# Patient Record
Sex: MALE | Race: WHITE | NOT HISPANIC OR LATINO | Employment: OTHER | URBAN - NONMETROPOLITAN AREA
[De-identification: names, ages, dates, MRNs, and addresses within clinical notes are randomized per-mention and may not be internally consistent; named-entity substitution may affect disease eponyms.]

---

## 2017-01-17 ENCOUNTER — HOSPITAL ENCOUNTER (INPATIENT)
Facility: HOSPITAL | Age: 47
LOS: 10 days | Discharge: HOME | End: 2017-01-27
Attending: FAMILY MEDICINE | Admitting: FAMILY MEDICINE

## 2017-01-17 ENCOUNTER — APPOINTMENT (OUTPATIENT)
Dept: ULTRASOUND IMAGING | Facility: HOSPITAL | Age: 47
End: 2017-01-17

## 2017-01-17 ENCOUNTER — APPOINTMENT (OUTPATIENT)
Dept: CARDIOLOGY | Facility: HOSPITAL | Age: 47
End: 2017-01-17
Attending: INTERNAL MEDICINE

## 2017-01-17 ENCOUNTER — APPOINTMENT (OUTPATIENT)
Dept: CT IMAGING | Facility: HOSPITAL | Age: 47
End: 2017-01-17

## 2017-01-17 DIAGNOSIS — N08 HENOCH-SCHONLEIN PURPURA NEPHRITIS (HCC): Primary | ICD-10-CM

## 2017-01-17 DIAGNOSIS — D69.0 HENOCH-SCHONLEIN PURPURA NEPHRITIS (HCC): Primary | ICD-10-CM

## 2017-01-17 DIAGNOSIS — N17.9 ACUTE KIDNEY INJURY SUPERIMPOSED ON CHRONIC KIDNEY DISEASE (HCC): ICD-10-CM

## 2017-01-17 DIAGNOSIS — N08 HSP (HENOCH-SCHONLEIN PURPURA) NEPHRITIS (HCC): ICD-10-CM

## 2017-01-17 DIAGNOSIS — D69.0 HSP (HENOCH-SCHONLEIN PURPURA) NEPHRITIS (HCC): ICD-10-CM

## 2017-01-17 DIAGNOSIS — N18.9 ACUTE KIDNEY INJURY SUPERIMPOSED ON CHRONIC KIDNEY DISEASE (HCC): ICD-10-CM

## 2017-01-17 LAB
ALBUMIN SERPL-MCNC: 3.7 G/DL (ref 3.5–5)
ALBUMIN/GLOB SERPL: 0.9 G/DL (ref 1.1–2.5)
ALP SERPL-CCNC: 92 U/L (ref 24–120)
ALT SERPL W P-5'-P-CCNC: 61 U/L (ref 0–54)
ANION GAP SERPL CALCULATED.3IONS-SCNC: 16 MMOL/L (ref 4–13)
ANION GAP SERPL CALCULATED.3IONS-SCNC: 16 MMOL/L (ref 4–13)
ANION GAP SERPL CALCULATED.3IONS-SCNC: 17 MMOL/L (ref 4–13)
AST SERPL-CCNC: 36 U/L (ref 7–45)
BACTERIA UR QL AUTO: ABNORMAL /HPF
BASOPHILS # BLD AUTO: 0.02 10*3/MM3 (ref 0–0.2)
BASOPHILS NFR BLD AUTO: 0.2 % (ref 0–2)
BILIRUB SERPL-MCNC: 0.7 MG/DL (ref 0.1–1)
BILIRUB UR QL STRIP: NEGATIVE
BUN BLD-MCNC: 109 MG/DL (ref 5–21)
BUN BLD-MCNC: 99 MG/DL (ref 5–21)
BUN BLD-MCNC: 99 MG/DL (ref 5–21)
BUN/CREAT SERPL: 17.8 (ref 7–25)
BUN/CREAT SERPL: 18 (ref 7–25)
BUN/CREAT SERPL: 19.2 (ref 7–25)
CALCIUM SPEC-SCNC: 8.4 MG/DL (ref 8.4–10.4)
CALCIUM SPEC-SCNC: 8.6 MG/DL (ref 8.4–10.4)
CALCIUM SPEC-SCNC: 8.8 MG/DL (ref 8.4–10.4)
CHLORIDE SERPL-SCNC: 96 MMOL/L (ref 98–110)
CHLORIDE SERPL-SCNC: 98 MMOL/L (ref 98–110)
CHLORIDE SERPL-SCNC: 99 MMOL/L (ref 98–110)
CLARITY UR: CLEAR
CO2 SERPL-SCNC: 23 MMOL/L (ref 24–31)
CO2 SERPL-SCNC: 23 MMOL/L (ref 24–31)
CO2 SERPL-SCNC: 24 MMOL/L (ref 24–31)
COLOR UR: ABNORMAL
CREAT BLD-MCNC: 5.51 MG/DL (ref 0.5–1.4)
CREAT BLD-MCNC: 5.57 MG/DL (ref 0.5–1.4)
CREAT BLD-MCNC: 5.69 MG/DL (ref 0.5–1.4)
DEPRECATED RDW RBC AUTO: 48.5 FL (ref 40–54)
DEPRECATED RDW RBC AUTO: 49.5 FL (ref 40–54)
EOSINOPHIL # BLD AUTO: 0 10*3/MM3 (ref 0–0.7)
EOSINOPHIL NFR BLD AUTO: 0 % (ref 0–4)
ERYTHROCYTE [DISTWIDTH] IN BLOOD BY AUTOMATED COUNT: 16.6 % (ref 12–15)
ERYTHROCYTE [DISTWIDTH] IN BLOOD BY AUTOMATED COUNT: 16.6 % (ref 12–15)
GFR SERPL CREATININE-BSD FRML MDRD: 11 ML/MIN/1.73
GLOBULIN UR ELPH-MCNC: 4 GM/DL
GLUCOSE BLD-MCNC: 110 MG/DL (ref 70–100)
GLUCOSE BLD-MCNC: 121 MG/DL (ref 70–100)
GLUCOSE BLD-MCNC: 80 MG/DL (ref 70–100)
GLUCOSE UR STRIP-MCNC: NEGATIVE MG/DL
HCT VFR BLD AUTO: 33 % (ref 40–52)
HCT VFR BLD AUTO: 33.4 % (ref 40–52)
HGB BLD-MCNC: 10.6 G/DL (ref 14–18)
HGB BLD-MCNC: 10.9 G/DL (ref 14–18)
HGB UR QL STRIP.AUTO: ABNORMAL
HYALINE CASTS UR QL AUTO: ABNORMAL /LPF
IMM GRANULOCYTES # BLD: 0.02 10*3/MM3 (ref 0–0.03)
IMM GRANULOCYTES NFR BLD: 0.2 % (ref 0–5)
KETONES UR QL STRIP: NEGATIVE
LEUKOCYTE ESTERASE UR QL STRIP.AUTO: ABNORMAL
LYMPHOCYTES # BLD AUTO: 2.59 10*3/MM3 (ref 0.72–4.86)
LYMPHOCYTES NFR BLD AUTO: 23.1 % (ref 15–45)
MCH RBC QN AUTO: 26 PG (ref 28–32)
MCH RBC QN AUTO: 26.1 PG (ref 28–32)
MCHC RBC AUTO-ENTMCNC: 32.1 G/DL (ref 33–36)
MCHC RBC AUTO-ENTMCNC: 32.6 G/DL (ref 33–36)
MCV RBC AUTO: 80.1 FL (ref 82–95)
MCV RBC AUTO: 80.9 FL (ref 82–95)
MONOCYTES # BLD AUTO: 0.84 10*3/MM3 (ref 0.19–1.3)
MONOCYTES NFR BLD AUTO: 7.5 % (ref 4–12)
NEUTROPHILS # BLD AUTO: 7.74 10*3/MM3 (ref 1.87–8.4)
NEUTROPHILS NFR BLD AUTO: 69 % (ref 39–78)
NITRITE UR QL STRIP: NEGATIVE
PH UR STRIP.AUTO: 6.5 [PH] (ref 5–8)
PLATELET # BLD AUTO: 128 10*3/MM3 (ref 130–400)
PLATELET # BLD AUTO: 133 10*3/MM3 (ref 130–400)
PMV BLD AUTO: 9.3 FL (ref 6–12)
PMV BLD AUTO: 9.4 FL (ref 6–12)
POTASSIUM BLD-SCNC: 4.7 MMOL/L (ref 3.5–5.3)
POTASSIUM BLD-SCNC: 5.1 MMOL/L (ref 3.5–5.3)
POTASSIUM BLD-SCNC: 5.3 MMOL/L (ref 3.5–5.3)
PROT SERPL-MCNC: 7.7 G/DL (ref 6.3–8.7)
PROT UR QL STRIP: ABNORMAL
RBC # BLD AUTO: 4.08 10*6/MM3 (ref 4.8–5.9)
RBC # BLD AUTO: 4.17 10*6/MM3 (ref 4.8–5.9)
RBC # UR: ABNORMAL /HPF
REF LAB TEST METHOD: ABNORMAL
SODIUM BLD-SCNC: 136 MMOL/L (ref 135–145)
SODIUM BLD-SCNC: 138 MMOL/L (ref 135–145)
SODIUM BLD-SCNC: 138 MMOL/L (ref 135–145)
SP GR UR STRIP: 1.01 (ref 1–1.03)
SQUAMOUS #/AREA URNS HPF: ABNORMAL /HPF
UROBILINOGEN UR QL STRIP: ABNORMAL
WBC NRBC COR # BLD: 11.21 10*3/MM3 (ref 4.8–10.8)
WBC NRBC COR # BLD: 12.75 10*3/MM3 (ref 4.8–10.8)
WBC UR QL AUTO: ABNORMAL /HPF

## 2017-01-17 PROCEDURE — 87086 URINE CULTURE/COLONY COUNT: CPT | Performed by: FAMILY MEDICINE

## 2017-01-17 PROCEDURE — 93306 TTE W/DOPPLER COMPLETE: CPT | Performed by: INTERNAL MEDICINE

## 2017-01-17 PROCEDURE — 85027 COMPLETE CBC AUTOMATED: CPT | Performed by: INTERNAL MEDICINE

## 2017-01-17 PROCEDURE — 76775 US EXAM ABDO BACK WALL LIM: CPT

## 2017-01-17 PROCEDURE — 25010000002 METHYLPREDNISOLONE PER 125 MG: Performed by: FAMILY MEDICINE

## 2017-01-17 PROCEDURE — 25010000002 PERFLUTREN (DEFINITY) 8.476 MG IN SODIUM CHLORIDE 10 ML INJECTION: Performed by: INTERNAL MEDICINE

## 2017-01-17 PROCEDURE — 80053 COMPREHEN METABOLIC PANEL: CPT | Performed by: FAMILY MEDICINE

## 2017-01-17 PROCEDURE — 81001 URINALYSIS AUTO W/SCOPE: CPT | Performed by: FAMILY MEDICINE

## 2017-01-17 PROCEDURE — 85025 COMPLETE CBC W/AUTO DIFF WBC: CPT | Performed by: FAMILY MEDICINE

## 2017-01-17 PROCEDURE — 93005 ELECTROCARDIOGRAM TRACING: CPT | Performed by: INTERNAL MEDICINE

## 2017-01-17 PROCEDURE — 99284 EMERGENCY DEPT VISIT MOD MDM: CPT

## 2017-01-17 PROCEDURE — 25010000002 MORPHINE SULFATE (PF) 2 MG/ML SOLUTION: Performed by: FAMILY MEDICINE

## 2017-01-17 PROCEDURE — 25010000002 METHYLPREDNISOLONE PER 125 MG: Performed by: INTERNAL MEDICINE

## 2017-01-17 PROCEDURE — 94799 UNLISTED PULMONARY SVC/PX: CPT

## 2017-01-17 PROCEDURE — 99253 IP/OBS CNSLTJ NEW/EST LOW 45: CPT | Performed by: UROLOGY

## 2017-01-17 PROCEDURE — 80048 BASIC METABOLIC PNL TOTAL CA: CPT | Performed by: NURSE PRACTITIONER

## 2017-01-17 PROCEDURE — 25010000002 CEFTRIAXONE: Performed by: INTERNAL MEDICINE

## 2017-01-17 PROCEDURE — 25010000002 ONDANSETRON PER 1 MG: Performed by: FAMILY MEDICINE

## 2017-01-17 PROCEDURE — C8929 TTE W OR WO FOL WCON,DOPPLER: HCPCS

## 2017-01-17 PROCEDURE — 93010 ELECTROCARDIOGRAM REPORT: CPT | Performed by: INTERNAL MEDICINE

## 2017-01-17 PROCEDURE — 74176 CT ABD & PELVIS W/O CONTRAST: CPT

## 2017-01-17 PROCEDURE — 80048 BASIC METABOLIC PNL TOTAL CA: CPT | Performed by: INTERNAL MEDICINE

## 2017-01-17 RX ORDER — ACETAMINOPHEN 325 MG/1
650 TABLET ORAL EVERY 6 HOURS PRN
Status: DISCONTINUED | OUTPATIENT
Start: 2017-01-17 | End: 2017-01-20 | Stop reason: SDUPTHER

## 2017-01-17 RX ORDER — ALBUTEROL SULFATE 90 UG/1
AEROSOL, METERED RESPIRATORY (INHALATION) EVERY 6 HOURS
Status: ON HOLD | COMMUNITY
End: 2020-08-25

## 2017-01-17 RX ORDER — ONDANSETRON 2 MG/ML
4 INJECTION INTRAMUSCULAR; INTRAVENOUS ONCE
Status: COMPLETED | OUTPATIENT
Start: 2017-01-17 | End: 2017-01-17

## 2017-01-17 RX ORDER — LISINOPRIL AND HYDROCHLOROTHIAZIDE 12.5; 1 MG/1; MG/1
1 TABLET ORAL DAILY
COMMUNITY
Start: 2016-11-03 | End: 2017-01-27 | Stop reason: HOSPADM

## 2017-01-17 RX ORDER — IPRATROPIUM BROMIDE AND ALBUTEROL SULFATE 2.5; .5 MG/3ML; MG/3ML
3 SOLUTION RESPIRATORY (INHALATION) EVERY 4 HOURS PRN
Status: DISCONTINUED | OUTPATIENT
Start: 2017-01-17 | End: 2017-01-27 | Stop reason: HOSPADM

## 2017-01-17 RX ORDER — SODIUM CHLORIDE 9 MG/ML
150 INJECTION, SOLUTION INTRAVENOUS CONTINUOUS
Status: DISCONTINUED | OUTPATIENT
Start: 2017-01-17 | End: 2017-01-22

## 2017-01-17 RX ORDER — METHYLPREDNISOLONE 4 MG/1
TABLET ORAL 2 TIMES DAILY
COMMUNITY
End: 2017-01-27 | Stop reason: HOSPADM

## 2017-01-17 RX ORDER — METHYLPREDNISOLONE SODIUM SUCCINATE 125 MG/2ML
80 INJECTION, POWDER, LYOPHILIZED, FOR SOLUTION INTRAMUSCULAR; INTRAVENOUS EVERY 8 HOURS
Status: DISCONTINUED | OUTPATIENT
Start: 2017-01-17 | End: 2017-01-21

## 2017-01-17 RX ORDER — METHYLPREDNISOLONE SODIUM SUCCINATE 125 MG/2ML
125 INJECTION, POWDER, LYOPHILIZED, FOR SOLUTION INTRAMUSCULAR; INTRAVENOUS ONCE
Status: COMPLETED | OUTPATIENT
Start: 2017-01-17 | End: 2017-01-17

## 2017-01-17 RX ORDER — METHYLPREDNISOLONE SODIUM SUCCINATE 125 MG/2ML
80 INJECTION, POWDER, LYOPHILIZED, FOR SOLUTION INTRAMUSCULAR; INTRAVENOUS EVERY 8 HOURS
Status: DISCONTINUED | OUTPATIENT
Start: 2017-01-17 | End: 2017-01-17

## 2017-01-17 RX ORDER — CIPROFLOXACIN 500 MG/1
TABLET, FILM COATED ORAL
COMMUNITY
Start: 2016-12-30 | End: 2017-01-27 | Stop reason: HOSPADM

## 2017-01-17 RX ORDER — SODIUM CHLORIDE 9 MG/ML
125 INJECTION, SOLUTION INTRAVENOUS CONTINUOUS
Status: DISCONTINUED | OUTPATIENT
Start: 2017-01-17 | End: 2017-01-17 | Stop reason: ALTCHOICE

## 2017-01-17 RX ORDER — ONDANSETRON 2 MG/ML
4 INJECTION INTRAMUSCULAR; INTRAVENOUS EVERY 6 HOURS PRN
Status: DISCONTINUED | OUTPATIENT
Start: 2017-01-17 | End: 2017-01-27 | Stop reason: HOSPADM

## 2017-01-17 RX ORDER — SODIUM CHLORIDE 0.9 % (FLUSH) 0.9 %
1-10 SYRINGE (ML) INJECTION AS NEEDED
Status: DISCONTINUED | OUTPATIENT
Start: 2017-01-17 | End: 2017-01-27 | Stop reason: HOSPADM

## 2017-01-17 RX ORDER — MORPHINE SULFATE 2 MG/ML
2 INJECTION, SOLUTION INTRAMUSCULAR; INTRAVENOUS ONCE
Status: COMPLETED | OUTPATIENT
Start: 2017-01-17 | End: 2017-01-17

## 2017-01-17 RX ORDER — NICOTINE 21 MG/24HR
1 PATCH, TRANSDERMAL 24 HOURS TRANSDERMAL NIGHTLY
Status: DISCONTINUED | OUTPATIENT
Start: 2017-01-17 | End: 2017-01-27 | Stop reason: HOSPADM

## 2017-01-17 RX ADMIN — SODIUM CHLORIDE 1000 ML: 9 INJECTION, SOLUTION INTRAVENOUS at 02:09

## 2017-01-17 RX ADMIN — ONDANSETRON 4 MG: 2 INJECTION INTRAMUSCULAR; INTRAVENOUS at 02:11

## 2017-01-17 RX ADMIN — CEFTRIAXONE 1 G: 1 INJECTION, POWDER, FOR SOLUTION INTRAMUSCULAR; INTRAVENOUS at 05:17

## 2017-01-17 RX ADMIN — METHYLPREDNISOLONE SODIUM SUCCINATE 80 MG: 125 INJECTION, POWDER, FOR SOLUTION INTRAMUSCULAR; INTRAVENOUS at 17:13

## 2017-01-17 RX ADMIN — SODIUM CHLORIDE 150 ML/HR: 9 INJECTION, SOLUTION INTRAVENOUS at 21:58

## 2017-01-17 RX ADMIN — METHYLPREDNISOLONE SODIUM SUCCINATE 125 MG: 125 INJECTION, POWDER, FOR SOLUTION INTRAMUSCULAR; INTRAVENOUS at 02:14

## 2017-01-17 RX ADMIN — METHYLPREDNISOLONE SODIUM SUCCINATE 80 MG: 125 INJECTION, POWDER, FOR SOLUTION INTRAMUSCULAR; INTRAVENOUS at 11:03

## 2017-01-17 RX ADMIN — SODIUM CHLORIDE 150 ML/HR: 9 INJECTION, SOLUTION INTRAVENOUS at 15:25

## 2017-01-17 RX ADMIN — SODIUM CHLORIDE 125 ML/HR: 9 INJECTION, SOLUTION INTRAVENOUS at 01:47

## 2017-01-17 RX ADMIN — MORPHINE SULFATE 2 MG: 2 INJECTION, SOLUTION INTRAMUSCULAR; INTRAVENOUS at 02:11

## 2017-01-17 RX ADMIN — SODIUM CHLORIDE 5 ML: 9 INJECTION INTRAMUSCULAR; INTRAVENOUS; SUBCUTANEOUS at 16:26

## 2017-01-17 NOTE — ED PROVIDER NOTES
Subjective   History of Present Illness  Patient presents for hematuria and right flank pain and increase renal failure suspected.  Patient reports that he was recently admitted to [facility] for Henoch Schoenlein purpura he was treated and released and instructed to drink 3 gallons a day of water.  Patient reports that he was doing really well and then he started to have nausea and vomiting had difficulty keeping his fluids down he did go back to [facility] they told him his creatinine was much higher was in fact twice as high as it was when he was discharged however they told him to go home he needed to find a nephrologist in order to get that taken care off and they discharged him.  Patient's wife it was a nurse for 30 years she looked at the values realized something was wrong and she had him brought up here.  Nausea and vomiting has made this worse nothing has made this better.  He does not have a history of this prior to this episode this month.  Patient reports that this flank pain has been increasingly worse today however.  Review of Systems   Constitutional: Negative for activity change, chills, fatigue and fever.   HENT: Negative for congestion and dental problem.    Eyes: Negative for pain, discharge and itching.   Respiratory: Negative for apnea and chest tightness.    Cardiovascular: Negative for chest pain and palpitations.   Gastrointestinal: Positive for nausea and vomiting. Negative for abdominal pain and diarrhea.   Endocrine: Negative for polydipsia and polyuria.   Genitourinary: Positive for flank pain, frequency and hematuria. Negative for difficulty urinating and dysuria.   Musculoskeletal: Negative for arthralgias, neck pain and neck stiffness.   Neurological: Negative for dizziness and headaches.   Hematological: Negative for adenopathy. Does not bruise/bleed easily.   Psychiatric/Behavioral: Negative for agitation and behavioral problems.       Past  Medical History   Diagnosis Date   • A-fib    • Chronic renal failure    • Elevated PSA    • Purpura        No Known Allergies    Past Surgical History   Procedure Laterality Date   • Appendectomy         History reviewed. No pertinent family history.    Social History     Social History   • Marital status:      Spouse name: N/A   • Number of children: N/A   • Years of education: N/A     Social History Main Topics   • Smoking status: Heavy Tobacco Smoker   • Smokeless tobacco: None   • Alcohol use Yes   • Drug use: None   • Sexual activity: Not Asked     Other Topics Concern   • None     Social History Narrative   • None       Lab Results (last 24 hours)     Procedure Component Value Units Date/Time    Urinalysis With / Culture If Indicated [[id number]]  (Abnormal) Collected:  01/17/17 0044    Specimen:  Urine from Urine, Clean Catch Updated:  01/17/17 0129     Color, UA Red (A)      Appearance, UA Clear      pH, UA 6.5      Specific Gravity, UA 1.014      Glucose, UA Negative      Ketones, UA Negative      Bilirubin, UA Negative      Blood, UA Large (3+) (A)      Protein,  mg/dL (2+) (A)      Leuk Esterase, UA Small (1+) (A)      Nitrite, UA Negative      Urobilinogen, UA 0.2 E.U./dL     Urinalysis, Microscopic Only [[id number]]  (Abnormal) Collected:  01/17/17 0044    Specimen:  Urine from Urine, Clean Catch Updated:  01/17/17 0129     RBC, UA Too Numerous to Count (A) /HPF      WBC, UA 6-12 (A) /HPF      Bacteria, UA None Seen /HPF      Squamous Epithelial Cells, UA 0-2 /HPF      Hyaline Casts, UA 0-2 /LPF      Methodology Manual Light Microscopy     Urine Culture [[id number]] Collected:  01/17/17 0044    Specimen:  Urine from Urine, Clean Catch Updated:  01/17/17 0057    CBC & Differential [[id number]] Collected:  01/17/17 0115    Specimen:  Blood Updated:  01/17/17 0130    Narrative:       The following orders were created for panel order CBC & Differential.  Procedure                                Abnormality         Status                     ---------                               -----------         ------                     CBC Auto Differential[[id number]]         Abnormal            Final result                 Please view results for these tests on the individual orders.    Comprehensive Metabolic Panel [[id number]]  (Abnormal) Collected:  01/17/17 0115    Specimen:  Blood Updated:  01/17/17 0142     Glucose 80 mg/dL       (H) mg/dL      Creatinine 5.69 (H) mg/dL      Sodium 138 mmol/L      Potassium 4.7 mmol/L      Chloride 98 mmol/L      CO2 24.0 mmol/L      Calcium 8.8 mg/dL      Total Protein 7.7 g/dL      Albumin 3.70 g/dL      ALT (SGPT) 61 (H) U/L      AST (SGOT) 36 U/L      Alkaline Phosphatase 92 U/L      Total Bilirubin 0.7 mg/dL      eGFR Non African Amer 11 (L) mL/min/1.73      Globulin 4.0 gm/dL      A/G Ratio 0.9 (L) g/dL      BUN/Creatinine Ratio 19.2      Anion Gap 16.0 (H) mmol/L     CBC Auto Differential [[id number]]  (Abnormal) Collected:  01/17/17 0115    Specimen:  Blood Updated:  01/17/17 0130     WBC 11.21 (H) 10*3/mm3      RBC 4.17 (L) 10*6/mm3      Hemoglobin 10.9 (L) g/dL      Hematocrit 33.4 (L) %      MCV 80.1 (L) fL      MCH 26.1 (L) pg      MCHC 32.6 (L) g/dL      RDW 16.6 (H) %      RDW-SD 48.5 fl      MPV 9.3 fL      Platelets 133 10*3/mm3      Neutrophil % 69.0 %      Lymphocyte % 23.1 %      Monocyte % 7.5 %      Eosinophil % 0.0 %      Basophil % 0.2 %      Immature Grans % 0.2 %      Neutrophils, Absolute 7.74 10*3/mm3      Lymphocytes, Absolute 2.59 10*3/mm3      Monocytes, Absolute 0.84 10*3/mm3      Eosinophils, Absolute 0.00 10*3/mm3      Basophils, Absolute 0.02 10*3/mm3      Immature Grans, Absolute 0.02 10*3/mm3           Objective   Physical Exam   Constitutional: He is oriented to person, place, and time. He appears well-developed and well-nourished.   HENT:   Head: Normocephalic and atraumatic.   Eyes: Conjunctivae and EOM are normal. Pupils are equal,  "round, and reactive to light.   Neck: Normal range of motion. Neck supple.   Cardiovascular: Normal rate and regular rhythm.    Pulmonary/Chest: Effort normal and breath sounds normal.   Abdominal: Soft. Bowel sounds are normal.   Musculoskeletal: He exhibits no tenderness or deformity.   Neurological: He is alert and oriented to person, place, and time.   Skin: Skin is warm and dry. Rash noted.   Purpura most pronounced on bilateral lower extremities.   Nursing note and vitals reviewed.      Procedures         CT Abdomen Pelvis Without Contrast    (Results Pending)       Visit Vitals   • /86   • Pulse 80   • Temp 98.6 °F (37 °C) (Temporal Artery )   • Resp 21   • Ht 73\" (185.4 cm)   • Wt 219 lb (99.3 kg)   • SpO2 99%   • BMI 28.89 kg/m2       ED Course    ED Course       Medications   sodium chloride 0.9 % infusion (125 mL/hr Intravenous New Bag 1/17/17 0147)   methylPREDNISolone sodium succinate (SOLU-Medrol) injection 80 mg (not administered)   acetaminophen (TYLENOL) tablet 650 mg (not administered)   ondansetron (ZOFRAN) injection 4 mg (not administered)   ipratropium-albuterol (DUO-NEB) nebulizer solution 3 mL (not administered)   sodium chloride 0.9 % infusion (not administered)   sodium chloride 0.9 % bolus 1,000 mL (0 mL Intravenous Stopped 1/17/17 0414)   methylPREDNISolone sodium succinate (SOLU-Medrol) injection 125 mg (125 mg Intravenous Given 1/17/17 0214)   ondansetron (ZOFRAN) injection 4 mg (4 mg Intravenous Given 1/17/17 0211)   Morphine sulfate (PF) injection 2 mg (2 mg Intravenous Given 1/17/17 0211)            MDM  Number of Diagnoses or Management Options  Henoch-Schonlein purpura nephritis: established and worsening  Diagnosis management comments: Spoke to nephrology they will consult in the a.m.  I did speak to jaime the mid-level for the nephrology group.  Spoke to Dr. Johan Dinh who will admit the patient for further workup.  Patient was given 1 L normal saline down here 125 mL of " saline per hour is now ordered and Dr. Dinh states that he will continue that.  Explained patient all the findings patient will be admitted to the service of the hospitalist for further evaluation and workup patient indicated understanding agreed with the plan.       Amount and/or Complexity of Data Reviewed  Clinical lab tests: ordered and reviewed  Tests in the radiology section of CPT®: ordered and reviewed  Tests in the medicine section of CPT®: ordered and reviewed  Independent visualization of images, tracings, or specimens: yes    Risk of Complications, Morbidity, and/or Mortality  Presenting problems: moderate  Diagnostic procedures: moderate  Management options: moderate    Patient Progress  Patient progress: improved      Final diagnoses:   Henoch-Schonlein purpura nephritis    add the diagnosis acute renal failure, flank pain, vomiting, nausea     Skye Lee,   01/17/17 0433

## 2017-01-17 NOTE — H&P
TIME: 4:28 a.m.     PRIMARY CARE PHYSICIAN: Moises Montes MD    HISTORY OF PRESENT ILLNESS: Mr. Andrea as a 47-year-old  male who presents to the emergency department at [facility] due to a chief complaint of nausea and vomiting associated with dark urine and bilateral flank pain. Imaging studies in the emergency department demonstrate right-sided hydronephrosis. Mr. Andrea has a history of nephrolithiasis. To complicate matters somewhat, he also has a history of HSP (Henoch-Schonlein purpura). In the emergency department, he was found to have significantly worsening renal function. Mr. Andrea will require admission to the hospital for further evaluation and treatment.     Note that Mr. Andrea awoke on the morning of 01/02/2017 and noticed an usual rash on his lower extremities. At that time he was also producing a dark urine. He presented at [facility] Emergency Department and was admitted with a diagnosis of HSP. He remained in the hospital for approximately 3 days and was discharged in stable and improved condition. Repeat followup laboratory studies demonstrated stable renal function. However, he has since then developed nausea and vomiting and his renal function has worsened considerably.     Presently, he is awake and alert. He has no complaints. He is in no distress at this time. He will require admission to the hospital for further evaluation and treatment. The nephrology service has already been notified.     REVIEW OF SYSTEMS: Otherwise unremarkable from a cardiovascular, pulmonary, gastrointestinal, genitourinary, neurologic, psychiatric, metabolic and constitutional standpoint, except as noted. He has had no unusual fatigue, malaise, lethargy or weakness. He has had no definite fevers, chills, or sweats. His appetite is good. His weight is stable. He has had no chest pain, chest palpitations, shortness of breath, lower extremity swelling, orthopnea, cough,  wheezing, or hemoptysis. He has had no abdominal pain, but he has had nausea and vomiting. He has had no diarrhea or constipation. He has had no dysphagia, odynophagia, hematemesis, hematochezia, or melena. He relates bilateral flank pain. He has had no pelvic pain. He has dark-colored urine. He has had no dysuria. He has had no skin rashes, arthralgias, or myalgias except for purpura on his lower extremities, which is not palpable at this time. He has had no headache, confusion, memory deficits or loss of consciousness. He has had no changes in vision or hearing. He has had no acute motor or sensory deficits.     PAST MEDICAL HISTORY:  1. Hypertension.   2. Paroxysmal atrial fibrillation.  3. Nephrolithiasis.   4. Chronic kidney disease.   5. Elevated PSA.   6. Recurring hematuria.   7. Hypomagnesemia.     PAST SURGICAL HISTORY:  1. Status post appendectomy.   2. Status post reconstruction of his lower lip due to injury.     ALLERGIES: No known drug allergies.     HOME MEDICATIONS:  1. Cipro 500 mg p.o. b.i.d.   2. Lisinopril/hydrochlorothiazide 10/12.5, take 1 p.o. daily.   3. Medrol Dosepak.   4. Lopressor 25 mg p.o. daily.   5. Albuterol inhaler 2-4 inhalations q.6 h. p.r.n. for shortness of breath.     SOCIAL HISTORY: Significant for being a resident of [place], [place]. He is . He has no children. He works on his farm. He has a high school education plus an additional studies. He smokes a pack of cigarettes per day and has done so for 30 years. He drinks alcohol on rare occasions. He has no history of illicit drug use. He has no particular Orthodoxy affiliation. He has no recent history of travel outside this region.     He designates his wife, Karine Dey, to serve as a surrogate for healthcare matters should such become necessary.     CODE STATUS: He is a FULL CODE.      FAMILY HISTORY: Significant for having a half-brother on his mother's side of the family. His brother is in good health;  however, he has a history of Kawasaki's disease as a child. His father is living but has a history of end-stage liver disease due to alcohol use, COPD, and diabetes mellitus type 2. His mother is alive and has a history of chronic kidney disease and hypothyroidism.      PHYSICAL EXAMINATION:  VITAL SIGNS: Temperature is 98.6, pulse 80, respirations are 21 and unlabored, blood pressure is 137/86, O2 saturation is 99% on nasal cannula, weight is 219 pounds.   GENERAL: This is a 47-year-old  male appearing his documented age. He is resting comfortably in bed. He is in no apparent distress. He is articulate in his speech. He is interactive and cooperative. He proves to be a good historian. His wife was present throughout the interview and exam and also assisted in providing information.   HEENT: Essentially unremarkable except as noted. I see no signs of acute trauma. Eyes, nose, and throat are grossly unremarkable. Sclerae are clear. There is no discharge from the nostrils. Mucous membranes are moist.   NECK: Supple. He has no cervical or clavicular adenopathy. He has no definite carotid bruits. There are no masses of the head or neck. Neck veins are not pathologically distended.   CARDIAC: Reveals S1 and S2 with a regular rhythm. He has a 3/6 systolic murmur heard best along the left anterior chest.   ABDOMEN: Reveals bowel sounds to be present. His abdomen is nontender, nondistended and soft. He relates bilateral flank pain. There is no guarding.   EXTREMITIES: No lower extremity edema, erythema or calf tenderness. He has a purpura rash which is obvious on his lower extremities, most notably below the knees.   NEUROLOGIC: Reveals the patient to be awake and alert. He seems oriented to person, place, time and situation. Cranial nerves II through XII appear grossly intact. He exhibits no definite focal, motor or sensory deficits. He seems able to move all extremities without difficulty and at will. His gait  was not tested.   PSYCHIATRIC: Reveals his mood to be stable. Affect seems appropriate. Thought processes are organized in that he is able to answer questions appropriately and provide a coherent history. Speech is fluent. There is no flight of ideas. There are no apparent short-term or long-term memory deficits.     DIAGNOSTIC DATA: Sodium 138, potassium 4.7, chloride 98, CO2 of 24, , creatinine 5.69, glucose 80, total calcium 8.8.     Liver function testing is essentially unremarkable; however, ALT is slightly elevated at 61.     CBC demonstrates a white blood cell count of 11.2, hemoglobin 10.9, hematocrit 33.4, and platelet count 133,000.     Urinalysis demonstrates a specific gravity of 1.014 with a pH of 6.5. His urine is positive for blood, protein and leukocyte esterase. He has too numerous to count red blood cells per high-power field and 6-12 white blood cells per high-power field.     CT study of the abdomen and pelvis demonstrates right-sided hydronephrosis, per report of the emergency department physician.     EKG is pending.     IMPRESSION:  1. Acute kidney injury.   2. Chronic kidney disease.   3. HSP.   4. Nephrolithiasis.   5. Right hydronephrosis.   6. Elevated PSA.   7. Hypertension.   8. Paroxysmal atrial fibrillation.   9. Urinary tract infection.     PLAN: At this time, Mr. Andrea will be admitted to [facility] for further evaluation and treatment. His admitting diagnoses are as noted. His condition at this time is judged to be stable. He will be placed on telemetry due to his acute kidney injury.     I have asked the nursing staff to obtain vital signs per protocol. He will be confined to bedrest with bathroom privileges with assistance. As noted, he has no known drug allergies. I have asked the nursing staff to monitor input and output. Daily weights will be obtained. He will be maintained on a renal diet but will be held n.p.o. pending evaluation by the nephrology  service. IV fluids will consist of normal saline at 150 mL/h. Oxygen will be used as needed to maintain his O2 saturation greater than 92%. He is a FULL CODE. Fall precautions are to be instituted. I will consult the nephrology and urology services.     ADMITTING MEDICATIONS:   1. Solu-Medrol 80 mg IV q.8 h.   2. Tylenol 650 mg p.o. q.6 h. p.r.n. for fever and/or discomfort.   3. DuoNeb 1 unit q.4 h. p.r.n. for shortness of breath.   4. Zofran 4 mg IV q.6 h. p.r.n. for nausea and vomiting.   5. Rocephin 1 g IV daily.     I will obtain followup laboratory studies in the morning.     I will obtain a renal ultrasound study.     EKG is pending.     I will continue to follow Mr. Andrea closely through the night, pending the return of the hospitalist team in the morning. Nursing staff may certainly call should they have any questions or concerns. Please refer to the medical record for additional information, orders and/or comments.       cc:  MD Johan PARMAR M.D. JRP/[id number]  D:  01/17/2017 05:44:51(Eastern Time)  T:  01/17/2017 07:19:27(Eastern Time)  Voice ID:  [id number]/Document ID:  [id number]

## 2017-01-17 NOTE — CONSULTS
Consults         Referring Provider: Fabrizio  Reason for Consultation: Hydronephrosis    Chief complaint Hematuria/Right Flank Pain    Subjective .     History of present illness:  Urolithiasis  Patient complains of right flank pain without radiation to the testicles. Onset of symptoms was abrupt starting 2 days ago with rapidly improving course since that time. Patient describes the pain as cramping, continuous and rated as severe. The patient has had nausea and vomiting. There has been no fever or chills. The patient is not complaining of dysuria, frequency, or urgency.  Previous management of stones includes none     Elevated PSA  Patient is here with an elevated PSA. The PSA was drawn4 week(s). He does not have a family history of prostate cancer. His AUA Symptom Score is  /35. Voiding symptoms include Hematuria. Denies Incomplete emptying, Intermittency, Weakened stream, Straining, Nocturia and Dysuria. Voiding symptoms began several days  . These have been sudden in onset. none  Previous PSA values are : 18.1 repeat after abx 17.9  No results found for: PSA         Review of Systems  The following systems were reviewed and negative;  eyes, ENT, respiratory, cardiovascular, gastrointestinal, breast, hematologic / lymphatic, musculoskeletal, neurological, behavioral/psych and endocrine     History  Past Medical History   Diagnosis Date   • A-fib    • Chronic renal failure    • Elevated PSA    • Purpura        Past Surgical History   Procedure Laterality Date   • Appendectomy         History reviewed. No pertinent family history.    Social History     Social History   • Marital status:      Spouse name: N/A   • Number of children: N/A   • Years of education: N/A     Occupational History   • Not on file.     Social History Main Topics   • Smoking status: Heavy Tobacco Smoker   • Smokeless tobacco: Not on file   • Alcohol use Yes   • Drug use: Not on file   • Sexual activity: Not on file     Other Topics  Concern   • Not on file     Social History Narrative   • No narrative on file       Current Facility-Administered Medications   Medication Dose Route Frequency Provider Last Rate Last Dose   • acetaminophen (TYLENOL) tablet 650 mg  650 mg Oral Q6H PRN Johan Dinh MD       • cefTRIAXone (ROCEPHIN) 1 g/100 mL 0.9% NS (MBP)  1 g Intravenous Q24H Johan Dinh MD   Stopped at 01/17/17 0552   • ipratropium-albuterol (DUO-NEB) nebulizer solution 3 mL  3 mL Nebulization Q4H PRN Johan Dinh MD       • methylPREDNISolone sodium succinate (SOLU-Medrol) injection 80 mg  80 mg Intravenous Q8H Johan Dinh MD   80 mg at 01/17/17 1103   • ondansetron (ZOFRAN) injection 4 mg  4 mg Intravenous Q6H PRN Johan Dinh MD       • sodium chloride 0.9 % flush 1-10 mL  1-10 mL Intravenous PRN Johan Dinh MD       • sodium chloride 0.9 % infusion  150 mL/hr Intravenous Continuous Johan Dinh  mL/hr at 01/17/17 0429 150 mL/hr at 01/17/17 0429        No Known Allergies    Objective     Vital Signs   Temp:  [97.5 °F (36.4 °C)-98.6 °F (37 °C)] 97.5 °F (36.4 °C)  Heart Rate:  [70-88] 70  Resp:  [12-21] 20  BP: (124-149)/() 132/79    Intake/Output Summary (Last 24 hours) at 01/17/17 1351  Last data filed at 01/17/17 0913   Gross per 24 hour   Intake      0 ml   Output   1150 ml   Net  -1150 ml       Physical Exam:     General Appearance:    Alert, cooperative, in no acute distress   Head:    Normocephalic, without obvious abnormality, atraumatic   Eyes:            Lids and lashes normal, conjunctivae and sclerae normal, no   icterus, no pallor, corneas clear, PERRLA   Ears:    Ears appear intact with no abnormalities noted   Throat:   No oral lesions, no thrush, oral mucosa moist   Neck:   No adenopathy, supple, trachea midline, no thyromegaly, no   carotid bruit, no JVD   Back:     No kyphosis present, no scoliosis present, no skin lesions,      erythema or scars, no tenderness to percussion or                   palpation,    range of motion normal   Lungs:     Clear to auscultation,respirations regular, even and                  unlabored    Heart:    Regular rhythm and normal rate, normal S1 and S2, no            murmur, no gallop, no rub, no click   Chest Wall:    No abnormalities observed   Abdomen:     Normal bowel sounds, no masses, no organomegaly, soft        non-tender, non-distended, no guarding, no rebound                Tenderness     Genitorurinary:    No anal or perineal lesions  No scrotal lesions, cysts, or rashes  Bilateral epididymides symmetrical without masses  Testes descended bilaterally, symmetric size, no tenderness, no masses  Penis without lesion, mass, scarring, or deformity    Normal Sphincter tone, no hemorrhoids, or masses  Seminal vesicles non palpable  Prostate 40gm, nodularity around apex   Extremities:   Moves all extremities well, no edema, no cyanosis, no             redness   Pulses:   Pulses palpable and equal bilaterally   Skin:   No bleeding, + purpura rash bilateral lower extremities   Lymph nodes:   No palpable adenopathy   Neurologic:   Cranial nerves 2 - 12 grossly intact, sensation intact, DTR       present and equal bilaterally       Results Review:   I reviewed the patient's new clinical results.      @[facility]@    @[facility]@    Imaging Results (last 24 hours)     Procedure Component Value Units Date/Time    CT Abdomen Pelvis Without Contrast [[id number]] Collected:  01/17/17 0714     Updated:  01/17/17 0735    Narrative:       CT ABDOMEN PELVIS WO CONTRAST- 1/17/2017 2:18 AM CST     HISTORY: right flank pain      COMPARISON: None      DLP: 838 mGy cm. Automated exposure control was utilized to diminish  patient radiation dose.     TECHNIQUE: Noncontrast enhanced images of the abdomen and pelvis  obtained without oral contrast.      FINDINGS:   There is mild bibasilar atelectasis. The base of the heart is  unremarkable..      LIVER: There are 3 hypodense lesions within the right lobe of  liver  including a 9 mm lesion within the anterior segment of the right lobe of  the liver as well as a 18 and 13 mm hypodense lesion within the  posterior segment of the right lobe of the liver. These are likely  representative of hepatic cysts. The liver is otherwise normal in  appearance. Ultrasound could be helpful for further characterization..      BILIARY SYSTEM: The gallbladder is unremarkable. No intrahepatic or  extrahepatic ductal dilatation.      PANCREAS: No focal pancreatic lesion.      SPLEEN: There is mild splenomegaly with a [id number] length of 15 cm..        KIDNEYS AND ADRENALS: No discrete renal mass is identified. There is a 9  mm nonobstructing calculus involving the interpolar aspect of the right  kidney. There is mild dilatation of the upper tracts of the right kidney  as well as some proximal right periureteral stranding. This would  suggest the patient may have recently passed a stone. There is no  evidence of a discrete ureteral stone at this time. The left renal  collecting system and ureter is unremarkable..     .     RETROPERITONEUM: No mass, lymphadenopathy or hemorrhage.      GI TRACT: There is mild constipation. There is no evidence of mechanical  obstruction but there are multiple fluid-filled small bowel loops. A few  scattered air-fluid levels are present.. The appendix has been  surgically removed..     OTHER: There is no mesenteric mass, lymphadenopathy or fluid collection.  The osseous structures and soft tissues demonstrate no worrisome  lesions. 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      PELVIS: No mass lesion, fluid collection or significant lymphadenopathy  is seen in the pelvis. The urinary bladder is normal in appearance. The  prostate gland is mildly enlarged.       Impression:       1. There is 9 mm nonobstructing calculus involving the interpolar aspect  of the right kidney. There is mild dilatation of the upper tracts of the  right  kidney and proximal right ureter with proximal periureteral  stranding. I see no evidence of a discrete ureteral stone but this may  represent sequela of a recently passed stone. Mild right-sided  obstructive uropathy secondary to another etiology would also be a  differential and if the patient's hematuria and flank pain are  persistent I would suggest urology consultation with retrograde  examination of the right renal collecting system and ureter. No evidence  of left-sided nephrolithiasis or ureteral calculus.  2. Suspected hepatic cysts within the right lobe of the liver. This  could be confirmed with ultrasound.  3. Nonspecific bowel gas pattern with some scattered fluid-filled bowel  loops. This is likely related to mild constipation with increased stool  noted throughout the colon..   4. Prostatic enlargement. A small fat-containing periumbilical hernia  is present.     Electronically Signed By-Dr. Jarvis Morgan MD On:1/17/2017 8:33 AM  EST  This report was finalized on 01/17/2017 07:33 by Dr. Jarvis Morgan MD.        CT independent review  The CT scan of the abdomen/pelvis done without contrast is available for me to review.  Treatment recommendations require an independent review.  First I scanned the liver, spleen, and bowel pattern.  The retroperitoneum including the major vessels and lymphatic packages are briefly reviewed.  This film as been reviewed by the radiologist to determine any non urologic abnormalities that are present.  The kidneys are closely inspected for size, symmetry, contour, parenchymal thickness, perinephric reaction, presence of calcifications, and intrarenal dilation of the collecting system.  The ureters are inspected for their course, caliber, and any calcifications.  The bladder is inspected for its thickness, size, and presence of any calcifications.  This scan shows:    The right kidney appears 9mm stone in upper pole infindibulum causing obstruction of that calyx.   There is no obstruction of the collecting system.  No dilation of the ureter, no obstructing stone in ureter    The left kidney appears normal on this non-contrasted CT scan.  The renal parenchymal is norml in thickness.  There are no solid masses or cysts.  There is no hydronephrosis.  There are no stones.      The bladder appears normal on this non-contrasted CT scan.  The bladder appears normal in thickness.  There no masses or stones seen on this exam.    Renal ultrasound independent review    The renal ultrasound is available for me to review.  Treatment recommendations require an independent review.  This film has been reviewed by the radiologist to determine any non urologic abnormalities that are presents.  However, I very closely inspected the kidneys for size, symmetry, contour, parenchymal thickness, perinephric reaction, presence of calcifications, and intrarenal dilation of the collecting system.       The right kidney appears 9mm stone in upper pole infindibulum causing obstruction of that calyx.  There is no obstruction of the collecting system.  No dilation of the ureter, no obstructing stone in ureter    The left kidney appears normal on this ultrasound.  The renal parenchymal is norml in thickness.  There are no solid masses or cysts.  There is no hydronephrosis.  There are no stones.      The bladder appears normal on thisultrsaound.  The bladder appears normal in thickness.  There no masses or stones seen on this exam.           Assessment/Plan     Active Problems:    Henoch-Schonlein purpura nephritis      9mm Right Nephrolithiasis  I personally reviewed his images.  It appears to me that he has a stone at the infundibulum of the right upper pole it is causing obstruction of this upper pole calyx.  I do not see dilation of the renal pelvis or the ureter on that side.  I would not recommend  any acute intervention.    As far as his elevated PSA is concerned.  The patient was previously seen by  Doctor Ruiz at [facility].  I reviewed records from Dr. Ruiz indicating a PSA of 18.1 and a repeat of 17.9 after fluoroquinolone antibiotic. His prostate was asymmetrical at the time of examination back in November. He would rather transferred his care to here.  His PSA levels which I have reviewed of18.1and17.9 and his examination I have recommended a prostate biopsy.  I will schedule this on an outpatient basis.    I discussed the patients findings and my recommendations with patient, family and nursing staff    Luis Carlos Awan MD  01/17/17  1:51 PM

## 2017-01-17 NOTE — IP AVS SNAPSHOT
AFTER VISIT SUMMARY             Gurjit Andrea           About your hospitalization     You were admitted on:  January 17, 2017 You last received care in the:  [address]       Procedures & Surgeries      Procedure(s) (LRB):  INSERTION PERMCATH (N/A)     1/17/2017 - 1/20/2017     Surgeon(s):  Ian Grimes, DO  -------------------      Medications    If you or your caregiver advised us that you are currently taking a medication and that medication is marked below as “Resume”, this simply indicates that we have reviewed those medications to make sure our new therapy recommendations do not interfere.  If you have concerns about medications other than those new ones which we are prescribing today, please consult the physician who prescribed them (or your primary physician).  Our review of your home medications is not meant to indicate that we are directing their use.             Your Medications      START taking these medications     diltiaZEM  MG 24 hr capsule   Take 1 capsule by mouth Daily.   Last time this was given:  1/27/2017  4:49 PM   Commonly known as:  CARDIZEM CD           predniSONE 20 MG tablet   Take 2 tablets by mouth 2 (Two) Times a Day With Meals.   Last time this was given:  1/27/2017  4:52 PM   Commonly known as:  DELTASONE           tamsulosin 0.4 MG capsule 24 hr capsule   Take 1 capsule by mouth Every Night.   Last time this was given:  1/26/2017  9:37 PM   Commonly known as:  FLOMAX             CONTINUE taking these medications     albuterol 108 (90 BASE) MCG/ACT inhaler   Every 6 (Six) Hours.   Commonly known as:  PROVENTIL HFA;VENTOLIN HFA   Notes to Patient:  Every 6 hours           metoprolol tartrate 25 MG tablet   25 mg 2 (Two) Times a Day.   Last time this was given:  1/26/2017  9:38 PM   Commonly known as:  LOPRESSOR             STOP taking these medications     CIPRO 500 MG tablet   Generic drug:  ciprofloxacin           lisinopril-hydrochlorothiazide 10-12.5  MG per tablet   Commonly known as:  PRINZIDE,ZESTORETIC           MethylPREDNISolone 4 MG tablet   Commonly known as:  MEDROL (LUIS ANGEL)                Where to Get Your Medications      These medications were sent to [facility] - [place], [place] - [address] - [phone]  - [phone] [address] [address]     Phone:  [phone]     diltiaZEM  MG 24 hr capsule    predniSONE 20 MG tablet    tamsulosin 0.4 MG capsule 24 hr capsule                  Your Medications      Your Medication List           Morning Noon Evening Bedtime As Needed    albuterol 108 (90 BASE) MCG/ACT inhaler   Every 6 (Six) Hours.   Commonly known as:  PROVENTIL HFA;VENTOLIN HFA   Notes to Patient:  Every 6 hours                                diltiaZEM  MG 24 hr capsule   Take 1 capsule by mouth Daily.   Commonly known as:  CARDIZEM CD                                   metoprolol tartrate 25 MG tablet   25 mg 2 (Two) Times a Day.   Commonly known as:  LOPRESSOR                                      predniSONE 20 MG tablet   Take 2 tablets by mouth 2 (Two) Times a Day With Meals.   Commonly known as:  DELTASONE                                      tamsulosin 0.4 MG capsule 24 hr capsule   Take 1 capsule by mouth Every Night.   Commonly known as:  FLOMAX                                            Instructions for After Discharge        Activity Instructions     Activity as Tolerated                 Diet Instructions     Diet: Cardiac, Renal; Thin Liquids, No Restrictions       Discharge Diet:   Cardiac  Renal      Fluid Consistency:  Thin Liquids, No Restrictions             Discharge References/Attachments     ACUTE KIDNEY INJURY (ENGLISH)    ATRIAL FIBRILLATION, EASY-TO-READ (ENGLISH)    DILTIAZEM TABLETS (ENGLISH)    PREDNISONE TABLETS (ENGLISH)    TAMSULOSIN CAPSULES (ENGLISH)       Follow-ups for After Discharge        Follow-up Information     Follow up with Alex Cortez MD Follow up in 4 week(s).     Specialty:  Cardiology    Why:  see in 1-2 months in [place] - per  request     Contact information:    [address]  [facility] [address]  [phone]          Follow up with Moises Montes MD Follow up in 1 week(s).    Specialty:  Internal Medicine    Contact information:    [address] [place] [address] [address] [place] [address]  [phone]          Follow up with Luis Carlos Awan MD Follow up in 2 week(s).    Specialty:  Urology    Why:  Needs prostate biopsy    Contact information:    [address]  [facility] [address]  [phone]        Referrals and Follow-ups to Schedule     Follow-Up    As directed    Keep scheduled appointments for dialysis, [facility]             MyChart Signup     Our records indicate that you have declined [employer] MyChart signup. If you would like to sign up for [employer], please email [email or web address] or call [phone] to obtain an activation code.         Summary of Your Hospitalization        Reason for Hospitalization     Your primary diagnosis was:  Kidney Disorder    Your diagnoses also included:  Tobacco Abuse, Atrial Fibrillation (Irregular Heartbeat), Elevated Prostate Specific Antigen (Psa), Nephrolithiasis, Microcytic Anemia, Kidney Disease Due To High Blood Pressure, Leaky Mitral Heart Valve      Care Providers     Provider Service Role Specialty    Pierre Pritchard DO -- Attending Provider Hospitalist    Mejia Hatfield MD -- Consulting Physician  Nephrology    Luis Carlos Awan MD -- Consulting Physician  Urology    Alex Cortez MD -- Consulting Physician  Cardiology      Your Allergies  Date Reviewed: 1/26/2017    No active allergies      Patient Belongings Returned     Document Return of Belongings Flowsheet     Were the patient bedside belongings sent home?   Yes   Belongings Retrieved from Security & Sent Home   N/A    Belongings Sent to Safe   --   Medications Retrieved from Pharmacy & Sent Home   N/A               More Information      Acute Kidney Injury  Acute kidney injury is any condition in which there is sudden (acute) damage to the kidneys. Acute kidney injury was previously known as acute kidney failure or acute renal failure. The kidneys are two organs that lie on either side of the spine between the middle of the back and the front of the abdomen. The kidneys:  · Remove wastes and extra water from the blood.    · Produce important hormones. These help keep bones strong, regulate blood pressure, and help create red blood cells.    · Balance the fluids and chemicals in the blood and tissues.  A small amount of kidney damage may not cause problems, but a large amount of damage may make it difficult or impossible for the kidneys to work the way they should. Acute kidney injury may develop into long-lasting (chronic) kidney disease. It may also develop into a life-threatening disease called end-stage kidney disease. Acute kidney injury can get worse very quickly, so it should be treated right away. Early treatment may prevent other kidney diseases from developing.  CAUSES   · A problem with blood flow to the kidneys. This may be caused by:      Blood loss.      Heart disease.      Severe burns.      Liver disease.  · Direct damage to the kidneys. This may be caused by:    Some medicines.      A kidney infection.      Poisoning or consuming toxic substances.      A surgical wound.      A blow to the kidney area.    · A problem with urine flow. This may be caused by:      Cancer.      Kidney stones.      An enlarged prostate.  SIGNS AND SYMPTOMS   · Swelling (edema) of the legs, ankles, or feet.    · Tiredness (lethargy).    · Nausea or vomiting.    · Confusion.    · Problems with urination, such as:      Painful or burning feeling during urination.      Decreased urine production.      Frequent accidents in children who are potty trained.      Bloody urine.    · Muscle twitches and cramps.    · Shortness of breath.     · Seizures.    · Chest pain or pressure.  Sometimes, no symptoms are present.   DIAGNOSIS  Acute kidney injury may be detected and diagnosed by tests, including blood, urine, imaging, or kidney biopsy tests.   TREATMENT  Treatment of acute kidney injury varies depending on the cause and severity of the kidney damage. In mild cases, no treatment may be needed. The kidneys may heal on their own. If acute kidney injury is more severe, your health care provider will treat the cause of the kidney damage, help the kidneys heal, and prevent complications from occurring. Severe cases may require a procedure to remove toxic wastes from the body (dialysis) or surgery to repair kidney damage. Surgery may involve:   · Repair of a torn kidney.    · Removal of an obstruction.  HOME CARE INSTRUCTIONS  · Follow your prescribed diet.  · Take medicines only as directed by your health care provider.   · Do not take any new medicines (prescription, over-the-counter, or nutritional supplements) unless approved by your health care provider. Many medicines can worsen your kidney damage or may need to have the dose adjusted.    · Keep all follow-up visits as directed by your health care provider. This is important.  · Observe your condition to make sure you are healing as expected.  SEEK IMMEDIATE MEDICAL CARE IF:  · You are feeling ill or have severe pain in the back or side.    · Your symptoms return or you have new symptoms.  · You have any symptoms of end-stage kidney disease. These include:      Persistent itchiness.      Loss of appetite.      Headaches.      Abnormally dark or light skin.    Numbness in the hands or feet.      Easy bruising.      Frequent hiccups.      Menstruation stops.    · You have a fever.  · You have increased urine production.  · You have pain or bleeding when urinating.  MAKE SURE YOU:   · Understand these instructions.  · Will watch your condition.  · Will get help right away if you are not doing well or  get worse.     This information is not intended to replace advice given to you by your health care provider. Make sure you discuss any questions you have with your health care provider.     Document Released: 07/02/2012 Document Revised: 01/08/2016 Document Reviewed: 08/16/2013  [employer] Interactive Patient Education ©2016 [employer]          Atrial Fibrillation  Atrial fibrillation is a type of heartbeat that is irregular or fast (rapid). If you have this condition, your heart keeps quivering in a weird (chaotic) way. This condition can make it so your heart cannot pump blood normally. Having this condition gives a person more risk for stroke, heart failure, and other heart problems. There are different types of atrial fibrillation. Talk with your doctor to learn about the type that you have.  HOME CARE  · Take over-the-counter and prescription medicines only as told by your doctor.  · If your doctor prescribed a blood-thinning medicine, take it exactly as told. Taking too much of it can cause bleeding. If you do not take enough of it, you will not have the protection that you need against stroke and other problems.  · Do not use any tobacco products. These include cigarettes, chewing tobacco, and e-cigarettes. If you need help quitting, ask your doctor.  · If you have apnea (obstructive sleep apnea), manage it as told by your doctor.  · Do not drink alcohol.  · Do not drink beverages that have caffeine. These include coffee, soda, and tea.  · Maintain a healthy weight. Do not use diet pills unless your doctor says they are safe for you. Diet pills may make heart problems worse.  · Follow diet instructions as told by your doctor.  · Exercise regularly as told by your doctor.  · Keep all follow-up visits as told by your doctor. This is important.  GET HELP IF:  · You notice a change in the speed, rhythm, or strength of your heartbeat.  · You are taking a blood-thinning medicine and you notice more  bruising.  · You get tired more easily when you move or exercise.  GET HELP RIGHT AWAY IF:  · You have pain in your chest or your belly (abdomen).  · You have sweating or weakness.  · You feel sick to your stomach (nauseous).  · You notice blood in your throw up (vomit), poop (stool), or pee (urine).  · You are short of breath.  · You suddenly have swollen feet and ankles.  · You feel dizzy.  · Your suddenly get weak or numb in your face, arms, or legs, especially if it happens on one side of your body.  · You have trouble talking, trouble understanding, or both.  · Your face or your eyelid droops on one side.  These symptoms may be an emergency. Do not wait to see if the symptoms will go away. Get medical help right away. Call your local emergency services (911 in the [place] Do not drive yourself to the hospital.     This information is not intended to replace advice given to you by your health care provider. Make sure you discuss any questions you have with your health care provider.     Document Released: 09/26/2009 Document Revised: 09/07/2016 Document Reviewed: 04/13/2016  [employer] Interactive Patient Education ©2016 [employer]          Diltiazem tablets  What is this medicine?  DILTIAZEM (dil SARA a zem) is a calcium-channel blocker. It affects the amount of calcium found in your heart and muscle cells. This relaxes your blood vessels, which can reduce the amount of work the heart has to do. This medicine is used to treat chest pain caused by angina.  This medicine may be used for other purposes; ask your health care provider or pharmacist if you have questions.  What should I tell my health care provider before I take this medicine?  They need to know if you have any of these conditions:  -heart problems, low blood pressure, irregular heartbeat  -liver disease  -previous heart attack  -an unusual or allergic reaction to diltiazem, other medicines, foods, dyes, or preservatives  -pregnant or trying to get  pregnant  -breast-feeding  How should I use this medicine?  Take this medicine by mouth with a glass of water. Follow the directions on the prescription label. Do not cut, crush or chew this medicine. This medicine is usually taken before meals and at bedtime. Take your doses at regular intervals. Do not take your medicine more often then directed. Do not stop taking except on the advice of your doctor or health care professional.  Talk to your pediatrician regarding the use of this medicine in children. Special care may be needed.  Overdosage: If you think you have taken too much of this medicine contact a poison control center or emergency room at once.  NOTE: This medicine is only for you. Do not share this medicine with others.  What if I miss a dose?  If you miss a dose, take it as soon as you can. If it is almost time for your next dose, take only that dose. Do not take double or extra doses.  What may interact with this medicine?  Do not take this medicine with any of the following:  -cisapride  -hawthorn  -pimozide  -ranolazine  -red yeast rice  This medicine may also interact with the following medications:  -buspirone  -carbamazepine  -cimetidine  -cyclosporine  -digoxin  -local anesthetics or general anesthetics  -lovastatin  -medicines for anxiety or difficulty sleeping like midazolam and triazolam  -medicines for high blood pressure or heart problems  -quinidine  -rifampin, rifabutin, or rifapentine  This list may not describe all possible interactions. Give your health care provider a list of all the medicines, herbs, non-prescription drugs, or dietary supplements you use. Also tell them if you smoke, drink alcohol, or use illegal drugs. Some items may interact with your medicine.  What should I watch for while using this medicine?  Check your blood pressure and pulse rate regularly. Ask your doctor or health care professional what your blood pressure and pulse rate should be and when you should  contact him or her.  You may feel dizzy or lightheaded. Do not drive, use machinery, or do anything that needs mental alertness until you know how this medicine affects you. To reduce the risk of dizzy or fainting spells, do not sit or stand up quickly, especially if you are an older patient. Alcohol can make you more dizzy or increase flushing and rapid heartbeats. Avoid alcoholic drinks.  What side effects may I notice from receiving this medicine?  Side effects that you should report to your doctor or health care professional as soon as possible:  -allergic reactions like skin rash, itching or hives, swelling of the face, lips, or tongue  -confusion, mental depression  -feeling faint or lightheaded, falls  -pinpoint red spots on the skin  -redness, blistering, peeling or loosening of the skin, including inside the mouth  -slow, irregular heartbeat  -swelling of the ankles, feet  -unusual bleeding or bruising  Side effects that usually do not require medical attention (report to your doctor or health care professional if they continue or are bothersome):  -change in sex drive or performance  -constipation or diarrhea  -flushing of the face  -headache  -nausea, vomiting  -tired or weak  -trouble sleeping  This list may not describe all possible side effects. Call your doctor for medical advice about side effects. You may report side effects to FDA at 7-043-FDA-4307.  Where should I keep my medicine?  Keep out of the reach of children.  Store at room temperature between 20 and 25 degrees C (68 and 77 degrees F). Protect from light. Keep container tightly closed. Throw away any unused medicine after the expiration date.  NOTE: This sheet is a summary. It may not cover all possible information. If you have questions about this medicine, talk to your doctor, pharmacist, or health care provider.     © 2016, [employer]. (2014-12-01 10:54:31)          Prednisone tablets  What is this medicine?  PREDNISONE  (PRED kaylin faust) is a corticosteroid. It is commonly used to treat inflammation of the skin, joints, lungs, and other organs. Common conditions treated include asthma, allergies, and arthritis. It is also used for other conditions, such as blood disorders and diseases of the adrenal glands.  This medicine may be used for other purposes; ask your health care provider or pharmacist if you have questions.  What should I tell my health care provider before I take this medicine?  They need to know if you have any of these conditions:  -Cushing's syndrome  -diabetes  -glaucoma  -heart disease  -high blood pressure  -infection (especially a virus infection such as chickenpox, cold sores, or herpes)  -kidney disease  -liver disease  -mental illness  -myasthenia gravis  -osteoporosis  -seizures  -stomach or intestine problems  -thyroid disease  -an unusual or allergic reaction to lactose, prednisone, other medicines, foods, dyes, or preservatives  -pregnant or trying to get pregnant  -breast-feeding  How should I use this medicine?  Take this medicine by mouth with a glass of water. Follow the directions on the prescription label. Take this medicine with food. If you are taking this medicine once a day, take it in the morning. Do not take more medicine than you are told to take. Do not suddenly stop taking your medicine because you may develop a severe reaction. Your doctor will tell you how much medicine to take. If your doctor wants you to stop the medicine, the dose may be slowly lowered over time to avoid any side effects.  Talk to your pediatrician regarding the use of this medicine in children. Special care may be needed.  Overdosage: If you think you have taken too much of this medicine contact a poison control center or emergency room at once.  NOTE: This medicine is only for you. Do not share this medicine with others.  What if I miss a dose?  If you miss a dose, take it as soon as you can. If it is almost time for  your next dose, talk to your doctor or health care professional. You may need to miss a dose or take an extra dose. Do not take double or extra doses without advice.  What may interact with this medicine?  Do not take this medicine with any of the following medications:  -metyrapone  -mifepristone  This medicine may also interact with the following medications:  -aminoglutethimide  -amphotericin B  -aspirin and aspirin-like medicines  -barbiturates  -certain medicines for diabetes, like glipizide or glyburide  -cholestyramine  -cholinesterase inhibitors  -cyclosporine  -digoxin  -diuretics  -ephedrine  -female hormones, like estrogens and birth control pills  -isoniazid  -ketoconazole  -NSAIDS, medicines for pain and inflammation, like ibuprofen or naproxen  -phenytoin  -rifampin  -toxoids  -vaccines  -warfarin  This list may not describe all possible interactions. Give your health care provider a list of all the medicines, herbs, non-prescription drugs, or dietary supplements you use. Also tell them if you smoke, drink alcohol, or use illegal drugs. Some items may interact with your medicine.  What should I watch for while using this medicine?  Visit your doctor or health care professional for regular checks on your progress. If you are taking this medicine over a prolonged period, carry an identification card with your name and address, the type and dose of your medicine, and your doctor's name and address.  This medicine may increase your risk of getting an infection. Tell your doctor or health care professional if you are around anyone with measles or chickenpox, or if you develop sores or blisters that do not heal properly.  If you are going to have surgery, tell your doctor or health care professional that you have taken this medicine within the last twelve months.  Ask your doctor or health care professional about your diet. You may need to lower the amount of salt you eat.  This medicine may affect blood  sugar levels. If you have diabetes, check with your doctor or health care professional before you change your diet or the dose of your diabetic medicine.  What side effects may I notice from receiving this medicine?  Side effects that you should report to your doctor or health care professional as soon as possible:  -allergic reactions like skin rash, itching or hives, swelling of the face, lips, or tongue  -changes in emotions or moods  -changes in vision  -depressed mood  -eye pain  -fever or chills, cough, sore throat, pain or difficulty passing urine  -increased thirst  -swelling of ankles, feet  Side effects that usually do not require medical attention (report to your doctor or health care professional if they continue or are bothersome):  -confusion, excitement, restlessness  -headache  -nausea, vomiting  -skin problems, acne, thin and shiny skin  -trouble sleeping  -weight gain  This list may not describe all possible side effects. Call your doctor for medical advice about side effects. You may report side effects to FDA at 8-213-FDA-4587.  Where should I keep my medicine?  Keep out of the reach of children.  Store at room temperature between 15 and 30 degrees C (59 and 86 degrees F). Protect from light. Keep container tightly closed. Throw away any unused medicine after the expiration date.  NOTE: This sheet is a summary. It may not cover all possible information. If you have questions about this medicine, talk to your doctor, pharmacist, or health care provider.     © 2016, [employer]. (2012-08-02 10:57:14)          Tamsulosin capsules  What is this medicine?  TAMSULOSIN (cohn JOHN luis sin) is used to treat enlargement of the prostate gland in men, a condition called benign prostatic hyperplasia or BPH. It is not for use in women. It works by relaxing muscles in the prostate and bladder neck. This improves urine flow and reduces BPH symptoms.  This medicine may be used for other purposes; ask  your health care provider or pharmacist if you have questions.  What should I tell my health care provider before I take this medicine?  They need to know if you have any of the following conditions:  -advanced kidney disease  -advanced liver disease  -low blood pressure  -prostate cancer  -an unusual or allergic reaction to tamsulosin, sulfa drugs, other medicines, foods, dyes, or preservatives  -pregnant or trying to get pregnant  -breast-feeding  How should I use this medicine?  Take this medicine by mouth about 30 minutes after the same meal every day. Follow the directions on the prescription label. Swallow the capsules whole with a glass of water. Do not crush, chew, or open capsules. Do not take your medicine more often than directed. Do not stop taking your medicine unless your doctor tells you to.  Talk to your pediatrician regarding the use of this medicine in children. Special care may be needed.  Overdosage: If you think you have taken too much of this medicine contact a poison control center or emergency room at once.  NOTE: This medicine is only for you. Do not share this medicine with others.  What if I miss a dose?  If you miss a dose, take it as soon as you can. If it is almost time for your next dose, take only that dose. Do not take double or extra doses. If you stop taking your medicine for several days or more, ask your doctor or health care professional what dose you should start back on.  What may interact with this medicine?  -cimetidine  -fluoxetine  -ketoconazole  -medicines for erectile disfunction like sildenafil, tadalafil, vardenafil  -medicines for high blood pressure  -other alpha-blockers like alfuzosin, doxazosin, phentolamine, phenoxybenzamine, prazosin, terazosin  -warfarin  This list may not describe all possible interactions. Give your health care provider a list of all the medicines, herbs, non-prescription drugs, or dietary supplements you use. Also tell them if you smoke,  drink alcohol, or use illegal drugs. Some items may interact with your medicine.  What should I watch for while using this medicine?  Visit your doctor or health care professional for regular check ups. You will need lab work done before you start this medicine and regularly while you are taking it. Check your blood pressure as directed. Ask your health care professional what your blood pressure should be, and when you should contact him or her.  This medicine may make you feel dizzy or lightheaded. This is more likely to happen after the first dose, after an increase in dose, or during hot weather or exercise. Drinking alcohol and taking some medicines can make this worse. Do not drive, use machinery, or do anything that needs mental alertness until you know how this medicine affects you. Do not sit or stand up quickly. If you begin to feel dizzy, sit down until you feel better. These effects can decrease once your body adjusts to the medicine.  Contact your doctor or health care professional right away if you have an erection that lasts longer than 4 hours or if it becomes painful. This may be a sign of a serious problem and must be treated right away to prevent permanent damage.  If you are thinking of having cataract surgery, tell your eye surgeon that you have taken this medicine.  What side effects may I notice from receiving this medicine?  Side effects that you should report to your doctor or health care professional as soon as possible:  -allergic reactions like skin rash or itching, hives, swelling of the lips, mouth, tongue, or throat  -breathing problems  -change in vision  -feeling faint or lightheaded  -irregular heartbeat  -prolonged or painful erection  -weakness  Side effects that usually do not require medical attention (report to your doctor or health care professional if they continue or are bothersome):  -back pain  -change in sex drive or performance  -constipation, nausea or  vomiting  -cough  -drowsy  -runny or stuffy nose  -trouble sleeping  This list may not describe all possible side effects. Call your doctor for medical advice about side effects. You may report side effects to FDA at 3-523-IZI-3370.  Where should I keep my medicine?  Keep out of the reach of children.  Store at room temperature between 15 and 30 degrees C (59 and 86 degrees F). Throw away any unused medicine after the expiration date.  NOTE: This sheet is a summary. It may not cover all possible information. If you have questions about this medicine, talk to your doctor, pharmacist, or health care provider.     © 2016, [employer]. (2013-12-18 14:11:34)         PREVENTING SURGICAL SITE INFECTIONS     Surgical Site Infections FAQs  What is a Surgical Site Infection (SSI)?  A surgical site infection is an infection that occurs after surgery in the part of the body where the surgery took place. Most patients who have surgery do not develop an infection. However, infections develop in about 1 to 3 out of every 100 patients who have surgery.  Some of the common symptoms of a surgical site infection are:  · Redness and pain around the area where you had surgery  · Drainage of cloudy fluid from your surgical wound  · Fever  Can SSIs be treated?  Yes. Most surgical site infections can be treated with antibiotics. The antibiotic given to you depends on the bacteria (germs) causing the infection. Sometimes patients with SSIs also need another surgery to treat the infection.  What are some of the things that hospitals are doing to prevent SSIs?  To prevent SSIs, doctors, nurses, and other healthcare providers:  · Clean their hands and arms up to their elbows with an antiseptic agent just before the surgery.  · Clean their hands with soap and water or an alcohol-based hand rub before and after caring for each patient.  · May remove some of your hair immediately before your surgery using electric clippers if the hair  is in the same area where the procedure will occur. They should not shave you with a razor.  · Wear special hair covers, masks, gowns, and gloves during surgery to keep the surgery area clean.  · Give you antibiotics before your surgery starts. In most cases, you should get antibiotics within 60 minutes before the surgery starts and the antibiotics should be stopped within 24 hours after surgery.  · Clean the skin at the site of your surgery with a special soap that kills germs.  What can I do to help prevent SSIs?  Before your surgery:  · Tell your doctor about other medical problems you may have. Health problems such as allergies, diabetes, and obesity could affect your surgery and your treatment.  · Quit smoking. Patients who smoke get more infections. Talk to your doctor about how you can quit before your surgery.  · Do not shave near where you will have surgery. Shaving with a razor can irritate your skin and make it easier to develop an infection.  At the time of your surgery:  · Speak up if someone tries to shave you with a razor before surgery. Ask why you need to be shaved and talk with your surgeon if you have any concerns.  · Ask if you will get antibiotics before surgery.  After your surgery:  · Make sure that your healthcare providers clean their hands before examining you, either with soap and water or an alcohol-based hand rub.    If you do not see your providers clean their hands, please ask them to do so.  · Family and friends who visit you should not touch the surgical wound or dressings.  · Family and friends should clean their hands with soap and water or an alcohol-based hand rub before and after visiting you. If you do not see them clean their hands, ask them to clean their hands.  What do I need to do when I go home from the hospital?  · Before you go home, your doctor or nurse should explain everything you need to know about taking care of your wound. Make sure you understand how to care for  your wound before you leave the hospital.  · Always clean your hands before and after caring for your wound.  · Before you go home, make sure you know who to contact if you have questions or problems after you get home.  · If you have any symptoms of an infection, such as redness and pain at the surgery site, drainage, or fever, call your doctor immediately.  If you have additional questions, please ask your doctor or nurse.  Developed and co-sponsored by The Society for Healthcare Epidemiology of [place] (SHEA); Infectious Diseases Society of [place] (IDSA); American Hospital Association; Association for Professionals in Infection Control and Epidemiology (APIC); Centers for Disease Control and Prevention (CDC); and The Joint Commission.     This information is not intended to replace advice given to you by your health care provider. Make sure you discuss any questions you have with your health care provider.     Document Released: 12/23/2014 Document Revised: 01/08/2016 Document Reviewed: 03/02/2016  [employer] Interactive Patient Education ©2016 [employer]             SYMPTOMS OF A STROKE    Call 911 or have someone take you to the Emergency Department if you have any of the following:    · Sudden numbness or weakness of your face, arm or leg especially on one side of the body  · Sudden confusion, diffiiculty speaking or trouble understanding   · Changes in your vision or loss of sight in one eye  · Sudden severe headache with no known cause  · sudden dizziness, trouble walking, loss of balance or coordination    It is important to seek emergency care right away if you have further stroke symptoms. If you get emergency help quickly, the powerful clot-dissolving medicines can reduce the disabilities caused by a stroke.     For more information:    American Stroke Association  1-643-3-STROKE  www.strokeassociation.org           IF YOU SMOKE OR USE TOBACCO PLEASE READ THE FOLLOWING:    Why is smoking bad for  me?  Smoking increases the risk of heart disease, lung disease, vascular disease, stroke, and cancer.     If you smoke, STOP!    If you would like more information on quitting smoking, please visit the [email or web address] website: [email or web address]   This link will provide additional resources including the QUIT line and the [employer] support groups.     For more information:    American Cancer Society  ([phone] 392-6004    American Heart Association  1-980.487.5682               YOU ARE THE MOST IMPORTANT FACTOR IN YOUR RECOVERY.     Follow all instructions carefully.     I have reviewed my discharge instructions with my nurse, including the following information, if applicable:     Information about my illness and diagnosis   Follow up appointments (including lab draws)   Wound Care   Equipment Needs   Medications (new and continuing) along with side effects   Preventative information such as vaccines and smoking cessations   Diet   Pain   I know when to contact my Doctor's office or seek emergency care      I want my nurse to describe the side effects of my medications: YES NO   If the answer is no, I understand the side effects of my medications: YES NO   My nurse described the side effects of my medications in a way that I could understand: YES NO   I have taken my personal belongings and my own medications with me at discharge: YES NO            I have received this information and my questions have been answered. I have discussed any concerns I see with this plan with the nurse or physician. I understand these instructions.    Signature of Patient or Responsible Person: _____________________________________    Date: _________________  Time: __________________    Signature of Healthcare Provider: _______________________________________  Date: _________________  Time: __________________

## 2017-01-17 NOTE — ED NOTES
425 ML urine output.     Maverick Jimenez RN  01/17/17 0913       Maverick Jimenez RN  01/17/17 0914

## 2017-01-17 NOTE — ED NOTES
Patient aware that no beds available at this time. Wife has left to take care of family animals.      Belkis Reynaga RN  01/17/17 0676

## 2017-01-17 NOTE — PAYOR COMM NOTE
"[facility]  ELA ELLIS RN [phone]  FAX [phone]    Gurjit Andrea (47 y.o. Male)     Date of Birth Social Security Number Address Home Phone MRN    [id number]  [id number]  [place] [address] [phone] [id number]    Sikhism Marital Status          None        Admission Date Admission Type Admitting Provider Attending Provider Department, Room/Bed    1/17/17 Emergency Fam Odom MD Pu[place]ollano, Glenn Riego, MD [facility] 4C, 495/1    Discharge Date Discharge Disposition Discharge Destination                      Attending Provider: Fam Odom MD     Allergies:  No Known Allergies    Isolation:  None   Infection:  None   Code Status:  FULL    Ht:  73\" (185.4 cm)   Wt:  219 lb (99.3 kg)    Admission Cmt:  None   Principal Problem:  None                Active Insurance as of 1/17/2017     Primary Coverage     Payor Plan Insurance Group Employer/Plan Group    [employer] [facility] [employer] [facility] [id number]     Payor Plan Address Payor Plan Phone Number Effective From Effective To    [address] [phone] 12/1/2016     [place] [address]       Subscriber Name Subscriber Birth Date Member ID       JORDAN ANDREA [birth year]2001 [id number]                 Emergency Contacts      (Rel.) Home Phone Work Phone Mobile Phone    Jordan Andrea (Spouse) [phone] -- [phone]            History & Physical     No notes of this type exist for this encounter.           Emergency Department Notes      Skye Lee DO at 1/17/2017  4:28 AM          Subjective   History of Present Illness  Patient presents for hematuria and right flank pain and increase renal failure suspected.  Patient reports that he was recently admitted to [facility] for Henoch Schoenlein purpura he was treated and released and instructed to drink 3 gallons a day of water.  Patient reports that he was doing really well and " then he started to have nausea and vomiting had difficulty keeping his fluids down he did go back to [facility] they told him his creatinine was much higher was in fact twice as high as it was when he was discharged however they told him to go home he needed to find a nephrologist in order to get that taken care off and they discharged him.  Patient's wife it was a nurse for 30 years she looked at the values realized something was wrong and she had him brought up here.  Nausea and vomiting has made this worse nothing has made this better.  He does not have a history of this prior to this episode this month.  Patient reports that this flank pain has been increasingly worse today however.  Review of Systems   Constitutional: Negative for activity change, chills, fatigue and fever.   HENT: Negative for congestion and dental problem.    Eyes: Negative for pain, discharge and itching.   Respiratory: Negative for apnea and chest tightness.    Cardiovascular: Negative for chest pain and palpitations.   Gastrointestinal: Positive for nausea and vomiting. Negative for abdominal pain and diarrhea.   Endocrine: Negative for polydipsia and polyuria.   Genitourinary: Positive for flank pain, frequency and hematuria. Negative for difficulty urinating and dysuria.   Musculoskeletal: Negative for arthralgias, neck pain and neck stiffness.   Neurological: Negative for dizziness and headaches.   Hematological: Negative for adenopathy. Does not bruise/bleed easily.   Psychiatric/Behavioral: Negative for agitation and behavioral problems.       Past Medical History   Diagnosis Date   • A-fib    • Chronic renal failure    • Elevated PSA    • Purpura        No Known Allergies    Past Surgical History   Procedure Laterality Date   • Appendectomy         History reviewed. No pertinent family history.    Social History     Social History   • Marital status:      Spouse name: N/A   • Number of children: N/A   •  Years of education: N/A     Social History Main Topics   • Smoking status: Heavy Tobacco Smoker   • Smokeless tobacco: None   • Alcohol use Yes   • Drug use: None   • Sexual activity: Not Asked     Other Topics Concern   • None     Social History Narrative   • None       Lab Results (last 24 hours)     Procedure Component Value Units Date/Time    Urinalysis With / Culture If Indicated [[id number]]  (Abnormal) Collected:  01/17/17 0044    Specimen:  Urine from Urine, Clean Catch Updated:  01/17/17 0129     Color, UA Red (A)      Appearance, UA Clear      pH, UA 6.5      Specific Gravity, UA 1.014      Glucose, UA Negative      Ketones, UA Negative      Bilirubin, UA Negative      Blood, UA Large (3+) (A)      Protein,  mg/dL (2+) (A)      Leuk Esterase, UA Small (1+) (A)      Nitrite, UA Negative      Urobilinogen, UA 0.2 E.U./dL     Urinalysis, Microscopic Only [[id number]]  (Abnormal) Collected:  01/17/17 0044    Specimen:  Urine from Urine, Clean Catch Updated:  01/17/17 0129     RBC, UA Too Numerous to Count (A) /HPF      WBC, UA 6-12 (A) /HPF      Bacteria, UA None Seen /HPF      Squamous Epithelial Cells, UA 0-2 /HPF      Hyaline Casts, UA 0-2 /LPF      Methodology Manual Light Microscopy     Urine Culture [[id number]] Collected:  01/17/17 0044    Specimen:  Urine from Urine, Clean Catch Updated:  01/17/17 0057    CBC & Differential [[id number]] Collected:  01/17/17 0115    Specimen:  Blood Updated:  01/17/17 0130    Narrative:       The following orders were created for panel order CBC & Differential.  Procedure                               Abnormality         Status                     ---------                               -----------         ------                     CBC Auto Differential[[id number]]         Abnormal            Final result                 Please view results for these tests on the individual orders.    Comprehensive Metabolic Panel [[id number]]  (Abnormal) Collected:  01/17/17 0115     Specimen:  Blood Updated:  01/17/17 0142     Glucose 80 mg/dL       (H) mg/dL      Creatinine 5.69 (H) mg/dL      Sodium 138 mmol/L      Potassium 4.7 mmol/L      Chloride 98 mmol/L      CO2 24.0 mmol/L      Calcium 8.8 mg/dL      Total Protein 7.7 g/dL      Albumin 3.70 g/dL      ALT (SGPT) 61 (H) U/L      AST (SGOT) 36 U/L      Alkaline Phosphatase 92 U/L      Total Bilirubin 0.7 mg/dL      eGFR Non African Amer 11 (L) mL/min/1.73      Globulin 4.0 gm/dL      A/G Ratio 0.9 (L) g/dL      BUN/Creatinine Ratio 19.2      Anion Gap 16.0 (H) mmol/L     CBC Auto Differential [[id number]]  (Abnormal) Collected:  01/17/17 0115    Specimen:  Blood Updated:  01/17/17 0130     WBC 11.21 (H) 10*3/mm3      RBC 4.17 (L) 10*6/mm3      Hemoglobin 10.9 (L) g/dL      Hematocrit 33.4 (L) %      MCV 80.1 (L) fL      MCH 26.1 (L) pg      MCHC 32.6 (L) g/dL      RDW 16.6 (H) %      RDW-SD 48.5 fl      MPV 9.3 fL      Platelets 133 10*3/mm3      Neutrophil % 69.0 %      Lymphocyte % 23.1 %      Monocyte % 7.5 %      Eosinophil % 0.0 %      Basophil % 0.2 %      Immature Grans % 0.2 %      Neutrophils, Absolute 7.74 10*3/mm3      Lymphocytes, Absolute 2.59 10*3/mm3      Monocytes, Absolute 0.84 10*3/mm3      Eosinophils, Absolute 0.00 10*3/mm3      Basophils, Absolute 0.02 10*3/mm3      Immature Grans, Absolute 0.02 10*3/mm3           Objective   Physical Exam   Constitutional: He is oriented to person, place, and time. He appears well-developed and well-nourished.   HENT:   Head: Normocephalic and atraumatic.   Eyes: Conjunctivae and EOM are normal. Pupils are equal, round, and reactive to light.   Neck: Normal range of motion. Neck supple.   Cardiovascular: Normal rate and regular rhythm.    Pulmonary/Chest: Effort normal and breath sounds normal.   Abdominal: Soft. Bowel sounds are normal.   Musculoskeletal: He exhibits no tenderness or deformity.   Neurological: He is alert and oriented to person, place, and time.   Skin: Skin is  "warm and dry. Rash noted.   Purpura most pronounced on bilateral lower extremities. note and vitals reviewed.      Procedures        CT Abdomen Pelvis Without Contrast    (Results Pending)       Visit Vitals   • /86   • Pulse 80   • Temp 98.6 °F (37 °C) (Temporal Artery )   • Resp 21   • Ht 73\" (185.4 cm)   • Wt 219 lb (99.3 kg)   • SpO2 99%   • BMI 28.89 kg/m2       ED Course    ED Course       Medications   sodium chloride 0.9 % infusion (125 mL/hr Intravenous New Bag 1/17/17 0147)   methylPREDNISolone sodium succinate (SOLU-Medrol) injection 80 mg (not administered)   acetaminophen (TYLENOL) tablet 650 mg (not administered)   ondansetron (ZOFRAN) injection 4 mg (not administered)   ipratropium-albuterol (DUO-NEB) nebulizer solution 3 mL (not administered)   sodium chloride 0.9 % infusion (not administered)   sodium chloride 0.9 % bolus 1,000 mL (0 mL Intravenous Stopped 1/17/17 0414)   methylPREDNISolone sodium succinate (SOLU-Medrol) injection 125 mg (125 mg Intravenous Given 1/17/17 0214)   ondansetron (ZOFRAN) injection 4 mg (4 mg Intravenous Given 1/17/17 0211)   Morphine sulfate (PF) injection 2 mg (2 mg Intravenous Given 1/17/17 0211)            MDM  Number of Diagnoses or Management Options  Henoch-Schonlein purpura nephritis: established and worsening  Diagnosis management comments: Spoke to nephrology they will consult in the a.m.  I did speak to jaime the mid-level for the nephrology group.  Spoke to Dr. Johan Dinh who will admit the patient for further workup.  Patient was given 1 L normal saline down here 125 mL of saline per hour is now ordered and Dr. Dinh states that he will continue that.  Explained patient all the findings patient will be admitted to the service of the hospitalist for further evaluation and workup patient indicated understanding agreed with the plan.       Amount and/or Complexity of Data Reviewed  Clinical lab tests: ordered and reviewed  Tests in the radiology section of " CPT®:  ordered and reviewed  Tests in the medicine section of CPT®:  ordered and reviewed  Independent visualization of images, tracings, or specimens: yes    Risk of Complications, Morbidity, and/or Mortality  Presenting problems: moderate  Diagnostic procedures: moderate  Management options: moderate    Patient Progress  Patient progress: improved      Final diagnoses:   Henoch-Schonlein purpura nephritis    add the diagnosis acute renal failure, flank pain, vomiting, nausea    Skye Lee DO  01/17/17 0432       Electronically signed by Skye Lee DO at 1/17/2017  4:32 AM      Belkis Reynaga RN at 1/17/2017  6:28 AM          Patient aware that no beds available at this time. Wife has left to take care of family animals.      Belkis Reynaga RN  01/17/17 0628       Electronically signed by Belkis Reynaga RN at 1/17/2017  6:28 AM      Maverick Jimenez RN at 1/17/2017  9:13 AM          425 ML urine output.     Maverick Jimenez RN  01/17/17 0913       Maverick Jimenez RN  01/17/17 0914       Electronically signed by Maverick Jimenez RN at 1/17/2017  9:14 AM        Intake & Output (last day)       01/16 0701 - 01/17 0700 01/17 0701 - 01/18 0700    Urine (mL/kg/hr) 725 425 (0.7)    Total Output 725 425    Net -725 -425              Lab Results (last 24 hours)     Procedure Component Value Units Date/Time    Urine Culture [[id number]] Collected:  01/17/17 0044    Specimen:  Urine from Urine, Clean Catch Updated:  01/17/17 0057    Urinalysis With / Culture If Indicated [[id number]]  (Abnormal) Collected:  01/17/17 0044    Specimen:  Urine from Urine, Clean Catch Updated:  01/17/17 0129     Color, UA Red (A)      Appearance, UA Clear      pH, UA 6.5      Specific Gravity, UA 1.014      Glucose, UA Negative      Ketones, UA Negative      Bilirubin, UA Negative      Blood, UA Large (3+) (A)      Protein,  mg/dL (2+) (A)      Leuk Esterase, UA Small (1+) (A)      Nitrite, UA Negative      Urobilinogen,  UA 0.2 E.U./dL     Urinalysis, Microscopic Only [[id number]]  (Abnormal) Collected:  01/17/17 0044    Specimen:  Urine from Urine, Clean Catch Updated:  01/17/17 0129     RBC, UA Too Numerous to Count (A) /HPF      WBC, UA 6-12 (A) /HPF      Bacteria, UA None Seen /HPF      Squamous Epithelial Cells, UA 0-2 /HPF      Hyaline Casts, UA 0-2 /LPF      Methodology Manual Light Microscopy     CBC & Differential [[id number]] Collected:  01/17/17 0115    Specimen:  Blood Updated:  01/17/17 0130    Narrative:       The following orders were created for panel order CBC & Differential.  Procedure                               Abnormality         Status                     ---------                               -----------         ------                     CBC Auto Differential[[id number]]         Abnormal            Final result                 Please view results for these tests on the individual orders.    CBC Auto Differential [[id number]]  (Abnormal) Collected:  01/17/17 0115    Specimen:  Blood Updated:  01/17/17 0130     WBC 11.21 (H) 10*3/mm3      RBC 4.17 (L) 10*6/mm3      Hemoglobin 10.9 (L) g/dL      Hematocrit 33.4 (L) %      MCV 80.1 (L) fL      MCH 26.1 (L) pg      MCHC 32.6 (L) g/dL      RDW 16.6 (H) %      RDW-SD 48.5 fl      MPV 9.3 fL      Platelets 133 10*3/mm3      Neutrophil % 69.0 %      Lymphocyte % 23.1 %      Monocyte % 7.5 %      Eosinophil % 0.0 %      Basophil % 0.2 %      Immature Grans % 0.2 %      Neutrophils, Absolute 7.74 10*3/mm3      Lymphocytes, Absolute 2.59 10*3/mm3      Monocytes, Absolute 0.84 10*3/mm3      Eosinophils, Absolute 0.00 10*3/mm3      Basophils, Absolute 0.02 10*3/mm3      Immature Grans, Absolute 0.02 10*3/mm3     Comprehensive Metabolic Panel [[id number]]  (Abnormal) Collected:  01/17/17 0115    Specimen:  Blood Updated:  01/17/17 0142     Glucose 80 mg/dL       (H) mg/dL      Creatinine 5.69 (H) mg/dL      Sodium 138 mmol/L      Potassium 4.7 mmol/L      Chloride 98 mmol/L       CO2 24.0 mmol/L      Calcium 8.8 mg/dL      Total Protein 7.7 g/dL      Albumin 3.70 g/dL      ALT (SGPT) 61 (H) U/L      AST (SGOT) 36 U/L      Alkaline Phosphatase 92 U/L      Total Bilirubin 0.7 mg/dL      eGFR Non African Amer 11 (L) mL/min/1.73      Globulin 4.0 gm/dL      A/G Ratio 0.9 (L) g/dL      BUN/Creatinine Ratio 19.2      Anion Gap 16.0 (H) mmol/L     CBC (No Diff) [[id number]]  (Abnormal) Collected:  01/17/17 0600    Specimen:  Blood Updated:  01/17/17 0615     WBC 12.75 (H) 10*3/mm3      RBC 4.08 (L) 10*6/mm3      Hemoglobin 10.6 (L) g/dL      Hematocrit 33.0 (L) %      MCV 80.9 (L) fL      MCH 26.0 (L) pg      MCHC 32.1 (L) g/dL      RDW 16.6 (H) %      RDW-SD 49.5 fl      MPV 9.4 fL      Platelets 128 (L) 10*3/mm3     Basic Metabolic Panel [[id number]]  (Abnormal) Collected:  01/17/17 0600    Specimen:  Blood Updated:  01/17/17 0627     Glucose 110 (H) mg/dL      BUN 99 (H) mg/dL      Creatinine 5.51 (H) mg/dL      Sodium 138 mmol/L      Potassium 5.1 mmol/L      Chloride 99 mmol/L      CO2 23.0 (L) mmol/L      Calcium 8.4 mg/dL      eGFR Non African Amer 11 (L) mL/min/1.73      BUN/Creatinine Ratio 18.0      Anion Gap 16.0 (H) mmol/L     Narrative:       GFR Normal >60  Chronic Kidney Disease <60  Kidney Failure <15          Imaging Results (last 24 hours)     Procedure Component Value Units Date/Time    CT Abdomen Pelvis Without Contrast [[id number]] Collected:  01/17/17 0714     Updated:  01/17/17 0735    Narrative:       CT ABDOMEN PELVIS WO CONTRAST- 1/17/2017 2:18 AM CST     HISTORY: right flank pain      COMPARISON: None      DLP: 838 mGy cm. Automated exposure control was utilized to diminish  patient radiation dose.     TECHNIQUE: Noncontrast enhanced images of the abdomen and pelvis  obtained without oral contrast.      FINDINGS:   There is mild bibasilar atelectasis. The base of the heart is  unremarkable..      LIVER: There are 3 hypodense lesions within the right lobe of  liver  including a 9 mm lesion within the anterior segment of the right lobe of  the liver as well as a 18 and 13 mm hypodense lesion within the  posterior segment of the right lobe of the liver. These are likely  representative of hepatic cysts. The liver is otherwise normal in  appearance. Ultrasound could be helpful for further characterization..      BILIARY SYSTEM: The gallbladder is unremarkable. No intrahepatic or  extrahepatic ductal dilatation.      PANCREAS: No focal pancreatic lesion.      SPLEEN: There is mild splenomegaly with a [id number] length of 15 cm..        KIDNEYS AND ADRENALS: No discrete renal mass is identified. There is a 9  mm nonobstructing calculus involving the interpolar aspect of the right  kidney. There is mild dilatation of the upper tracts of the right kidney  as well as some proximal right periureteral stranding. This would  suggest the patient may have recently passed a stone. There is no  evidence of a discrete ureteral stone at this time. The left renal  collecting system and ureter is unremarkable..     .     RETROPERITONEUM: No mass, lymphadenopathy or hemorrhage.      GI TRACT: There is mild constipation. There is no evidence of mechanical  obstruction but there are multiple fluid-filled small bowel loops. A few  scattered air-fluid levels are present.. The appendix has been  surgically removed..     OTHER: There is no mesenteric mass, lymphadenopathy or fluid collection.  The osseous structures and soft tissues demonstrate no worrisome  lesions. 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      PELVIS: No mass lesion, fluid collection or significant lymphadenopathy  is seen in the pelvis. The urinary bladder is normal in appearance. The  prostate gland is mildly enlarged.       Impression:       1. There is 9 mm nonobstructing calculus involving the interpolar aspect  of the right kidney. There is mild dilatation of the upper tracts of the  right  kidney and proximal right ureter with proximal periureteral  stranding. I see no evidence of a discrete ureteral stone but this may  represent sequela of a recently passed stone. Mild right-sided  obstructive uropathy secondary to another etiology would also be a  differential and if the patient's hematuria and flank pain are  persistent I would suggest urology consultation with retrograde  examination of the right renal collecting system and ureter. No evidence  of left-sided nephrolithiasis or ureteral calculus.  2. Suspected hepatic cysts within the right lobe of the liver. This  could be confirmed with ultrasound.  3. Nonspecific bowel gas pattern with some scattered fluid-filled bowel  loops. This is likely related to mild constipation with increased stool  noted throughout the colon..   4. Prostatic enlargement. A small fat-containing periumbilical hernia  is present.     Electronically Signed By-Dr. Jarvis Morgan MD On:1/17/2017 8:33 AM  EST  This report was finalized on 01/17/2017 07:33 by Dr. Jarvis Morgan MD.          Orders (last 24 hrs)     Start     Ordered    01/17/17 1600  Vital Signs  Every 4 Hours     Comments:  Per per hospital policy    01/17/17 1230    01/17/17 1400  Incentive Spirometry  Every 4 Hours While Awake      01/17/17 1230    01/17/17 1300  Strict Intake and Output  Every Hour      01/17/17 1230    01/17/17 1231  Weigh patient  Once      01/17/17 1230    01/17/17 1231  Daily Weights  Daily      01/17/17 1230    01/17/17 1231  Fall Precautions  Continuous      01/17/17 1230    01/17/17 1231  Oxygen Therapy-  Continuous      01/17/17 1230    01/17/17 1231  Insert Peripheral IV  Once      01/17/17 1230    01/17/17 1231  Saline Lock & Maintain IV Access  Continuous      01/17/17 1230    01/17/17 1231  Full Code  Continuous      01/17/17 1230    01/17/17 1231  Cardiac Monitoring  Continuous      01/17/17 1230    01/17/17 1231  Pulse Oximetry, Continuous  Continuous      01/17/17 1230     01/17/17 1231  Activity - Bed Rest With Exceptions (specify)  Until Discontinued      01/17/17 1230    01/17/17 1231  NPO Diet  Diet Effective Now      01/17/17 1230    01/17/17 1231  Reason for No Pharmacological VTE Prophylaxis  Once      01/17/17 1230    01/17/17 1231  Place SADIA Hose  Once      01/17/17 1230    01/17/17 1230  sodium chloride 0.9 % flush 1-10 mL  As Needed      01/17/17 1230    01/17/17 1000  methylPREDNISolone sodium succinate (SOLU-Medrol) injection 80 mg  Every 8 Hours      01/17/17 0431    01/17/17 0600  Basic Metabolic Panel  Morning Draw      01/17/17 0444    01/17/17 0600  CBC (No Diff)  Morning Draw      01/17/17 0444    01/17/17 0541  Adult Transthoracic Echo Complete  Once      01/17/17 0540    01/17/17 0541  Inpatient Admission  Once      01/17/17 0541    01/17/17 0500  cefTRIAXone (ROCEPHIN) 1 g/100 mL 0.9% NS (MBP)  Every 24 Hours      01/17/17 0443    01/17/17 0446  Inpatient Consult to Urology  Once     Specialty:  Urology  Provider:  (Not yet assigned)    01/17/17 0445    01/17/17 0445  US Renal Bilateral  1 Time Imaging      01/17/17 0444    01/17/17 0442  ECG 12 Lead  STAT      01/17/17 0441    01/17/17 0429  methylPREDNISolone sodium succinate (SOLU-Medrol) injection 80 mg  Every 8 Hours,   Status:  Discontinued      01/17/17 0427    01/17/17 0428  sodium chloride 0.9 % infusion  Continuous      01/17/17 0427    01/17/17 0428  Inpatient Consult to Nephrology  Once     Specialty:  Nephrology  Provider:  (Not yet assigned)    01/17/17 0427    01/17/17 0426  acetaminophen (TYLENOL) tablet 650 mg  Every 6 Hours PRN      01/17/17 0427    01/17/17 0426  ondansetron (ZOFRAN) injection 4 mg  Every 6 Hours PRN      01/17/17 0427    01/17/17 0426  ipratropium-albuterol (DUO-NEB) nebulizer solution 3 mL  Every 4 Hours PRN      01/17/17 0427    01/17/17 0426  Inpatient Admission  Once      01/17/17 0426    01/17/17 0148  ondansetron (ZOFRAN) injection 4 mg  Once      01/17/17 0146     01/17/17 0148  Morphine sulfate (PF) injection 2 mg  Once      01/17/17 0146    01/17/17 0147  sodium chloride 0.9 % bolus 1,000 mL  Once      01/17/17 0145 01/17/17 0147  sodium chloride 0.9 % infusion  Continuous,   Status:  Discontinued      01/17/17 0145    01/17/17 0147  methylPREDNISolone sodium succinate (SOLU-Medrol) injection 125 mg  Once      01/17/17 0145    01/17/17 0147  CT Abdomen Pelvis Without Contrast  1 Time Imaging     Comments:  NON-CONTRASTED STUDY      01/17/17 0146    01/17/17 0106  CBC & Differential  Once      01/17/17 0105    01/17/17 0106  Comprehensive Metabolic Panel  Once      01/17/17 0105 01/17/17 0106  CBC Auto Differential  PROCEDURE ONCE      01/17/17 0105 01/17/17 0058  Urinalysis, Microscopic Only  Once      01/17/17 0057 01/17/17 0058  Urine Culture  Once      01/17/17 0057 01/17/17 0041  Urinalysis With / Culture If Indicated  STAT      01/17/17 0040    12/30/16 0000  ciprofloxacin (CIPRO) 500 MG tablet      01/17/17 0020 11/03/16 0000  lisinopril-hydrochlorothiazide (PRINZIDE,ZESTORETIC) 10-12.5 MG per tablet  Daily      01/17/17 0020    11/03/16 0000  metoprolol tartrate (LOPRESSOR) 25 MG tablet  2 Times Daily      01/17/17 0020    --  albuterol (PROVENTIL HFA;VENTOLIN HFA) 108 (90 BASE) MCG/ACT inhaler  Every 6 Hours      01/17/17 0020    --  MethylPREDNISolone (MEDROL, LUIS ANGEL,) 4 MG tablet  2 Times Daily      01/17/17 0021           Draft: Not Electronically Signed.   Johan Dinh MD Physician Unsigned Transcription Medicine H&P Date of Service: 1/16/2017 11:25 PM         []Hide copied text  []Hover for attribution information  TIME: 4:28 a.m.      PRIMARY CARE PHYSICIAN: Moises Montes MD     HISTORY OF PRESENT ILLNESS: Mr. Andrea as a 47-year-old  male who presents to the emergency department at [facility] due to a chief complaint of nausea and vomiting associated with dark urine and bilateral flank pain. Imaging studies in the  emergency department demonstrate right-sided hydronephrosis. Mr. Andrea has a history of nephrolithiasis. To complicate matters somewhat, he also has a history of HSP (Henoch-Schonlein purpura). In the emergency department, he was found to have significantly worsening renal function. Mr. Andrea will require admission to the hospital for further evaluation and treatment.      Note that Mr. Andrea awoke on the morning of 01/02/2017 and noticed an usual rash on his lower extremities. At that time he was also producing a dark urine. He presented at [facility] Emergency Department and was admitted with a diagnosis of HSP. He remained in the hospital for approximately 3 days and was discharged in stable and improved condition. Repeat followup laboratory studies demonstrated stable renal function. However, he has since then developed nausea and vomiting and his renal function has worsened considerably.      Presently, he is awake and alert. He has no complaints. He is in no distress at this time. He will require admission to the hospital for further evaluation and treatment. The nephrology service has already been notified.      REVIEW OF SYSTEMS: Otherwise unremarkable from a cardiovascular, pulmonary, gastrointestinal, genitourinary, neurologic, psychiatric, metabolic and constitutional standpoint, except as noted. He has had no unusual fatigue, malaise, lethargy or weakness. He has had no definite fevers, chills, or sweats. His appetite is good. His weight is stable. He has had no chest pain, chest palpitations, shortness of breath, lower extremity swelling, orthopnea, cough, wheezing, or hemoptysis. He has had no abdominal pain, but he has had nausea and vomiting. He has had no diarrhea or constipation. He has had no dysphagia, odynophagia, hematemesis, hematochezia, or melena. He relates bilateral flank pain. He has had no pelvic pain. He has dark-colored urine. He has had no dysuria. He has had no  skin rashes, arthralgias, or myalgias except for purpura on his lower extremities, which is not palpable at this time. He has had no headache, confusion, memory deficits or loss of consciousness. He has had no changes in vision or hearing. He has had no acute motor or sensory deficits.      PAST MEDICAL HISTORY:  1. Hypertension.   2. Paroxysmal atrial fibrillation.  3. Nephrolithiasis.   4. Chronic kidney disease.   5. Elevated PSA.   6. Recurring hematuria.   7. Hypomagnesemia.      PAST SURGICAL HISTORY:  1. Status post appendectomy.   2. Status post reconstruction of his lower lip due to injury.      ALLERGIES: No known drug allergies.      HOME MEDICATIONS:  1. Cipro 500 mg p.o. b.i.d.   2. Lisinopril/hydrochlorothiazide 10/12.5, take 1 p.o. daily.   3. Medrol Dosepak.   4. Lopressor 25 mg p.o. daily.   5. Albuterol inhaler 2-4 inhalations q.6 h. p.r.n. for shortness of breath.      SOCIAL HISTORY: Significant for being a resident of [place], [place]. He is . He has no children. He works on his farm. He has a high school education plus an additional studies. He smokes a pack of cigarettes per day and has done so for 30 years. He drinks alcohol on rare occasions. He has no history of illicit drug use. He has no particular Yazidi affiliation. He has no recent history of travel outside this region.      He designates his wife, Karine Dey, to serve as a surrogate for healthcare matters should such become necessary.      CODE STATUS: He is a FULL CODE.      FAMILY HISTORY: Significant for having a half-brother on his mother's side of the family. His brother is in good health; however, he has a history of Kawasaki's disease as a child. His father is living but has a history of end-stage liver disease due to alcohol use, COPD, and diabetes mellitus type 2. His mother is alive and has a history of chronic kidney disease and hypothyroidism.      PHYSICAL EXAMINATION:  VITAL SIGNS: Temperature is 98.6,  pulse 80, respirations are 21 and unlabored, blood pressure is 137/86, O2 saturation is 99% on nasal cannula, weight is 219 pounds.   GENERAL: This is a 47-year-old  male appearing his documented age. He is resting comfortably in bed. He is in no apparent distress. He is articulate in his speech. He is interactive and cooperative. He proves to be a good historian. His wife was present throughout the interview and exam and also assisted in providing information.   HEENT: Essentially unremarkable except as noted. I see no signs of acute trauma. Eyes, nose, and throat are grossly unremarkable. Sclerae are clear. There is no discharge from the nostrils. Mucous membranes are moist.   NECK: Supple. He has no cervical or clavicular adenopathy. He has no definite carotid bruits. There are no masses of the head or neck. Neck veins are not pathologically distended.   CARDIAC: Reveals S1 and S2 with a regular rhythm. He has a 3/6 systolic murmur heard best along the left anterior chest.   ABDOMEN: Reveals bowel sounds to be present. His abdomen is nontender, nondistended and soft. He relates bilateral flank pain. There is no guarding.   EXTREMITIES: No lower extremity edema, erythema or calf tenderness. He has a purpura rash which is obvious on his lower extremities, most notably below the knees.   NEUROLOGIC: Reveals the patient to be awake and alert. He seems oriented to person, place, time and situation. Cranial nerves II through XII appear grossly intact. He exhibits no definite focal, motor or sensory deficits. He seems able to move all extremities without difficulty and at will. His gait was not tested.   PSYCHIATRIC: Reveals his mood to be stable. Affect seems appropriate. Thought processes are organized in that he is able to answer questions appropriately and provide a coherent history. Speech is fluent. There is no flight of ideas. There are no apparent short-term or long-term memory deficits.      DIAGNOSTIC  DATA: Sodium 138, potassium 4.7, chloride 98, CO2 of 24, , creatinine 5.69, glucose 80, total calcium 8.8.      Liver function testing is essentially unremarkable; however, ALT is slightly elevated at 61.      CBC demonstrates a white blood cell count of 11.2, hemoglobin 10.9, hematocrit 33.4, and platelet count 133,000.      Urinalysis demonstrates a specific gravity of 1.014 with a pH of 6.5. His urine is positive for blood, protein and leukocyte esterase. He has too numerous to count red blood cells per high-power field and 6-12 white blood cells per high-power field.      CT study of the abdomen and pelvis demonstrates right-sided hydronephrosis, per report of the emergency department physician.      EKG is pending.      IMPRESSION:  1. Acute kidney injury.   2. Chronic kidney disease.   3. HSP.   4. Nephrolithiasis.   5. Right hydronephrosis.   6. Elevated PSA.   7. Hypertension.   8. Paroxysmal atrial fibrillation.   9. Urinary tract infection.      PLAN: At this time, Mr. Andrea will be admitted to [facility] for further evaluation and treatment. His admitting diagnoses are as noted. His condition at this time is judged to be stable. He will be placed on telemetry due to his acute kidney injury.      I have asked the nursing staff to obtain vital signs per protocol. He will be confined to bedrest with bathroom privileges with assistance. As noted, he has no known drug allergies. I have asked the nursing staff to monitor input and output. Daily weights will be obtained. He will be maintained on a renal diet but will be held n.p.o. pending evaluation by the nephrology service. IV fluids will consist of normal saline at 150 mL/h. Oxygen will be used as needed to maintain his O2 saturation greater than 92%. He is a FULL CODE. Fall precautions are to be instituted. I will consult the nephrology and urology services.      ADMITTING MEDICATIONS:   1. Solu-Medrol 80 mg IV q.8 h.   2. Tylenol 650  mg p.o. q.6 h. p.r.n. for fever and/or discomfort.   3. DuoNeb 1 unit q.4 h. p.r.n. for shortness of breath.   4. Zofran 4 mg IV q.6 h. p.r.n. for nausea and vomiting.   5. Rocephin 1 g IV daily.      I will obtain followup laboratory studies in the morning.      I will obtain a renal ultrasound study.      EKG is pending.      I will continue to follow Mr. Andrea closely through the night, pending the return of the hospitalist team in the morning. Nursing staff may certainly call should they have any questions or concerns. Please refer to the medical record for additional information, orders and/or comments.         cc: MD Johan PARMAR M.D. [id number]  D: 01/17/2017 05:44:51(Eastern Time)  T: 01/17/2017 07:19:27(Eastern Time)  Voice ID: [id number]/Document ID: [id number]

## 2017-01-17 NOTE — PLAN OF CARE
Problem: Patient Care Overview (Adult)  Goal: Plan of Care Review  Outcome: Ongoing (interventions implemented as appropriate)    01/17/17 3999   Coping/Psychosocial Response Interventions   Plan Of Care Reviewed With patient   Patient Care Overview   Progress no change   Outcome Evaluation   Outcome Summary/Follow up Plan patient admitted from ED this shift       Goal: Adult Individualization and Mutuality  Outcome: Ongoing (interventions implemented as appropriate)  Goal: Discharge Needs Assessment  Outcome: Ongoing (interventions implemented as appropriate)    Problem: Renal Failure/Kidney Injury, Acute (Adult)  Goal: Signs and Symptoms of Listed Potential Problems Will be Absent or Manageable (Renal Failure/Kidney Injury, Acute)  Outcome: Ongoing (interventions implemented as appropriate)

## 2017-01-18 LAB
ANION GAP SERPL CALCULATED.3IONS-SCNC: 18 MMOL/L (ref 4–13)
BACTERIA UR QL AUTO: ABNORMAL /HPF
BILIRUB UR QL STRIP: NEGATIVE
BUN BLD-MCNC: 106 MG/DL (ref 5–21)
BUN/CREAT SERPL: 20 (ref 7–25)
CALCIUM SPEC-SCNC: 8.2 MG/DL (ref 8.4–10.4)
CHLORIDE SERPL-SCNC: 98 MMOL/L (ref 98–110)
CLARITY UR: ABNORMAL
CO2 SERPL-SCNC: 19 MMOL/L (ref 24–31)
COLOR UR: ABNORMAL
CREAT BLD-MCNC: 5.31 MG/DL (ref 0.5–1.4)
CREAT UR-MCNC: 43.7 MG/DL
DEPRECATED RDW RBC AUTO: 47.5 FL (ref 40–54)
ERYTHROCYTE [DISTWIDTH] IN BLOOD BY AUTOMATED COUNT: 16.4 % (ref 12–15)
FERRITIN SERPL-MCNC: 169 NG/ML (ref 17.9–464)
FOLATE SERPL-MCNC: 10.5 NG/ML
GFR SERPL CREATININE-BSD FRML MDRD: 12 ML/MIN/1.73
GLUCOSE BLD-MCNC: 194 MG/DL (ref 70–100)
GLUCOSE UR STRIP-MCNC: ABNORMAL MG/DL
HCT VFR BLD AUTO: 31 % (ref 40–52)
HGB BLD-MCNC: 10.3 G/DL (ref 14–18)
HGB UR QL STRIP.AUTO: ABNORMAL
IRON 24H UR-MRATE: 117 MCG/DL (ref 42–180)
IRON SATN MFR SERPL: 46 % (ref 20–45)
KETONES UR QL STRIP: NEGATIVE
LEUKOCYTE ESTERASE UR QL STRIP.AUTO: ABNORMAL
LV EF 2D ECHO EST: 65 %
MCH RBC QN AUTO: 26.3 PG (ref 28–32)
MCHC RBC AUTO-ENTMCNC: 33.2 G/DL (ref 33–36)
MCV RBC AUTO: 79.3 FL (ref 82–95)
NITRITE UR QL STRIP: NEGATIVE
PH UR STRIP.AUTO: <=5 [PH] (ref 5–8)
PLATELET # BLD AUTO: 148 10*3/MM3 (ref 130–400)
PMV BLD AUTO: 9.8 FL (ref 6–12)
POTASSIUM BLD-SCNC: 4.9 MMOL/L (ref 3.5–5.3)
PROT UR QL STRIP: ABNORMAL
RBC # BLD AUTO: 3.91 10*6/MM3 (ref 4.8–5.9)
RBC # UR: ABNORMAL /HPF
REF LAB TEST METHOD: ABNORMAL
SODIUM BLD-SCNC: 135 MMOL/L (ref 135–145)
SODIUM UR-SCNC: 51 MMOL/L (ref 30–90)
SP GR UR STRIP: 1.02 (ref 1–1.03)
SQUAMOUS #/AREA URNS HPF: ABNORMAL /HPF
TIBC SERPL-MCNC: 254 MCG/DL (ref 225–420)
UROBILINOGEN UR QL STRIP: ABNORMAL
UUN 24H UR-MCNC: 581 MG/DL
VIT B12 BLD-MCNC: 579 PG/ML (ref 239–931)
WBC NRBC COR # BLD: 16.75 10*3/MM3 (ref 4.8–10.8)
WBC UR QL AUTO: ABNORMAL /HPF

## 2017-01-18 PROCEDURE — 84540 ASSAY OF URINE/UREA-N: CPT | Performed by: NURSE PRACTITIONER

## 2017-01-18 PROCEDURE — 83540 ASSAY OF IRON: CPT | Performed by: NURSE PRACTITIONER

## 2017-01-18 PROCEDURE — 85027 COMPLETE CBC AUTOMATED: CPT | Performed by: NURSE PRACTITIONER

## 2017-01-18 PROCEDURE — 82607 VITAMIN B-12: CPT | Performed by: NURSE PRACTITIONER

## 2017-01-18 PROCEDURE — 84300 ASSAY OF URINE SODIUM: CPT | Performed by: NURSE PRACTITIONER

## 2017-01-18 PROCEDURE — 82746 ASSAY OF FOLIC ACID SERUM: CPT | Performed by: NURSE PRACTITIONER

## 2017-01-18 PROCEDURE — 25010000002 METHYLPREDNISOLONE PER 125 MG: Performed by: INTERNAL MEDICINE

## 2017-01-18 PROCEDURE — 81001 URINALYSIS AUTO W/SCOPE: CPT | Performed by: NURSE PRACTITIONER

## 2017-01-18 PROCEDURE — 82728 ASSAY OF FERRITIN: CPT | Performed by: NURSE PRACTITIONER

## 2017-01-18 PROCEDURE — 82570 ASSAY OF URINE CREATININE: CPT | Performed by: NURSE PRACTITIONER

## 2017-01-18 PROCEDURE — 80048 BASIC METABOLIC PNL TOTAL CA: CPT | Performed by: NURSE PRACTITIONER

## 2017-01-18 PROCEDURE — 83550 IRON BINDING TEST: CPT | Performed by: NURSE PRACTITIONER

## 2017-01-18 PROCEDURE — 84156 ASSAY OF PROTEIN URINE: CPT | Performed by: NURSE PRACTITIONER

## 2017-01-18 PROCEDURE — 25010000002 CEFTRIAXONE: Performed by: INTERNAL MEDICINE

## 2017-01-18 PROCEDURE — 25010000002 HYDROMORPHONE PER 4 MG: Performed by: INTERNAL MEDICINE

## 2017-01-18 RX ORDER — TAMSULOSIN HYDROCHLORIDE 0.4 MG/1
0.4 CAPSULE ORAL NIGHTLY
Status: DISCONTINUED | OUTPATIENT
Start: 2017-01-18 | End: 2017-01-27 | Stop reason: HOSPADM

## 2017-01-18 RX ORDER — HYDROCODONE BITARTRATE AND ACETAMINOPHEN 5; 325 MG/1; MG/1
1 TABLET ORAL EVERY 4 HOURS PRN
Status: DISCONTINUED | OUTPATIENT
Start: 2017-01-18 | End: 2017-01-20 | Stop reason: SDUPTHER

## 2017-01-18 RX ORDER — LIDOCAINE HYDROCHLORIDE 10 MG/ML
20 INJECTION, SOLUTION INFILTRATION; PERINEURAL ONCE
Status: COMPLETED | OUTPATIENT
Start: 2017-01-19 | End: 2017-01-19

## 2017-01-18 RX ORDER — LIDOCAINE HYDROCHLORIDE 10 MG/ML
20 INJECTION, SOLUTION INFILTRATION; PERINEURAL ONCE
Status: DISCONTINUED | OUTPATIENT
Start: 2017-01-18 | End: 2017-01-18

## 2017-01-18 RX ADMIN — METHYLPREDNISOLONE SODIUM SUCCINATE 80 MG: 125 INJECTION, POWDER, FOR SOLUTION INTRAMUSCULAR; INTRAVENOUS at 17:44

## 2017-01-18 RX ADMIN — SODIUM CHLORIDE 150 ML/HR: 9 INJECTION, SOLUTION INTRAVENOUS at 12:59

## 2017-01-18 RX ADMIN — METHYLPREDNISOLONE SODIUM SUCCINATE 80 MG: 125 INJECTION, POWDER, FOR SOLUTION INTRAMUSCULAR; INTRAVENOUS at 02:04

## 2017-01-18 RX ADMIN — NICOTINE 1 PATCH: 21 PATCH, EXTENDED RELEASE TRANSDERMAL at 00:03

## 2017-01-18 RX ADMIN — HYDROMORPHONE HYDROCHLORIDE 1 MG: 1 INJECTION, SOLUTION INTRAMUSCULAR; INTRAVENOUS; SUBCUTANEOUS at 21:50

## 2017-01-18 RX ADMIN — ACETAMINOPHEN 650 MG: 325 TABLET ORAL at 05:53

## 2017-01-18 RX ADMIN — HYDROCODONE BITARTRATE AND ACETAMINOPHEN 1 TABLET: 5; 325 TABLET ORAL at 12:03

## 2017-01-18 RX ADMIN — TAMSULOSIN HYDROCHLORIDE 0.4 MG: 0.4 CAPSULE ORAL at 21:50

## 2017-01-18 RX ADMIN — SODIUM CHLORIDE 150 ML/HR: 9 INJECTION, SOLUTION INTRAVENOUS at 04:48

## 2017-01-18 RX ADMIN — SODIUM CHLORIDE 150 ML/HR: 9 INJECTION, SOLUTION INTRAVENOUS at 20:02

## 2017-01-18 RX ADMIN — METHYLPREDNISOLONE SODIUM SUCCINATE 80 MG: 125 INJECTION, POWDER, FOR SOLUTION INTRAMUSCULAR; INTRAVENOUS at 09:20

## 2017-01-18 RX ADMIN — CEFTRIAXONE 1 G: 1 INJECTION, POWDER, FOR SOLUTION INTRAMUSCULAR; INTRAVENOUS at 05:13

## 2017-01-18 RX ADMIN — NICOTINE 1 PATCH: 21 PATCH, EXTENDED RELEASE TRANSDERMAL at 21:50

## 2017-01-18 NOTE — CONSULTS
Nephrology ([facility]) Consult Note      Patient:  Gurjit Andrea  YOB: 1970  Date of Service: 1/18/2017  MRN: [id number]   Acct: [id number]   Primary Care Physician: Moises Montes MD  [place] Directive: Full Code  Admit Date: 1/17/2017       Hospital Day: 1  Referring Provider: Johan Dinh MD      Patient Seen, Chart, Consults, Notes, Labs, Radiology studies reviewed.        Subjective:  Gurjit Andrea is a 47 y.o. male  whom we were consulted for acute kidney injury.  Per pt, baseline GFR in the 50s.  He was treated at [facility] approx two weeks ago for Henoch-Schonlein Purpura.  During that admission his creatinine improved from over 5 to approx 2.0mg which is his baseline.  The last few days he had noted increased fatigue, decreased appetite, and returned to the hospital.  Renal function was found to be significantly decreased; creatinine in ER 5.6mg.  Today he is complaining of flank pain, nonoliguric.  Denies N/V/D.    Allergies:  Review of patient's allergies indicates no known allergies.    Home Meds:  Prescriptions Prior to Admission   Medication Sig Dispense Refill Last Dose   • ciprofloxacin (CIPRO) 500 MG tablet Take 1 tablet by mouth 2 times daily Begin taking the day before the biopsy is scheduled.      • lisinopril-hydrochlorothiazide (PRINZIDE,ZESTORETIC) 10-12.5 MG per tablet 1 tablet Daily.      • MethylPREDNISolone (MEDROL, LUIS ANGEL,) 4 MG tablet Take  by mouth 2 (Two) Times a Day. Take as directed on package instructions.      • metoprolol tartrate (LOPRESSOR) 25 MG tablet 25 mg 2 (Two) Times a Day.      • albuterol (PROVENTIL HFA;VENTOLIN HFA) 108 (90 BASE) MCG/ACT inhaler Every 6 (Six) Hours.          Medicines:  Current Facility-Administered Medications   Medication Dose Route Frequency Provider Last Rate Last Dose   • acetaminophen (TYLENOL) tablet 650 mg  650 mg Oral Q6H PRN Johan Dinh MD   650 mg at 01/18/17 0553   • cefTRIAXone  (ROCEPHIN) 1 g/100 mL 0.9% NS (MBP)  1 g Intravenous Q24H Johan Dinh MD   Stopped at 01/18/17 0554   • ipratropium-albuterol (DUO-NEB) nebulizer solution 3 mL  3 mL Nebulization Q4H PRN Johan Dinh MD       • methylPREDNISolone sodium succinate (SOLU-Medrol) injection 80 mg  80 mg Intravenous Q8H Johan Dinh MD   80 mg at 01/18/17 0920   • nicotine (NICODERM CQ) 21 MG/24HR patch 1 patch  1 patch Transdermal Nightly Johan Dinh MD   1 patch at 01/18/17 0003   • ondansetron (ZOFRAN) injection 4 mg  4 mg Intravenous Q6H PRN Johan Dinh MD       • sodium chloride 0.9 % flush 1-10 mL  1-10 mL Intravenous PRN Johan Dinh MD       • sodium chloride 0.9 % infusion  150 mL/hr Intravenous Continuous Johan Dinh  mL/hr at 01/18/17 0448 150 mL/hr at 01/18/17 0448       Past Medical History:  Past Medical History   Diagnosis Date   • A-fib    • Chronic renal failure    • Elevated PSA    • Purpura        Past Surgical History:  Past Surgical History   Procedure Laterality Date   • Appendectomy         Family History  History reviewed. No pertinent family history.    Social History  Social History     Social History   • Marital status:      Spouse name: N/A   • Number of children: N/A   • Years of education: N/A     Occupational History   • Not on file.     Social History Main Topics   • Smoking status: Heavy Tobacco Smoker   • Smokeless tobacco: Not on file   • Alcohol use Yes   • Drug use: Not on file   • Sexual activity: Not on file     Other Topics Concern   • Not on file     Social History Narrative   • No narrative on file         Review of Systems:  History obtained from chart review and the patient  General ROS: Patient complains of malaise and No fever or chills  Respiratory ROS: No cough, shortness of breath, wheezing  Cardiovascular ROS: no chest pain or dyspnea on exertion  Gastrointestinal ROS: No abdominal pain or melena  Genito-Urinary ROS: No dysuria or hematuria  14 point ROS reviewed  "with the patient and negative except as noted above and in the HPI unless unable to obtain.    Objective:  Visit Vitals   • /71   • Pulse 82   • Temp 97.4 °F (36.3 °C)   • Resp 20   • Ht 73\" (185.4 cm)   • Wt 224 lb 11.2 oz (102 kg)   • SpO2 98%   • BMI 29.65 kg/m2       Intake/Output Summary (Last 24 hours) at 01/18/17 0953  Last data filed at 01/18/17 0554   Gross per 24 hour   Intake   2035 ml   Output      0 ml   Net   2035 ml     General: awake/alert   Chest:  clear to auscultation bilaterally without respiratory distress  CVS: regular rate and rhythm  Abdominal: soft, nontender, normal bowel sounds  Extremities: no cyanosis or edema  Skin: diffusely scattered purpura      Labs:    Results from last 7 days  Lab Units 01/18/17  0755 01/17/17  0600 01/17/17  0115   WBC 10*3/mm3 16.75* 12.75* 11.21*   HEMOGLOBIN g/dL 10.3* 10.6* 10.9*   HEMATOCRIT % 31.0* 33.0* 33.4*   PLATELETS 10*3/mm3 148 128* 133           Results from last 7 days  Lab Units 01/18/17  0755 01/17/17  1524 01/17/17  0600 01/17/17  0115   SODIUM mmol/L 135 136 138 138   POTASSIUM mmol/L 4.9 5.3 5.1 4.7   CHLORIDE mmol/L 98 96* 99 98   TOTAL CO2 mmol/L 19.0* 23.0* 23.0* 24.0   BUN mg/dL 106* 99* 99* 109*   CREATININE mg/dL 5.31* 5.57* 5.51* 5.69*   CALCIUM mg/dL 8.2* 8.6 8.4 8.8   BILIRUBIN mg/dL  --   --   --  0.7   ALK PHOS U/L  --   --   --  92   ALT (SGPT) U/L  --   --   --  61*   AST (SGOT) U/L  --   --   --  36   GLUCOSE mg/dL 194* 121* 110* 80       Radiology:   Imaging Results (last 72 hours)     Procedure Component Value Units Date/Time    CT Abdomen Pelvis Without Contrast [[id number]] Collected:  01/17/17 0714     Updated:  01/17/17 0735    Narrative:       CT ABDOMEN PELVIS WO CONTRAST- 1/17/2017 2:18 AM CST     HISTORY: right flank pain      COMPARISON: None      DLP: 838 mGy cm. Automated exposure control was utilized to diminish  patient radiation dose.     TECHNIQUE: Noncontrast enhanced images of the abdomen and " pelvis  obtained without oral contrast.      FINDINGS:   There is mild bibasilar atelectasis. The base of the heart is  unremarkable..      LIVER: There are 3 hypodense lesions within the right lobe of liver  including a 9 mm lesion within the anterior segment of the right lobe of  the liver as well as a 18 and 13 mm hypodense lesion within the  posterior segment of the right lobe of the liver. These are likely  representative of hepatic cysts. The liver is otherwise normal in  appearance. Ultrasound could be helpful for further characterization..      BILIARY SYSTEM: The gallbladder is unremarkable. No intrahepatic or  extrahepatic ductal dilatation.      PANCREAS: No focal pancreatic lesion.      SPLEEN: There is mild splenomegaly with a [id number] length of 15 cm..        KIDNEYS AND ADRENALS: No discrete renal mass is identified. There is a 9  mm nonobstructing calculus involving the interpolar aspect of the right  kidney. There is mild dilatation of the upper tracts of the right kidney  as well as some proximal right periureteral stranding. This would  suggest the patient may have recently passed a stone. There is no  evidence of a discrete ureteral stone at this time. The left renal  collecting system and ureter is unremarkable..     .     RETROPERITONEUM: No mass, lymphadenopathy or hemorrhage.      GI TRACT: There is mild constipation. There is no evidence of mechanical  obstruction but there are multiple fluid-filled small bowel loops. A few  scattered air-fluid levels are present.. The appendix has been  surgically removed..     OTHER: There is no mesenteric mass, lymphadenopathy or fluid collection.  The osseous structures and soft tissues demonstrate no worrisome  lesions. 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      PELVIS: No mass lesion, fluid collection or significant lymphadenopathy  is seen in the pelvis. The urinary bladder is normal in appearance. The  prostate gland  is mildly enlarged.       Impression:       1. There is 9 mm nonobstructing calculus involving the interpolar aspect  of the right kidney. There is mild dilatation of the upper tracts of the  right kidney and proximal right ureter with proximal periureteral  stranding. I see no evidence of a discrete ureteral stone but this may  represent sequela of a recently passed stone. Mild right-sided  obstructive uropathy secondary to another etiology would also be a  differential and if the patient's hematuria and flank pain are  persistent I would suggest urology consultation with retrograde  examination of the right renal collecting system and ureter. No evidence  of left-sided nephrolithiasis or ureteral calculus.  2. Suspected hepatic cysts within the right lobe of the liver. This  could be confirmed with ultrasound.  3. Nonspecific bowel gas pattern with some scattered fluid-filled bowel  loops. This is likely related to mild constipation with increased stool  noted throughout the colon..   4. Prostatic enlargement. A small fat-containing periumbilical hernia  is present.     Electronically Signed By-Dr. Jarvis Morgan MD On:1/17/2017 8:33 AM  EST  This report was finalized on 01/17/2017 07:33 by Dr. Jarvis Morgan MD.          Culture:  URINE CULTURE   Date Value Ref Range Status   01/17/2017 No growth at 24 hours  Preliminary         Assessment   1.  Acute kidney injury   2.  HSP   3.  Uremic  4.  Anemia      Plan:  1.  Urine studies pending  2.  May need to initiate dialysis as only minimal improvement in renal function since admission  2.  Further assessment and plan following Dr Hatfield's evaluation      Thank you for the consult, we appreciate the opportunity to provide care to your patients.  Feel free to contact me if I can be of any further assistance.      Charlie Jacobo, APRN  1/18/2017  9:53 AM

## 2017-01-18 NOTE — PLAN OF CARE
Problem: Patient Care Overview (Adult)  Goal: Plan of Care Review  Outcome: Ongoing (interventions implemented as appropriate)    01/18/17 0456   Coping/Psychosocial Response Interventions   Plan Of Care Reviewed With patient   Patient Care Overview   Progress progress toward functional goals as expected   Outcome Evaluation   Outcome Summary/Follow up Plan Pt. has nephrology consult for today. Possible biopsy as outpatient.         Problem: Renal Failure/Kidney Injury, Acute (Adult)  Goal: Signs and Symptoms of Listed Potential Problems Will be Absent or Manageable (Renal Failure/Kidney Injury, Acute)  Outcome: Ongoing (interventions implemented as appropriate)

## 2017-01-18 NOTE — PLAN OF CARE
Problem: Patient Care Overview (Adult)  Goal: Plan of Care Review    01/18/17 1620   Coping/Psychosocial Response Interventions   Plan Of Care Reviewed With patient   Patient Care Overview   Progress improving   Outcome Evaluation   Outcome Summary/Follow up Plan punch biopsy of leg rash today, may require dialysis, 9mm right kidney stone, right flank pain, no interventions fron urology

## 2017-01-18 NOTE — PROGRESS NOTES
[facility] Medicine Services  INPATIENT PROGRESS NOTE    Length of Stay: 1  Date of Admission: 1/17/2017  Primary Care Physician: Moises Montes MD    Subjective   Chief Complaint: Right flank pain    HPI   47-year-old male admitted with nausea and vomiting associated with dark urine and bilateral flank pain. He has a 9 mm stone on the right, Dr. Awan has seen and feels acute intervention is not necessary at this time.  He has Henoch-Schonlein purpura, diagnosed in [place], [place].  He did see a nephrologist one time there but made significant changes in diet, lost 60 pounds and was feeling quite well until date of admission.  Today he complains of severe right flank pain, only medication ordered is Tylenol. He denies chest pain, shortness of breathing or abdominal pain.  He has been up in the [place] walking but due to pain he had to go back to bed.  He is stable.    Review of Systems   Constitutional: Negative for activity change, appetite change, fatigue and fever.   HENT: Negative for congestion, mouth sores, rhinorrhea, sinus pressure and trouble swallowing.    Respiratory: Negative for cough, chest tightness, shortness of breath and wheezing.    Cardiovascular: Negative for chest pain, palpitations and leg swelling.   Gastrointestinal: Negative for abdominal distention, abdominal pain, constipation, diarrhea, nausea and vomiting.   Genitourinary: Positive for flank pain (severe right 8 on scale of 1/10). Negative for difficulty urinating, dysuria and frequency.   Musculoskeletal: Negative for arthralgias and myalgias.   Neurological: Negative for dizziness, weakness and light-headedness.   Psychiatric/Behavioral: Negative for agitation and sleep disturbance. The patient is not nervous/anxious.         All pertinent negatives and positives are as above. All other systems have been reviewed and are negative unless otherwise stated.     Objective    Temp:  [96.9 °F (36.1  °C)-98.5 °F (36.9 °C)] 97.4 °F (36.3 °C)  Heart Rate:  [70-90] 82  Resp:  [16-20] 20  BP: (123-146)/(71-80) 138/71    Physical Exam   Constitutional: He is oriented to person, place, and time. He appears well-developed and well-nourished. No distress.   HENT:   Head: Normocephalic and atraumatic.   Eyes: Conjunctivae and EOM are normal. Pupils are equal, round, and reactive to light. No scleral icterus.   Neck: Normal range of motion. Neck supple. No JVD present. No tracheal deviation present.   Cardiovascular: Normal rate, regular rhythm, normal heart sounds and intact distal pulses.  Exam reveals no gallop.    No murmur heard.  Pulmonary/Chest: Effort normal and breath sounds normal. No respiratory distress. He has no wheezes. He has no rales.   Abdominal: Soft. Bowel sounds are normal. He exhibits no distension. There is no tenderness. There is no guarding.   Musculoskeletal: Normal range of motion. He exhibits no edema.   Neurological: He is alert and oriented to person, place, and time.   No obvious deficits noted.   Skin: Skin is warm and dry. No rash noted. He is not diaphoretic. No erythema. There is pallor.   Psychiatric: He has a normal mood and affect. His behavior is normal.   Vitals reviewed.    Results Review:  Recent Results (from the past 12 hour(s))   Basic Metabolic Panel    Collection Time: 01/18/17  7:55 AM   Result Value Ref Range    Glucose 194 (H) 70 - 100 mg/dL     (H) 5 - 21 mg/dL    Creatinine 5.31 (H) 0.50 - 1.40 mg/dL    Sodium 135 135 - 145 mmol/L    Potassium 4.9 3.5 - 5.3 mmol/L    Chloride 98 98 - 110 mmol/L    CO2 19.0 (L) 24.0 - 31.0 mmol/L    Calcium 8.2 (L) 8.4 - 10.4 mg/dL    eGFR Non African Amer 12 (L) >60 mL/min/1.73    BUN/Creatinine Ratio 20.0 7.0 - 25.0    Anion Gap 18.0 (H) 4.0 - 13.0 mmol/L   CBC (No Diff)    Collection Time: 01/18/17  7:55 AM   Result Value Ref Range    WBC 16.75 (H) 4.80 - 10.80 10*3/mm3    RBC 3.91 (L) 4.80 - 5.90 10*6/mm3    Hemoglobin 10.3 (L)  14.0 - 18.0 g/dL    Hematocrit 31.0 (L) 40.0 - 52.0 %    MCV 79.3 (L) 82.0 - 95.0 fL    MCH 26.3 (L) 28.0 - 32.0 pg    MCHC 33.2 33.0 - 36.0 g/dL    RDW 16.4 (H) 12.0 - 15.0 %    RDW-SD 47.5 40.0 - 54.0 fl    MPV 9.8 6.0 - 12.0 fL    Platelets 148 130 - 400 10*3/mm3     Cultures:  URINE CULTURE   Date Value Ref Range Status   2017 No growth at 24 hours  Preliminary     Radiology Data:    Imaging Results (last 24 hours)     ** No results found for the last 24 hours. **          Intake/Output Summary (Last 24 hours) at 17 0957  Last data filed at 17 0554   Gross per 24 hour   Intake   2035 ml   Output      0 ml   Net   2035 ml     No Known Allergies    Scheduled meds:     ceftriaxone 1 g Intravenous Q24H   methylPREDNISolone sodium succinate 80 mg Intravenous Q8H   nicotine 1 patch Transdermal Nightly     PRN meds:  •  acetaminophen  •  ipratropium-albuterol  •  ondansetron  •  sodium chloride    Assessment/Plan     Active Problems:    Henoch-Schonlein purpura nephritis  Nephrolithiasis, 9mm right, causing right flank pain - no acute intervention at this time  Elevated PSA - plans for outpatient w/u per Dr Awan  OSMANI on CKD - no improvement with hydration (prior diagnosis of HSP)   HTN  PAF  UTI   Leukocytosis - worsening  Anemia, microcytic    Plan:  1. Followed by urology  2. Day 2 Rocephin  3. Day 2 Solumedrol 80mg q 8 hrs  4. Consult Nephrology  5. Continue hydration NS at 150/hr  6. Check anemia substrates  7. Labs in am: CBC, BMP  8. Add Norco 5 and Dilaudid 0.5 for pain control    Discharge Plannin-3 days    BHARATHI Jay   17   9:57 AM     I personally evaluated and examined the patient in conjunction with BHARATHI Farley and agree with the assessment, treatment plan, and disposition of the patient as recorded by her. My history, exam, and further recommendations are:       Results from last 7 days  Lab Units 17  0755 17  1524 17  0600   SODIUM mmol/L 135  136 138   POTASSIUM mmol/L 4.9 5.3 5.1   CHLORIDE mmol/L 98 96* 99   TOTAL CO2 mmol/L 19.0* 23.0* 23.0*   BUN mg/dL 106* 99* 99*   CREATININE mg/dL 5.31* 5.57* 5.51*   GLUCOSE mg/dL 194* 121* 110*   CALCIUM mg/dL 8.2* 8.6 8.4       Intake/Output Summary (Last 24 hours) at 01/18/17 1209  Last data filed at 01/18/17 0554   Gross per 24 hour   Intake   2035 ml   Output      0 ml   Net   2035 ml     The patient presents with nonoliguric acute on chronic kidney disease. He has a history of chronic kidney disease (presumably secondary to hypertensive nephrosclerosis) and has seen a nephrologist in [place], [place] previously. He was recently admitted at [facility] to Dr. Montes and given a presumptive diagnosis of Henoch-Schönlein purpura after presenting with hematuria, acute renal failure, and a rash in the bilateral lower extremities.  No areas of the rash have been biopsied and he has not undergone a renal biopsy..  I am not sure why they elected to keep him there and not transfer from nephrology input at that point in time. He was treated with IV fluids and IV steroids. The patient says his kidney function got back down to 2.0 and he was allowed to go home.    I appreciate urology and nephrology evaluation.  No intervention planned by urology at this point in time.  We will make adjustments with nephrology's assistance.  The patient may ultimately require dialysis.  If so, he will need to be set up for a Permacath with vascular.    Continue IV fluids and IV steroids. Repeat urine studies pending. I am going to do a punch biopsy of one of his lesions on his extremities.  This was discussed with Dr. Hatfield.    History of elevated PSA to be dealt with on an outpatient basis.  Given this history as well as his stone would add Flomax at this point in time.    Brandt Gordon, DO  01/18/17  12:08 PM

## 2017-01-19 LAB
ABO GROUP BLD: NORMAL
ANION GAP SERPL CALCULATED.3IONS-SCNC: 15 MMOL/L (ref 4–13)
BACTERIA SPEC AEROBE CULT: NORMAL
BASOPHILS # BLD AUTO: 0 10*3/MM3 (ref 0–0.2)
BASOPHILS NFR BLD AUTO: 0 % (ref 0–2)
BLD GP AB SCN SERPL QL: NEGATIVE
BUN BLD-MCNC: 107 MG/DL (ref 5–21)
BUN/CREAT SERPL: 17.9 (ref 7–25)
CALCIUM SPEC-SCNC: 8.2 MG/DL (ref 8.4–10.4)
CHLORIDE SERPL-SCNC: 104 MMOL/L (ref 98–110)
CO2 SERPL-SCNC: 17 MMOL/L (ref 24–31)
CREAT BLD-MCNC: 5.97 MG/DL (ref 0.5–1.4)
DEPRECATED RDW RBC AUTO: 47 FL (ref 40–54)
EOSINOPHIL # BLD AUTO: 0 10*3/MM3 (ref 0–0.7)
EOSINOPHIL NFR BLD AUTO: 0 % (ref 0–4)
ERYTHROCYTE [DISTWIDTH] IN BLOOD BY AUTOMATED COUNT: 16.6 % (ref 12–15)
GFR SERPL CREATININE-BSD FRML MDRD: 10 ML/MIN/1.73
GLUCOSE BLD-MCNC: 142 MG/DL (ref 70–100)
HCT VFR BLD AUTO: 28.1 % (ref 40–52)
HGB BLD-MCNC: 9.2 G/DL (ref 14–18)
LYMPHOCYTES # BLD AUTO: 0.83 10*3/MM3 (ref 0.72–4.86)
LYMPHOCYTES NFR BLD AUTO: 5.4 % (ref 15–45)
MCH RBC QN AUTO: 25.9 PG (ref 28–32)
MCHC RBC AUTO-ENTMCNC: 32.7 G/DL (ref 33–36)
MCV RBC AUTO: 79.2 FL (ref 82–95)
MONOCYTES # BLD AUTO: 0.42 10*3/MM3 (ref 0.19–1.3)
MONOCYTES NFR BLD AUTO: 2.7 % (ref 4–12)
NEUTROPHILS # BLD AUTO: 14.13 10*3/MM3 (ref 1.87–8.4)
NEUTROPHILS NFR BLD AUTO: 91.9 % (ref 39–78)
PLATELET # BLD AUTO: 158 10*3/MM3 (ref 130–400)
PMV BLD AUTO: 10 FL (ref 6–12)
POTASSIUM BLD-SCNC: 5.1 MMOL/L (ref 3.5–5.3)
PROT UR-MCNC: NORMAL MG/DL (ref 0–13.5)
RBC # BLD AUTO: 3.55 10*6/MM3 (ref 4.8–5.9)
RH BLD: POSITIVE
SODIUM BLD-SCNC: 136 MMOL/L (ref 135–145)
WBC NRBC COR # BLD: 15.38 10*3/MM3 (ref 4.8–10.8)

## 2017-01-19 PROCEDURE — 0Y9C3ZX DRAINAGE OF RIGHT UPPER LEG, PERCUTANEOUS APPROACH, DIAGNOSTIC: ICD-10-PCS | Performed by: INTERNAL MEDICINE

## 2017-01-19 PROCEDURE — 25010000002 METHYLPREDNISOLONE PER 125 MG: Performed by: INTERNAL MEDICINE

## 2017-01-19 PROCEDURE — 85025 COMPLETE CBC W/AUTO DIFF WBC: CPT | Performed by: NURSE PRACTITIONER

## 2017-01-19 PROCEDURE — 86900 BLOOD TYPING SEROLOGIC ABO: CPT

## 2017-01-19 PROCEDURE — 86901 BLOOD TYPING SEROLOGIC RH(D): CPT

## 2017-01-19 PROCEDURE — 99253 IP/OBS CNSLTJ NEW/EST LOW 45: CPT | Performed by: NURSE PRACTITIONER

## 2017-01-19 PROCEDURE — 25010000002 CEFTRIAXONE: Performed by: INTERNAL MEDICINE

## 2017-01-19 PROCEDURE — 86850 RBC ANTIBODY SCREEN: CPT

## 2017-01-19 PROCEDURE — 88305 TISSUE EXAM BY PATHOLOGIST: CPT | Performed by: INTERNAL MEDICINE

## 2017-01-19 PROCEDURE — 80048 BASIC METABOLIC PNL TOTAL CA: CPT | Performed by: NURSE PRACTITIONER

## 2017-01-19 RX ADMIN — SODIUM CHLORIDE 150 ML/HR: 9 INJECTION, SOLUTION INTRAVENOUS at 18:30

## 2017-01-19 RX ADMIN — TAMSULOSIN HYDROCHLORIDE 0.4 MG: 0.4 CAPSULE ORAL at 22:52

## 2017-01-19 RX ADMIN — LIDOCAINE HYDROCHLORIDE 20 ML: 10 INJECTION, SOLUTION INFILTRATION; PERINEURAL at 10:30

## 2017-01-19 RX ADMIN — METHYLPREDNISOLONE SODIUM SUCCINATE 80 MG: 125 INJECTION, POWDER, FOR SOLUTION INTRAMUSCULAR; INTRAVENOUS at 11:40

## 2017-01-19 RX ADMIN — CEFTRIAXONE 1 G: 1 INJECTION, POWDER, FOR SOLUTION INTRAMUSCULAR; INTRAVENOUS at 05:16

## 2017-01-19 RX ADMIN — METHYLPREDNISOLONE SODIUM SUCCINATE 80 MG: 125 INJECTION, POWDER, FOR SOLUTION INTRAMUSCULAR; INTRAVENOUS at 02:07

## 2017-01-19 RX ADMIN — SODIUM CHLORIDE 150 ML/HR: 9 INJECTION, SOLUTION INTRAVENOUS at 02:52

## 2017-01-19 RX ADMIN — SODIUM CHLORIDE 150 ML/HR: 9 INJECTION, SOLUTION INTRAVENOUS at 11:39

## 2017-01-19 RX ADMIN — METHYLPREDNISOLONE SODIUM SUCCINATE 80 MG: 125 INJECTION, POWDER, FOR SOLUTION INTRAMUSCULAR; INTRAVENOUS at 17:18

## 2017-01-19 RX ADMIN — NICOTINE 1 PATCH: 21 PATCH, EXTENDED RELEASE TRANSDERMAL at 22:52

## 2017-01-19 NOTE — PROGRESS NOTES
Nephrology ([facility]) Consult Note      Patient:  Gurjit Andrea  YOB: 1970  Date of Service: 1/19/2017  MRN: [id number]   Acct: [id number]   Primary Care Physician: Moises Montes MD  Advance Directive: Full Code  Admit Date: 1/17/2017       Hospital Day: 2  Referring Provider: Johan Dinh MD      Patient Seen, Chart, Consults, Notes, Labs, Radiology studies reviewed.        Subjective:  Gurjit Andrea is a 47 y.o. male  whom we were consulted for OSMANI/CKD 3. baseline eGFR 50s, pt notes improved diet/lifestyle/weight loss. Had followed with nephrology years ago. 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 S/p punch biopsy here.         Allergies:  Review of patient's allergies indicates no known allergies.    Home Meds:  Prescriptions Prior to Admission   Medication Sig Dispense Refill Last Dose   • ciprofloxacin (CIPRO) 500 MG tablet Take 1 tablet by mouth 2 times daily Begin taking the day before the biopsy is scheduled.      • lisinopril-hydrochlorothiazide (PRINZIDE,ZESTORETIC) 10-12.5 MG per tablet 1 tablet Daily.      • MethylPREDNISolone (MEDROL, LUIS ANGEL,) 4 MG tablet Take  by mouth 2 (Two) Times a Day. Take as directed on package instructions.      • metoprolol tartrate (LOPRESSOR) 25 MG tablet 25 mg 2 (Two) Times a Day.      • albuterol (PROVENTIL HFA;VENTOLIN HFA) 108 (90 BASE) MCG/ACT inhaler Every 6 (Six) Hours.          Medicines:  Current Facility-Administered Medications   Medication Dose Route Frequency Provider Last Rate Last Dose   • acetaminophen (TYLENOL) tablet 650 mg  650 mg Oral Q6H PRN Johan Dinh MD   650 mg at 01/18/17 0553   • cefTRIAXone (ROCEPHIN) 1 g/100 mL 0.9% NS (MBP)  1 g Intravenous Q24H Johan Dinh MD 0 mL/hr at 01/18/17 0554 1 g at 01/19/17 0516   • HYDROcodone-acetaminophen (NORCO) 5-325 MG per tablet 1 tablet  1 tablet Oral Q4H PRN BHARATHI Cervantes   1 tablet at 01/18/17 1203   • HYDROmorphone (DILAUDID) injection 1  mg  1 mg Intravenous Q4H PRN Brandt Gordon DO   1 mg at 01/18/17 2150   • ipratropium-albuterol (DUO-NEB) nebulizer solution 3 mL  3 mL Nebulization Q4H PRN Johan Dinh MD       • methylPREDNISolone sodium succinate (SOLU-Medrol) injection 80 mg  80 mg Intravenous Q8H Johan Dinh MD   80 mg at 01/19/17 1140   • nicotine (NICODERM CQ) 21 MG/24HR patch 1 patch  1 patch Transdermal Nightly Johan Dinh MD   1 patch at 01/18/17 2150   • ondansetron (ZOFRAN) injection 4 mg  4 mg Intravenous Q6H PRN Johan Dinh MD       • sodium chloride 0.9 % flush 1-10 mL  1-10 mL Intravenous PRN Johan Dinh MD       • sodium chloride 0.9 % infusion  150 mL/hr Intravenous Continuous Johan Dinh  mL/hr at 01/19/17 1139 150 mL/hr at 01/19/17 1139   • tamsulosin (FLOMAX) 24 hr capsule 0.4 mg  0.4 mg Oral Nightly Brandt Gordon DO   0.4 mg at 01/18/17 2150       Past Medical History:  Past Medical History   Diagnosis Date   • A-fib    • Chronic renal failure    • Elevated PSA    • Purpura        Past Surgical History:  Past Surgical History   Procedure Laterality Date   • Appendectomy         Family History  History reviewed. No pertinent family history.    Social History  Social History     Social History   • Marital status:      Spouse name: N/A   • Number of children: N/A   • Years of education: N/A     Occupational History   • Not on file.     Social History Main Topics   • Smoking status: Heavy Tobacco Smoker   • Smokeless tobacco: Not on file   • Alcohol use Yes   • Drug use: Not on file   • Sexual activity: Not on file     Other Topics Concern   • Not on file     Social History Narrative   • No narrative on file         Review of Systems:  History obtained from chart review and the patient  General ROS: No fever or chills  Respiratory ROS: No cough, shortness of breath, wheezing  Cardiovascular ROS: no chest pain or dyspnea on exertion  Gastrointestinal ROS: No abdominal pain or melena  Genito-Urinary ROS:  "No dysuria or hematuria  14 point ROS reviewed with the patient and negative except as noted above and in the HPI unless unable to obtain.    Objective:  Visit Vitals   • /77 (BP Location: Right arm, Patient Position: Lying)   • Pulse 78   • Temp 97.9 °F (36.6 °C) (Tympanic)   • Resp 20   • Ht 73\" (185.4 cm)   • Wt 233 lb 4.8 oz (106 kg)   • SpO2 99%   • BMI 30.78 kg/m2       Intake/Output Summary (Last 24 hours) at 01/19/17 1701  Last data filed at 01/19/17 1539   Gross per 24 hour   Intake 2212.6 ml   Output   2225 ml   Net  -12.4 ml     General: awake/alert   Chest:  clear to auscultation bilaterally without respiratory distress  CVS: regular rate and rhythm  Abdominal: soft, nontender, normal bowel sounds  Extremities: no cyanosis or edema  Skin: bilat le rash      Labs:    Results from last 7 days  Lab Units 01/19/17  0449 01/18/17  0755 01/17/17  0600   WBC 10*3/mm3 15.38* 16.75* 12.75*   HEMOGLOBIN g/dL 9.2* 10.3* 10.6*   HEMATOCRIT % 28.1* 31.0* 33.0*   PLATELETS 10*3/mm3 158 148 128*           Results from last 7 days  Lab Units 01/19/17  0449 01/18/17  0755 01/17/17  1524  01/17/17  0115   SODIUM mmol/L 136 135 136  < > 138   POTASSIUM mmol/L 5.1 4.9 5.3  < > 4.7   CHLORIDE mmol/L 104 98 96*  < > 98   TOTAL CO2 mmol/L 17.0* 19.0* 23.0*  < > 24.0   BUN mg/dL 107* 106* 99*  < > 109*   CREATININE mg/dL 5.97* 5.31* 5.57*  < > 5.69*   CALCIUM mg/dL 8.2* 8.2* 8.6  < > 8.8   BILIRUBIN mg/dL  --   --   --   --  0.7   ALK PHOS U/L  --   --   --   --  92   ALT (SGPT) U/L  --   --   --   --  61*   AST (SGOT) U/L  --   --   --   --  36   GLUCOSE mg/dL 142* 194* 121*  < > 80   < > = values in this interval not displayed.    Radiology:   Imaging Results (last 72 hours)     Procedure Component Value Units Date/Time    CT Abdomen Pelvis Without Contrast [[id number]] Collected:  01/17/17 0714     Updated:  01/17/17 0735    Narrative:       CT ABDOMEN PELVIS WO CONTRAST- 1/17/2017 2:18 AM CST     HISTORY: right flank " pain      COMPARISON: None      DLP: 838 mGy cm. Automated exposure control was utilized to diminish  patient radiation dose.     TECHNIQUE: Noncontrast enhanced images of the abdomen and pelvis  obtained without oral contrast.      FINDINGS:   There is mild bibasilar atelectasis. The base of the heart is  unremarkable..      LIVER: There are 3 hypodense lesions within the right lobe of liver  including a 9 mm lesion within the anterior segment of the right lobe of  the liver as well as a 18 and 13 mm hypodense lesion within the  posterior segment of the right lobe of the liver. These are likely  representative of hepatic cysts. The liver is otherwise normal in  appearance. Ultrasound could be helpful for further characterization..      BILIARY SYSTEM: The gallbladder is unremarkable. No intrahepatic or  extrahepatic ductal dilatation.      PANCREAS: No focal pancreatic lesion.      SPLEEN: There is mild splenomegaly with a [id number] length of 15 cm..        KIDNEYS AND ADRENALS: No discrete renal mass is identified. There is a 9  mm nonobstructing calculus involving the interpolar aspect of the right  kidney. There is mild dilatation of the upper tracts of the right kidney  as well as some proximal right periureteral stranding. This would  suggest the patient may have recently passed a stone. There is no  evidence of a discrete ureteral stone at this time. The left renal  collecting system and ureter is unremarkable..     .     RETROPERITONEUM: No mass, lymphadenopathy or hemorrhage.      GI TRACT: There is mild constipation. There is no evidence of mechanical  obstruction but there are multiple fluid-filled small bowel loops. A few  scattered air-fluid levels are present.. The appendix has been  surgically removed..     OTHER: There is no mesenteric mass, lymphadenopathy or fluid collection.  The osseous structures and soft tissues demonstrate no worrisome  lesions. There is arthrosis of both SI joints with  subchondral  sclerosis.      PELVIS: No mass lesion, fluid collection or significant lymphadenopathy  is seen in the pelvis. The urinary bladder is normal in appearance. The  prostate gland is mildly enlarged.       Impression:       1. There is 9 mm nonobstructing calculus involving the interpolar aspect  of the right kidney. There is mild dilatation of the upper tracts of the  right kidney and proximal right ureter with proximal periureteral  stranding. I see no evidence of a discrete ureteral stone but this may  represent sequela of a recently passed stone. Mild right-sided  obstructive uropathy secondary to another etiology would also be a  differential and if the patient's hematuria and flank pain are  persistent I would suggest urology consultation with retrograde  examination of the right renal collecting system and ureter. No evidence  of left-sided nephrolithiasis or ureteral calculus.  2. Suspected hepatic cysts within the right lobe of the liver. This  could be confirmed with ultrasound.  3. Nonspecific bowel gas pattern with some scattered fluid-filled bowel  loops. This is likely related to mild constipation with increased stool  noted throughout the colon..   4. Prostatic enlargement. A small fat-containing periumbilical hernia  is present.     Electronically Signed By-Dr. Jarvis Morgan MD On:1/17/2017 8:33 AM  EST  This report was finalized on 01/17/2017 07:33 by Dr. Jarvis Morgan MD.          Culture:  URINE CULTURE   Date Value Ref Range Status   01/17/2017 No growth at 2 days  Final         Assessment   OSMANI (?HSP)  CKD 3 (?HTN Nephrosclerosis)  Right nephrolithiasis   HTN  Elevated PSA     Plan:  D/w Dr. Gordon, plan permcath tomorrow for HD  Agree with emperic steroids/fluids  Punch biopsy pending      Mejia Hatfield MD  1/19/2017  5:01 PM

## 2017-01-19 NOTE — PLAN OF CARE
Problem: Patient Care Overview (Adult)  Goal: Plan of Care Review  Outcome: Ongoing (interventions implemented as appropriate)    01/19/17 0455   Coping/Psychosocial Response Interventions   Plan Of Care Reviewed With patient   Patient Care Overview   Progress no change   Outcome Evaluation   Outcome Summary/Follow up Plan Right flank pain continues but is tolerated per patient. Possible punch biopsy 1/19 of leg rash. Monitoring labs.         Problem: Renal Failure/Kidney Injury, Acute (Adult)  Goal: Signs and Symptoms of Listed Potential Problems Will be Absent or Manageable (Renal Failure/Kidney Injury, Acute)  Outcome: Ongoing (interventions implemented as appropriate)

## 2017-01-19 NOTE — PROGRESS NOTES
Procedure Note    A punch biopsy was performed on Mr. Andrea's right medial thigh just above the knee.  Sterile technique was used.  His skin was cleansed with ChloraPrep ×2.  The lesion selected was infiltrated with 5 mL's of 1% lidocaine without epinephrine.  I then used a 4 mm punch biopsy.  The tissue was distracted with a forcep and cut with scissors.  The specimen was placed in formalin and sent for pathology.  Pressure was applied with sterile gauze.  No significant bleeding was noted.  Gauze and a Band-Aid were applied to the site.  We will keep it clean with warm soapy water and apply Bactroban and allow to heal by secondary intention.

## 2017-01-19 NOTE — PLAN OF CARE
Problem: Patient Care Overview (Adult)  Goal: Plan of Care Review  Outcome: Ongoing (interventions implemented as appropriate)    01/19/17 1654   Coping/Psychosocial Response Interventions   Plan Of Care Reviewed With patient   Patient Care Overview   Progress no change         01/19/17 1654   Coping/Psychosocial Response Interventions   Plan Of Care Reviewed With patient   Patient Care Overview   Progress no change   Outcome Evaluation   Outcome Summary/Follow up Plan Rising BUN/CREATINE, consult to vascular         Problem: Renal Failure/Kidney Injury, Acute (Adult)  Goal: Signs and Symptoms of Listed Potential Problems Will be Absent or Manageable (Renal Failure/Kidney Injury, Acute)  Outcome: Ongoing (interventions implemented as appropriate)

## 2017-01-19 NOTE — PROGRESS NOTES
[facility] Medicine Services  INPATIENT PROGRESS NOTE    Length of Stay: 2  Date of Admission: 1/17/2017  Primary Care Physician: Moises Montes MD    Subjective   Chief Complaint: follow up flank pain.   HPI   States dilaudid took pain from 8/10 to a -2/10. No pain at present. Urine still dark. Some itching in bilateral lower extremities. Pt states overall he feels pretty good today. Denies any chest pain or shortness of breath. Is in NSR 70-80's with rare multifocal PVC's. Tolerated regular diet without nausea or vomiting.     Review of Systems   All pertinent negatives and positives are as above. All other systems have been reviewed and are negative unless otherwise stated.     Objective    Temp:  [97.4 °F (36.3 °C)-98.4 °F (36.9 °C)] 98.4 °F (36.9 °C)  Heart Rate:  [71-82] 71  Resp:  [18-20] 20  BP: (135-142)/(71-84) 140/81  Physical Exam   Constitutional: He is oriented to person, place, and time. He appears well-developed and well-nourished.   HENT:   Head: Normocephalic and atraumatic.   Eyes: EOM are normal. Pupils are equal, round, and reactive to light. No scleral icterus.   Neck: Normal range of motion. Neck supple. No JVD present. Carotid bruit is not present. No tracheal deviation present. No thyromegaly present.   Cardiovascular: Normal rate and regular rhythm.  Exam reveals no gallop and no friction rub.    No murmur heard.  Pulmonary/Chest: Effort normal and breath sounds normal. No respiratory distress. He has no wheezes. He has no rales. He exhibits no tenderness.   Abdominal: Soft. Bowel sounds are normal. He exhibits no distension. There is tenderness.   Right flank.   Musculoskeletal: Normal range of motion. He exhibits no edema.   Neurological: He is alert and oriented to person, place, and time. No cranial nerve deficit.   Skin: Skin is warm and dry. Rash noted.   Purple flat rash to bilateral lower extremities. None on abdomen.    Psychiatric: He has a  normal mood and affect.     Results Review:  I have reviewed the labs, radiology results, and diagnostic studies.    Laboratory Data:     Results from last 7 days  Lab Units 01/19/17  0449 01/18/17  0755 01/17/17  0600   WBC 10*3/mm3 15.38* 16.75* 12.75*   HEMOGLOBIN g/dL 9.2* 10.3* 10.6*   HEMATOCRIT % 28.1* 31.0* 33.0*   PLATELETS 10*3/mm3 158 148 128*       Results from last 7 days  Lab Units 01/19/17  0449 01/18/17  0755 01/17/17  1524  01/17/17  0115   SODIUM mmol/L 136 135 136  < > 138   POTASSIUM mmol/L 5.1 4.9 5.3  < > 4.7   CHLORIDE mmol/L 104 98 96*  < > 98   TOTAL CO2 mmol/L 17.0* 19.0* 23.0*  < > 24.0   BUN mg/dL 107* 106* 99*  < > 109*   CREATININE mg/dL 5.97* 5.31* 5.57*  < > 5.69*   CALCIUM mg/dL 8.2* 8.2* 8.6  < > 8.8   BILIRUBIN mg/dL  --   --   --   --  0.7   ALK PHOS U/L  --   --   --   --  92   ALT (SGPT) U/L  --   --   --   --  61*   AST (SGOT) U/L  --   --   --   --  36   GLUCOSE mg/dL 142* 194* 121*  < > 80   < > = values in this interval not displayed.    Culture Data:   URINE CULTURE   Date Value Ref Range Status   01/17/2017 No growth at 24 hours  Preliminary       Radiology Data:   Imaging Results (last 24 hours)     ** No results found for the last 24 hours. **        I have reviewed the patient current medications.     Assessment/Plan     Hospital Problem List     Henoch-Schonlein purpura nephritis        Assessment:  1. Presumptive Henoch-Schonlein purpura nephritis  2. Nephrolithiasis-9mm right stone-no acute intervention at present per Dr. Quinn  3. CKD 3- ?secondary to hypertensive nephrosclerosis  4. 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 Hypertension  6. Leukocytosis  7. Microcytic hcbtnh-baskhehdz-llxvnw dilutional.  8. Paroxysmal atrial fibrillation-NSR 70-80's in telemetry  9. Right flank pain  10.Elevated PSA-outpatient workup per Dr. Awan  11.UTI-no growth on cultures  12. Lower extremity rash  13. Mild hyperkalemia-5.1    Plan:  1. Punch biopsy today per Dr. Gordon.   2. Solumedrol 80mg  IV Every 8 hours.   3. Rocephin day 3  4. Repeat labs in am.   5. Records reviewed from [place]. Lyme, ehrlichia, and RMSF all negative.     Discharge Planning: I expect patient to be discharged to home in 2-3 days.    BHARATHI Cid   01/19/17   6:18 AM     I personally evaluated and examined the patient in conjunction with BHARATHI Baldwin and agree with the assessment, treatment plan, and disposition of the patient as recorded by her. My history, exam, and further recommendations are:     No new complaints today.  Seen with Patricia Li.    I performed a punch biopsy at the bedside today.  This was sent for pathology.  Hopefully this will help port the diagnosis of HSP.  Hopefully this will allow us to avoid renal biopsy.    His creatinine and BUN are still elevated, but stable.  He has no acidosis oremergent electrolyte derangements.  However, he may still require dialysis.  If so, vascular be consulted for a Permacath. I will discuss with Dr. Hatfield today.    Intake/Output Summary (Last 24 hours) at 01/19/17 1020  Last data filed at 01/19/17 0941   Gross per 24 hour   Intake 3012.6 ml   Output   1325 ml   Net 1687.6 ml   He does remain non-oliguric.  However, intravenous fluids and steroids still do not appear to be helping his overall process.      Brandt Gordon, DO  01/19/17  10:19 AM

## 2017-01-19 NOTE — CONSULTS
Gurjit Andrea  [id number]  [id number]  495/1  Brandt Gordon, DO  1/17/2017      HPI: Gurjit Andrea is a 47 year old  male who initially was treated at [facility] about 2 weeks ago for Henoch-Schonlein Purpura. He states he woke up with a rash to his lower extremities on 1/2/17. From patient report he has a history of chronic kidney disease but states his GFR was 59.  He did have increased fatigue and decreased appetite, and returned to the hospital.  He does complain of flank pain, but denies any pain at present.  His kidney function has not improved, likely secondary to HSP.  We are now being consulted for Permcath placement.    Past Medical History   Diagnosis Date   • A-fib    • Chronic renal failure    • Elevated PSA    • Purpura        Past Surgical History   Procedure Laterality Date   • Appendectomy         History reviewed. No pertinent family history.    Social History     Social History   • Marital status:      Spouse name: N/A   • Number of children: N/A   • Years of education: N/A     Occupational History   • Not on file.     Social History Main Topics   • Smoking status: Heavy Tobacco Smoker   • Smokeless tobacco: Not on file   • Alcohol use Yes   • Drug use: Not on file   • Sexual activity: Not on file     Other Topics Concern   • Not on file     Social History Narrative   • No narrative on file       No Known Allergies    Hospital Medications (active)       Dose Frequency Start End    acetaminophen (TYLENOL) tablet 650 mg 650 mg Every 6 Hours PRN 1/17/2017     Sig - Route: Take 2 tablets by mouth Every 6 (Six) Hours As Needed for mild pain (1-3) or fever. - Oral    cefTRIAXone (ROCEPHIN) 1 g/100 mL 0.9% NS (MBP) 1 g Every 24 Hours 1/17/2017     Sig - Route: Infuse 100 mL into a venous catheter Daily. - Intravenous    HYDROcodone-acetaminophen (NORCO) 5-325 MG per tablet 1 tablet 1 tablet Every 4 Hours PRN 1/18/2017 1/28/2017    Sig - Route: Take 1 tablet by mouth  Every 4 (Four) Hours As Needed for moderate pain (4-6). - Oral    HYDROmorphone (DILAUDID) injection 1 mg 1 mg Every 4 Hours PRN 1/18/2017     Sig - Route: Infuse 1 mg into a venous catheter Every 4 (Four) Hours As Needed for severe pain (7-10). - Intravenous    ipratropium-albuterol (DUO-NEB) nebulizer solution 3 mL 3 mL Every 4 Hours PRN 1/17/2017     Sig - Route: Take 3 mL by nebulization Every 4 (Four) Hours As Needed for wheezing or shortness of air. - Nebulization    lidocaine (XYLOCAINE) 1 % injection 20 mL 20 mL Once 1/19/2017 1/19/2017    Sig - Route: 20 mL by Infiltration route 1 (One) Time. - Infiltration    methylPREDNISolone sodium succinate (SOLU-Medrol) injection 80 mg 80 mg Every 8 Hours 1/17/2017     Sig - Route: Infuse 1.28 mL into a venous catheter Every 8 (Eight) Hours. - Intravenous    Cosign for Ordering: Accepted by Johan Dinh MD on 1/17/2017  6:11 AM    nicotine (NICODERM CQ) 21 MG/24HR patch 1 patch 1 patch Nightly 1/17/2017     Sig - Route: Place 1 patch on the skin Every Night. - Transdermal    ondansetron (ZOFRAN) injection 4 mg 4 mg Every 6 Hours PRN 1/17/2017     Sig - Route: Infuse 2 mL into a venous catheter Every 6 (Six) Hours As Needed for nausea or vomiting. - Intravenous    sodium chloride 0.9 % flush 1-10 mL 1-10 mL As Needed 1/17/2017     Sig - Route: Infuse 1-10 mL into a venous catheter As Needed for line care. - Intravenous    sodium chloride 0.9 % infusion 150 mL/hr Continuous 1/17/2017     Sig - Route: Infuse 150 mL/hr into a venous catheter Continuous. - Intravenous    tamsulosin (FLOMAX) 24 hr capsule 0.4 mg 0.4 mg Nightly 1/18/2017     Sig - Route: Take 1 capsule by mouth Every Night. - Oral    lidocaine (XYLOCAINE) 1 % injection 20 mL (Discontinued) 20 mL Once 1/18/2017 1/18/2017    Sig - Route: 20 mL by Infiltration route 1 (One) Time. - Infiltration          Review of Systems   Constitutional: Negative.    HENT: Negative.    Eyes: Negative.    Respiratory:  Negative.    Cardiovascular: Negative.    Gastrointestinal: Negative.    Endocrine: Negative.    Genitourinary: Negative.    Musculoskeletal: Negative.    Skin: Positive for rash (?HSP).        itching   Allergic/Immunologic: Negative.    Neurological: Negative.    Hematological: Negative.    Psychiatric/Behavioral: Negative.    All other systems reviewed and are negative.      Physical Exam   Constitutional: He is oriented to person, place, and time. He appears well-developed and well-nourished. No distress.   HENT:   Head: Normocephalic and atraumatic.   Mouth/Throat: Oropharynx is clear and moist and mucous membranes are normal.   Eyes: Pupils are equal, round, and reactive to light. No scleral icterus.   Neck: Normal range of motion. Neck supple. No JVD present. Carotid bruit is not present. No thyromegaly present.   Cardiovascular: Normal rate, regular rhythm, S1 normal, S2 normal, intact distal pulses and normal pulses.  Exam reveals no gallop and no friction rub.    Murmur heard.   Systolic murmur is present with a grade of 3/6   Pulses:       Femoral pulses are 2+ on the right side, and 2+ on the left side.       Popliteal pulses are 2+ on the right side, and 2+ on the left side.        Dorsalis pedis pulses are 2+ on the right side, and 2+ on the left side.        Posterior tibial pulses are 2+ on the right side, and 2+ on the left side.   Pulmonary/Chest: Effort normal and breath sounds normal.   Abdominal: Soft. Normal appearance, normal aorta and bowel sounds are normal. He exhibits no abdominal bruit. There is no hepatosplenomegaly. There is no tenderness.   Musculoskeletal: Normal range of motion.       Vascular Status -  His exam exhibits no right foot edema. His exam exhibits no left foot edema.  Neurological: He is alert and oriented to person, place, and time. He has normal strength. No cranial nerve deficit.   Skin: Skin is warm, dry and intact. Rash (to bilateral lower extremities, biopsy today  ?HSP) noted. He is not diaphoretic.   Psychiatric: He has a normal mood and affect. His behavior is normal. Judgment and thought content normal. Cognition and memory are normal.   Nursing note and vitals reviewed.      Laboratory Data:    Results from last 7 days  Lab Units 01/19/17  0449 01/18/17  0755 01/17/17  0600   WBC 10*3/mm3 15.38* 16.75* 12.75*   HEMOGLOBIN g/dL 9.2* 10.3* 10.6*   HEMATOCRIT % 28.1* 31.0* 33.0*   PLATELETS 10*3/mm3 158 148 128*         Results from last 7 days  Lab Units 01/19/17  0449 01/18/17  0755 01/17/17  1524  01/17/17  0115   SODIUM mmol/L 136 135 136  < > 138   POTASSIUM mmol/L 5.1 4.9 5.3  < > 4.7   CHLORIDE mmol/L 104 98 96*  < > 98   TOTAL CO2 mmol/L 17.0* 19.0* 23.0*  < > 24.0   BUN mg/dL 107* 106* 99*  < > 109*   CREATININE mg/dL 5.97* 5.31* 5.57*  < > 5.69*   CALCIUM mg/dL 8.2* 8.2* 8.6  < > 8.8   BILIRUBIN mg/dL  --   --   --   --  0.7   ALK PHOS U/L  --   --   --   --  92   ALT (SGPT) U/L  --   --   --   --  61*   AST (SGOT) U/L  --   --   --   --  36   GLUCOSE mg/dL 142* 194* 121*  < > 80   < > = values in this interval not displayed.          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      Comparison is made with current noncontrast CT exam.      Small hepatic cysts are noted. The largest of these measures 1.8 cm.      Normal and symmetric renal size.  Cortical thickness is appropriate.  Right kidney parenchymal echogenicity is slightly increased.      There is a shadowing 9 mm stone within the upper right kidney and mild  dilation of the upper pole collecting system.      Right kidney = 13.4 x 6.6 x 7.0 cm.      Left kidney = 12.7 x 5.3 x 6.0 cm.      Unremarkable urinary bladder.      Summary:  1. Upper right renal stone with mild dilation of the upper pole  collecting  system.  2. Symmetric kidney size.      Electronically Signed By-Dr. Roland Doan MD On:1/17/2017 9:17 AM EST  This report was finalized on 01/17/2017 08:17 by Dr. Roland Doan MD.      CT abdomen/pelvis:  CT ABDOMEN PELVIS WO CONTRAST- 1/17/2017 2:18 AM CST      HISTORY: right flank pain       COMPARISON: None       DLP: 838 mGy cm. Automated exposure control was utilized to diminish  patient radiation dose.      TECHNIQUE: Noncontrast enhanced images of the abdomen and pelvis  obtained without oral contrast.       FINDINGS:   There is mild bibasilar atelectasis. The base of the heart is  unremarkable..       LIVER: There are 3 hypodense lesions within the right lobe of liver  including a 9 mm lesion within the anterior segment of the right lobe of  the liver as well as a 18 and 13 mm hypodense lesion within the  posterior segment of the right lobe of the liver. These are likely  representative of hepatic cysts. The liver is otherwise normal in  appearance. Ultrasound could be helpful for further characterization..       BILIARY SYSTEM: The gallbladder is unremarkable. No intrahepatic or  extrahepatic ductal dilatation.       PANCREAS: No focal pancreatic lesion.       SPLEEN: There is mild splenomegaly with a rimg-qq-xmfz length of 15 cm..          KIDNEYS AND ADRENALS: No discrete renal mass is identified. There is a 9  mm nonobstructing calculus involving the interpolar aspect of the right  kidney. There is mild dilatation of the upper tracts of the right kidney  as well as some proximal right periureteral stranding. This would  suggest the patient may have recently passed a stone. There is no  evidence of a discrete ureteral stone at this time. The left renal  collecting system and ureter is unremarkable.. .      RETROPERITONEUM: No mass, lymphadenopathy or hemorrhage.       GI TRACT: There is mild constipation. There is no evidence of mechanical  obstruction but there are multiple fluid-filled small bowel  loops. A few  scattered air-fluid levels are present.. The appendix has been  surgically removed..      OTHER: There is no mesenteric mass, lymphadenopathy or fluid collection.  The osseous structures and soft tissues demonstrate no worrisome  lesions. 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       PELVIS: No mass lesion, fluid collection or significant lymphadenopathy  is seen in the pelvis. The urinary bladder is normal in appearance. The  prostate gland is mildly enlarged.      IMPRESSION:  1. There is 9 mm nonobstructing calculus involving the interpolar aspect  of the right kidney. There is mild dilatation of the upper tracts of the  right kidney and proximal right ureter with proximal periureteral  stranding. I see no evidence of a discrete ureteral stone but this may  represent sequela of a recently passed stone. Mild right-sided  obstructive uropathy secondary to another etiology would also be a  differential and if the patient's hematuria and flank pain are  persistent I would suggest urology consultation with retrograde  examination of the right renal collecting system and ureter. No evidence  of left-sided nephrolithiasis or ureteral calculus.  2. Suspected hepatic cysts within the right lobe of the liver. This  could be confirmed with ultrasound.  3. Nonspecific bowel gas pattern with some scattered fluid-filled bowel  loops. This is likely related to mild constipation with increased stool  noted throughout the colon..  4. Prostatic enlargement. A small fat-containing periumbilical hernia  is present.      Electronically Signed By-Dr. Jarvis Morgan MD On:1/17/2017 8:33 AM  EST  This report was finalized on 01/17/2017 07:33 by Dr. Jarvis Morgan MD.      Impression:  1. Presumptive Henoch-Schonlein purpura nephritis  2. Nephrolithiasis-9mm right stone-no acute intervention at present per Dr. Quinn  3. CKD 3- ?secondary to hypertensive nephrosclerosis  4. OSMANI-? Secondary to  HSP  5. Hypertension  6. Leukocytosis  7. Microcytic azqqgj-ajsexuvvd-hzktnc dilutional.  8. Paroxysmal atrial fibrillation-NSR 70-80's in telemetry  9. Right flank pain  10.Elevated PSA-outpatient workup per Dr. Awan  11.UTI-no growth on cultures  12. Lower extremity rash  13. Mild hyperkalemia-5.1      Plan: After thoroughly evaluating Gurjit Andrea, I believe the best course of action is to proceed with Permcath placement tomorrow.  The risks and benefits were explained at great length to the patient which include but are not limited to bleeding, infection, vessel damage, nerve damage and pneumothorax. The patient understands the risks and wishes for me to proceed.   I will schedule for tomorrow.  Dr. Hatfield notified of plans.  He did have a biopsy today per Dr. Gordon, and is being treated empirically with steroids and fluids. This was all discussed in full with complete understanding.      Thank you for allowing me to participate in the care of your patient.  Please do not hesitate to call with any questions or concerns.    BHARATHI Echevarria        Attestation: The patient was seen/examined at the bedside.  Agree with above.  Patient will have PermCath placement in the operating room.  Risks/benefits were explained to the patient which include but are not limited to bleeding, infection, vessel damage, pneumothorax.  Patient understands risks and wishes for me to proceed.

## 2017-01-20 ENCOUNTER — ANESTHESIA EVENT (OUTPATIENT)
Dept: PERIOP | Facility: HOSPITAL | Age: 47
End: 2017-01-20

## 2017-01-20 ENCOUNTER — ANESTHESIA (OUTPATIENT)
Dept: PERIOP | Facility: HOSPITAL | Age: 47
End: 2017-01-20

## 2017-01-20 ENCOUNTER — APPOINTMENT (OUTPATIENT)
Dept: INTERVENTIONAL RADIOLOGY/VASCULAR | Facility: HOSPITAL | Age: 47
End: 2017-01-20

## 2017-01-20 LAB
ANION GAP SERPL CALCULATED.3IONS-SCNC: 15 MMOL/L (ref 4–13)
BASOPHILS # BLD AUTO: 0 10*3/MM3 (ref 0–0.2)
BASOPHILS NFR BLD AUTO: 0 % (ref 0–2)
BUN BLD-MCNC: 112 MG/DL (ref 5–21)
BUN/CREAT SERPL: 17.7 (ref 7–25)
CALCIUM SPEC-SCNC: 8.1 MG/DL (ref 8.4–10.4)
CHLORIDE SERPL-SCNC: 108 MMOL/L (ref 98–110)
CO2 SERPL-SCNC: 16 MMOL/L (ref 24–31)
CREAT BLD-MCNC: 6.31 MG/DL (ref 0.5–1.4)
DEPRECATED RDW RBC AUTO: 47.4 FL (ref 40–54)
EOSINOPHIL # BLD AUTO: 0 10*3/MM3 (ref 0–0.7)
EOSINOPHIL NFR BLD AUTO: 0 % (ref 0–4)
ERYTHROCYTE [DISTWIDTH] IN BLOOD BY AUTOMATED COUNT: 16.3 % (ref 12–15)
GFR SERPL CREATININE-BSD FRML MDRD: 10 ML/MIN/1.73
GLUCOSE BLD-MCNC: 116 MG/DL (ref 70–100)
HBV SURFACE AB SER QL: <5
HBV SURFACE AB SER RIA-ACNC: ABNORMAL
HBV SURFACE AG SERPL QL IA: NEGATIVE
HCT VFR BLD AUTO: 28.9 % (ref 40–52)
HGB BLD-MCNC: 9.4 G/DL (ref 14–18)
IMM GRANULOCYTES # BLD: 0.04 10*3/MM3 (ref 0–0.03)
IMM GRANULOCYTES NFR BLD: 0.3 % (ref 0–5)
INR PPP: 1.12 (ref 0.91–1.09)
LYMPHOCYTES # BLD AUTO: 0.91 10*3/MM3 (ref 0.72–4.86)
LYMPHOCYTES NFR BLD AUTO: 7.2 % (ref 15–45)
MCH RBC QN AUTO: 25.8 PG (ref 28–32)
MCHC RBC AUTO-ENTMCNC: 32.5 G/DL (ref 33–36)
MCV RBC AUTO: 79.4 FL (ref 82–95)
MONOCYTES # BLD AUTO: 0.5 10*3/MM3 (ref 0.19–1.3)
MONOCYTES NFR BLD AUTO: 4 % (ref 4–12)
NEUTROPHILS # BLD AUTO: 11.11 10*3/MM3 (ref 1.87–8.4)
NEUTROPHILS NFR BLD AUTO: 88.5 % (ref 39–78)
PLATELET # BLD AUTO: 144 10*3/MM3 (ref 130–400)
PMV BLD AUTO: 10 FL (ref 6–12)
POTASSIUM BLD-SCNC: 5.2 MMOL/L (ref 3.5–5.3)
PROTHROMBIN TIME: 14.8 SECONDS (ref 11.9–14.6)
RBC # BLD AUTO: 3.64 10*6/MM3 (ref 4.8–5.9)
SODIUM BLD-SCNC: 139 MMOL/L (ref 135–145)
WBC NRBC COR # BLD: 12.56 10*3/MM3 (ref 4.8–10.8)

## 2017-01-20 PROCEDURE — 25010000003 CEFAZOLIN PER 500 MG: Performed by: NURSE PRACTITIONER

## 2017-01-20 PROCEDURE — 05HM33Z INSERTION OF INFUSION DEVICE INTO RIGHT INTERNAL JUGULAR VEIN, PERCUTANEOUS APPROACH: ICD-10-PCS | Performed by: SURGERY

## 2017-01-20 PROCEDURE — 85025 COMPLETE CBC W/AUTO DIFF WBC: CPT | Performed by: NURSE PRACTITIONER

## 2017-01-20 PROCEDURE — 87340 HEPATITIS B SURFACE AG IA: CPT | Performed by: INTERNAL MEDICINE

## 2017-01-20 PROCEDURE — 36558 INSERT TUNNELED CV CATH: CPT | Performed by: SURGERY

## 2017-01-20 PROCEDURE — C1750 CATH, HEMODIALYSIS,LONG-TERM: HCPCS | Performed by: SURGERY

## 2017-01-20 PROCEDURE — 85610 PROTHROMBIN TIME: CPT | Performed by: INTERNAL MEDICINE

## 2017-01-20 PROCEDURE — 86706 HEP B SURFACE ANTIBODY: CPT | Performed by: INTERNAL MEDICINE

## 2017-01-20 PROCEDURE — 5A1D60Z PERFORMANCE OF URINARY FILTRATION, MULTIPLE: ICD-10-PCS | Performed by: INTERNAL MEDICINE

## 2017-01-20 PROCEDURE — 25010000002 METHYLPREDNISOLONE PER 125 MG: Performed by: INTERNAL MEDICINE

## 2017-01-20 PROCEDURE — 25010000002 PROPOFOL 10 MG/ML EMULSION: Performed by: NURSE ANESTHETIST, CERTIFIED REGISTERED

## 2017-01-20 PROCEDURE — 25010000002 HYDROMORPHONE PER 4 MG: Performed by: INTERNAL MEDICINE

## 2017-01-20 PROCEDURE — 25010000002 CEFTRIAXONE: Performed by: INTERNAL MEDICINE

## 2017-01-20 PROCEDURE — 76000 FLUOROSCOPY <1 HR PHYS/QHP: CPT

## 2017-01-20 PROCEDURE — 36558 INSERT TUNNELED CV CATH: CPT

## 2017-01-20 PROCEDURE — 77001 FLUOROGUIDE FOR VEIN DEVICE: CPT | Performed by: SURGERY

## 2017-01-20 PROCEDURE — 25010000002 HEPARIN (PORCINE) PER 1000 UNITS: Performed by: SURGERY

## 2017-01-20 PROCEDURE — 0JH63XZ INSERTION OF TUNNELED VASCULAR ACCESS DEVICE INTO CHEST SUBCUTANEOUS TISSUE AND FASCIA, PERCUTANEOUS APPROACH: ICD-10-PCS | Performed by: SURGERY

## 2017-01-20 PROCEDURE — 80048 BASIC METABOLIC PNL TOTAL CA: CPT | Performed by: NURSE PRACTITIONER

## 2017-01-20 RX ORDER — IPRATROPIUM BROMIDE AND ALBUTEROL SULFATE 2.5; .5 MG/3ML; MG/3ML
3 SOLUTION RESPIRATORY (INHALATION) ONCE AS NEEDED
Status: DISCONTINUED | OUTPATIENT
Start: 2017-01-20 | End: 2017-01-20 | Stop reason: HOSPADM

## 2017-01-20 RX ORDER — BUPIVACAINE HYDROCHLORIDE 5 MG/ML
INJECTION, SOLUTION EPIDURAL; INTRACAUDAL AS NEEDED
Status: DISCONTINUED | OUTPATIENT
Start: 2017-01-20 | End: 2017-01-20 | Stop reason: HOSPADM

## 2017-01-20 RX ORDER — FENTANYL CITRATE 50 UG/ML
25 INJECTION, SOLUTION INTRAMUSCULAR; INTRAVENOUS AS NEEDED
Status: DISCONTINUED | OUTPATIENT
Start: 2017-01-20 | End: 2017-01-20 | Stop reason: HOSPADM

## 2017-01-20 RX ORDER — SODIUM CHLORIDE 0.9 % (FLUSH) 0.9 %
1-10 SYRINGE (ML) INJECTION AS NEEDED
Status: DISCONTINUED | OUTPATIENT
Start: 2017-01-20 | End: 2017-01-20 | Stop reason: HOSPADM

## 2017-01-20 RX ORDER — ONDANSETRON 2 MG/ML
4 INJECTION INTRAMUSCULAR; INTRAVENOUS AS NEEDED
Status: DISCONTINUED | OUTPATIENT
Start: 2017-01-20 | End: 2017-01-20 | Stop reason: HOSPADM

## 2017-01-20 RX ORDER — HEPARIN SODIUM 1000 [USP'U]/ML
INJECTION, SOLUTION INTRAVENOUS; SUBCUTANEOUS AS NEEDED
Status: DISCONTINUED | OUTPATIENT
Start: 2017-01-20 | End: 2017-01-20 | Stop reason: HOSPADM

## 2017-01-20 RX ORDER — DIPHENHYDRAMINE HCL 25 MG
25 CAPSULE ORAL EVERY 4 HOURS PRN
Status: DISCONTINUED | OUTPATIENT
Start: 2017-01-20 | End: 2017-01-27 | Stop reason: HOSPADM

## 2017-01-20 RX ORDER — IPRATROPIUM BROMIDE AND ALBUTEROL SULFATE 2.5; .5 MG/3ML; MG/3ML
3 SOLUTION RESPIRATORY (INHALATION) ONCE
Status: COMPLETED | OUTPATIENT
Start: 2017-01-20 | End: 2017-01-20

## 2017-01-20 RX ORDER — MORPHINE SULFATE 2 MG/ML
2 INJECTION, SOLUTION INTRAMUSCULAR; INTRAVENOUS EVERY 4 HOURS PRN
Status: DISCONTINUED | OUTPATIENT
Start: 2017-01-20 | End: 2017-01-27 | Stop reason: HOSPADM

## 2017-01-20 RX ORDER — MEPERIDINE HYDROCHLORIDE 25 MG/ML
12.5 INJECTION INTRAMUSCULAR; INTRAVENOUS; SUBCUTANEOUS
Status: DISCONTINUED | OUTPATIENT
Start: 2017-01-20 | End: 2017-01-20 | Stop reason: HOSPADM

## 2017-01-20 RX ORDER — NALOXONE HCL 0.4 MG/ML
0.04 VIAL (ML) INJECTION AS NEEDED
Status: DISCONTINUED | OUTPATIENT
Start: 2017-01-20 | End: 2017-01-20 | Stop reason: HOSPADM

## 2017-01-20 RX ORDER — FLUMAZENIL 0.1 MG/ML
0.2 INJECTION INTRAVENOUS AS NEEDED
Status: DISCONTINUED | OUTPATIENT
Start: 2017-01-20 | End: 2017-01-20 | Stop reason: HOSPADM

## 2017-01-20 RX ORDER — NALOXONE HCL 0.4 MG/ML
0.4 VIAL (ML) INJECTION
Status: DISCONTINUED | OUTPATIENT
Start: 2017-01-20 | End: 2017-01-27 | Stop reason: HOSPADM

## 2017-01-20 RX ORDER — LABETALOL HYDROCHLORIDE 5 MG/ML
5 INJECTION, SOLUTION INTRAVENOUS
Status: DISCONTINUED | OUTPATIENT
Start: 2017-01-20 | End: 2017-01-20 | Stop reason: HOSPADM

## 2017-01-20 RX ORDER — HYDRALAZINE HYDROCHLORIDE 20 MG/ML
5 INJECTION INTRAMUSCULAR; INTRAVENOUS
Status: DISCONTINUED | OUTPATIENT
Start: 2017-01-20 | End: 2017-01-20 | Stop reason: HOSPADM

## 2017-01-20 RX ORDER — PROPOFOL 10 MG/ML
VIAL (ML) INTRAVENOUS AS NEEDED
Status: DISCONTINUED | OUTPATIENT
Start: 2017-01-20 | End: 2017-01-20 | Stop reason: SURG

## 2017-01-20 RX ORDER — DIPHENHYDRAMINE HYDROCHLORIDE 50 MG/ML
25 INJECTION INTRAMUSCULAR; INTRAVENOUS EVERY 4 HOURS PRN
Status: DISCONTINUED | OUTPATIENT
Start: 2017-01-20 | End: 2017-01-27 | Stop reason: HOSPADM

## 2017-01-20 RX ORDER — MORPHINE SULFATE 2 MG/ML
2 INJECTION, SOLUTION INTRAMUSCULAR; INTRAVENOUS AS NEEDED
Status: DISCONTINUED | OUTPATIENT
Start: 2017-01-20 | End: 2017-01-20 | Stop reason: HOSPADM

## 2017-01-20 RX ORDER — HYDROCODONE BITARTRATE AND ACETAMINOPHEN 5; 325 MG/1; MG/1
1 TABLET ORAL EVERY 4 HOURS PRN
Status: DISCONTINUED | OUTPATIENT
Start: 2017-01-20 | End: 2017-01-27 | Stop reason: HOSPADM

## 2017-01-20 RX ORDER — ACETAMINOPHEN 325 MG/1
650 TABLET ORAL EVERY 4 HOURS PRN
Status: DISCONTINUED | OUTPATIENT
Start: 2017-01-20 | End: 2017-01-27 | Stop reason: HOSPADM

## 2017-01-20 RX ORDER — SODIUM CHLORIDE 9 MG/ML
50 INJECTION, SOLUTION INTRAVENOUS CONTINUOUS
Status: DISCONTINUED | OUTPATIENT
Start: 2017-01-20 | End: 2017-01-22

## 2017-01-20 RX ORDER — METOCLOPRAMIDE HYDROCHLORIDE 5 MG/ML
5 INJECTION INTRAMUSCULAR; INTRAVENOUS
Status: DISCONTINUED | OUTPATIENT
Start: 2017-01-20 | End: 2017-01-20 | Stop reason: HOSPADM

## 2017-01-20 RX ORDER — MIDAZOLAM HYDROCHLORIDE 1 MG/ML
1 INJECTION INTRAMUSCULAR; INTRAVENOUS
Status: DISCONTINUED | OUTPATIENT
Start: 2017-01-20 | End: 2017-01-20 | Stop reason: HOSPADM

## 2017-01-20 RX ORDER — MIDAZOLAM HYDROCHLORIDE 1 MG/ML
2 INJECTION INTRAMUSCULAR; INTRAVENOUS
Status: DISCONTINUED | OUTPATIENT
Start: 2017-01-20 | End: 2017-01-20 | Stop reason: HOSPADM

## 2017-01-20 RX ORDER — SODIUM CHLORIDE, SODIUM LACTATE, POTASSIUM CHLORIDE, CALCIUM CHLORIDE 600; 310; 30; 20 MG/100ML; MG/100ML; MG/100ML; MG/100ML
100 INJECTION, SOLUTION INTRAVENOUS CONTINUOUS
Status: DISCONTINUED | OUTPATIENT
Start: 2017-01-20 | End: 2017-01-20

## 2017-01-20 RX ADMIN — SODIUM CHLORIDE 50 ML/HR: 9 INJECTION, SOLUTION INTRAVENOUS at 07:13

## 2017-01-20 RX ADMIN — METHYLPREDNISOLONE SODIUM SUCCINATE 80 MG: 125 INJECTION, POWDER, FOR SOLUTION INTRAMUSCULAR; INTRAVENOUS at 18:16

## 2017-01-20 RX ADMIN — TAMSULOSIN HYDROCHLORIDE 0.4 MG: 0.4 CAPSULE ORAL at 21:22

## 2017-01-20 RX ADMIN — PROPOFOL 25 MG: 10 INJECTION, EMULSION INTRAVENOUS at 07:52

## 2017-01-20 RX ADMIN — PROPOFOL 25 MG: 10 INJECTION, EMULSION INTRAVENOUS at 07:51

## 2017-01-20 RX ADMIN — NICOTINE 1 PATCH: 21 PATCH, EXTENDED RELEASE TRANSDERMAL at 21:23

## 2017-01-20 RX ADMIN — PROPOFOL 50 MG: 10 INJECTION, EMULSION INTRAVENOUS at 07:47

## 2017-01-20 RX ADMIN — IPRATROPIUM BROMIDE AND ALBUTEROL SULFATE 3 ML: .5; 3 SOLUTION RESPIRATORY (INHALATION) at 07:13

## 2017-01-20 RX ADMIN — METHYLPREDNISOLONE SODIUM SUCCINATE 80 MG: 125 INJECTION, POWDER, FOR SOLUTION INTRAMUSCULAR; INTRAVENOUS at 10:17

## 2017-01-20 RX ADMIN — METHYLPREDNISOLONE SODIUM SUCCINATE 80 MG: 125 INJECTION, POWDER, FOR SOLUTION INTRAMUSCULAR; INTRAVENOUS at 03:00

## 2017-01-20 RX ADMIN — SODIUM CHLORIDE 150 ML/HR: 9 INJECTION, SOLUTION INTRAVENOUS at 01:14

## 2017-01-20 RX ADMIN — PROPOFOL 25 MG: 10 INJECTION, EMULSION INTRAVENOUS at 07:49

## 2017-01-20 RX ADMIN — LIDOCAINE HYDROCHLORIDE 0.5 ML: 10 INJECTION, SOLUTION EPIDURAL; INFILTRATION; INTRACAUDAL; PERINEURAL at 07:12

## 2017-01-20 RX ADMIN — CEFTRIAXONE 1 G: 1 INJECTION, POWDER, FOR SOLUTION INTRAMUSCULAR; INTRAVENOUS at 06:11

## 2017-01-20 RX ADMIN — HYDROMORPHONE HYDROCHLORIDE 1 MG: 1 INJECTION, SOLUTION INTRAMUSCULAR; INTRAVENOUS; SUBCUTANEOUS at 04:49

## 2017-01-20 RX ADMIN — CEFAZOLIN 2 G: 1 INJECTION, POWDER, FOR SOLUTION INTRAVENOUS at 07:21

## 2017-01-20 NOTE — ANESTHESIA POSTPROCEDURE EVALUATION
Patient: Gurjit Andrea    Procedure Summary     Date Anesthesia Start Anesthesia Stop Room / Location    01/20/17 0721 0804  [facility] OR 12 / [facility]       Procedure Diagnosis Surgeon Provider    INSERTION PERMCATH (N/A Neck) Acute kidney injury superimposed on chronic kidney disease  (Acute kidney injury superimposed on chronic kidney disease [N17.9, N18.9]) DO Giovany Piña CRNA          Anesthesia Type: general  Last vitals  BP      Temp      Pulse     Resp      SpO2        Post Anesthesia Care and Evaluation    Patient location during evaluation: PACU  Patient participation: complete - patient participated  Level of consciousness: awake and alert  Pain score: 0  Pain management: adequate  Airway patency: patent  Anesthetic complications: No anesthetic complications    Cardiovascular status: acceptable and stable  Respiratory status: acceptable  Hydration status: acceptable

## 2017-01-20 NOTE — PROGRESS NOTES
[facility] Medicine Services  INPATIENT PROGRESS NOTE    Length of Stay: 3  Date of Admission: 1/17/2017  Primary Care Physician: Moises Montes MD    Subjective   Chief Complaint: anxiety  HPI   Status post permacath placement this am. Pt is anxious about starting dialysis. Punch biopsy results are pending. No bowel movement. States right flank pain is controlled with dilaudid    Review of Systems   All pertinent negatives and positives are as above. All other systems have been reviewed and are negative unless otherwise stated.     Objective    Temp:  [97.5 °F (36.4 °C)-98.9 °F (37.2 °C)] 97.6 °F (36.4 °C)  Heart Rate:  [70-84] 78  Resp:  [15-20] 16  BP: (128-157)/(65-98) 152/84  Physical Exam   Constitutional: He is oriented to person, place, and time. He appears well-developed and well-nourished.   HENT:   Head: Normocephalic and atraumatic.   Eyes: EOM are normal. Pupils are equal, round, and reactive to light. No scleral icterus.   Neck: Normal range of motion. Neck supple. No JVD present. Carotid bruit is not present. No tracheal deviation present. No thyromegaly present.   Cardiovascular: Normal rate and regular rhythm.  Exam reveals no gallop and no friction rub.    Murmur heard.  Pulmonary/Chest: Effort normal and breath sounds normal. No respiratory distress. He has no wheezes. He has no rales. He exhibits no tenderness.   Abdominal: Soft. Bowel sounds are normal. He exhibits no distension. There is no tenderness.   Musculoskeletal: Normal range of motion. He exhibits no edema.   Neurological: He is alert and oriented to person, place, and time. No cranial nerve deficit.   Skin: Skin is warm and dry.   Psychiatric: He has a normal mood and affect.       Results Review:  I have reviewed the labs, radiology results, and diagnostic studies.    Laboratory Data:     Results from last 7 days  Lab Units 01/20/17  0456 01/19/17  0449 01/18/17  0755   WBC 10*3/mm3 12.56* 15.38*  16.75*   HEMOGLOBIN g/dL 9.4* 9.2* 10.3*   HEMATOCRIT % 28.9* 28.1* 31.0*   PLATELETS 10*3/mm3 144 158 148          Results from last 7 days  Lab Units 01/20/17  0456 01/19/17  0449 01/18/17  0755  01/17/17  0115   SODIUM mmol/L 139 136 135  < > 138   POTASSIUM mmol/L 5.2 5.1 4.9  < > 4.7   CHLORIDE mmol/L 108 104 98  < > 98   TOTAL CO2 mmol/L 16.0* 17.0* 19.0*  < > 24.0   BUN mg/dL 112* 107* 106*  < > 109*   CREATININE mg/dL 6.31* 5.97* 5.31*  < > 5.69*   CALCIUM mg/dL 8.1* 8.2* 8.2*  < > 8.8   BILIRUBIN mg/dL  --   --   --   --  0.7   ALK PHOS U/L  --   --   --   --  92   ALT (SGPT) U/L  --   --   --   --  61*   AST (SGOT) U/L  --   --   --   --  36   GLUCOSE mg/dL 116* 142* 194*  < > 80   < > = values in this interval not displayed.    Culture Data:   URINE CULTURE   Date Value Ref Range Status   01/17/2017 No growth at 2 days  Final       Radiology Data:   Imaging Results (last 24 hours)     Procedure Component Value Units Date/Time    IR Insert Tunneled CV Catheter Without Port 5 Plus [[id number]]      Updated:  01/20/17 0804    FL C Arm During Surgery [[id number]]      Updated:  01/20/17 0804        I have reviewed the patient current medications.     Assessment/Plan     Hospital Problem List     Henoch-Schonlein purpura nephritis      Assessment:  1. Presumptive Henoch-Schonlein purpura nephritis  2. Nephrolithiasis-9mm right stone-no acute intervention at present per Dr. Quinn  3. CKD 3- ?secondary to hypertensive nephrosclerosis  4. 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 Hypertension  6. Leukocytosis  7. Microcytic yzjpmf-wyyuyrskj-ebystp dilutional.  8. Paroxysmal atrial fibrillation-NSR 70-80's in telemetry  9. Right flank pain  10.Elevated PSA-outpatient workup per Dr. Awan  11.UTI-no growth on cultures  12. Lower extremity rash  13. Mild hyperkalemia-5.2    Plan:  1. Await punch biopsy results.  2. Dialysis today per nephrology  3. Repeat labs in am  4. Rocephin day 4    Discharge Planning: I expect patient to be  "discharged to in ? days.    BHARATHI Cid   01/20/17   10:20 AM     I personally evaluated and examined the patient in conjunction with BHARATHI Baldwin and agree with the assessment, treatment plan, and disposition of the patient as recorded by her. My history, exam, and further recommendations are:     Seen on dialysis.  Using his right IJ permacath that was placed this morning by vascular.  He complains of soreness around this, but nothing he cannot handle.  He is hoping that he will not require dialysis long-term. He is on his phone \"Googling movies that last as long his dialysis treatments.\"  He says that he will probably watch Good Will [place]. He has no new complaints today.  We are awaiting his punch biopsy. I just checked and it is still in process.  Otherwise, continue as we are doing.    Brandt Gordon, DO  01/20/17  12:20 PM    "

## 2017-01-20 NOTE — PROGRESS NOTES
Nephrology ([facility]) Consult Note      Patient:  Gurjit Andrea  YOB: 1970  Date of Service: 1/20/2017  MRN: [id number]   Acct: [id number]   Primary Care Physician: Moises Montes MD  Advance Directive: Full Code  Admit Date: 1/17/2017       Hospital Day: 3  Referring Provider: Johan Dinh MD      Patient Seen, Chart, Consults, Notes, Labs, Radiology studies reviewed.        Subjective:  Gurjit Andrea is a 47 y.o. male  whom we were consulted for OSMANI/CKD 3. baseline eGFR 50s, pt notes improved diet/lifestyle/weight loss. Had followed with nephrology years ago. 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 S/p punch biopsy here.  No issues with permcath.      Dialysis   Pt was seen on RRT  Modality: Hemodialysis  Access: Catheter  Location: right upper  QB: 350  QD: 600  UF: 670         Allergies:  Review of patient's allergies indicates no known allergies.    Home Meds:  Prescriptions Prior to Admission   Medication Sig Dispense Refill Last Dose   • ciprofloxacin (CIPRO) 500 MG tablet Take 1 tablet by mouth 2 times daily Begin taking the day before the biopsy is scheduled.      • lisinopril-hydrochlorothiazide (PRINZIDE,ZESTORETIC) 10-12.5 MG per tablet 1 tablet Daily.      • MethylPREDNISolone (MEDROL, LUIS ANGEL,) 4 MG tablet Take  by mouth 2 (Two) Times a Day. Take as directed on package instructions.      • metoprolol tartrate (LOPRESSOR) 25 MG tablet 25 mg 2 (Two) Times a Day.      • albuterol (PROVENTIL HFA;VENTOLIN HFA) 108 (90 BASE) MCG/ACT inhaler Every 6 (Six) Hours.          Medicines:  Current Facility-Administered Medications   Medication Dose Route Frequency Provider Last Rate Last Dose   • acetaminophen (TYLENOL) tablet 650 mg  650 mg Oral Q4H PRN Ian Grimes, DO       • cefTRIAXone (ROCEPHIN) 1 g/100 mL 0.9% NS (MBP)  1 g Intravenous Q24H Johan Dinh MD 0 mL/hr at 01/18/17 0554 1 g at 01/20/17 0611   • HYDROcodone-acetaminophen (NORCO)  5-325 MG per tablet 1 tablet  1 tablet Oral Q4H PRN Ian Grimes DO       • HYDROmorphone (DILAUDID) injection 1 mg  1 mg Intravenous Q4H PRN Brandt Gordon DO   1 mg at 01/20/17 0449   • ipratropium-albuterol (DUO-NEB) nebulizer solution 3 mL  3 mL Nebulization Q4H PRN Johan Dinh MD       • methylPREDNISolone sodium succinate (SOLU-Medrol) injection 80 mg  80 mg Intravenous Q8H Johan Dinh MD   80 mg at 01/20/17 1017   • Morphine sulfate (PF) injection 2 mg  2 mg Intravenous Q4H PRN Ian Grimes DO        And   • naloxone (NARCAN) injection 0.4 mg  0.4 mg Intravenous Q5 Min PRN Ian Grimes DO       • nicotine (NICODERM CQ) 21 MG/24HR patch 1 patch  1 patch Transdermal Nightly Johan Dinh MD   1 patch at 01/19/17 2252   • ondansetron (ZOFRAN) injection 4 mg  4 mg Intravenous Q6H PRN Johan Dinh MD       • sodium chloride 0.9 % flush 1-10 mL  1-10 mL Intravenous PRN Johan Dinh MD       • sodium chloride 0.9 % infusion  150 mL/hr Intravenous Continuous Johan Dinh  mL/hr at 01/20/17 0114 150 mL/hr at 01/20/17 0114   • sodium chloride 0.9 % infusion  50 mL/hr Intravenous Continuous Anne-Marie Blount MD 50 mL/hr at 01/20/17 0713 50 mL/hr at 01/20/17 0713   • tamsulosin (FLOMAX) 24 hr capsule 0.4 mg  0.4 mg Oral Nightly Brandt Gordon DO   0.4 mg at 01/19/17 2252       Past Medical History:  Past Medical History   Diagnosis Date   • A-fib    • Chronic renal failure    • Elevated PSA    • Purpura        Past Surgical History:  Past Surgical History   Procedure Laterality Date   • Appendectomy     • Insertion hemodialysis catheter N/A 1/20/2017     Procedure: INSERTION PERMCATH;  Surgeon: Ian Grimes DO;  Location: [facility] OR;  Service:        Family History  History reviewed. No pertinent family history.    Social History  Social History     Social History   • Marital status:      Spouse name: N/A   • Number of children: N/A   • Years of education: N/A  "    Occupational History   • Not on file.     Social History Main Topics   • Smoking status: Heavy Tobacco Smoker   • Smokeless tobacco: Not on file   • Alcohol use Yes   • Drug use: Not on file   • Sexual activity: Not on file     Other Topics Concern   • Not on file     Social History Narrative         Review of Systems:  History obtained from chart review and the patient  General ROS: No fever or chills  Respiratory ROS: No cough, shortness of breath, wheezing  Cardiovascular ROS: no chest pain or dyspnea on exertion  Gastrointestinal ROS: No abdominal pain or melena  Genito-Urinary ROS: No dysuria or hematuria  14 point ROS reviewed with the patient and negative except as noted above and in the HPI unless unable to obtain.    Objective:  Visit Vitals   • /84 (BP Location: Left arm, Patient Position: Sitting)   • Pulse 78   • Temp 97.6 °F (36.4 °C) (Temporal Artery )   • Resp 16   • Ht 73\" (185.4 cm)   • Wt 234 lb 8 oz (106 kg)   • SpO2 96%   • BMI 30.94 kg/m2       Intake/Output Summary (Last 24 hours) at 01/20/17 1331  Last data filed at 01/20/17 0755   Gross per 24 hour   Intake   4610 ml   Output    900 ml   Net   3710 ml     General: awake/alert   Chest:  clear to auscultation bilaterally without respiratory distress  CVS: regular rate and rhythm  Abdominal: soft, nontender, normal bowel sounds  Extremities: no cyanosis or edema  Skin: bilat le rash      Labs:    Results from last 7 days  Lab Units 01/20/17  0456 01/19/17  0449 01/18/17  0755   WBC 10*3/mm3 12.56* 15.38* 16.75*   HEMOGLOBIN g/dL 9.4* 9.2* 10.3*   HEMATOCRIT % 28.9* 28.1* 31.0*   PLATELETS 10*3/mm3 144 158 148           Results from last 7 days  Lab Units 01/20/17  0456 01/19/17  0449 01/18/17  0755  01/17/17  0115   SODIUM mmol/L 139 136 135  < > 138   POTASSIUM mmol/L 5.2 5.1 4.9  < > 4.7   CHLORIDE mmol/L 108 104 98  < > 98   TOTAL CO2 mmol/L 16.0* 17.0* 19.0*  < > 24.0   BUN mg/dL 112* 107* 106*  < > 109*   CREATININE mg/dL 6.31* " 5.97* 5.31*  < > 5.69*   CALCIUM mg/dL 8.1* 8.2* 8.2*  < > 8.8   BILIRUBIN mg/dL  --   --   --   --  0.7   ALK PHOS U/L  --   --   --   --  92   ALT (SGPT) U/L  --   --   --   --  61*   AST (SGOT) U/L  --   --   --   --  36   GLUCOSE mg/dL 116* 142* 194*  < > 80   < > = values in this interval not displayed.    Radiology:   Imaging Results (last 72 hours)     Procedure Component Value Units Date/Time    CT Abdomen Pelvis Without Contrast [[id number]] Collected:  01/17/17 0714     Updated:  01/17/17 0735    Narrative:       CT ABDOMEN PELVIS WO CONTRAST- 1/17/2017 2:18 AM CST     HISTORY: right flank pain      COMPARISON: None      DLP: 838 mGy cm. Automated exposure control was utilized to diminish  patient radiation dose.     TECHNIQUE: Noncontrast enhanced images of the abdomen and pelvis  obtained without oral contrast.      FINDINGS:   There is mild bibasilar atelectasis. The base of the heart is  unremarkable..      LIVER: There are 3 hypodense lesions within the right lobe of liver  including a 9 mm lesion within the anterior segment of the right lobe of  the liver as well as a 18 and 13 mm hypodense lesion within the  posterior segment of the right lobe of the liver. These are likely  representative of hepatic cysts. The liver is otherwise normal in  appearance. Ultrasound could be helpful for further characterization..      BILIARY SYSTEM: The gallbladder is unremarkable. No intrahepatic or  extrahepatic ductal dilatation.      PANCREAS: No focal pancreatic lesion.      SPLEEN: There is mild splenomegaly with a jbrt-ol-qlou length of 15 cm..        KIDNEYS AND ADRENALS: No discrete renal mass is identified. There is a 9  mm nonobstructing calculus involving the interpolar aspect of the right  kidney. There is mild dilatation of the upper tracts of the right kidney  as well as some proximal right periureteral stranding. This would  suggest the patient may have recently passed a stone. There is no  evidence of a  discrete ureteral stone at this time. The left renal  collecting system and ureter is unremarkable..     .     RETROPERITONEUM: No mass, lymphadenopathy or hemorrhage.      GI TRACT: There is mild constipation. There is no evidence of mechanical  obstruction but there are multiple fluid-filled small bowel loops. A few  scattered air-fluid levels are present.. The appendix has been  surgically removed..     OTHER: There is no mesenteric mass, lymphadenopathy or fluid collection.  The osseous structures and soft tissues demonstrate no worrisome  lesions. 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      PELVIS: No mass lesion, fluid collection or significant lymphadenopathy  is seen in the pelvis. The urinary bladder is normal in appearance. The  prostate gland is mildly enlarged.       Impression:       1. There is 9 mm nonobstructing calculus involving the interpolar aspect  of the right kidney. There is mild dilatation of the upper tracts of the  right kidney and proximal right ureter with proximal periureteral  stranding. I see no evidence of a discrete ureteral stone but this may  represent sequela of a recently passed stone. Mild right-sided  obstructive uropathy secondary to another etiology would also be a  differential and if the patient's hematuria and flank pain are  persistent I would suggest urology consultation with retrograde  examination of the right renal collecting system and ureter. No evidence  of left-sided nephrolithiasis or ureteral calculus.  2. Suspected hepatic cysts within the right lobe of the liver. This  could be confirmed with ultrasound.  3. Nonspecific bowel gas pattern with some scattered fluid-filled bowel  loops. This is likely related to mild constipation with increased stool  noted throughout the colon..   4. Prostatic enlargement. A small fat-containing periumbilical hernia  is present.     Electronically Signed By-Dr. Jarvis Morgan MD On:1/17/2017 8:33  AM  EST  This report was finalized on 01/17/2017 07:33 by Dr. Jarvis Morgan MD.          Culture:  URINE CULTURE   Date Value Ref Range Status   01/17/2017 No growth at 2 days  Final         Assessment   OSMANI (?HSP)  CKD 3 (?HTN Nephrosclerosis)  Right nephrolithiasis   HTN  Elevated PSA     Plan:  HD #1 today, next likely 1/21  D/w Dr. Gordon  Agree with emperic steroids/fluids  Punch biopsy pending      Mejia Hatfield MD  1/20/2017  1:31 PM

## 2017-01-20 NOTE — PLAN OF CARE
Problem: Patient Care Overview (Adult)  Goal: Plan of Care Review  Outcome: Ongoing (interventions implemented as appropriate)  Goal: Adult Individualization and Mutuality  Outcome: Ongoing (interventions implemented as appropriate)

## 2017-01-20 NOTE — BRIEF OP NOTE
HEMODIALYSIS CATHETER INSERTION  Procedure Note    Gurjit Andrea  1/17/2017 - 1/20/2017    Pre-op Diagnosis:   Acute kidney injury superimposed on chronic kidney disease [N17.9, N18.9]    Post-op Diagnosis:     Post-Op Diagnosis Codes:     * Acute kidney injury superimposed on chronic kidney disease [N17.9, N18.9]    Procedure/CPT® Codes:      Procedure(s):  INSERTION PERMCATH    Surgeon(s):  Ian Grimes DO    Anesthesia: Monitor Anesthesia Care    Staff:   Circulator: Lilo Montanez RN  Scrub Person: Joseph Cespedes  Assistant: Carla Park    Estimated Blood Loss: 10ml    Specimens:                * No specimens in log *      Drains:           Complications: none      Ian Grimes DO     Date: 1/20/2017  Time: 8:01 AM

## 2017-01-20 NOTE — OP NOTE
DATE OF PROCEDURE: 01/20/2017    PREOPERATIVE DIAGNOSIS: Acute renal failure.     POSTOPERATIVE DIAGNOSIS: Acute renal failure.      PROCEDURES PERFORMED:   1. Right internal jugular vein cannulation under ultrasound guidance.   2. Placement of a 16-Sudanese x 23 cm tunneled PermCath.   3. Radiographic supervision and interpretation.     SURGEON: Ian Grimes DO    ASSISTANT: None.     ANESTHESIA: MAC with local.     ESTIMATED BLOOD LOSS: 10 mL    COMPLICATIONS: None.     COUNTS: Correct.     SPECIMENS: None.     INDICATIONS FOR PROCEDURE: The patient is a 47-year-old  male who developed Henoch-Schonlein purpura about 2 weeks ago. He does have a history of chronic kidney disease, and his GFR has significantly declined. He now needs dialysis access for dialysis. Risks and benefits were explained at great length to the patient, which include but are not limited to bleeding, infection, vessel damage and pneumothorax. The patient understands all the risks and wishes for me to proceed.     DESCRIPTION OF PROCEDURE: After the patient was correctly identified, the patient was brought to the operating room and placed in the supine position. Satisfactory anesthesia was then induced. The right neck and chest were then prepped and draped in standard sterile fashion. Under ultrasound guidance, 5 mL of 0.5% Marcaine plain was used to infiltrate skin for local anesthesia. Using a micropuncture technique, the right internal jugular vein was cannulated and a J-wire was advanced down into the SVC. Next, an additional 10 mL of 0.5% Marcaine plain was used to infiltrate the subcutaneous tract. A stab incision was then made in the right deltopectoral groove. The catheter secured to the tunneling device was then tunneled in a subcutaneous manner up through the neck insertion site. Next, serial dilatation was then made to the internal jugular vein under fluoroscopic guidance. Lastly, the tear-away sheath was placed and  inner dilator and wire were removed. The catheter was placed through the tear-away sheath successfully with the tip ending in the right atrial/SVC junction. There were no kinks in the catheter. The catheter had good return and was flushed with heparinized saline and then straight heparin 1000 units/mL. The catheter was secured to the skin with a 2-0 nylon, and then the neck insertion site was closed with 4-0 Monocryl in a subcuticular fashion. The wounds were then cleaned. Sterile dressings were applied. The patient tolerated the procedure well and was transferred to the PACU in stable condition. All counts were correct at the end of the case. I was present for the entire procedure.        cc:           JAYLAN Osman/[id number]  D:  01/20/2017 09:06:54(Eastern Time)  T:  01/20/2017 10:21:09(Eastern Time)  Voice ID:  [id number]/Document ID:  [id number]

## 2017-01-20 NOTE — ADDENDUM NOTE
Addendum  created 01/20/17 0805 by Giovany England, CRNA    Anesthesia Event edited, Anesthesia Intra Flowsheets edited, Anesthesia Intra Meds edited, Anesthesia Review and Sign - Ready for Procedure, Anesthesia Staff edited, Patient device added, Patient device removed, Procedure Event Log accessed, Sign clinical note

## 2017-01-20 NOTE — PLAN OF CARE
Problem: Patient Care Overview (Adult)  Goal: Plan of Care Review  Outcome: Ongoing (interventions implemented as appropriate)    01/20/17 1739   Coping/Psychosocial Response Interventions   Plan Of Care Reviewed With patient   Patient Care Overview   Progress no change   Outcome Evaluation   Outcome Summary/Follow up Plan Permacath was placed today. Had first dialysis treatment today and will have another tomorrow. No C/O pain.       Goal: Adult Individualization and Mutuality  Outcome: Ongoing (interventions implemented as appropriate)  Goal: Discharge Needs Assessment  Outcome: Ongoing (interventions implemented as appropriate)    Problem: Renal Failure/Kidney Injury, Acute (Adult)  Goal: Signs and Symptoms of Listed Potential Problems Will be Absent or Manageable (Renal Failure/Kidney Injury, Acute)  Outcome: Ongoing (interventions implemented as appropriate)    Problem: Perioperative Period (Adult)  Goal: Signs and Symptoms of Listed Potential Problems Will be Absent or Manageable (Perioperative Period)  Outcome: Ongoing (interventions implemented as appropriate)

## 2017-01-20 NOTE — ANESTHESIA PREPROCEDURE EVALUATION
Anesthesia Evaluation     Patient summary reviewed    No history of anesthetic complications   Airway   Mallampati: III  TM distance: >3 FB  Neck ROM: full  no difficulty expected  Dental    (+) poor dentation    Comment: Multiple chipped upper teeth      Pulmonary - normal exam   (+) hx of smoking (Quit 1/16/17, prior 1 ppd),   Cardiovascular - normal exam  Exercise tolerance: good (4-7 METS)  (+) hypertension, dysrhythmias Atrial Fib, PVD (PAD)    ECG reviewed  Patient on routine beta blocker  ROS comment: Patient on B blockers prior to OR. Will give low dose IV metoprolol prior to OR    Neuro/Psych  (-) seizures, TIA, CVA  GI/Hepatic/Renal/Endo    (+)  chronic renal disease (Henoch-Schonlein purpura nephritis),     ROS Comment: Magnesium deficiency    Musculoskeletal     Abdominal    Substance History      OB/GYN          Other                             Anesthesia Plan    ASA 3     general     intravenous induction   Anesthetic plan and risks discussed with patient.

## 2017-01-20 NOTE — PLAN OF CARE
Problem: Patient Care Overview (Adult)  Goal: Plan of Care Review  Outcome: Ongoing (interventions implemented as appropriate)    01/20/17 0655   Patient Care Overview   Progress no change   Outcome Evaluation   Outcome Summary/Follow up Plan Pt down for permacath placement, with dialysis to follow         Problem: Renal Failure/Kidney Injury, Acute (Adult)  Goal: Signs and Symptoms of Listed Potential Problems Will be Absent or Manageable (Renal Failure/Kidney Injury, Acute)  Outcome: Ongoing (interventions implemented as appropriate)

## 2017-01-21 LAB
ANION GAP SERPL CALCULATED.3IONS-SCNC: 12 MMOL/L (ref 4–13)
BASOPHILS # BLD AUTO: 0 10*3/MM3 (ref 0–0.2)
BASOPHILS NFR BLD AUTO: 0 % (ref 0–2)
BUN BLD-MCNC: 79 MG/DL (ref 5–21)
BUN/CREAT SERPL: 16.9 (ref 7–25)
CALCIUM SPEC-SCNC: 8.3 MG/DL (ref 8.4–10.4)
CHLORIDE SERPL-SCNC: 105 MMOL/L (ref 98–110)
CO2 SERPL-SCNC: 21 MMOL/L (ref 24–31)
CREAT BLD-MCNC: 4.67 MG/DL (ref 0.5–1.4)
DEPRECATED RDW RBC AUTO: 46.5 FL (ref 40–54)
EOSINOPHIL # BLD AUTO: 0 10*3/MM3 (ref 0–0.7)
EOSINOPHIL NFR BLD AUTO: 0 % (ref 0–4)
ERYTHROCYTE [DISTWIDTH] IN BLOOD BY AUTOMATED COUNT: 16 % (ref 12–15)
GFR SERPL CREATININE-BSD FRML MDRD: 13 ML/MIN/1.73
GLUCOSE BLD-MCNC: 124 MG/DL (ref 70–100)
HCT VFR BLD AUTO: 28.3 % (ref 40–52)
HGB BLD-MCNC: 9.4 G/DL (ref 14–18)
IMM GRANULOCYTES # BLD: 0.02 10*3/MM3 (ref 0–0.03)
IMM GRANULOCYTES NFR BLD: 0.2 % (ref 0–5)
LYMPHOCYTES # BLD AUTO: 0.72 10*3/MM3 (ref 0.72–4.86)
LYMPHOCYTES NFR BLD AUTO: 7.4 % (ref 15–45)
MCH RBC QN AUTO: 26.2 PG (ref 28–32)
MCHC RBC AUTO-ENTMCNC: 33.2 G/DL (ref 33–36)
MCV RBC AUTO: 78.8 FL (ref 82–95)
MONOCYTES # BLD AUTO: 0.49 10*3/MM3 (ref 0.19–1.3)
MONOCYTES NFR BLD AUTO: 5 % (ref 4–12)
NEUTROPHILS # BLD AUTO: 8.48 10*3/MM3 (ref 1.87–8.4)
NEUTROPHILS NFR BLD AUTO: 87.4 % (ref 39–78)
PLATELET # BLD AUTO: 158 10*3/MM3 (ref 130–400)
PMV BLD AUTO: 9.8 FL (ref 6–12)
POTASSIUM BLD-SCNC: 4.9 MMOL/L (ref 3.5–5.3)
RBC # BLD AUTO: 3.59 10*6/MM3 (ref 4.8–5.9)
SODIUM BLD-SCNC: 138 MMOL/L (ref 135–145)
WBC NRBC COR # BLD: 9.71 10*3/MM3 (ref 4.8–10.8)

## 2017-01-21 PROCEDURE — 25010000002 METHYLPREDNISOLONE PER 125 MG: Performed by: INTERNAL MEDICINE

## 2017-01-21 PROCEDURE — 25010000002 CEFTRIAXONE: Performed by: INTERNAL MEDICINE

## 2017-01-21 PROCEDURE — 85025 COMPLETE CBC W/AUTO DIFF WBC: CPT | Performed by: NURSE PRACTITIONER

## 2017-01-21 PROCEDURE — 25010000002 HEPARIN (PORCINE) PER 1000 UNITS: Performed by: INTERNAL MEDICINE

## 2017-01-21 PROCEDURE — 25010000002 METHYLPREDNISOLONE PER 125 MG: Performed by: NURSE PRACTITIONER

## 2017-01-21 PROCEDURE — 80048 BASIC METABOLIC PNL TOTAL CA: CPT | Performed by: NURSE PRACTITIONER

## 2017-01-21 RX ORDER — METHYLPREDNISOLONE SODIUM SUCCINATE 125 MG/2ML
60 INJECTION, POWDER, LYOPHILIZED, FOR SOLUTION INTRAMUSCULAR; INTRAVENOUS EVERY 8 HOURS SCHEDULED
Status: DISCONTINUED | OUTPATIENT
Start: 2017-01-21 | End: 2017-01-23

## 2017-01-21 RX ORDER — HEPARIN SODIUM 5000 [USP'U]/ML
5000 INJECTION, SOLUTION INTRAVENOUS; SUBCUTANEOUS
Status: COMPLETED | OUTPATIENT
Start: 2017-01-21 | End: 2017-01-21

## 2017-01-21 RX ADMIN — SODIUM CHLORIDE 50 ML/HR: 9 INJECTION, SOLUTION INTRAVENOUS at 05:31

## 2017-01-21 RX ADMIN — HEPARIN SODIUM 5000 UNITS: 5000 INJECTION, SOLUTION INTRAVENOUS; SUBCUTANEOUS at 11:45

## 2017-01-21 RX ADMIN — METHYLPREDNISOLONE SODIUM SUCCINATE 60 MG: 125 INJECTION, POWDER, FOR SOLUTION INTRAMUSCULAR; INTRAVENOUS at 21:38

## 2017-01-21 RX ADMIN — TAMSULOSIN HYDROCHLORIDE 0.4 MG: 0.4 CAPSULE ORAL at 21:33

## 2017-01-21 RX ADMIN — METHYLPREDNISOLONE SODIUM SUCCINATE 60 MG: 125 INJECTION, POWDER, FOR SOLUTION INTRAMUSCULAR; INTRAVENOUS at 14:24

## 2017-01-21 RX ADMIN — CEFTRIAXONE 1 G: 1 INJECTION, POWDER, FOR SOLUTION INTRAMUSCULAR; INTRAVENOUS at 05:29

## 2017-01-21 RX ADMIN — METHYLPREDNISOLONE SODIUM SUCCINATE 80 MG: 125 INJECTION, POWDER, FOR SOLUTION INTRAMUSCULAR; INTRAVENOUS at 02:47

## 2017-01-21 RX ADMIN — NICOTINE 1 PATCH: 21 PATCH, EXTENDED RELEASE TRANSDERMAL at 21:33

## 2017-01-21 NOTE — PROGRESS NOTES
Nephrology ([facility]) Consult Note      Patient:  Gurjit Andrea  YOB: 1970  Date of Service: 1/21/2017  MRN: [id number]   Acct: [id number]   Primary Care Physician: Moises Montes MD  Advance Directive: Full Code  Admit Date: 1/17/2017       Hospital Day: 4  Referring Provider: Johan Dinh MD      Patient Seen, Chart, Consults, Notes, Labs, Radiology studies reviewed.        Subjective:  Gurjit Andrea is a 47 y.o. male  whom we were consulted for OSMANI/CKD 3. baseline eGFR 50s, pt notes improved diet/lifestyle/weight loss. Had followed with nephrology years ago. 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 S/p punch biopsy here.  No issues with permcath.      Dialysis   Pt was seen on RRT  Modality: Hemodialysis  Access: Catheter  Location: right upper  QB: 450  QD: 600  UF: 490         Allergies:  Review of patient's allergies indicates no known allergies.    Home Meds:  Prescriptions Prior to Admission   Medication Sig Dispense Refill Last Dose   • ciprofloxacin (CIPRO) 500 MG tablet Take 1 tablet by mouth 2 times daily Begin taking the day before the biopsy is scheduled.      • lisinopril-hydrochlorothiazide (PRINZIDE,ZESTORETIC) 10-12.5 MG per tablet 1 tablet Daily.      • MethylPREDNISolone (MEDROL, LUIS ANGEL,) 4 MG tablet Take  by mouth 2 (Two) Times a Day. Take as directed on package instructions.      • metoprolol tartrate (LOPRESSOR) 25 MG tablet 25 mg 2 (Two) Times a Day.      • albuterol (PROVENTIL HFA;VENTOLIN HFA) 108 (90 BASE) MCG/ACT inhaler Every 6 (Six) Hours.          Medicines:  Current Facility-Administered Medications   Medication Dose Route Frequency Provider Last Rate Last Dose   • acetaminophen (TYLENOL) tablet 650 mg  650 mg Oral Q4H PRN Ian Grimes, DO       • diphenhydrAMINE (BENADRYL) capsule 25 mg  25 mg Oral Q4H PRN Mejia Hatfield MD        Or   • diphenhydrAMINE (BENADRYL) injection 25 mg  25 mg Intravenous Q4H PRN  Mejia Hatfield MD       • HYDROcodone-acetaminophen (NORCO) 5-325 MG per tablet 1 tablet  1 tablet Oral Q4H PRN Ian rGimes DO       • HYDROmorphone (DILAUDID) injection 1 mg  1 mg Intravenous Q4H PRN Brandt Gordon DO   1 mg at 01/20/17 0449   • ipratropium-albuterol (DUO-NEB) nebulizer solution 3 mL  3 mL Nebulization Q4H PRN Johan Dinh MD       • methylPREDNISolone sodium succinate (SOLU-Medrol) injection 60 mg  60 mg Intravenous Q8H BHARATHI Cid       • Morphine sulfate (PF) injection 2 mg  2 mg Intravenous Q4H PRN Ian Grimes DO        And   • naloxone (NARCAN) injection 0.4 mg  0.4 mg Intravenous Q5 Min PRN Ian Grimes DO       • nicotine (NICODERM CQ) 21 MG/24HR patch 1 patch  1 patch Transdermal Nightly Johan Dinh MD   1 patch at 01/20/17 2123   • ondansetron (ZOFRAN) injection 4 mg  4 mg Intravenous Q6H PRN Johan Dinh MD       • sodium chloride 0.9 % flush 1-10 mL  1-10 mL Intravenous PRN Johan Dinh MD       • sodium chloride 0.9 % infusion  150 mL/hr Intravenous Continuous Johan Dinh MD   Stopped at 01/21/17 0530   • sodium chloride 0.9 % infusion  50 mL/hr Intravenous Continuous Anne-Marie Blount MD 50 mL/hr at 01/21/17 0531 50 mL/hr at 01/21/17 0531   • tamsulosin (FLOMAX) 24 hr capsule 0.4 mg  0.4 mg Oral Nightly Brandt Gordon DO   0.4 mg at 01/20/17 2122       Past Medical History:  Past Medical History   Diagnosis Date   • A-fib    • Chronic renal failure    • Elevated PSA    • Purpura        Past Surgical History:  Past Surgical History   Procedure Laterality Date   • Appendectomy     • Insertion hemodialysis catheter N/A 1/20/2017     Procedure: INSERTION PERMCATH;  Surgeon: Ian Grimes DO;  Location: [facility] OR;  Service:        Family History  History reviewed. No pertinent family history.    Social History  Social History     Social History   • Marital status:      Spouse name: N/A   • Number of children: N/A   •  "Years of education: N/A     Occupational History   • Not on file.     Social History Main Topics   • Smoking status: Heavy Tobacco Smoker   • Smokeless tobacco: Not on file   • Alcohol use Yes   • Drug use: Not on file   • Sexual activity: Not on file     Other Topics Concern   • Not on file     Social History Narrative         Review of Systems:  History obtained from chart review and the patient  General ROS: No fever or chills  Respiratory ROS: No cough, shortness of breath, wheezing  Cardiovascular ROS: no chest pain or dyspnea on exertion  Gastrointestinal ROS: No abdominal pain or melena  Genito-Urinary ROS: No dysuria or hematuria  14 point ROS reviewed with the patient and negative except as noted above and in the HPI unless unable to obtain.    Objective:  Visit Vitals   • /91 (BP Location: Right arm, Patient Position: Lying)   • Pulse 73   • Temp 98.2 °F (36.8 °C) (Oral)   • Resp 20   • Ht 73\" (185.4 cm)   • Wt 234 lb 8 oz (106 kg)   • SpO2 96%   • BMI 30.94 kg/m2       Intake/Output Summary (Last 24 hours) at 01/21/17 1005  Last data filed at 01/21/17 0530   Gross per 24 hour   Intake   2256 ml   Output      0 ml   Net   2256 ml     General: awake/alert   Chest:  clear to auscultation bilaterally without respiratory distress  CVS: regular rate and rhythm  Abdominal: soft, nontender, normal bowel sounds  Extremities: no cyanosis or edema  Skin: bilat le rash      Labs:    Results from last 7 days  Lab Units 01/21/17  0558 01/20/17  0456 01/19/17  0449   WBC 10*3/mm3 9.71 12.56* 15.38*   HEMOGLOBIN g/dL 9.4* 9.4* 9.2*   HEMATOCRIT % 28.3* 28.9* 28.1*   PLATELETS 10*3/mm3 158 144 158           Results from last 7 days  Lab Units 01/21/17  0558 01/20/17  0456 01/19/17  0449  01/17/17  0115   SODIUM mmol/L 138 139 136  < > 138   POTASSIUM mmol/L 4.9 5.2 5.1  < > 4.7   CHLORIDE mmol/L 105 108 104  < > 98   TOTAL CO2 mmol/L 21.0* 16.0* 17.0*  < > 24.0   BUN mg/dL 79* 112* 107*  < > 109*   CREATININE mg/dL " 4.67* 6.31* 5.97*  < > 5.69*   CALCIUM mg/dL 8.3* 8.1* 8.2*  < > 8.8   BILIRUBIN mg/dL  --   --   --   --  0.7   ALK PHOS U/L  --   --   --   --  92   ALT (SGPT) U/L  --   --   --   --  61*   AST (SGOT) U/L  --   --   --   --  36   GLUCOSE mg/dL 124* 116* 142*  < > 80   < > = values in this interval not displayed.    Radiology:   Imaging Results (last 72 hours)     Procedure Component Value Units Date/Time    CT Abdomen Pelvis Without Contrast [[id number]] Collected:  01/17/17 0714     Updated:  01/17/17 0735    Narrative:       CT ABDOMEN PELVIS WO CONTRAST- 1/17/2017 2:18 AM CST     HISTORY: right flank pain      COMPARISON: None      DLP: 838 mGy cm. Automated exposure control was utilized to diminish  patient radiation dose.     TECHNIQUE: Noncontrast enhanced images of the abdomen and pelvis  obtained without oral contrast.      FINDINGS:   There is mild bibasilar atelectasis. The base of the heart is  unremarkable..      LIVER: There are 3 hypodense lesions within the right lobe of liver  including a 9 mm lesion within the anterior segment of the right lobe of  the liver as well as a 18 and 13 mm hypodense lesion within the  posterior segment of the right lobe of the liver. These are likely  representative of hepatic cysts. The liver is otherwise normal in  appearance. Ultrasound could be helpful for further characterization..      BILIARY SYSTEM: The gallbladder is unremarkable. No intrahepatic or  extrahepatic ductal dilatation.      PANCREAS: No focal pancreatic lesion.      SPLEEN: There is mild splenomegaly with a dcbk-[id number]-qtfn length of 15 cm..        KIDNEYS AND ADRENALS: No discrete renal mass is identified. There is a 9  mm nonobstructing calculus involving the interpolar aspect of the right  kidney. There is mild dilatation of the upper tracts of the right kidney  as well as some proximal right periureteral stranding. This would  suggest the patient may have recently passed a stone. There is  no  evidence of a discrete ureteral stone at this time. The left renal  collecting system and ureter is unremarkable..     .     RETROPERITONEUM: No mass, lymphadenopathy or hemorrhage.      GI TRACT: There is mild constipation. There is no evidence of mechanical  obstruction but there are multiple fluid-filled small bowel loops. A few  scattered air-fluid levels are present.. The appendix has been  surgically removed..     OTHER: There is no mesenteric mass, lymphadenopathy or fluid collection.  The osseous structures and soft tissues demonstrate no worrisome  lesions. 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      PELVIS: No mass lesion, fluid collection or significant lymphadenopathy  is seen in the pelvis. The urinary bladder is normal in appearance. The  prostate gland is mildly enlarged.       Impression:       1. There is 9 mm nonobstructing calculus involving the interpolar aspect  of the right kidney. There is mild dilatation of the upper tracts of the  right kidney and proximal right ureter with proximal periureteral  stranding. I see no evidence of a discrete ureteral stone but this may  represent sequela of a recently passed stone. Mild right-sided  obstructive uropathy secondary to another etiology would also be a  differential and if the patient's hematuria and flank pain are  persistent I would suggest urology consultation with retrograde  examination of the right renal collecting system and ureter. No evidence  of left-sided nephrolithiasis or ureteral calculus.  2. Suspected hepatic cysts within the right lobe of the liver. This  could be confirmed with ultrasound.  3. Nonspecific bowel gas pattern with some scattered fluid-filled bowel  loops. This is likely related to mild constipation with increased stool  noted throughout the colon..   4. Prostatic enlargement. A small fat-containing periumbilical hernia  is present.     Electronically Signed By-Dr. Jarvis Morgan MD  On:1/17/2017 8:33 AM  EST  This report was finalized on 01/17/2017 07:33 by Dr. Jarvis Morgan MD.          Culture:  URINE CULTURE   Date Value Ref Range Status   01/17/2017 No growth at 2 days  Final         Assessment   OSMANI (?HSP)  CKD 3 (?HTN Nephrosclerosis)  Right nephrolithiasis   HTN  Elevated PSA     Plan:  HD #2 today, next likely 1/23  Agree with emperic steroids/fluids  Punch biopsy results pending      Mejia Hatfield MD  1/21/2017  10:05 AM

## 2017-01-21 NOTE — PROGRESS NOTES
"    [facility] Medicine Services  INPATIENT PROGRESS NOTE    Length of Stay: 4  Date of Admission: 1/17/2017  Primary Care Physician: Moises Montes MD    Subjective   Chief Complaint: none  HPI   \"I feel good\". Tolerated first dialysis treatment. Going down now for another dialysis treatment now. Rash is significantly improved. Minimal flank pain now. Punch biopsy still pending. Denies any chest pain. No nausea or vomiting    Review of Systems   All pertinent negatives and positives are as above. All other systems have been reviewed and are negative unless otherwise stated.     Objective    Temp:  [97.5 °F (36.4 °C)-98.7 °F (37.1 °C)] 98.2 °F (36.8 °C)  Heart Rate:  [70-82] 73  Resp:  [15-20] 20  BP: (131-153)/(75-91) 146/91  Physical Exam  Constitutional: He is oriented to person, place, and time. He appears well-developed and well-nourished.   HENT:   Head: Normocephalic and atraumatic.   Eyes: EOM are normal. Pupils are equal, round, and reactive to light. No scleral icterus.   Neck: Normal range of motion. Neck supple. No JVD present. Carotid bruit is not present. No tracheal deviation present. No thyromegaly present.   Cardiovascular: Normal rate and regular rhythm. Exam reveals no gallop and no friction rub.   Murmur heard.  Pulmonary/Chest: Effort normal and breath sounds normal. No respiratory distress. He has no wheezes. He has no rales. He exhibits no tenderness.   Abdominal: Soft. Bowel sounds are normal. He exhibits no distension. There is no tenderness.   Musculoskeletal: Normal range of motion. He exhibits no edema.   Neurological: He is alert and oriented to person, place, and time. No cranial nerve deficit.   Skin: Skin is warm and dry. Rash to bilateral lower extremities significantly improved Pale purple  in color now.   Psychiatric: He has a normal mood and affect.       Results Review:  I have reviewed the labs, radiology results, and diagnostic " studies.    Laboratory Data:     Results from last 7 days  Lab Units 01/21/17  0558 01/20/17  0456 01/19/17  0449   WBC 10*3/mm3 9.71 12.56* 15.38*   HEMOGLOBIN g/dL 9.4* 9.4* 9.2*   HEMATOCRIT % 28.3* 28.9* 28.1*   PLATELETS 10*3/mm3 158 144 158          Results from last 7 days  Lab Units 01/20/17  0456 01/19/17  0449 01/18/17  0755  01/17/17  0115   SODIUM mmol/L 139 136 135  < > 138   POTASSIUM mmol/L 5.2 5.1 4.9  < > 4.7   CHLORIDE mmol/L 108 104 98  < > 98   TOTAL CO2 mmol/L 16.0* 17.0* 19.0*  < > 24.0   BUN mg/dL 112* 107* 106*  < > 109*   CREATININE mg/dL 6.31* 5.97* 5.31*  < > 5.69*   CALCIUM mg/dL 8.1* 8.2* 8.2*  < > 8.8   BILIRUBIN mg/dL  --   --   --   --  0.7   ALK PHOS U/L  --   --   --   --  92   ALT (SGPT) U/L  --   --   --   --  61*   AST (SGOT) U/L  --   --   --   --  36   GLUCOSE mg/dL 116* 142* 194*  < > 80   < > = values in this interval not displayed.    Culture Data:   URINE CULTURE   Date Value Ref Range Status   01/17/2017 No growth at 2 days  Final       Radiology Data:   Imaging Results (last 24 hours)     Procedure Component Value Units Date/Time    FL C Arm During Surgery [[id number]] Collected:  01/20/17 1623     Updated:  01/20/17 1623    Narrative:       Performed in surgery by Dr. Grimes. Please see operative report.     This report was finalized on  by Dr. Ian Grimes MD.    IR Insert Tunneled CV Catheter Without Port 5 Plus [[id number]] Collected:  01/20/17 1623     Updated:  01/20/17 1623    Narrative:       Performed in surgery by Dr. Grimes. Please see operative report.     This report was finalized on  by Dr. Ian Grimes MD.          I have reviewed the patient current medications.     Assessment/Plan     Hospital Problem List     Henoch-Schonlein purpura nephritis      Assessment:  1. Presumptive Henoch-Schonlein purpura nephritis  2. Nephrolithiasis-9mm right stone-no acute intervention at present per Dr. Quinn  3. CKD 3- secondary to hypertensive nephrosclerosis  4.  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 Hypertension  6. Leukocytosis-resolved.   7. Microcytic yolsgc-hibyohrae-shwiwo dilutional.  8. Paroxysmal atrial fibrillation-NSR 70-80's in telemetry  9. Right flank pain-improving  10.Elevated PSA-outpatient workup per Dr. Awan  11.UTI-no growth on cultures  12. Lower extremity rash  13. Mild hyperkalemia-5.2-    Plan:  1. Dialysis today  2. Await this am bmp  3. Wean steroid to 60 mg every 8 hours IV  4. Pt has remained in normal sinus rhythm throughout his hospitalization. Will dc telemetry.   5. Repeat labs in am  6. Rocephin day 5-will dc after today's dose.   7. Need daily weights.         Discharge Planning: I expect patient to be discharged to home in ? days.    BHARATHI Cid   01/21/17   6:45 AM     I personally evaluated and examined the patient in conjunction with BHARATHI Ya and agree with the assessment, treatment plan, and disposition of the patient as recorded by her. My history, exam, and further recommendations are:     Plan:  1. Dialysis today  2. Await this am bmp  3. Wean steroid to 60 mg every 8 hours IV  4. Pt has remained in normal sinus rhythm throughout his hospitalization. Will dc telemetry.   5. Repeat labs in am  6. Rocephin day 5-will dc after today's dose.   7. Need daily weights.     Pierre Pritchard,   01/21/17  7:56 AM

## 2017-01-21 NOTE — PLAN OF CARE
Problem: Patient Care Overview (Adult)  Goal: Plan of Care Review  Outcome: Ongoing (interventions implemented as appropriate)    01/21/17 8257   Coping/Psychosocial Response Interventions   Plan Of Care Reviewed With patient   Patient Care Overview   Progress improving   Outcome Evaluation   Outcome Summary/Follow up Plan BUN and CREAT continue to improve on dialysis         Problem: Renal Failure/Kidney Injury, Acute (Adult)  Goal: Signs and Symptoms of Listed Potential Problems Will be Absent or Manageable (Renal Failure/Kidney Injury, Acute)  Outcome: Ongoing (interventions implemented as appropriate)    Problem: Perioperative Period (Adult)  Goal: Signs and Symptoms of Listed Potential Problems Will be Absent or Manageable (Perioperative Period)  Outcome: Ongoing (interventions implemented as appropriate)

## 2017-01-22 LAB
ANION GAP SERPL CALCULATED.3IONS-SCNC: 11 MMOL/L (ref 4–13)
BUN BLD-MCNC: 74 MG/DL (ref 5–21)
BUN/CREAT SERPL: 16.7 (ref 7–25)
CALCIUM SPEC-SCNC: 8.1 MG/DL (ref 8.4–10.4)
CHLORIDE SERPL-SCNC: 102 MMOL/L (ref 98–110)
CO2 SERPL-SCNC: 25 MMOL/L (ref 24–31)
CREAT BLD-MCNC: 4.44 MG/DL (ref 0.5–1.4)
GFR SERPL CREATININE-BSD FRML MDRD: 14 ML/MIN/1.73
GLUCOSE BLD-MCNC: 137 MG/DL (ref 70–100)
POTASSIUM BLD-SCNC: 4.8 MMOL/L (ref 3.5–5.3)
SODIUM BLD-SCNC: 138 MMOL/L (ref 135–145)

## 2017-01-22 PROCEDURE — 25010000002 METHYLPREDNISOLONE PER 125 MG: Performed by: NURSE PRACTITIONER

## 2017-01-22 PROCEDURE — 80048 BASIC METABOLIC PNL TOTAL CA: CPT | Performed by: NURSE PRACTITIONER

## 2017-01-22 RX ADMIN — METHYLPREDNISOLONE SODIUM SUCCINATE 60 MG: 125 INJECTION, POWDER, FOR SOLUTION INTRAMUSCULAR; INTRAVENOUS at 06:23

## 2017-01-22 RX ADMIN — ACETAMINOPHEN 650 MG: 325 TABLET ORAL at 13:17

## 2017-01-22 RX ADMIN — ACETAMINOPHEN 650 MG: 325 TABLET ORAL at 17:57

## 2017-01-22 RX ADMIN — SODIUM CHLORIDE 50 ML/HR: 9 INJECTION, SOLUTION INTRAVENOUS at 13:16

## 2017-01-22 RX ADMIN — METOPROLOL TARTRATE 25 MG: 25 TABLET, FILM COATED ORAL at 08:32

## 2017-01-22 RX ADMIN — METHYLPREDNISOLONE SODIUM SUCCINATE 60 MG: 125 INJECTION, POWDER, FOR SOLUTION INTRAMUSCULAR; INTRAVENOUS at 21:44

## 2017-01-22 RX ADMIN — METHYLPREDNISOLONE SODIUM SUCCINATE 60 MG: 125 INJECTION, POWDER, FOR SOLUTION INTRAMUSCULAR; INTRAVENOUS at 14:21

## 2017-01-22 RX ADMIN — METOPROLOL TARTRATE 25 MG: 25 TABLET, FILM COATED ORAL at 20:07

## 2017-01-22 RX ADMIN — TAMSULOSIN HYDROCHLORIDE 0.4 MG: 0.4 CAPSULE ORAL at 20:07

## 2017-01-22 RX ADMIN — NICOTINE 1 PATCH: 21 PATCH, EXTENDED RELEASE TRANSDERMAL at 20:08

## 2017-01-22 NOTE — PLAN OF CARE
Problem: Patient Care Overview (Adult)  Goal: Plan of Care Review  Outcome: Ongoing (interventions implemented as appropriate)    01/22/17 0353   Coping/Psychosocial Response Interventions   Plan Of Care Reviewed With patient   Patient Care Overview   Progress progress toward functional goals as expected   Outcome Evaluation   Outcome Summary/Follow up Plan No c/o pain , up ad lid gait steady , greg diet next dialysis 1/23 recheck labs thisam       Goal: Adult Individualization and Mutuality  Outcome: Ongoing (interventions implemented as appropriate)  Goal: Discharge Needs Assessment  Outcome: Ongoing (interventions implemented as appropriate)    Problem: Renal Failure/Kidney Injury, Acute (Adult)  Goal: Signs and Symptoms of Listed Potential Problems Will be Absent or Manageable (Renal Failure/Kidney Injury, Acute)  Outcome: Ongoing (interventions implemented as appropriate)    Problem: Perioperative Period (Adult)  Goal: Signs and Symptoms of Listed Potential Problems Will be Absent or Manageable (Perioperative Period)  Outcome: Ongoing (interventions implemented as appropriate)

## 2017-01-22 NOTE — PROGRESS NOTES
"    [facility] Medicine Services  INPATIENT PROGRESS NOTE    Length of Stay: 5  Date of Admission: 1/17/2017  Primary Care Physician: Moises Montes MD    Subjective   Chief Complaint: follow up rash  HPI   Pt has tolerated dialysis well. Punch biopsy results remain pending. Patient states he feels significantly better. Has been up in room ad jordan. Rash continues to fade. He is starting to get \"cabin fever.\" Wants to know about restarting his blood pressure meds.     Review of Systems   All pertinent negatives and positives are as above. All other systems have been reviewed and are negative unless otherwise stated.     Objective    Temp:  [97.5 °F (36.4 °C)-98.6 °F (37 °C)] 98.6 °F (37 °C)  Heart Rate:  [73-78] 76  Resp:  [18-20] 20  BP: (136-160)/(81-93) 138/90  Physical Exam  Constitutional: He is oriented to person, place, and time. He appears well-developed and well-nourished.   HENT:   Head: Normocephalic and atraumatic.   Eyes: EOM are normal. Pupils are equal, round, and reactive to light. No scleral icterus.   Neck: Normal range of motion. Neck supple. No JVD present. Carotid bruit is not present. No tracheal deviation present. No thyromegaly present.   Cardiovascular: Normal rate and regular rhythm. Exam reveals no gallop and no friction rub.   Murmur heard.  Pulmonary/Chest: Effort normal and breath sounds normal. No respiratory distress. He has no wheezes. He has no rales. He exhibits no tenderness.   Abdominal: Soft. Bowel sounds are normal. He exhibits no distension. There is no tenderness.   Musculoskeletal: Normal range of motion. He exhibits no edema.   Neurological: He is alert and oriented to person, place, and time. No cranial nerve deficit.   Skin: Skin is warm and dry. Rash to bilateral lower extremities significantly improved Pale purple in color now.    Psychiatric: He has a normal mood and affect.       Results Review:  I have reviewed the labs, radiology " results, and diagnostic studies.    Laboratory Data:     Results from last 7 days  Lab Units 01/21/17  0558 01/20/17  0456 01/19/17  0449   WBC 10*3/mm3 9.71 12.56* 15.38*   HEMOGLOBIN g/dL 9.4* 9.4* 9.2*   HEMATOCRIT % 28.3* 28.9* 28.1*   PLATELETS 10*3/mm3 158 144 158          Results from last 7 days  Lab Units 01/21/17  0558 01/20/17  0456 01/19/17  0449  01/17/17  0115   SODIUM mmol/L 138 139 136  < > 138   POTASSIUM mmol/L 4.9 5.2 5.1  < > 4.7   CHLORIDE mmol/L 105 108 104  < > 98   TOTAL CO2 mmol/L 21.0* 16.0* 17.0*  < > 24.0   BUN mg/dL 79* 112* 107*  < > 109*   CREATININE mg/dL 4.67* 6.31* 5.97*  < > 5.69*   CALCIUM mg/dL 8.3* 8.1* 8.2*  < > 8.8   BILIRUBIN mg/dL  --   --   --   --  0.7   ALK PHOS U/L  --   --   --   --  92   ALT (SGPT) U/L  --   --   --   --  61*   AST (SGOT) U/L  --   --   --   --  36   GLUCOSE mg/dL 124* 116* 142*  < > 80   < > = values in this interval not displayed.    Culture Data:   URINE CULTURE   Date Value Ref Range Status   01/17/2017 No growth at 2 days  Final       Radiology Data:   Imaging Results (last 24 hours)     ** No results found for the last 24 hours. **          I have reviewed the patient current medications.     Assessment/Plan     Hospital Problem List     Henoch-Schonlein purpura nephritis      Assessment:  1. Presumptive Henoch-Schonlein purpura nephritis  2. Nephrolithiasis-9mm right stone-no acute intervention at present per Dr. Quinn  3. CKD 3- secondary to hypertensive nephrosclerosis  4. 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 Hypertension  6. Leukocytosis-resolved.   7. Microcytic anemia--  8. Paroxysmal atrial fibrillation-NSR 70-80's in telemetry  9. Right flank pain-improving  10.Elevated PSA-outpatient workup per Dr. Awan  11.UTI-no growth on cultures  12. Lower extremity rash-significantly improved.   13. Mild hyperkalemia-resolved.     Plan:  1. Planned dialysis 1/23.  2. Await am labs today.  3. Solumedrol 40 mg IV Every 8 hours  4. Await punch biopsy.   5.  Repeat labs in am.   6. Restart b-blocker.     Discharge Planning: I expect patient to be discharged to home in ? days.    Jahaira Li, APRN   01/22/17   6:19 AM

## 2017-01-22 NOTE — PROGRESS NOTES
Nephrology ([facility]) Consult Note      Patient:  Gurjit Andrea  YOB: 1970  Date of Service: 1/22/2017  MRN: [id number]   Acct: [id number]   Primary Care Physician: Moises Montes MD  Advance Directive: Full Code  Admit Date: 1/17/2017       Hospital Day: 5  Referring Provider: Johan Dinh MD      Patient Seen, Chart, Consults, Notes, Labs, Radiology studies reviewed.        Subjective:  Gurjit Andrea is a 47 y.o. male  whom we were consulted for OSMANI/CKD 3. baseline eGFR 50s, pt notes improved diet/lifestyle/weight loss. Had followed with nephrology years ago. 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 S/p punch biopsy here.  No issues with permcath.  Still awaiting biopsy report.  No n/v/d.         Allergies:  Review of patient's allergies indicates no known allergies.    Home Meds:  Prescriptions Prior to Admission   Medication Sig Dispense Refill Last Dose   • ciprofloxacin (CIPRO) 500 MG tablet Take 1 tablet by mouth 2 times daily Begin taking the day before the biopsy is scheduled.      • lisinopril-hydrochlorothiazide (PRINZIDE,ZESTORETIC) 10-12.5 MG per tablet 1 tablet Daily.      • MethylPREDNISolone (MEDROL, LUIS ANGEL,) 4 MG tablet Take  by mouth 2 (Two) Times a Day. Take as directed on package instructions.      • metoprolol tartrate (LOPRESSOR) 25 MG tablet 25 mg 2 (Two) Times a Day.      • albuterol (PROVENTIL HFA;VENTOLIN HFA) 108 (90 BASE) MCG/ACT inhaler Every 6 (Six) Hours.          Medicines:  Current Facility-Administered Medications   Medication Dose Route Frequency Provider Last Rate Last Dose   • acetaminophen (TYLENOL) tablet 650 mg  650 mg Oral Q4H PRN Ian Grimes, DO   650 mg at 01/22/17 1317   • diphenhydrAMINE (BENADRYL) capsule 25 mg  25 mg Oral Q4H PRN Mejia Hatfield MD        Or   • diphenhydrAMINE (BENADRYL) injection 25 mg  25 mg Intravenous Q4H PRN Mejia Hatfield MD       • HYDROcodone-acetaminophen  (NORCO) 5-325 MG per tablet 1 tablet  1 tablet Oral Q4H PRN Ian Grimes DO       • HYDROmorphone (DILAUDID) injection 1 mg  1 mg Intravenous Q4H PRN Brandt Gordon DO   1 mg at 01/20/17 0449   • ipratropium-albuterol (DUO-NEB) nebulizer solution 3 mL  3 mL Nebulization Q4H PRN Johan Dinh MD       • methylPREDNISolone sodium succinate (SOLU-Medrol) injection 60 mg  60 mg Intravenous Q8H Jahaira Li, APRN   60 mg at 01/22/17 1421   • metoprolol tartrate (LOPRESSOR) tablet 25 mg  25 mg Oral Q12H Jahaira Li, APRN   25 mg at 01/22/17 0832   • Morphine sulfate (PF) injection 2 mg  2 mg Intravenous Q4H PRN Ian Grimes DO        And   • naloxone (NARCAN) injection 0.4 mg  0.4 mg Intravenous Q5 Min PRN Ian Grimes DO       • nicotine (NICODERM CQ) 21 MG/24HR patch 1 patch  1 patch Transdermal Nightly Johan Dinh MD   1 patch at 01/21/17 2133   • ondansetron (ZOFRAN) injection 4 mg  4 mg Intravenous Q6H PRN Johan Dinh MD       • sodium chloride 0.9 % flush 1-10 mL  1-10 mL Intravenous PRN Johan Dinh MD       • sodium chloride 0.9 % infusion  50 mL/hr Intravenous Continuous Anne-Marie Blount MD 50 mL/hr at 01/22/17 1316 50 mL/hr at 01/22/17 1316   • tamsulosin (FLOMAX) 24 hr capsule 0.4 mg  0.4 mg Oral Nightly Brandt Gordon DO   0.4 mg at 01/21/17 2133       Past Medical History:  Past Medical History   Diagnosis Date   • A-fib    • Chronic renal failure    • Elevated PSA    • Purpura        Past Surgical History:  Past Surgical History   Procedure Laterality Date   • Appendectomy     • Insertion hemodialysis catheter N/A 1/20/2017     Procedure: INSERTION PERMCATH;  Surgeon: Ian Grimes DO;  Location: [facility] OR;  Service:        Family History  History reviewed. No pertinent family history.    Social History  Social History     Social History   • Marital status:      Spouse name: N/A   • Number of children: N/A   • Years of education: N/A  "    Occupational History   • Not on file.     Social History Main Topics   • Smoking status: Heavy Tobacco Smoker   • Smokeless tobacco: Not on file   • Alcohol use Yes   • Drug use: Not on file   • Sexual activity: Not on file     Other Topics Concern   • Not on file     Social History Narrative         Review of Systems:  History obtained from chart review and the patient  General ROS: No fever or chills  Respiratory ROS: No cough, shortness of breath, wheezing  Cardiovascular ROS: no chest pain or dyspnea on exertion  Gastrointestinal ROS: No abdominal pain or melena  Genito-Urinary ROS: No dysuria or hematuria  14 point ROS reviewed with the patient and negative except as noted above and in the HPI unless unable to obtain.    Objective:  Visit Vitals   • /90   • Pulse 66   • Temp 98.5 °F (36.9 °C)   • Resp 20   • Ht 73\" (185.4 cm)   • Wt 233 lb 9.6 oz (106 kg)   • SpO2 99%   • BMI 30.82 kg/m2       Intake/Output Summary (Last 24 hours) at 01/22/17 1716  Last data filed at 01/22/17 1316   Gross per 24 hour   Intake   2440 ml   Output      0 ml   Net   2440 ml     General: awake/alert   Chest:  clear to auscultation bilaterally without respiratory distress  CVS: regular rate and rhythm  Abdominal: soft, nontender, normal bowel sounds  Extremities: no cyanosis or edema  Skin: bilat le rash      Labs:    Results from last 7 days  Lab Units 01/21/17  0558 01/20/17  0456 01/19/17  0449   WBC 10*3/mm3 9.71 12.56* 15.38*   HEMOGLOBIN g/dL 9.4* 9.4* 9.2*   HEMATOCRIT % 28.3* 28.9* 28.1*   PLATELETS 10*3/mm3 158 144 158           Results from last 7 days  Lab Units 01/22/17  0711 01/21/17  0558 01/20/17  0456  01/17/17  0115   SODIUM mmol/L 138 138 139  < > 138   POTASSIUM mmol/L 4.8 4.9 5.2  < > 4.7   CHLORIDE mmol/L 102 105 108  < > 98   TOTAL CO2 mmol/L 25.0 21.0* 16.0*  < > 24.0   BUN mg/dL 74* 79* 112*  < > 109*   CREATININE mg/dL 4.44* 4.67* 6.31*  < > 5.69*   CALCIUM mg/dL 8.1* 8.3* 8.1*  < > 8.8   BILIRUBIN " mg/dL  --   --   --   --  0.7   ALK PHOS U/L  --   --   --   --  92   ALT (SGPT) U/L  --   --   --   --  61*   AST (SGOT) U/L  --   --   --   --  36   GLUCOSE mg/dL 137* 124* 116*  < > 80   < > = values in this interval not displayed.    Radiology:   Imaging Results (last 72 hours)     Procedure Component Value Units Date/Time    CT Abdomen Pelvis Without Contrast [[id number]] Collected:  01/17/17 0714     Updated:  01/17/17 0735    Narrative:       CT ABDOMEN PELVIS WO CONTRAST- 1/17/2017 2:18 AM CST     HISTORY: right flank pain      COMPARISON: None      DLP: 838 mGy cm. Automated exposure control was utilized to diminish  patient radiation dose.     TECHNIQUE: Noncontrast enhanced images of the abdomen and pelvis  obtained without oral contrast.      FINDINGS:   There is mild bibasilar atelectasis. The base of the heart is  unremarkable..      LIVER: There are 3 hypodense lesions within the right lobe of liver  including a 9 mm lesion within the anterior segment of the right lobe of  the liver as well as a 18 and 13 mm hypodense lesion within the  posterior segment of the right lobe of the liver. These are likely  representative of hepatic cysts. The liver is otherwise normal in  appearance. Ultrasound could be helpful for further characterization..      BILIARY SYSTEM: The gallbladder is unremarkable. No intrahepatic or  extrahepatic ductal dilatation.      PANCREAS: No focal pancreatic lesion.      SPLEEN: There is mild splenomegaly with a [id number] length of 15 cm..        KIDNEYS AND ADRENALS: No discrete renal mass is identified. There is a 9  mm nonobstructing calculus involving the interpolar aspect of the right  kidney. There is mild dilatation of the upper tracts of the right kidney  as well as some proximal right periureteral stranding. This would  suggest the patient may have recently passed a stone. There is no  evidence of a discrete ureteral stone at this time. The left renal  collecting system  and ureter is unremarkable..     .     RETROPERITONEUM: No mass, lymphadenopathy or hemorrhage.      GI TRACT: There is mild constipation. There is no evidence of mechanical  obstruction but there are multiple fluid-filled small bowel loops. A few  scattered air-fluid levels are present.. The appendix has been  surgically removed..     OTHER: There is no mesenteric mass, lymphadenopathy or fluid collection.  The osseous structures and soft tissues demonstrate no worrisome  lesions. 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      PELVIS: No mass lesion, fluid collection or significant lymphadenopathy  is seen in the pelvis. The urinary bladder is normal in appearance. The  prostate gland is mildly enlarged.       Impression:       1. There is 9 mm nonobstructing calculus involving the interpolar aspect  of the right kidney. There is mild dilatation of the upper tracts of the  right kidney and proximal right ureter with proximal periureteral  stranding. I see no evidence of a discrete ureteral stone but this may  represent sequela of a recently passed stone. Mild right-sided  obstructive uropathy secondary to another etiology would also be a  differential and if the patient's hematuria and flank pain are  persistent I would suggest urology consultation with retrograde  examination of the right renal collecting system and ureter. No evidence  of left-sided nephrolithiasis or ureteral calculus.  2. Suspected hepatic cysts within the right lobe of the liver. This  could be confirmed with ultrasound.  3. Nonspecific bowel gas pattern with some scattered fluid-filled bowel  loops. This is likely related to mild constipation with increased stool  noted throughout the colon..   4. Prostatic enlargement. A small fat-containing periumbilical hernia  is present.     Electronically Signed By-Dr. Jarvis Morgan MD On:1/17/2017 8:33 AM  EST  This report was finalized on 01/17/2017 07:33 by Dr. Conley  MD Eddie.          Culture:  URINE CULTURE   Date Value Ref Range Status   01/17/2017 No growth at 2 days  Final         Assessment   OSMANI (?HSP)  CKD 3 (?HTN Nephrosclerosis)  Right nephrolithiasis   HTN  Elevated PSA     Plan:  HD #2 yesterday, next likely 1/23  Agree with emperic steroids/will wean ivf  Punch biopsy results pending      Mejia Hatfield MD  1/22/2017  5:16 PM

## 2017-01-22 NOTE — PLAN OF CARE
Problem: Patient Care Overview (Adult)  Goal: Plan of Care Review  Outcome: Ongoing (interventions implemented as appropriate)    01/22/17 1510   Coping/Psychosocial Response Interventions   Plan Of Care Reviewed With patient   Patient Care Overview   Progress improving   Outcome Evaluation   Outcome Summary/Follow up Plan Pt's BUN and CREATININE are continuing to improve         Problem: Renal Failure/Kidney Injury, Acute (Adult)  Goal: Signs and Symptoms of Listed Potential Problems Will be Absent or Manageable (Renal Failure/Kidney Injury, Acute)  Outcome: Ongoing (interventions implemented as appropriate)    Problem: Perioperative Period (Adult)  Goal: Signs and Symptoms of Listed Potential Problems Will be Absent or Manageable (Perioperative Period)  Outcome: Ongoing (interventions implemented as appropriate)

## 2017-01-23 LAB
ANION GAP SERPL CALCULATED.3IONS-SCNC: 10 MMOL/L (ref 4–13)
BACTERIA UR QL AUTO: ABNORMAL /HPF
BASOPHILS # BLD AUTO: 0 10*3/MM3 (ref 0–0.2)
BASOPHILS NFR BLD AUTO: 0 % (ref 0–2)
BILIRUB UR QL STRIP: NEGATIVE
BUN BLD-MCNC: 85 MG/DL (ref 5–21)
BUN/CREAT SERPL: 18.3 (ref 7–25)
CALCIUM SPEC-SCNC: 8.5 MG/DL (ref 8.4–10.4)
CHLORIDE SERPL-SCNC: 102 MMOL/L (ref 98–110)
CLARITY UR: ABNORMAL
CO2 SERPL-SCNC: 28 MMOL/L (ref 24–31)
COLOR UR: ABNORMAL
CREAT BLD-MCNC: 4.65 MG/DL (ref 0.5–1.4)
DEPRECATED RDW RBC AUTO: 47.1 FL (ref 40–54)
EOSINOPHIL # BLD AUTO: 0 10*3/MM3 (ref 0–0.7)
EOSINOPHIL NFR BLD AUTO: 0 % (ref 0–4)
ERYTHROCYTE [DISTWIDTH] IN BLOOD BY AUTOMATED COUNT: 15.8 % (ref 12–15)
GFR SERPL CREATININE-BSD FRML MDRD: 14 ML/MIN/1.73
GLUCOSE BLD-MCNC: 131 MG/DL (ref 70–100)
GLUCOSE UR STRIP-MCNC: ABNORMAL MG/DL
HCT VFR BLD AUTO: 30.2 % (ref 40–52)
HGB BLD-MCNC: 9.7 G/DL (ref 14–18)
HGB UR QL STRIP.AUTO: ABNORMAL
HYALINE CASTS UR QL AUTO: ABNORMAL /LPF
IMM GRANULOCYTES # BLD: 0.04 10*3/MM3 (ref 0–0.03)
IMM GRANULOCYTES NFR BLD: 0.4 % (ref 0–5)
KETONES UR QL STRIP: NEGATIVE
LEUKOCYTE ESTERASE UR QL STRIP.AUTO: ABNORMAL
LYMPHOCYTES # BLD AUTO: 1.06 10*3/MM3 (ref 0.72–4.86)
LYMPHOCYTES NFR BLD AUTO: 10.8 % (ref 15–45)
MCH RBC QN AUTO: 26.1 PG (ref 28–32)
MCHC RBC AUTO-ENTMCNC: 32.1 G/DL (ref 33–36)
MCV RBC AUTO: 81.2 FL (ref 82–95)
MONOCYTES # BLD AUTO: 0.51 10*3/MM3 (ref 0.19–1.3)
MONOCYTES NFR BLD AUTO: 5.2 % (ref 4–12)
NEUTROPHILS # BLD AUTO: 8.16 10*3/MM3 (ref 1.87–8.4)
NEUTROPHILS NFR BLD AUTO: 83.6 % (ref 39–78)
NITRITE UR QL STRIP: NEGATIVE
PH UR STRIP.AUTO: 5.5 [PH] (ref 5–8)
PLAT MORPH BLD: NORMAL
PLATELET # BLD AUTO: 168 10*3/MM3 (ref 130–400)
PMV BLD AUTO: 9.8 FL (ref 6–12)
POTASSIUM BLD-SCNC: 4.8 MMOL/L (ref 3.5–5.3)
PROT UR QL STRIP: ABNORMAL
RBC # BLD AUTO: 3.72 10*6/MM3 (ref 4.8–5.9)
RBC # UR: ABNORMAL /HPF
RBC MORPH BLD: NORMAL
REF LAB TEST METHOD: ABNORMAL
SODIUM BLD-SCNC: 140 MMOL/L (ref 135–145)
SP GR UR STRIP: 1.01 (ref 1–1.03)
SQUAMOUS #/AREA URNS HPF: ABNORMAL /HPF
UROBILINOGEN UR QL STRIP: ABNORMAL
WBC MORPH BLD: NORMAL
WBC NRBC COR # BLD: 9.77 10*3/MM3 (ref 4.8–10.8)
WBC UR QL AUTO: ABNORMAL /HPF

## 2017-01-23 PROCEDURE — 25010000002 METHYLPREDNISOLONE PER 125 MG: Performed by: NURSE PRACTITIONER

## 2017-01-23 PROCEDURE — 81001 URINALYSIS AUTO W/SCOPE: CPT | Performed by: NURSE PRACTITIONER

## 2017-01-23 PROCEDURE — 25010000002 HYDRALAZINE PER 20 MG: Performed by: INTERNAL MEDICINE

## 2017-01-23 PROCEDURE — 85007 BL SMEAR W/DIFF WBC COUNT: CPT | Performed by: NURSE PRACTITIONER

## 2017-01-23 PROCEDURE — 85025 COMPLETE CBC W/AUTO DIFF WBC: CPT | Performed by: NURSE PRACTITIONER

## 2017-01-23 PROCEDURE — 80048 BASIC METABOLIC PNL TOTAL CA: CPT | Performed by: NURSE PRACTITIONER

## 2017-01-23 PROCEDURE — 87086 URINE CULTURE/COLONY COUNT: CPT | Performed by: NURSE PRACTITIONER

## 2017-01-23 RX ORDER — HEPARIN SODIUM 1000 [USP'U]/ML
5000 INJECTION, SOLUTION INTRAVENOUS; SUBCUTANEOUS ONCE
Status: DISCONTINUED | OUTPATIENT
Start: 2017-01-23 | End: 2017-01-24 | Stop reason: ALTCHOICE

## 2017-01-23 RX ORDER — HYDRALAZINE HYDROCHLORIDE 20 MG/ML
10 INJECTION INTRAMUSCULAR; INTRAVENOUS EVERY 4 HOURS PRN
Status: DISCONTINUED | OUTPATIENT
Start: 2017-01-23 | End: 2017-01-27 | Stop reason: HOSPADM

## 2017-01-23 RX ORDER — METHYLPREDNISOLONE SODIUM SUCCINATE 125 MG/2ML
60 INJECTION, POWDER, LYOPHILIZED, FOR SOLUTION INTRAMUSCULAR; INTRAVENOUS EVERY 12 HOURS
Status: DISCONTINUED | OUTPATIENT
Start: 2017-01-24 | End: 2017-01-25

## 2017-01-23 RX ADMIN — METHYLPREDNISOLONE SODIUM SUCCINATE 60 MG: 125 INJECTION, POWDER, FOR SOLUTION INTRAMUSCULAR; INTRAVENOUS at 06:13

## 2017-01-23 RX ADMIN — METHYLPREDNISOLONE SODIUM SUCCINATE 60 MG: 125 INJECTION, POWDER, FOR SOLUTION INTRAMUSCULAR; INTRAVENOUS at 12:16

## 2017-01-23 RX ADMIN — TAMSULOSIN HYDROCHLORIDE 0.4 MG: 0.4 CAPSULE ORAL at 21:51

## 2017-01-23 RX ADMIN — NICOTINE 1 PATCH: 21 PATCH, EXTENDED RELEASE TRANSDERMAL at 21:55

## 2017-01-23 RX ADMIN — HYDRALAZINE HYDROCHLORIDE 10 MG: 20 INJECTION INTRAMUSCULAR; INTRAVENOUS at 23:59

## 2017-01-23 RX ADMIN — METOPROLOL TARTRATE 25 MG: 25 TABLET, FILM COATED ORAL at 21:51

## 2017-01-23 RX ADMIN — METOPROLOL TARTRATE 25 MG: 25 TABLET, FILM COATED ORAL at 12:15

## 2017-01-23 NOTE — PLAN OF CARE
Problem: Patient Care Overview (Adult)  Goal: Plan of Care Review  Outcome: Ongoing (interventions implemented as appropriate)    01/23/17 1421   Coping/Psychosocial Response Interventions   Plan Of Care Reviewed With patient   Patient Care Overview   Progress improving         Problem: Renal Failure/Kidney Injury, Acute (Adult)  Goal: Signs and Symptoms of Listed Potential Problems Will be Absent or Manageable (Renal Failure/Kidney Injury, Acute)  Outcome: Outcome(s) achieved Date Met:  01/23/17    Problem: Perioperative Period (Adult)  Goal: Signs and Symptoms of Listed Potential Problems Will be Absent or Manageable (Perioperative Period)  Outcome: Outcome(s) achieved Date Met:  01/23/17

## 2017-01-23 NOTE — PROGRESS NOTES
Nephrology ([facility]) Consult Note      Patient:  Gurjit Andrea  YOB: 1970  Date of Service: 1/23/2017  MRN: [id number]   Acct: [id number]   Primary Care Physician: Moises Montes MD  Advance Directive: Full Code  Admit Date: 1/17/2017       Hospital Day: 6  Referring Provider: Johan Dinh MD      Patient Seen, Chart, Consults, Notes, Labs, Radiology studies reviewed.        Subjective:  Gurjit Andrea is a 47 y.o. male  whom we were consulted for OSMANI/CKD 3. baseline eGFR 50s, pt notes improved diet/lifestyle/weight loss. Had followed with nephrology years ago. 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 S/p punch biopsy here. Permcath placed 1/21; tolerating dialysis well. Still awaiting biopsy report. No new issues.  Seen on dialysis today.    Allergies:  Review of patient's allergies indicates no known allergies.    Home Meds:  Prescriptions Prior to Admission   Medication Sig Dispense Refill Last Dose   • ciprofloxacin (CIPRO) 500 MG tablet Take 1 tablet by mouth 2 times daily Begin taking the day before the biopsy is scheduled.      • lisinopril-hydrochlorothiazide (PRINZIDE,ZESTORETIC) 10-12.5 MG per tablet 1 tablet Daily.      • MethylPREDNISolone (MEDROL, LUIS ANGEL,) 4 MG tablet Take  by mouth 2 (Two) Times a Day. Take as directed on package instructions.      • metoprolol tartrate (LOPRESSOR) 25 MG tablet 25 mg 2 (Two) Times a Day.      • albuterol (PROVENTIL HFA;VENTOLIN HFA) 108 (90 BASE) MCG/ACT inhaler Every 6 (Six) Hours.          Medicines:  Current Facility-Administered Medications   Medication Dose Route Frequency Provider Last Rate Last Dose   • acetaminophen (TYLENOL) tablet 650 mg  650 mg Oral Q4H PRN Ian Grimes, DO   650 mg at 01/22/17 1757   • diphenhydrAMINE (BENADRYL) capsule 25 mg  25 mg Oral Q4H PRN Mejia Hatfield MD        Or   • diphenhydrAMINE (BENADRYL) injection 25 mg  25 mg Intravenous Q4H PRN Mejia Dewitt  MD Liu       • HYDROcodone-acetaminophen (NORCO) 5-325 MG per tablet 1 tablet  1 tablet Oral Q4H PRN Ian Grimes DO       • HYDROmorphone (DILAUDID) injection 1 mg  1 mg Intravenous Q4H PRN Brandt Gordon DO   1 mg at 01/20/17 0449   • ipratropium-albuterol (DUO-NEB) nebulizer solution 3 mL  3 mL Nebulization Q4H PRN Johan Dinh MD       • methylPREDNISolone sodium succinate (SOLU-Medrol) injection 60 mg  60 mg Intravenous Q8H Jahaira Li, APRN   60 mg at 01/23/17 0613   • metoprolol tartrate (LOPRESSOR) tablet 25 mg  25 mg Oral Q12H Jahaira Li, APRN   25 mg at 01/22/17 2007   • Morphine sulfate (PF) injection 2 mg  2 mg Intravenous Q4H PRN Ian Grimes DO        And   • naloxone (NARCAN) injection 0.4 mg  0.4 mg Intravenous Q5 Min PRN Ian Grimes DO       • nicotine (NICODERM CQ) 21 MG/24HR patch 1 patch  1 patch Transdermal Nightly Johan Dinh MD   1 patch at 01/22/17 2008   • ondansetron (ZOFRAN) injection 4 mg  4 mg Intravenous Q6H PRN Johan Dinh MD       • sodium chloride 0.9 % flush 1-10 mL  1-10 mL Intravenous PRN Johan Dinh MD       • tamsulosin (FLOMAX) 24 hr capsule 0.4 mg  0.4 mg Oral Nightly Brandt Gordon DO   0.4 mg at 01/22/17 2007       Past Medical History:  Past Medical History   Diagnosis Date   • A-fib    • Chronic renal failure    • Elevated PSA    • Purpura        Past Surgical History:  Past Surgical History   Procedure Laterality Date   • Appendectomy     • Insertion hemodialysis catheter N/A 1/20/2017     Procedure: INSERTION PERMCATH;  Surgeon: Ian Grimes DO;  Location: [facility] OR;  Service:        Family History  History reviewed. No pertinent family history.    Social History  Social History     Social History   • Marital status:      Spouse name: N/A   • Number of children: N/A   • Years of education: N/A     Occupational History   • Not on file.     Social History Main Topics   • Smoking status: Heavy Tobacco  "Smoker   • Smokeless tobacco: Not on file   • Alcohol use Yes   • Drug use: Not on file   • Sexual activity: Not on file     Other Topics Concern   • Not on file     Social History Narrative         Review of Systems:  History obtained from chart review and the patient  General ROS: No fever or chills  Respiratory ROS: No cough, shortness of breath, wheezing  Cardiovascular ROS: no chest pain or dyspnea on exertion  Gastrointestinal ROS: No abdominal pain or melena  Genito-Urinary ROS: No dysuria or hematuria  14 point ROS reviewed with the patient and negative except as noted above and in the HPI unless unable to obtain.    Objective:  Visit Vitals   • /92 (BP Location: Left arm, Patient Position: Lying)  Comment: made nurse dominick aware   • Pulse 63   • Temp 97.9 °F (36.6 °C) (Tympanic)   • Resp 18   • Ht 73\" (185.4 cm)   • Wt 236 lb (107 kg)   • SpO2 98%   • BMI 31.14 kg/m2       Intake/Output Summary (Last 24 hours) at 01/23/17 0818  Last data filed at 01/23/17 0405   Gross per 24 hour   Intake 1124.1 ml   Output    725 ml   Net  399.1 ml     General: awake/alert   Chest:  clear to auscultation bilaterally without respiratory distress  CVS: regular rate and rhythm  Abdominal: soft, nontender, normal bowel sounds  Extremities: no cyanosis or edema  Skin: warm and dry without rash      Labs:    Results from last 7 days  Lab Units 01/23/17  0418 01/21/17  0558 01/20/17  0456   WBC 10*3/mm3 9.77 9.71 12.56*   HEMOGLOBIN g/dL 9.7* 9.4* 9.4*   HEMATOCRIT % 30.2* 28.3* 28.9*   PLATELETS 10*3/mm3 168 158 144           Results from last 7 days  Lab Units 01/23/17  0418 01/22/17  0711 01/21/17  0558  01/17/17  0115   SODIUM mmol/L 140 138 138  < > 138   POTASSIUM mmol/L 4.8 4.8 4.9  < > 4.7   CHLORIDE mmol/L 102 102 105  < > 98   TOTAL CO2 mmol/L 28.0 25.0 21.0*  < > 24.0   BUN mg/dL 85* 74* 79*  < > 109*   CREATININE mg/dL 4.65* 4.44* 4.67*  < > 5.69*   CALCIUM mg/dL 8.5 8.1* 8.3*  < > 8.8   BILIRUBIN mg/dL  --   -- "   --   --  0.7   ALK PHOS U/L  --   --   --   --  92   ALT (SGPT) U/L  --   --   --   --  61*   AST (SGOT) U/L  --   --   --   --  36   GLUCOSE mg/dL 131* 137* 124*  < > 80   < > = values in this interval not displayed.    Radiology:   Imaging Results (last 72 hours)     Procedure Component Value Units Date/Time    FL C Arm During Surgery [[id number]] Collected:  01/20/17 1623     Updated:  01/20/17 1623    Narrative:       Performed in surgery by Dr. Grimes. Please see operative report.     This report was finalized on  by Dr. Ian Grimes MD.    IR Insert Tunneled CV Catheter Without Port 5 Plus [[id number]] Collected:  01/20/17 1623     Updated:  01/20/17 1623    Narrative:       Performed in surgery by Dr. Grimes. Please see operative report.     This report was finalized on  by Dr. Ian Grimes MD.          Culture:  URINE CULTURE   Date Value Ref Range Status   01/17/2017 No growth at 2 days  Final         Assessment   1.  Acute kidney injury--? HSP  2.  CKD stage 3   3.  Right nephrolithiasis--improved  4.  HTN  5. PSA elevated    Plan:  1.  Dialysis today  2.  Punch biopsy results pending    Dialysis   Pt was seen on RRT. Tolerating well  Modality: Hemodialysis  Access: Catheter  Location: right upper  QB: 450  QD: 600  UF: 600      Charlie Jacobo, BHARATHI  1/23/2017  8:18 AM

## 2017-01-23 NOTE — PROGRESS NOTES
"    [facility] Medicine Services  INPATIENT PROGRESS NOTE    Length of Stay: 6  Date of Admission: 1/17/2017  Primary Care Physician: Moises Montes MD    Subjective   Chief Complaint: \"Im hungry, missed breakfast\"    HPI   Patient setting up in chair.  He states dialysis went well this morning.  His only complaint is his tray had been removed therefore he has had no breakfast.  He is pleasant.  He has no complaints of chest pain, abdominal pain, changes in bowel or bladder habits.  No shortness of breathing.  He does not feel fatigued or dizzy post dialysis.  Condition is stable.    Review of Systems   All pertinent negatives and positives are as above. All other systems have been reviewed and are negative unless otherwise stated.     Objective    Temp:  [97.1 °F (36.2 °C)-97.9 °F (36.6 °C)] 97.9 °F (36.6 °C)  Heart Rate:  [63-73] 63  Resp:  [18-20] 18  BP: (134-158)/(84-94) 146/92    Physical Exam   Constitutional: He is oriented to person, place, and time. He appears well-developed and well-nourished. No distress.   HENT:   Head: Normocephalic and atraumatic.   Eyes: Conjunctivae and EOM are normal. Pupils are equal, round, and reactive to light. No scleral icterus.   Neck: Normal range of motion. Neck supple. No JVD present. No tracheal deviation present.   Cardiovascular: Normal rate, regular rhythm and intact distal pulses.  Exam reveals no gallop.    Murmur heard.  Pulmonary/Chest: Effort normal and breath sounds normal. No respiratory distress. He has no wheezes. He has no rales.   Abdominal: Soft. Bowel sounds are normal. He exhibits no distension. There is no tenderness. There is no guarding.   Musculoskeletal: Normal range of motion. He exhibits no edema.   Neurological: He is alert and oriented to person, place, and time.   No obvious deficits noted.   Skin: Skin is warm and dry. No rash noted. He is not diaphoretic. No erythema. No pallor.   Psychiatric: He has a normal " mood and affect. His behavior is normal.   Vitals reviewed.      Results Review:  Recent Results (from the past 12 hour(s))   Basic Metabolic Panel    Collection Time: 01/23/17  4:18 AM   Result Value Ref Range    Glucose 131 (H) 70 - 100 mg/dL    BUN 85 (H) 5 - 21 mg/dL    Creatinine 4.65 (H) 0.50 - 1.40 mg/dL    Sodium 140 135 - 145 mmol/L    Potassium 4.8 3.5 - 5.3 mmol/L    Chloride 102 98 - 110 mmol/L    CO2 28.0 24.0 - 31.0 mmol/L    Calcium 8.5 8.4 - 10.4 mg/dL    eGFR Non African Amer 14 (L) >60 mL/min/1.73    BUN/Creatinine Ratio 18.3 7.0 - 25.0    Anion Gap 10.0 4.0 - 13.0 mmol/L   CBC Auto Differential    Collection Time: 01/23/17  4:18 AM   Result Value Ref Range    WBC 9.77 4.80 - 10.80 10*3/mm3    RBC 3.72 (L) 4.80 - 5.90 10*6/mm3    Hemoglobin 9.7 (L) 14.0 - 18.0 g/dL    Hematocrit 30.2 (L) 40.0 - 52.0 %    MCV 81.2 (L) 82.0 - 95.0 fL    MCH 26.1 (L) 28.0 - 32.0 pg    MCHC 32.1 (L) 33.0 - 36.0 g/dL    RDW 15.8 (H) 12.0 - 15.0 %    RDW-SD 47.1 40.0 - 54.0 fl    MPV 9.8 6.0 - 12.0 fL    Platelets 168 130 - 400 10*3/mm3    Neutrophil % 83.6 (H) 39.0 - 78.0 %    Lymphocyte % 10.8 (L) 15.0 - 45.0 %    Monocyte % 5.2 4.0 - 12.0 %    Eosinophil % 0.0 0.0 - 4.0 %    Basophil % 0.0 0.0 - 2.0 %    Immature Grans % 0.4 0.0 - 5.0 %    Neutrophils, Absolute 8.16 1.87 - 8.40 10*3/mm3    Lymphocytes, Absolute 1.06 0.72 - 4.86 10*3/mm3    Monocytes, Absolute 0.51 0.19 - 1.30 10*3/mm3    Eosinophils, Absolute 0.00 0.00 - 0.70 10*3/mm3    Basophils, Absolute 0.00 0.00 - 0.20 10*3/mm3    Immature Grans, Absolute 0.04 (H) 0.00 - 0.03 10*3/mm3   Scan Slide    Collection Time: 01/23/17  4:18 AM   Result Value Ref Range    RBC Morphology Normal Normal    WBC Morphology Normal Normal    Platelet Morphology Normal Normal       Cultures:  URINE CULTURE   Date Value Ref Range Status   01/17/2017 No growth at 2 days  Final       Radiology Data:    Imaging Results (last 24 hours)     ** No results found for the last 24 hours. **             Intake/Output Summary (Last 24 hours) at 01/23/17 1117  Last data filed at 01/23/17 1022   Gross per 24 hour   Intake 1524.1 ml   Output    725 ml   Net  799.1 ml       No Known Allergies    Scheduled meds:     heparin (porcine) 5,000 Units Intravenous Once   methylPREDNISolone sodium succinate 60 mg Intravenous Q8H   metoprolol tartrate 25 mg Oral Q12H   nicotine 1 patch Transdermal Nightly   tamsulosin 0.4 mg Oral Nightly       PRN meds:  •  acetaminophen  •  diphenhydrAMINE **OR** diphenhydrAMINE  •  HYDROcodone-acetaminophen  •  HYDROmorphone  •  ipratropium-albuterol  •  Morphine **AND** naloxone  •  ondansetron  •  sodium chloride    Assessment/Plan     Active Problems:    Henoch-Schonlein purpura nephritis  Assessment:  1. Presumptive Henoch-Schonlein purpura nephritis  2. Nephrolithiasis-9mm right stone-no acute intervention at present per Dr. Quinn  3. CKD 3- secondary to hypertensive nephrosclerosis  4. 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 Hypertension  6. Leukocytosis-resolved.   7. Microcytic anemia--  8. Paroxysmal atrial fibrillation-NSR 70-80's in telemetry  9. Right flank pain-improving  10.Elevated PSA-outpatient workup per Dr. Awan  11.UTI-no growth on cultures  12. Lower extremity rash-significantly improved.   13. Mild hyperkalemia-resolved.   14. Hematuria     Plan:  1. Planned dialysis 1/23. ? Continue M W F  2. CBC and BMP ruth ann am  3. Day 3 Solumedrol 60 mg IV  = decrease to 12 hours  4. Await punch biopsy results  5. Continue current dose b-blocker.   6. Repeat UA  7. Consult SS to schedule dialysis chair/clinic     Discharge Planning: I expect patient to be discharged to home in ? days.       BHARATHI Jay   01/23/17   11:17 AM      I personally evaluated and examined the patient in conjunction with BHARATHI Love and agree with the assessment, treatment plan, and disposition of the patient as recorded by her. My history, exam, and further recommendations are:     Plan:  1. Planned  dialysis 1/23. ? Continue M W F  2. CBC and BMP ruth ann am  3. Day 3 Solumedrol 60 mg IV  = decrease to 12 hours  4. Await punch biopsy results  5. Continue current dose b-blocker.   6. Repeat UA  7. Consult  to schedule dialysis chair/clinic    Pierre Pritchard,   01/24/17  4:08 PM

## 2017-01-23 NOTE — PROGRESS NOTES
Discharge Planning Assessment  [facility]     Patient Name: Gurjit Andrea  MRN: [id number]  Today's Date: 1/23/2017    Admit Date: 1/17/2017          Discharge Needs Assessment       01/23/17 1452    Living Environment    Lives With spouse    Living Arrangements mobile home    Provides Primary Care For no one    Quality Of Family Relationships supportive    Able to Return to Prior Living Arrangements yes    Discharge Needs Assessment    Equipment Currently Used at Home none    Equipment Needed After Discharge none    Transportation Available car    Discharge Planning Comments Spoke with pt to assess for home needs. Pt lives at home with wife/disabled children.  He is independent and no HH nor DME needs present.  He will need outpt dialysis set up.  Faxed information to [facility] to set up chair time.             Discharge Plan     None        Discharge Placement     No information found                Demographic Summary     None            Functional Status     None            Psychosocial     None            Abuse/Neglect     None            Legal     None            Substance Abuse     None            Patient Forms     None          LAUREN Nguyen

## 2017-01-23 NOTE — PLAN OF CARE
Problem: Patient Care Overview (Adult)  Goal: Plan of Care Review  Outcome: Ongoing (interventions implemented as appropriate)    01/23/17 0554   Coping/Psychosocial Response Interventions   Plan Of Care Reviewed With patient   Patient Care Overview   Progress improving   Outcome Evaluation   Outcome Summary/Follow up Plan Labs are continuing to improve. Patient will have dialysis again today         Problem: Renal Failure/Kidney Injury, Acute (Adult)  Goal: Signs and Symptoms of Listed Potential Problems Will be Absent or Manageable (Renal Failure/Kidney Injury, Acute)  Outcome: Ongoing (interventions implemented as appropriate)    Problem: Perioperative Period (Adult)  Goal: Signs and Symptoms of Listed Potential Problems Will be Absent or Manageable (Perioperative Period)  Outcome: Ongoing (interventions implemented as appropriate)

## 2017-01-23 NOTE — DISCHARGE PLACEMENT REQUEST
"Susie Persaud [phone]    Gurjit Andrea (47 y.o. Male)     Date of Birth Social Security Number Address Home Phone MRN    [id number]  [id number] [facility] [address] [phone] [id number]    Latter-day Marital Status          None        Admission Date Admission Type Admitting Provider Attending Provider Department, Room/Bed    1/17/17 Emergency Pierre Pritchard DO Robinson, Maurice S, DO [facility] 4C, 495/1    Discharge Date Discharge Disposition Discharge Destination                      Attending Provider: Pierre Pritchard DO     Allergies:  No Known Allergies    Isolation:  None   Infection:  None   Code Status:  FULL    Ht:  73\" (185.4 cm)   Wt:  236 lb (107 kg)    Admission Cmt:  None   Principal Problem:  None                Active Insurance as of 1/17/2017     Primary Coverage     Payor Plan Insurance Group Employer/Plan Group    [employer] BLUE [employer]OSS [employer] BLUE [employer]OSS BLUE [employer]IELD PPO [id number]     Payor Plan Address Payor Plan Phone Number Effective From Effective To    [address] [phone] 12/1/2016     [place] [address]       Subscriber Name Subscriber Birth Date Member ID       JORDAN ANDREA [birth year]2001 [id number]                 Emergency Contacts      (Rel.) Home Phone Work Phone Mobile Phone    Jordan Andrea (Spouse) [phone] -- [phone]               History & Physical      H&P signed by Johan Dinh MD at 1/17/2017  7:22 PM              TIME: 4:28 a.m.     PRIMARY CARE PHYSICIAN: Moises Montes MD    HISTORY OF PRESENT ILLNESS: Mr. Andrea as a 47-year-old  male who presents to the emergency department at [facility] due to a chief complaint of nausea and vomiting associated with dark urine and bilateral flank pain. Imaging studies in the emergency department demonstrate right-sided hydronephrosis. Mr. Andrea has a history of nephrolithiasis. To complicate matters somewhat, he also has a history of HSP " (Henoch-Schonlein purpura). In the emergency department, he was found to have significantly worsening renal function. Mr. Andrea will require admission to the hospital for further evaluation and treatment.     Note that Mr. Andrea awoke on the morning of 01/02/2017 and noticed an usual rash on his lower extremities. At that time he was also producing a dark urine. He presented at [facility] Emergency Department and was admitted with a diagnosis of HSP. He remained in the hospital for approximately 3 days and was discharged in stable and improved condition. Repeat followup laboratory studies demonstrated stable renal function. However, he has since then developed nausea and vomiting and his renal function has worsened considerably.     Presently, he is awake and alert. He has no complaints. He is in no distress at this time. He will require admission to the hospital for further evaluation and treatment. The nephrology service has already been notified.     REVIEW OF SYSTEMS: Otherwise unremarkable from a cardiovascular, pulmonary, gastrointestinal, genitourinary, neurologic, psychiatric, metabolic and constitutional standpoint, except as noted. He has had no unusual fatigue, malaise, lethargy or weakness. He has had no definite fevers, chills, or sweats. His appetite is good. His weight is stable. He has had no chest pain, chest palpitations, shortness of breath, lower extremity swelling, orthopnea, cough, wheezing, or hemoptysis. He has had no abdominal pain, but he has had nausea and vomiting. He has had no diarrhea or constipation. He has had no dysphagia, odynophagia, hematemesis, hematochezia, or melena. He relates bilateral flank pain. He has had no pelvic pain. He has dark-colored urine. He has had no dysuria. He has had no skin rashes, arthralgias, or myalgias except for purpura on his lower extremities, which is not palpable at this time. He has had no headache, confusion, memory deficits  or loss of consciousness. He has had no changes in vision or hearing. He has had no acute motor or sensory deficits.     PAST MEDICAL HISTORY:  1. Hypertension.   2. Paroxysmal atrial fibrillation.  3. Nephrolithiasis.   4. Chronic kidney disease.   5. Elevated PSA.   6. Recurring hematuria.   7. Hypomagnesemia.     PAST SURGICAL HISTORY:  1. Status post appendectomy.   2. Status post reconstruction of his lower lip due to injury.     ALLERGIES: No known drug allergies.     HOME MEDICATIONS:  1. Cipro 500 mg p.o. b.i.d.   2. Lisinopril/hydrochlorothiazide 10/12.5, take 1 p.o. daily.   3. Medrol Dosepak.   4. Lopressor 25 mg p.o. daily.   5. Albuterol inhaler 2-4 inhalations q.6 h. p.r.n. for shortness of breath.     SOCIAL HISTORY: Significant for being a resident of [place], [place]. He is . He has no children. He works on his farm. He has a high school education plus an additional studies. He smokes a pack of cigarettes per day and has done so for 30 years. He drinks alcohol on rare occasions. He has no history of illicit drug use. He has no particular Church affiliation. He has no recent history of travel outside this region.     He designates his wife, Karine Dey, to serve as a surrogate for healthcare matters should such become necessary.     CODE STATUS: He is a FULL CODE.      FAMILY HISTORY: Significant for having a half-brother on his mother's side of the family. His brother is in good health; however, he has a history of Kawasaki's disease as a child. His father is living but has a history of end-stage liver disease due to alcohol use, COPD, and diabetes mellitus type 2. His mother is alive and has a history of chronic kidney disease and hypothyroidism.      PHYSICAL EXAMINATION:  VITAL SIGNS: Temperature is 98.6, pulse 80, respirations are 21 and unlabored, blood pressure is 137/86, O2 saturation is 99% on nasal cannula, weight is 219 pounds.   GENERAL: This is a 47-year-old  male  appearing his documented age. He is resting comfortably in bed. He is in no apparent distress. He is articulate in his speech. He is interactive and cooperative. He proves to be a good historian. His wife was present throughout the interview and exam and also assisted in providing information.   HEENT: Essentially unremarkable except as noted. I see no signs of acute trauma. Eyes, nose, and throat are grossly unremarkable. Sclerae are clear. There is no discharge from the nostrils. Mucous membranes are moist.   NECK: Supple. He has no cervical or clavicular adenopathy. He has no definite carotid bruits. There are no masses of the head or neck. Neck veins are not pathologically distended.   CARDIAC: Reveals S1 and S2 with a regular rhythm. He has a 3/6 systolic murmur heard best along the left anterior chest.   ABDOMEN: Reveals bowel sounds to be present. His abdomen is nontender, nondistended and soft. He relates bilateral flank pain. There is no guarding.   EXTREMITIES: No lower extremity edema, erythema or calf tenderness. He has a purpura rash which is obvious on his lower extremities, most notably below the knees.   NEUROLOGIC: Reveals the patient to be awake and alert. He seems oriented to person, place, time and situation. Cranial nerves II through XII appear grossly intact. He exhibits no definite focal, motor or sensory deficits. He seems able to move all extremities without difficulty and at will. His gait was not tested.   PSYCHIATRIC: Reveals his mood to be stable. Affect seems appropriate. Thought processes are organized in that he is able to answer questions appropriately and provide a coherent history. Speech is fluent. There is no flight of ideas. There are no apparent short-term or long-term memory deficits.     DIAGNOSTIC DATA: Sodium 138, potassium 4.7, chloride 98, CO2 of 24, , creatinine 5.69, glucose 80, total calcium 8.8.     Liver function testing is essentially unremarkable; however,  ALT is slightly elevated at 61.     CBC demonstrates a white blood cell count of 11.2, hemoglobin 10.9, hematocrit 33.4, and platelet count 133,000.     Urinalysis demonstrates a specific gravity of 1.014 with a pH of 6.5. His urine is positive for blood, protein and leukocyte esterase. He has too numerous to count red blood cells per high-power field and 6-12 white blood cells per high-power field.     CT study of the abdomen and pelvis demonstrates right-sided hydronephrosis, per report of the emergency department physician.     EKG is pending.     IMPRESSION:  1. Acute kidney injury.   2. Chronic kidney disease.   3. HSP.   4. Nephrolithiasis.   5. Right hydronephrosis.   6. Elevated PSA.   7. Hypertension.   8. Paroxysmal atrial fibrillation.   9. Urinary tract infection.     PLAN: At this time, Mr. Andrea will be admitted to [facility] for further evaluation and treatment. His admitting diagnoses are as noted. His condition at this time is judged to be stable. He will be placed on telemetry due to his acute kidney injury.     I have asked the nursing staff to obtain vital signs per protocol. He will be confined to bedrest with bathroom privileges with assistance. As noted, he has no known drug allergies. I have asked the nursing staff to monitor input and output. Daily weights will be obtained. He will be maintained on a renal diet but will be held n.p.o. pending evaluation by the nephrology service. IV fluids will consist of normal saline at 150 mL/h. Oxygen will be used as needed to maintain his O2 saturation greater than 92%. He is a FULL CODE. Fall precautions are to be instituted. I will consult the nephrology and urology services.     ADMITTING MEDICATIONS:   1. Solu-Medrol 80 mg IV q.8 h.   2. Tylenol 650 mg p.o. q.6 h. p.r.n. for fever and/or discomfort.   3. DuoNeb 1 unit q.4 h. p.r.n. for shortness of breath.   4. Zofran 4 mg IV q.6 h. p.r.n. for nausea and vomiting.   5. Rocephin 1 g IV  "daily.     I will obtain followup laboratory studies in the morning.     I will obtain a renal ultrasound study.     EKG is pending.     I will continue to follow Mr. Andrea closely through the night, pending the return of the hospitalist team in the morning. Nursing staff may certainly call should they have any questions or concerns. Please refer to the medical record for additional information, orders and/or comments.       cc:  MD Johan PARMAR M.D. [id number]  D:  01/17/2017 05:44:51(Eastern Time)  T:  01/17/2017 07:19:27(Eastern Time)  Voice ID:  [id number]/Document ID:  [id number]     Electronically signed by Johan Dinh MD at 1/17/2017  7:22 PM        Hospital Medications (active)       Dose Frequency Start End    acetaminophen (TYLENOL) tablet 650 mg 650 mg Every 4 Hours PRN 1/20/2017     Sig - Route: Take 2 tablets by mouth Every 4 (Four) Hours As Needed for mild pain (1-3). - Oral    diphenhydrAMINE (BENADRYL) capsule 25 mg 25 mg Every 4 Hours PRN 1/20/2017     Sig - Route: Take 1 capsule by mouth Every 4 (Four) Hours As Needed for itching. - Oral    Linked Group 1:  \"Or\" Linked Group Details        diphenhydrAMINE (BENADRYL) injection 25 mg 25 mg Every 4 Hours PRN 1/20/2017     Sig - Route: Infuse 0.5 mL into a venous catheter Every 4 (Four) Hours As Needed for itching. - Intravenous    Linked Group 1:  \"Or\" Linked Group Details        heparin (porcine) injection 5,000 Units 5,000 Units Once 1/23/2017     Sig - Route: Infuse 5 mL into a venous catheter 1 (One) Time. - Intravenous    HYDROcodone-acetaminophen (NORCO) 5-325 MG per tablet 1 tablet 1 tablet Every 4 Hours PRN 1/20/2017 1/30/2017    Sig - Route: Take 1 tablet by mouth Every 4 (Four) Hours As Needed for moderate pain (4-6). - Oral    HYDROmorphone (DILAUDID) injection 1 mg 1 mg Every 4 Hours PRN 1/18/2017     Sig - Route: Infuse 1 mg into a venous catheter Every 4 (Four) Hours As Needed for severe pain (7-10). - " "Intravenous    ipratropium-albuterol (DUO-NEB) nebulizer solution 3 mL 3 mL Every 4 Hours PRN 1/17/2017     Sig - Route: Take 3 mL by nebulization Every 4 (Four) Hours As Needed for wheezing or shortness of air. - Nebulization    methylPREDNISolone sodium succinate (SOLU-Medrol) injection 60 mg 60 mg Every 12 Hours 1/24/2017     Sig - Route: Infuse 0.96 mL into a venous catheter Every 12 (Twelve) Hours. - Intravenous    metoprolol tartrate (LOPRESSOR) tablet 25 mg 25 mg Every 12 Hours Scheduled 1/22/2017     Sig - Route: Take 1 tablet by mouth Every 12 (Twelve) Hours. - Oral    Morphine sulfate (PF) injection 2 mg 2 mg Every 4 Hours PRN 1/20/2017 1/30/2017    Sig - Route: Infuse 1 mL into a venous catheter Every 4 (Four) Hours As Needed for moderate pain (4-6). - Intravenous    Linked Group 2:  \"And\" Linked Group Details        naloxone (NARCAN) injection 0.4 mg 0.4 mg Every 5 Minutes PRN 1/20/2017     Sig - Route: Infuse 1 mL into a venous catheter Every 5 (Five) Minutes As Needed for respiratory depression. - Intravenous    Linked Group 2:  \"And\" Linked Group Details        nicotine (NICODERM CQ) 21 MG/24HR patch 1 patch 1 patch Nightly 1/17/2017     Sig - Route: Place 1 patch on the skin Every Night. - Transdermal    ondansetron (ZOFRAN) injection 4 mg 4 mg Every 6 Hours PRN 1/17/2017     Sig - Route: Infuse 2 mL into a venous catheter Every 6 (Six) Hours As Needed for nausea or vomiting. - Intravenous    sodium chloride 0.9 % flush 1-10 mL 1-10 mL As Needed 1/17/2017     Sig - Route: Infuse 1-10 mL into a venous catheter As Needed for line care. - Intravenous    tamsulosin (FLOMAX) 24 hr capsule 0.4 mg 0.4 mg Nightly 1/18/2017     Sig - Route: Take 1 capsule by mouth Every Night. - Oral    methylPREDNISolone sodium succinate (SOLU-Medrol) injection 60 mg (Discontinued) 60 mg Every 8 Hours Scheduled 1/21/2017 1/23/2017    Sig - Route: Infuse 0.96 mL into a venous catheter Every 8 (Eight) Hours. - Intravenous    " sodium chloride 0.9 % infusion (Discontinued) 50 mL/hr Continuous 1/20/2017 1/22/2017    Sig - Route: Infuse 50 mL/hr into a venous catheter Continuous. - Intravenous          Operative/Procedure Notes (last 24 hours) (Notes from 1/22/2017  3:00 PM through 1/23/2017  3:00 PM)     No notes of this type exist for this encounter.           Physician Progress Notes (last 24 hours) (Notes from 1/22/2017  3:00 PM through 1/23/2017  3:00 PM)      Mejia Hatfield MD at 1/22/2017  5:16 PM  Version 1 of 1         Nephrology ([facility]) Consult Note      Patient:  Gurjit Andrea  YOB: 1970  Date of Service: 1/22/2017  MRN: [id number]   Acct: [id number]   Primary Care Physician: Moises Montes MD  Advance Directive: Full Code  Admit Date: 1/17/2017       Hospital Day: 5  Referring Provider: Johan Dinh MD      Patient Seen, Chart, Consults, Notes, Labs, Radiology studies reviewed.        Subjective:  Gurjit Andrea is a 47 y.o. male  whom we were consulted for OSMANI/CKD 3. baseline eGFR 50s, pt notes improved diet/lifestyle/weight loss. Had followed with nephrology years ago. 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 S/p punch biopsy here.  No issues with permcath.  Still awaiting biopsy report.  No n/v/d.         Allergies:  Review of patient's allergies indicates no known allergies.    Home Meds:  Prescriptions Prior to Admission   Medication Sig Dispense Refill Last Dose   • ciprofloxacin (CIPRO) 500 MG tablet Take 1 tablet by mouth 2 times daily Begin taking the day before the biopsy is scheduled.      • lisinopril-hydrochlorothiazide (PRINZIDE,ZESTORETIC) 10-12.5 MG per tablet 1 tablet Daily.      • MethylPREDNISolone (MEDROL, LUIS ANGEL,) 4 MG tablet Take  by mouth 2 (Two) Times a Day. Take as directed on package instructions.      • metoprolol tartrate (LOPRESSOR) 25 MG tablet 25 mg 2 (Two) Times a Day.      • albuterol (PROVENTIL HFA;VENTOLIN HFA) 108 (90  BASE) MCG/ACT inhaler Every 6 (Six) Hours.          Medicines:  Current Facility-Administered Medications   Medication Dose Route Frequency Provider Last Rate Last Dose   • acetaminophen (TYLENOL) tablet 650 mg  650 mg Oral Q4H PRN Ian Grimes DO   650 mg at 01/22/17 1317   • diphenhydrAMINE (BENADRYL) capsule 25 mg  25 mg Oral Q4H PRN Mejia Hatfield MD        Or   • diphenhydrAMINE (BENADRYL) injection 25 mg  25 mg Intravenous Q4H PRN Mejia Hatfield MD       • HYDROcodone-acetaminophen (NORCO) 5-325 MG per tablet 1 tablet  1 tablet Oral Q4H PRN Ian Grimes DO       • HYDROmorphone (DILAUDID) injection 1 mg  1 mg Intravenous Q4H PRN Brandt Gordon DO   1 mg at 01/20/17 0449   • ipratropium-albuterol (DUO-NEB) nebulizer solution 3 mL  3 mL Nebulization Q4H PRN Johan Dinh MD       • methylPREDNISolone sodium succinate (SOLU-Medrol) injection 60 mg  60 mg Intravenous Q8H Jahaira Li APRN   60 mg at 01/22/17 1421   • metoprolol tartrate (LOPRESSOR) tablet 25 mg  25 mg Oral Q12H Jahaira Li APRN   25 mg at 01/22/17 0832   • Morphine sulfate (PF) injection 2 mg  2 mg Intravenous Q4H PRN Ian Grimes DO        And   • naloxone (NARCAN) injection 0.4 mg  0.4 mg Intravenous Q5 Min PRN Ian Grimes DO       • nicotine (NICODERM CQ) 21 MG/24HR patch 1 patch  1 patch Transdermal Nightly Johan Dinh MD   1 patch at 01/21/17 2133   • ondansetron (ZOFRAN) injection 4 mg  4 mg Intravenous Q6H PRN Johan Dinh MD       • sodium chloride 0.9 % flush 1-10 mL  1-10 mL Intravenous PRN Johan Dinh MD       • sodium chloride 0.9 % infusion  50 mL/hr Intravenous Continuous Anne-Marie Blount MD 50 mL/hr at 01/22/17 1316 50 mL/hr at 01/22/17 1316   • tamsulosin (FLOMAX) 24 hr capsule 0.4 mg  0.4 mg Oral Nightly Brandt Gordon DO   0.4 mg at 01/21/17 1391       Past Medical History:  Past Medical History   Diagnosis Date   • A-fib    • Chronic renal failure  "   • Elevated PSA    • Purpura        Past Surgical History:  Past Surgical History   Procedure Laterality Date   • Appendectomy     • Insertion hemodialysis catheter N/A 1/20/2017     Procedure: INSERTION PERMCATH;  Surgeon: Ian Grimes DO;  Location: [facility] OR;  Service:        Family History  History reviewed. No pertinent family history.    Social History  Social History     Social History   • Marital status:      Spouse name: N/A   • Number of children: N/A   • Years of education: N/A     Occupational History   • Not on file.     Social History Main Topics   • Smoking status: Heavy Tobacco Smoker   • Smokeless tobacco: Not on file   • Alcohol use Yes   • Drug use: Not on file   • Sexual activity: Not on file     Other Topics Concern   • Not on file     Social History Narrative         Review of Systems:  History obtained from chart review and the patient  General ROS: No fever or chills  Respiratory ROS: No cough, shortness of breath, wheezing  Cardiovascular ROS: no chest pain or dyspnea on exertion  Gastrointestinal ROS: No abdominal pain or melena  Genito-Urinary ROS: No dysuria or hematuria  14 point ROS reviewed with the patient and negative except as noted above and in the HPI unless unable to obtain.    Objective:  Visit Vitals   • /90   • Pulse 66   • Temp 98.5 °F (36.9 °C)   • Resp 20   • Ht 73\" (185.4 cm)   • Wt 233 lb 9.6 oz (106 kg)   • SpO2 99%   • BMI 30.82 kg/m2       Intake/Output Summary (Last 24 hours) at 01/22/17 1716  Last data filed at 01/22/17 1316   Gross per 24 hour   Intake   2440 ml   Output      0 ml   Net   2440 ml     General: awake/alert   Chest:  clear to auscultation bilaterally without respiratory distress  CVS: regular rate and rhythm  Abdominal: soft, nontender, normal bowel sounds  Extremities: no cyanosis or edema  Skin: bilat le rash      Labs:    Results from last 7 days  Lab Units 01/21/17  0558 01/20/17  0456 01/19/17  0449   WBC 10*3/mm3 9.71 12.56* " 15.38*   HEMOGLOBIN g/dL 9.4* 9.4* 9.2*   HEMATOCRIT % 28.3* 28.9* 28.1*   PLATELETS 10*3/mm3 158 144 158           Results from last 7 days  Lab Units 01/22/17  0711 01/21/17  0558 01/20/17  0456  01/17/17  0115   SODIUM mmol/L 138 138 139  < > 138   POTASSIUM mmol/L 4.8 4.9 5.2  < > 4.7   CHLORIDE mmol/L 102 105 108  < > 98   TOTAL CO2 mmol/L 25.0 21.0* 16.0*  < > 24.0   BUN mg/dL 74* 79* 112*  < > 109*   CREATININE mg/dL 4.44* 4.67* 6.31*  < > 5.69*   CALCIUM mg/dL 8.1* 8.3* 8.1*  < > 8.8   BILIRUBIN mg/dL  --   --   --   --  0.7   ALK PHOS U/L  --   --   --   --  92   ALT (SGPT) U/L  --   --   --   --  61*   AST (SGOT) U/L  --   --   --   --  36   GLUCOSE mg/dL 137* 124* 116*  < > 80   < > = values in this interval not displayed.    Radiology:   Imaging Results (last 72 hours)     Procedure Component Value Units Date/Time    CT Abdomen Pelvis Without Contrast [[id number]] Collected:  01/17/17 0714     Updated:  01/17/17 0735    Narrative:       CT ABDOMEN PELVIS WO CONTRAST- 1/17/2017 2:18 AM CST     HISTORY: right flank pain      COMPARISON: None      DLP: 838 mGy cm. Automated exposure control was utilized to diminish  patient radiation dose.     TECHNIQUE: Noncontrast enhanced images of the abdomen and pelvis  obtained without oral contrast.      FINDINGS:   There is mild bibasilar atelectasis. The base of the heart is  unremarkable..      LIVER: There are 3 hypodense lesions within the right lobe of liver  including a 9 mm lesion within the anterior segment of the right lobe of  the liver as well as a 18 and 13 mm hypodense lesion within the  posterior segment of the right lobe of the liver. These are likely  representative of hepatic cysts. The liver is otherwise normal in  appearance. Ultrasound could be helpful for further characterization..      BILIARY SYSTEM: The gallbladder is unremarkable. No intrahepatic or  extrahepatic ductal dilatation.      PANCREAS: No focal pancreatic lesion.      SPLEEN: There  is mild splenomegaly with a aejz-[id number] length of 15 cm..        KIDNEYS AND ADRENALS: No discrete renal mass is identified. There is a 9  mm nonobstructing calculus involving the interpolar aspect of the right  kidney. There is mild dilatation of the upper tracts of the right kidney  as well as some proximal right periureteral stranding. This would  suggest the patient may have recently passed a stone. There is no  evidence of a discrete ureteral stone at this time. The left renal  collecting system and ureter is unremarkable..     .     RETROPERITONEUM: No mass, lymphadenopathy or hemorrhage.      GI TRACT: There is mild constipation. There is no evidence of mechanical  obstruction but there are multiple fluid-filled small bowel loops. A few  scattered air-fluid levels are present.. The appendix has been  surgically removed..     OTHER: There is no mesenteric mass, lymphadenopathy or fluid collection.  The osseous structures and soft tissues demonstrate no worrisome  lesions. 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      PELVIS: No mass lesion, fluid collection or significant lymphadenopathy  is seen in the pelvis. The urinary bladder is normal in appearance. The  prostate gland is mildly enlarged.       Impression:       1. There is 9 mm nonobstructing calculus involving the interpolar aspect  of the right kidney. There is mild dilatation of the upper tracts of the  right kidney and proximal right ureter with proximal periureteral  stranding. I see no evidence of a discrete ureteral stone but this may  represent sequela of a recently passed stone. Mild right-sided  obstructive uropathy secondary to another etiology would also be a  differential and if the patient's hematuria and flank pain are  persistent I would suggest urology consultation with retrograde  examination of the right renal collecting system and ureter. No evidence  of left-sided nephrolithiasis or ureteral calculus.  2.  Suspected hepatic cysts within the right lobe of the liver. This  could be confirmed with ultrasound.  3. Nonspecific bowel gas pattern with some scattered fluid-filled bowel  loops. This is likely related to mild constipation with increased stool  noted throughout the colon..   4. Prostatic enlargement. A small fat-containing periumbilical hernia  is present.     Electronically Signed By-Dr. Jarvis Morgan MD On:1/17/2017 8:33 AM  EST  This report was finalized on 01/17/2017 07:33 by Dr. Jarvis Morgan MD.          Culture:  URINE CULTURE   Date Value Ref Range Status   01/17/2017 No growth at 2 days  Final         Assessment   OSMANI (?HSP)  CKD 3 (?HTN Nephrosclerosis)  Right nephrolithiasis   HTN  Elevated PSA     Plan:  HD #2 yesterday, next likely 1/23  Agree with emperic steroids/will wean ivf  Punch biopsy results pending      Mejia Hatfield MD  1/22/2017  5:16 PM         Electronically signed by Mejia Hatfeild MD at 1/22/2017  5:17 PM      BHARATHI Clifford at 1/23/2017  8:17 AM  Version 2 of 2         Nephrology ([facility]) Consult Note      Patient:  Gurjit Andrea  YOB: 1970  Date of Service: 1/23/2017  MRN: [id number]   Acct: [id number]   Primary Care Physician: Moises Montes MD  Advance Directive: Full Code  Admit Date: 1/17/2017       Hospital Day: 6  Referring Provider: Johan Dinh MD      Patient Seen, Chart, Consults, Notes, Labs, Radiology studies reviewed.        Subjective:  Gurjit Andrea is a 47 y.o. male  whom we were consulted for OSMANI/CKD 3. baseline eGFR 50s, pt notes improved diet/lifestyle/weight loss. Had followed with nephrology years ago. 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 S/p punch biopsy here. Permcath placed 1/21; tolerating dialysis well. Still awaiting biopsy report. No new issues.  Seen on dialysis today.    Allergies:  Review of patient's allergies indicates no known  allergies.    Home Meds:  Prescriptions Prior to Admission   Medication Sig Dispense Refill Last Dose   • ciprofloxacin (CIPRO) 500 MG tablet Take 1 tablet by mouth 2 times daily Begin taking the day before the biopsy is scheduled.      • lisinopril-hydrochlorothiazide (PRINZIDE,ZESTORETIC) 10-12.5 MG per tablet 1 tablet Daily.      • MethylPREDNISolone (MEDROL, LUIS ANGEL,) 4 MG tablet Take  by mouth 2 (Two) Times a Day. Take as directed on package instructions.      • metoprolol tartrate (LOPRESSOR) 25 MG tablet 25 mg 2 (Two) Times a Day.      • albuterol (PROVENTIL HFA;VENTOLIN HFA) 108 (90 BASE) MCG/ACT inhaler Every 6 (Six) Hours.          Medicines:  Current Facility-Administered Medications   Medication Dose Route Frequency Provider Last Rate Last Dose   • acetaminophen (TYLENOL) tablet 650 mg  650 mg Oral Q4H PRN Ian Grimes DO   650 mg at 01/22/17 1757   • diphenhydrAMINE (BENADRYL) capsule 25 mg  25 mg Oral Q4H PRN Mejia Hatfield MD        Or   • diphenhydrAMINE (BENADRYL) injection 25 mg  25 mg Intravenous Q4H PRN Mejia Hatfield MD       • HYDROcodone-acetaminophen (NORCO) 5-325 MG per tablet 1 tablet  1 tablet Oral Q4H PRN Ian Grimes DO       • HYDROmorphone (DILAUDID) injection 1 mg  1 mg Intravenous Q4H PRN Brandt Gordon DO   1 mg at 01/20/17 0449   • ipratropium-albuterol (DUO-NEB) nebulizer solution 3 mL  3 mL Nebulization Q4H PRN Johan Dinh MD       • methylPREDNISolone sodium succinate (SOLU-Medrol) injection 60 mg  60 mg Intravenous Q8H Jahaira Li APRN   60 mg at 01/23/17 0613   • metoprolol tartrate (LOPRESSOR) tablet 25 mg  25 mg Oral Q12H Jahaira Li APRN   25 mg at 01/22/17 2007   • Morphine sulfate (PF) injection 2 mg  2 mg Intravenous Q4H PRN Ian Grimes DO        And   • naloxone (NARCAN) injection 0.4 mg  0.4 mg Intravenous Q5 Min PRN Ian Grimes DO       • nicotine (NICODERM CQ) 21 MG/24HR patch 1 patch  1 patch  "Transdermal Nightly Johan Dinh MD   1 patch at 01/22/17 2008   • ondansetron (ZOFRAN) injection 4 mg  4 mg Intravenous Q6H PRN Johan Dinh MD       • sodium chloride 0.9 % flush 1-10 mL  1-10 mL Intravenous PRN Johan Dinh MD       • tamsulosin (FLOMAX) 24 hr capsule 0.4 mg  0.4 mg Oral Nightly Brandt Gordon DO   0.4 mg at 01/22/17 2007       Past Medical History:  Past Medical History   Diagnosis Date   • A-fib    • Chronic renal failure    • Elevated PSA    • Purpura        Past Surgical History:  Past Surgical History   Procedure Laterality Date   • Appendectomy     • Insertion hemodialysis catheter N/A 1/20/2017     Procedure: INSERTION PERMCATH;  Surgeon: Ian Grimes DO;  Location: [facility] OR;  Service:        Family History  History reviewed. No pertinent family history.    Social History  Social History     Social History   • Marital status:      Spouse name: N/A   • Number of children: N/A   • Years of education: N/A     Occupational History   • Not on file.     Social History Main Topics   • Smoking status: Heavy Tobacco Smoker   • Smokeless tobacco: Not on file   • Alcohol use Yes   • Drug use: Not on file   • Sexual activity: Not on file     Other Topics Concern   • Not on file     Social History Narrative         Review of Systems:  History obtained from chart review and the patient  General ROS: No fever or chills  Respiratory ROS: No cough, shortness of breath, wheezing  Cardiovascular ROS: no chest pain or dyspnea on exertion  Gastrointestinal ROS: No abdominal pain or melena  Genito-Urinary ROS: No dysuria or hematuria  14 point ROS reviewed with the patient and negative except as noted above and in the HPI unless unable to obtain.    Objective:  Visit Vitals   • /92 (BP Location: Left arm, Patient Position: Lying)  Comment: made nurse dominick aware   • Pulse 63   • Temp 97.9 °F (36.6 °C) (Tympanic)   • Resp 18   • Ht 73\" (185.4 cm)   • Wt 236 lb (107 kg)   • SpO2 98%   • " BMI 31.14 kg/m2       Intake/Output Summary (Last 24 hours) at 01/23/17 0818  Last data filed at 01/23/17 0405   Gross per 24 hour   Intake 1124.1 ml   Output    725 ml   Net  399.1 ml     General: awake/alert   Chest:  clear to auscultation bilaterally without respiratory distress  CVS: regular rate and rhythm  Abdominal: soft, nontender, normal bowel sounds  Extremities: no cyanosis or edema  Skin: warm and dry without rash      Labs:    Results from last 7 days  Lab Units 01/23/17  0418 01/21/17  0558 01/20/17  0456   WBC 10*3/mm3 9.77 9.71 12.56*   HEMOGLOBIN g/dL 9.7* 9.4* 9.4*   HEMATOCRIT % 30.2* 28.3* 28.9*   PLATELETS 10*3/mm3 168 158 144           Results from last 7 days  Lab Units 01/23/17  0418 01/22/17  0711 01/21/17  0558  01/17/17  0115   SODIUM mmol/L 140 138 138  < > 138   POTASSIUM mmol/L 4.8 4.8 4.9  < > 4.7   CHLORIDE mmol/L 102 102 105  < > 98   TOTAL CO2 mmol/L 28.0 25.0 21.0*  < > 24.0   BUN mg/dL 85* 74* 79*  < > 109*   CREATININE mg/dL 4.65* 4.44* 4.67*  < > 5.69*   CALCIUM mg/dL 8.5 8.1* 8.3*  < > 8.8   BILIRUBIN mg/dL  --   --   --   --  0.7   ALK PHOS U/L  --   --   --   --  92   ALT (SGPT) U/L  --   --   --   --  61*   AST (SGOT) U/L  --   --   --   --  36   GLUCOSE mg/dL 131* 137* 124*  < > 80   < > = values in this interval not displayed.    Radiology:   Imaging Results (last 72 hours)     Procedure Component Value Units Date/Time    FL C Arm During Surgery [[id number]] Collected:  01/20/17 1623     Updated:  01/20/17 1623    Narrative:       Performed in surgery by Dr. Grimes. Please see operative report.     This report was finalized on  by Dr. Ian Grimes MD.    IR Insert Tunneled CV Catheter Without Port 5 Plus [[id number]] Collected:  01/20/17 1623     Updated:  01/20/17 1623    Narrative:       Performed in surgery by Dr. Grimes. Please see operative report.     This report was finalized on  by Dr. Ian Grimes MD.          Culture:  URINE CULTURE   Date Value Ref Range  Status   01/17/2017 No growth at 2 days  Final         Assessment   1.  Acute kidney injury--? HSP  2.  CKD stage 3   3.  Right nephrolithiasis--improved  4.  HTN  5. PSA elevated    Plan:  1.  Dialysis today  2.  Punch biopsy results pending    Dialysis   Pt was seen on RRT. Tolerating well  Modality: Hemodialysis  Access: Catheter  Location: right upper  QB: 450  QD: 600  UF: 600      BHARATHI Chun  1/23/2017  8:18 AM       Electronically signed by BHARATHI Clifford at 1/23/2017 11:07 AM      BHARATHI Clifford at 1/23/2017  8:17 AM  Version 1 of 2         Nephrology ([facility]) Consult Note      Patient:  Gurjit Andrea  YOB: 1970  Date of Service: 1/23/2017  MRN: [id number]   Acct: [id number]   Primary Care Physician: Moises Montes MD  Advance Directive: Full Code  Admit Date: 1/17/2017       Hospital Day: 6  Referring Provider: Johan Dinh MD      Patient Seen, Chart, Consults, Notes, Labs, Radiology studies reviewed.        Subjective:  Gurjit Andrea is a 47 y.o. male  whom we were consulted for OSMANI/CKD 3. baseline eGFR 50s, pt notes improved diet/lifestyle/weight loss. Had followed with nephrology years ago. 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 S/p punch biopsy here. Permcath placed 1/21; tolerating dialysis well. Still awaiting biopsy report. No new issues.  Seen on dialysis today.    Allergies:  Review of patient's allergies indicates no known allergies.    Home Meds:  Prescriptions Prior to Admission   Medication Sig Dispense Refill Last Dose   • ciprofloxacin (CIPRO) 500 MG tablet Take 1 tablet by mouth 2 times daily Begin taking the day before the biopsy is scheduled.      • lisinopril-hydrochlorothiazide (PRINZIDE,ZESTORETIC) 10-12.5 MG per tablet 1 tablet Daily.      • MethylPREDNISolone (MEDROL, LUIS ANGEL,) 4 MG tablet Take  by mouth 2 (Two) Times a Day. Take as directed on package instructions.      • metoprolol  tartrate (LOPRESSOR) 25 MG tablet 25 mg 2 (Two) Times a Day.      • albuterol (PROVENTIL HFA;VENTOLIN HFA) 108 (90 BASE) MCG/ACT inhaler Every 6 (Six) Hours.          Medicines:  Current Facility-Administered Medications   Medication Dose Route Frequency Provider Last Rate Last Dose   • acetaminophen (TYLENOL) tablet 650 mg  650 mg Oral Q4H PRN Ian Grimes, DO   650 mg at 01/22/17 1757   • diphenhydrAMINE (BENADRYL) capsule 25 mg  25 mg Oral Q4H PRN Mejia Hatfield MD        Or   • diphenhydrAMINE (BENADRYL) injection 25 mg  25 mg Intravenous Q4H PRN Mejia Hatfield MD       • HYDROcodone-acetaminophen (NORCO) 5-325 MG per tablet 1 tablet  1 tablet Oral Q4H PRN Ian Grimes DO       • HYDROmorphone (DILAUDID) injection 1 mg  1 mg Intravenous Q4H PRN Brandt Gordon, DO   1 mg at 01/20/17 0449   • ipratropium-albuterol (DUO-NEB) nebulizer solution 3 mL  3 mL Nebulization Q4H PRN Johan Dinh MD       • methylPREDNISolone sodium succinate (SOLU-Medrol) injection 60 mg  60 mg Intravenous Q8H Jahaira Li, APRN   60 mg at 01/23/17 0613   • metoprolol tartrate (LOPRESSOR) tablet 25 mg  25 mg Oral Q12H Jahaira Li, APRN   25 mg at 01/22/17 2007   • Morphine sulfate (PF) injection 2 mg  2 mg Intravenous Q4H PRN Ian Grimes DO        And   • naloxone (NARCAN) injection 0.4 mg  0.4 mg Intravenous Q5 Min PRN Ian Grimes DO       • nicotine (NICODERM CQ) 21 MG/24HR patch 1 patch  1 patch Transdermal Nightly Johan Dinh MD   1 patch at 01/22/17 2008   • ondansetron (ZOFRAN) injection 4 mg  4 mg Intravenous Q6H PRN Johan Dinh MD       • sodium chloride 0.9 % flush 1-10 mL  1-10 mL Intravenous PRN Johan Dinh MD       • tamsulosin (FLOMAX) 24 hr capsule 0.4 mg  0.4 mg Oral Nightly Brandt Gordon DO   0.4 mg at 01/22/17 2007       Past Medical History:  Past Medical History   Diagnosis Date   • A-fib    • Chronic renal failure    • Elevated PSA    • Purpura  "       Past Surgical History:  Past Surgical History   Procedure Laterality Date   • Appendectomy     • Insertion hemodialysis catheter N/A 1/20/2017     Procedure: INSERTION PERMCATH;  Surgeon: Ian Grimes DO;  Location: [facility] OR;  Service:        Family History  History reviewed. No pertinent family history.    Social History  Social History     Social History   • Marital status:      Spouse name: N/A   • Number of children: N/A   • Years of education: N/A     Occupational History   • Not on file.     Social History Main Topics   • Smoking status: Heavy Tobacco Smoker   • Smokeless tobacco: Not on file   • Alcohol use Yes   • Drug use: Not on file   • Sexual activity: Not on file     Other Topics Concern   • Not on file     Social History Narrative         Review of Systems:  History obtained from chart review and the patient  General ROS: No fever or chills  Respiratory ROS: No cough, shortness of breath, wheezing  Cardiovascular ROS: no chest pain or dyspnea on exertion  Gastrointestinal ROS: No abdominal pain or melena  Genito-Urinary ROS: No dysuria or hematuria  14 point ROS reviewed with the patient and negative except as noted above and in the HPI unless unable to obtain.    Objective:  Visit Vitals   • /92 (BP Location: Left arm, Patient Position: Lying)  Comment: made nurse tan aware   • Pulse 63   • Temp 97.9 °F (36.6 °C) (Tympanic)   • Resp 18   • Ht 73\" (185.4 cm)   • Wt 236 lb (107 kg)   • SpO2 98%   • BMI 31.14 kg/m2       Intake/Output Summary (Last 24 hours) at 01/23/17 0818  Last data filed at 01/23/17 0405   Gross per 24 hour   Intake 1124.1 ml   Output    725 ml   Net  399.1 ml     General: awake/alert   Chest:  clear to auscultation bilaterally without respiratory distress  CVS: regular rate and rhythm  Abdominal: soft, nontender, normal bowel sounds  Extremities: no cyanosis or edema  Skin: warm and dry without rash      Labs:    Results from last 7 days  Lab Units " 01/23/17  0418 01/21/17  0558 01/20/17  0456   WBC 10*3/mm3 9.77 9.71 12.56*   HEMOGLOBIN g/dL 9.7* 9.4* 9.4*   HEMATOCRIT % 30.2* 28.3* 28.9*   PLATELETS 10*3/mm3 168 158 144           Results from last 7 days  Lab Units 01/23/17  0418 01/22/17  0711 01/21/17  0558  01/17/17  0115   SODIUM mmol/L 140 138 138  < > 138   POTASSIUM mmol/L 4.8 4.8 4.9  < > 4.7   CHLORIDE mmol/L 102 102 105  < > 98   TOTAL CO2 mmol/L 28.0 25.0 21.0*  < > 24.0   BUN mg/dL 85* 74* 79*  < > 109*   CREATININE mg/dL 4.65* 4.44* 4.67*  < > 5.69*   CALCIUM mg/dL 8.5 8.1* 8.3*  < > 8.8   BILIRUBIN mg/dL  --   --   --   --  0.7   ALK PHOS U/L  --   --   --   --  92   ALT (SGPT) U/L  --   --   --   --  61*   AST (SGOT) U/L  --   --   --   --  36   GLUCOSE mg/dL 131* 137* 124*  < > 80   < > = values in this interval not displayed.    Radiology:   Imaging Results (last 72 hours)     Procedure Component Value Units Date/Time    FL C Arm During Surgery [[id number]] Collected:  01/20/17 1623     Updated:  01/20/17 1623    Narrative:       Performed in surgery by Dr. Grimes. Please see operative report.     This report was finalized on  by Dr. Ian Grimes MD.    IR Insert Tunneled CV Catheter Without Port 5 Plus [[id number]] Collected:  01/20/17 1623     Updated:  01/20/17 1623    Narrative:       Performed in surgery by Dr. Grimes. Please see operative report.     This report was finalized on  by Dr. Ian Grimes MD.          Culture:  URINE CULTURE   Date Value Ref Range Status   01/17/2017 No growth at 2 days  Final         Assessment   1.  Acute kidney injury--? HSP  2.  CKD stage 3   3.  Right nephrolithiasis--improved  4.  HTN  5. PSA elevated    Plan:  1.  Dialysis today  2.  Punch biopsy results pending    Dialysis   Pt was seen on RRT. Tolerating well  Modality: Hemodialysis  Access: Catheter  Location: right upper  QB: 450  QD: 600  UF: 600      Charlie Jacobo, BHARATHI  1/23/2017  8:18 AM       Electronically signed by Charlie STILES  BHARATHI Jacobo at 1/23/2017  8:31 AM        Consult Notes (last 24 hours) (Notes from 1/22/2017  3:00 PM through 1/23/2017  3:00 PM)     No notes of this type exist for this encounter.      Hepatitis B Surface Antigen [[id number]] (Order [id number])   Lab   Date: 1/20/2017 Department: [address] Released By: Esdras Coleman RN (auto-released) Authorizing: Mejia Hatfield MD       Hepatitis B Surface Antigen   Order: [id number]   Status:  Final result   Visible to patient:  No (Not Released)      Ref Range & Units 3d ago     Hepatitis B Surface Ag Negative Negative   Resulting Agency  [facility]      Specimen Collected: 01/20/17 12:03 PM Last Resulted: 01/20/17 12:57 PM                       Related Result Highlights   Hepatitis B Surface Antibody [[id number]] (Order [id number])   Lab   Date: 1/20/2017 Department: [address] Released By: Esdras Coleman RN (auto-released) Authorizing: Mejia Hatfield MD          Hepatitis B Surface Antibody   Order: [id number]   Status:  Final result   Visible to patient:  No (Not Released)      Ref Range & Units 3d ago     Hepatitis Bs Ab Index  <5.00   Hep B S Ab Immune Non-Immune (A)   Resulting Agency  [facility]      Specimen Collected: 01/20/17 12:03 PM Last Resulted: 01/20/17  1:15 PM                       Related Result Highlights       Hepatitis B Surface Antigen  Final result 1/20/2017            Other Results from 1/17/2017    Urinalysis With / Culture If Indicated  Final result 1/23/2017    Urinalysis, Microscopic Only  Final result 1/23/2017    Urine Culture  In process 1/23/2017    Basic Metabolic Panel  Final result 1/23/2017    CBC Auto Differential  Final result 1/23/2017    Scan Slide  Final result 1/23/2017    Basic Metabolic Panel  Final result 1/22/2017    Basic Metabolic Panel  Final result 1/21/2017    CBC Auto Differential  Final result 1/21/2017    Basic Metabolic Panel  Final result 1/20/2017    CBC Auto  Differential  Final result 1/20/2017    Protime-INR  Final result 1/20/2017    Type & Screen  Edited Result - FINAL 1/19/2017    Tissue Exam  In process 1/19/2017    Basic Metabolic Panel  Final result 1/19/2017    CBC Auto Differential  Final result 1/19/2017    Protein, Urine, Random  Final result 1/18/2017    Creatinine, Urine, Random  Final result 1/18/2017    Urinalysis With / Microscopic If Indicated  Final result 1/18/2017    Sodium, Urine, Random  Final result      Warning: Additional results from 1/17/2017 are available but are not displayed in this report.         Hepatitis B Surface Antibody [[id number]] (Order [id number])   Lab   Date: 1/20/2017 Department: [address] Released By: Esdras Coleman RN (auto-released) Authorizing: Mejia Hatfield MD   Results   Hepatitis B Surface Antibody (Order [id number])   1/20/2017  1:15 PM   Component Results   Component Value Flag Ref Range Units Status   Hepatitis Bs Ab Index <5.00    Final   Hep B S Ab Non-Immune (A) Immune  Final   Lab and Collection   Hepatitis B Surface Antibody (Order #[id number]) on 1/20/2017 - Lab and Collection Information   Call Information      Department Center   1/17/2017 12:25 AM  Pad 4c PAD   Testing Performed By   Lab - Abbreviation Name Director Address Valid Date Range   [id number] PAD LAB [facility] Vilma Lee MD [[id number]] <br>[address] [facility] [address] 01/03/17 1255-Present   Additional Information   Specimen ID Draw Type Specimen Type Specimen Source Bill Type Client ID   [id number] Line Blood      Specimen Date Taken Specimen Time Taken Specimen Received Date Specimen Received Time Result Date Result Time   Jan 20, 2017 12:03 PM Jan 20, 2017 12:11 PM Jan 20, 2017  1:15 PM   Order History   Inpatient   Date/Time Action Taken User Additional Information   01/20/17 1148 Release Esdras Coleman RN (auto-released) From Order: [id number]   01/20/17 1315 Result  Lab, Background User Final   Order Details   Frequency Duration Priority Order Class   Once 1  occurrence Routine Lab Collect   Order Information   Order Date/Time Release Date/Time Start Date/Time End Date/Time   01/20/17 11:48 AM 01/20/17 11:48 AM 01/20/17 11:45 AM 01/20/17 11:45 AM   Related Result Highlights       Hepatitis B Surface Antigen  Final result 1/20/2017            Other Results from 1/17/2017    Urinalysis With / Culture If Indicated  Final result 1/23/2017    Urinalysis, Microscopic Only  Final result 1/23/2017    Urine Culture  In process 1/23/2017    Basic Metabolic Panel  Final result 1/23/2017    CBC Auto Differential  Final result 1/23/2017    Scan Slide  Final result 1/23/2017    Basic Metabolic Panel  Final result 1/22/2017    Basic Metabolic Panel  Final result 1/21/2017    CBC Auto Differential  Final result 1/21/2017    Basic Metabolic Panel  Final result 1/20/2017    CBC Auto Differential  Final result 1/20/2017    Protime-INR  Final result 1/20/2017    Type & Screen  Edited Result - FINAL 1/19/2017    Tissue Exam  In process 1/19/2017    Basic Metabolic Panel  Final result 1/19/2017    CBC Auto Differential  Final result 1/19/2017    Protein, Urine, Random  Final result 1/18/2017    Creatinine, Urine, Random  Final result 1/18/2017    Urinalysis With / Microscopic If Indicated  Final result 1/18/2017    Sodium, Urine, Random  Final result 1/18/2017             Nutrition Notes (last 24 hours) (Notes from 1/22/2017  3:00 PM through 1/23/2017  3:00 PM)     No notes of this type exist for this encounter.

## 2017-01-24 PROBLEM — Z72.0 TOBACCO ABUSE: Status: ACTIVE | Noted: 2017-01-24

## 2017-01-24 PROBLEM — I34.0 MODERATE MITRAL REGURGITATION: Status: ACTIVE | Noted: 2017-01-24

## 2017-01-24 PROBLEM — I12.9 HYPERTENSIVE NEPHROSCLEROSIS: Status: ACTIVE | Noted: 2017-01-24

## 2017-01-24 PROBLEM — D50.9 MICROCYTIC ANEMIA: Status: ACTIVE | Noted: 2017-01-24

## 2017-01-24 PROBLEM — R97.20 ELEVATED PSA: Status: ACTIVE | Noted: 2017-01-24

## 2017-01-24 PROBLEM — N20.0 NEPHROLITHIASIS: Status: ACTIVE | Noted: 2017-01-24

## 2017-01-24 PROBLEM — I48.0 PAROXYSMAL ATRIAL FIBRILLATION (HCC): Status: ACTIVE | Noted: 2017-01-24

## 2017-01-24 LAB
ANION GAP SERPL CALCULATED.3IONS-SCNC: 13 MMOL/L (ref 4–13)
BUN BLD-MCNC: 53 MG/DL (ref 5–21)
BUN/CREAT SERPL: 19.3 (ref 7–25)
CALCIUM SPEC-SCNC: 8.3 MG/DL (ref 8.4–10.4)
CHLORIDE SERPL-SCNC: 97 MMOL/L (ref 98–110)
CO2 SERPL-SCNC: 27 MMOL/L (ref 24–31)
CREAT BLD-MCNC: 2.74 MG/DL (ref 0.5–1.4)
CYTO UR: NORMAL
GFR SERPL CREATININE-BSD FRML MDRD: 25 ML/MIN/1.73
GLUCOSE BLD-MCNC: 193 MG/DL (ref 70–100)
LAB AP CASE REPORT: NORMAL
LAB AP CLINICAL INFORMATION: NORMAL
Lab: NORMAL
MAGNESIUM SERPL-MCNC: 1.6 MG/DL (ref 1.4–2.2)
PATH REPORT.FINAL DX SPEC: NORMAL
PATH REPORT.GROSS SPEC: NORMAL
POTASSIUM BLD-SCNC: 3.8 MMOL/L (ref 3.5–5.3)
SODIUM BLD-SCNC: 137 MMOL/L (ref 135–145)

## 2017-01-24 PROCEDURE — 83735 ASSAY OF MAGNESIUM: CPT | Performed by: NURSE PRACTITIONER

## 2017-01-24 PROCEDURE — 84100 ASSAY OF PHOSPHORUS: CPT | Performed by: INTERNAL MEDICINE

## 2017-01-24 PROCEDURE — 25010000002 METHYLPREDNISOLONE PER 125 MG: Performed by: NURSE PRACTITIONER

## 2017-01-24 PROCEDURE — 80048 BASIC METABOLIC PNL TOTAL CA: CPT | Performed by: NURSE PRACTITIONER

## 2017-01-24 PROCEDURE — 99254 IP/OBS CNSLTJ NEW/EST MOD 60: CPT | Performed by: NURSE PRACTITIONER

## 2017-01-24 RX ORDER — DILTIAZEM HYDROCHLORIDE 60 MG/1
60 TABLET, FILM COATED ORAL EVERY 6 HOURS SCHEDULED
Status: DISCONTINUED | OUTPATIENT
Start: 2017-01-24 | End: 2017-01-26

## 2017-01-24 RX ORDER — DILTIAZEM HCL/D5W 125 MG/125
5-15 PLASTIC BAG, INJECTION (ML) INTRAVENOUS
Status: DISCONTINUED | OUTPATIENT
Start: 2017-01-24 | End: 2017-01-27 | Stop reason: HOSPADM

## 2017-01-24 RX ADMIN — Medication 12.5 MG/HR: at 13:12

## 2017-01-24 RX ADMIN — METOPROLOL TARTRATE 25 MG: 25 TABLET, FILM COATED ORAL at 21:53

## 2017-01-24 RX ADMIN — TAMSULOSIN HYDROCHLORIDE 0.4 MG: 0.4 CAPSULE ORAL at 21:53

## 2017-01-24 RX ADMIN — NICOTINE 1 PATCH: 21 PATCH, EXTENDED RELEASE TRANSDERMAL at 21:56

## 2017-01-24 RX ADMIN — DILTIAZEM HYDROCHLORIDE 60 MG: 60 TABLET, FILM COATED ORAL at 17:30

## 2017-01-24 RX ADMIN — Medication 5 MG/HR: at 03:34

## 2017-01-24 RX ADMIN — METHYLPREDNISOLONE SODIUM SUCCINATE 60 MG: 125 INJECTION, POWDER, FOR SOLUTION INTRAMUSCULAR; INTRAVENOUS at 12:02

## 2017-01-24 RX ADMIN — METOPROLOL TARTRATE 25 MG: 25 TABLET, FILM COATED ORAL at 08:03

## 2017-01-24 RX ADMIN — DILTIAZEM HYDROCHLORIDE 30 MG: 30 TABLET, FILM COATED ORAL at 11:59

## 2017-01-24 RX ADMIN — METHYLPREDNISOLONE SODIUM SUCCINATE 60 MG: 125 INJECTION, POWDER, FOR SOLUTION INTRAMUSCULAR; INTRAVENOUS at 00:45

## 2017-01-24 NOTE — PLAN OF CARE
Problem: Patient Care Overview (Adult)  Goal: Plan of Care Review  Outcome: Ongoing (interventions implemented as appropriate)    01/24/17 0429   Coping/Psychosocial Response Interventions   Plan Of Care Reviewed With patient   Patient Care Overview   Progress improving   Outcome Evaluation   Outcome Summary/Follow up Plan labs are improving

## 2017-01-24 NOTE — PROGRESS NOTES
[facility] Medicine Services  INPATIENT PROGRESS NOTE    Length of Stay: 7  Date of Admission: 1/17/2017  Primary Care Physician: Moises Montes MD    Subjective   Chief Complaint: rapid heart rate, cause; biopsy site oozing    HPI   Patient is sitting up in bed talking to the nurse.  His only concern today is punch biopsy site oozing clear fluid IX Nguyễn most likely secondary to anasarca, an avenue for fluid to escape.  He also was concerns regarding high heart rate and feels it may be secondary to stoppage of magnesium on admission, however record reviewed does not show that this was listed as a home med.  He has no chest pain, shortness of breathing, abdominal pain, changes in bowel habits or bladder habits.    Review of Systems   Skin:        bx site oozing        All pertinent negatives and positives are as above. All other systems have been reviewed and are negative unless otherwise stated.     Objective    Temp:  [97.1 °F (36.2 °C)-97.9 °F (36.6 °C)] 97.4 °F (36.3 °C)  Heart Rate:  [] 153  Resp:  [16-20] 20  BP: (119-165)/() 119/94    Physical Exam   Constitutional: He is oriented to person, place, and time. He appears well-developed and well-nourished. No distress.   HENT:   Head: Normocephalic and atraumatic.   Eyes: Conjunctivae and EOM are normal. Pupils are equal, round, and reactive to light. No scleral icterus.   Neck: Normal range of motion. Neck supple. No JVD present. No tracheal deviation present.   Cardiovascular: Normal heart sounds and intact distal pulses.  Exam reveals no gallop.    No murmur heard.  afib 124   Pulmonary/Chest: Effort normal and breath sounds normal. No respiratory distress. He has no wheezes. He has no rales.   Abdominal: Soft. Bowel sounds are normal. He exhibits no distension. There is no tenderness. There is no guarding.   Musculoskeletal: Normal range of motion. He exhibits no edema.   Neurological: He is alert and  oriented to person, place, and time.   No obvious deficits noted.   Skin: Skin is warm and dry. No rash noted. He is not diaphoretic. No erythema. No pallor.   Punch bx site right thigh, oozing clear fluid, no signs of infection, most likely secondary to anasarca   Psychiatric: He has a normal mood and affect. His behavior is normal.   Vitals reviewed.      Results Review:  Recent Results (from the past 12 hour(s))   Basic Metabolic Panel    Collection Time: 01/24/17  5:06 AM   Result Value Ref Range    Glucose 193 (H) 70 - 100 mg/dL    BUN 53 (H) 5 - 21 mg/dL    Creatinine 2.74 (H) 0.50 - 1.40 mg/dL    Sodium 137 135 - 145 mmol/L    Potassium 3.8 3.5 - 5.3 mmol/L    Chloride 97 (L) 98 - 110 mmol/L    CO2 27.0 24.0 - 31.0 mmol/L    Calcium 8.3 (L) 8.4 - 10.4 mg/dL    eGFR Non African Amer 25 (L) >60 mL/min/1.73    BUN/Creatinine Ratio 19.3 7.0 - 25.0    Anion Gap 13.0 4.0 - 13.0 mmol/L       Cultures:  URINE CULTURE   Date Value Ref Range Status   01/23/2017 No growth at 24 hours  Preliminary       Intake/Output Summary (Last 24 hours) at 01/24/17 0754  Last data filed at 01/23/17 1835   Gross per 24 hour   Intake    685 ml   Output      0 ml   Net    685 ml       No Known Allergies    Scheduled meds:     heparin (porcine) 5,000 Units Intravenous Once   methylPREDNISolone sodium succinate 60 mg Intravenous Q12H   metoprolol tartrate 25 mg Oral Q12H   nicotine 1 patch Transdermal Nightly   tamsulosin 0.4 mg Oral Nightly       PRN meds:  •  acetaminophen  •  diphenhydrAMINE **OR** diphenhydrAMINE  •  hydrALAZINE  •  HYDROcodone-acetaminophen  •  HYDROmorphone  •  ipratropium-albuterol  •  Morphine **AND** naloxone  •  ondansetron  •  sodium chloride    Assessment/Plan     Active Problems:    Henoch-Schonlein purpura nephritis    Assessment:  1. Presumptive Henoch-Schonlein purpura nephritis  2. Nephrolithiasis-9mm right stone-no acute intervention at present per Dr. Quinn 1/17/2017  3. CKD 3- secondary to hypertensive  nephrosclerosis  4. 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 Hypertension  6. Leukocytosis-resolved.   7. Microcytic anemia--  8. Paroxysmal atrial fibrillation-NSR 70-80's in telemetry  9. Right flank pain-improving  10.Elevated PSA-outpatient workup per Dr. Awan  11.UTI-no growth on cultures  12. Lower extremity rash-significantly improved.   13. Mild hyperkalemia-resolved.   14. Hematuria  15. Pyuria     Plan:  1. HD - Continue M W F  2. CBC and BMP in am  3. Day 2 Solumedrol 60 mg IV q 12 hours  4. Await punch biopsy results  5. Continue current dose b-blocker.   6. Consult Cardiology - Afib RVR with hypotension   7. Continue cardizem drip  8. Nephrology has re-consulted Urology       Discharge Planning: I expect patient to be discharged to home in ? Days. SS to arrange dialysis chair/clinic      BHARATHI Jay   01/24/17   7:54 AM    I personally evaluated and examined the patient in conjunction with BHARATHI Love and agree with the assessment, treatment plan, and disposition of the patient as recorded by her. My history, exam, and further recommendations are:       Plan:  1. HD - Continue M W F  2. CBC and BMP in am  3. Day 2 Solumedrol 60 mg IV q 12 hours  4. Await punch biopsy results  5. Continue current dose b-blocker.   6. Consult Cardiology - Afib RVR with hypotension   7. Continue cardizem drip  8. Nephrology has re-consulted Urology    Pierre Pritchard DO  01/24/17  4:08 PM

## 2017-01-24 NOTE — PLAN OF CARE
Problem: Cardiac Rhythm Management Device (Adult)  Goal: Signs and Symptoms of Listed Potential Problems Will be Absent or Manageable (Cardiac Rhythm Management Device)    01/24/17 0434   Cardiac Rhythm Management Device   Problems Assessed (Cardiac Rhythm Management Device) dysrhythmia/arrhythmia   Problems Present (Cardiac Rhythm Management Device) dysrhythmia/arrhythmia

## 2017-01-24 NOTE — NURSING NOTE
Spoke with Sloane in reading room, informed her that patient's HR will not stabilize below 115, card gtt is still on at 7ml/hr, gave PO card at 1200 first dose. Second dose is due at 1800 and every 6 hrs.

## 2017-01-24 NOTE — NURSING NOTE
Talked to Kim PHILIPPE from Dr Camacho. Dr Quinn called and said he would not take consult because Dr Awan had already saw pt 5 days ago. Kim said she will be making the consult to Dr Awan

## 2017-01-24 NOTE — CONSULTS
"[facility]ART GROUP CONSULT NOTE    Referring Provider: Johan Dinh MD    Reason for Consultation: A-Fib RVR     Chief Complaint   Patient presents with   • Blood in Urine   • Flank Pain   • Nausea       Subjective .     History of present illness:  Gurjit Andrea is a 47 y.o.  male with a known PMH significant for paroxysmal Atrial Fibrillation (last in A-Fib ~3 years ago), chronic renal disease, tobacco abuse, who presented to [facility] via ED with complaint of nausea, vomiting, bilateral flank pain, and dark urine on 1/16/17. Patient was admitted to the hospitalist service to [address] [place], [place] [address]etry with a diagnosis of Henoch-Schonlein purpura nephritis- Renal consulted. Patient was started on dialysis. Cardiology was consulted on 1/24 due to patient converted to A-Fib RVR. He as started on a Cardizem gtt, which improved his HR. Patient states that he has a known hx of Paroxysmal A-Fib, previously anticoagulated with Coumadin which was discontinued due to frequent ER visits for controlled bleeding. Denies additional cardiac or stroke hx. Previously followed by a Cardiologist in [place], [place]- has not seen a Cardiologist in 1 year. Per wife, \"He converts to A-Fib when his Magnesium level is low\".     History  Past Medical History   Diagnosis Date   • A-fib    • Chronic renal failure    • Elevated PSA    • Purpura    ,   Past Surgical History   Procedure Laterality Date   • Appendectomy     • Insertion hemodialysis catheter N/A 1/20/2017     Procedure: INSERTION PERMCATH;  Surgeon: Ian Grimes DO;  Location: [facility] OR;  Service:    ,   History reviewed. No pertinent family history.,   Social History   Substance Use Topics   • Smoking status: Heavy Tobacco Smoker   • Smokeless tobacco: None   • Alcohol use Yes   ,     Medications  Current Facility-Administered Medications   Medication Dose Route Frequency Provider Last Rate Last Dose   • acetaminophen (TYLENOL) tablet 650 mg  650 mg Oral Q4H PRN " Ian Grimes DO   650 mg at 01/22/17 1757   • diltiaZEM (CARDIZEM) 125mg/125 mL infusion  5-15 mg/hr Intravenous Titrated Iliana Alexis MD 12.5 mL/hr at 01/24/17 0454 12.5 mg/hr at 01/24/17 0454   • diphenhydrAMINE (BENADRYL) capsule 25 mg  25 mg Oral Q4H PRN Mejia Hatfield MD        Or   • diphenhydrAMINE (BENADRYL) injection 25 mg  25 mg Intravenous Q4H PRN Mejia Hatfield MD       • heparin (porcine) injection 5,000 Units  5,000 Units Intravenous Once Reinaldo Foley MD   5,000 Units at 01/23/17 1214   • hydrALAZINE (APRESOLINE) injection 10 mg  10 mg Intravenous Q4H PRN Iliana Alexis MD   10 mg at 01/23/17 4719   • HYDROcodone-acetaminophen (NORCO) 5-325 MG per tablet 1 tablet  1 tablet Oral Q4H PRN Ian Grimes DO       • HYDROmorphone (DILAUDID) injection 1 mg  1 mg Intravenous Q4H PRN Brandt Gordon DO   1 mg at 01/20/17 0449   • ipratropium-albuterol (DUO-NEB) nebulizer solution 3 mL  3 mL Nebulization Q4H PRN Johan Dinh MD       • methylPREDNISolone sodium succinate (SOLU-Medrol) injection 60 mg  60 mg Intravenous Q12H Neva Lane, APRN   60 mg at 01/24/17 0045   • metoprolol tartrate (LOPRESSOR) tablet 25 mg  25 mg Oral Q12H Jahaira Li, APRN   25 mg at 01/24/17 0803   • Morphine sulfate (PF) injection 2 mg  2 mg Intravenous Q4H PRN Ian Grimes DO        And   • naloxone (NARCAN) injection 0.4 mg  0.4 mg Intravenous Q5 Min PRN Ian Grimes DO       • nicotine (NICODERM CQ) 21 MG/24HR patch 1 patch  1 patch Transdermal Nightly Johan Dinh MD   1 patch at 01/23/17 5745   • ondansetron (ZOFRAN) injection 4 mg  4 mg Intravenous Q6H PRN Johan Dinh MD       • sodium chloride 0.9 % flush 1-10 mL  1-10 mL Intravenous PRN Johan Dinh MD       • tamsulosin (FLOMAX) 24 hr capsule 0.4 mg  0.4 mg Oral Nightly Brandt Gordon DO   0.4 mg at 01/23/17 3815       Allergies:  Review of patient's allergies indicates no known allergies.    Review of  "Systems  Review of Systems   Constitution: Positive for weight gain (\"Since starting steroids\").   Cardiovascular: Positive for irregular heartbeat and palpitations. Negative for chest pain, dyspnea on exertion, leg swelling, near-syncope, orthopnea, paroxysmal nocturnal dyspnea and syncope.   Respiratory: Negative for cough, hemoptysis, shortness of breath, sleep disturbances due to breathing, sputum production and wheezing.    Hematologic/Lymphatic: Negative for bleeding problem.   Skin: Positive for rash (BLE).   Musculoskeletal: Negative for falls.   Gastrointestinal: Negative for bloating, abdominal pain, nausea and vomiting.   Neurological: Negative for dizziness and focal weakness.   Psychiatric/Behavioral: Negative for altered mental status.       Objective     Physical Exam:  Patient Vitals for the past 24 hrs:   BP Temp Temp src Pulse Resp SpO2 Weight   01/24/17 0800 127/77 97.7 °F (36.5 °C) Tympanic 99 16 97 % -   01/24/17 0421 119/94 - - - - - -   01/24/17 0400 (!) 153/105 97.4 °F (36.3 °C) Temporal Art (!) 153 20 97 % -   01/24/17 0000 165/94 97.1 °F (36.2 °C) Temporal Art 68 20 92 % -   01/23/17 2000 159/88 97.4 °F (36.3 °C) Temporal Art 73 18 97 % 233 lb 6.4 oz (106 kg)   01/23/17 1600 140/88 97.7 °F (36.5 °C) Tympanic 72 16 98 % -   01/23/17 1223 138/91 97.5 °F (36.4 °C) Tympanic 70 16 99 % -   01/23/17 1200 138/90 97.8 °F (36.6 °C) Tympanic 70 16 99 % -     Physical Exam   Constitutional: He is oriented to person, place, and time. He appears well-developed and well-nourished. He is cooperative. No distress.   Resting comfortably in bed. Wife who is an RN is on speaker phone    HENT:   Head: Normocephalic and atraumatic.   Neck: Carotid bruit is not present.   Cardiovascular: Intact distal pulses.  An irregularly irregular rhythm present. Tachycardia present.    Murmur heard.  High-pitched blowing holosystolic murmur is present with a grade of 3/6  at the apex  Telemetry: A-Fib, up to 165 bpm " overnight. 117 BPM this morning.    Pulmonary/Chest: Effort normal and breath sounds normal. No respiratory distress. He exhibits no tenderness.   Abdominal: Soft. Normal appearance and bowel sounds are normal. He exhibits no distension. There is no tenderness.   Musculoskeletal: He exhibits no edema.   Neurological: He is alert and oriented to person, place, and time.   Skin: Skin is warm and dry. Purpura (BLE) and rash noted. He is not diaphoretic.   Psychiatric: He has a normal mood and affect. His speech is normal and behavior is normal.   Vitals reviewed.      Results Review:   I reviewed the patient's new clinical results.    Lab Results   Component Value Date    WBC 9.77 01/23/2017    HGB 9.7 (L) 01/23/2017    HCT 30.2 (L) 01/23/2017    MCV 81.2 (L) 01/23/2017     01/23/2017     Lab Results   Component Value Date    GLUCOSE 193 (H) 01/24/2017    CALCIUM 8.3 (L) 01/24/2017     01/24/2017    K 3.8 01/24/2017    CO2 27.0 01/24/2017    CL 97 (L) 01/24/2017    BUN 53 (H) 01/24/2017    CREATININE 2.74 (H) 01/24/2017    EGFRIFNONA 25 (L) 01/24/2017    BCR 19.3 01/24/2017    ANIONGAP 13.0 01/24/2017       Lab Results   Component Value Date    ECHOEFEST 65 01/17/2017   · All left ventricular wall segments contract normally.  · Left ventricular function is normal. Estimated EF = 65%.  · Left atrial cavity size is mildly dilated.  · Mild-to-moderate mitral valve regurgitation is present          Left ventricular diastolic dysfunction (grade I a) consistent with impaired relaxation.      Principal Problem:    1. Henoch-Schonlein purpura nephritis- Renal following. Receiving HD.     Active Problems:    2. Hypertensive nephrosclerosis    3. Paroxysmal atrial fibrillation RVR     4. Nephrolithiasis- Urology following     5. Tobacco abuse    6. Elevated PSA    7. Microcytic anemia    8. Mild to moderate Mitral Regurgitation       Plan   1. Cardizem gtt- wean/titrate then will start PO Cardizem 30mg PO Q6 hours  for rate control.   2. Monitor telemetry  3. No anticoagulation due to renal function. Will defer to Dr. Cortez.   4. Obtain Cardiology records from [place], [place]  5. Obtain Magnesium level.     Further orders per Dr. Cortez upon his evaluation of the patient.     Thank you for the consultation, cardiology will gladly continue to follow.     BHARATHI Portillo        Please note this cardiology consultation note is the result of a face to face consultation with the patient, in addition to reviewing medical records at length by myself, BHARATHI Bergman.     Time: More than 50% of time spent in counseling and coordination of care:  Total face-to-face/floor time 45 min.  Time spent in counseling 25 min. Counseling included the following topics: lab results, ECHO results, ECG/telemetry, assessment, discussing the plan of care and ansering questions

## 2017-01-24 NOTE — PLAN OF CARE
Problem: Patient Care Overview (Adult)  Goal: Plan of Care Review  Outcome: Ongoing (interventions implemented as appropriate)    01/24/17 1402   Coping/Psychosocial Response Interventions   Plan Of Care Reviewed With patient   Patient Care Overview   Progress improving         Problem: Cardiac Rhythm Management Device (Adult)  Goal: Signs and Symptoms of Listed Potential Problems Will be Absent or Manageable (Cardiac Rhythm Management Device)  Outcome: Outcome(s) achieved Date Met:  01/24/17

## 2017-01-24 NOTE — PROGRESS NOTES
Nephrology ([facility]) Consult Note      Patient:  Gurjit Andrea  YOB: 1970  Date of Service: 1/24/2017  MRN: [id number]   Acct: [id number]   Primary Care Physician: Moises Montes MD  Advance Directive: Full Code  Admit Date: 1/17/2017       Hospital Day: 7  Referring Provider: Johan Dinh MD      Patient Seen, Chart, Consults, Notes, Labs, Radiology studies reviewed.        Subjective:  Gurjit Andrea is a 47 y.o. male  whom we were consulted for OSMANI/CKD 3. baseline eGFR 50s, pt notes improved diet/lifestyle/weight loss. Had followed with nephrology years ago. 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 S/p punch biopsy here. Permcath placed 1/21; tolerating dialysis well. Still awaiting biopsy report. No new issues.    Allergies:  Review of patient's allergies indicates no known allergies.    Home Meds:  Prescriptions Prior to Admission   Medication Sig Dispense Refill Last Dose   • ciprofloxacin (CIPRO) 500 MG tablet Take 1 tablet by mouth 2 times daily Begin taking the day before the biopsy is scheduled.      • lisinopril-hydrochlorothiazide (PRINZIDE,ZESTORETIC) 10-12.5 MG per tablet 1 tablet Daily.      • MethylPREDNISolone (MEDROL, LUIS ANGEL,) 4 MG tablet Take  by mouth 2 (Two) Times a Day. Take as directed on package instructions.      • metoprolol tartrate (LOPRESSOR) 25 MG tablet 25 mg 2 (Two) Times a Day.      • albuterol (PROVENTIL HFA;VENTOLIN HFA) 108 (90 BASE) MCG/ACT inhaler Every 6 (Six) Hours.          Medicines:  Current Facility-Administered Medications   Medication Dose Route Frequency Provider Last Rate Last Dose   • acetaminophen (TYLENOL) tablet 650 mg  650 mg Oral Q4H PRN Ian Grimes,    650 mg at 01/22/17 1757   • diltiaZEM (CARDIZEM) 125mg/125 mL infusion  5-15 mg/hr Intravenous Titrated Iliana Alexis MD 10 mL/hr at 01/24/17 1321 10 mg/hr at 01/24/17 1321   • diltiaZEM (CARDIZEM) tablet 30 mg  30 mg Oral Q6H Slime Alexis  APRN   30 mg at 01/24/17 1159   • diphenhydrAMINE (BENADRYL) capsule 25 mg  25 mg Oral Q4H PRN Mejia Hatfield MD        Or   • diphenhydrAMINE (BENADRYL) injection 25 mg  25 mg Intravenous Q4H PRN Mejia Hatfield MD       • heparin (porcine) injection 5,000 Units  5,000 Units Intravenous Once Reinaldo Foley MD   5,000 Units at 01/23/17 1214   • hydrALAZINE (APRESOLINE) injection 10 mg  10 mg Intravenous Q4H PRN Iliana Alexis MD   10 mg at 01/23/17 0759   • HYDROcodone-acetaminophen (NORCO) 5-325 MG per tablet 1 tablet  1 tablet Oral Q4H PRN Ian Grimes DO       • HYDROmorphone (DILAUDID) injection 1 mg  1 mg Intravenous Q4H PRN Brandt Gordon DO   1 mg at 01/20/17 0449   • ipratropium-albuterol (DUO-NEB) nebulizer solution 3 mL  3 mL Nebulization Q4H PRN Johan Dinh MD       • methylPREDNISolone sodium succinate (SOLU-Medrol) injection 60 mg  60 mg Intravenous Q12H Neva Lane, APRN   60 mg at 01/24/17 1202   • metoprolol tartrate (LOPRESSOR) tablet 25 mg  25 mg Oral Q12H Jahaira Li, APRN   25 mg at 01/24/17 0803   • Morphine sulfate (PF) injection 2 mg  2 mg Intravenous Q4H PRN Ian Grimes DO        And   • naloxone (NARCAN) injection 0.4 mg  0.4 mg Intravenous Q5 Min PRN Ian Grimes DO       • nicotine (NICODERM CQ) 21 MG/24HR patch 1 patch  1 patch Transdermal Nightly Johan Dinh MD   1 patch at 01/23/17 3675   • ondansetron (ZOFRAN) injection 4 mg  4 mg Intravenous Q6H PRN Johan Dinh MD       • sodium chloride 0.9 % flush 1-10 mL  1-10 mL Intravenous PRN Johan Dinh MD       • tamsulosin (FLOMAX) 24 hr capsule 0.4 mg  0.4 mg Oral Nightly Brandt Gordon DO   0.4 mg at 01/23/17 9501       Past Medical History:  Past Medical History   Diagnosis Date   • A-fib    • Chronic renal failure    • Elevated PSA    • Purpura        Past Surgical History:  Past Surgical History   Procedure Laterality Date   • Appendectomy     • Insertion hemodialysis  "catheter N/A 1/20/2017     Procedure: INSERTION PERMCATH;  Surgeon: Ian Grimes DO;  Location: [facility] OR;  Service:        Family History  History reviewed. No pertinent family history.    Social History  Social History     Social History   • Marital status:      Spouse name: N/A   • Number of children: N/A   • Years of education: N/A     Occupational History   • Not on file.     Social History Main Topics   • Smoking status: Heavy Tobacco Smoker   • Smokeless tobacco: Not on file   • Alcohol use Yes   • Drug use: Not on file   • Sexual activity: Not on file     Other Topics Concern   • Not on file     Social History Narrative         Review of Systems:  History obtained from chart review and the patient  General ROS: No fever or chills  Respiratory ROS: No cough, shortness of breath, wheezing  Cardiovascular ROS: no chest pain or dyspnea on exertion  Gastrointestinal ROS: No abdominal pain or melena  Genito-Urinary ROS: No dysuria or hematuria  14 point ROS reviewed with the patient and negative except as noted above and in the HPI unless unable to obtain.    Objective:  Visit Vitals   • /54 (BP Location: Right arm, Patient Position: Lying)   • Pulse 100   • Temp 97.7 °F (36.5 °C) (Tympanic)   • Resp 18   • Ht 73\" (185.4 cm)   • Wt 233 lb 6.4 oz (106 kg)   • SpO2 98%   • BMI 30.79 kg/m2       Intake/Output Summary (Last 24 hours) at 01/24/17 1327  Last data filed at 01/24/17 0908   Gross per 24 hour   Intake    525 ml   Output      0 ml   Net    525 ml     General: awake/alert   Chest:  clear to auscultation bilaterally without respiratory distress  CVS: regular rate and rhythm  Abdominal: soft, nontender, normal bowel sounds  Extremities: no cyanosis or edema  Skin: warm and dry without rash      Labs:    Results from last 7 days  Lab Units 01/23/17  0418 01/21/17  0558 01/20/17  0456   WBC 10*3/mm3 9.77 9.71 12.56*   HEMOGLOBIN g/dL 9.7* 9.4* 9.4*   HEMATOCRIT % 30.2* 28.3* 28.9*   PLATELETS " 10*3/mm3 168 158 144           Results from last 7 days  Lab Units 01/24/17  0506 01/23/17  0418 01/22/17  0711   SODIUM mmol/L 137 140 138   POTASSIUM mmol/L 3.8 4.8 4.8   CHLORIDE mmol/L 97* 102 102   TOTAL CO2 mmol/L 27.0 28.0 25.0   BUN mg/dL 53* 85* 74*   CREATININE mg/dL 2.74* 4.65* 4.44*   CALCIUM mg/dL 8.3* 8.5 8.1*   GLUCOSE mg/dL 193* 131* 137*       Radiology:   Imaging Results (last 72 hours)     Procedure Component Value Units Date/Time    FL C Arm During Surgery [[id number]] Collected:  01/20/17 1623     Updated:  01/23/17 1557    Narrative:       Performed in surgery by Dr. Grimes. Please see operative report.     This report was finalized on 01/23/2017 15:55 by Dr. Ian Grimes MD.    IR Insert Tunneled CV Catheter Without Port 5 Plus [[id number]] Collected:  01/20/17 1623     Updated:  01/23/17 1557    Narrative:       Performed in surgery by Dr. Grimes. Please see operative report.     This report was finalized on 01/23/2017 15:55 by Dr. Ian Grimes MD.          Culture:  URINE CULTURE   Date Value Ref Range Status   01/23/2017 No growth at 24 hours  Preliminary         Assessment   1.  Acute kidney injury; remains dialysis dependent and anuric  2.  CKD stage 3 as a baseline  3.  Right nephrolithiasis--resolved  4.  HTN  5.  HSP--awaiting punch biopsy results    Plan:  1.  Dialysis in AM  2.  Biopsy results pending      Charlie Jacobo, BHARATHI  1/24/2017  1:27 PM

## 2017-01-25 LAB
ANION GAP SERPL CALCULATED.3IONS-SCNC: 14 MMOL/L (ref 4–13)
BACTERIA SPEC AEROBE CULT: NORMAL
BUN BLD-MCNC: 60 MG/DL (ref 5–21)
BUN/CREAT SERPL: 20.3 (ref 7–25)
CALCIUM SPEC-SCNC: 8 MG/DL (ref 8.4–10.4)
CHLORIDE SERPL-SCNC: 99 MMOL/L (ref 98–110)
CO2 SERPL-SCNC: 24 MMOL/L (ref 24–31)
CREAT BLD-MCNC: 2.95 MG/DL (ref 0.5–1.4)
DEPRECATED RDW RBC AUTO: 46.9 FL (ref 40–54)
ERYTHROCYTE [DISTWIDTH] IN BLOOD BY AUTOMATED COUNT: 15.8 % (ref 12–15)
GFR SERPL CREATININE-BSD FRML MDRD: 23 ML/MIN/1.73
GLUCOSE BLD-MCNC: 210 MG/DL (ref 70–100)
HCT VFR BLD AUTO: 32.1 % (ref 40–52)
HGB BLD-MCNC: 10.4 G/DL (ref 14–18)
MCH RBC QN AUTO: 26 PG (ref 28–32)
MCHC RBC AUTO-ENTMCNC: 32.4 G/DL (ref 33–36)
MCV RBC AUTO: 80.3 FL (ref 82–95)
PHOSPHATE SERPL-MCNC: 4.2 MG/DL (ref 2.5–4.5)
PLATELET # BLD AUTO: 193 10*3/MM3 (ref 130–400)
PMV BLD AUTO: 9.9 FL (ref 6–12)
POTASSIUM BLD-SCNC: 4 MMOL/L (ref 3.5–5.3)
RBC # BLD AUTO: 4 10*6/MM3 (ref 4.8–5.9)
SODIUM BLD-SCNC: 137 MMOL/L (ref 135–145)
WBC NRBC COR # BLD: 10.67 10*3/MM3 (ref 4.8–10.8)

## 2017-01-25 PROCEDURE — 99231 SBSQ HOSP IP/OBS SF/LOW 25: CPT | Performed by: UROLOGY

## 2017-01-25 PROCEDURE — 93010 ELECTROCARDIOGRAM REPORT: CPT | Performed by: INTERNAL MEDICINE

## 2017-01-25 PROCEDURE — 99233 SBSQ HOSP IP/OBS HIGH 50: CPT | Performed by: INTERNAL MEDICINE

## 2017-01-25 PROCEDURE — 80048 BASIC METABOLIC PNL TOTAL CA: CPT | Performed by: NURSE PRACTITIONER

## 2017-01-25 PROCEDURE — 25010000002 HEPARIN (PORCINE) PER 1000 UNITS: Performed by: INTERNAL MEDICINE

## 2017-01-25 PROCEDURE — 85027 COMPLETE CBC AUTOMATED: CPT | Performed by: NURSE PRACTITIONER

## 2017-01-25 PROCEDURE — 25010000002 METHYLPREDNISOLONE PER 125 MG: Performed by: NURSE PRACTITIONER

## 2017-01-25 PROCEDURE — 63710000001 PREDNISONE PER 5 MG: Performed by: NURSE PRACTITIONER

## 2017-01-25 PROCEDURE — 93005 ELECTROCARDIOGRAM TRACING: CPT | Performed by: FAMILY MEDICINE

## 2017-01-25 RX ORDER — DIPHENHYDRAMINE HYDROCHLORIDE 50 MG/ML
25 INJECTION INTRAMUSCULAR; INTRAVENOUS EVERY 4 HOURS PRN
Status: DISCONTINUED | OUTPATIENT
Start: 2017-01-25 | End: 2017-01-27 | Stop reason: HOSPADM

## 2017-01-25 RX ORDER — HEPARIN SODIUM 5000 [USP'U]/ML
75 INJECTION, SOLUTION INTRAVENOUS; SUBCUTANEOUS ONCE
Status: DISCONTINUED | OUTPATIENT
Start: 2017-01-25 | End: 2017-01-27 | Stop reason: HOSPADM

## 2017-01-25 RX ORDER — ACETAMINOPHEN 500 MG
500 TABLET ORAL EVERY 4 HOURS PRN
Status: DISCONTINUED | OUTPATIENT
Start: 2017-01-25 | End: 2017-01-27 | Stop reason: HOSPADM

## 2017-01-25 RX ORDER — HEPARIN SODIUM 1000 [USP'U]/ML
1800 INJECTION, SOLUTION INTRAVENOUS; SUBCUTANEOUS ONCE
Status: DISCONTINUED | OUTPATIENT
Start: 2017-01-25 | End: 2017-01-27 | Stop reason: HOSPADM

## 2017-01-25 RX ORDER — HEPARIN SODIUM 1000 [USP'U]/ML
1800 INJECTION, SOLUTION INTRAVENOUS; SUBCUTANEOUS AS NEEDED
Status: DISCONTINUED | OUTPATIENT
Start: 2017-01-25 | End: 2017-01-27 | Stop reason: HOSPADM

## 2017-01-25 RX ORDER — PREDNISONE 20 MG/1
40 TABLET ORAL 2 TIMES DAILY WITH MEALS
Status: DISCONTINUED | OUTPATIENT
Start: 2017-01-25 | End: 2017-01-27 | Stop reason: HOSPADM

## 2017-01-25 RX ORDER — ACETAMINOPHEN 500 MG
500 TABLET ORAL EVERY 4 HOURS PRN
Status: DISCONTINUED | OUTPATIENT
Start: 2017-01-25 | End: 2017-01-25 | Stop reason: SDUPTHER

## 2017-01-25 RX ORDER — HEPARIN SODIUM 1000 [USP'U]/ML
3600 INJECTION, SOLUTION INTRAVENOUS; SUBCUTANEOUS ONCE
Status: DISCONTINUED | OUTPATIENT
Start: 2017-01-25 | End: 2017-01-27 | Stop reason: HOSPADM

## 2017-01-25 RX ORDER — DIPHENHYDRAMINE HCL 25 MG
25 CAPSULE ORAL EVERY 4 HOURS PRN
Status: DISCONTINUED | OUTPATIENT
Start: 2017-01-25 | End: 2017-01-27 | Stop reason: HOSPADM

## 2017-01-25 RX ADMIN — NICOTINE 1 PATCH: 21 PATCH, EXTENDED RELEASE TRANSDERMAL at 20:28

## 2017-01-25 RX ADMIN — HEPARIN SODIUM 1800 UNITS: 1000 INJECTION, SOLUTION INTRAVENOUS; SUBCUTANEOUS at 11:24

## 2017-01-25 RX ADMIN — DILTIAZEM HYDROCHLORIDE 60 MG: 60 TABLET, FILM COATED ORAL at 17:38

## 2017-01-25 RX ADMIN — METOPROLOL TARTRATE 25 MG: 25 TABLET, FILM COATED ORAL at 20:25

## 2017-01-25 RX ADMIN — PREDNISONE 40 MG: 20 TABLET ORAL at 12:49

## 2017-01-25 RX ADMIN — TAMSULOSIN HYDROCHLORIDE 0.4 MG: 0.4 CAPSULE ORAL at 20:25

## 2017-01-25 RX ADMIN — PREDNISONE 40 MG: 20 TABLET ORAL at 17:38

## 2017-01-25 RX ADMIN — METOPROLOL TARTRATE 25 MG: 25 TABLET, FILM COATED ORAL at 12:49

## 2017-01-25 RX ADMIN — METHYLPREDNISOLONE SODIUM SUCCINATE 60 MG: 125 INJECTION, POWDER, FOR SOLUTION INTRAMUSCULAR; INTRAVENOUS at 00:43

## 2017-01-25 RX ADMIN — DILTIAZEM HYDROCHLORIDE 60 MG: 60 TABLET, FILM COATED ORAL at 00:43

## 2017-01-25 RX ADMIN — DILTIAZEM HYDROCHLORIDE 60 MG: 60 TABLET, FILM COATED ORAL at 06:09

## 2017-01-25 RX ADMIN — DILTIAZEM HYDROCHLORIDE 60 MG: 60 TABLET, FILM COATED ORAL at 12:49

## 2017-01-25 NOTE — PROGRESS NOTES
Continued Stay Note  EVA Stoll     Patient Name: Gurjit Andrea  MRN: [id number]  Today's Date: 1/25/2017    Admit Date: 1/17/2017          Discharge Plan       01/25/17 0956    Case Management/Social Work Plan    Plan Home    Additional Comments Pt is still on daily dialysis.  Still awaiting assigned chair time from [facility]. They are aware pt needs placement.                Discharge Codes     None            LAUREN Nguyen

## 2017-01-25 NOTE — PROGRESS NOTES
Nephrology ([facility]) Consult Note      Patient:  Gurjit Andrea  YOB: 1970  Date of Service: 1/25/2017  MRN: [id number]   Acct: [id number]   Primary Care Physician: Moises Montes MD  Advance Directive: Full Code  Admit Date: 1/17/2017       Hospital Day: 8  Referring Provider: Johan Dinh MD      Patient Seen, Chart, Consults, Notes, Labs, Radiology studies reviewed.        Subjective:  Gurjit Andrea is a 47 y.o. male  whom we were consulted for OSMANI/CKD 3. baseline eGFR 50s, pt notes improved diet/lifestyle/weight loss. Had followed with nephrology years ago. 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 S/p punch biopsy here. Permcath placed 1/21; tolerating dialysis well. No new issues    Allergies:  Review of patient's allergies indicates no known allergies.    Home Meds:  Prescriptions Prior to Admission   Medication Sig Dispense Refill Last Dose   • ciprofloxacin (CIPRO) 500 MG tablet Take 1 tablet by mouth 2 times daily Begin taking the day before the biopsy is scheduled.      • lisinopril-hydrochlorothiazide (PRINZIDE,ZESTORETIC) 10-12.5 MG per tablet 1 tablet Daily.      • MethylPREDNISolone (MEDROL, LUIS ANGEL,) 4 MG tablet Take  by mouth 2 (Two) Times a Day. Take as directed on package instructions.      • metoprolol tartrate (LOPRESSOR) 25 MG tablet 25 mg 2 (Two) Times a Day.      • albuterol (PROVENTIL HFA;VENTOLIN HFA) 108 (90 BASE) MCG/ACT inhaler Every 6 (Six) Hours.          Medicines:  Current Facility-Administered Medications   Medication Dose Route Frequency Provider Last Rate Last Dose   • acetaminophen (TYLENOL) tablet 650 mg  650 mg Oral Q4H PRN Ian Grimes, DO   650 mg at 01/22/17 1757   • diltiaZEM (CARDIZEM) 125mg/125 mL infusion  5-15 mg/hr Intravenous Titrated Iliana Alexis MD   Stopped at 01/25/17 0149   • diltiaZEM (CARDIZEM) tablet 60 mg  60 mg Oral Q6H Slime Alexis APRN   60 mg at 01/25/17 0609   • diphenhydrAMINE  (BENADRYL) capsule 25 mg  25 mg Oral Q4H PRN Mejia Hatfield MD        Or   • diphenhydrAMINE (BENADRYL) injection 25 mg  25 mg Intravenous Q4H PRN Mejia Hatfield MD       • hydrALAZINE (APRESOLINE) injection 10 mg  10 mg Intravenous Q4H PRN Iliana Alexis MD   10 mg at 01/23/17 1719   • HYDROcodone-acetaminophen (NORCO) 5-325 MG per tablet 1 tablet  1 tablet Oral Q4H PRN Ian Grimes DO       • HYDROmorphone (DILAUDID) injection 1 mg  1 mg Intravenous Q4H PRN Brandt Gordon DO   1 mg at 01/20/17 1939   • ipratropium-albuterol (DUO-NEB) nebulizer solution 3 mL  3 mL Nebulization Q4H PRN Johan Dinh MD       • metoprolol tartrate (LOPRESSOR) tablet 25 mg  25 mg Oral Q12H Jahaira Li APRN   25 mg at 01/24/17 2153   • Morphine sulfate (PF) injection 2 mg  2 mg Intravenous Q4H PRN Ian Grimes DO        And   • naloxone (NARCAN) injection 0.4 mg  0.4 mg Intravenous Q5 Min PRN Ian Grimes DO       • nicotine (NICODERM CQ) 21 MG/24HR patch 1 patch  1 patch Transdermal Nightly Johan Dinh MD   1 patch at 01/24/17 2156   • ondansetron (ZOFRAN) injection 4 mg  4 mg Intravenous Q6H PRN Johan Dinh MD       • predniSONE (DELTASONE) tablet 40 mg  40 mg Oral BID With Meals BHARATHI Cervantes       • sodium chloride 0.9 % flush 1-10 mL  1-10 mL Intravenous PRN Johan Dinh MD       • tamsulosin (FLOMAX) 24 hr capsule 0.4 mg  0.4 mg Oral Nightly Brandt Gordon DO   0.4 mg at 01/24/17 2153       Past Medical History:  Past Medical History   Diagnosis Date   • A-fib    • Chronic renal failure    • Elevated PSA    • Purpura        Past Surgical History:  Past Surgical History   Procedure Laterality Date   • Appendectomy     • Insertion hemodialysis catheter N/A 1/20/2017     Procedure: INSERTION PERMCATH;  Surgeon: Ian Grimes DO;  Location:  [facility] OR;  Service:        Family History  History reviewed. No pertinent family history.    Social History  Social History  "    Social History   • Marital status:      Spouse name: N/A   • Number of children: N/A   • Years of education: N/A     Occupational History   • Not on file.     Social History Main Topics   • Smoking status: Heavy Tobacco Smoker   • Smokeless tobacco: Not on file   • Alcohol use Yes   • Drug use: Not on file   • Sexual activity: Not on file     Other Topics Concern   • Not on file     Social History Narrative         Review of Systems:  History obtained from chart review and the patient  General ROS: No fever or chills  Respiratory ROS: No cough, shortness of breath, wheezing  Cardiovascular ROS: no chest pain or dyspnea on exertion  Gastrointestinal ROS: No abdominal pain or melena  Genito-Urinary ROS: No dysuria or hematuria  14 point ROS reviewed with the patient and negative except as noted above and in the HPI unless unable to obtain.    Objective:  Visit Vitals   • /83 (BP Location: Left arm, Patient Position: Lying)   • Pulse 108   • Temp 97.8 °F (36.6 °C) (Tympanic)   • Resp 18   • Ht 73\" (185.4 cm)   • Wt 233 lb 6.4 oz (106 kg)   • SpO2 97%   • BMI 30.79 kg/m2       Intake/Output Summary (Last 24 hours) at 01/25/17 0834  Last data filed at 01/24/17 2000   Gross per 24 hour   Intake    720 ml   Output      0 ml   Net    720 ml     General: awake/alert   Chest:  clear to auscultation bilaterally without respiratory distress  CVS: regular rate and rhythm  Abdominal: soft, nontender, normal bowel sounds  Extremities: trace LE edema  Skin: scattered LE purpura      Labs:    Results from last 7 days  Lab Units 01/25/17  0538 01/23/17 0418 01/21/17  0558   WBC 10*3/mm3 10.67 9.77 9.71   HEMOGLOBIN g/dL 10.4* 9.7* 9.4*   HEMATOCRIT % 32.1* 30.2* 28.3*   PLATELETS 10*3/mm3 193 168 158           Results from last 7 days  Lab Units 01/25/17  0538 01/24/17  0506 01/23/17  0418   SODIUM mmol/L 137 137 140   POTASSIUM mmol/L 4.0 3.8 4.8   CHLORIDE mmol/L 99 97* 102   TOTAL CO2 mmol/L 24.0 27.0 28.0   BUN " "mg/dL 60* 53* 85*   CREATININE mg/dL 2.95* 2.74* 4.65*   CALCIUM mg/dL 8.0* 8.3* 8.5   GLUCOSE mg/dL 210* 193* 131*       Radiology:   Imaging Results (last 72 hours)     Procedure Component Value Units Date/Time    FL C Arm During Surgery [[id number]] Collected:  01/20/17 1623     Updated:  01/23/17 1557    Narrative:       Performed in surgery by Dr. Grimes. Please see operative report.     This report was finalized on 01/23/2017 15:55 by Dr. Ian Grimes MD.    IR Insert Tunneled CV Catheter Without Port 5 Plus [[id number]] Collected:  01/20/17 1623     Updated:  01/23/17 1557    Narrative:       Performed in surgery by Dr. Grimes. Please see operative report.     This report was finalized on 01/23/2017 15:55 by Dr. Ian Grimes MD.          Culture:  URINE CULTURE   Date Value Ref Range Status   01/23/2017 No growth at 2 days  Final         Assessment   1.  Acute kidney injury; remains dialysis dependent  2.  CKD stage 3 as a baseline  3.  Right nephrolithiasis  4.  HTN  5.  HSP--punch biopsy results on chart; \"benign skin with superficial hemorrhage\"    Plan:  1.  Dialysis today  2.  Punch biopsy results inconclusive; ? Need for renal biopsy    Dialysis   Pt was seen on RRT.  Tolerating well.  Modality: Hemodialysis  Access: Catheter  Location: right upper  QB: 400  QD: 600  UF: 1000      Charlie Jacobo, BHARATHI  1/25/2017  8:34 AM    "

## 2017-01-25 NOTE — PLAN OF CARE
Problem: Patient Care Overview (Adult)  Goal: Plan of Care Review  Outcome: Ongoing (interventions implemented as appropriate)    01/25/17 7352   Coping/Psychosocial Response Interventions   Plan Of Care Reviewed With patient   Patient Care Overview   Progress improving   Outcome Evaluation   Outcome Summary/Follow up Plan pt taken off cardizem gtt tonight , pt still on cardizem PO

## 2017-01-25 NOTE — PROGRESS NOTES
Subjective    Mr. Andrea is 47 y.o. male    Chief Complaint: Non obstructing stone; Elevated PSA    History of Present Illness     Urolithiasis  Patient complains of right flank pain without radiation to the abdomen. Onset of symptoms was abrupt starting 2 weeks ago with completely resolved course since that time. Patient describes the pain as none, continuous and rated as no. The patient has had no nausea and no vomiting. There has been no fever or chills. The patient is not complaining of dysuria, frequency, or urgency.  Previous management of stones includes none     Elevated PSA  Patient is here with an elevated PSA. The PSA was drawn2 month(s). He does not have a family history of prostate cancer.  Voiding symptoms include Hematuria. Denies Dysuria. Voiding symptoms began several years  . These have been gradual in onset.   Previous PSA values are :   No results found for: PSA       The following portions of the patient's history were reviewed and updated as appropriate: allergies, current medications, past family history, past medical history, past social history, past surgical history and problem list.    Review of Systems   Respiratory: Negative.    Cardiovascular: Negative.    Neurological: Negative.          Current Facility-Administered Medications:   •  acetaminophen (TYLENOL) tablet 500 mg, 500 mg, Oral, Q4H PRN, Mejia Hatfield MD  •  acetaminophen (TYLENOL) tablet 650 mg, 650 mg, Oral, Q4H PRN, Ian Grimes DO, 650 mg at 01/22/17 1757  •  diltiaZEM (CARDIZEM) 125mg/125 mL infusion, 5-15 mg/hr, Intravenous, Titrated, Iliana Alexis MD, Stopped at 01/25/17 0149  •  diltiaZEM (CARDIZEM) tablet 60 mg, 60 mg, Oral, Q6H, Slime Alexis, APRN, 60 mg at 01/25/17 1738  •  diphenhydrAMINE (BENADRYL) capsule 25 mg, 25 mg, Oral, Q4H PRN **OR** diphenhydrAMINE (BENADRYL) injection 25 mg, 25 mg, Intravenous, Q4H PRN, Mejia Hatfield MD  •  diphenhydrAMINE (BENADRYL) capsule 25 mg, 25  mg, Oral, Q4H PRN **OR** diphenhydrAMINE (BENADRYL) injection 25 mg, 25 mg, Intravenous, Q4H PRN, Mejia Hatfield MD  •  heparin (porcine) 5000 UNIT/ML injection 7,950 Units, 75 Units/kg, Intravenous, Once, Mejia Hatfield MD, 7,950 Units at 01/25/17 1345  •  heparin (porcine) injection 1,800 Units, 1,800 Units, Intracatheter, Once, Mejia Hatfield MD, 1,800 Units at 01/25/17 1346  •  heparin (porcine) injection 1,800 Units, 1,800 Units, Intracatheter, Once, Mejia Hatfield MD, 1,800 Units at 01/25/17 1346  •  heparin (porcine) injection 1,800 Units, 1,800 Units, Intracatheter, PRN, Reinaldo Foley MD, 1,800 Units at 01/25/17 1124  •  heparin (porcine) injection 1,800 Units, 1,800 Units, Intracatheter, PRN, Reinaldo Foley MD, 1,800 Units at 01/25/17 1124  •  heparin (porcine) injection 3,600 Units, 3,600 Units, Intracatheter, Once, Mejia Hatfield MD, 3,600 Units at 01/25/17 1347  •  heparin (porcine) injection 3,600 Units, 3,600 Units, Intravenous, Once, Mejia Hatfield MD, 3,600 Units at 01/25/17 1346  •  hydrALAZINE (APRESOLINE) injection 10 mg, 10 mg, Intravenous, Q4H PRN, Iliana Alexis MD, 10 mg at 01/23/17 7063  •  HYDROcodone-acetaminophen (NORCO) 5-325 MG per tablet 1 tablet, 1 tablet, Oral, Q4H PRN, Ian Grimes DO  •  HYDROmorphone (DILAUDID) injection 1 mg, 1 mg, Intravenous, Q4H PRN, Brandt Gordon DO, 1 mg at 01/20/17 3329  •  ipratropium-albuterol (DUO-NEB) nebulizer solution 3 mL, 3 mL, Nebulization, Q4H PRN, Johan Dinh MD  •  metoprolol tartrate (LOPRESSOR) tablet 25 mg, 25 mg, Oral, Q12H, Jahaira Li, APRN, 25 mg at 01/25/17 1249  •  Morphine sulfate (PF) injection 2 mg, 2 mg, Intravenous, Q4H PRN **AND** naloxone (NARCAN) injection 0.4 mg, 0.4 mg, Intravenous, Q5 Min PRN, Ian Grimes, DO  •  nicotine (NICODERM CQ) 21 MG/24HR patch 1 patch, 1 patch, Transdermal, Nightly, Johan Dinh MD, 1 patch  "at 01/24/17 2156  •  ondansetron (ZOFRAN) injection 4 mg, 4 mg, Intravenous, Q6H PRN, Johan Dinh MD  •  predniSONE (DELTASONE) tablet 40 mg, 40 mg, Oral, BID With Meals, Neva Lane, APRN, 40 mg at 01/25/17 1738  •  sodium chloride 0.9 % bolus 100 mL, 100 mL, Intravenous, PRN, Mejia Hatfield MD  •  sodium chloride 0.9 % flush 1-10 mL, 1-10 mL, Intravenous, PRN, Johan Dinh MD  •  tamsulosin (FLOMAX) 24 hr capsule 0.4 mg, 0.4 mg, Oral, Nightly, Brandt Gordon DO, 0.4 mg at 01/24/17 2153    Past Medical History   Diagnosis Date   • A-fib    • Chronic renal failure    • Elevated PSA    • Purpura        Past Surgical History   Procedure Laterality Date   • Appendectomy     • Insertion hemodialysis catheter N/A 1/20/2017     Procedure: INSERTION PERMCATH;  Surgeon: Ian Grimes DO;  Location: [facility] OR;  Service:        Social History     Social History   • Marital status:      Spouse name: N/A   • Number of children: N/A   • Years of education: N/A     Social History Main Topics   • Smoking status: Heavy Tobacco Smoker   • Smokeless tobacco: None   • Alcohol use Yes   • Drug use: None   • Sexual activity: Not Asked     Other Topics Concern   • None     Social History Narrative       History reviewed. No pertinent family history.    Objective    Visit Vitals   • /80 (BP Location: Left arm, Patient Position: Lying)   • Pulse 110   • Temp 98.3 °F (36.8 °C) (Oral)   • Resp 18   • Ht 73\" (185.4 cm)   • Wt 233 lb 6.4 oz (106 kg)   • SpO2 99%   • BMI 30.79 kg/m2       Intake/Output Summary (Last 24 hours) at 01/25/17 1750  Last data filed at 01/24/17 2000   Gross per 24 hour   Intake    480 ml   Output      0 ml   Net    480 ml       Physical Exam   Constitutional: He is oriented to person, place, and time. He appears well-developed and well-nourished. No distress.   Pulmonary/Chest: Effort normal.   Abdominal: Soft. He exhibits no distension and no mass. There is no tenderness. There is " no rebound and no guarding. No hernia.   Neurological: He is alert and oriented to person, place, and time.   Skin: Skin is warm and dry. He is not diaphoretic.   Psychiatric: He has a normal mood and affect.   Vitals reviewed.      Results Review:   I reviewed the patient's new clinical results.      @[facility]@    @[facility]    Imaging Results (last 24 hours)     ** No results found for the last 24 hours. **            Results for orders placed or performed during the hospital encounter of 01/17/17   Urine Culture   Result Value Ref Range    Urine Culture No growth at 2 days    Urine Culture   Result Value Ref Range    Urine Culture No growth at 2 days    Urinalysis With / Culture If Indicated   Result Value Ref Range    Color, UA Red (A) Yellow, Straw    Appearance, UA Clear Clear    pH, UA 6.5 5.0 - 8.0    Specific Gravity, UA 1.014 1.005 - 1.030    Glucose, UA Negative Negative    Ketones, UA Negative Negative    Bilirubin, UA Negative Negative    Blood, UA Large (3+) (A) Negative    Protein,  mg/dL (2+) (A) Negative    Leuk Esterase, UA Small (1+) (A) Negative    Nitrite, UA Negative Negative    Urobilinogen, UA 0.2 E.U./dL 0.2 - 1.0 E.U./dL   Urinalysis, Microscopic Only   Result Value Ref Range    RBC, UA Too Numerous to Count (A) None Seen /HPF    WBC, UA 6-12 (A) None Seen /HPF    Bacteria, UA None Seen None Seen /HPF    Squamous Epithelial Cells, UA 0-2 None Seen, 0-2 /HPF    Hyaline Casts, UA 0-2 None Seen /LPF    Methodology Manual Light Microscopy    Comprehensive Metabolic Panel   Result Value Ref Range    Glucose 80 70 - 100 mg/dL     (H) 5 - 21 mg/dL    Creatinine 5.69 (H) 0.50 - 1.40 mg/dL    Sodium 138 135 - 145 mmol/L    Potassium 4.7 3.5 - 5.3 mmol/L    Chloride 98 98 - 110 mmol/L    CO2 24.0 24.0 - 31.0 mmol/L    Calcium 8.8 8.4 - 10.4 mg/dL    Total Protein 7.7 6.3 - 8.7 g/dL    Albumin 3.70 3.50 - 5.00 g/dL    ALT (SGPT) 61 (H) 0 - 54 U/L    AST (SGOT) 36 7 - 45 U/L    Alkaline  Phosphatase 92 24 - 120 U/L    Total Bilirubin 0.7 0.1 - 1.0 mg/dL    eGFR Non African Amer 11 (L) >60 mL/min/1.73    Globulin 4.0 gm/dL    A/G Ratio 0.9 (L) 1.1 - 2.5 g/dL    BUN/Creatinine Ratio 19.2 7.0 - 25.0    Anion Gap 16.0 (H) 4.0 - 13.0 mmol/L   CBC Auto Differential   Result Value Ref Range    WBC 11.21 (H) 4.80 - 10.80 10*3/mm3    RBC 4.17 (L) 4.80 - 5.90 10*6/mm3    Hemoglobin 10.9 (L) 14.0 - 18.0 g/dL    Hematocrit 33.4 (L) 40.0 - 52.0 %    MCV 80.1 (L) 82.0 - 95.0 fL    MCH 26.1 (L) 28.0 - 32.0 pg    MCHC 32.6 (L) 33.0 - 36.0 g/dL    RDW 16.6 (H) 12.0 - 15.0 %    RDW-SD 48.5 40.0 - 54.0 fl    MPV 9.3 6.0 - 12.0 fL    Platelets 133 130 - 400 10*3/mm3    Neutrophil % 69.0 39.0 - 78.0 %    Lymphocyte % 23.1 15.0 - 45.0 %    Monocyte % 7.5 4.0 - 12.0 %    Eosinophil % 0.0 0.0 - 4.0 %    Basophil % 0.2 0.0 - 2.0 %    Immature Grans % 0.2 0.0 - 5.0 %    Neutrophils, Absolute 7.74 1.87 - 8.40 10*3/mm3    Lymphocytes, Absolute 2.59 0.72 - 4.86 10*3/mm3    Monocytes, Absolute 0.84 0.19 - 1.30 10*3/mm3    Eosinophils, Absolute 0.00 0.00 - 0.70 10*3/mm3    Basophils, Absolute 0.02 0.00 - 0.20 10*3/mm3    Immature Grans, Absolute 0.02 0.00 - 0.03 10*3/mm3   Basic Metabolic Panel   Result Value Ref Range    Glucose 110 (H) 70 - 100 mg/dL    BUN 99 (H) 5 - 21 mg/dL    Creatinine 5.51 (H) 0.50 - 1.40 mg/dL    Sodium 138 135 - 145 mmol/L    Potassium 5.1 3.5 - 5.3 mmol/L    Chloride 99 98 - 110 mmol/L    CO2 23.0 (L) 24.0 - 31.0 mmol/L    Calcium 8.4 8.4 - 10.4 mg/dL    eGFR Non African Amer 11 (L) >60 mL/min/1.73    BUN/Creatinine Ratio 18.0 7.0 - 25.0    Anion Gap 16.0 (H) 4.0 - 13.0 mmol/L   CBC (No Diff)   Result Value Ref Range    WBC 12.75 (H) 4.80 - 10.80 10*3/mm3    RBC 4.08 (L) 4.80 - 5.90 10*6/mm3    Hemoglobin 10.6 (L) 14.0 - 18.0 g/dL    Hematocrit 33.0 (L) 40.0 - 52.0 %    MCV 80.9 (L) 82.0 - 95.0 fL    MCH 26.0 (L) 28.0 - 32.0 pg    MCHC 32.1 (L) 33.0 - 36.0 g/dL    RDW 16.6 (H) 12.0 - 15.0 %    RDW-SD  49.5 40.0 - 54.0 fl    MPV 9.4 6.0 - 12.0 fL    Platelets 128 (L) 130 - 400 10*3/mm3   Basic Metabolic Panel   Result Value Ref Range    Glucose 121 (H) 70 - 100 mg/dL    BUN 99 (H) 5 - 21 mg/dL    Creatinine 5.57 (H) 0.50 - 1.40 mg/dL    Sodium 136 135 - 145 mmol/L    Potassium 5.3 3.5 - 5.3 mmol/L    Chloride 96 (L) 98 - 110 mmol/L    CO2 23.0 (L) 24.0 - 31.0 mmol/L    Calcium 8.6 8.4 - 10.4 mg/dL    eGFR Non African Amer 11 (L) >60 mL/min/1.73    BUN/Creatinine Ratio 17.8 7.0 - 25.0    Anion Gap 17.0 (H) 4.0 - 13.0 mmol/L   Basic Metabolic Panel   Result Value Ref Range    Glucose 194 (H) 70 - 100 mg/dL     (H) 5 - 21 mg/dL    Creatinine 5.31 (H) 0.50 - 1.40 mg/dL    Sodium 135 135 - 145 mmol/L    Potassium 4.9 3.5 - 5.3 mmol/L    Chloride 98 98 - 110 mmol/L    CO2 19.0 (L) 24.0 - 31.0 mmol/L    Calcium 8.2 (L) 8.4 - 10.4 mg/dL    eGFR Non African Amer 12 (L) >60 mL/min/1.73    BUN/Creatinine Ratio 20.0 7.0 - 25.0    Anion Gap 18.0 (H) 4.0 - 13.0 mmol/L   CBC (No Diff)   Result Value Ref Range    WBC 16.75 (H) 4.80 - 10.80 10*3/mm3    RBC 3.91 (L) 4.80 - 5.90 10*6/mm3    Hemoglobin 10.3 (L) 14.0 - 18.0 g/dL    Hematocrit 31.0 (L) 40.0 - 52.0 %    MCV 79.3 (L) 82.0 - 95.0 fL    MCH 26.3 (L) 28.0 - 32.0 pg    MCHC 33.2 33.0 - 36.0 g/dL    RDW 16.4 (H) 12.0 - 15.0 %    RDW-SD 47.5 40.0 - 54.0 fl    MPV 9.8 6.0 - 12.0 fL    Platelets 148 130 - 400 10*3/mm3   Protein, Urine, Random   Result Value Ref Range    Total Protein, Urine  0.0 - 13.5 mg/dL   Creatinine, Urine, Random   Result Value Ref Range    Creatinine, Urine 43.7 mg/dL   Urinalysis With / Microscopic If Indicated   Result Value Ref Range    Color, UA Orange (A) Yellow, Straw    Appearance, UA Cloudy (A) Clear    pH, UA <=5.0 5.0 - 8.0    Specific Gravity, UA 1.016 1.005 - 1.030    Glucose,  mg/dL (2+) (A) Negative    Ketones, UA Negative Negative    Bilirubin, UA Negative Negative    Blood, UA Large (3+) (A) Negative    Protein,  mg/dL  (2+) (A) Negative    Leuk Esterase, UA Small (1+) (A) Negative    Nitrite, UA Negative Negative    Urobilinogen, UA 0.2 E.U./dL 0.2 - 1.0 E.U./dL   Sodium, Urine, Random   Result Value Ref Range    Sodium, Urine 51 30 - 90 mmol/L   Urea Nitrogen, Urine   Result Value Ref Range    Urea Nitrogen, Urine 581 mg/dL   Iron Profile   Result Value Ref Range    Iron 117 42 - 180 mcg/dL    TIBC 254 225 - 420 mcg/dL    Iron Saturation 46 (H) 20 - 45 %   Ferritin   Result Value Ref Range    Ferritin 169.00 17.90 - 464.00 ng/mL   Folate   Result Value Ref Range    Folate 10.50 >2.75 ng/mL   Vitamin B12   Result Value Ref Range    Vitamin B-12 579 239 - 931 pg/mL   Urinalysis, Microscopic Only   Result Value Ref Range    RBC, UA Too Numerous to Count (A) None Seen /HPF    WBC, UA 6-12 (A) None Seen /HPF    Bacteria, UA 1+ (A) None Seen /HPF    Squamous Epithelial Cells, UA 3-6 (A) None Seen, 0-2 /HPF    Methodology Manual Light Microscopy    Basic Metabolic Panel   Result Value Ref Range    Glucose 142 (H) 70 - 100 mg/dL     (H) 5 - 21 mg/dL    Creatinine 5.97 (H) 0.50 - 1.40 mg/dL    Sodium 136 135 - 145 mmol/L    Potassium 5.1 3.5 - 5.3 mmol/L    Chloride 104 98 - 110 mmol/L    CO2 17.0 (L) 24.0 - 31.0 mmol/L    Calcium 8.2 (L) 8.4 - 10.4 mg/dL    eGFR Non African Amer 10 (L) >60 mL/min/1.73    BUN/Creatinine Ratio 17.9 7.0 - 25.0    Anion Gap 15.0 (H) 4.0 - 13.0 mmol/L   CBC Auto Differential   Result Value Ref Range    WBC 15.38 (H) 4.80 - 10.80 10*3/mm3    RBC 3.55 (L) 4.80 - 5.90 10*6/mm3    Hemoglobin 9.2 (L) 14.0 - 18.0 g/dL    Hematocrit 28.1 (L) 40.0 - 52.0 %    MCV 79.2 (L) 82.0 - 95.0 fL    MCH 25.9 (L) 28.0 - 32.0 pg    MCHC 32.7 (L) 33.0 - 36.0 g/dL    RDW 16.6 (H) 12.0 - 15.0 %    RDW-SD 47.0 40.0 - 54.0 fl    MPV 10.0 6.0 - 12.0 fL    Platelets 158 130 - 400 10*3/mm3    Neutrophil % 91.9 (H) 39.0 - 78.0 %    Lymphocyte % 5.4 (L) 15.0 - 45.0 %    Monocyte % 2.7 (L) 4.0 - 12.0 %    Eosinophil % 0.0 0.0 - 4.0  %    Basophil % 0.0 0.0 - 2.0 %    Neutrophils, Absolute 14.13 (H) 1.87 - 8.40 10*3/mm3    Lymphocytes, Absolute 0.83 0.72 - 4.86 10*3/mm3    Monocytes, Absolute 0.42 0.19 - 1.30 10*3/mm3    Eosinophils, Absolute 0.00 0.00 - 0.70 10*3/mm3    Basophils, Absolute 0.00 0.00 - 0.20 10*3/mm3   Basic Metabolic Panel   Result Value Ref Range    Glucose 116 (H) 70 - 100 mg/dL     (H) 5 - 21 mg/dL    Creatinine 6.31 (H) 0.50 - 1.40 mg/dL    Sodium 139 135 - 145 mmol/L    Potassium 5.2 3.5 - 5.3 mmol/L    Chloride 108 98 - 110 mmol/L    CO2 16.0 (L) 24.0 - 31.0 mmol/L    Calcium 8.1 (L) 8.4 - 10.4 mg/dL    eGFR Non African Amer 10 (L) >60 mL/min/1.73    BUN/Creatinine Ratio 17.7 7.0 - 25.0    Anion Gap 15.0 (H) 4.0 - 13.0 mmol/L   CBC Auto Differential   Result Value Ref Range    WBC 12.56 (H) 4.80 - 10.80 10*3/mm3    RBC 3.64 (L) 4.80 - 5.90 10*6/mm3    Hemoglobin 9.4 (L) 14.0 - 18.0 g/dL    Hematocrit 28.9 (L) 40.0 - 52.0 %    MCV 79.4 (L) 82.0 - 95.0 fL    MCH 25.8 (L) 28.0 - 32.0 pg    MCHC 32.5 (L) 33.0 - 36.0 g/dL    RDW 16.3 (H) 12.0 - 15.0 %    RDW-SD 47.4 40.0 - 54.0 fl    MPV 10.0 6.0 - 12.0 fL    Platelets 144 130 - 400 10*3/mm3    Neutrophil % 88.5 (H) 39.0 - 78.0 %    Lymphocyte % 7.2 (L) 15.0 - 45.0 %    Monocyte % 4.0 4.0 - 12.0 %    Eosinophil % 0.0 0.0 - 4.0 %    Basophil % 0.0 0.0 - 2.0 %    Immature Grans % 0.3 0.0 - 5.0 %    Neutrophils, Absolute 11.11 (H) 1.87 - 8.40 10*3/mm3    Lymphocytes, Absolute 0.91 0.72 - 4.86 10*3/mm3    Monocytes, Absolute 0.50 0.19 - 1.30 10*3/mm3    Eosinophils, Absolute 0.00 0.00 - 0.70 10*3/mm3    Basophils, Absolute 0.00 0.00 - 0.20 10*3/mm3    Immature Grans, Absolute 0.04 (H) 0.00 - 0.03 10*3/mm3   Protime-INR   Result Value Ref Range    Protime 14.8 (H) 11.9 - 14.6 Seconds    INR 1.12 (H) 0.91 - 1.09   Hepatitis B Surface Antigen   Result Value Ref Range    Hepatitis B Surface Ag Negative Negative   Hepatitis B Surface Antibody   Result Value Ref Range    Hepatitis  Bs Ab Index <5.00     Hep B S Ab Non-Immune (A) Immune   Basic Metabolic Panel   Result Value Ref Range    Glucose 124 (H) 70 - 100 mg/dL    BUN 79 (H) 5 - 21 mg/dL    Creatinine 4.67 (H) 0.50 - 1.40 mg/dL    Sodium 138 135 - 145 mmol/L    Potassium 4.9 3.5 - 5.3 mmol/L    Chloride 105 98 - 110 mmol/L    CO2 21.0 (L) 24.0 - 31.0 mmol/L    Calcium 8.3 (L) 8.4 - 10.4 mg/dL    eGFR Non African Amer 13 (L) >60 mL/min/1.73    BUN/Creatinine Ratio 16.9 7.0 - 25.0    Anion Gap 12.0 4.0 - 13.0 mmol/L   CBC Auto Differential   Result Value Ref Range    WBC 9.71 4.80 - 10.80 10*3/mm3    RBC 3.59 (L) 4.80 - 5.90 10*6/mm3    Hemoglobin 9.4 (L) 14.0 - 18.0 g/dL    Hematocrit 28.3 (L) 40.0 - 52.0 %    MCV 78.8 (L) 82.0 - 95.0 fL    MCH 26.2 (L) 28.0 - 32.0 pg    MCHC 33.2 33.0 - 36.0 g/dL    RDW 16.0 (H) 12.0 - 15.0 %    RDW-SD 46.5 40.0 - 54.0 fl    MPV 9.8 6.0 - 12.0 fL    Platelets 158 130 - 400 10*3/mm3    Neutrophil % 87.4 (H) 39.0 - 78.0 %    Lymphocyte % 7.4 (L) 15.0 - 45.0 %    Monocyte % 5.0 4.0 - 12.0 %    Eosinophil % 0.0 0.0 - 4.0 %    Basophil % 0.0 0.0 - 2.0 %    Immature Grans % 0.2 0.0 - 5.0 %    Neutrophils, Absolute 8.48 (H) 1.87 - 8.40 10*3/mm3    Lymphocytes, Absolute 0.72 0.72 - 4.86 10*3/mm3    Monocytes, Absolute 0.49 0.19 - 1.30 10*3/mm3    Eosinophils, Absolute 0.00 0.00 - 0.70 10*3/mm3    Basophils, Absolute 0.00 0.00 - 0.20 10*3/mm3    Immature Grans, Absolute 0.02 0.00 - 0.03 10*3/mm3   Basic Metabolic Panel   Result Value Ref Range    Glucose 137 (H) 70 - 100 mg/dL    BUN 74 (H) 5 - 21 mg/dL    Creatinine 4.44 (H) 0.50 - 1.40 mg/dL    Sodium 138 135 - 145 mmol/L    Potassium 4.8 3.5 - 5.3 mmol/L    Chloride 102 98 - 110 mmol/L    CO2 25.0 24.0 - 31.0 mmol/L    Calcium 8.1 (L) 8.4 - 10.4 mg/dL    eGFR Non African Amer 14 (L) >60 mL/min/1.73    BUN/Creatinine Ratio 16.7 7.0 - 25.0    Anion Gap 11.0 4.0 - 13.0 mmol/L   Basic Metabolic Panel   Result Value Ref Range    Glucose 131 (H) 70 - 100 mg/dL     BUN 85 (H) 5 - 21 mg/dL    Creatinine 4.65 (H) 0.50 - 1.40 mg/dL    Sodium 140 135 - 145 mmol/L    Potassium 4.8 3.5 - 5.3 mmol/L    Chloride 102 98 - 110 mmol/L    CO2 28.0 24.0 - 31.0 mmol/L    Calcium 8.5 8.4 - 10.4 mg/dL    eGFR Non African Amer 14 (L) >60 mL/min/1.73    BUN/Creatinine Ratio 18.3 7.0 - 25.0    Anion Gap 10.0 4.0 - 13.0 mmol/L   CBC Auto Differential   Result Value Ref Range    WBC 9.77 4.80 - 10.80 10*3/mm3    RBC 3.72 (L) 4.80 - 5.90 10*6/mm3    Hemoglobin 9.7 (L) 14.0 - 18.0 g/dL    Hematocrit 30.2 (L) 40.0 - 52.0 %    MCV 81.2 (L) 82.0 - 95.0 fL    MCH 26.1 (L) 28.0 - 32.0 pg    MCHC 32.1 (L) 33.0 - 36.0 g/dL    RDW 15.8 (H) 12.0 - 15.0 %    RDW-SD 47.1 40.0 - 54.0 fl    MPV 9.8 6.0 - 12.0 fL    Platelets 168 130 - 400 10*3/mm3    Neutrophil % 83.6 (H) 39.0 - 78.0 %    Lymphocyte % 10.8 (L) 15.0 - 45.0 %    Monocyte % 5.2 4.0 - 12.0 %    Eosinophil % 0.0 0.0 - 4.0 %    Basophil % 0.0 0.0 - 2.0 %    Immature Grans % 0.4 0.0 - 5.0 %    Neutrophils, Absolute 8.16 1.87 - 8.40 10*3/mm3    Lymphocytes, Absolute 1.06 0.72 - 4.86 10*3/mm3    Monocytes, Absolute 0.51 0.19 - 1.30 10*3/mm3    Eosinophils, Absolute 0.00 0.00 - 0.70 10*3/mm3    Basophils, Absolute 0.00 0.00 - 0.20 10*3/mm3    Immature Grans, Absolute 0.04 (H) 0.00 - 0.03 10*3/mm3   Scan Slide   Result Value Ref Range    RBC Morphology Normal Normal    WBC Morphology Normal Normal    Platelet Morphology Normal Normal   Urinalysis With / Culture If Indicated   Result Value Ref Range    Color, UA Orange (A) Yellow, Straw    Appearance, UA Cloudy (A) Clear    pH, UA 5.5 5.0 - 8.0    Specific Gravity, UA 1.008 1.005 - 1.030    Glucose,  mg/dL (Trace) (A) Negative    Ketones, UA Negative Negative    Bilirubin, UA Negative Negative    Blood, UA Large (3+) (A) Negative    Protein, UA 30 mg/dL (1+) (A) Negative    Leuk Esterase, UA Moderate (2+) (A) Negative    Nitrite, UA Negative Negative    Urobilinogen, UA 0.2 E.U./dL 0.2 - 1.0 E.U./dL    Urinalysis, Microscopic Only   Result Value Ref Range    RBC, UA Too Numerous to Count (A) None Seen /HPF    WBC, UA 21-30 (A) None Seen /HPF    Bacteria, UA None Seen None Seen /HPF    Squamous Epithelial Cells, UA 0-2 None Seen, 0-2 /HPF    Hyaline Casts, UA 0-2 None Seen /LPF    Methodology Automated Microscopy    Basic Metabolic Panel   Result Value Ref Range    Glucose 193 (H) 70 - 100 mg/dL    BUN 53 (H) 5 - 21 mg/dL    Creatinine 2.74 (H) 0.50 - 1.40 mg/dL    Sodium 137 135 - 145 mmol/L    Potassium 3.8 3.5 - 5.3 mmol/L    Chloride 97 (L) 98 - 110 mmol/L    CO2 27.0 24.0 - 31.0 mmol/L    Calcium 8.3 (L) 8.4 - 10.4 mg/dL    eGFR Non African Amer 25 (L) >60 mL/min/1.73    BUN/Creatinine Ratio 19.3 7.0 - 25.0    Anion Gap 13.0 4.0 - 13.0 mmol/L   Magnesium   Result Value Ref Range    Magnesium 1.6 1.4 - 2.2 mg/dL   Phosphorus   Result Value Ref Range    Phosphorus 4.2 2.5 - 4.5 mg/dL   Basic Metabolic Panel   Result Value Ref Range    Glucose 210 (H) 70 - 100 mg/dL    BUN 60 (H) 5 - 21 mg/dL    Creatinine 2.95 (H) 0.50 - 1.40 mg/dL    Sodium 137 135 - 145 mmol/L    Potassium 4.0 3.5 - 5.3 mmol/L    Chloride 99 98 - 110 mmol/L    CO2 24.0 24.0 - 31.0 mmol/L    Calcium 8.0 (L) 8.4 - 10.4 mg/dL    eGFR Non African Amer 23 (L) >60 mL/min/1.73    BUN/Creatinine Ratio 20.3 7.0 - 25.0    Anion Gap 14.0 (H) 4.0 - 13.0 mmol/L   CBC (No Diff)   Result Value Ref Range    WBC 10.67 4.80 - 10.80 10*3/mm3    RBC 4.00 (L) 4.80 - 5.90 10*6/mm3    Hemoglobin 10.4 (L) 14.0 - 18.0 g/dL    Hematocrit 32.1 (L) 40.0 - 52.0 %    MCV 80.3 (L) 82.0 - 95.0 fL    MCH 26.0 (L) 28.0 - 32.0 pg    MCHC 32.4 (L) 33.0 - 36.0 g/dL    RDW 15.8 (H) 12.0 - 15.0 %    RDW-SD 46.9 40.0 - 54.0 fl    MPV 9.9 6.0 - 12.0 fL    Platelets 193 130 - 400 10*3/mm3   Adult Transthoracic Echo Complete With Contrast   Result Value Ref Range    Echo EF Estimated 65 %   Type & Screen   Result Value Ref Range    ABO Type O     RH type Positive     Antibody Screen  "Negative    Tissue Exam   Result Value Ref Range    Case Report       Surgical Pathology Report                         Case: [id number]                                  Authorizing Provider:  Brandt Gordon DO         Collected:           01/19/2017 12:08 PM          Ordering Location:     [address]  Received:            01/19/2017 12:46 PM          Pathologist:           Tomi Magdaleno MD                                                         Specimen:    Leg, Right,  Punch biopsy of right medial thigh just above the knee.                       Clinical Information       Pre-Op Diagnosis:      Right medial thigh just above the knee lesion.        Final Diagnosis       Skin, right medial thigh, punch biopsy:       Benign skin with superficial hemorrhage.                                      1      Gross Description       Specimen #1 is received in formalin, labeled with the patient's name, medical record number and designated \"punch biopsy of right medial thigh just above the knee.\"  The specimen consists of a round to oval punch biopsy measuring 0.3 x 0.3 cm and measuring 0.3 cm in depth. The skin surface is diffusely red-pink, slightly raised and erythematous. The resection margin is inked black.    DRW/js          Microscopic Description       Sections demonstrate a minute punch biopsy showing benign epidermis with underlying superficial dermal hemorrhage.    DRW/ltw       Embedded Images       Assessment and Plan    I was called to reconsult.  Please see my consult note he has a nonobstructing stone.  It is obstructing calyx but there is no hydronephrosis of his collecting system.  This does not require intervention.  He does have an elevated PSA.  We will schedule this for a prostate biopsy as an outpatient.  Again no acute urologic issues.           "

## 2017-01-26 ENCOUNTER — APPOINTMENT (OUTPATIENT)
Dept: CT IMAGING | Facility: HOSPITAL | Age: 47
End: 2017-01-26

## 2017-01-26 LAB
ANION GAP SERPL CALCULATED.3IONS-SCNC: 10 MMOL/L (ref 4–13)
APTT PPP: 30.6 SECONDS (ref 24.1–34.8)
BUN BLD-MCNC: 58 MG/DL (ref 5–21)
BUN/CREAT SERPL: 20.7 (ref 7–25)
CALCIUM SPEC-SCNC: 8.3 MG/DL (ref 8.4–10.4)
CHLORIDE SERPL-SCNC: 99 MMOL/L (ref 98–110)
CO2 SERPL-SCNC: 27 MMOL/L (ref 24–31)
CREAT BLD-MCNC: 2.8 MG/DL (ref 0.5–1.4)
DEPRECATED RDW RBC AUTO: 46.1 FL (ref 40–54)
ERYTHROCYTE [DISTWIDTH] IN BLOOD BY AUTOMATED COUNT: 15.7 % (ref 12–15)
GFR SERPL CREATININE-BSD FRML MDRD: 24 ML/MIN/1.73
GLUCOSE BLD-MCNC: 139 MG/DL (ref 70–100)
HCT VFR BLD AUTO: 30.9 % (ref 40–52)
HGB BLD-MCNC: 10.1 G/DL (ref 14–18)
INR PPP: 0.98 (ref 0.91–1.09)
INR PPP: 1.04 (ref 0.91–1.09)
MCH RBC QN AUTO: 26.3 PG (ref 28–32)
MCHC RBC AUTO-ENTMCNC: 32.7 G/DL (ref 33–36)
MCV RBC AUTO: 80.5 FL (ref 82–95)
PLATELET # BLD AUTO: 189 10*3/MM3 (ref 130–400)
PMV BLD AUTO: 9.4 FL (ref 6–12)
POTASSIUM BLD-SCNC: 4.2 MMOL/L (ref 3.5–5.3)
PROTHROMBIN TIME: 13.3 SECONDS (ref 11.9–14.6)
PROTHROMBIN TIME: 13.9 SECONDS (ref 11.9–14.6)
RBC # BLD AUTO: 3.84 10*6/MM3 (ref 4.8–5.9)
SODIUM BLD-SCNC: 136 MMOL/L (ref 135–145)
WBC NRBC COR # BLD: 14.15 10*3/MM3 (ref 4.8–10.8)

## 2017-01-26 PROCEDURE — 63710000001 PREDNISONE PER 5 MG: Performed by: NURSE PRACTITIONER

## 2017-01-26 PROCEDURE — 0TB13ZX EXCISION OF LEFT KIDNEY, PERCUTANEOUS APPROACH, DIAGNOSTIC: ICD-10-PCS | Performed by: RADIOLOGY

## 2017-01-26 PROCEDURE — 85730 THROMBOPLASTIN TIME PARTIAL: CPT | Performed by: NURSE PRACTITIONER

## 2017-01-26 PROCEDURE — 77012 CT SCAN FOR NEEDLE BIOPSY: CPT

## 2017-01-26 PROCEDURE — 80048 BASIC METABOLIC PNL TOTAL CA: CPT | Performed by: NURSE PRACTITIONER

## 2017-01-26 PROCEDURE — 99233 SBSQ HOSP IP/OBS HIGH 50: CPT | Performed by: INTERNAL MEDICINE

## 2017-01-26 PROCEDURE — 85027 COMPLETE CBC AUTOMATED: CPT | Performed by: NURSE PRACTITIONER

## 2017-01-26 PROCEDURE — 85610 PROTHROMBIN TIME: CPT | Performed by: INTERNAL MEDICINE

## 2017-01-26 PROCEDURE — 85610 PROTHROMBIN TIME: CPT | Performed by: NURSE PRACTITIONER

## 2017-01-26 RX ORDER — DILTIAZEM HCL 90 MG
90 TABLET ORAL EVERY 6 HOURS SCHEDULED
Status: DISCONTINUED | OUTPATIENT
Start: 2017-01-26 | End: 2017-01-27

## 2017-01-26 RX ADMIN — DILTIAZEM HYDROCHLORIDE 60 MG: 60 TABLET, FILM COATED ORAL at 00:33

## 2017-01-26 RX ADMIN — DILTIAZEM HYDROCHLORIDE 60 MG: 60 TABLET, FILM COATED ORAL at 06:04

## 2017-01-26 RX ADMIN — METOPROLOL TARTRATE 25 MG: 25 TABLET, FILM COATED ORAL at 08:57

## 2017-01-26 RX ADMIN — METOPROLOL TARTRATE 25 MG: 25 TABLET, FILM COATED ORAL at 21:38

## 2017-01-26 RX ADMIN — PREDNISONE 40 MG: 20 TABLET ORAL at 08:57

## 2017-01-26 RX ADMIN — DILTIAZEM HYDROCHLORIDE 60 MG: 60 TABLET, FILM COATED ORAL at 12:51

## 2017-01-26 RX ADMIN — PREDNISONE 40 MG: 20 TABLET ORAL at 17:16

## 2017-01-26 RX ADMIN — NICOTINE 1 PATCH: 21 PATCH, EXTENDED RELEASE TRANSDERMAL at 21:38

## 2017-01-26 RX ADMIN — DILTIAZEM HYDROCHLORIDE 90 MG: 90 TABLET, FILM COATED ORAL at 17:16

## 2017-01-26 RX ADMIN — TAMSULOSIN HYDROCHLORIDE 0.4 MG: 0.4 CAPSULE ORAL at 21:37

## 2017-01-26 NOTE — PROGRESS NOTES
LOS: 8 days   Patient Care Team:  Moises Montes MD as PCP - General (Internal Medicine)    Chief Complaint: AF RVR  Subjective   Feeling much better  No chest pain or excessive shortness of breath  No new complaints    Interval History: Improved overall    Patient Complaints:  Chief Complaint   Patient presents with   • Blood in Urine   • Flank Pain   • Nausea     Denies chest pain. Denies excessive shortness of breath. Denies abdominal pain, nausea vomiting or diarrhoea.    Telemetry: no malignant arrhythmia. No significant pauses.AF    History taken from: Patient and chart  Review of Systems:    The following systems were reviewed and negative; ENT, respiratory,  gastrointestinal, integument, hematologic / lymphatic, neurological, behavioral/psych and allergies / immunologic    Labs:    WBC WBC   Date Value Ref Range Status   01/25/2017 10.67 4.80 - 10.80 10*3/mm3 Final   01/23/2017 9.77 4.80 - 10.80 10*3/mm3 Final      HGB HEMOGLOBIN   Date Value Ref Range Status   01/25/2017 10.4 (L) 14.0 - 18.0 g/dL Final   01/23/2017 9.7 (L) 14.0 - 18.0 g/dL Final      HCT HEMATOCRIT   Date Value Ref Range Status   01/25/2017 32.1 (L) 40.0 - 52.0 % Final   01/23/2017 30.2 (L) 40.0 - 52.0 % Final      Platlets PLATELETS   Date Value Ref Range Status   01/25/2017 193 130 - 400 10*3/mm3 Final   01/23/2017 168 130 - 400 10*3/mm3 Final      MCV MCV   Date Value Ref Range Status   01/25/2017 80.3 (L) 82.0 - 95.0 fL Final   01/23/2017 81.2 (L) 82.0 - 95.0 fL Final        Results from last 7 days  Lab Units 01/25/17  0538 01/24/17  0506 01/23/17  0418   SODIUM mmol/L 137 137 140   POTASSIUM mmol/L 4.0 3.8 4.8   CHLORIDE mmol/L 99 97* 102   TOTAL CO2 mmol/L 24.0 27.0 28.0   BUN mg/dL 60* 53* 85*   CREATININE mg/dL 2.95* 2.74* 4.65*   CALCIUM mg/dL 8.0* 8.3* 8.5   GLUCOSE mg/dL 210* 193* 131*   No results found for: CKTOTAL, CKMB, CKMBINDEX, TROPONINI, TROPONINT  PT/INR:  No results found for: PROTIME/No results found for:  INR    Imaging Results (last 72 hours)     Procedure Component Value Units Date/Time    FL C Arm During Surgery [[id number]] Collected:  01/20/17 1623     Updated:  01/23/17 1557    Narrative:       Performed in surgery by Dr. Grimes. Please see operative report.     This report was finalized on 01/23/2017 15:55 by Dr. Ian Grimes MD.    IR Insert Tunneled CV Catheter Without Port 5 Plus [[id number]] Collected:  01/20/17 1623     Updated:  01/23/17 1557    Narrative:       Performed in surgery by Dr. Grimes. Please see operative report.     This report was finalized on 01/23/2017 15:55 by Dr. Ian Grimes MD.          Objective     Medication Review:   Current Facility-Administered Medications   Medication Dose Route Frequency Provider Last Rate Last Dose   • acetaminophen (TYLENOL) tablet 500 mg  500 mg Oral Q4H PRN Mejia Hatfield MD       • acetaminophen (TYLENOL) tablet 650 mg  650 mg Oral Q4H PRN Ian Grimes,    650 mg at 01/22/17 1757   • diltiaZEM (CARDIZEM) 125mg/125 mL infusion  5-15 mg/hr Intravenous Titrated Iliana Alexis MD   Stopped at 01/25/17 0149   • diltiaZEM (CARDIZEM) tablet 60 mg  60 mg Oral Q6H BHARATHI Portillo   60 mg at 01/25/17 1738   • diphenhydrAMINE (BENADRYL) capsule 25 mg  25 mg Oral Q4H PRN Mejia Hatfield MD        Or   • diphenhydrAMINE (BENADRYL) injection 25 mg  25 mg Intravenous Q4H PRN Mejia Hatfield MD       • diphenhydrAMINE (BENADRYL) capsule 25 mg  25 mg Oral Q4H PRN Mejia Hatfield MD        Or   • diphenhydrAMINE (BENADRYL) injection 25 mg  25 mg Intravenous Q4H PRN Mejia Hatfield MD       • heparin (porcine) 5000 UNIT/ML injection 7,950 Units  75 Units/kg Intravenous Once Mejia Hatfield MD   7,950 Units at 01/25/17 1345   • heparin (porcine) injection 1,800 Units  1,800 Units Intracatheter Once Mejia Hatfield MD   1,800 Units at 01/25/17 2026   • heparin (porcine) injection  1,800 Units  1,800 Units Intracatheter Once Mejia Hatfield MD   1,800 Units at 01/25/17 1346   • heparin (porcine) injection 1,800 Units  1,800 Units Intracatheter PRN Reinaldo Foley MD   1,800 Units at 01/25/17 1124   • heparin (porcine) injection 1,800 Units  1,800 Units Intracatheter PRN Reinaldo Foley MD   1,800 Units at 01/25/17 1124   • heparin (porcine) injection 3,600 Units  3,600 Units Intracatheter Once Mejia Hatfield MD   3,600 Units at 01/25/17 1347   • heparin (porcine) injection 3,600 Units  3,600 Units Intravenous Once Mejia Hatfield MD   3,600 Units at 01/25/17 1346   • hydrALAZINE (APRESOLINE) injection 10 mg  10 mg Intravenous Q4H PRN Iliana Alexis MD   10 mg at 01/23/17 2359   • HYDROcodone-acetaminophen (NORCO) 5-325 MG per tablet 1 tablet  1 tablet Oral Q4H PRN Ian Grimes DO       • HYDROmorphone (DILAUDID) injection 1 mg  1 mg Intravenous Q4H PRN Brandt Gordon DO   1 mg at 01/20/17 0449   • ipratropium-albuterol (DUO-NEB) nebulizer solution 3 mL  3 mL Nebulization Q4H PRN Johan Dinh MD       • metoprolol tartrate (LOPRESSOR) tablet 25 mg  25 mg Oral Q12H Jahaira Li APRN   25 mg at 01/25/17 2025   • Morphine sulfate (PF) injection 2 mg  2 mg Intravenous Q4H PRN Ian Grimes DO        And   • naloxone (NARCAN) injection 0.4 mg  0.4 mg Intravenous Q5 Min PRN Ian Grimes DO       • nicotine (NICODERM CQ) 21 MG/24HR patch 1 patch  1 patch Transdermal Nightly Johan Dinh MD   1 patch at 01/25/17 2028   • ondansetron (ZOFRAN) injection 4 mg  4 mg Intravenous Q6H PRN Johan Dinh MD       • predniSONE (DELTASONE) tablet 40 mg  40 mg Oral BID With Meals Neva Lane, APRN   40 mg at 01/25/17 1738   • sodium chloride 0.9 % bolus 100 mL  100 mL Intravenous PRN Mejia Hatfield MD       • sodium chloride 0.9 % flush 1-10 mL  1-10 mL Intravenous PRN Johan Dinh MD       • tamsulosin (FLOMAX) 24 hr  "capsule 0.4 mg  0.4 mg Oral Nightly Brandt Gordon DO   0.4 mg at 01/25/17 2025       Vital Sign Min/Max for last 24 hours  Temp  Min: 97.8 °F (36.6 °C)  Max: 98.6 °F (37 °C)   BP  Min: 120/80  Max: 150/82   Pulse  Min: 86  Max: 131   Resp  Min: 18  Max: 20   SpO2  Min: 97 %  Max: 99 %   Flow (L/min)  Min: 2  Max: 2   No Data Recorded     Flowsheet Rows         First Filed Value    Admission Height  73\" (185.4 cm) Documented at 01/17/2017 0019    Admission Weight  219 lb (99.3 kg) Documented at 01/17/2017 0019          Physical Exam:  Ears ears appear intact with no abnormalities noted  Nose nares normal, septum midline, mucosa normal and no drainage  Neck suppple, trachea midline, no thyromegaly, no carotid bruit and no JVD  Back no kyphosis present, no scoliosis present, no skin lesions, erythema, or scars, no tenderness to percussion or palpation and range of motion normal  Lungs respirations regular, respirations even and respirations unlabored  Heart normal S1, S2, 2/6 pansystolic murmur in the left sternal border,  no rub and no click  Abdomen normal bowel sounds, no masses, no hepatomegaly, no splenomegaly, no guarding and no rebound tenderness  Skin no bleeding, bruising or rash     Results Review:   I reviewed the patient's new clinical results.  I reviewed the patient's new imaging results and agree with the interpretation.  I reviewed the patient's other test results and agree with the interpretation  I personally viewed and interpreted the patient's EKG/Telemetry data  Discussed with patient    Medication Review: Performed    Assessment/Plan     Principal Problem:    Henoch-Schonlein purpura nephritis  Active Problems:    Tobacco abuse    Paroxysmal atrial fibrillation    Elevated PSA    Nephrolithiasis    Microcytic anemia    Hypertensive nephrosclerosis    Moderate mitral regurgitation  Normal LVEF      Plan  Rate control  Defer anticoagulation  Supportive care  Telemetry  Optimal medical " therapy  Improved  Alex Cortez MD  01/25/17  8:57 PM

## 2017-01-26 NOTE — PROGRESS NOTES
"[facility] -  Progress Note     LOS: 9 days   Patient Care Team:  Moises Montes MD as PCP - General (Internal Medicine)      Reason for consultation: A-Fib RVR     Subjective     Interval History:     \"I'm feeling better today\". Denies cardiac concerns at this time.       Review of Systems:   Review of Systems   Constitution: Negative for diaphoresis, fever, weakness and malaise/fatigue.   Cardiovascular: Positive for leg swelling (\"Better\"). Negative for chest pain, dyspnea on exertion, irregular heartbeat, near-syncope, orthopnea, palpitations, paroxysmal nocturnal dyspnea and syncope.   Respiratory: Negative for cough, shortness of breath and sleep disturbances due to breathing.    Hematologic/Lymphatic: Negative for bleeding problem.   Skin: Negative for rash.        BLE purpura- improving   Gastrointestinal: Negative for bloating, abdominal pain, nausea and vomiting.   Psychiatric/Behavioral: Negative for altered mental status. The patient is not nervous/anxious.         Objective     Vital Sign Min/Max for last 24 hours  Temp  Min: 97.9 °F (36.6 °C)  Max: 98.4 °F (36.9 °C)   BP  Min: 120/67  Max: 143/95   Pulse  Min: 65  Max: 115   Resp  Min: 18  Max: 20   SpO2  Min: 96 %  Max: 100 %   Flow (L/min)  Min: 2  Max: 2   Weight  Min: 233 lb 9.6 oz (106 kg)  Max: 233 lb 9.6 oz (106 kg)     Last Weight    01/25/17  2000   Weight: 233 lb 9.6 oz (106 kg)         Intake/Output Summary (Last 24 hours) at 01/26/17 1601  Last data filed at 01/26/17 1448   Gross per 24 hour   Intake      0 ml   Output    300 ml   Net   -300 ml         Physical Exam:  Physical Exam   Constitutional: He is oriented to person, place, and time. He appears well-developed and well-nourished. No distress.   HENT:   Head: Normocephalic and atraumatic.   Cardiovascular: Intact distal pulses.  An irregularly irregular rhythm present. Tachycardia present.    Murmur heard.  Telemetry: A-fib  BPM    Pulmonary/Chest: " Effort normal and breath sounds normal.   Musculoskeletal: He exhibits no edema.   Neurological: He is alert and oriented to person, place, and time.   Skin: Skin is warm and dry. Purpura (BLE- improving) and rash noted. He is not diaphoretic.   Psychiatric: He has a normal mood and affect. His speech is normal and behavior is normal.   Vitals reviewed.       Results Review:   Lab Results   Component Value Date    WBC 14.15 (H) 01/26/2017    HGB 10.1 (L) 01/26/2017    HCT 30.9 (L) 01/26/2017    MCV 80.5 (L) 01/26/2017     01/26/2017     Lab Results   Component Value Date    GLUCOSE 139 (H) 01/26/2017    CALCIUM 8.3 (L) 01/26/2017     01/26/2017    K 4.2 01/26/2017    CO2 27.0 01/26/2017    CL 99 01/26/2017    BUN 58 (H) 01/26/2017    CREATININE 2.80 (H) 01/26/2017    EGFRIFNONA 24 (L) 01/26/2017    BCR 20.7 01/26/2017    ANIONGAP 10.0 01/26/2017            Echo EF Estimated  Lab Results   Component Value Date    ECHOEFEST 65 01/17/2017         Medication Review: yes  Current Facility-Administered Medications   Medication Dose Route Frequency Provider Last Rate Last Dose   • acetaminophen (TYLENOL) tablet 500 mg  500 mg Oral Q4H PRN Mejia Hatfield MD       • acetaminophen (TYLENOL) tablet 650 mg  650 mg Oral Q4H PRN Ian Grimes, DO   650 mg at 01/22/17 2714   • diltiaZEM (CARDIZEM) 125mg/125 mL infusion  5-15 mg/hr Intravenous Titrated Iliana Alexis MD   Stopped at 01/25/17 0149   • diltiaZEM (CARDIZEM) tablet 90 mg  90 mg Oral Q6H BHARATHI Portillo       • diphenhydrAMINE (BENADRYL) capsule 25 mg  25 mg Oral Q4H PRN Mejia Hatfield MD        Or   • diphenhydrAMINE (BENADRYL) injection 25 mg  25 mg Intravenous Q4H PRN Mejia Hatfield MD       • diphenhydrAMINE (BENADRYL) capsule 25 mg  25 mg Oral Q4H PRN Mejia Hatfield MD        Or   • diphenhydrAMINE (BENADRYL) injection 25 mg  25 mg Intravenous Q4H PRN Mejia Hatfield MD       • heparin  (porcine) 5000 UNIT/ML injection 7,950 Units  75 Units/kg Intravenous Once Mejia Hatfield MD   7,950 Units at 01/25/17 1345   • heparin (porcine) injection 1,800 Units  1,800 Units Intracatheter Once Mejia Hatfield MD   1,800 Units at 01/25/17 1346   • heparin (porcine) injection 1,800 Units  1,800 Units Intracatheter Once Mejia Hatfield MD   1,800 Units at 01/25/17 1346   • heparin (porcine) injection 1,800 Units  1,800 Units Intracatheter PRN Reinaldo Foley MD   1,800 Units at 01/25/17 1124   • heparin (porcine) injection 1,800 Units  1,800 Units Intracatheter PRN Reinaldo Foley MD   1,800 Units at 01/25/17 1124   • heparin (porcine) injection 3,600 Units  3,600 Units Intracatheter Once Mejia Hatfield MD   3,600 Units at 01/25/17 1347   • heparin (porcine) injection 3,600 Units  3,600 Units Intravenous Once Mejia Hatfield MD   3,600 Units at 01/25/17 1346   • hydrALAZINE (APRESOLINE) injection 10 mg  10 mg Intravenous Q4H PRN Iliana Alexis MD   10 mg at 01/23/17 2359   • HYDROcodone-acetaminophen (NORCO) 5-325 MG per tablet 1 tablet  1 tablet Oral Q4H PRN Ian Grimes DO       • HYDROmorphone (DILAUDID) injection 1 mg  1 mg Intravenous Q4H PRN Brandt Gordon DO   1 mg at 01/20/17 0449   • ipratropium-albuterol (DUO-NEB) nebulizer solution 3 mL  3 mL Nebulization Q4H PRN Johan Dinh MD       • metoprolol tartrate (LOPRESSOR) tablet 25 mg  25 mg Oral Q12H BHARATHI Cid   25 mg at 01/26/17 0857   • Morphine sulfate (PF) injection 2 mg  2 mg Intravenous Q4H PRN Ian Grimes DO        And   • naloxone (NARCAN) injection 0.4 mg  0.4 mg Intravenous Q5 Min PRN Ian Grimes DO       • nicotine (NICODERM CQ) 21 MG/24HR patch 1 patch  1 patch Transdermal Nightly Johan Dinh MD   1 patch at 01/25/17 2028   • ondansetron (ZOFRAN) injection 4 mg  4 mg Intravenous Q6H PRN Johan Dinh MD       • predniSONE (DELTASONE)  tablet 40 mg  40 mg Oral BID With Meals Neva LINSEY Domingo, BHARATHI   40 mg at 01/26/17 0857   • sodium chloride 0.9 % bolus 100 mL  100 mL Intravenous PRN Mejia Hatfield MD       • sodium chloride 0.9 % flush 1-10 mL  1-10 mL Intravenous PRN Johan Dinh MD       • tamsulosin (FLOMAX) 24 hr capsule 0.4 mg  0.4 mg Oral Nightly Brandt Gordon DO   0.4 mg at 01/25/17 2025         Assessment/Plan     Principal Problem:    Henoch-Schonlein purpura nephritis    Active Problems:    Hypertensive nephrosclerosis    Paroxysmal atrial fibrillation, currently A-Fib, rate has improved.     Nephrolithiasis    Tobacco abuse    Elevated PSA    Microcytic anemia    Moderate mitral regurgitation      Plan   1. Dialysis planned for the am, M-W-F  2. Renal biopsy- pending  3. Monitor telemetry  4. BMP in am   5. Continue Metoprolol and Cardizem. Increase Cardizem to 90mg PO Q6H. Will increase BB if needed and if BP will tolerate.   6. Defer anticoagulation to Dr. Dana Alexis, BHARATHI  01/26/17  4:01 PM

## 2017-01-26 NOTE — PROGRESS NOTES
Continued Stay Note  EVA Stoll     Patient Name: Gurjit Andrea  MRN: [id number]  Today's Date: 1/26/2017    Admit Date: 1/17/2017          Discharge Plan       01/26/17 1058    Case Management/Social Work Plan    Plan Home    Additional Comments Spoke with Marta at [facility] Intake and chair time has been set for Tue Thurs Sat at 6 15am.  What day would pt need to start at clinic? This will be at [facility].               Discharge Codes     None            LAUREN Nguyen
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
## 2017-01-26 NOTE — PROGRESS NOTES
Nephrology ([facility]) Consult Note      Patient:  Gurjit Andrea  YOB: 1970  Date of Service: 1/26/2017  MRN: [id number]   Acct: [id number]   Primary Care Physician: Moises Montes MD  Advance Directive: Full Code  Admit Date: 1/17/2017       Hospital Day: 9  Referring Provider: Johan Dinh MD      Patient Seen, Chart, Consults, Notes, Labs, Radiology studies reviewed.        Subjective:  Gurjit Andrea is a 47 y.o. male  whom we were consulted for acute kidney injury.  Baseline of CKD stage 3, GFR in 50s.  Had followed with nephrologist years ago.  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  S/p punch biopsy here.  Permcath placed 1/21; tolerating dialysis well.  Scheduled for kidney biopsy today.    Allergies:  Review of patient's allergies indicates no known allergies.    Home Meds:  Prescriptions Prior to Admission   Medication Sig Dispense Refill Last Dose   • ciprofloxacin (CIPRO) 500 MG tablet Take 1 tablet by mouth 2 times daily Begin taking the day before the biopsy is scheduled.      • lisinopril-hydrochlorothiazide (PRINZIDE,ZESTORETIC) 10-12.5 MG per tablet 1 tablet Daily.      • MethylPREDNISolone (MEDROL, LUIS ANGEL,) 4 MG tablet Take  by mouth 2 (Two) Times a Day. Take as directed on package instructions.      • metoprolol tartrate (LOPRESSOR) 25 MG tablet 25 mg 2 (Two) Times a Day.      • albuterol (PROVENTIL HFA;VENTOLIN HFA) 108 (90 BASE) MCG/ACT inhaler Every 6 (Six) Hours.          Medicines:  Current Facility-Administered Medications   Medication Dose Route Frequency Provider Last Rate Last Dose   • acetaminophen (TYLENOL) tablet 500 mg  500 mg Oral Q4H PRN Mejia Hatfield MD       • acetaminophen (TYLENOL) tablet 650 mg  650 mg Oral Q4H PRN Ian Grimes DO   650 mg at 01/22/17 3688   • diltiaZEM (CARDIZEM) 125mg/125 mL infusion  5-15 mg/hr Intravenous Titrated Iliana Alexis MD   Stopped at 01/25/17 0148    • diltiaZEM (CARDIZEM) tablet 60 mg  60 mg Oral Q6H BHARATHI Portillo   60 mg at 01/26/17 0604   • diphenhydrAMINE (BENADRYL) capsule 25 mg  25 mg Oral Q4H PRN Mejia Hatfield MD        Or   • diphenhydrAMINE (BENADRYL) injection 25 mg  25 mg Intravenous Q4H PRN Mejia Hatfield MD       • diphenhydrAMINE (BENADRYL) capsule 25 mg  25 mg Oral Q4H PRN Mejia Hatfield MD        Or   • diphenhydrAMINE (BENADRYL) injection 25 mg  25 mg Intravenous Q4H PRN Mejia Hatfield MD       • heparin (porcine) 5000 UNIT/ML injection 7,950 Units  75 Units/kg Intravenous Once Mejia Hatfield MD   7,950 Units at 01/25/17 1345   • heparin (porcine) injection 1,800 Units  1,800 Units Intracatheter Once Mejia Hatfield MD   1,800 Units at 01/25/17 1346   • heparin (porcine) injection 1,800 Units  1,800 Units Intracatheter Once Mejia Hatfield MD   1,800 Units at 01/25/17 1346   • heparin (porcine) injection 1,800 Units  1,800 Units Intracatheter PRN Reinaldo Foley MD   1,800 Units at 01/25/17 1124   • heparin (porcine) injection 1,800 Units  1,800 Units Intracatheter PRN Reinaldo Foley MD   1,800 Units at 01/25/17 1124   • heparin (porcine) injection 3,600 Units  3,600 Units Intracatheter Once Mejia Hatfield MD   3,600 Units at 01/25/17 1347   • heparin (porcine) injection 3,600 Units  3,600 Units Intravenous Once Mejia Hatfield MD   3,600 Units at 01/25/17 1346   • hydrALAZINE (APRESOLINE) injection 10 mg  10 mg Intravenous Q4H PRN Iliana Alexis MD   10 mg at 01/23/17 1550   • HYDROcodone-acetaminophen (NORCO) 5-325 MG per tablet 1 tablet  1 tablet Oral Q4H PRN Ian Grimes DO       • HYDROmorphone (DILAUDID) injection 1 mg  1 mg Intravenous Q4H PRN Brandt Gordon DO   1 mg at 01/20/17 0449   • ipratropium-albuterol (DUO-NEB) nebulizer solution 3 mL  3 mL Nebulization Q4H PRN Johan Dinh MD       • metoprolol  tartrate (LOPRESSOR) tablet 25 mg  25 mg Oral Q12H Jahaira Li, APRN   25 mg at 01/26/17 0857   • Morphine sulfate (PF) injection 2 mg  2 mg Intravenous Q4H PRN Ian Grimes DO        And   • naloxone (NARCAN) injection 0.4 mg  0.4 mg Intravenous Q5 Min PRN Ian Grimes DO       • nicotine (NICODERM CQ) 21 MG/24HR patch 1 patch  1 patch Transdermal Nightly Johan Dinh MD   1 patch at 01/25/17 2028   • ondansetron (ZOFRAN) injection 4 mg  4 mg Intravenous Q6H PRN Johan Dinh MD       • predniSONE (DELTASONE) tablet 40 mg  40 mg Oral BID With Meals Neva Lane, APRN   40 mg at 01/26/17 0857   • sodium chloride 0.9 % bolus 100 mL  100 mL Intravenous PRN Mejia Hatfield MD       • sodium chloride 0.9 % flush 1-10 mL  1-10 mL Intravenous PRN Johan Dinh MD       • tamsulosin (FLOMAX) 24 hr capsule 0.4 mg  0.4 mg Oral Nightly Brandt Gordon DO   0.4 mg at 01/25/17 2025       Past Medical History:  Past Medical History   Diagnosis Date   • A-fib    • Chronic renal failure    • Elevated PSA    • Purpura        Past Surgical History:  Past Surgical History   Procedure Laterality Date   • Appendectomy     • Insertion hemodialysis catheter N/A 1/20/2017     Procedure: INSERTION PERMCATH;  Surgeon: Ian Grimes DO;  [facility];  Service:        Family History  History reviewed. No pertinent family history.    Social History  Social History     Social History   • Marital status:      Spouse name: N/A   • Number of children: N/A   • Years of education: N/A     Occupational History   • Not on file.     Social History Main Topics   • Smoking status: Heavy Tobacco Smoker   • Smokeless tobacco: Not on file   • Alcohol use Yes   • Drug use: Not on file   • Sexual activity: Not on file     Other Topics Concern   • Not on file     Social History Narrative         Review of Systems:  History obtained from chart review and the patient  General ROS: No fever or  "chills  Respiratory ROS: No cough, shortness of breath, wheezing  Cardiovascular ROS: no chest pain or dyspnea on exertion  Gastrointestinal ROS: No abdominal pain or melena  Genito-Urinary ROS: No dysuria or hematuria  14 point ROS reviewed with the patient and negative except as noted above and in the HPI unless unable to obtain.    Objective:  Visit Vitals   • /77 (BP Location: Left arm, Patient Position: Lying)   • Pulse 98   • Temp 98.3 °F (36.8 °C) (Tympanic)   • Resp 20   • Ht 73\" (185.4 cm)   • Wt 233 lb 9.6 oz (106 kg)   • SpO2 99%   • BMI 30.82 kg/m2     No intake or output data in the 24 hours ending 01/26/17 1210  General: awake/alert   Chest:  clear to auscultation bilaterally without respiratory distress  CVS: regular rate and rhythm  Abdominal: soft, nontender, normal bowel sounds  Extremities: no cyanosis or edema  Skin: warm and dry without rash      Labs:    Results from last 7 days  Lab Units 01/26/17  0509 01/25/17  0538 01/23/17  0418   WBC 10*3/mm3 14.15* 10.67 9.77   HEMOGLOBIN g/dL 10.1* 10.4* 9.7*   HEMATOCRIT % 30.9* 32.1* 30.2*   PLATELETS 10*3/mm3 189 193 168           Results from last 7 days  Lab Units 01/26/17  0509 01/25/17  0538 01/24/17  0506   SODIUM mmol/L 136 137 137   POTASSIUM mmol/L 4.2 4.0 3.8   CHLORIDE mmol/L 99 99 97*   TOTAL CO2 mmol/L 27.0 24.0 27.0   BUN mg/dL 58* 60* 53*   CREATININE mg/dL 2.80* 2.95* 2.74*   CALCIUM mg/dL 8.3* 8.0* 8.3*   GLUCOSE mg/dL 139* 210* 193*       Radiology:   Imaging Results (last 72 hours)     Procedure Component Value Units Date/Time    FL C Arm During Surgery [[id number]] Collected:  01/20/17 1623     Updated:  01/23/17 9637    Narrative:       Performed in surgery by Dr. Grimes. Please see operative report.     This report was finalized on 01/23/2017 15:55 by Dr. Ian Grimes MD.    IR Insert Tunneled CV Catheter Without Port 5 Plus [[id number]] Collected:  01/20/17 1623     Updated:  01/23/17 1557    Narrative:       Performed " in surgery by Dr. Grimes. Please see operative report.     This report was finalized on 01/23/2017 15:55 by Dr. Ian Grimes MD.          Culture:  URINE CULTURE   Date Value Ref Range Status   01/23/2017 No growth at 2 days  Final         Assessment   1.  Acute kidney injury; remains dialysis dependent  2.  CKD stage 3 as a baseline  3.  HTN  4.  Presumptive HSP    Plan:  1.  Renal biopsy today  2.  Dialysis in AM      Charlie Jacobo, APRN  1/26/2017  12:10 PM

## 2017-01-26 NOTE — PLAN OF CARE
Problem: Patient Care Overview (Adult)  Goal: Plan of Care Review  Outcome: Ongoing (interventions implemented as appropriate)    01/26/17 0411   Coping/Psychosocial Response Interventions   Plan Of Care Reviewed With patient   Patient Care Overview   Progress progress toward functional goals as expected   Outcome Evaluation   Outcome Summary/Follow up Plan renal biopsy today

## 2017-01-27 VITALS
DIASTOLIC BLOOD PRESSURE: 96 MMHG | BODY MASS INDEX: 30.91 KG/M2 | TEMPERATURE: 97.2 F | RESPIRATION RATE: 20 BRPM | SYSTOLIC BLOOD PRESSURE: 146 MMHG | HEART RATE: 152 BPM | WEIGHT: 233.25 LBS | HEIGHT: 73 IN | OXYGEN SATURATION: 99 %

## 2017-01-27 LAB
ANION GAP SERPL CALCULATED.3IONS-SCNC: 13 MMOL/L (ref 4–13)
BUN BLD-MCNC: 76 MG/DL (ref 5–21)
BUN/CREAT SERPL: 23.6 (ref 7–25)
CALCIUM SPEC-SCNC: 8.1 MG/DL (ref 8.4–10.4)
CHLORIDE SERPL-SCNC: 99 MMOL/L (ref 98–110)
CO2 SERPL-SCNC: 26 MMOL/L (ref 24–31)
CREAT BLD-MCNC: 3.22 MG/DL (ref 0.5–1.4)
GFR SERPL CREATININE-BSD FRML MDRD: 21 ML/MIN/1.73
GLUCOSE BLD-MCNC: 211 MG/DL (ref 70–100)
PHOSPHATE SERPL-MCNC: 3.2 MG/DL (ref 2.5–4.5)
POTASSIUM BLD-SCNC: 4 MMOL/L (ref 3.5–5.3)
SODIUM BLD-SCNC: 138 MMOL/L (ref 135–145)

## 2017-01-27 PROCEDURE — 63710000001 PREDNISONE PER 5 MG: Performed by: NURSE PRACTITIONER

## 2017-01-27 PROCEDURE — 84100 ASSAY OF PHOSPHORUS: CPT | Performed by: INTERNAL MEDICINE

## 2017-01-27 PROCEDURE — 80048 BASIC METABOLIC PNL TOTAL CA: CPT | Performed by: NURSE PRACTITIONER

## 2017-01-27 PROCEDURE — 99233 SBSQ HOSP IP/OBS HIGH 50: CPT | Performed by: INTERNAL MEDICINE

## 2017-01-27 PROCEDURE — 25010000002 HEPARIN (PORCINE) PER 1000 UNITS: Performed by: INTERNAL MEDICINE

## 2017-01-27 RX ORDER — PREDNISONE 20 MG/1
40 TABLET ORAL 2 TIMES DAILY WITH MEALS
Qty: 60 TABLET | Refills: 0 | Status: SHIPPED | OUTPATIENT
Start: 2017-01-27 | End: 2017-02-23 | Stop reason: HOSPADM

## 2017-01-27 RX ORDER — DILTIAZEM HYDROCHLORIDE 180 MG/1
360 CAPSULE, COATED, EXTENDED RELEASE ORAL
Status: DISCONTINUED | OUTPATIENT
Start: 2017-01-27 | End: 2017-01-27 | Stop reason: HOSPADM

## 2017-01-27 RX ORDER — TAMSULOSIN HYDROCHLORIDE 0.4 MG/1
0.4 CAPSULE ORAL NIGHTLY
Qty: 30 CAPSULE | Refills: 0 | Status: ON HOLD | OUTPATIENT
Start: 2017-01-27 | End: 2020-08-25

## 2017-01-27 RX ORDER — METOPROLOL TARTRATE 5 MG/5ML
5 INJECTION INTRAVENOUS ONCE
Status: COMPLETED | OUTPATIENT
Start: 2017-01-27 | End: 2017-01-27

## 2017-01-27 RX ORDER — DILTIAZEM HYDROCHLORIDE 360 MG/1
360 CAPSULE, EXTENDED RELEASE ORAL
Qty: 30 CAPSULE | Refills: 5 | Status: ON HOLD | OUTPATIENT
Start: 2017-01-27 | End: 2017-02-21

## 2017-01-27 RX ADMIN — DILTIAZEM HYDROCHLORIDE 360 MG: 180 CAPSULE, COATED, EXTENDED RELEASE ORAL at 16:49

## 2017-01-27 RX ADMIN — METOPROLOL TARTRATE 5 MG: 5 INJECTION INTRAVENOUS at 17:52

## 2017-01-27 RX ADMIN — HEPARIN SODIUM 1800 UNITS: 1000 INJECTION, SOLUTION INTRAVENOUS; SUBCUTANEOUS at 16:37

## 2017-01-27 RX ADMIN — PREDNISONE 40 MG: 20 TABLET ORAL at 09:47

## 2017-01-27 RX ADMIN — PREDNISONE 40 MG: 20 TABLET ORAL at 16:52

## 2017-01-27 RX ADMIN — HEPARIN SODIUM 1800 UNITS: 1000 INJECTION, SOLUTION INTRAVENOUS; SUBCUTANEOUS at 16:38

## 2017-01-27 RX ADMIN — DILTIAZEM HYDROCHLORIDE 90 MG: 90 TABLET, FILM COATED ORAL at 05:52

## 2017-01-27 RX ADMIN — DILTIAZEM HYDROCHLORIDE 90 MG: 90 TABLET, FILM COATED ORAL at 00:13

## 2017-01-27 NOTE — PAYOR COMM NOTE
"[facility]  ELA DEGROOTLAND RN [phone]  FAX [phone]  Gurjit Andrea (47 y.o. Male) [id number]    Date of Birth Social Security Number Address Home Phone MRN    1970  [id number]  [facility] [address] [phone] [id number]    Gnosticist Marital Status          None        Admission Date Admission Type Admitting Provider Attending Provider Department, Room/Bed    1/17/17 Emergency Pierre Pritchard DO Robinson, Maurice S, DO [facility] 4C, 495/1    Discharge Date Discharge Disposition Discharge Destination                      Attending Provider: Pierre Pritchard DO     Allergies:  No Known Allergies    Isolation:  None   Infection:  None   Code Status:  FULL    Ht:  73\" (185.4 cm)   Wt:  233 lb 4 oz (106 kg)    Admission Cmt:  None   Principal Problem:  Henoch-Schonlein purpura nephritis [N05.8]                 Active Insurance as of 1/17/2017     Primary Coverage     Payor Plan Insurance Group Employer/Plan Group    [employer]M BLUE [employer]OSS [facility] [id number]     Payor Plan Address Payor Plan Phone Number Effective From Effective To    [address] [phone] 12/1/2016     [place] [address]       Subscriber Name Subscriber Birth Date Member ID       JORDAN ANDREA [birth year]/2001 [id number]                 Emergency Contacts      (Rel.) Home Phone Work Phone Mobile Phone    Jordan Andrea (Spouse) [phone] -- [phone]              Intake & Output (last day)       01/26 0701 - 01/27 0700 01/27 0701 - 01/28 0700    P.O. 360     Total Intake(mL/kg) 360 (3.4)     Urine (mL/kg/hr) 450 (0.2)     Stool 0 (0)     Total Output 450      Net -90            Unmeasured Urine Occurrence 3 x     Unmeasured Stool Occurrence 1 x         Lab Results (last 24 hours)     Procedure Component Value Units Date/Time    Protime-INR [[id number]]  (Normal) Collected:  01/26/17 1106    Specimen:  Blood Updated:  01/26/17 1138     Protime 13.9 Seconds "      INR 1.04     aPTT [[id number]]  (Normal) Collected:  01/26/17 1106    Specimen:  Blood Updated:  01/26/17 1138     PTT 30.6 seconds     Phosphorus [[id number]]  (Normal) Collected:  01/27/17 0019    Specimen:  Blood Updated:  01/27/17 0123     Phosphorus 3.2 mg/dL     Basic Metabolic Panel [[id number]]  (Abnormal) Collected:  01/27/17 0433    Specimen:  Blood Updated:  01/27/17 0519     Glucose 211 (H) mg/dL      BUN 76 (H) mg/dL      Creatinine 3.22 (H) mg/dL      Sodium 138 mmol/L      Potassium 4.0 mmol/L      Chloride 99 mmol/L      CO2 26.0 mmol/L      Calcium 8.1 (L) mg/dL      eGFR Non African Amer 21 (L) mL/min/1.73      BUN/Creatinine Ratio 23.6      Anion Gap 13.0 mmol/L     Narrative:       GFR Normal >60  Chronic Kidney Disease <60  Kidney Failure <15          Imaging Results (last 24 hours)     Procedure Component Value Units Date/Time    CT Needle Biopsy Kidney [[id number]] Collected:  01/26/17 1443     Updated:  01/26/17 1550    Narrative:       HISTORY: Acute kidney injury.     The risks of procedure were explained to the patient. Informed consent  was obtained.     Under sterile conditions, using 1% lidocaine as local anesthetic, and CT  guidance, a 17-gauge guide needle was placed into the posterior cortex  of the lower pole of the left kidney.     Two 2 cm 18-gauge cores were obtained, sectioned and placed in three  different vials as per standard procedure.     The patient tolerated the procedure well without immediate  complications.     Minimal blood loss observed, less than 1 mL.     Radiation dose equals  mGy-cm.  Automated exposure control dose  reduction technique was implemented.       Impression:       CT-guided renal biopsy performed as described above, without immediate,  complications.  This report was finalized on 01/26/2017 15:48 by Dr. Robby Park MD.          Orders (last 24 hrs)     Start     Ordered    01/27/17 0600  Basic Metabolic Panel  Morning Draw,   Status:  Canceled       01/26/17 1700    01/27/17 0000  Phosphorus  Once      01/26/17 1700    01/27/17 0000  Hemodialysis inpatient  Once      01/26/17 1700    01/26/17 1800  diltiaZEM (CARDIZEM) tablet 90 mg  Every 6 Hours Scheduled      01/26/17 1550    01/26/17 1440  Vital signs (specify frequency)  Once     Comments:  Then routine.    Diet as before.    01/26/17 1439    01/26/17 1440  Bedrest  Until Discontinued     Comments:  After returning to room    01/26/17 1439    01/26/17 1440  Patient Assessment  Once     Comments:  1)  Assess patient for signs of pain and bleeding (decreased blood pressure, increased heart rate, lightheadedness, increased swelling around puncture site) 2)  Assess puncture site.    01/26/17 1439    01/26/17 1335  Diet Regular; Cardiac  Diet Effective Now      01/26/17 1334    01/26/17 1027  Protime-INR  STAT     Comments:  Use blood in lab if possible    01/26/17 1026    01/26/17 1027  aPTT  STAT      01/26/17 1026    01/26/17 1022  CT Needle Biopsy Kidney  1 Time Imaging      01/26/17 1022    01/26/17 1000  US Guided Kidney Biopsy  1 Time Imaging      01/26/17 1001    01/25/17 1345  heparin (porcine) injection 1,800 Units  Once      01/25/17 1309    01/25/17 1345  heparin (porcine) injection 1,800 Units  Once      01/25/17 1309    01/25/17 1345  heparin (porcine) injection 3,600 Units  Once      01/25/17 1309    01/25/17 1345  heparin (porcine) 5000 UNIT/ML injection 7,950 Units  Once      01/25/17 1309    01/25/17 1345  heparin (porcine) injection 3,600 Units  Once      01/25/17 1309    01/25/17 1309  acetaminophen (TYLENOL) tablet 500 mg  Every 4 Hours PRN      01/25/17 1309    01/25/17 1309  diphenhydrAMINE (BENADRYL) capsule 25 mg  Every 4 Hours PRN      01/25/17 1309    01/25/17 1309  diphenhydrAMINE (BENADRYL) injection 25 mg  Every 4 Hours PRN      01/25/17 1309    01/25/17 1309  sodium chloride 0.9 % bolus 100 mL  As Needed      01/25/17 1309    01/25/17 1052  heparin (porcine) injection 1,800  Units  As Needed      01/25/17 1053    01/25/17 1051  heparin (porcine) injection 1,800 Units  As Needed      01/25/17 1053    01/25/17 0900  predniSONE (DELTASONE) tablet 40 mg  2 Times Daily With Meals      01/25/17 0815    01/24/17 1800  diltiaZEM (CARDIZEM) tablet 60 mg  Every 6 Hours Scheduled,   Status:  Discontinued      01/24/17 1551    01/24/17 0400  diltiaZEM (CARDIZEM) 125mg/125 mL infusion  Titrated      01/24/17 0320    01/23/17 2338  hydrALAZINE (APRESOLINE) injection 10 mg  Every 4 Hours PRN      01/23/17 2338    01/23/17 0600  Basic Metabolic Panel  Daily      01/22/17 0655    01/22/17 0900  metoprolol tartrate (LOPRESSOR) tablet 25 mg  Every 12 Hours Scheduled      01/22/17 0700    01/20/17 1943  diphenhydrAMINE (BENADRYL) capsule 25 mg  Every 4 Hours PRN      01/20/17 1943    01/20/17 1943  diphenhydrAMINE (BENADRYL) injection 25 mg  Every 4 Hours PRN      01/20/17 1943    01/20/17 0831  HYDROcodone-acetaminophen (NORCO) 5-325 MG per tablet 1 tablet  Every 4 Hours PRN      01/20/17 0831    01/20/17 0831  acetaminophen (TYLENOL) tablet 650 mg  Every 4 Hours PRN      01/20/17 0831    01/20/17 0831  Morphine sulfate (PF) injection 2 mg  Every 4 Hours PRN      01/20/17 0831    01/20/17 0831  naloxone (NARCAN) injection 0.4 mg  Every 5 Minutes PRN      01/20/17 0831    01/18/17 2100  tamsulosin (FLOMAX) 24 hr capsule 0.4 mg  Nightly      01/18/17 1237    01/18/17 1237  HYDROmorphone (DILAUDID) injection 1 mg  Every 4 Hours PRN      01/18/17 1237    01/17/17 2330  nicotine (NICODERM CQ) 21 MG/24HR patch 1 patch  Nightly      01/17/17 2325    01/17/17 1230  sodium chloride 0.9 % flush 1-10 mL  As Needed      01/17/17 1230    01/17/17 0426  ondansetron (ZOFRAN) injection 4 mg  Every 6 Hours PRN      01/17/17 0427    01/17/17 0426  ipratropium-albuterol (DUO-NEB) nebulizer solution 3 mL  Every 4 Hours PRN      01/17/17 0427    12/30/16 0000  ciprofloxacin (CIPRO) 500 MG tablet      01/17/17 0020    11/03/16  0000  lisinopril-hydrochlorothiazide (PRINZIDE,ZESTORETIC) 10-12.5 MG per tablet  Daily      01/17/17 0020 11/03/16 0000  metoprolol tartrate (LOPRESSOR) 25 MG tablet  2 Times Daily      01/17/17 0020    Unscheduled  Vital Signs, Neurochecks, and Vascular checks  As Needed      01/20/17 0831    --  albuterol (PROVENTIL HFA;VENTOLIN HFA) 108 (90 BASE) MCG/ACT inhaler  Every 6 Hours      01/17/17 0020    --  MethylPREDNISolone (MEDROL, LUIS ANGEL,) 4 MG tablet  2 Times Daily      01/17/17 0021    --  SCANNED EKG      01/17/17 0000    --  SCANNED - TELEMETRY        01/17/17 0000    --  SCANNED - TELEMETRY        01/17/17 0000    --  SCANNED - TELEMETRY        01/17/17 0000             Physician Progress Notes (last 24 hours) (Notes from 1/26/2017  7:52 AM through 1/27/2017  7:52 AM)      BHARATHI Cervantes at 1/26/2017  9:47 AM  Version 1 of 1    Attestation signed by Pierre Pritchard DO at 1/26/2017 10:53 AM        I personally evaluated and examined the patient in conjunction with BHARATHI Love and agree with the assessment, treatment plan, and disposition of the patient as recorded by her. My history, exam, and further recommendations are:     Plan:  1. HD - Continue M W F  2. CBC and BMP in am  3. Prednisone 40mg po bid - per discussion with Dr Foley this will be home dose  4. Cardiology following - Afib RVR   5. Kidney bx today, stat PT/PTT    Pierre Pritchard DO  01/26/17  10:53 AM                                         [facility] Medicine Services  INPATIENT PROGRESS NOTE    Length of Stay: 9  Date of Admission: 1/17/2017  Primary Care Physician: Moises Montes MD    Subjective   Chief Complaint: hunger and thirsty    HPI   Patient waiting impatiently for kidney biopsy.  No needs voiced other than hunger and thirst.  Anxious to go home.  Assured we are waiting on dialysis chair availability.  No distress.     Review of Systems   Constitutional:        Impatience and  hunger        All pertinent negatives and positives are as above. All other systems have been reviewed and are negative unless otherwise stated.     Objective    Temp:  [97.9 °F (36.6 °C)-98.3 °F (36.8 °C)] 98.3 °F (36.8 °C)  Heart Rate:  [] 98  Resp:  [18-20] 20  BP: (120-143)/(67-93) 132/77    Physical Exam   Constitutional: He is oriented to person, place, and time. He appears well-developed and well-nourished. No distress.   HENT:   Head: Normocephalic and atraumatic.   Eyes: Conjunctivae and EOM are normal. Pupils are equal, round, and reactive to light. No scleral icterus.   Neck: Normal range of motion. Neck supple. No JVD present. No tracheal deviation present.   Cardiovascular: Normal rate, normal heart sounds and intact distal pulses.  Exam reveals no gallop.    No murmur heard.  Afib/flutter 92   Pulmonary/Chest: Effort normal and breath sounds normal. No respiratory distress. He has no wheezes. He has no rales.   Abdominal: Soft. Bowel sounds are normal. He exhibits no distension. There is no tenderness. There is no guarding.   Musculoskeletal: Normal range of motion. He exhibits no edema.   Neurological: He is alert and oriented to person, place, and time.   No obvious deficits noted.   Skin: Skin is warm and dry. Rash (fading on lower extremities) noted. He is not diaphoretic. No erythema. No pallor.   Psychiatric: He has a normal mood and affect. His behavior is normal.   Vitals reviewed.    Results Review:  Recent Results (from the past 12 hour(s))   Basic Metabolic Panel    Collection Time: 01/26/17  5:09 AM   Result Value Ref Range    Glucose 139 (H) 70 - 100 mg/dL    BUN 58 (H) 5 - 21 mg/dL    Creatinine 2.80 (H) 0.50 - 1.40 mg/dL    Sodium 136 135 - 145 mmol/L    Potassium 4.2 3.5 - 5.3 mmol/L    Chloride 99 98 - 110 mmol/L    CO2 27.0 24.0 - 31.0 mmol/L    Calcium 8.3 (L) 8.4 - 10.4 mg/dL    eGFR Non African Amer 24 (L) >60 mL/min/1.73    BUN/Creatinine Ratio 20.7 7.0 - 25.0    Anion Gap  10.0 4.0 - 13.0 mmol/L   CBC (No Diff)    Collection Time: 01/26/17  5:09 AM   Result Value Ref Range    WBC 14.15 (H) 4.80 - 10.80 10*3/mm3    RBC 3.84 (L) 4.80 - 5.90 10*6/mm3    Hemoglobin 10.1 (L) 14.0 - 18.0 g/dL    Hematocrit 30.9 (L) 40.0 - 52.0 %    MCV 80.5 (L) 82.0 - 95.0 fL    MCH 26.3 (L) 28.0 - 32.0 pg    MCHC 32.7 (L) 33.0 - 36.0 g/dL    RDW 15.7 (H) 12.0 - 15.0 %    RDW-SD 46.1 40.0 - 54.0 fl    MPV 9.4 6.0 - 12.0 fL    Platelets 189 130 - 400 10*3/mm3   Protime-INR    Collection Time: 01/26/17  5:09 AM   Result Value Ref Range    Protime 13.3 11.9 - 14.6 Seconds    INR 0.98 0.91 - 1.09       Cultures:  URINE CULTURE   Date Value Ref Range Status   01/23/2017 No growth at 2 days  Final       Radiology Data:    Imaging Results (last 24 hours)     ** No results found for the last 24 hours. **          No intake or output data in the 24 hours ending 01/26/17 0947    No Known Allergies    Scheduled meds:     diltiaZEM 60 mg Oral Q6H   heparin (porcine) 75 Units/kg Intravenous Once   heparin (porcine) 1,800 Units Intracatheter Once   heparin (porcine) 1,800 Units Intracatheter Once   heparin (porcine) 3,600 Units Intracatheter Once   heparin (porcine) 3,600 Units Intravenous Once   metoprolol tartrate 25 mg Oral Q12H   nicotine 1 patch Transdermal Nightly   predniSONE 40 mg Oral BID With Meals   tamsulosin 0.4 mg Oral Nightly       PRN meds:  •  acetaminophen  •  acetaminophen  •  diphenhydrAMINE **OR** diphenhydrAMINE  •  diphenhydrAMINE **OR** diphenhydrAMINE  •  heparin (porcine)  •  heparin (porcine)  •  hydrALAZINE  •  HYDROcodone-acetaminophen  •  HYDROmorphone  •  ipratropium-albuterol  •  Morphine **AND** naloxone  •  ondansetron  •  sodium chloride  •  sodium chloride    Assessment/Plan     Assessment:  1. Presumptive Henoch-Schonlein purpura nephritis  2. Nephrolithiasis-9mm right stone-no acute intervention at present per Dr. Quinn 1/17/2017  3. CKD 3- secondary to hypertensive  nephrosclerosis  4. 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 Hypertension - stable  6. Leukocytosis- returned   7. Microcytic anemia--  8. Paroxysmal atrial fibrillation with RVR (new 1/24/2017)  9. Right flank pain-improving  10.Elevated PSA-outpatient workup per Dr. Awan  11.UTI-no growth on cultures  12. Lower extremity rash-significantly improved. Punch biopsy results (right thigh) - Benign skin with superficial hemorrhage   13. Mild hyperkalemia-resolved.   14. Hematuria  15. Pyuria      Plan:  1. HD - Continue M W F  2. CBC and BMP in am  3. Prednisone 40mg po bid - per discussion with Dr Foley this will be home dose  4. Cardiology following - Afib RVR   5. Kidney bx today, stat PT/PTT         Discharge Planning: I expect patient to be discharged to home in 1-2 days. SS to arrange dialysis chair/clinic            BHARATHI Jay   01/26/17   9:47 AM                   Electronically signed by Pierre Pritchard DO at 1/26/2017 10:53 AM      BHARATHI Clifford at 1/26/2017 12:10 PM  Version 1 of 1    Attestation signed by Reinaldo Foley MD at 1/26/2017  4:57 PM        I saw and examined patient independently. I agree with NP's exam and assessment. Patient was doing ok, underwent today a kidney biopsy. He was still having hematuria. Vitals: stable. Phys exam: S1S2 reg, lungs-CTA, abd soft, non tender, legs no edema, with fading legs purpura. Labs: reviewed.   A/P:  1. Acute kidney injury; renal function is somewhat improving, will get dialysis in AM and follow up on kidney biopsy  2. CKD stage 3 as a baseline  3. Right nephrolithiasis  4. HTN  5. 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 Will continue empiric steroids.                                Nephrology ([facility]) Consult Note      Patient:  Gurjit Andrea  YOB: 1970  Date of Service: 1/26/2017  MRN: [id number]   Acct: [id number]   Primary Care Physician: Moises Montes MD  Advance Directive: Full  Code  Admit Date: 1/17/2017       Hospital Day: 9  Referring Provider: Johan Dinh MD      Patient Seen, Chart, Consults, Notes, Labs, Radiology studies reviewed.        Subjective:  Gurjit Andrea is a 47 y.o. male  whom we were consulted for acute kidney injury.  Baseline of CKD stage 3, GFR in 50s.  Had followed with nephrologist years ago.  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  S/p punch biopsy here.  Permcath placed 1/21; tolerating dialysis well.  Scheduled for kidney biopsy today.    Allergies:  Review of patient's allergies indicates no known allergies.    Home Meds:  Prescriptions Prior to Admission   Medication Sig Dispense Refill Last Dose   • ciprofloxacin (CIPRO) 500 MG tablet Take 1 tablet by mouth 2 times daily Begin taking the day before the biopsy is scheduled.      • lisinopril-hydrochlorothiazide (PRINZIDE,ZESTORETIC) 10-12.5 MG per tablet 1 tablet Daily.      • MethylPREDNISolone (MEDROL, LUIS ANGEL,) 4 MG tablet Take  by mouth 2 (Two) Times a Day. Take as directed on package instructions.      • metoprolol tartrate (LOPRESSOR) 25 MG tablet 25 mg 2 (Two) Times a Day.      • albuterol (PROVENTIL HFA;VENTOLIN HFA) 108 (90 BASE) MCG/ACT inhaler Every 6 (Six) Hours.          Medicines:  Current Facility-Administered Medications   Medication Dose Route Frequency Provider Last Rate Last Dose   • acetaminophen (TYLENOL) tablet 500 mg  500 mg Oral Q4H PRN Mejia Hatfield MD       • acetaminophen (TYLENOL) tablet 650 mg  650 mg Oral Q4H PRN Ian Grimes,    650 mg at 01/22/17 1757   • diltiaZEM (CARDIZEM) 125mg/125 mL infusion  5-15 mg/hr Intravenous Titrated Iliana Alexis MD   Stopped at 01/25/17 0149   • diltiaZEM (CARDIZEM) tablet 60 mg  60 mg Oral Q6H BHARATHI Portillo   60 mg at 01/26/17 0604   • diphenhydrAMINE (BENADRYL) capsule 25 mg  25 mg Oral Q4H PRN Mejia Hatfield MD        Or   • diphenhydrAMINE (BENADRYL) injection 25 mg   25 mg Intravenous Q4H PRN Mejia Hatfield MD       • diphenhydrAMINE (BENADRYL) capsule 25 mg  25 mg Oral Q4H PRN Mejia Hatfield MD        Or   • diphenhydrAMINE (BENADRYL) injection 25 mg  25 mg Intravenous Q4H PRN Mejia Hatfield MD       • heparin (porcine) 5000 UNIT/ML injection 7,950 Units  75 Units/kg Intravenous Once Mejia Hatfield MD   7,950 Units at 01/25/17 1345   • heparin (porcine) injection 1,800 Units  1,800 Units Intracatheter Once Mejia Hatfield MD   1,800 Units at 01/25/17 1346   • heparin (porcine) injection 1,800 Units  1,800 Units Intracatheter Once Mejia Hatfield MD   1,800 Units at 01/25/17 1346   • heparin (porcine) injection 1,800 Units  1,800 Units Intracatheter PRN Reinaldo Foley MD   1,800 Units at 01/25/17 1124   • heparin (porcine) injection 1,800 Units  1,800 Units Intracatheter PRN Reinaldo Foley MD   1,800 Units at 01/25/17 1124   • heparin (porcine) injection 3,600 Units  3,600 Units Intracatheter Once Mejia Hatfield MD   3,600 Units at 01/25/17 1347   • heparin (porcine) injection 3,600 Units  3,600 Units Intravenous Once Mejia Hatfield MD   3,600 Units at 01/25/17 1346   • hydrALAZINE (APRESOLINE) injection 10 mg  10 mg Intravenous Q4H PRN Iliana Alexis MD   10 mg at 01/23/17 3962   • HYDROcodone-acetaminophen (NORCO) 5-325 MG per tablet 1 tablet  1 tablet Oral Q4H PRN Ian Grimes DO       • HYDROmorphone (DILAUDID) injection 1 mg  1 mg Intravenous Q4H PRN Brandt Gordon DO   1 mg at 01/20/17 5124   • ipratropium-albuterol (DUO-NEB) nebulizer solution 3 mL  3 mL Nebulization Q4H PRN Johan Dinh MD       • metoprolol tartrate (LOPRESSOR) tablet 25 mg  25 mg Oral Q12H BHARATHI Cid   25 mg at 01/26/17 0857   • Morphine sulfate (PF) injection 2 mg  2 mg Intravenous Q4H PRN Ian Grimes DO        And   • naloxone (NARCAN) injection 0.4 mg  0.4 mg  Intravenous Q5 Min PRN Ian Grimes DO       • nicotine (NICODERM CQ) 21 MG/24HR patch 1 patch  1 patch Transdermal Nightly Johan Dinh MD   1 patch at 01/25/17 2028   • ondansetron (ZOFRAN) injection 4 mg  4 mg Intravenous Q6H PRN Johan Dinh MD       • predniSONE (DELTASONE) tablet 40 mg  40 mg Oral BID With Meals Neva Lane, APRN   40 mg at 01/26/17 0857   • sodium chloride 0.9 % bolus 100 mL  100 mL Intravenous PRN Mejia Hatfield MD       • sodium chloride 0.9 % flush 1-10 mL  1-10 mL Intravenous PRN Johan Dinh MD       • tamsulosin (FLOMAX) 24 hr capsule 0.4 mg  0.4 mg Oral Nightly Brandt Gordon DO   0.4 mg at 01/25/17 2025       Past Medical History:  Past Medical History   Diagnosis Date   • A-fib    • Chronic renal failure    • Elevated PSA    • Purpura        Past Surgical History:  Past Surgical History   Procedure Laterality Date   • Appendectomy     • Insertion hemodialysis catheter N/A 1/20/2017     Procedure: INSERTION PERMCATH;  Surgeon: Ian Grimes DO;  [facility]: [facility] OR;  Service:        Family History  History reviewed. No pertinent family history.    Social History  Social History     Social History   • Marital status:      Spouse name: N/A   • Number of children: N/A   • Years of education: N/A     Occupational History   • Not on file.     Social History Main Topics   • Smoking status: Heavy Tobacco Smoker   • Smokeless tobacco: Not on file   • Alcohol use Yes   • Drug use: Not on file   • Sexual activity: Not on file     Other Topics Concern   • Not on file     Social History Narrative         Review of Systems:  History obtained from chart review and the patient  General ROS: No fever or chills  Respiratory ROS: No cough, shortness of breath, wheezing  Cardiovascular ROS: no chest pain or dyspnea on exertion  Gastrointestinal ROS: No abdominal pain or melena  Genito-Urinary ROS: No dysuria or hematuria  14 point ROS reviewed with the patient and  "negative except as noted above and in the HPI unless unable to obtain.    Objective:  Visit Vitals   • /77 (BP Location: Left arm, Patient Position: Lying)   • Pulse 98   • Temp 98.3 °F (36.8 °C) (Tympanic)   • Resp 20   • Ht 73\" (185.4 cm)   • Wt 233 lb 9.6 oz (106 kg)   • SpO2 99%   • BMI 30.82 kg/m2     No intake or output data in the 24 hours ending 01/26/17 1210  General: awake/alert   Chest:  clear to auscultation bilaterally without respiratory distress  CVS: regular rate and rhythm  Abdominal: soft, nontender, normal bowel sounds  Extremities: no cyanosis or edema  Skin: warm and dry without rash      Labs:    Results from last 7 days  Lab Units 01/26/17  0509 01/25/17  0538 01/23/17  0418   WBC 10*3/mm3 14.15* 10.67 9.77   HEMOGLOBIN g/dL 10.1* 10.4* 9.7*   HEMATOCRIT % 30.9* 32.1* 30.2*   PLATELETS 10*3/mm3 189 193 168           Results from last 7 days  Lab Units 01/26/17  0509 01/25/17  0538 01/24/17  0506   SODIUM mmol/L 136 137 137   POTASSIUM mmol/L 4.2 4.0 3.8   CHLORIDE mmol/L 99 99 97*   TOTAL CO2 mmol/L 27.0 24.0 27.0   BUN mg/dL 58* 60* 53*   CREATININE mg/dL 2.80* 2.95* 2.74*   CALCIUM mg/dL 8.3* 8.0* 8.3*   GLUCOSE mg/dL 139* 210* 193*       Radiology:   Imaging Results (last 72 hours)     Procedure Component Value Units Date/Time    FL C Arm During Surgery [[id number]] Collected:  01/20/17 1623     Updated:  01/23/17 1557    Narrative:       Performed in surgery by Dr. Grimes. Please see operative report.     This report was finalized on 01/23/2017 15:55 by Dr. Ian Grimes MD.    IR Insert Tunneled CV Catheter Without Port 5 Plus [[id number]] Collected:  01/20/17 1623     Updated:  01/23/17 1557    Narrative:       Performed in surgery by Dr. Grimes. Please see operative report.     This report was finalized on 01/23/2017 15:55 by Dr. Ian Grimes MD.          Culture:  URINE CULTURE   Date Value Ref Range Status   01/23/2017 No growth at 2 days  Final         Assessment   1.  " Acute kidney injury; remains dialysis dependent  2.  CKD stage 3 as a baseline  3.  HTN  4.  Presumptive HSP    Plan:  1.  Renal biopsy today  2.  Dialysis in AM      BHARATHI Chun  1/26/2017  12:10 PM       Electronically signed by Reinaldo Foley MD at 1/26/2017  4:57 PM      BHARATHI Portillo at 1/26/2017  3:47 PM  Version 1 of 1    Attestation signed by Alex Cortez MD at 1/26/2017 10:29 PM        I have seen and evaluated this patient personally. I agree with the findings, assessment and plan as outlined by mid level provider. In addition, I have the following to add.    Face to face encounter 1/26/17 5 PM     LOS: 9 days   Patient Care Team:  Moises Montes MD as PCP - General (Internal Medicine)    Chief Complaint:  AF RVR  Subjective   Feeling much better  No chest pain or excessive shortness of breath  No new complaints    Interval History: Improved overall    Patient Complaints:  Chief Complaint   Patient presents with   • Blood in Urine   • Flank Pain   • Nausea     Denies chest pain currently. Denies excessive shortness of breath. Denies abdominal pain, nausea vomiting or diarrhoea.    Telemetry: no malignant arrhythmia. No significant pauses. Af RATE 100-120    History taken from: Patient and chart  Review of Systems:    The following systems were reviewed and negative; ENT, respiratory,  gastrointestinal, integument, hematologic / lymphatic, neurological, behavioral/psych and allergies / immunologic    Labs:    WBC WBC   Date Value Ref Range Status   01/26/2017 14.15 (H) 4.80 - 10.80 10*3/mm3 Final   01/25/2017 10.67 4.80 - 10.80 10*3/mm3 Final      HGB HEMOGLOBIN   Date Value Ref Range Status   01/26/2017 10.1 (L) 14.0 - 18.0 g/dL Final   01/25/2017 10.4 (L) 14.0 - 18.0 g/dL Final      HCT HEMATOCRIT   Date Value Ref Range Status   01/26/2017 30.9 (L) 40.0 - 52.0 % Final   01/25/2017 32.1 (L) 40.0 - 52.0 % Final      Platlets PLATELETS   Date Value Ref Range Status    01/26/2017 189 130 - 400 10*3/mm3 Final   01/25/2017 193 130 - 400 10*3/mm3 Final      MCV MCV   Date Value Ref Range Status   01/26/2017 80.5 (L) 82.0 - 95.0 fL Final   01/25/2017 80.3 (L) 82.0 - 95.0 fL Final        Results from last 7 days  Lab Units 01/26/17  0509 01/25/17  0538 01/24/17  0506   SODIUM mmol/L 136 137 137   POTASSIUM mmol/L 4.2 4.0 3.8   CHLORIDE mmol/L 99 99 97*   TOTAL CO2 mmol/L 27.0 24.0 27.0   BUN mg/dL 58* 60* 53*   CREATININE mg/dL 2.80* 2.95* 2.74*   CALCIUM mg/dL 8.3* 8.0* 8.3*   GLUCOSE mg/dL 139* 210* 193*   No results found for: CKTOTAL, CKMB, CKMBINDEX, TROPONINI, TROPONINT  PT/INR:    PROTIME   Date Value Ref Range Status   01/26/2017 13.9 11.9 - 14.6 Seconds Final   01/26/2017 13.3 11.9 - 14.6 Seconds Final   /  INR   Date Value Ref Range Status   01/26/2017 1.04 0.91 - 1.09 Final   01/26/2017 0.98 0.91 - 1.09 Final       Imaging Results (last 72 hours)     Procedure Component Value Units Date/Time    CT Needle Biopsy Kidney [[id number]] Collected:  01/26/17 1443     Updated:  01/26/17 1550    Narrative:       HISTORY: Acute kidney injury.     The risks of procedure were explained to the patient. Informed consent  was obtained.     Under sterile conditions, using 1% lidocaine as local anesthetic, and CT  guidance, a 17-gauge guide needle was placed into the posterior cortex  of the lower pole of the left kidney.     Two 2 cm 18-gauge cores were obtained, sectioned and placed in three  different vials as per standard procedure.     The patient tolerated the procedure well without immediate  complications.     Minimal blood loss observed, less than 1 mL.     Radiation dose equals  mGy-cm.  Automated exposure control dose  reduction technique was implemented.       Impression:       CT-guided renal biopsy performed as described above, without immediate,  complications.  This report was finalized on 01/26/2017 15:48 by Dr. Robby Park MD.          Objective     Medication  Review:   Current Facility-Administered Medications   Medication Dose Route Frequency Provider Last Rate Last Dose   • acetaminophen (TYLENOL) tablet 500 mg  500 mg Oral Q4H PRN Mejia Hatfield MD       • acetaminophen (TYLENOL) tablet 650 mg  650 mg Oral Q4H PRN Ian Grimes DO   650 mg at 01/22/17 1757   • diltiaZEM (CARDIZEM) 125mg/125 mL infusion  5-15 mg/hr Intravenous Titrated Iliana Alexis MD   Stopped at 01/25/17 0149   • diltiaZEM (CARDIZEM) tablet 90 mg  90 mg Oral Q6H BHARATHI Portillo   90 mg at 01/26/17 1716   • diphenhydrAMINE (BENADRYL) capsule 25 mg  25 mg Oral Q4H PRN Mejia Hatfield MD        Or   • diphenhydrAMINE (BENADRYL) injection 25 mg  25 mg Intravenous Q4H PRN Mejia Hatfield MD       • diphenhydrAMINE (BENADRYL) capsule 25 mg  25 mg Oral Q4H PRN Mejia Hatfield MD        Or   • diphenhydrAMINE (BENADRYL) injection 25 mg  25 mg Intravenous Q4H PRN Mejia Hatfield MD       • heparin (porcine) 5000 UNIT/ML injection 7,950 Units  75 Units/kg Intravenous Once Mejia Hatfield MD   7,950 Units at 01/25/17 1345   • heparin (porcine) injection 1,800 Units  1,800 Units Intracatheter Once Mejia Hatfield MD   1,800 Units at 01/25/17 1346   • heparin (porcine) injection 1,800 Units  1,800 Units Intracatheter Once Mejia Hatfield MD   1,800 Units at 01/25/17 1346   • heparin (porcine) injection 1,800 Units  1,800 Units Intracatheter PRN Reinaldo Foley MD   1,800 Units at 01/25/17 1124   • heparin (porcine) injection 1,800 Units  1,800 Units Intracatheter PRN Reinaldo Foley MD   1,800 Units at 01/25/17 1124   • heparin (porcine) injection 3,600 Units  3,600 Units Intracatheter Once Mejia Hatfield MD   3,600 Units at 01/25/17 1347   • heparin (porcine) injection 3,600 Units  3,600 Units Intravenous Once Mejia Hatfield MD   3,600 Units at 01/25/17 1346   • hydrALAZINE  "(APRESOLINE) injection 10 mg  10 mg Intravenous Q4H PRN Iliana Alexis MD   10 mg at 01/23/17 2359   • HYDROcodone-acetaminophen (NORCO) 5-325 MG per tablet 1 tablet  1 tablet Oral Q4H PRN Ian Grimes DO       • HYDROmorphone (DILAUDID) injection 1 mg  1 mg Intravenous Q4H PRN Brandt Gordon DO   1 mg at 01/20/17 8209   • ipratropium-albuterol (DUO-NEB) nebulizer solution 3 mL  3 mL Nebulization Q4H PRN Johan Dinh MD       • metoprolol tartrate (LOPRESSOR) tablet 25 mg  25 mg Oral Q12H Jahaira Li, APRN   25 mg at 01/26/17 2138   • Morphine sulfate (PF) injection 2 mg  2 mg Intravenous Q4H PRN Ian Grimes DO        And   • naloxone (NARCAN) injection 0.4 mg  0.4 mg Intravenous Q5 Min PRN Ian Grimes DO       • nicotine (NICODERM CQ) 21 MG/24HR patch 1 patch  1 patch Transdermal Nightly Johan Dinh MD   1 patch at 01/26/17 2138   • ondansetron (ZOFRAN) injection 4 mg  4 mg Intravenous Q6H PRN Johan Dinh MD       • predniSONE (DELTASONE) tablet 40 mg  40 mg Oral BID With Meals Neva Lane, APRN   40 mg at 01/26/17 1716   • sodium chloride 0.9 % bolus 100 mL  100 mL Intravenous PRN Mejia Hatfield MD       • sodium chloride 0.9 % flush 1-10 mL  1-10 mL Intravenous PRN Johan Dnih MD       • tamsulosin (FLOMAX) 24 hr capsule 0.4 mg  0.4 mg Oral Nightly Brandt Gordon DO   0.4 mg at 01/26/17 2137       Vital Sign Min/Max for last 24 hours  Temp  Min: 97.9 °F (36.6 °C)  Max: 98.6 °F (37 °C)   BP  Min: 120/67  Max: 143/95   Pulse  Min: 65  Max: 117   Resp  Min: 18  Max: 20   SpO2  Min: 96 %  Max: 100 %   No Data Recorded   Weight  Min: 233 lb 4 oz (106 kg)  Max: 233 lb 4 oz (106 kg)     Flowsheet Rows         First Filed Value    Admission Height  73\" (185.4 cm) Documented at 01/17/2017 0019    Admission Weight  219 lb (99.3 kg) Documented at 01/17/2017 0019          Physical Exam:  Ears ears appear intact with no abnormalities noted  Nose nares normal, septum midline, " "mucosa normal and no drainage  Neck suppple, trachea midline, no thyromegaly, no carotid bruit and no JVD  Back no kyphosis present, no scoliosis present, no skin lesions, erythema, or scars, no tenderness to percussion or palpation and range of motion normal  Lungs respirations regular, respirations even and respirations unlabored  Heart normal S1, S2, no murmur, no james, no rub and no click  Abdomen normal bowel sounds, no masses, no hepatomegaly, no splenomegaly, no guarding and no rebound tenderness  Skin no bleeding, bruising or rash     Results Review:   I reviewed the patient's new clinical results.  I reviewed the patient's new imaging results and agree with the interpretation.  I reviewed the patient's other test results and agree with the interpretation  I personally viewed and interpreted the patient's EKG/Telemetry data  Discussed with patient    Medication Review: Performed    Assessment/Plan     Principal Problem:    Henoch-Schonlein purpura nephritis  Active Problems:    Tobacco abuse    Paroxysmal atrial fibrillation    Elevated PSA    Nephrolithiasis    Microcytic anemia    Hypertensive nephrosclerosis    Moderate mitral regurgitation    Plan  Agree with rate control   Supportive care  Telemetry  Optimal medical therapy  Deep vein thrombosis prophylaxis/precautions  Will follow     Alxe Cortez MD  01/26/17  10:27 PM                                       [facility] -  Progress Note     LOS: 9 days   Patient Care Team:  Moises Montes MD as PCP - General (Internal Medicine)      Reason for consultation: A-Fib RVR     Subjective     Interval History:     \"I'm feeling better today\". Denies cardiac concerns at this time.       Review of Systems:   Review of Systems   Constitution: Negative for diaphoresis, fever, weakness and malaise/fatigue.   Cardiovascular: Positive for leg swelling (\"Better\"). Negative for chest pain, dyspnea on exertion, irregular heartbeat, near-syncope, " orthopnea, palpitations, paroxysmal nocturnal dyspnea and syncope.   Respiratory: Negative for cough, shortness of breath and sleep disturbances due to breathing.    Hematologic/Lymphatic: Negative for bleeding problem.   Skin: Negative for rash.        BLE purpura- improving   Gastrointestinal: Negative for bloating, abdominal pain, nausea and vomiting.   Psychiatric/Behavioral: Negative for altered mental status. The patient is not nervous/anxious.         Objective     Vital Sign Min/Max for last 24 hours  Temp  Min: 97.9 °F (36.6 °C)  Max: 98.4 °F (36.9 °C)   BP  Min: 120/67  Max: 143/95   Pulse  Min: 65  Max: 115   Resp  Min: 18  Max: 20   SpO2  Min: 96 %  Max: 100 %   Flow (L/min)  Min: 2  Max: 2   Weight  Min: 233 lb 9.6 oz (106 kg)  Max: 233 lb 9.6 oz (106 kg)     Last Weight    01/25/17 2000   Weight: 233 lb 9.6 oz (106 kg)         Intake/Output Summary (Last 24 hours) at 01/26/17 1601  Last data filed at 01/26/17 1448   Gross per 24 hour   Intake      0 ml   Output    300 ml   Net   -300 ml         Physical Exam:  Physical Exam   Constitutional: He is oriented to person, place, and time. He appears well-developed and well-nourished. No distress.   HENT:   Head: Normocephalic and atraumatic.   Cardiovascular: Intact distal pulses.  An irregularly irregular rhythm present. Tachycardia present.    Murmur heard.  Telemetry: A-fib  BPM    Pulmonary/Chest: Effort normal and breath sounds normal.   Musculoskeletal: He exhibits no edema.   Neurological: He is alert and oriented to person, place, and time.   Skin: Skin is warm and dry. Purpura (BLE- improving) and rash noted. He is not diaphoretic.   Psychiatric: He has a normal mood and affect. His speech is normal and behavior is normal. reviewed.       Results Review:   Lab Results   Component Value Date    WBC 14.15 (H) 01/26/2017    HGB 10.1 (L) 01/26/2017    HCT 30.9 (L) 01/26/2017    MCV 80.5 (L) 01/26/2017     01/26/2017     Lab Results    Component Value Date    GLUCOSE 139 (H) 01/26/2017    CALCIUM 8.3 (L) 01/26/2017     01/26/2017    K 4.2 01/26/2017    CO2 27.0 01/26/2017    CL 99 01/26/2017    BUN 58 (H) 01/26/2017    CREATININE 2.80 (H) 01/26/2017    EGFRIFNONA 24 (L) 01/26/2017    BCR 20.7 01/26/2017    ANIONGAP 10.0 01/26/2017            Echo EF Estimated  Lab Results   Component Value Date    ECHOEFEST 65 01/17/2017         Medication Review: yes  Current Facility-Administered Medications   Medication Dose Route Frequency Provider Last Rate Last Dose   • acetaminophen (TYLENOL) tablet 500 mg  500 mg Oral Q4H PRN Mejia Hatfield MD       • acetaminophen (TYLENOL) tablet 650 mg  650 mg Oral Q4H PRN Ian Grimes, DO   650 mg at 01/22/17 8529   • diltiaZEM (CARDIZEM) 125mg/125 mL infusion  5-15 mg/hr Intravenous Titrated Iliana Alexis MD   Stopped at 01/25/17 0149   • diltiaZEM (CARDIZEM) tablet 90 mg  90 mg Oral Q6H BHARATHI Portillo       • diphenhydrAMINE (BENADRYL) capsule 25 mg  25 mg Oral Q4H PRN Mejia Hatfield MD        Or   • diphenhydrAMINE (BENADRYL) injection 25 mg  25 mg Intravenous Q4H PRN Mejia Hatfield MD       • diphenhydrAMINE (BENADRYL) capsule 25 mg  25 mg Oral Q4H PRN Mejia Hatfield MD        Or   • diphenhydrAMINE (BENADRYL) injection 25 mg  25 mg Intravenous Q4H PRN Mejia Hatfield MD       • heparin (porcine) 5000 UNIT/ML injection 7,950 Units  75 Units/kg Intravenous Once Mejia Hatfield MD   7,950 Units at 01/25/17 1345   • heparin (porcine) injection 1,800 Units  1,800 Units Intracatheter Once Mejia Hatfield MD   1,800 Units at 01/25/17 1346   • heparin (porcine) injection 1,800 Units  1,800 Units Intracatheter Once Mejia Hatfield MD   1,800 Units at 01/25/17 1346   • heparin (porcine) injection 1,800 Units  1,800 Units Intracatheter MIKALAN Reinaldo Foley MD   1,800 Units at 01/25/17 1124   • heparin (porcine)  injection 1,800 Units  1,800 Units Intracatheter PRN Reinaldo Foley MD   1,800 Units at 01/25/17 1124   • heparin (porcine) injection 3,600 Units  3,600 Units Intracatheter Once Mejia Hatfield MD   3,600 Units at 01/25/17 1347   • heparin (porcine) injection 3,600 Units  3,600 Units Intravenous Once Mejia Hatfield MD   3,600 Units at 01/25/17 1346   • hydrALAZINE (APRESOLINE) injection 10 mg  10 mg Intravenous Q4H PRN Iliana Alexis MD   10 mg at 01/23/17 3179   • HYDROcodone-acetaminophen (NORCO) 5-325 MG per tablet 1 tablet  1 tablet Oral Q4H PRN Ian Grimes DO       • HYDROmorphone (DILAUDID) injection 1 mg  1 mg Intravenous Q4H PRN Brandt Gordon DO   1 mg at 01/20/17 0449   • ipratropium-albuterol (DUO-NEB) nebulizer solution 3 mL  3 mL Nebulization Q4H PRN Johan Dinh MD       • metoprolol tartrate (LOPRESSOR) tablet 25 mg  25 mg Oral Q12H Jahaira Li, APRN   25 mg at 01/26/17 0857   • Morphine sulfate (PF) injection 2 mg  2 mg Intravenous Q4H PRN Ian Grimes DO        And   • naloxone (NARCAN) injection 0.4 mg  0.4 mg Intravenous Q5 Min PRN Ian Grimes DO       • nicotine (NICODERM CQ) 21 MG/24HR patch 1 patch  1 patch Transdermal Nightly Johan Dinh MD   1 patch at 01/25/17 2028   • ondansetron (ZOFRAN) injection 4 mg  4 mg Intravenous Q6H PRN Johan Dinh MD       • predniSONE (DELTASONE) tablet 40 mg  40 mg Oral BID With Meals Neva Lane, APRN   40 mg at 01/26/17 0857   • sodium chloride 0.9 % bolus 100 mL  100 mL Intravenous PRN Mejia Hatfield MD       • sodium chloride 0.9 % flush 1-10 mL  1-10 mL Intravenous PRN Johan Dinh MD       • tamsulosin (FLOMAX) 24 hr capsule 0.4 mg  0.4 mg Oral Nightly Brandt Gordon DO   0.4 mg at 01/25/17 2025         Assessment&#47;Plan     Principal Problem:    Henoch-Schonlein purpura nephritis    Active Problems:    Hypertensive nephrosclerosis    Paroxysmal atrial fibrillation,  currently A-Fib, rate has improved.     Nephrolithiasis    Tobacco abuse    Elevated PSA    Microcytic anemia    Moderate mitral regurgitation      Plan   1. Dialysis planned for the am, M-W-F  2. Renal biopsy- pending  3. Monitor telemetry  4. BMP in am   5. Continue Metoprolol and Cardizem. Increase Cardizem to 90mg PO Q6H. Will increase BB if needed and if BP will tolerate.   6. Defer anticoagulation to Dr. Dana Alexis, APRJACK  01/26/17  4:01 PM                 Electronically signed by Alex Cortez MD at 1/26/2017 10:29 PM

## 2017-01-27 NOTE — PROGRESS NOTES
Continued Stay Note   Jeramn     Patient Name: Gurjit Andrea  MRN: [id number]  Today's Date: 1/27/2017    Admit Date: 1/17/2017          Discharge Plan       01/27/17 0931    Case Management/Social Work Plan    Plan Home    Additional Comments Provided pt schedule for outpt dialysis at [facility]. They have his first tx scheduled for TUE Jan 31st at 6 15am.  Unit manager (Celestina) is wanting to know anticipated dc date.               Discharge Codes     None            LAUREN Nguyen

## 2017-01-27 NOTE — PROGRESS NOTES
Nephrology ([facility]) Consult Note      Patient:  Gurjit Andrea  YOB: 1970  Date of Service: 1/27/2017  MRN: [id number]   Acct: [id number]   Primary Care Physician: Moises Montes MD  Advance Directive: Full Code  Admit Date: 1/17/2017       Hospital Day: 10  Referring Provider: Johan Dinh MD      Patient Seen, Chart, Consults, Notes, Labs, Radiology studies reviewed.        Subjective:  Gurjit Andrea is a 47 y.o. male  whom we were consulted for acute kidney injury. Baseline of CKD stage 3, GFR in 50s. Had followed with nephrologist years ago. 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 S/p punch biopsy here. Permcath placed 1/21; tolerating dialysis well. Kidney biopsy yesterday.  No new issues today.    Allergies:  Review of patient's allergies indicates no known allergies.    Home Meds:  Prescriptions Prior to Admission   Medication Sig Dispense Refill Last Dose   • ciprofloxacin (CIPRO) 500 MG tablet Take 1 tablet by mouth 2 times daily Begin taking the day before the biopsy is scheduled.      • lisinopril-hydrochlorothiazide (PRINZIDE,ZESTORETIC) 10-12.5 MG per tablet 1 tablet Daily.      • MethylPREDNISolone (MEDROL, LUIS ANGEL,) 4 MG tablet Take  by mouth 2 (Two) Times a Day. Take as directed on package instructions.      • metoprolol tartrate (LOPRESSOR) 25 MG tablet 25 mg 2 (Two) Times a Day.      • albuterol (PROVENTIL HFA;VENTOLIN HFA) 108 (90 BASE) MCG/ACT inhaler Every 6 (Six) Hours.          Medicines:  Current Facility-Administered Medications   Medication Dose Route Frequency Provider Last Rate Last Dose   • acetaminophen (TYLENOL) tablet 500 mg  500 mg Oral Q4H PRN Mejia Hatfield MD       • acetaminophen (TYLENOL) tablet 650 mg  650 mg Oral Q4H PRN Ian Grimes DO   650 mg at 01/22/17 5697   • diltiaZEM (CARDIZEM) 125mg/125 mL infusion  5-15 mg/hr Intravenous Titrated Iliana Alexis MD   Stopped at 01/25/17  0149   • diltiaZEM (CARDIZEM) tablet 90 mg  90 mg Oral Q6H BHARATHI Portillo   90 mg at 01/27/17 0552   • diphenhydrAMINE (BENADRYL) capsule 25 mg  25 mg Oral Q4H PRN Mejia Hatfield MD        Or   • diphenhydrAMINE (BENADRYL) injection 25 mg  25 mg Intravenous Q4H PRN Mejia Hatfield MD       • diphenhydrAMINE (BENADRYL) capsule 25 mg  25 mg Oral Q4H PRN Mejia Hatfield MD        Or   • diphenhydrAMINE (BENADRYL) injection 25 mg  25 mg Intravenous Q4H PRN Mejia Hatfield MD       • heparin (porcine) 5000 UNIT/ML injection 7,950 Units  75 Units/kg Intravenous Once Mejia Hatfield MD   7,950 Units at 01/25/17 1345   • heparin (porcine) injection 1,800 Units  1,800 Units Intracatheter Once Mejia Hatfield MD   1,800 Units at 01/25/17 1346   • heparin (porcine) injection 1,800 Units  1,800 Units Intracatheter Once Mejia Hatfield MD   1,800 Units at 01/25/17 1346   • heparin (porcine) injection 1,800 Units  1,800 Units Intracatheter PRN Reinaldo Foley MD   1,800 Units at 01/25/17 1124   • heparin (porcine) injection 1,800 Units  1,800 Units Intracatheter PRN Reinaldo Foley MD   1,800 Units at 01/25/17 1124   • heparin (porcine) injection 3,600 Units  3,600 Units Intracatheter Once Mejia Hatfield MD   3,600 Units at 01/25/17 1347   • heparin (porcine) injection 3,600 Units  3,600 Units Intravenous Once Mejia Hatfield MD   3,600 Units at 01/25/17 1346   • hydrALAZINE (APRESOLINE) injection 10 mg  10 mg Intravenous Q4H PRN Iliana Alexis MD   10 mg at 01/23/17 0541   • HYDROcodone-acetaminophen (NORCO) 5-325 MG per tablet 1 tablet  1 tablet Oral Q4H PRN Ian Grimes DO       • HYDROmorphone (DILAUDID) injection 1 mg  1 mg Intravenous Q4H PRN Brandt Gordon DO   1 mg at 01/20/17 0449   • ipratropium-albuterol (DUO-NEB) nebulizer solution 3 mL  3 mL Nebulization Q4H PRN Johan Dinh MD       •  metoprolol tartrate (LOPRESSOR) tablet 25 mg  25 mg Oral Q12H Jahaira Li, APRN   25 mg at 01/26/17 2138   • Morphine sulfate (PF) injection 2 mg  2 mg Intravenous Q4H PRN Ian Grimes DO        And   • naloxone (NARCAN) injection 0.4 mg  0.4 mg Intravenous Q5 Min PRN Ian Grimes DO       • nicotine (NICODERM CQ) 21 MG/24HR patch 1 patch  1 patch Transdermal Nightly Johan Dinh MD   1 patch at 01/26/17 2138   • ondansetron (ZOFRAN) injection 4 mg  4 mg Intravenous Q6H PRN Johan Dinh MD       • predniSONE (DELTASONE) tablet 40 mg  40 mg Oral BID With Meals Neva Lane, APRN   40 mg at 01/27/17 0947   • sodium chloride 0.9 % bolus 100 mL  100 mL Intravenous PRN Mejia Hatfield MD       • sodium chloride 0.9 % flush 1-10 mL  1-10 mL Intravenous PRN Johan Dinh MD       • tamsulosin (FLOMAX) 24 hr capsule 0.4 mg  0.4 mg Oral Nightly Brandt Gordon,    0.4 mg at 01/26/17 2137       Past Medical History:  Past Medical History   Diagnosis Date   • A-fib    • Chronic renal failure    • Elevated PSA    • Purpura        Past Surgical History:  Past Surgical History   Procedure Laterality Date   • Appendectomy     • Insertion hemodialysis catheter N/A 1/20/2017     Procedure: INSERTION PERMCATH;  Surgeon: Ian Grimes DO;  Location: [facility] OR;  Service:        Family History  History reviewed. No pertinent family history.    Social History  Social History     Social History   • Marital status:      Spouse name: N/A   • Number of children: N/A   • Years of education: N/A     Occupational History   • Not on file.     Social History Main Topics   • Smoking status: Heavy Tobacco Smoker   • Smokeless tobacco: Not on file   • Alcohol use Yes   • Drug use: Not on file   • Sexual activity: Not on file     Other Topics Concern   • Not on file     Social History Narrative         Review of Systems:  History obtained from chart review and the patient  General ROS: No fever or  "chills  Respiratory ROS: No cough, shortness of breath, wheezing  Cardiovascular ROS: no chest pain or dyspnea on exertion  Gastrointestinal ROS: No abdominal pain or melena  Genito-Urinary ROS: No dysuria or hematuria  14 point ROS reviewed with the patient and negative except as noted above and in the HPI unless unable to obtain.    Objective:  Visit Vitals   • /75 (BP Location: Left arm, Patient Position: Lying)   • Pulse 98   • Temp 98.1 °F (36.7 °C) (Tympanic)   • Resp 20   • Ht 73\" (185.4 cm)   • Wt 233 lb 4 oz (106 kg)   • SpO2 98%   • BMI 30.77 kg/m2       Intake/Output Summary (Last 24 hours) at 01/27/17 1045  Last data filed at 01/26/17 2027   Gross per 24 hour   Intake    360 ml   Output    450 ml   Net    -90 ml     General: awake/alert   Chest:  clear to auscultation bilaterally without respiratory distress  CVS: regular rate and rhythm  Abdominal: soft, nontender, normal bowel sounds  Extremities: no cyanosis or edema  Skin: warm and dry without rash      Labs:    Results from last 7 days  Lab Units 01/26/17  0509 01/25/17  0538 01/23/17  0418   WBC 10*3/mm3 14.15* 10.67 9.77   HEMOGLOBIN g/dL 10.1* 10.4* 9.7*   HEMATOCRIT % 30.9* 32.1* 30.2*   PLATELETS 10*3/mm3 189 193 168           Results from last 7 days  Lab Units 01/27/17  0433 01/26/17  0509 01/25/17  0538   SODIUM mmol/L 138 136 137   POTASSIUM mmol/L 4.0 4.2 4.0   CHLORIDE mmol/L 99 99 99   TOTAL CO2 mmol/L 26.0 27.0 24.0   BUN mg/dL 76* 58* 60*   CREATININE mg/dL 3.22* 2.80* 2.95*   CALCIUM mg/dL 8.1* 8.3* 8.0*   GLUCOSE mg/dL 211* 139* 210*       Radiology:   Imaging Results (last 72 hours)     Procedure Component Value Units Date/Time    CT Needle Biopsy Kidney [[id number]] Collected:  01/26/17 1443     Updated:  01/26/17 1550    Narrative:       HISTORY: Acute kidney injury.     The risks of procedure were explained to the patient. Informed consent  was obtained.     Under sterile conditions, using 1% lidocaine as local anesthetic, " and CT  guidance, a 17-gauge guide needle was placed into the posterior cortex  of the lower pole of the left kidney.     Two 2 cm 18-gauge cores were obtained, sectioned and placed in three  different vials as per standard procedure.     The patient tolerated the procedure well without immediate  complications.     Minimal blood loss observed, less than 1 mL.     Radiation dose equals  mGy-cm.  Automated exposure control dose  reduction technique was implemented.       Impression:       CT-guided renal biopsy performed as described above, without immediate,  complications.  This report was finalized on 01/26/2017 15:48 by Dr. Robby Park MD.          Culture:  URINE CULTURE   Date Value Ref Range Status   01/23/2017 No growth at 2 days  Final         Assessment   1. Acute kidney injury;  dialysis today, kidney biopsy results pending  2. CKD stage 3 as a baseline  3. Right nephrolithiasis--nonobstructing  4. HTN  5. 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 Will continue empiric steroids.     Plan:  1.  Dialysis today  2.  Pt has chair time at [facility] to begin Tuesday 1/31.      Charlie Jacobo, APRN  1/27/2017  10:45 AM

## 2017-01-27 NOTE — DISCHARGE SUMMARY
[facility] ER Medicine Services  DISCHARGE SUMMARY       Date of Admission: 1/17/2017  Date of Discharge:  1/27/2017  Primary Care Physician: Moises Montes MD    Discharge Diagnoses:  Hospital Problem List     * (Principal)Henoch-Schonlein purpura nephritis    Tobacco abuse    Paroxysmal atrial fibrillation    Elevated PSA    Nephrolithiasis    Microcytic anemia    Hypertensive nephrosclerosis    Moderate mitral regurgitation          Procedures Performed: Perma cath placement 1/20/2017; punch biopsy right inner thigh 1/19/2017; left kidney biopsy 1/26/2017    Pertinent Test Results:   Lab Results (all)     Procedure Component Value Units Date/Time    Urinalysis With / Culture If Indicated [[id number]]  (Abnormal) Collected:  01/17/17 0044    Specimen:  Urine from Urine, Clean Catch Updated:  01/17/17 0129     Color, UA Red (A)      Appearance, UA Clear      pH, UA 6.5      Specific Gravity, UA 1.014      Glucose, UA Negative      Ketones, UA Negative      Bilirubin, UA Negative      Blood, UA Large (3+) (A)      Protein,  mg/dL (2+) (A)      Leuk Esterase, UA Small (1+) (A)      Nitrite, UA Negative      Urobilinogen, UA 0.2 E.U./dL     Urinalysis, Microscopic Only [[id number]]  (Abnormal) Collected:  01/17/17 0044    Specimen:  Urine from Urine, Clean Catch Updated:  01/17/17 0129     RBC, UA Too Numerous to Count (A) /HPF      WBC, UA 6-12 (A) /HPF      Bacteria, UA None Seen /HPF      Squamous Epithelial Cells, UA 0-2 /HPF      Hyaline Casts, UA 0-2 /LPF      Methodology Manual Light Microscopy     CBC Auto Differential [[id number]]  (Abnormal) Collected:  01/17/17 0115    Specimen:  Blood Updated:  01/17/17 0130     WBC 11.21 (H) 10*3/mm3      RBC 4.17 (L) 10*6/mm3      Hemoglobin 10.9 (L) g/dL      Hematocrit 33.4 (L) %      MCV 80.1 (L) fL      MCH 26.1 (L) pg      MCHC 32.6 (L) g/dL      RDW 16.6 (H) %      RDW-SD 48.5 fl      MPV 9.3 fL      Platelets 133 10*3/mm3       Neutrophil % 69.0 %      Lymphocyte % 23.1 %      Monocyte % 7.5 %      Eosinophil % 0.0 %      Basophil % 0.2 %      Immature Grans % 0.2 %      Neutrophils, Absolute 7.74 10*3/mm3      Lymphocytes, Absolute 2.59 10*3/mm3      Monocytes, Absolute 0.84 10*3/mm3      Eosinophils, Absolute 0.00 10*3/mm3      Basophils, Absolute 0.02 10*3/mm3      Immature Grans, Absolute 0.02 10*3/mm3     Comprehensive Metabolic Panel [[id number]]  (Abnormal) Collected:  01/17/17 0115    Specimen:  Blood Updated:  01/17/17 0142     Glucose 80 mg/dL       (H) mg/dL      Creatinine 5.69 (H) mg/dL      Sodium 138 mmol/L      Potassium 4.7 mmol/L      Chloride 98 mmol/L      CO2 24.0 mmol/L      Calcium 8.8 mg/dL      Total Protein 7.7 g/dL      Albumin 3.70 g/dL      ALT (SGPT) 61 (H) U/L      AST (SGOT) 36 U/L      Alkaline Phosphatase 92 U/L      Total Bilirubin 0.7 mg/dL      eGFR Non African Amer 11 (L) mL/min/1.73      Globulin 4.0 gm/dL      A/G Ratio 0.9 (L) g/dL      BUN/Creatinine Ratio 19.2      Anion Gap 16.0 (H) mmol/L     Basic Metabolic Panel [[id number]]  (Abnormal) Collected:  01/17/17 1524    Specimen:  Blood Updated:  01/17/17 1559     Glucose 121 (H) mg/dL      BUN 99 (H) mg/dL      Creatinine 5.57 (H) mg/dL      Sodium 136 mmol/L      Potassium 5.3 mmol/L      Chloride 96 (L) mmol/L      CO2 23.0 (L) mmol/L      Calcium 8.6 mg/dL      eGFR Non African Amer 11 (L) mL/min/1.73      BUN/Creatinine Ratio 17.8      Anion Gap 17.0 (H) mmol/L     Iron Profile [[id number]]  (Abnormal) Collected:  01/18/17 1104    Specimen:  Blood Updated:  01/18/17 1147     Iron 117 mcg/dL      TIBC 254 mcg/dL      Iron Saturation 46 (H) %     Ferritin [[id number]]  (Normal) Collected:  01/18/17 1104    Specimen:  Blood Updated:  01/18/17 1214     Ferritin 169.00 ng/mL     Folate [[id number]] Collected:  01/18/17 1104    Specimen:  Blood Updated:  01/18/17 1244     Folate 10.50 ng/mL     Vitamin B12 [[id number]]  (Normal) Collected:  01/18/17 1104     Specimen:  Blood Updated:  01/18/17 1244     Vitamin B-12 579 pg/mL     Urinalysis With / Microscopic If Indicated [[id number]]  (Abnormal) Collected:  01/18/17 1216    Specimen:  Urine from Urine, Clean Catch Updated:  01/18/17 1307     Color, UA Orange (A)      Appearance, UA Cloudy (A)      pH, UA <=5.0      Specific Gravity, UA 1.016      Glucose,  mg/dL (2+) (A)      Ketones, UA Negative      Bilirubin, UA Negative      Blood, UA Large (3+) (A)      Protein,  mg/dL (2+) (A)      Leuk Esterase, UA Small (1+) (A)      Nitrite, UA Negative      Urobilinogen, UA 0.2 E.U./dL     Narrative:       Dipstick results may be inaccurate due to color interference.    Urinalysis, Microscopic Only [[id number]]  (Abnormal) Collected:  01/18/17 1216    Specimen:  Urine from Urine, Clean Catch Updated:  01/18/17 1307     RBC, UA Too Numerous to Count (A) /HPF      WBC, UA 6-12 (A) /HPF      Bacteria, UA 1+ (A) /HPF      Squamous Epithelial Cells, UA 3-6 (A) /HPF      Methodology Manual Light Microscopy     Creatinine, Urine, Random [[id number]] Collected:  01/18/17 1216    Specimen:  Urine from Urine, Clean Catch Updated:  01/18/17 1359     Creatinine, Urine 43.7 mg/dL     Sodium, Urine, Random [[id number]]  (Normal) Collected:  01/18/17 1216    Specimen:  Urine from Urine, Clean Catch Updated:  01/18/17 1359     Sodium, Urine 51 mmol/L     Urea Nitrogen, Urine [[id number]] Collected:  01/18/17 1216    Specimen:  Urine from Urine, Clean Catch Updated:  01/18/17 1359     Urea Nitrogen, Urine 581 mg/dL     Urine Culture [[id number]]  (Normal) Collected:  01/17/17 0044    Specimen:  Urine from Urine, Clean Catch Updated:  01/19/17 0635     Urine Culture No growth at 2 days     Protein, Urine, Random [[id number]] Collected:  01/18/17 1216    Specimen:  Urine from Urine, Clean Catch Updated:  01/19/17 1146     Total Protein, Urine -- mg/dL     Protime-INR [[id number]]  (Abnormal) Collected:  01/20/17 0456    Specimen:  Blood Updated:   01/20/17 0550     Protime 14.8 (H) Seconds      INR 1.12 (H)     Hepatitis B Surface Antigen [[id number]]  (Normal) Collected:  01/20/17 1203    Specimen:  Blood Updated:  01/20/17 1257     Hepatitis B Surface Ag Negative     Hepatitis B Surface Antibody [[id number]]  (Abnormal) Collected:  01/20/17 1203    Specimen:  Blood Updated:  01/20/17 1315     Hepatitis Bs Ab Index <5.00      Hep B S Ab Non-Immune (A)     Urinalysis With / Culture If Indicated [[id number]]  (Abnormal) Collected:  01/23/17 1413    Specimen:  Urine from Urine, Clean Catch Updated:  01/23/17 1434     Color, UA Orange (A)      Appearance, UA Cloudy (A)      pH, UA 5.5      Specific Gravity, UA 1.008      Glucose,  mg/dL (Trace) (A)      Ketones, UA Negative      Bilirubin, UA Negative      Blood, UA Large (3+) (A)      Protein, UA 30 mg/dL (1+) (A)      Leuk Esterase, UA Moderate (2+) (A)      Nitrite, UA Negative      Urobilinogen, UA 0.2 E.U./dL     Narrative:       Dipstick results may be inaccurate due to color interference.    Urinalysis, Microscopic Only [[id number]]  (Abnormal) Collected:  01/23/17 1413    Specimen:  Urine from Urine, Clean Catch Updated:  01/23/17 1434     RBC, UA Too Numerous to Count (A) /HPF      WBC, UA 21-30 (A) /HPF      Bacteria, UA None Seen /HPF      Squamous Epithelial Cells, UA 0-2 /HPF      Hyaline Casts, UA 0-2 /LPF      Methodology Automated Microscopy     Tissue Exam [[id number]] Collected:  01/19/17 1208    Specimen:  Tissue from Leg, Right Updated:  01/24/17 1010     Pre-Op Diagnosis:      Right medial thigh just above the knee lesion.         Final Diagnosis --      Skin, right medial thigh, punch biopsy:       Benign skin with superficial hemorrhage.                                      1      Magnesium [[id number]]  (Normal) Collected:  01/24/17 0506    Specimen:  Blood Updated:  01/24/17 1344     Magnesium 1.6 mg/dL     Phosphorus [[id number]]  (Normal) Collected:  01/24/17 2357    Specimen:  Blood Updated:   01/25/17 0107     Phosphorus 4.2 mg/dL     Urine Culture [[id number]]  (Normal) Collected:  01/23/17 1413    Specimen:  Urine from Urine, Clean Catch Updated:  01/25/17 0653     Urine Culture No growth at 2 days     Protime-INR [[id number]]  (Normal) Collected:  01/26/17 1106    Specimen:  Blood Updated:  01/26/17 1138     Protime 13.9 Seconds      INR 1.04     aPTT [[id number]]  (Normal) Collected:  01/26/17 1106    Specimen:  Blood Updated:  01/26/17 1138     PTT 30.6 seconds     Phosphorus [[id number]]  (Normal) Collected:  01/27/17 0019    Specimen:  Blood Updated:  01/27/17 0123     Phosphorus 3.2 mg/dL     Basic Metabolic Panel [[id number]]  (Abnormal) Collected:  01/27/17 0433    Specimen:  Blood Updated:  01/27/17 0519     Glucose 211 (H) mg/dL      BUN 76 (H) mg/dL      Creatinine 3.22 (H) mg/dL      Sodium 138 mmol/L      Potassium 4.0 mmol/L      Chloride 99 mmol/L      CO2 26.0 mmol/L      Calcium 8.1 (L) mg/dL      eGFR Non African Amer 21 (L) mL/min/1.73      BUN/Creatinine Ratio 23.6      Anion Gap 13.0 mmol/L     Narrative:       GFR Normal >60  Chronic Kidney Disease <60  Kidney Failure <15           Ref. Range 1/26/2017 05:09   WBC Latest Ref Range: 4.80 - 10.80 10*3/mm3 14.15 (H)   RBC Latest Ref Range: 4.80 - 5.90 10*6/mm3 3.84 (L)   Hemoglobin Latest Ref Range: 14.0 - 18.0 g/dL 10.1 (L)   Hematocrit Latest Ref Range: 40.0 - 52.0 % 30.9 (L)   RDW Latest Ref Range: 12.0 - 15.0 % 15.7 (H)   MCV Latest Ref Range: 82.0 - 95.0 fL 80.5 (L)   MCH Latest Ref Range: 28.0 - 32.0 pg 26.3 (L)   MCHC Latest Ref Range: 33.0 - 36.0 g/dL 32.7 (L)   MPV Latest Ref Range: 6.0 - 12.0 fL 9.4   Platelets Latest Ref Range: 130 - 400 10*3/mm3 189   RDW-SD Latest Ref Range: 40.0 - 54.0 fl 46.1     Imaging Results (all)     Procedure Component Value Units Date/Time    CT Abdomen Pelvis Without Contrast [[id number]] Collected:  01/17/17 0714     Updated:  01/17/17 0735    Narrative:       CT ABDOMEN PELVIS WO CONTRAST- 1/17/2017 2:18  AM CST     HISTORY: right flank pain      COMPARISON: None      DLP: 838 mGy cm. Automated exposure control was utilized to diminish  patient radiation dose.     TECHNIQUE: Noncontrast enhanced images of the abdomen and pelvis  obtained without oral contrast.      FINDINGS:   There is mild bibasilar atelectasis. The base of the heart is  unremarkable..      LIVER: There are 3 hypodense lesions within the right lobe of liver  including a 9 mm lesion within the anterior segment of the right lobe of  the liver as well as a 18 and 13 mm hypodense lesion within the  posterior segment of the right lobe of the liver. These are likely  representative of hepatic cysts. The liver is otherwise normal in  appearance. Ultrasound could be helpful for further characterization..      BILIARY SYSTEM: The gallbladder is unremarkable. No intrahepatic or  extrahepatic ductal dilatation.      PANCREAS: No focal pancreatic lesion.      SPLEEN: There is mild splenomegaly with a [id number] length of 15 cm..        KIDNEYS AND ADRENALS: No discrete renal mass is identified. There is a 9  mm nonobstructing calculus involving the interpolar aspect of the right  kidney. There is mild dilatation of the upper tracts of the right kidney  as well as some proximal right periureteral stranding. This would  suggest the patient may have recently passed a stone. There is no  evidence of a discrete ureteral stone at this time. The left renal  collecting system and ureter is unremarkable..     .     RETROPERITONEUM: No mass, lymphadenopathy or hemorrhage.      GI TRACT: There is mild constipation. There is no evidence of mechanical  obstruction but there are multiple fluid-filled small bowel loops. A few  scattered air-fluid levels are present.. The appendix has been  surgically removed..     OTHER: There is no mesenteric mass, lymphadenopathy or fluid collection.  The osseous structures and soft tissues demonstrate no worrisome  lesions. There is  arthrosis of both SI joints with subchondral  sclerosis.      PELVIS: No mass lesion, fluid collection or significant lymphadenopathy  is seen in the pelvis. The urinary bladder is normal in appearance. The  prostate gland is mildly enlarged.       Impression:       1. There is 9 mm nonobstructing calculus involving the interpolar aspect  of the right kidney. There is mild dilatation of the upper tracts of the  right kidney and proximal right ureter with proximal periureteral  stranding. I see no evidence of a discrete ureteral stone but this may  represent sequela of a recently passed stone. Mild right-sided  obstructive uropathy secondary to another etiology would also be a  differential and if the patient's hematuria and flank pain are  persistent I would suggest urology consultation with retrograde  examination of the right renal collecting system and ureter. No evidence  of left-sided nephrolithiasis or ureteral calculus.  2. Suspected hepatic cysts within the right lobe of the liver. This  could be confirmed with ultrasound.  3. Nonspecific bowel gas pattern with some scattered fluid-filled bowel  loops. This is likely related to mild constipation with increased stool  noted throughout the colon..   4. Prostatic enlargement. A small fat-containing periumbilical hernia  is present.     Electronically Signed By-Dr. Jarvis Morgan MD On:1/17/2017 8:33 AM  EST  This report was finalized on 01/17/2017 07:33 by Dr. Jarvis Morgan MD.    FL C Arm During Surgery [[id number]] Collected:  01/20/17 1623     Updated:  01/23/17 1557    Narrative:       Performed in surgery by Dr. Grimes. Please see operative report.     This report was finalized on 01/23/2017 15:55 by Dr. Ian Grimes MD.    IR Insert Tunneled CV Catheter Without Port 5 Plus [[id number]] Collected:  01/20/17 1623     Updated:  01/23/17 8377    Narrative:       Performed in surgery by Dr. Grimes. Please see operative report.     This report was  finalized on 01/23/2017 15:55 by Dr. Ian Grimes MD.    CT Needle Biopsy Kidney [[id number]] Collected:  01/26/17 1443     Updated:  01/26/17 1550    Narrative:       HISTORY: Acute kidney injury.     The risks of procedure were explained to the patient. Informed consent  was obtained.     Under sterile conditions, using 1% lidocaine as local anesthetic, and CT  guidance, a 17-gauge guide needle was placed into the posterior cortex  of the lower pole of the left kidney.     Two 2 cm 18-gauge cores were obtained, sectioned and placed in three  different vials as per standard procedure.     The patient tolerated the procedure well without immediate  complications.     Minimal blood loss observed, less than 1 mL.     Radiation dose equals  mGy-cm.  Automated exposure control dose  reduction technique was implemented.       Impression:       CT-guided renal biopsy performed as described above, without immediate,  complications.  This report was finalized on 01/26/2017 15:48 by Dr. Robby Park MD.        Consults: Alex Cortez MD Cardiology    Ian Grimes MD Vascular Surgery    Mejia Hatfield MD Nephrology    Luis Carlos Awan MD Urology    Chief Complaint on Day of Discharge: Anxious to go home    Hospital Course  Patient is a 47 y.o. male presented with complaint of nausea and vomiting associated with dark urine and bilateral flank pain.  Laboratory studies revealed acute kidney injury on history of chronic kidney disease stage III.  Initial imaging studies in the emergency department revealed right-sided hydronephrosis secondary to nonobstructive nephrolithiasis.  Patient has a history of HSP (Henoch-Schonlein purpura) and it is felt that this is cause of kidney dysfunction and rash on his lower extremities.  A punch biopsy of the rash was obtained which was negative for diagnosis of HSP, therefore a left kidney biopsy was undertaken yesterday.  Nephrology has followed closely.  Patient underwent  "permacath placement and hemodialysis was initiated.  Kidney function is rebounding slowly however patient will continue with HD on an outpatient basis.  Treatment for HSP is long term high dose steroids and currently patient is on prednisone 40 mg twice a day, nephrology will be following this closely as outpatient.  The patient's stay has been complicated by A. fib with RVR, cardiology has followed closely.  This is been problematic with multiple episodes of need for Cardizem drip.  The patient's pressure usually remains quite stable as is the case this afternoon, 146//96 heart rate 152.  Patient is quite distressed that he may not get to go home, however I did discuss with Dr. Alberto, additional dose of Lopressor 5 mg IV was given in addition to long-acting Cardizem 360 mg.  Patient was noted to have microcytic anemia and was treated for iron deficiency with 900 mg of Venofer.  Initially urology was consulted, recommendations were to follow the stone at the infundibulum of the right upper pole, causing obstruction of this upper pole calyx without dilation of the renal pelvis or the ureter on that side, therefore no intervention is warranted.  Patient has elevated PSA most recent 17.9 and outpatient state biopsy was recommended.  Patient has had significant hematuria and pyuria with negative urine cultures, he was treated with 4 days of Rocephin without change and urinalysis.  Patient is insistent upon discharge tonight even if heart rate does not decrease.  He is stable no chest pain, diaphoresis, shortness of breathing, vital signs stable.  We will monitor for another hour and an discharge.  His wife is an RN and he is agreeable to return to ER if heart rate increases or he becomes symptomatic.    Condition on Discharge:  Stable    Physical Exam on Discharge:  Visit Vitals   • /96 (BP Location: Left arm, Patient Position: Lying)   • Pulse (!) 152   • Temp 97.2 °F (36.2 °C) (Tympanic)   • Resp 20   • Ht 73\" " (185.4 cm)   • Wt 233 lb 4 oz (106 kg)   • SpO2 99%   • BMI 30.77 kg/m2     Physical Exam   Constitutional: He is oriented to person, place, and time. He appears well-developed and well-nourished. No distress.   HENT:   Head: Normocephalic and atraumatic.   Eyes: Conjunctivae and EOM are normal. Pupils are equal, round, and reactive to light. No scleral icterus.   Neck: Normal range of motion. Neck supple. No JVD present. No tracheal deviation present.   Cardiovascular: Normal heart sounds and intact distal pulses.  Exam reveals no gallop.    No murmur heard.  Afib 150-160's   Pulmonary/Chest: Effort normal and breath sounds normal. No respiratory distress. He has no wheezes. He has no rales.   Abdominal: Soft. Bowel sounds are normal. He exhibits no distension. There is no tenderness. There is no guarding.   Musculoskeletal: Normal range of motion. He exhibits no edema.   Neurological: He is alert and oriented to person, place, and time.   No obvious deficits noted.   Skin: Skin is warm and dry. No rash noted. He is not diaphoretic. No erythema. No pallor.   Psychiatric: He has a normal mood and affect. His behavior is normal.   Vitals reviewed.      Discharge Disposition:  Home or Self Care    Discharge Medications:   Gurjit Andrea   Home Medication Instructions ZANE:[id number]    Printed on:01/27/17 8511   Medication Information                      albuterol (PROVENTIL HFA;VENTOLIN HFA) 108 (90 BASE) MCG/ACT inhaler  Every 6 (Six) Hours.             diltiaZEM CD (CARDIZEM CD) 360 MG 24 hr capsule  Take 1 capsule by mouth Daily.             metoprolol tartrate (LOPRESSOR) 25 MG tablet  25 mg 2 (Two) Times a Day.             predniSONE (DELTASONE) 20 MG tablet  Take 2 tablets by mouth 2 (Two) Times a Day With Meals.             tamsulosin (FLOMAX) 0.4 MG capsule 24 hr capsule  Take 1 capsule by mouth Every Night.                 Discharge Diet:   Diet Instructions     Diet: Cardiac, Renal; Thin Liquids, No  Restrictions       Discharge Diet:   Cardiac  Renal      Fluid Consistency:  Thin Liquids, No Restrictions                 Discharge Care Plan / Instructions: Keep scheduled appointments on Tuesday, Thursday, Saturday at [facility]    Activity at Discharge:   Activity Instructions     Activity as Tolerated                     Follow-up Appointments: Alex Cortez MD 4 weeks     Luis Carlos Awan MD 2 weeks     Moises Montes MD 1 week     Nephrology will follow in clinic    Test Results Pending at Discharge: Kidney biopsy results     Plan discussed with Dr. Pierre Pritchard.     Time spent in face-to-face evaluation, chart review, planning and education 60 minutes.    BHARATHI Correia  01/27/17  5:53 PM      I personally evaluated the patient in conjunction with BHARATHI Love and agree with the assessment, treatment plan, and disposition of the patient as recorded by her. My history, exam, and further recommendations are:     Agree with discharge.  Pierer Pritchard DO  02/06/17  7:32 AM

## 2017-01-28 NOTE — PROGRESS NOTES
LOS: 10 days   Patient Care Team:  Moises Montes MD as PCP - General (Internal Medicine)    Chief Complaint:  AF RVR  Subjective   Feeling much better  No chest pain or excessive shortness of breath  No new complaints    Interval History: Improved overall    Patient Complaints:  Chief Complaint   Patient presents with   • Blood in Urine   • Flank Pain   • Nausea     Denies chest pain. Denies excessive shortness of breath. Denies abdominal pain, nausea vomiting or diarrhoea.    Telemetry: no malignant arrhythmia. No significant pauses.     History taken from: Patient and chart  Review of Systems:    The following systems were reviewed and negative; ENT, respiratory,  gastrointestinal, integument, hematologic / lymphatic, neurological, behavioral/psych and allergies / immunologic    Labs:    WBC WBC   Date Value Ref Range Status   01/26/2017 14.15 (H) 4.80 - 10.80 10*3/mm3 Final   01/25/2017 10.67 4.80 - 10.80 10*3/mm3 Final      HGB HEMOGLOBIN   Date Value Ref Range Status   01/26/2017 10.1 (L) 14.0 - 18.0 g/dL Final   01/25/2017 10.4 (L) 14.0 - 18.0 g/dL Final      HCT HEMATOCRIT   Date Value Ref Range Status   01/26/2017 30.9 (L) 40.0 - 52.0 % Final   01/25/2017 32.1 (L) 40.0 - 52.0 % Final      Platlets PLATELETS   Date Value Ref Range Status   01/26/2017 189 130 - 400 10*3/mm3 Final   01/25/2017 193 130 - 400 10*3/mm3 Final      MCV MCV   Date Value Ref Range Status   01/26/2017 80.5 (L) 82.0 - 95.0 fL Final   01/25/2017 80.3 (L) 82.0 - 95.0 fL Final        Results from last 7 days  Lab Units 01/27/17  0433 01/26/17  0509 01/25/17  0538   SODIUM mmol/L 138 136 137   POTASSIUM mmol/L 4.0 4.2 4.0   CHLORIDE mmol/L 99 99 99   TOTAL CO2 mmol/L 26.0 27.0 24.0   BUN mg/dL 76* 58* 60*   CREATININE mg/dL 3.22* 2.80* 2.95*   CALCIUM mg/dL 8.1* 8.3* 8.0*   GLUCOSE mg/dL 211* 139* 210*   No results found for: CKTOTAL, CKMB, CKMBINDEX, TROPONINI, TROPONINT  PT/INR:    PROTIME   Date Value Ref Range Status   01/26/2017  13.9 11.9 - 14.6 Seconds Final   01/26/2017 13.3 11.9 - 14.6 Seconds Final   /  INR   Date Value Ref Range Status   01/26/2017 1.04 0.91 - 1.09 Final   01/26/2017 0.98 0.91 - 1.09 Final       Imaging Results (last 72 hours)     Procedure Component Value Units Date/Time    CT Needle Biopsy Kidney [[id number]] Collected:  01/26/17 1443     Updated:  01/26/17 1550    Narrative:       HISTORY: Acute kidney injury.     The risks of procedure were explained to the patient. Informed consent  was obtained.     Under sterile conditions, using 1% lidocaine as local anesthetic, and CT  guidance, a 17-gauge guide needle was placed into the posterior cortex  of the lower pole of the left kidney.     Two 2 cm 18-gauge cores were obtained, sectioned and placed in three  different vials as per standard procedure.     The patient tolerated the procedure well without immediate  complications.     Minimal blood loss observed, less than 1 mL.     Radiation dose equals  mGy-cm.  Automated exposure control dose  reduction technique was implemented.       Impression:       CT-guided renal biopsy performed as described above, without immediate,  complications.  This report was finalized on 01/26/2017 15:48 by Dr. Robby Park MD.          Objective     Medication Review:   No current facility-administered medications for this encounter.      Current Outpatient Prescriptions   Medication Sig Dispense Refill   • metoprolol tartrate (LOPRESSOR) 25 MG tablet 25 mg 2 (Two) Times a Day.     • albuterol (PROVENTIL HFA;VENTOLIN HFA) 108 (90 BASE) MCG/ACT inhaler Every 6 (Six) Hours.     • diltiaZEM CD (CARDIZEM CD) 360 MG 24 hr capsule Take 1 capsule by mouth Daily. 30 capsule 5   • predniSONE (DELTASONE) 20 MG tablet Take 2 tablets by mouth 2 (Two) Times a Day With Meals. 60 tablet 0   • tamsulosin (FLOMAX) 0.4 MG capsule 24 hr capsule Take 1 capsule by mouth Every Night. 30 capsule 0       Vital Sign Min/Max for last 24 hours  Temp  Min:  "97.2 °F (36.2 °C)  Max: 98.1 °F (36.7 °C)   BP  Min: 115/74  Max: 146/96   Pulse  Min: 56  Max: 152   Resp  Min: 18  Max: 20   SpO2  Min: 96 %  Max: 99 %   No Data Recorded   No Data Recorded     Flowsheet Rows         First Filed Value    Admission Height  73\" (185.4 cm) Documented at 01/17/2017 0019    Admission Weight  219 lb (99.3 kg) Documented at 01/17/2017 0019          Physical Exam:  Ears ears appear intact with no abnormalities noted  Nose nares normal, septum midline, mucosa normal and no drainage  Neck suppple, trachea midline, no thyromegaly, no carotid bruit and no JVD  Back no kyphosis present, no scoliosis present, no skin lesions, erythema, or scars, no tenderness to percussion or palpation and range of motion normal  Lungs respirations regular, respirations even and respirations unlabored  Heart normal irregular  S1, S2,  2/6 pansystolic murmur in the left sternal border,  no rub and no click  Abdomen normal bowel sounds, no masses, no hepatomegaly, no splenomegaly, no guarding and no rebound tenderness  Skin no bleeding, bruising or rash     Results Review:   I reviewed the patient's new clinical results.  I reviewed the patient's new imaging results and agree with the interpretation.  I reviewed the patient's other test results and agree with the interpretation  I personally viewed and interpreted the patient's EKG/Telemetry data  Discussed with patient    Medication Review: Performed    Assessment/Plan     Principal Problem:    Henoch-Schonlein purpura nephritis  Active Problems:    Tobacco abuse    Paroxysmal atrial fibrillation    Elevated PSA    Nephrolithiasis    Microcytic anemia    Hypertensive nephrosclerosis    Moderate mitral regurgitation  Hematuria      Plan  Same treatment  OK to discharge  See me in 1 month after discharge  Due to hematuria no anticoagulation for now  Supportive care  Optimal medical therapy  Deep vein thrombosis prophylaxis  Counseled regarding disease appropriate " diet, fluid, sodium, caffeine, stimulants intake   Stressed compliance to diet and medications     Alex Cortez MD  01/27/17  11:53 PM

## 2017-02-10 ENCOUNTER — OFFICE VISIT (OUTPATIENT)
Dept: UROLOGY | Facility: CLINIC | Age: 47
End: 2017-02-10

## 2017-02-10 VITALS — BODY MASS INDEX: 30.83 KG/M2 | WEIGHT: 227.6 LBS | HEIGHT: 72 IN | TEMPERATURE: 97 F

## 2017-02-10 DIAGNOSIS — R97.20 ELEVATED PROSTATE SPECIFIC ANTIGEN (PSA): Primary | ICD-10-CM

## 2017-02-10 DIAGNOSIS — N20.0 NEPHROLITHIASIS: ICD-10-CM

## 2017-02-10 LAB
BILIRUB BLD-MCNC: NEGATIVE MG/DL
CLARITY, POC: CLEAR
COLOR UR: YELLOW
GLUCOSE UR STRIP-MCNC: ABNORMAL MG/DL
KETONES UR QL: NEGATIVE
LEUKOCYTE EST, POC: ABNORMAL
NITRITE UR-MCNC: NEGATIVE MG/ML
PH UR: 5.5 [PH] (ref 5–8)
PROT UR STRIP-MCNC: ABNORMAL MG/DL
RBC # UR STRIP: ABNORMAL /UL
SP GR UR: 1.02 (ref 1–1.03)
UROBILINOGEN UR QL: NORMAL

## 2017-02-10 PROCEDURE — 99215 OFFICE O/P EST HI 40 MIN: CPT | Performed by: UROLOGY

## 2017-02-10 PROCEDURE — 81003 URINALYSIS AUTO W/O SCOPE: CPT | Performed by: UROLOGY

## 2017-02-10 RX ORDER — CEPHALEXIN 500 MG/1
500 CAPSULE ORAL 3 TIMES DAILY
Qty: 9 CAPSULE | Refills: 0 | Status: SHIPPED | OUTPATIENT
Start: 2017-02-10 | End: 2017-02-13

## 2017-02-10 NOTE — PROGRESS NOTES
Subjective    Mr. Andrea is 47 y.o. male    Chief Complaint:Elevated PSA/Urolithiasis    History of Present Illness     Urolithiasis  Patient complains of right flank pain without radiation to the testicles. Onset of symptoms was abrupt starting 6 weeks ago with rapidly improving course since that time. Patient describes the pain as cramping, continuous and rated as severe. The patient has had nausea and vomiting. There has been no fever or chills. The patient is not complaining of dysuria, frequency, or urgency. Previous management of stones includes none      Elevated PSA  Patient is here with an elevated PSA. The PSA was drawn4 week(s). He does not have a family history of prostate cancer. His AUA Symptom Score is /35. Voiding symptoms include Hematuria. Denies Incomplete emptying, Intermittency, Weakened stream, Straining, Nocturia and Dysuria. Voiding symptoms began several days . These have been sudden in onset. none  Previous PSA values are : 18.1 repeat after abx 17.9  No results found for: PSA    The following portions of the patient's history were reviewed and updated as appropriate: allergies, current medications, past family history, past medical history, past social history, past surgical history and problem list.    Review of Systems   Constitutional: Negative for chills and fever.   Gastrointestinal: Negative for abdominal pain, anal bleeding and blood in stool.   Genitourinary: Negative for flank pain and hematuria.         Current Outpatient Prescriptions:   •  albuterol (PROVENTIL HFA;VENTOLIN HFA) 108 (90 BASE) MCG/ACT inhaler, Every 6 (Six) Hours., Disp: , Rfl:   •  diltiaZEM CD (CARDIZEM CD) 360 MG 24 hr capsule, Take 1 capsule by mouth Daily., Disp: 30 capsule, Rfl: 5  •  metoprolol tartrate (LOPRESSOR) 25 MG tablet, 25 mg 2 (Two) Times a Day., Disp: , Rfl:   •  predniSONE (DELTASONE) 20 MG tablet, Take 2 tablets by mouth 2 (Two) Times a Day With Meals., Disp: 60 tablet, Rfl: 0  •  tamsulosin  "(FLOMAX) 0.4 MG capsule 24 hr capsule, Take 1 capsule by mouth Every Night., Disp: 30 capsule, Rfl: 0  •  cephalexin (KEFLEX) 500 MG capsule, Take 1 capsule by mouth 3 (Three) Times a Day for 3 days., Disp: 9 capsule, Rfl: 0    Past Medical History   Diagnosis Date   • A-fib    • Chronic renal failure    • Elevated PSA    • Kidney stone    • Purpura        Past Surgical History   Procedure Laterality Date   • Appendectomy     • Insertion hemodialysis catheter N/A 1/20/2017     Procedure: INSERTION PERMCATH;  Surgeon: Ian Grimes DO;  Location: [facility] OR;  Service:        Social History     Social History   • Marital status:      Spouse name: N/A   • Number of children: N/A   • Years of education: N/A     Social History Main Topics   • Smoking status: Heavy Tobacco Smoker   • Smokeless tobacco: None   • Alcohol use Yes   • Drug use: None   • Sexual activity: Not Asked     Other Topics Concern   • None     Social History Narrative       History reviewed. No pertinent family history.    Objective    Visit Vitals   • Temp 97 °F (36.1 °C)   • Ht 72\" (182.9 cm)   • Wt 227 lb 9.6 oz (103 kg)   • BMI 30.87 kg/m2       Physical Exam   Constitutional: He is oriented to person, place, and time. He appears well-developed and well-nourished. No distress.   Pulmonary/Chest: Effort normal.   Abdominal: Soft. He exhibits no distension and no mass. There is no tenderness. There is no rebound and no guarding. No hernia.   Neurological: He is alert and oriented to person, place, and time.   Skin: Skin is warm and dry. He is not diaphoretic.   Psychiatric: He has a normal mood and affect.   Vitals reviewed.          Results for orders placed or performed in visit on 02/10/17   POC Urinalysis Dipstick, Automated   Result Value Ref Range    Color Yellow Yellow, Straw, Dark Yellow, Falguni    Clarity, UA Clear Clear    Glucose,  mg/dL (A) Negative, 1000 mg/dL (3+) mg/dL    Bilirubin Negative Negative    Ketones, UA " Negative Negative    Specific Gravity  1.020 1.005 - 1.030    Blood, UA Large (A) Negative    pH, Urine 5.5 5.0 - 8.0    Protein,  mg/dL (A) Negative mg/dL    Urobilinogen, UA Normal Normal    Leukocytes Small (1+) (A) Negative    Nitrite, UA Negative Negative   Microscopic Urinalysis  I inspected the urine myself based on the clinical situation including the dipstick urine. The urine is spun in a centrifuge for three minutes. The spun urine shows 12-20 rbc/hpf, 3-6 wbc/hpf, none epi/hpf, negative bacteria, negative crystals, and negative casts.   Assessment and Plan    Gurjit was seen today for elevated psa.    Diagnoses and all orders for this visit:    Elevated prostate specific antigen (PSA)  -     POC Urinalysis Dipstick, Automated  -     Biopsy Prostate; Future  -     cephalexin (KEFLEX) 500 MG capsule; Take 1 capsule by mouth 3 (Three) Times a Day for 3 days.    Nephrolithiasis    Elevated PSA        This is a very difficult situation.  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  He states the nephrologist was given a 60% chance of recoverable renal function.  He is on prednisone which puts him at higher risk for complications of an infectious nature after prostate biopsy.  He is going to follow-up with a prostate biopsy.  I discussed with him that we need to know if he has prostate cancer is going on immunosuppression and if he did not have recovered renal function.  He was placed on the transplant list.

## 2017-02-15 ENCOUNTER — APPOINTMENT (OUTPATIENT)
Dept: GENERAL RADIOLOGY | Facility: HOSPITAL | Age: 47
End: 2017-02-15

## 2017-02-15 ENCOUNTER — APPOINTMENT (OUTPATIENT)
Dept: CT IMAGING | Facility: HOSPITAL | Age: 47
End: 2017-02-15

## 2017-02-15 ENCOUNTER — APPOINTMENT (OUTPATIENT)
Dept: NUCLEAR MEDICINE | Facility: HOSPITAL | Age: 47
End: 2017-02-15

## 2017-02-15 ENCOUNTER — HOSPITAL ENCOUNTER (INPATIENT)
Facility: HOSPITAL | Age: 47
LOS: 8 days | Discharge: ANOTHER HOSPITAL | End: 2017-02-23
Attending: INTERNAL MEDICINE | Admitting: FAMILY MEDICINE

## 2017-02-15 DIAGNOSIS — D64.9 CHRONIC ANEMIA: ICD-10-CM

## 2017-02-15 DIAGNOSIS — Z74.09 IMPAIRED MOBILITY AND ADLS: ICD-10-CM

## 2017-02-15 DIAGNOSIS — R07.89 OTHER CHEST PAIN: ICD-10-CM

## 2017-02-15 DIAGNOSIS — N19 RENAL FAILURE: ICD-10-CM

## 2017-02-15 DIAGNOSIS — Z74.09 IMPAIRED FUNCTIONAL MOBILITY, BALANCE, GAIT, AND ENDURANCE: ICD-10-CM

## 2017-02-15 DIAGNOSIS — I48.19 PERSISTENT ATRIAL FIBRILLATION (HCC): Primary | ICD-10-CM

## 2017-02-15 DIAGNOSIS — Z78.9 IMPAIRED MOBILITY AND ADLS: ICD-10-CM

## 2017-02-15 LAB
ALBUMIN SERPL-MCNC: 3.3 G/DL (ref 3.5–5)
ALBUMIN/GLOB SERPL: 1.1 G/DL (ref 1.1–2.5)
ALP SERPL-CCNC: 114 U/L (ref 24–120)
ALT SERPL W P-5'-P-CCNC: 59 U/L (ref 0–54)
ANION GAP SERPL CALCULATED.3IONS-SCNC: 10 MMOL/L (ref 4–13)
ANION GAP SERPL CALCULATED.3IONS-SCNC: 14 MMOL/L (ref 4–13)
ANISOCYTOSIS BLD QL: NORMAL
APTT PPP: 30.7 SECONDS (ref 24.1–34.8)
ARTERIAL PATENCY WRIST A: ABNORMAL
AST SERPL-CCNC: 25 U/L (ref 7–45)
ATMOSPHERIC PRESS: ABNORMAL MMHG
BASE EXCESS BLDA CALC-SCNC: 5.9 MMOL/L (ref -2–2)
BASOPHILS # BLD AUTO: 0.04 10*3/MM3 (ref 0–0.2)
BASOPHILS NFR BLD AUTO: 0.3 % (ref 0–2)
BDY SITE: ABNORMAL
BILIRUB SERPL-MCNC: 0.4 MG/DL (ref 0.1–1)
BUN BLD-MCNC: 63 MG/DL (ref 5–21)
BUN BLD-MCNC: 64 MG/DL (ref 5–21)
BUN/CREAT SERPL: 36.6 (ref 7–25)
BUN/CREAT SERPL: 38.1 (ref 7–25)
CALCIUM SPEC-SCNC: 8.9 MG/DL (ref 8.4–10.4)
CALCIUM SPEC-SCNC: 9.1 MG/DL (ref 8.4–10.4)
CHLORIDE SERPL-SCNC: 95 MMOL/L (ref 98–110)
CHLORIDE SERPL-SCNC: 96 MMOL/L (ref 98–110)
CO2 SERPL-SCNC: 28 MMOL/L (ref 24–31)
CO2 SERPL-SCNC: 32 MMOL/L (ref 24–31)
CREAT BLD-MCNC: 1.68 MG/DL (ref 0.5–1.4)
CREAT BLD-MCNC: 1.72 MG/DL (ref 0.5–1.4)
D DIMER PPP FEU-MCNC: 3.12 MG/L (FEU) (ref 0–0.5)
DEPRECATED RDW RBC AUTO: 58.8 FL (ref 40–54)
EOSINOPHIL # BLD AUTO: 0 10*3/MM3 (ref 0–0.7)
EOSINOPHIL NFR BLD AUTO: 0 % (ref 0–4)
ERYTHROCYTE [DISTWIDTH] IN BLOOD BY AUTOMATED COUNT: 19.1 % (ref 12–15)
GAS FLOW AIRWAY: 2 LPM
GFR SERPL CREATININE-BSD FRML MDRD: 43 ML/MIN/1.73
GFR SERPL CREATININE-BSD FRML MDRD: 44 ML/MIN/1.73
GLOBULIN UR ELPH-MCNC: 3 GM/DL
GLUCOSE BLD-MCNC: 132 MG/DL (ref 70–100)
GLUCOSE BLD-MCNC: 311 MG/DL (ref 70–100)
HCO3 BLDA-SCNC: 29 MMOL/L (ref 22–26)
HCT VFR BLD AUTO: 28.9 % (ref 40–52)
HGB BLD-MCNC: 9 G/DL (ref 14–18)
HYPOCHROMIA BLD QL: NORMAL
IMM GRANULOCYTES # BLD: 0.49 10*3/MM3 (ref 0–0.03)
IMM GRANULOCYTES NFR BLD: 3.6 % (ref 0–5)
INR PPP: 1.12 (ref 0.91–1.09)
LYMPHOCYTES # BLD AUTO: 1.24 10*3/MM3 (ref 0.72–4.86)
LYMPHOCYTES NFR BLD AUTO: 9 % (ref 15–45)
MAGNESIUM SERPL-MCNC: 1.9 MG/DL (ref 1.4–2.2)
MCH RBC QN AUTO: 26.7 PG (ref 28–32)
MCHC RBC AUTO-ENTMCNC: 31.1 G/DL (ref 33–36)
MCV RBC AUTO: 85.8 FL (ref 82–95)
MODALITY: ABNORMAL
MONOCYTES # BLD AUTO: 0.48 10*3/MM3 (ref 0.19–1.3)
MONOCYTES NFR BLD AUTO: 3.5 % (ref 4–12)
NEUTROPHILS # BLD AUTO: 11.46 10*3/MM3 (ref 1.87–8.4)
NEUTROPHILS NFR BLD AUTO: 83.6 % (ref 39–78)
NT-PROBNP SERPL-MCNC: 5920 PG/ML (ref 0–450)
PCO2 BLDA: 37.1 MM HG (ref 35–45)
PH BLDA: 7.51 PH UNITS (ref 7.35–7.45)
PLAT MORPH BLD: NORMAL
PLATELET # BLD AUTO: 127 10*3/MM3 (ref 130–400)
PMV BLD AUTO: 10.2 FL (ref 6–12)
PO2 BLDA: 74.9 MM HG (ref 80–100)
POTASSIUM BLD-SCNC: 3.8 MMOL/L (ref 3.5–5.3)
POTASSIUM BLD-SCNC: 4.3 MMOL/L (ref 3.5–5.3)
PROT SERPL-MCNC: 6.3 G/DL (ref 6.3–8.7)
PROTHROMBIN TIME: 14.8 SECONDS (ref 11.9–14.6)
RBC # BLD AUTO: 3.37 10*6/MM3 (ref 4.8–5.9)
SAO2 % BLDCOA: 96.2 % (ref 94–100)
SAO2 % BLDCOA: 96.2 % (ref 94–100)
SODIUM BLD-SCNC: 137 MMOL/L (ref 135–145)
SODIUM BLD-SCNC: 138 MMOL/L (ref 135–145)
TROPONIN I SERPL-MCNC: 0 NG/ML (ref 0–0.07)
TROPONIN I SERPL-MCNC: 0 NG/ML (ref 0–0.07)
TROPONIN I SERPL-MCNC: <0.012 NG/ML (ref 0–0.03)
TSH SERPL DL<=0.05 MIU/L-ACNC: 1.07 MIU/ML (ref 0.47–4.68)
WBC MORPH BLD: NORMAL
WBC NRBC COR # BLD: 13.71 10*3/MM3 (ref 4.8–10.8)

## 2017-02-15 PROCEDURE — A9540 TC99M MAA: HCPCS | Performed by: INTERNAL MEDICINE

## 2017-02-15 PROCEDURE — 80048 BASIC METABOLIC PNL TOTAL CA: CPT | Performed by: NURSE PRACTITIONER

## 2017-02-15 PROCEDURE — 84484 ASSAY OF TROPONIN QUANT: CPT

## 2017-02-15 PROCEDURE — 93010 ELECTROCARDIOGRAM REPORT: CPT | Performed by: INTERNAL MEDICINE

## 2017-02-15 PROCEDURE — 70450 CT HEAD/BRAIN W/O DYE: CPT

## 2017-02-15 PROCEDURE — 36600 WITHDRAWAL OF ARTERIAL BLOOD: CPT

## 2017-02-15 PROCEDURE — 84484 ASSAY OF TROPONIN QUANT: CPT | Performed by: NURSE PRACTITIONER

## 2017-02-15 PROCEDURE — 84443 ASSAY THYROID STIM HORMONE: CPT | Performed by: NURSE PRACTITIONER

## 2017-02-15 PROCEDURE — 85025 COMPLETE CBC W/AUTO DIFF WBC: CPT | Performed by: PHYSICIAN ASSISTANT

## 2017-02-15 PROCEDURE — 63710000001 PREDNISONE PER 5 MG: Performed by: NURSE PRACTITIONER

## 2017-02-15 PROCEDURE — 85007 BL SMEAR W/DIFF WBC COUNT: CPT | Performed by: PHYSICIAN ASSISTANT

## 2017-02-15 PROCEDURE — 83735 ASSAY OF MAGNESIUM: CPT | Performed by: NURSE PRACTITIONER

## 2017-02-15 PROCEDURE — 93005 ELECTROCARDIOGRAM TRACING: CPT

## 2017-02-15 PROCEDURE — 78582 LUNG VENTILAT&PERFUS IMAGING: CPT

## 2017-02-15 PROCEDURE — 99254 IP/OBS CNSLTJ NEW/EST MOD 60: CPT | Performed by: NURSE PRACTITIONER

## 2017-02-15 PROCEDURE — A9558 XE133 XENON 10MCI: HCPCS | Performed by: INTERNAL MEDICINE

## 2017-02-15 PROCEDURE — 0 XENON XE 133: Performed by: INTERNAL MEDICINE

## 2017-02-15 PROCEDURE — 85379 FIBRIN DEGRADATION QUANT: CPT | Performed by: PHYSICIAN ASSISTANT

## 2017-02-15 PROCEDURE — 80053 COMPREHEN METABOLIC PANEL: CPT | Performed by: PHYSICIAN ASSISTANT

## 2017-02-15 PROCEDURE — 93005 ELECTROCARDIOGRAM TRACING: CPT | Performed by: PHYSICIAN ASSISTANT

## 2017-02-15 PROCEDURE — 82803 BLOOD GASES ANY COMBINATION: CPT

## 2017-02-15 PROCEDURE — 25010000002 ENOXAPARIN PER 10 MG: Performed by: NURSE PRACTITIONER

## 2017-02-15 PROCEDURE — 85730 THROMBOPLASTIN TIME PARTIAL: CPT | Performed by: PHYSICIAN ASSISTANT

## 2017-02-15 PROCEDURE — 83880 ASSAY OF NATRIURETIC PEPTIDE: CPT | Performed by: PHYSICIAN ASSISTANT

## 2017-02-15 PROCEDURE — 85610 PROTHROMBIN TIME: CPT | Performed by: PHYSICIAN ASSISTANT

## 2017-02-15 PROCEDURE — 0 TECHNETIUM ALBUMIN AGGREGATED: Performed by: INTERNAL MEDICINE

## 2017-02-15 PROCEDURE — 71010 HC CHEST PA OR AP: CPT

## 2017-02-15 PROCEDURE — 99285 EMERGENCY DEPT VISIT HI MDM: CPT

## 2017-02-15 RX ORDER — DOCUSATE SODIUM 100 MG/1
100 CAPSULE, LIQUID FILLED ORAL 2 TIMES DAILY
Status: DISCONTINUED | OUTPATIENT
Start: 2017-02-15 | End: 2017-02-23 | Stop reason: HOSPADM

## 2017-02-15 RX ORDER — ACETAMINOPHEN 325 MG/1
650 TABLET ORAL EVERY 4 HOURS PRN
Status: DISCONTINUED | OUTPATIENT
Start: 2017-02-15 | End: 2017-02-23 | Stop reason: HOSPADM

## 2017-02-15 RX ORDER — SODIUM CHLORIDE 0.9 % (FLUSH) 0.9 %
1-10 SYRINGE (ML) INJECTION AS NEEDED
Status: DISCONTINUED | OUTPATIENT
Start: 2017-02-15 | End: 2017-02-23 | Stop reason: HOSPADM

## 2017-02-15 RX ORDER — TAMSULOSIN HYDROCHLORIDE 0.4 MG/1
0.4 CAPSULE ORAL NIGHTLY
Status: DISCONTINUED | OUTPATIENT
Start: 2017-02-15 | End: 2017-02-23 | Stop reason: HOSPADM

## 2017-02-15 RX ORDER — DILTIAZEM HYDROCHLORIDE 180 MG/1
360 CAPSULE, COATED, EXTENDED RELEASE ORAL
Status: DISCONTINUED | OUTPATIENT
Start: 2017-02-15 | End: 2017-02-15 | Stop reason: SDUPTHER

## 2017-02-15 RX ORDER — PREDNISONE 20 MG/1
40 TABLET ORAL 2 TIMES DAILY WITH MEALS
Status: DISCONTINUED | OUTPATIENT
Start: 2017-02-15 | End: 2017-02-21

## 2017-02-15 RX ORDER — MAGNESIUM HYDROXIDE/ALUMINUM HYDROXICE/SIMETHICONE 120; 1200; 1200 MG/30ML; MG/30ML; MG/30ML
30 SUSPENSION ORAL EVERY 6 HOURS PRN
Status: DISCONTINUED | OUTPATIENT
Start: 2017-02-15 | End: 2017-02-23 | Stop reason: HOSPADM

## 2017-02-15 RX ORDER — DILTIAZEM HYDROCHLORIDE 5 MG/ML
10 INJECTION INTRAVENOUS ONCE
Status: COMPLETED | OUTPATIENT
Start: 2017-02-15 | End: 2017-02-15

## 2017-02-15 RX ORDER — DILTIAZEM HCL/D5W 125 MG/125
5-15 PLASTIC BAG, INJECTION (ML) INTRAVENOUS
Status: DISCONTINUED | OUTPATIENT
Start: 2017-02-15 | End: 2017-02-18

## 2017-02-15 RX ORDER — DILTIAZEM HCL 90 MG
90 TABLET ORAL EVERY 6 HOURS SCHEDULED
Status: DISCONTINUED | OUTPATIENT
Start: 2017-02-15 | End: 2017-02-21

## 2017-02-15 RX ORDER — ONDANSETRON 2 MG/ML
4 INJECTION INTRAMUSCULAR; INTRAVENOUS EVERY 6 HOURS PRN
Status: DISCONTINUED | OUTPATIENT
Start: 2017-02-15 | End: 2017-02-23 | Stop reason: HOSPADM

## 2017-02-15 RX ADMIN — DILTIAZEM HYDROCHLORIDE 10 MG: 5 INJECTION INTRAVENOUS at 10:08

## 2017-02-15 RX ADMIN — ENOXAPARIN SODIUM 30 MG: 30 INJECTION SUBCUTANEOUS at 19:41

## 2017-02-15 RX ADMIN — PREDNISONE 40 MG: 20 TABLET ORAL at 19:41

## 2017-02-15 RX ADMIN — Medication 9.9 MILLICURIE: at 15:46

## 2017-02-15 RX ADMIN — Medication 12.5 MG/HR: at 21:59

## 2017-02-15 RX ADMIN — Medication 5 MG/HR: at 10:08

## 2017-02-15 RX ADMIN — DILTIAZEM HYDROCHLORIDE 90 MG: 90 TABLET, FILM COATED ORAL at 21:53

## 2017-02-15 RX ADMIN — ACETAMINOPHEN 650 MG: 325 TABLET, FILM COATED ORAL at 21:56

## 2017-02-15 RX ADMIN — METOPROLOL TARTRATE 25 MG: 25 TABLET, FILM COATED ORAL at 19:42

## 2017-02-15 RX ADMIN — TAMSULOSIN HYDROCHLORIDE 0.4 MG: 0.4 CAPSULE ORAL at 21:54

## 2017-02-15 RX ADMIN — Medication 1 DOSE: at 15:46

## 2017-02-15 NOTE — NURSING NOTE
Pt came to floor around 1600. Assessment charted at wrong time. Assessment not at 1430. Assessment done at 1610.

## 2017-02-15 NOTE — H&P
[facility] Medicine Services  HISTORY AND PHYSICAL    Date of Admission: 2/15/2017  Primary Care Physician: Moises Montes MD    Subjective     Chief Complaint: Chest pain, shortness of breath and palpitations    History of Present Illness  The patient is a 47-year-old  male who presented to [facility] emergency department today with chest pain, shortness of breath and palpitations.  His wife also states that he has been having some neurologic changes as well.  She states that he is having difficulties with his speech, mumbling and getting confused with his words.  He was recently discharged from our facility on 2/6/17.  He has recently been started on outpatient dialysis, Tuesday, Thursday, and Saturday regimen.  He states that he went to dialysis yesterday and felt fine.  He states he woke up at approximately 4 AM with the above symptoms.  When he was recently admitted to our facility he had difficulties in controlling his rate with his atrial fibrillation.  He has been taking his Cardizem and metoprolol at home.  No recent sick contact.  No nausea vomiting or diarrhea.  He denies any dysuria or abdominal pain.  His wife has been at the bedside throughout this interview.  He is on long-term high dose steroid therapy in regards to his Jerome's granulomatosis disease.    Review of Systems   Constitutional: Positive for fatigue. Negative for activity change, appetite change, chills and fever.   HENT: Negative for hearing loss, nosebleeds, tinnitus and trouble swallowing.    Eyes: Negative for visual disturbance.   Respiratory: Positive for shortness of breath. Negative for cough, chest tightness and wheezing.    Cardiovascular: Positive for chest pain, palpitations and leg swelling.   Gastrointestinal: Negative for abdominal distention, abdominal pain, blood in stool, constipation, diarrhea, nausea and vomiting.   Endocrine: Negative for cold intolerance,  heat intolerance, polydipsia, polyphagia and polyuria.   Genitourinary: Negative for decreased urine volume, difficulty urinating, dysuria, flank pain, frequency and hematuria.   Musculoskeletal: Negative for arthralgias, joint swelling and myalgias.   Skin: Negative for rash.   Allergic/Immunologic: Negative for immunocompromised state.   Neurological: Positive for weakness. Negative for dizziness, syncope, light-headedness and headaches.   Hematological: Negative for adenopathy. Does not bruise/bleed easily.   Psychiatric/Behavioral: Negative for confusion and sleep disturbance. The patient is not nervous/anxious.       Otherwise complete ROS reviewed and negative except as mentioned in the HPI.    Past Medical History:   Past Medical History   Diagnosis Date   • A-fib    • Chronic renal failure    • Elevated PSA    • Kidney stone    • Purpura    • Wegener's granulomatosis with renal involvement      Past Surgical History:  Past Surgical History   Procedure Laterality Date   • Appendectomy     • Insertion hemodialysis catheter N/A 1/20/2017     Procedure: INSERTION PERMCATH;  Surgeon: Ian Grimes DO;  Location: [facility] OR;  Service:      Social History:  reports that he has been smoking.  He does not have any smokeless tobacco history on file. He reports that he drinks alcohol. He reports that he does not use illicit drugs.    Family History: Significant for having a half-brother on his mother's side of the family. His brother is in good health; however, he has a history of Kawasaki's disease as a child. His father is living but has a history of end-stage liver disease due to alcohol use, COPD, and diabetes mellitus type 2. His mother is alive and has a history of chronic kidney disease and hypothyroidism.     Allergies:  No Known Allergies    Medications:  Prior to Admission medications    Medication Sig Start Date End Date Taking? Authorizing Provider   albuterol (PROVENTIL HFA;VENTOLIN HFA) 108 (90 BASE)  "MCG/ACT inhaler Every 6 (Six) Hours.   Yes Historical Provider, MD   diltiaZEM CD (CARDIZEM CD) 360 MG 24 hr capsule Take 1 capsule by mouth Daily. 1/27/17  Yes BHARATHI Portillo   metoprolol tartrate (LOPRESSOR) 25 MG tablet 25 mg 2 (Two) Times a Day. 11/3/16  Yes Historical Provider, MD   predniSONE (DELTASONE) 20 MG tablet Take 2 tablets by mouth 2 (Two) Times a Day With Meals.  Patient taking differently: Take 80 mg by mouth 2 (Two) Times a Day With Meals. 1/27/17  Yes BHARATHI Cantor   tamsulosin (FLOMAX) 0.4 MG capsule 24 hr capsule Take 1 capsule by mouth Every Night. 1/27/17  Yes BHARATHI Cantor     Objective     Vital Signs:   Visit Vitals   • /91   • Pulse 113   • Temp 98.2 °F (36.8 °C) (Oral)   • Resp 21   • Ht 73\" (185.4 cm)   • Wt 224 lb (102 kg)   • SpO2 96%   • BMI 29.55 kg/m2     Physical Exam   Constitutional: He is oriented to person, place, and time. He appears well-developed and well-nourished.   HENT:   Head: Normocephalic and atraumatic.   Eyes: Conjunctivae and EOM are normal. Pupils are equal, round, and reactive to light.   Neck: Neck supple. No JVD present. No thyromegaly present.   Cardiovascular: Normal heart sounds and intact distal pulses.  An irregular rhythm present. Tachycardia present.  Exam reveals no gallop and no friction rub.    No murmur heard.  Atrial fib   Pulmonary/Chest: Effort normal. No respiratory distress. He has decreased breath sounds (Bilateral bases). He has no wheezes. He has no rales. He exhibits no tenderness.   Abdominal: Soft. Bowel sounds are normal. He exhibits no distension. There is no tenderness. There is no rebound and no guarding.   Musculoskeletal: Normal range of motion. He exhibits edema (bilateral lower extremity pitting edema). He exhibits no tenderness or deformity.   Lymphadenopathy:     He has no cervical adenopathy.   Neurological: He is alert and oriented to person, place, and time. He displays normal reflexes. No " cranial nerve deficit. He exhibits normal muscle tone.   Skin: Skin is warm and dry. No rash noted.   Psychiatric: His behavior is normal. Judgment and thought content normal.   Flat affect     Results Reviewed:  Lab Results (last 24 hours)     Procedure Component Value Units Date/Time    POC Troponin, Rapid [[id number]]  (Normal) Collected:  02/15/17 1006    Specimen:  Blood Updated:  02/15/17 1021     Troponin I 0.00 ng/mL       Serial Number: [id number]    : [id number]       D-dimer, Quantitative [[id number]]  (Abnormal) Collected:  02/15/17 1000    Specimen:  Blood Updated:  02/15/17 1022     D-Dimer, Quantitative 3.12 (H) mg/L (FEU)     Narrative:       Reference Range is 0-0.50 mg/L FEU. However, results <0.50 mg/L FEU tends to rule out DVT or PE. Results >0.50 mg/L FEU are not useful in predicting absence or presence of DVT or PE.    Protime-INR [[id number]]  (Abnormal) Collected:  02/15/17 1000    Specimen:  Blood Updated:  02/15/17 1022     Protime 14.8 (H) Seconds      INR 1.12 (H)     aPTT [[id number]]  (Normal) Collected:  02/15/17 1000    Specimen:  Blood Updated:  02/15/17 1022     PTT 30.7 seconds     Comprehensive Metabolic Panel [[id number]]  (Abnormal) Collected:  02/15/17 1000    Specimen:  Blood Updated:  02/15/17 1026     Glucose 311 (H) mg/dL      BUN 64 (H) mg/dL      Creatinine 1.68 (H) mg/dL      Sodium 137 mmol/L      Potassium 3.8 mmol/L      Chloride 95 (L) mmol/L      CO2 28.0 mmol/L      Calcium 8.9 mg/dL      Total Protein 6.3 g/dL      Albumin 3.30 (L) g/dL      ALT (SGPT) 59 (H) U/L      AST (SGOT) 25 U/L      Alkaline Phosphatase 114 U/L      Total Bilirubin 0.4 mg/dL      eGFR Non African Amer 44 (L) mL/min/1.73      Globulin 3.0 gm/dL      A/G Ratio 1.1 g/dL      BUN/Creatinine Ratio 38.1 (H)      Anion Gap 14.0 (H) mmol/L     BNP [[id number]]  (Abnormal) Collected:  02/15/17 1000    Specimen:  Blood Updated:  02/15/17 1029     proBNP 5920.0 (H) pg/mL     CBC & Differential [[id number]]  Collected:  02/15/17 1000    Specimen:  Blood Updated:  02/15/17 1051    Narrative:       The following orders were created for panel order CBC & Differential.  Procedure                               Abnormality         Status                     ---------                               -----------         ------                     Scan Slide[[id number]]                                        Final result               CBC Auto Differential[[id number]]         Abnormal            Final result                 Please view results for these tests on the individual orders.    CBC Auto Differential [[id number]]  (Abnormal) Collected:  02/15/17 1000    Specimen:  Blood Updated:  02/15/17 1051     WBC 13.71 (H) 10*3/mm3      RBC 3.37 (L) 10*6/mm3      Hemoglobin 9.0 (L) g/dL      Hematocrit 28.9 (L) %      MCV 85.8 fL      MCH 26.7 (L) pg      MCHC 31.1 (L) g/dL      RDW 19.1 (H) %      RDW-SD 58.8 (H) fl      MPV 10.2 fL      Platelets 127 (L) 10*3/mm3      Neutrophil % 83.6 (H) %      Lymphocyte % 9.0 (L) %      Monocyte % 3.5 (L) %      Eosinophil % 0.0 %      Basophil % 0.3 %      Immature Grans % 3.6 %      Neutrophils, Absolute 11.46 (H) 10*3/mm3      Lymphocytes, Absolute 1.24 10*3/mm3      Monocytes, Absolute 0.48 10*3/mm3      Eosinophils, Absolute 0.00 10*3/mm3      Basophils, Absolute 0.04 10*3/mm3      Immature Grans, Absolute 0.49 (H) 10*3/mm3     Scan Slide [[id number]] Collected:  02/15/17 1000    Specimen:  Blood Updated:  02/15/17 1051     Anisocytosis Slight/1+      Hypochromia Mod/2+      WBC Morphology Normal      Platelet Morphology Normal     POC Troponin, Rapid [[id number]]  (Normal) Collected:  02/15/17 1246    Specimen:  Blood Updated:  02/15/17 1300     Troponin I 0.00 ng/mL       Serial Number: [id number]    : [id number]       Blood Gas, Arterial [[id number]]  (Abnormal) Collected:  02/15/17 1342    Specimen:  Arterial Blood Updated:  02/15/17 1343     Site Arterial: left radial      Artem's Test --        Documented in Rapid Comm        pH, Arterial 7.511 (H) pH units      pCO2, Arterial 37.1 mm Hg      pO2, Arterial 74.9 (L) mm Hg      HCO3, Arterial 29.0 (H) mmol/L      Base Excess, Arterial 5.9 (H) mmol/L      O2 Saturation, Arterial 96.2 %      O2 Saturation Calculated 96.2 %      Barometric Pressure for Blood Gas -- mmHg       Component not reported at this site.        Modality Cannula      Flow Rate 2.00 lpm     Narrative:       Serial Number: [id number]    : [id number]        Imaging Results (last 24 hours)     Procedure Component Value Units Date/Time    XR Chest 1 View [[id number]] Collected:  02/15/17 1137     Updated:  02/15/17 1137    Narrative:       EXAM:   XR CHEST 1 VW-       DATE:  02/15/2017      HISTORY:  Male, age  47 years;chest pain     COMPARISON:  None.     FINDINGS:  The heart mediastinum are normal in size. Lungs are without focal  infiltrate, mass or effusions.  The bones show no acute pathology.   Right-sided dialysis catheter with tip terminating at the expected  location of the atriocaval junction.       Impression:       No acute cardiopulmonary disease.     This report was finalized on  by Dr. Kristyn Rodriguez MD.        I have personally reviewed and interpreted the radiology studies and ECG obtained at time of admission.     Assessment / Plan     Assessment & Plan  Hospital Problem List     Persistent atrial fibrillation        1.  Atrial fibrillation with rapid ventricular response, no anticoagulation  2.  Jerome's granulomatosis  3.  Long-term dialysis  4.  Chest pain  5.  Elevated BNP  6.  Elevated d-dimer  7.  Anemia of chronic disease    Plan:    We will admit the patient to the hospitalist service for workup and management.  He will be placed to Dr. Pierre's service.  He will be placed on the telemetry unit with continuous telemetry.  We will continue the Cardizem drip. We will consult nephrology and cardiology.  He will need dialysis tomorrow.  Since he had an elevated d-dimer we  will go ahead and check a VQ scan instead of a CTA angio of his chest due to his renal function.  We will resume his home medications.  He also complained of some left-sided weakness and speech issues therefore we will obtain a CT of the head.  Lovenox 30 mg for DVT prophylaxis.  We will also placed on SADIA hose, he has pitting bilateral lower extremity edema.  We will obtain laboratory data in the a.m.  Will also check a TSH now.  Please note this is initial evaluation and workup is ongoing.    Further orders per Dr. Pierre.  Dr. Vergara:  Patient seen and examined in the ER. Patient appears older than stated age. Has +1-2 pitting bilateral lower extremity edema, with mild chronic skin changes on bilateral lower legs. Lungs sounds decreased at the bases and heart irregularly irregular. Will ask above consultants to join the case.     Code Status: Full     I discussed the patients findings and my recommendations with the patient, his wife, and Dr. Pierre.    Estimated length of stay 3-5 days    Gio Rivera, APRN   02/15/17   2:10 PM

## 2017-02-15 NOTE — ED PROVIDER NOTES
"Subjective   Patient is a 47 y.o. male presenting with chest pain.   Chest Pain   Associated symptoms: fatigue, shortness of breath and weakness    Associated symptoms: no cough and no palpitations          Patient is a pleasant 47-year-old  gentleman with chief complaint of chest pain.  He presents to ED with his wife who also provides history.  At 4 o'clock this morning, the patient suddenly felt a \"bad pain deep to the bone to my left side.\"  He specifically points to the left side of his chest with the pain is radiating down his arm, through his left scapular region, and left side of his neck.  He complains of nausea and shortness of breath.  He feels his heart is racing.  The patient does have a history of atrial fibrillation which usually is controlled with Cardizem. He was recently placed on this by mouth in the past several weeks.  When his wife came home from work 8 o'clock this morning, he went to her vehicle and told her that he did not feel well.  She reports that his lips were blue and he was short of breath.  His heart rate sounded irregular.  She brought him here for further evaluation.    The patient was admitted to this facility 1 month ago.  He does have a history of renal failure - noted to have Wegener's granulomatosis after completing a renal biopsy.  The patient is on dialysis.  His last treatment was yesterday.  Wife denies any weight gain.  He has chronic swelling of his lower extremities which has improved.  He does have a history of hypoproteinemia and hypoalbuminemia.  He was in atrial fibrillation during his last admission as well.  He converted after being on Cardizem drip and placed on Cardizem by mouth.  The patient reports his [place] stress [place].  He denies any disease.  He denies ever undergoing a cardiac catheterization.  Upon his last admission, he did complete a transthoracic echo and noted to have a mitral valve " regurgitation.    The patient denies any fever.  He denies any change in cough.  He denies any history of congestive heart failure or COPD.  He continues to be on steroids.  Due to his vasculitis, he was advised not to take any aspirin or anticoagulants.    Review of Systems   Constitutional: Positive for activity change, appetite change and fatigue.   HENT: Negative.    Respiratory: Positive for chest tightness and shortness of breath. Negative for cough.    Cardiovascular: Positive for chest pain and leg swelling. Negative for palpitations.   Gastrointestinal: Negative.    Genitourinary: Negative.    Musculoskeletal: Negative.    Neurological: Positive for weakness and light-headedness.       Past Medical History   Diagnosis Date   • A-fib    • Chronic renal failure    • Elevated PSA    • Kidney stone    • Purpura    • Wegener's granulomatosis with renal involvement        No Known Allergies    Past Surgical History   Procedure Laterality Date   • Appendectomy     • Insertion hemodialysis catheter N/A 1/20/2017     Procedure: INSERTION PERMCATH;  Surgeon: Ian Grimes DO;  Location: [facility] OR;  Service:        History reviewed. No pertinent family history.    Social History     Social History   • Marital status:      Spouse name: N/A   • Number of children: N/A   • Years of education: N/A     Social History Main Topics   • Smoking status: Heavy Tobacco Smoker   • Smokeless tobacco: None   • Alcohol use Yes   • Drug use: No   • Sexual activity: Not Asked     Other Topics Concern   • None     Social History Narrative   • None       Prior to Admission medications    Medication Sig Start Date End Date Taking? Authorizing Provider   albuterol (PROVENTIL HFA;VENTOLIN HFA) 108 (90 BASE) MCG/ACT inhaler Every 6 (Six) Hours.   Yes Historical Provider, MD   diltiaZEM CD (CARDIZEM CD) 360 MG 24 hr capsule Take 1 capsule by mouth Daily. 1/27/17  Yes BHARATHI Portillo   metoprolol tartrate (LOPRESSOR) 25 MG  "tablet 25 mg 2 (Two) Times a Day. 11/3/16  Yes Historical Provider, MD   predniSONE (DELTASONE) 20 MG tablet Take 2 tablets by mouth 2 (Two) Times a Day With Meals.  Patient taking differently: Take 80 mg by mouth 2 (Two) Times a Day With Meals. 1/27/17  Yes BHARATHI Cantor   tamsulosin (FLOMAX) 0.4 MG capsule 24 hr capsule Take 1 capsule by mouth Every Night. 1/27/17  Yes BHARATHI Cantor       Medications   diltiaZEM (CARDIZEM) 125mg/125 mL infusion (10 mg/hr Intravenous Rate/Dose Change 2/15/17 1145)   diltiaZEM (CARDIZEM) tablet 90 mg (not administered)   metoprolol tartrate (LOPRESSOR) tablet 25 mg (not administered)   diltiaZEM (CARDIZEM) injection 10 mg (10 mg Intravenous Given 2/15/17 1008)       Visit Vitals   • /64   • Pulse 107   • Temp 98.4 °F (36.9 °C)   • Resp 24   • Ht 73\" (185.4 cm)   • Wt 224 lb (102 kg)   • SpO2 95%   • BMI 29.55 kg/m2         Objective   Physical Exam   Constitutional: He is oriented to person, place, and time. He appears well-developed and well-nourished.   HENT:   Head: Normocephalic and atraumatic.   Right Ear: External ear normal.   Left Ear: External ear normal.   Nose: Nose normal.   Mouth/Throat: Oropharynx is clear and moist.   Eyes: Conjunctivae and EOM are normal. Pupils are equal, round, and reactive to light.   Neck: Normal range of motion. Neck supple. No tracheal deviation present.   Cardiovascular: Intact distal pulses.  An irregularly irregular rhythm present. Tachycardia present.  Exam reveals no gallop and no friction rub.    Murmur heard.  Pulmonary/Chest: Effort normal and breath sounds normal. No respiratory distress. He has no wheezes. He has no rales. He exhibits no tenderness.   Abdominal: Soft. Bowel sounds are normal. He exhibits distension. He exhibits no mass. There is no tenderness. There is no rebound and no guarding.   Musculoskeletal: Normal range of motion. He exhibits edema. He exhibits no tenderness or deformity.   3+ pitting " edema.    Neurological: He is alert and oriented to person, place, and time.   Skin: Skin is warm and dry. No erythema. No pallor.   Psychiatric: He has a normal mood and affect. His behavior is normal. Judgment and thought content normal.   Vitals reviewed.      Procedures         Lab Results (last 24 hours)     Procedure Component Value Units Date/Time    CBC & Differential [[id number]] Collected:  02/15/17 1000    Specimen:  Blood Updated:  02/15/17 1051    Narrative:       The following orders were created for panel order CBC & Differential.  Procedure                               Abnormality         Status                     ---------                               -----------         ------                     Scan Slide[[id number]]                                        Final result               CBC Auto Differential[[id number]]         Abnormal            Final result                 Please view results for these tests on the individual orders.    Comprehensive Metabolic Panel [[id number]]  (Abnormal) Collected:  02/15/17 1000    Specimen:  Blood Updated:  02/15/17 1026     Glucose 311 (H) mg/dL      BUN 64 (H) mg/dL      Creatinine 1.68 (H) mg/dL      Sodium 137 mmol/L      Potassium 3.8 mmol/L      Chloride 95 (L) mmol/L      CO2 28.0 mmol/L      Calcium 8.9 mg/dL      Total Protein 6.3 g/dL      Albumin 3.30 (L) g/dL      ALT (SGPT) 59 (H) U/L      AST (SGOT) 25 U/L      Alkaline Phosphatase 114 U/L      Total Bilirubin 0.4 mg/dL      eGFR Non African Amer 44 (L) mL/min/1.73      Globulin 3.0 gm/dL      A/G Ratio 1.1 g/dL      BUN/Creatinine Ratio 38.1 (H)      Anion Gap 14.0 (H) mmol/L     D-dimer, Quantitative [[id number]]  (Abnormal) Collected:  02/15/17 1000    Specimen:  Blood Updated:  02/15/17 1022     D-Dimer, Quantitative 3.12 (H) mg/L (FEU)     Narrative:       Reference Range is 0-0.50 mg/L FEU. However, results <0.50 mg/L FEU tends to rule out DVT or PE. Results >0.50 mg/L FEU are not useful in  predicting absence or presence of DVT or PE.    Protime-INR [[id number]]  (Abnormal) Collected:  02/15/17 1000    Specimen:  Blood Updated:  02/15/17 1022     Protime 14.8 (H) Seconds      INR 1.12 (H)     aPTT [[id number]]  (Normal) Collected:  02/15/17 1000    Specimen:  Blood Updated:  02/15/17 1022     PTT 30.7 seconds     BNP [[id number]]  (Abnormal) Collected:  02/15/17 1000    Specimen:  Blood Updated:  02/15/17 1029     proBNP 5920.0 (H) pg/mL     CBC Auto Differential [[id number]]  (Abnormal) Collected:  02/15/17 1000    Specimen:  Blood Updated:  02/15/17 1051     WBC 13.71 (H) 10*3/mm3      RBC 3.37 (L) 10*6/mm3      Hemoglobin 9.0 (L) g/dL      Hematocrit 28.9 (L) %      MCV 85.8 fL      MCH 26.7 (L) pg      MCHC 31.1 (L) g/dL      RDW 19.1 (H) %      RDW-SD 58.8 (H) fl      MPV 10.2 fL      Platelets 127 (L) 10*3/mm3      Neutrophil % 83.6 (H) %      Lymphocyte % 9.0 (L) %      Monocyte % 3.5 (L) %      Eosinophil % 0.0 %      Basophil % 0.3 %      Immature Grans % 3.6 %      Neutrophils, Absolute 11.46 (H) 10*3/mm3      Lymphocytes, Absolute 1.24 10*3/mm3      Monocytes, Absolute 0.48 10*3/mm3      Eosinophils, Absolute 0.00 10*3/mm3      Basophils, Absolute 0.04 10*3/mm3      Immature Grans, Absolute 0.49 (H) 10*3/mm3     Scan Slide [[id number]] Collected:  02/15/17 1000    Specimen:  Blood Updated:  02/15/17 1051     Anisocytosis Slight/1+      Hypochromia Mod/2+      WBC Morphology Normal      Platelet Morphology Normal     POC Troponin, Rapid [[id number]]  (Normal) Collected:  02/15/17 1006    Specimen:  Blood Updated:  02/15/17 1021     Troponin I 0.00 ng/mL       Serial Number: [id number]    : [id number]       POC Troponin, Rapid [[id number]]  (Normal) Collected:  02/15/17 1246    Specimen:  Blood Updated:  02/15/17 1300     Troponin I 0.00 ng/mL       Serial Number: [id number]    : [id number]       Blood Gas, Arterial [[id number]]  (Abnormal) Collected:  02/15/17 1342    Specimen:  Arterial Blood Updated:   02/15/17 1343     Site Arterial: left radial      Artem's Test --       Documented in Rapid Comm        pH, Arterial 7.511 (H) pH units      pCO2, Arterial 37.1 mm Hg      pO2, Arterial 74.9 (L) mm Hg      HCO3, Arterial 29.0 (H) mmol/L      Base Excess, Arterial 5.9 (H) mmol/L      O2 Saturation, Arterial 96.2 %      O2 Saturation Calculated 96.2 %      Barometric Pressure for Blood Gas -- mmHg       Component not reported at this site.        Modality Cannula      Flow Rate 2.00 lpm     Narrative:       Serial Number: [id number]    : [id number]          Ct Abdomen Pelvis Without Contrast    Result Date: 1/17/2017  Narrative: CT ABDOMEN PELVIS WO CONTRAST- 1/17/2017 2:18 AM CST  HISTORY: right flank pain  COMPARISON: None  DLP: 838 mGy cm. Automated exposure control was utilized to diminish patient radiation dose.  TECHNIQUE: Noncontrast enhanced images of the abdomen and pelvis obtained without oral contrast.  FINDINGS: There is mild bibasilar atelectasis. The base of the heart is unremarkable..  LIVER: There are 3 hypodense lesions within the right lobe of liver including a 9 mm lesion within the anterior segment of the right lobe of the liver as well as a 18 and 13 mm hypodense lesion within the posterior segment of the right lobe of the liver. These are likely representative of hepatic cysts. The liver is otherwise normal in appearance. Ultrasound could be helpful for further characterization..  BILIARY SYSTEM: The gallbladder is unremarkable. No intrahepatic or extrahepatic ductal dilatation.  PANCREAS: No focal pancreatic lesion.  SPLEEN: There is mild splenomegaly with a exnf-kn-hala length of 15 cm..   KIDNEYS AND ADRENALS: No discrete renal mass is identified. There is a 9 mm nonobstructing calculus involving the interpolar aspect of the right kidney. There is mild dilatation of the upper tracts of the right kidney as well as some proximal right periureteral stranding. This would suggest the patient may  have recently passed a stone. There is no evidence of a discrete ureteral stone at this time. The left renal collecting system and ureter is unremarkable..     .  RETROPERITONEUM: No mass, lymphadenopathy or hemorrhage.  GI TRACT: There is mild constipation. There is no evidence of mechanical obstruction but there are multiple fluid-filled small bowel loops. A few scattered air-fluid levels are present.. The appendix has been surgically removed..  OTHER: There is no mesenteric mass, lymphadenopathy or fluid collection. The osseous structures and soft tissues demonstrate no worrisome lesions. 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  PELVIS: No mass lesion, fluid collection or significant lymphadenopathy is seen in the pelvis. The urinary bladder is normal in appearance. The prostate gland is mildly enlarged.      Impression: 1. There is 9 mm nonobstructing calculus involving the interpolar aspect of the right kidney. There is mild dilatation of the upper tracts of the right kidney and proximal right ureter with proximal periureteral stranding. I see no evidence of a discrete ureteral stone but this may represent sequela of a recently passed stone. Mild right-sided obstructive uropathy secondary to another etiology would also be a differential and if the patient's hematuria and flank pain are persistent I would suggest urology consultation with retrograde examination of the right renal collecting system and ureter. No evidence of left-sided nephrolithiasis or ureteral calculus. 2. Suspected hepatic cysts within the right lobe of the liver. This could be confirmed with ultrasound. 3. Nonspecific bowel gas pattern with some scattered fluid-filled bowel loops. This is likely related to mild constipation with increased stool noted throughout the colon..  4. Prostatic enlargement. A small fat-containing periumbilical hernia is present.  Electronically Signed By-Dr. Jarvis Morgan MD On:1/17/2017  8:33 AM EST This report was finalized on 01/17/2017 07:33 by Dr. Jarvis Morgan MD.    Xr Chest 1 View    Result Date: 2/15/2017  Narrative: EXAM:   XR CHEST 1 VW-   DATE:  02/15/2017  HISTORY:  Male, age  47 years;chest pain  COMPARISON:  None.  FINDINGS: The heart mediastinum are normal in size. Lungs are without focal infiltrate, mass or effusions.  The bones show no acute pathology. Right-sided dialysis catheter with tip terminating at the expected location of the atriocaval junction.      Impression: No acute cardiopulmonary disease.  This report was finalized on  by Dr. Kristyn Rodriguez MD.    Ct Needle Biopsy Kidney    Result Date: 1/26/2017  Narrative: HISTORY: Acute kidney injury.  The risks of procedure were explained to the patient. Informed consent was obtained.  Under sterile conditions, using 1% lidocaine as local anesthetic, and CT guidance, a 17-gauge guide needle was placed into the posterior cortex of the lower pole of the left kidney.  Two 2 cm 18-gauge cores were obtained, sectioned and placed in three different vials as per standard procedure.  The patient tolerated the procedure well without immediate complications.  Minimal blood loss observed, less than 1 mL.  Radiation dose equals  mGy-cm.  Automated exposure control dose reduction technique was implemented.      Impression: CT-guided renal biopsy performed as described above, without immediate, complications. This report was finalized on 01/26/2017 15:48 by Dr. Robby Park MD.    Fl C Arm During Surgery    Result Date: 1/23/2017  Narrative: Performed in surgery by Dr. Grimes. Please see operative report.  This report was finalized on 01/23/2017 15:55 by Dr. Ian Grimes MD.    Ir Insert Tunneled Cv Catheter Without Port 5 Plus    Result Date: 1/23/2017  Narrative: Performed in surgery by Dr. Grimes. Please see operative report.  This report was finalized on 01/23/2017 15:55 by Dr. Ian Grimes MD.      ED Course  ED  Course     I reviewed [facility] case with Dr. Benitez who assessed the paitent.  The patient's atrial fibrillation RVR did respond to Cardizem 5mg/kg drip.  However, he did go back up until 130s.  We increased the cardizem to 10mg/kg drip  And Dr. Benitez contacted the heart group who has a provider here in the ED to evaluate the patient presently.           Macie Carrion, nurse practitioner with heart group informed us that the family and patient informed her that the patient had left arm weakness early this morning.  He did not inform this examiner or Dr. Benitez about this.  We have now ordered a CT scan of his head without contrast.  The heart group will order a VQ scan on the patient.      Dr. Benitez has spoken with DR. Vergara, hospitalist on call, who will admit the patient under her services.     MDM    Final diagnoses:   Persistent atrial fibrillation   Other chest pain   Renal failure   Chronic anemia          edenPeg DENAE Mak  02/15/17 3083

## 2017-02-15 NOTE — CONSULTS
Patient Care Team:  Moises Montes MD as PCP - General (Internal Medicine)  Luis Carlos Awan MD as Consulting Physician (Urology)  Alex Cortez MD as Cardiologist (Cardiology)  No ref. provider found  REASON FOR REFERRAL: atrial fibrillation , rvr   Chief complaint: left arm and chest pain, dyspnea, weakness, speech difficulty     Subjective     Patient is a 47 y.o. male presents with c/o weakness, dyspnea, left sided chest pain with radiation to his back, neck and down his left arm, as well as speech difficulties and left arm weakness. The patient's wife states earlier today his lips appeared blue and he was slurring his speech. The patient reports the onset of symptoms was around 5 am. Upon arrival to the ER, he was found to be in afib, RVR with HR 140s- he has been treated with IV cardizem and his HR has improved to low 100s-110s. BP is stable. Symptoms are improved. CXR negative. BNP elevated. Creat is chronically elevated. He is also chronically anemic. D-dimer is elevated at 3.12. Troponins are negative x 2. WBCs are mildly elevated.  No acute STT changes on EKG. CT of the head is pending.    The patient has a known history of PAF, microcytic anemia, and henoch schonlein purpura nephritis on HD 3 times per week. He was previously anticoagulated with warfarin, but the wife states this was stopped years ago. More recently, following an admission last month, the patient also had hematuria- he is not anticaogulated. He takes cardizem and metoprolol at home for rate control. He is followed by Dr. Cortez for his PAF. Echo last month revealed normal LVEF , mild LAE, grade 1 DD, and mild to moderate MR. He denies a CAD history.     Review of Systems   Review of Systems   Constitutional: Positive for fatigue. Negative for diaphoresis, fever and unexpected weight change.   HENT: Negative for nosebleeds.    Respiratory: Positive for shortness of breath. Negative for apnea, cough, chest tightness and wheezing.     Cardiovascular: Positive for chest pain (with left arm pain and weakness ). Negative for palpitations and leg swelling.   Gastrointestinal: Negative for abdominal distention, nausea and vomiting.   Genitourinary: Negative for hematuria.   Musculoskeletal: Negative for gait problem.   Skin: Negative for color change.   Neurological: Positive for speech difficulty and weakness. Negative for dizziness, syncope and light-headedness.       History  Past Medical History   Diagnosis Date   • A-fib    • Chronic renal failure    • Elevated PSA    • Kidney stone    • Purpura    • Wegener's granulomatosis with renal involvement      Past Surgical History   Procedure Laterality Date   • Appendectomy     • Insertion hemodialysis catheter N/A 1/20/2017     Procedure: INSERTION PERMCATH;  Surgeon: Ian Grimes DO;  Location: [facility] OR;  Service:      History reviewed. No pertinent family history.  Social History   Substance Use Topics   • Smoking status: Heavy Tobacco Smoker   • Smokeless tobacco: None   • Alcohol use Yes       (Not in a hospital admission)    Current Facility-Administered Medications:   •  diltiaZEM (CARDIZEM) 125mg/125 mL infusion, 5-15 mg/hr, Intravenous, Titrated, [facility] DENAE Mak, Last Rate: 10 mL/hr at 02/15/17 1145, 10 mg/hr at 02/15/17 1145    Current Outpatient Prescriptions:   •  albuterol (PROVENTIL HFA;VENTOLIN HFA) 108 (90 BASE) MCG/ACT inhaler, Every 6 (Six) Hours., Disp: , Rfl:   •  diltiaZEM CD (CARDIZEM CD) 360 MG 24 hr capsule, Take 1 capsule by mouth Daily., Disp: 30 capsule, Rfl: 5  •  metoprolol tartrate (LOPRESSOR) 25 MG tablet, 25 mg 2 (Two) Times a Day., Disp: , Rfl:   •  predniSONE (DELTASONE) 20 MG tablet, Take 2 tablets by mouth 2 (Two) Times a Day With Meals. (Patient taking differently: Take 80 mg by mouth 2 (Two) Times a Day With Meals.), Disp: 60 tablet, Rfl: 0  •  tamsulosin (FLOMAX) 0.4 MG capsule 24 hr capsule, Take 1 capsule by mouth Every Night., Disp: 30  capsule, Rfl: 0  Allergies:  Review of patient's allergies indicates no known allergies.    Objective     Vital Signs  Temp:  [98.2 °F (36.8 °C)] 98.2 °F (36.8 °C)  Heart Rate:  [102-144] 113  Resp:  [18-22] 21  BP: (109-124)/(75-95) 124/91    Physical Exam:   Physical Exam   Constitutional: He is oriented to person, place, and time. He appears well-developed and well-nourished. No distress.   HENT:   Head: Normocephalic and atraumatic.   Eyes: Pupils are equal, round, and reactive to light.   Neck: Normal range of motion. Neck supple. No JVD present. No thyromegaly present.   Cardiovascular: Normal heart sounds and intact distal pulses.  An irregular rhythm present. Tachycardia present.  Exam reveals no gallop and no friction rub.    No murmur heard.  Pulses:       Dorsalis pedis pulses are 2+ on the right side, and 2+ on the left side.   Pulmonary/Chest: Effort normal. No respiratory distress. He has no wheezes. He has rales (few rales bases). He exhibits no tenderness.   Abdominal: Soft. Bowel sounds are normal. He exhibits no distension. There is no tenderness.   Musculoskeletal: Normal range of motion. He exhibits edema (1+ BLE edema ).   Neurological: He is alert and oriented to person, place, and time.   Drift and tremor of LUE    Skin: Skin is warm and dry. He is not diaphoretic.   Psychiatric: He has a normal mood and affect. His behavior is normal.     Results Review:     Lab Results (last 72 hours)     Procedure Component Value Units Date/Time    POC Troponin, Rapid [[id number]]  (Normal) Collected:  02/15/17 1006    Specimen:  Blood Updated:  02/15/17 1021     Troponin I 0.00 ng/mL       Serial Number: [id number]    : [id number]       D-dimer, Quantitative [[id number]]  (Abnormal) Collected:  02/15/17 1000    Specimen:  Blood Updated:  02/15/17 1022     D-Dimer, Quantitative 3.12 (H) mg/L (FEU)     Narrative:       Reference Range is 0-0.50 mg/L FEU. However, results <0.50 mg/L FEU tends to rule out DVT or  PE. Results >0.50 mg/L FEU are not useful in predicting absence or presence of DVT or PE.    Protime-INR [[id number]]  (Abnormal) Collected:  02/15/17 1000    Specimen:  Blood Updated:  02/15/17 1022     Protime 14.8 (H) Seconds      INR 1.12 (H)     aPTT [[id number]]  (Normal) Collected:  02/15/17 1000    Specimen:  Blood Updated:  02/15/17 1022     PTT 30.7 seconds     Comprehensive Metabolic Panel [[id number]]  (Abnormal) Collected:  02/15/17 1000    Specimen:  Blood Updated:  02/15/17 1026     Glucose 311 (H) mg/dL      BUN 64 (H) mg/dL      Creatinine 1.68 (H) mg/dL      Sodium 137 mmol/L      Potassium 3.8 mmol/L      Chloride 95 (L) mmol/L      CO2 28.0 mmol/L      Calcium 8.9 mg/dL      Total Protein 6.3 g/dL      Albumin 3.30 (L) g/dL      ALT (SGPT) 59 (H) U/L      AST (SGOT) 25 U/L      Alkaline Phosphatase 114 U/L      Total Bilirubin 0.4 mg/dL      eGFR Non African Amer 44 (L) mL/min/1.73      Globulin 3.0 gm/dL      A/G Ratio 1.1 g/dL      BUN/Creatinine Ratio 38.1 (H)      Anion Gap 14.0 (H) mmol/L     BNP [[id number]]  (Abnormal) Collected:  02/15/17 1000    Specimen:  Blood Updated:  02/15/17 1029     proBNP 5920.0 (H) pg/mL     CBC & Differential [[id number]] Collected:  02/15/17 1000    Specimen:  Blood Updated:  02/15/17 1051    Narrative:       The following orders were created for panel order CBC & Differential.  Procedure                               Abnormality         Status                     ---------                               -----------         ------                     Scan Slide[[id number]]                                        Final result               CBC Auto Differential[[id number]]         Abnormal            Final result                 Please view results for these tests on the individual orders.    CBC Auto Differential [[id number]]  (Abnormal) Collected:  02/15/17 1000    Specimen:  Blood Updated:  02/15/17 1051     WBC 13.71 (H) 10*3/mm3      RBC 3.37 (L) 10*6/mm3      Hemoglobin  9.0 (L) g/dL      Hematocrit 28.9 (L) %      MCV 85.8 fL      MCH 26.7 (L) pg      MCHC 31.1 (L) g/dL      RDW 19.1 (H) %      RDW-SD 58.8 (H) fl      MPV 10.2 fL      Platelets 127 (L) 10*3/mm3      Neutrophil % 83.6 (H) %      Lymphocyte % 9.0 (L) %      Monocyte % 3.5 (L) %      Eosinophil % 0.0 %      Basophil % 0.3 %      Immature Grans % 3.6 %      Neutrophils, Absolute 11.46 (H) 10*3/mm3      Lymphocytes, Absolute 1.24 10*3/mm3      Monocytes, Absolute 0.48 10*3/mm3      Eosinophils, Absolute 0.00 10*3/mm3      Basophils, Absolute 0.04 10*3/mm3      Immature Grans, Absolute 0.49 (H) 10*3/mm3     Scan Slide [[id number]] Collected:  02/15/17 1000    Specimen:  Blood Updated:  02/15/17 1051     Anisocytosis Slight/1+      Hypochromia Mod/2+      WBC Morphology Normal      Platelet Morphology Normal     POC Troponin, Rapid [[id number]]  (Normal) Collected:  02/15/17 1246    Specimen:  Blood Updated:  02/15/17 1300     Troponin I 0.00 ng/mL       Serial Number: [id number]    : [id number]       Blood Gas, Arterial [[id number]]  (Abnormal) Collected:  02/15/17 1342    Specimen:  Arterial Blood Updated:  02/15/17 1343     Site Arterial: left radial      Artem's Test --       Documented in Rapid Comm        pH, Arterial 7.511 (H) pH units      pCO2, Arterial 37.1 mm Hg      pO2, Arterial 74.9 (L) mm Hg      HCO3, Arterial 29.0 (H) mmol/L      Base Excess, Arterial 5.9 (H) mmol/L      O2 Saturation, Arterial 96.2 %      O2 Saturation Calculated 96.2 %      Barometric Pressure for Blood Gas -- mmHg       Component not reported at this site.        Modality Cannula      Flow Rate 2.00 lpm     Narrative:       Serial Number: [id number]    : [id number]          Assessment/Plan     Atrial fibrillation, RVR  LUE weakness and pain  Questionable slurred speech and expressive aphasia  Henoch Schonlein purpura nephritis on 3x week HD   Anemia  Recent hematuria  Tobacco use  Mild to Moderate mitral regurgitation    Leukocytosis  Elevated d-dimer   Elevated BNP    Plan-  Agree with IV cardizem. Will resume oral cardizem and wean gtt as tolerated.  Resume oral beta blocker  No anticoagulation at this time due to recent hematuria, anemia, and reported frequent injuries and falls  VQ scan  CT head ordered per ER  Discussed with Dr. Cortez- will follow as consultant; further recommendations following his evaluation of the patient     I discussed the patients findings and my recommendations with patient, family, primary care team and consulting provider.     Macie Carrion, APRN  02/15/17  1:55 PM

## 2017-02-15 NOTE — PLAN OF CARE
Problem: Patient Care Overview (Adult)  Goal: Plan of Care Review  Outcome: Ongoing (interventions implemented as appropriate)    02/15/17 1711   Coping/Psychosocial Response Interventions   Plan Of Care Reviewed With patient   Patient Care Overview   Progress no change   Outcome Evaluation   Outcome Summary/Follow up Plan pt is new admit to floor. dialysis patient. last tx yesterday 2/14/17. tachypenic but o2 sat 97% on ra. asks to leave off o2 at this time. pt alert and orientd. no slurred speech noted. no deficits noted up on admit.        Goal: Adult Individualization and Mutuality  Outcome: Ongoing (interventions implemented as appropriate)  Goal: Discharge Needs Assessment  Outcome: Ongoing (interventions implemented as appropriate)    Problem: Cardiac Output, Decreased (Adult)  Goal: Identify Related Risk Factors and Signs and Symptoms  Outcome: Ongoing (interventions implemented as appropriate)  Goal: Adequate Cardiac Output/Effective Tissue Perfusion  Outcome: Ongoing (interventions implemented as appropriate)    Problem: Respiratory Insufficiency (Adult)  Goal: Identify Related Risk Factors and Signs and Symptoms  Outcome: Ongoing (interventions implemented as appropriate)  Goal: Acid/Base Balance  Outcome: Ongoing (interventions implemented as appropriate)  Goal: Effective Ventilation  Outcome: Ongoing (interventions implemented as appropriate)

## 2017-02-16 ENCOUNTER — APPOINTMENT (OUTPATIENT)
Dept: MRI IMAGING | Facility: HOSPITAL | Age: 47
End: 2017-02-16

## 2017-02-16 PROBLEM — M31.31 WEGENER'S GRANULOMATOSIS WITH RENAL INVOLVEMENT: Status: ACTIVE | Noted: 2017-02-16

## 2017-02-16 LAB
ANION GAP SERPL CALCULATED.3IONS-SCNC: 12 MMOL/L (ref 4–13)
BUN BLD-MCNC: 59 MG/DL (ref 5–21)
BUN/CREAT SERPL: 31.7 (ref 7–25)
CALCIUM SPEC-SCNC: 8.8 MG/DL (ref 8.4–10.4)
CHLORIDE SERPL-SCNC: 96 MMOL/L (ref 98–110)
CO2 SERPL-SCNC: 31 MMOL/L (ref 24–31)
CREAT BLD-MCNC: 1.86 MG/DL (ref 0.5–1.4)
DEPRECATED RDW RBC AUTO: 59.7 FL (ref 40–54)
ERYTHROCYTE [DISTWIDTH] IN BLOOD BY AUTOMATED COUNT: 19.2 % (ref 12–15)
GFR SERPL CREATININE-BSD FRML MDRD: 39 ML/MIN/1.73
GLUCOSE BLD-MCNC: 209 MG/DL (ref 70–100)
HCT VFR BLD AUTO: 27.5 % (ref 40–52)
HGB BLD-MCNC: 8.6 G/DL (ref 14–18)
MCH RBC QN AUTO: 26.8 PG (ref 28–32)
MCHC RBC AUTO-ENTMCNC: 31.3 G/DL (ref 33–36)
MCV RBC AUTO: 85.7 FL (ref 82–95)
PLATELET # BLD AUTO: 112 10*3/MM3 (ref 130–400)
PMV BLD AUTO: 10.3 FL (ref 6–12)
POTASSIUM BLD-SCNC: 3.8 MMOL/L (ref 3.5–5.3)
RBC # BLD AUTO: 3.21 10*6/MM3 (ref 4.8–5.9)
SODIUM BLD-SCNC: 139 MMOL/L (ref 135–145)
TSH SERPL DL<=0.05 MIU/L-ACNC: 0.91 MIU/ML (ref 0.47–4.68)
WBC NRBC COR # BLD: 11.65 10*3/MM3 (ref 4.8–10.8)

## 2017-02-16 PROCEDURE — 63710000001 PREDNISONE PER 5 MG: Performed by: NURSE PRACTITIONER

## 2017-02-16 PROCEDURE — 70551 MRI BRAIN STEM W/O DYE: CPT

## 2017-02-16 PROCEDURE — 80048 BASIC METABOLIC PNL TOTAL CA: CPT | Performed by: NURSE PRACTITIONER

## 2017-02-16 PROCEDURE — 85027 COMPLETE CBC AUTOMATED: CPT | Performed by: NURSE PRACTITIONER

## 2017-02-16 PROCEDURE — 25010000002 HEPARIN (PORCINE) PER 1000 UNITS: Performed by: INTERNAL MEDICINE

## 2017-02-16 PROCEDURE — 25010000002 ENOXAPARIN PER 10 MG: Performed by: NURSE PRACTITIONER

## 2017-02-16 PROCEDURE — 84443 ASSAY THYROID STIM HORMONE: CPT | Performed by: NURSE PRACTITIONER

## 2017-02-16 RX ORDER — ACETAMINOPHEN 500 MG
1000 TABLET ORAL EVERY 4 HOURS PRN
Status: CANCELLED | OUTPATIENT
Start: 2017-02-16

## 2017-02-16 RX ORDER — DIPHENHYDRAMINE HYDROCHLORIDE 50 MG/ML
50 INJECTION INTRAMUSCULAR; INTRAVENOUS ONCE AS NEEDED
Status: CANCELLED | OUTPATIENT
Start: 2017-02-16

## 2017-02-16 RX ORDER — METOPROLOL TARTRATE 50 MG/1
50 TABLET, FILM COATED ORAL EVERY 12 HOURS SCHEDULED
Status: DISCONTINUED | OUTPATIENT
Start: 2017-02-16 | End: 2017-02-17

## 2017-02-16 RX ORDER — HEPARIN SODIUM 1000 [USP'U]/ML
1800 INJECTION, SOLUTION INTRAVENOUS; SUBCUTANEOUS AS NEEDED
Status: CANCELLED | OUTPATIENT
Start: 2017-02-16

## 2017-02-16 RX ORDER — SODIUM CHLORIDE 234 MG/ML
10 INJECTION, SOLUTION INTRAVENOUS
Status: CANCELLED | OUTPATIENT
Start: 2017-02-16

## 2017-02-16 RX ORDER — HEPARIN SODIUM 1000 [USP'U]/ML
1800 INJECTION, SOLUTION INTRAVENOUS; SUBCUTANEOUS AS NEEDED
Status: DISCONTINUED | OUTPATIENT
Start: 2017-02-16 | End: 2017-02-23 | Stop reason: HOSPADM

## 2017-02-16 RX ORDER — ALBUMIN (HUMAN) 12.5 G/50ML
12.5 SOLUTION INTRAVENOUS AS NEEDED
Status: CANCELLED | OUTPATIENT
Start: 2017-02-16 | End: 2017-02-17

## 2017-02-16 RX ADMIN — ENOXAPARIN SODIUM 30 MG: 30 INJECTION SUBCUTANEOUS at 17:33

## 2017-02-16 RX ADMIN — HEPARIN SODIUM 1800 UNITS: 1000 INJECTION, SOLUTION INTRAVENOUS; SUBCUTANEOUS at 13:50

## 2017-02-16 RX ADMIN — Medication 12.5 MG/HR: at 08:42

## 2017-02-16 RX ADMIN — DILTIAZEM HYDROCHLORIDE 90 MG: 90 TABLET, FILM COATED ORAL at 17:34

## 2017-02-16 RX ADMIN — TAMSULOSIN HYDROCHLORIDE 0.4 MG: 0.4 CAPSULE ORAL at 20:42

## 2017-02-16 RX ADMIN — PREDNISONE 40 MG: 20 TABLET ORAL at 17:33

## 2017-02-16 RX ADMIN — METOPROLOL TARTRATE 50 MG: 50 TABLET ORAL at 20:41

## 2017-02-16 RX ADMIN — METOPROLOL TARTRATE 25 MG: 25 TABLET, FILM COATED ORAL at 08:09

## 2017-02-16 RX ADMIN — DILTIAZEM HYDROCHLORIDE 90 MG: 90 TABLET, FILM COATED ORAL at 12:04

## 2017-02-16 RX ADMIN — PREDNISONE 40 MG: 20 TABLET ORAL at 08:09

## 2017-02-16 RX ADMIN — ACETAMINOPHEN 650 MG: 325 TABLET, FILM COATED ORAL at 19:31

## 2017-02-16 RX ADMIN — DILTIAZEM HYDROCHLORIDE 90 MG: 90 TABLET, FILM COATED ORAL at 20:42

## 2017-02-16 RX ADMIN — HEPARIN SODIUM 1800 UNITS: 1000 INJECTION, SOLUTION INTRAVENOUS; SUBCUTANEOUS at 14:00

## 2017-02-16 NOTE — PROGRESS NOTES
"    [facility] Medicine Services  INPATIENT PROGRESS NOTE    Length of Stay: 1  Date of Admission: 2/15/2017  Primary Care Physician: Moises Montes MD    Subjective   Chief Complaint: follow up short of breath and palpitations. \" I feel fine today\"  HPI   Admitted 2/15/16 with chest pain shortness of breath and palpitations.  He was found to be in atrial flutter with RVR and was placed on cardizem drip.  He tells me that his wife stated he was having some difficulty with speech and left arm difficulty.  He is currently in dialysis.  He denies chest pain or palpitations.  He denies shortness of breath.  His speech is fluent without any slurred speech.  He denies any weakness left upper extremity.    Review of Systems   Constitutional: Positive for fatigue.   HENT: Negative for congestion.    Eyes: Negative for visual disturbance.   Respiratory: Positive for shortness of breath. Negative for wheezing.    Cardiovascular: Positive for leg swelling (chronic).   Gastrointestinal: Negative for abdominal distention, constipation, diarrhea, nausea and vomiting.   Endocrine: Negative for polyuria.   Genitourinary: Negative for dysuria.   Skin: Negative for wound.   Neurological: Positive for weakness.   Psychiatric/Behavioral: Positive for agitation.      All pertinent negatives and positives are as above. All other systems have been reviewed and are negative unless otherwise stated.     Objective    Temp:  [96.9 °F (36.1 °C)-98.9 °F (37.2 °C)] 97.2 °F (36.2 °C)  Heart Rate:  [] 107  Resp:  [18-24] 18  BP: (119-132)/(64-91) 132/69  Physical Exam   Constitutional: He is oriented to person, place, and time. He appears well-developed and well-nourished.   HENT:   Head: Normocephalic and atraumatic.   Eyes: EOM are normal. Pupils are equal, round, and reactive to light.   Neck: Normal range of motion. Neck supple. No tracheal deviation present.   Cardiovascular:   No murmur " heard.  Irregular rate, atrial fibrillation on telemetry    Pulmonary/Chest: Effort normal and breath sounds normal. No respiratory distress. He has no wheezes. He has no rales.   Abdominal: Soft. Bowel sounds are normal. He exhibits no distension.   Musculoskeletal: He exhibits edema (trace ankle edema).   Neurological: He is alert and oriented to person, place, and time.   Skin: Skin is warm and dry.   Psychiatric:   flat     Results Review:  I have reviewed the labs, radiology results, and diagnostic studies.    Laboratory Data:     Results from last 7 days  Lab Units 02/16/17  0442 02/15/17  1000   WBC 10*3/mm3 11.65* 13.71*   HEMOGLOBIN g/dL 8.6* 9.0*   HEMATOCRIT % 27.5* 28.9*   PLATELETS 10*3/mm3 112* 127*          Results from last 7 days  Lab Units 02/16/17  0442 02/15/17  1615 02/15/17  1000   SODIUM mmol/L 139 138 137   POTASSIUM mmol/L 3.8 4.3 3.8   CHLORIDE mmol/L 96* 96* 95*   TOTAL CO2 mmol/L 31.0 32.0* 28.0   BUN mg/dL 59* 63* 64*   CREATININE mg/dL 1.86* 1.72* 1.68*   CALCIUM mg/dL 8.8 9.1 8.9   BILIRUBIN mg/dL  --   --  0.4   ALK PHOS U/L  --   --  114   ALT (SGPT) U/L  --   --  59*   AST (SGOT) U/L  --   --  25   GLUCOSE mg/dL 209* 132* 311*       Radiology Data:   NM lung ventilation perfusion 12/15/17  Findings are most commonly associated with low probability for acute  pulmonary embolism.    CT head without contrast 2/15/17 no acute intracranial process    Chest x-ray 1 view 2/15/17 no acute cardiopulmonary disease.    Scheduled Meds    diltiaZEM 90 mg Oral Q6H   docusate sodium 100 mg Oral BID   enoxaparin 30 mg Subcutaneous Daily   metoprolol tartrate 25 mg Oral Q12H   predniSONE 40 mg Oral BID With Meals   tamsulosin 0.4 mg Oral Nightly       I have reviewed the patient current medications.     Assessment/Plan     Hospital Problem List     Persistent atrial fibrillation        Assessment:   1.  Atrial fibrillation with RVR, no anticoagulation due to recent hematuria, anemia, and  reported frequent injuries and falls (per cardiology)  2.  ESRD on hemodialysis T,TH, Sat  3.  Henoch Schollein purpura nephritis on HD  4.  Chronic anemia, anemia of chronic disease  5.  LUE weakness, resolved  6.  Elevated proBNP secondary to ESRD  7.  Elevated d-dimer with negative NM ventilation perfusion  8.  Jerome's granulomatosis  9.  Mild leukocytosis    Plan:  1.  Currently on Cardizem drip.  He has been seen by cardiology.  Cardizem has been resumed orally.  Will discontinue Cardizem drip.  2.  NM  Scan negative.  3.  Check MRI head without contrast due to reported left upper extremity weakness and slurred speech.  4.  Hemodialysis Tuesday Thursday Saturday.  5.  Cardiology adjusting medications for A. fib with RVR  6.  BMP a.m. to monitor renal statu.  7.  Physical therapy, occupational therapy consultation      The above documentation resulted from a face-to-face encounter by me Flores GARVIN, [facility].      Discharge Planning: I expect patient to be discharged to home in 1-2 days when atrial fibrillation is rate controlled.    BHARATHI Kramer   02/16/17   11:48 AM    Imaging Results (last 72 hours)        MRI Brain Without Contrast [[id number]] Collected:  02/16/17 1600     Updated:  02/16/17 1722    Narrative:       EXAM: MR BRAIN WITHOUT IV CONTRAST 02/16/2017     COMPARISON: Head CT dated 02/15/2017.      HISTORY: 47 year-old male. Left lower extremity weakness.     TECHNIQUE:   Routine pulse sequences of the brain were obtained without IV contrast.      REPORT:   The ventricles and cerebrospinal fluid spaces are normal in size and  configuration for the patient's age. There is no evidence of mass-effect  or midline shift. No reduced diffusivity is demonstrated. A few  scattered foci of gliosis in the bilateral white matter  The brainstem, sella, pituitary, cerebellum are unremarkable. The  basilar cisterns are preserved. The intraorbital structures are  unremarkable.   The flow voids are  preserved.   No acute osseous lesion.        The visualized portions of the paranasal sinuses and mastoid air cells  are unremarkable.           Impression:       1.  No acute intracranial abnormalities.  2.  Nonspecific foci of gliosis in the bilateral white matter, likely  reflecting mild chronic microvascular changes.  This report was finalized on 02/16/2017 17:20 by Dr. Kristyn Rodriguez MD.        I personally evaluated  the patient in conjunction with BHARATHI Tanner and agree with the assessment, treatment plan, and disposition of the patient as recorded by her. My history, exam, and further recommendations are:   Patient presented with chest pain and shortness of breath, was found to be in atrial fibrillation with rapid ventricular response.  He also is a end-stage renal disease and has henoch schonlein nephritis, wegener's granulomatosis.  He has now been started on oral Cardizem.  He underwent dialysis today.  Agree with above assessment and plan of care as outlined and discussed with Flores.  Fam Odom MD  02/16/17  6:36 PM

## 2017-02-16 NOTE — PROGRESS NOTES
[facility] -  Progress Note     LOS: 1 day   Patient Care Team:  Moises Montes MD as PCP - General (Internal Medicine)  Luis Carlos Awan MD as Consulting Physician (Urology)  Alex Cortez MD as Cardiologist (Cardiology)    Chief Complaint: Chest pain, shortness of breath and palpitations    Reason for consultation: A-Fib     Subjective     Interval History:     Cardizem gtt turned off during dialysis. Patient denies any cardiac concerns. He states that he is feeling well. His left upper extremity weakness has resolved.     Review of Systems:   Review of Systems   Constitution: Positive for weakness. Negative for diaphoresis and fever.   Cardiovascular: Positive for leg swelling. Negative for chest pain, irregular heartbeat, near-syncope, orthopnea, palpitations, paroxysmal nocturnal dyspnea and syncope.   Respiratory: Negative for cough, shortness of breath and sputum production.    Skin: Negative for rash.   Musculoskeletal: Negative for falls.   Gastrointestinal: Negative for bloating, abdominal pain, nausea and vomiting.   Neurological: Positive for focal weakness (LUE- resolved).   Psychiatric/Behavioral: Negative for altered mental status.     Objective     Vital Sign Min/Max for last 24 hours  Temp  Min: 96.9 °F (36.1 °C)  Max: 98.9 °F (37.2 °C)   BP  Min: 121/74  Max: 132/69   Pulse  Min: 92  Max: 116   Resp  Min: 18  Max: 24   SpO2  Min: 93 %  Max: 98 %   Flow (L/min)  Min: 2  Max: 2   Weight  Min: 222 lb 14.4 oz (101 kg)  Max: 222 lb 14.4 oz (101 kg)     Last Weight    02/15/17  1635   Weight: 222 lb 14.4 oz (101 kg)         Intake/Output Summary (Last 24 hours) at 02/16/17 1415  Last data filed at 02/16/17 0813   Gross per 24 hour   Intake    830 ml   Output   1300 ml   Net   -470 ml         Physical Exam:  Physical Exam   Constitutional: He is oriented to person, place, and time. He appears well-developed and well-nourished. He is cooperative. No distress.   Currently  receiving dialysis.    HENT:   Head: Normocephalic and atraumatic.   Cardiovascular: Intact distal pulses.  An irregularly irregular rhythm present. Tachycardia present.    No murmur heard.  Telemetry: A-Flutter 97- 117 BPM     Pulmonary/Chest: Effort normal and breath sounds normal. No respiratory distress. He exhibits no tenderness.   Abdominal: Soft. Bowel sounds are normal. He exhibits no distension. There is no tenderness.   Musculoskeletal: He exhibits edema (BLE 1+ pitting edema, left > right ).   Neurological: He is alert and oriented to person, place, and time.   Skin: Skin is warm and dry. No rash noted. He is not diaphoretic.   Psychiatric: He has a normal mood and affect. His speech is normal and behavior is normal.   Vitals reviewed.       Results Review:   Lab Results   Component Value Date    WBC 11.65 (H) 02/16/2017    HGB 8.6 (L) 02/16/2017    HCT 27.5 (L) 02/16/2017    MCV 85.7 02/16/2017     (L) 02/16/2017     Lab Results   Component Value Date    GLUCOSE 209 (H) 02/16/2017    CALCIUM 8.8 02/16/2017     02/16/2017    K 3.8 02/16/2017    CO2 31.0 02/16/2017    CL 96 (L) 02/16/2017    BUN 59 (H) 02/16/2017    CREATININE 1.86 (H) 02/16/2017    EGFRIFNONA 39 (L) 02/16/2017    BCR 31.7 (H) 02/16/2017    ANIONGAP 12.0 02/16/2017      2/15/17- VQ Scan: Findings are most commonly associated with low probability for acute pulmonary embolism.         Echo EF Estimated  Lab Results   Component Value Date    ECHOEFEST 65 01/17/2017       Medication Review: yes  Current Facility-Administered Medications   Medication Dose Route Frequency Provider Last Rate Last Dose   • acetaminophen (TYLENOL) tablet 650 mg  650 mg Oral Q4H PRN BHARATHI Diaz   650 mg at 02/15/17 2156   • aluminum-magnesium hydroxide-simethicone (MAALOX/MYLANTA) suspension 30 mL  30 mL Oral Q6H PRN BHARATHI Diaz       • diltiaZEM (CARDIZEM) 125mg/125 mL infusion  5-15 mg/hr Intravenous Titrated Thu-Peg Celena Perry  DENAE Teresa   Stopped at 02/16/17 1205   • diltiaZEM (CARDIZEM) tablet 90 mg  90 mg Oral Q6H Macie E Knees, APRN   90 mg at 02/16/17 1204   • docusate sodium (COLACE) capsule 100 mg  100 mg Oral BID BHARATHI Diaz   100 mg at 02/15/17 1944   • enoxaparin (LOVENOX) syringe 30 mg  30 mg Subcutaneous Daily BHARATHI Diaz   30 mg at 02/15/17 1941   • heparin (porcine) injection 1,800 Units  1,800 Units Intracatheter PRN Reinaldo Foley MD   1,800 Units at 02/16/17 1400   • metoprolol tartrate (LOPRESSOR) tablet 25 mg  25 mg Oral Q12H Macie E Knees, APRN   25 mg at 02/16/17 0809   • ondansetron (ZOFRAN) injection 4 mg  4 mg Intravenous Q6H PRN BHARATHI Diaz       • predniSONE (DELTASONE) tablet 40 mg  40 mg Oral BID With Meals BHARATHI Diaz   40 mg at 02/16/17 0809   • sodium chloride 0.9 % flush 1-10 mL  1-10 mL Intravenous PRN BHARATHI Diaz       • tamsulosin (FLOMAX) 24 hr capsule 0.4 mg  0.4 mg Oral Nightly BHARATHI Diaz   0.4 mg at 02/15/17 2154         Assessment/Plan     Principal Problem:    -Persistent atrial fibrillation RVR- Cardizem gtt discontinued earlier today and HR is currently 110's-120's. He is asymptomatic.     Active Problems:    -Henoch-Schonlein purpura nephritis    -Hypertensive nephrosclerosis    -Wegener's granulomatosis with renal involvement    -Tobacco abuse    -Microcytic anemia      Plan   1. Monitor telemetry  2. No anticoagulation at this time due to recent hematuria, anemia, and reported frequent injuries and falls  3. Low salt cardiac diet  4. Continue Cardizem- switch to CD at discharge  5. Increase Lopresor to 50mg PO BID for rate control     BHARATHI Portillo  02/16/17  2:15 PM

## 2017-02-16 NOTE — PLAN OF CARE
Problem: Patient Care Overview (Adult)  Goal: Plan of Care Review  Outcome: Ongoing (interventions implemented as appropriate)    02/16/17 1549   Coping/Psychosocial Response Interventions   Plan Of Care Reviewed With patient   Patient Care Overview   Progress progress toward functional goals as expected   Outcome Evaluation   Outcome Summary/Follow up Plan cardizem gtt d/c- p.o cardizem started. pt voiding well. dialysis today. no c/o pain. alert. VSS       Goal: Adult Individualization and Mutuality  Outcome: Ongoing (interventions implemented as appropriate)  Goal: Discharge Needs Assessment  Outcome: Ongoing (interventions implemented as appropriate)    Problem: Cardiac Output, Decreased (Adult)  Goal: Identify Related Risk Factors and Signs and Symptoms  Outcome: Ongoing (interventions implemented as appropriate)  Goal: Adequate Cardiac Output/Effective Tissue Perfusion  Outcome: Ongoing (interventions implemented as appropriate)    Problem: Respiratory Insufficiency (Adult)  Goal: Identify Related Risk Factors and Signs and Symptoms  Outcome: Outcome(s) achieved Date Met:  02/16/17  Goal: Acid/Base Balance  Outcome: Ongoing (interventions implemented as appropriate)  Goal: Effective Ventilation  Outcome: Outcome(s) achieved Date Met:  02/16/17

## 2017-02-16 NOTE — PROGRESS NOTES
Discharge Planning Assessment  [facility]     Patient Name: Gurjit Andrea  MRN: [id number]  Today's Date: 2/16/2017    Admit Date: 2/15/2017          Discharge Needs Assessment       02/16/17 0850    Living Environment    Lives With spouse    Living Arrangements mobile home    Provides Primary Care For no one    Quality Of Family Relationships supportive    Able to Return to Prior Living Arrangements yes    Discharge Needs Assessment    Concerns To Be Addressed no discharge needs identified;denies needs/concerns at this time    Readmission Within The Last 30 Days previous discharge plan unsuccessful    Outpatient/Agency/Support Group Needs outpatient hemodialysis (specify)    Community Agency Name(S) [facility] TTS schedule    Anticipated Changes Related to Illness none    Equipment Currently Used at Home none    Equipment Needed After Discharge none    Transportation Available family or friend will provide    Discharge Disposition home or self-care            Discharge Plan       02/16/17 0852    Case Management/Social Work Plan    Plan PT resides at home with spouse and attends the [facility] on the TTS schedule. PT plans to dc home and is unaware of any dc needs at this time. Will follow.     Patient/Family In Agreement With Plan yes        Discharge Placement     No information found                Demographic Summary     None            Functional Status     None            Psychosocial     None            Abuse/Neglect     None            Legal     None            Substance Abuse     None            Patient Forms     None          SHANI Miranda

## 2017-02-16 NOTE — PROGRESS NOTES
"Patient:  Gurjit Andrea  YOB: 1970  Date of Service: 2/16/2017  MRN: [id number]   Acct: [id number]   Primary Care Physician: Moises Montes MD  Advance Directive: Full Code  Admit Date: 2/15/2017       Hospital Day: 1  Referring Provider: Kalie Vergara DO      Pt was seen on RRT  Modality: Hemodialysis  Access: catheter  Location: right upper  QB: 450  QD: 600  UF: 3 liters      Review of Systems:  History obtained from chart review and the patient  General ROS: No fever or chills  Respiratory ROS: No cough, shortness of breath, wheezing  Cardiovascular ROS: +chest pain or dyspnea on exertion  Gastrointestinal ROS: No abdominal pain or melena  Genito-Urinary ROS: No dysuria or hematuria  Musculoskeletal: right shoulder pain  Skin: negative    Objective:  Visit Vitals   • /69 (BP Location: Left arm, Patient Position: Lying)   • Pulse 107   • Temp 97.2 °F (36.2 °C) (Temporal Artery )   • Resp 18   • Ht 73\" (185.4 cm)   • Wt 222 lb 14.4 oz (101 kg)   • SpO2 96%   • BMI 29.41 kg/m2       Intake/Output Summary (Last 24 hours) at 02/16/17 1345  Last data filed at 02/16/17 0813   Gross per 24 hour   Intake    830 ml   Output   1300 ml   Net   -470 ml       Physical examination:  General: awake/alert   Chest:  clear to auscultation bilaterally without respiratory distress  CVS:+/- regular rate and rhythm  Abdominal: soft, nontender, normal bowel sounds  Extremities: no cyanosis or edema  Skin: warm and dry without rash  Neuro: No focal motor deficits    Labs:  Lab Results (last 24 hours)     Procedure Component Value Units Date/Time    Magnesium [[id number]]  (Normal) Collected:  02/15/17 1615    Specimen:  Blood Updated:  02/15/17 1645     Magnesium 1.9 mg/dL     Troponin [[id number]]  (Normal) Collected:  02/15/17 1615    Specimen:  Blood Updated:  02/15/17 1656     Troponin I <0.012 ng/mL     Basic Metabolic Panel [[id number]]  (Abnormal) Collected:  02/15/17 1615    Specimen:  Blood Updated:  " 02/15/17 1713     Glucose 132 (H) mg/dL      BUN 63 (H) mg/dL      Creatinine 1.72 (H) mg/dL      Sodium 138 mmol/L      Potassium 4.3 mmol/L      Chloride 96 (L) mmol/L      CO2 32.0 (H) mmol/L      Calcium 9.1 mg/dL      eGFR Non African Amer 43 (L) mL/min/1.73      BUN/Creatinine Ratio 36.6 (H)      Anion Gap 10.0 mmol/L     Narrative:       GFR Normal >60  Chronic Kidney Disease <60  Kidney Failure <15    TSH [[id number]]  (Normal) Collected:  02/15/17 1615    Specimen:  Blood Updated:  02/15/17 1716     TSH 1.070 mIU/mL     CBC (No Diff) [[id number]]  (Abnormal) Collected:  02/16/17 0442    Specimen:  Blood Updated:  02/16/17 0534     WBC 11.65 (H) 10*3/mm3      RBC 3.21 (L) 10*6/mm3      Hemoglobin 8.6 (L) g/dL      Hematocrit 27.5 (L) %      MCV 85.7 fL      MCH 26.8 (L) pg      MCHC 31.3 (L) g/dL      RDW 19.2 (H) %      RDW-SD 59.7 (H) fl      MPV 10.3 fL      Platelets 112 (L) 10*3/mm3     Basic Metabolic Panel [[id number]]  (Abnormal) Collected:  02/16/17 0442    Specimen:  Blood Updated:  02/16/17 0541     Glucose 209 (H) mg/dL      BUN 59 (H) mg/dL      Creatinine 1.86 (H) mg/dL      Sodium 139 mmol/L      Potassium 3.8 mmol/L      Chloride 96 (L) mmol/L      CO2 31.0 mmol/L      Calcium 8.8 mg/dL      eGFR Non African Amer 39 (L) mL/min/1.73      BUN/Creatinine Ratio 31.7 (H)      Anion Gap 12.0 mmol/L     Narrative:       GFR Normal >60  Chronic Kidney Disease <60  Kidney Failure <15    TSH [[id number]]  (Normal) Collected:  02/16/17 0442    Specimen:  Blood Updated:  02/16/17 0611     TSH 0.912 mIU/mL           Radiology:   Imaging Results (last 24 hours)     Procedure Component Value Units Date/Time    CT Head Without Contrast [[id number]] Collected:  02/15/17 1538     Updated:  02/15/17 1541    Narrative:       CT HEAD WO CONTRAST- 2/15/2017 3:15 PM CST     HISTORY: weakness; I48.1-Persistent atrial fibrillation; R07.89-Other  chest pain; N19-Unspecified kidney failure; D64.9-Anemia, unspecified        COMPARISON: None      DOSE LENGTH PRODUCT: 748 mGy cm. Automated exposure control was also  utilized to decrease patient radiation dose.     TECHNIQUE: Helical tomographic images of the brain were obtained without  the use of intravenous contrast.      FINDINGS:   Hemorrhage: None.     Mass effect: No midline shift or mass effect.     Ventricles: Normal and symmetric without hydrocephalus.     Basilar cisterns: Normal.     Sulci: Normal in configuration. No sulcal effacement.     Extra-axial fluid: No abnormal extra-axial fluid collections.     Grey and white matter differentiation: Normal.     Posterior fossa and brainstem: Normal.     Bones: No fractures or lesions.     Soft tissues: No acute process.     Sinuses and mastoid air cells: Clear.       Impression:       1. No acute intracranial process.        This report was finalized on 02/15/2017 15:39 by Dr. Stanton Romano MD.    NM Lung Ventilation Perfusion [[id number]] Collected:  02/15/17 1539     Updated:  02/15/17 1545    Narrative:       NM LUNG VENTILATION PERFUSION- 02/15/2017     History: Dyspnea and elevated D dimer       Comparison: Chest x-ray 02/15/2017      Radiopharmaceutical: 9.9 mCi of Xenon-133 for the ventilation study and  5.3 mCi Tc 99m MAA for the perfusion study.      Technique: Following inhalation of the aerosolized radiopharmaceutical,  the ventilation study was performed with images of the thorax being  obtained in posterior projection. Thereafter, the perfusion study was  performed following the injection of radiolabeled MAA particles with  images of the thorax obtained in 8 projections.      FINDINGS:    No pleural based, wedge-shape, matched or mismatched segmental  ventilation perfusion defects are demonstrated.         Impression:       Findings are most commonly associated with low probability for acute  pulmonary embolism.     This report was finalized on 02/15/2017 15:40 by Dr. Stanton Romano MD.    XR Chest 1 View [[id number]]  Collected:  02/15/17 1137     Updated:  02/15/17 1722    Narrative:       EXAM:   XR CHEST 1 VW-       DATE:  02/15/2017      HISTORY:  Male, age  47 years;chest pain     COMPARISON:  None.     FINDINGS:  The heart mediastinum are normal in size. Lungs are without focal  infiltrate, mass or effusions.  The bones show no acute pathology.   Right-sided dialysis catheter with tip terminating at the expected  location of the atriocaval junction.       Impression:       No acute cardiopulmonary disease.     This report was finalized on 02/15/2017 17:20 by Dr. Kristyn Rodriguez MD.              Assessment     1. OSMANI (due to Wegener's granulomatosis)- patient is followed by Rheumatology and is on immunosuppressants.   2. Atrial fibrillation with RVR--rate controlled now.  3. Anemia  4. Benign HTN  5-Rt shoulder pain-will check uric acid level for possible gout.    Plan:  As above.       Reinaldo Foley MD  2/16/2017  1:45 PM

## 2017-02-16 NOTE — CONSULTS
Nephrology ([facility]) Consult Note      Patient:  Gurjit Andrea  YOB: 1970  Date of Service: 2/16/2017  MRN: [id number]   Acct: [id number]   Primary Care Physician: Moises Montes MD  Advance Directive: Full Code  Admit Date: 2/15/2017       Hospital Day: 1  Referring Provider: Kalie Vergara DO      Patient Seen, Chart, Consults, Notes, Labs, Radiology studies reviewed.        Subjective:  Gurjit Andrea is a 47 y.o. male  whom we were consulted for End stage renal disease management.  Pt has been on dialysis for approx one month secondary to biopsy-proven HSP nephritis.  Admitted with atrial fibrillation with RVR.  Rhythm converted following administration of Cardizem gtt.  Pt feeling better today; seen during dialysis.  No pain/discomfort.  Denies dyspnea, denies N/V/D.    Allergies:  Review of patient's allergies indicates no known allergies.    Home Meds:  Prescriptions Prior to Admission   Medication Sig Dispense Refill Last Dose   • albuterol (PROVENTIL HFA;VENTOLIN HFA) 108 (90 BASE) MCG/ACT inhaler Every 6 (Six) Hours.   Taking   • diltiaZEM CD (CARDIZEM CD) 360 MG 24 hr capsule Take 1 capsule by mouth Daily. 30 capsule 5 Taking   • metoprolol tartrate (LOPRESSOR) 25 MG tablet 25 mg 2 (Two) Times a Day.   Taking   • predniSONE (DELTASONE) 20 MG tablet Take 2 tablets by mouth 2 (Two) Times a Day With Meals. (Patient taking differently: Take 80 mg by mouth 2 (Two) Times a Day With Meals.) 60 tablet 0 Taking   • tamsulosin (FLOMAX) 0.4 MG capsule 24 hr capsule Take 1 capsule by mouth Every Night. 30 capsule 0 Taking       Medicines:  Current Facility-Administered Medications   Medication Dose Route Frequency Provider Last Rate Last Dose   • acetaminophen (TYLENOL) tablet 650 mg  650 mg Oral Q4H PRN Gio Rivera, APRN   650 mg at 02/15/17 7325   • aluminum-magnesium hydroxide-simethicone (MAALOX/MYLANTA) suspension 30 mL  30 mL Oral Q6H PRN Gio Rivera,  APRN       • diltiaZEM (CARDIZEM) 125mg/125 mL infusion  5-15 mg/hr Intravenous Titrated Thu-DENAE Ambrosio   Stopped at 02/16/17 1205   • diltiaZEM (CARDIZEM) tablet 90 mg  90 mg Oral Q6H Macie E Knees, APRN   90 mg at 02/16/17 1204   • docusate sodium (COLACE) capsule 100 mg  100 mg Oral BID Gio Rivera APRN   100 mg at 02/15/17 1944   • enoxaparin (LOVENOX) syringe 30 mg  30 mg Subcutaneous Daily Gio Rivera APRN   30 mg at 02/15/17 1941   • heparin (porcine) injection 1,800 Units  1,800 Units Intracatheter PRN Reinaldo Foley MD       • metoprolol tartrate (LOPRESSOR) tablet 25 mg  25 mg Oral Q12H Macie E Knees, APRN   25 mg at 02/16/17 0809   • ondansetron (ZOFRAN) injection 4 mg  4 mg Intravenous Q6H PRN BHARATHI Diaz       • predniSONE (DELTASONE) tablet 40 mg  40 mg Oral BID With Meals BHARATHI Diaz   40 mg at 02/16/17 0809   • sodium chloride 0.9 % flush 1-10 mL  1-10 mL Intravenous PRN BHARATHI Diaz       • tamsulosin (FLOMAX) 24 hr capsule 0.4 mg  0.4 mg Oral Nightly BHARATHI Diaz   0.4 mg at 02/15/17 2154       Past Medical History:  Past Medical History   Diagnosis Date   • A-fib    • Chronic renal failure    • Elevated PSA    • Kidney stone    • Purpura    • Wegener's granulomatosis with renal involvement        Past Surgical History:  Past Surgical History   Procedure Laterality Date   • Appendectomy     • Insertion hemodialysis catheter N/A 1/20/2017     Procedure: INSERTION PERMCATH;  Surgeon: Ian Grimes DO;  Location: [facility] OR;  Service:        Family History  History reviewed. No pertinent family history.    Social History  Social History     Social History   • Marital status:      Spouse name: N/A   • Number of children: N/A   • Years of education: N/A     Occupational History   • Not on file.     Social History Main Topics   • Smoking status: Heavy Tobacco Smoker   • Smokeless tobacco: Not on file   • Alcohol use Yes  "  • Drug use: No   • Sexual activity: Not on file     Other Topics Concern   • Not on file     Social History Narrative   • No narrative on file         Review of Systems:  History obtained from chart review and the patient  General ROS: No fever or chills  Respiratory ROS: No cough, shortness of breath, wheezing  Cardiovascular ROS: no chest pain or dyspnea on exertion  Gastrointestinal ROS: No abdominal pain or melena  Genito-Urinary ROS: No dysuria or hematuria  14 point ROS reviewed with the patient and negative except as noted above and in the HPI unless unable to obtain.    Objective:  Visit Vitals   • /69 (BP Location: Left arm, Patient Position: Lying)   • Pulse 107   • Temp 97.2 °F (36.2 °C) (Temporal Artery )   • Resp 18   • Ht 73\" (185.4 cm)   • Wt 222 lb 14.4 oz (101 kg)   • SpO2 96%   • BMI 29.41 kg/m2       Intake/Output Summary (Last 24 hours) at 02/16/17 1223  Last data filed at 02/16/17 0813   Gross per 24 hour   Intake    830 ml   Output   1300 ml   Net   -470 ml     General: awake/alert   Chest:  clear to auscultation bilaterally without respiratory distress  CVS: regular rate and rhythm  Abdominal: soft, nontender, normal bowel sounds  Extremities: no cyanosis or edema  Skin: warm and dry without rash      Labs:    Results from last 7 days  Lab Units 02/16/17  0442 02/15/17  1000   WBC 10*3/mm3 11.65* 13.71*   HEMOGLOBIN g/dL 8.6* 9.0*   HEMATOCRIT % 27.5* 28.9*   PLATELETS 10*3/mm3 112* 127*           Results from last 7 days  Lab Units 02/16/17  0442 02/15/17  1615 02/15/17  1000   SODIUM mmol/L 139 138 137   POTASSIUM mmol/L 3.8 4.3 3.8   CHLORIDE mmol/L 96* 96* 95*   TOTAL CO2 mmol/L 31.0 32.0* 28.0   BUN mg/dL 59* 63* 64*   CREATININE mg/dL 1.86* 1.72* 1.68*   CALCIUM mg/dL 8.8 9.1 8.9   BILIRUBIN mg/dL  --   --  0.4   ALK PHOS U/L  --   --  114   ALT (SGPT) U/L  --   --  59*   AST (SGOT) U/L  --   --  25   GLUCOSE mg/dL 209* 132* 311*       Radiology:   Imaging Results (last 72 hours) "     Procedure Component Value Units Date/Time    CT Head Without Contrast [[id number]] Collected:  02/15/17 1538     Updated:  02/15/17 1541    Narrative:       CT HEAD WO CONTRAST- 2/15/2017 3:15 PM CST     HISTORY: weakness; I48.1-Persistent atrial fibrillation; R07.89-Other  chest pain; N19-Unspecified kidney failure; D64.9-Anemia, unspecified       COMPARISON: None      DOSE LENGTH PRODUCT: 748 mGy cm. Automated exposure control was also  utilized to decrease patient radiation dose.     TECHNIQUE: Helical tomographic images of the brain were obtained without  the use of intravenous contrast.      FINDINGS:   Hemorrhage: None.     Mass effect: No midline shift or mass effect.     Ventricles: Normal and symmetric without hydrocephalus.     Basilar cisterns: Normal.     Sulci: Normal in configuration. No sulcal effacement.     Extra-axial fluid: No abnormal extra-axial fluid collections.     Grey and white matter differentiation: Normal.     Posterior fossa and brainstem: Normal.     Bones: No fractures or lesions.     Soft tissues: No acute process.     Sinuses and mastoid air cells: Clear.       Impression:       1. No acute intracranial process.        This report was finalized on 02/15/2017 15:39 by Dr. Stanton Romano MD.    NM Lung Ventilation Perfusion [[id number]] Collected:  02/15/17 1539     Updated:  02/15/17 1545    Narrative:       NM LUNG VENTILATION PERFUSION- 02/15/2017     History: Dyspnea and elevated D dimer       Comparison: Chest x-ray 02/15/2017      Radiopharmaceutical: 9.9 mCi of Xenon-133 for the ventilation study and  5.3 mCi Tc 99m MAA for the perfusion study.      Technique: Following inhalation of the aerosolized radiopharmaceutical,  the ventilation study was performed with images of the thorax being  obtained in posterior projection. Thereafter, the perfusion study was  performed following the injection of radiolabeled MAA particles with  images of the thorax obtained in 8 projections.       FINDINGS:    No pleural based, wedge-shape, matched or mismatched segmental  ventilation perfusion defects are demonstrated.         Impression:       Findings are most commonly associated with low probability for acute  pulmonary embolism.     This report was finalized on 02/15/2017 15:40 by Dr. Stanton Romano MD.    XR Chest 1 View [[id number]] Collected:  02/15/17 1137     Updated:  02/15/17 1722    Narrative:       EXAM:   XR CHEST 1 VW-       DATE:  02/15/2017      HISTORY:  Male, age  47 years;chest pain     COMPARISON:  None.     FINDINGS:  The heart mediastinum are normal in size. Lungs are without focal  infiltrate, mass or effusions.  The bones show no acute pathology.   Right-sided dialysis catheter with tip terminating at the expected  location of the atriocaval junction.       Impression:       No acute cardiopulmonary disease.     This report was finalized on 02/15/2017 17:20 by Dr. Kristyn Rodriguez MD.          Culture:         Assessment   1. End stage renal dialysis; maintenance HD e Thur Sat  2.  Atrial fibrillation with RVR--rate now controlled  3.  Anemia  4.  HTN    Plan:  1.  Dialysis today  2.  Further assessment and plan following Dr Foley's evaluation of patient    Dialysis   Pt was seen on RRT.  Tolerating well  Modality: Hemodialysis  Access: Catheter  Location: right upper  QB: 450  QD: 600  UF: 2000      Thank you for the consult, we appreciate the opportunity to provide care to your patients.  Feel free to contact me if I can be of any further assistance.      Charlie Jacobo, APRN  2/16/2017  12:23 PM

## 2017-02-16 NOTE — PLAN OF CARE
Problem: Patient Care Overview (Adult)  Goal: Plan of Care Review  Outcome: Ongoing (interventions implemented as appropriate)    02/16/17 0429   Coping/Psychosocial Response Interventions   Plan Of Care Reviewed With patient   Patient Care Overview   Progress progress toward functional goals as expected   Outcome Evaluation   Outcome Summary/Follow up Plan Afluter 100-137 pvc , voids per urinal for dialysis this am, no nausea , a&o x4 no tu feficits cont on cardizem drip       Goal: Adult Individualization and Mutuality  Outcome: Ongoing (interventions implemented as appropriate)  Goal: Discharge Needs Assessment  Outcome: Ongoing (interventions implemented as appropriate)    Problem: Cardiac Output, Decreased (Adult)  Goal: Identify Related Risk Factors and Signs and Symptoms  Outcome: Ongoing (interventions implemented as appropriate)  Goal: Adequate Cardiac Output/Effective Tissue Perfusion  Outcome: Ongoing (interventions implemented as appropriate)    Problem: Respiratory Insufficiency (Adult)  Goal: Identify Related Risk Factors and Signs and Symptoms  Outcome: Ongoing (interventions implemented as appropriate)  Goal: Acid/Base Balance  Outcome: Ongoing (interventions implemented as appropriate)  Goal: Effective Ventilation  Outcome: Ongoing (interventions implemented as appropriate)

## 2017-02-16 NOTE — PAYOR COMM NOTE
"FROM: QUANG MACDONALD  PHONE: [phone]  FAX: [phone]    PENDING AUTH: [id number]    Gurjit Be (47 y.o. Male)     Date of Birth Social Security Number Address Home Phone MRN    [id number]  [id number] [facility] [phone] [id number]    Syn[employer] Marital Status          None        Admission Date Admission Type Admitting Provider Attending Provider Department, Room/Bed    2/15/17 Emergency Kalie Vergara DO [place], Ali Janell, DO [facility] 4B, 408/1    Discharge Date Discharge Disposition Discharge Destination                      Attending Provider: Kalie Vergara DO     Allergies:  No Known Allergies    Isolation:  None   Infection:  None   Code Status:  FULL    Ht:  73\" (185.4 cm)   Wt:  222 lb 14.4 oz (101 kg)    Admission Cmt:  None   Principal Problem:  None                Active Insurance as of 2/15/2017     Primary Coverage     Payor Plan Insurance Group Employer/Plan Group    [employer] [facility] [employer] [facility] PPO [id number]     Payor Plan Address Payor Plan Phone Number Effective From Effective To    [address] [phone] 12/1/2016     [place] [address]       Subscriber Name Subscriber Birth Date Member ID       JORDAN BE [birth year]2001 [id number]                 Emergency Contacts      (Rel.) Home Phone Work Phone Mobile Phone    Jordan Be (Spouse) [phone] -- [phone]               History & Physical      Kalie Vergara DO at 2/15/2017  2:10 PM              [facility] Medicine Services  HISTORY AND PHYSICAL    Date of Admission: 2/15/2017  Primary Care Physician: Moises Montes MD    Subjective     Chief Complaint: Chest pain, shortness of breath and palpitations    History of Present Illness  The patient is a 47-year-old  male who presented to [facility] emergency department today with chest pain, shortness of breath and palpitations.  His wife " also states that he has been having some neurologic changes as well.  She states that he is having difficulties with his speech, mumbling and getting confused with his words.  He was recently discharged from our facility on 2/6/17.  He has recently been started on outpatient dialysis, Tuesday, Thursday, and Saturday regimen.  He states that he went to dialysis yesterday and felt fine.  He states he woke up at approximately 4 AM with the above symptoms.  When he was recently admitted to our facility he had difficulties in controlling his rate with his atrial fibrillation.  He has been taking his Cardizem and metoprolol at home.  No recent sick contact.  No nausea vomiting or diarrhea.  He denies any dysuria or abdominal pain.  His wife has been at the bedside throughout this interview.  He is on long-term high dose steroid therapy in regards to his Jerome's granulomatosis disease.    Review of Systems   Constitutional: Positive for fatigue. Negative for activity change, appetite change, chills and fever.   HENT: Negative for hearing loss, nosebleeds, tinnitus and trouble swallowing.    Eyes: Negative for visual disturbance.   Respiratory: Positive for shortness of breath. Negative for cough, chest tightness and wheezing.    Cardiovascular: Positive for chest pain, palpitations and leg swelling.   Gastrointestinal: Negative for abdominal distention, abdominal pain, blood in stool, constipation, diarrhea, nausea and vomiting.   Endocrine: Negative for cold intolerance, heat intolerance, polydipsia, polyphagia and polyuria.   Genitourinary: Negative for decreased urine volume, difficulty urinating, dysuria, flank pain, frequency and hematuria.   Musculoskeletal: Negative for arthralgias, joint swelling and myalgias.   Skin: Negative for rash.   Allergic/Immunologic: Negative for immunocompromised state.   Neurological: Positive for weakness. Negative for dizziness, syncope, light-headedness and headaches.    Hematological: Negative for adenopathy. Does not bruise/bleed easily.   Psychiatric/Behavioral: Negative for confusion and sleep disturbance. The patient is not nervous/anxious.       Otherwise complete ROS reviewed and negative except as mentioned in the HPI.    Past Medical History:   Past Medical History   Diagnosis Date   • A-fib    • Chronic renal failure    • Elevated PSA    • Kidney stone    • Purpura    • Wegener's granulomatosis with renal involvement      Past Surgical History:  Past Surgical History   Procedure Laterality Date   • Appendectomy     • Insertion hemodialysis catheter N/A 1/20/2017     Procedure: INSERTION PERMCATH;  Surgeon: Ian Grimes DO;  Location: [facility];  Service:      Social History:  reports that he has been smoking.  He does not have any smokeless tobacco history on file. He reports that he drinks alcohol. He reports that he does not use illicit drugs.    Family History: Significant for having a half-brother on his mother's side of the family. His brother is in good health; however, he has a history of Kawasaki's disease as a child. His father is living but has a history of end-stage liver disease due to alcohol use, COPD, and diabetes mellitus type 2. His mother is alive and has a history of chronic kidney disease and hypothyroidism.     Allergies:  No Known Allergies    Medications:  Prior to Admission medications    Medication Sig Start Date End Date Taking? Authorizing Provider   albuterol (PROVENTIL HFA;VENTOLIN HFA) 108 (90 BASE) MCG/ACT inhaler Every 6 (Six) Hours.   Yes Historical Provider, MD   diltiaZEM CD (CARDIZEM CD) 360 MG 24 hr capsule Take 1 capsule by mouth Daily. 1/27/17  Yes BHARATHI Portillo   metoprolol tartrate (LOPRESSOR) 25 MG tablet 25 mg 2 (Two) Times a Day. 11/3/16  Yes Historical Provider, MD   predniSONE (DELTASONE) 20 MG tablet Take 2 tablets by mouth 2 (Two) Times a Day With Meals.  Patient taking differently: Take 80 mg by mouth 2  "(Two) Times a Day With Meals. 1/27/17  Yes BHARATHI Cantor   tamsulosin (FLOMAX) 0.4 MG capsule 24 hr capsule Take 1 capsule by mouth Every Night. 1/27/17  Yes BHARATHI Cantor     Objective     Vital Signs:   Visit Vitals   • /91   • Pulse 113   • Temp 98.2 °F (36.8 °C) (Oral)   • Resp 21   • Ht 73\" (185.4 cm)   • Wt 224 lb (102 kg)   • SpO2 96%   • BMI 29.55 kg/m2     Physical Exam   Constitutional: He is oriented to person, place, and time. He appears well-developed and well-nourished.   HENT:   Head: Normocephalic and atraumatic.   Eyes: Conjunctivae and EOM are normal. Pupils are equal, round, and reactive to light.   Neck: Neck supple. No JVD present. No thyromegaly present.   Cardiovascular: Normal heart sounds and intact distal pulses.  An irregular rhythm present. Tachycardia present.  Exam reveals no gallop and no friction rub.    No murmur heard.  Atrial fib   Pulmonary/Chest: Effort normal. No respiratory distress. He has decreased breath sounds (Bilateral bases). He has no wheezes. He has no rales. He exhibits no tenderness.   Abdominal: Soft. Bowel sounds are normal. He exhibits no distension. There is no tenderness. There is no rebound and no guarding.   Musculoskeletal: Normal range of motion. He exhibits edema (bilateral lower extremity pitting edema). He exhibits no tenderness or deformity.   Lymphadenopathy:     He has no cervical adenopathy.   Neurological: He is alert and oriented to person, place, and time. He displays normal reflexes. No cranial nerve deficit. He exhibits normal muscle tone.   Skin: Skin is warm and dry. No rash noted.   Psychiatric: His behavior is normal. Judgment and thought content normal.   Flat affect     Results Reviewed:  Lab Results (last 24 hours)     Procedure Component Value Units Date/Time    POC Troponin, Rapid [[id number]]  (Normal) Collected:  02/15/17 1006    Specimen:  Blood Updated:  02/15/17 1021     Troponin I 0.00 ng/mL       Serial " Number: [id number]    : [id number]       D-dimer, Quantitative [[id number]]  (Abnormal) Collected:  02/15/17 1000    Specimen:  Blood Updated:  02/15/17 1022     D-Dimer, Quantitative 3.12 (H) mg/L (FEU)     Narrative:       Reference Range is 0-0.50 mg/L FEU. However, results <0.50 mg/L FEU tends to rule out DVT or PE. Results >0.50 mg/L FEU are not useful in predicting absence or presence of DVT or PE.    Protime-INR [[id number]]  (Abnormal) Collected:  02/15/17 1000    Specimen:  Blood Updated:  02/15/17 1022     Protime 14.8 (H) Seconds      INR 1.12 (H)     aPTT [[id number]]  (Normal) Collected:  02/15/17 1000    Specimen:  Blood Updated:  02/15/17 1022     PTT 30.7 seconds     Comprehensive Metabolic Panel [[id number]]  (Abnormal) Collected:  02/15/17 1000    Specimen:  Blood Updated:  02/15/17 1026     Glucose 311 (H) mg/dL      BUN 64 (H) mg/dL      Creatinine 1.68 (H) mg/dL      Sodium 137 mmol/L      Potassium 3.8 mmol/L      Chloride 95 (L) mmol/L      CO2 28.0 mmol/L      Calcium 8.9 mg/dL      Total Protein 6.3 g/dL      Albumin 3.30 (L) g/dL      ALT (SGPT) 59 (H) U/L      AST (SGOT) 25 U/L      Alkaline Phosphatase 114 U/L      Total Bilirubin 0.4 mg/dL      eGFR Non African Amer 44 (L) mL/min/1.73      Globulin 3.0 gm/dL      A/G Ratio 1.1 g/dL      BUN/Creatinine Ratio 38.1 (H)      Anion Gap 14.0 (H) mmol/L     BNP [[id number]]  (Abnormal) Collected:  02/15/17 1000    Specimen:  Blood Updated:  02/15/17 1029     proBNP 5920.0 (H) pg/mL     CBC & Differential [[id number]] Collected:  02/15/17 1000    Specimen:  Blood Updated:  02/15/17 1051    Narrative:       The following orders were created for panel order CBC & Differential.  Procedure                               Abnormality         Status                     ---------                               -----------         ------                     Scan Slide[[id number]]                                        Final result               CBC Auto  Differential[[id number]]         Abnormal            Final result                 Please view results for these tests on the individual orders.    CBC Auto Differential [[id number]]  (Abnormal) Collected:  02/15/17 1000    Specimen:  Blood Updated:  02/15/17 1051     WBC 13.71 (H) 10*3/mm3      RBC 3.37 (L) 10*6/mm3      Hemoglobin 9.0 (L) g/dL      Hematocrit 28.9 (L) %      MCV 85.8 fL      MCH 26.7 (L) pg      MCHC 31.1 (L) g/dL      RDW 19.1 (H) %      RDW-SD 58.8 (H) fl      MPV 10.2 fL      Platelets 127 (L) 10*3/mm3      Neutrophil % 83.6 (H) %      Lymphocyte % 9.0 (L) %      Monocyte % 3.5 (L) %      Eosinophil % 0.0 %      Basophil % 0.3 %      Immature Grans % 3.6 %      Neutrophils, Absolute 11.46 (H) 10*3/mm3      Lymphocytes, Absolute 1.24 10*3/mm3      Monocytes, Absolute 0.48 10*3/mm3      Eosinophils, Absolute 0.00 10*3/mm3      Basophils, Absolute 0.04 10*3/mm3      Immature Grans, Absolute 0.49 (H) 10*3/mm3     Scan Slide [[id number]] Collected:  02/15/17 1000    Specimen:  Blood Updated:  02/15/17 1051     Anisocytosis Slight/1+      Hypochromia Mod/2+      WBC Morphology Normal      Platelet Morphology Normal     POC Troponin, Rapid [[id number]]  (Normal) Collected:  02/15/17 1246    Specimen:  Blood Updated:  02/15/17 1300     Troponin I 0.00 ng/mL       Serial Number: [id number]    : [id number]       Blood Gas, Arterial [[id number]]  (Abnormal) Collected:  02/15/17 1342    Specimen:  Arterial Blood Updated:  02/15/17 1343     Site Arterial: left radial      Artem's Test --       Documented in Rapid Comm        pH, Arterial 7.511 (H) pH units      pCO2, Arterial 37.1 mm Hg      pO2, Arterial 74.9 (L) mm Hg      HCO3, Arterial 29.0 (H) mmol/L      Base Excess, Arterial 5.9 (H) mmol/L      O2 Saturation, Arterial 96.2 %      O2 Saturation Calculated 96.2 %      Barometric Pressure for Blood Gas -- mmHg       Component not reported at this site.        Modality Cannula      Flow Rate 2.00 lpm      Narrative:       Serial Number: [id number]    : [id number]        Imaging Results (last 24 hours)     Procedure Component Value Units Date/Time    XR Chest 1 View [[id number]] Collected:  02/15/17 1137     Updated:  02/15/17 1137    Narrative:       EXAM:   XR CHEST 1 VW-       DATE:  02/15/2017      HISTORY:  Male, age  47 years;chest pain     COMPARISON:  None.     FINDINGS:  The heart mediastinum are normal in size. Lungs are without focal  infiltrate, mass or effusions.  The bones show no acute pathology.   Right-sided dialysis catheter with tip terminating at the expected  location of the atriocaval junction.       Impression:       No acute cardiopulmonary disease.     This report was finalized on  by Dr. Kristyn Rodriguez MD.        I have personally reviewed and interpreted the radiology studies and ECG obtained at time of admission.     Assessment / Plan     Assessment & Plan  Hospital Problem List     Persistent atrial fibrillation        1.  Atrial fibrillation with rapid ventricular response, no anticoagulation  2.  Jerome's granulomatosis  3.  Long-term dialysis  4.  Chest pain  5.  Elevated BNP  6.  Elevated d-dimer  7.  Anemia of chronic disease    Plan:    We will admit the patient to the hospitalist service for workup and management.  He will be placed to Dr. Pierre's service.  He will be placed on the telemetry unit with continuous telemetry.  We will continue the Cardizem drip. We will consult nephrology and cardiology.  He will need dialysis tomorrow.  Since he had an elevated d-dimer we will go ahead and check a VQ scan instead of a CTA angio of his chest due to his renal function.  We will resume his home medications.  He also complained of some left-sided weakness and speech issues therefore we will obtain a CT of the head.  Lovenox 30 mg for DVT prophylaxis.  We will also placed on SADIA hose, he has pitting bilateral lower extremity edema.  We will obtain laboratory data in the a.m.  Will also check  "a TSH now.  Please note this is initial evaluation and workup is ongoing.    Further orders per Dr. Pierre.  Dr. Vergara:  Patient seen and examined in the ER. Patient appears older than stated age. Has +1-2 pitting bilateral lower extremity edema, with mild chronic skin changes on bilateral lower legs. Lungs sounds decreased at the bases and heart irregularly irregular. Will ask above consultants to join the case.     Code Status: Full     I discussed the patients findings and my recommendations with the patient, his wife, and Dr. Pierre.    Estimated length of stay 3-5 days    Gio Rivera, APRN   02/15/17   2:10 PM               Electronically signed by Kalie Vergara DO at 2/15/2017  3:05 PM           Emergency Department Notes      DENAE Lau at 2/15/2017  9:58 AM     Attestation signed by Rao Benitez Jr., MD at 2/15/2017  3:33 PM              For this patient encounter, I reviewed the NP or PA documentation, treatment plan, and medical decision making. Rao Benitez Jr, MD 2/15/2017 3:33 PM                               Subjective   Patient is a 47 y.o. male presenting with chest pain.   Chest Pain   Associated symptoms: fatigue, shortness of breath and weakness    Associated symptoms: no cough and no palpitations        is a pleasant 47-year-old  gentleman with chief complaint of chest pain.  He presents to ED with his wife who also provides history.  At 4 o'clock this morning, the patient suddenly felt a \"bad pain deep to the bone to my left side.\"  He specifically points to the left side of his chest with the pain is radiating down his arm, through his left scapular region, and left side of his neck.  He complains of nausea and shortness of breath.  He feels his heart is racing.  The patient does have a history of atrial fibrillation which usually is controlled with Cardizem. He was recently placed on this by mouth in the past several weeks.  When his wife came home " from work 8 o'clock this morning, he went to her vehicle and told her that he did not feel well.  She reports that his lips were blue and he was short of breath.  His heart rate sounded irregular.  She brought him here for further evaluation.    The patient was admitted to this facility 1 month ago.  He does have a history of renal failure - noted to have Wegener's granulomatosis after completing a renal biopsy.  The patient is on dialysis.  His last treatment was yesterday.  Wife denies any weight gain.  He has chronic swelling of his lower extremities which has improved.  He does have a history of hypoproteinemia and hypoalbuminemia.  He was in atrial fibrillation during his last admission as well.  He converted after being on Cardizem drip and placed on Cardizem by mouth.  The patient reports his last stress test [place].  He denies any disease.  He denies ever undergoing a cardiac catheterization.  Upon his last admission, he did complete a transthoracic echo and noted to have a mitral valve regurgitation.    The patient denies any fever.  He denies any change in cough.  He denies any history of congestive heart failure or COPD.  He continues to be on steroids.  Due to his vasculitis, he was advised not to take any aspirin or anticoagulants.    Review of Systems   Constitutional: Positive for activity change, appetite change and fatigue.   HENT: Negative.    Respiratory: Positive for chest tightness and shortness of breath. Negative for cough.    Cardiovascular: Positive for chest pain and leg swelling. Negative for palpitations.   Gastrointestinal: Negative.    Genitourinary: Negative.    Musculoskeletal: Negative.    Neurological: Positive for weakness and light-headedness.       Past Medical History   Diagnosis Date   • A-fib    • Chronic renal failure    • Elevated PSA    • Kidney stone    • Purpura    • Wegener's granulomatosis with renal involvement        No Known  "Allergies    Past Surgical History   Procedure Laterality Date   • Appendectomy     • Insertion hemodialysis catheter N/A 1/20/2017     Procedure: INSERTION PERMCATH;  Surgeon: Ian Grimes DO;  Location: [facility] OR;  Service:        History reviewed. No pertinent family history.    Social History     Social History   • Marital status:      Spouse name: N/A   • Number of children: N/A   • Years of education: N/A     Social History Main Topics   • Smoking status: Heavy Tobacco Smoker   • Smokeless tobacco: None   • Alcohol use Yes   • Drug use: No   • Sexual activity: Not Asked     Other Topics Concern   • None     Social History Narrative   • None       Prior to Admission medications    Medication Sig Start Date End Date Taking? Authorizing Provider   albuterol (PROVENTIL HFA;VENTOLIN HFA) 108 (90 BASE) MCG/ACT inhaler Every 6 (Six) Hours.   Yes Historical Provider, MD   diltiaZEM CD (CARDIZEM CD) 360 MG 24 hr capsule Take 1 capsule by mouth Daily. 1/27/17  Yes BHARATHI Portillo   metoprolol tartrate (LOPRESSOR) 25 MG tablet 25 mg 2 (Two) Times a Day. 11/3/16  Yes Historical Provider, MD   predniSONE (DELTASONE) 20 MG tablet Take 2 tablets by mouth 2 (Two) Times a Day With Meals.  Patient taking differently: Take 80 mg by mouth 2 (Two) Times a Day With Meals. 1/27/17  Yes BHARATHI Cantor   tamsulosin (FLOMAX) 0.4 MG capsule 24 hr capsule Take 1 capsule by mouth Every Night. 1/27/17  Yes BHARATHI Cantor       Medications   diltiaZEM (CARDIZEM) 125mg/125 mL infusion (10 mg/hr Intravenous Rate/Dose Change 2/15/17 1145)   diltiaZEM (CARDIZEM) tablet 90 mg (not administered)   metoprolol tartrate (LOPRESSOR) tablet 25 mg (not administered)   diltiaZEM (CARDIZEM) injection 10 mg (10 mg Intravenous Given 2/15/17 1008)       Visit Vitals   • /64   • Pulse 107   • Temp 98.4 °F (36.9 °C)   • Resp 24   • Ht 73\" (185.4 cm)   • Wt 224 lb (102 kg)   • SpO2 95%   • BMI 29.55 kg/m2 "         Objective   Physical Exam   Constitutional: He is oriented to person, place, and time. He appears well-developed and well-nourished.   HENT:   Head: Normocephalic and atraumatic.   Right Ear: External ear normal.   Left Ear: External ear normal.   Nose: Nose normal.   Mouth/Throat: Oropharynx is clear and moist.   Eyes: Conjunctivae and EOM are normal. Pupils are equal, round, and reactive to light.   Neck: Normal range of motion. Neck supple. No tracheal deviation present.   Cardiovascular: Intact distal pulses.  An irregularly irregular rhythm present. Tachycardia present.  Exam reveals no gallop and no friction rub.    Murmur heard.  Pulmonary/Chest: Effort normal and breath sounds normal. No respiratory distress. He has no wheezes. He has no rales. He exhibits no tenderness.   Abdominal: Soft. Bowel sounds are normal. He exhibits distension. He exhibits no mass. There is no tenderness. There is no rebound and no guarding.   Musculoskeletal: Normal range of motion. He exhibits edema. He exhibits no tenderness or deformity.   3+ pitting edema.    Neurological: He is alert and oriented to person, place, and time.   Skin: Skin is warm and dry. No erythema. No pallor.   Psychiatric: He has a normal mood and affect. His behavior is normal. Judgment and thought content normal. reviewed.      Procedures        Lab Results (last 24 hours)     Procedure Component Value Units Date/Time    CBC & Differential [[id number]] Collected:  02/15/17 1000    Specimen:  Blood Updated:  02/15/17 1051    Narrative:       The following orders were created for panel order CBC & Differential.  Procedure                               Abnormality         Status                     ---------                               -----------         ------                     Scan Slide[[id number]]                                        Final result               CBC Auto Differential[[id number]]         Abnormal            Final result                  Please view results for these tests on the individual orders.    Comprehensive Metabolic Panel [[id number]]  (Abnormal) Collected:  02/15/17 1000    Specimen:  Blood Updated:  02/15/17 1026     Glucose 311 (H) mg/dL      BUN 64 (H) mg/dL      Creatinine 1.68 (H) mg/dL      Sodium 137 mmol/L      Potassium 3.8 mmol/L      Chloride 95 (L) mmol/L      CO2 28.0 mmol/L      Calcium 8.9 mg/dL      Total Protein 6.3 g/dL      Albumin 3.30 (L) g/dL      ALT (SGPT) 59 (H) U/L      AST (SGOT) 25 U/L      Alkaline Phosphatase 114 U/L      Total Bilirubin 0.4 mg/dL      eGFR Non African Amer 44 (L) mL/min/1.73      Globulin 3.0 gm/dL      A/G Ratio 1.1 g/dL      BUN/Creatinine Ratio 38.1 (H)      Anion Gap 14.0 (H) mmol/L     D-dimer, Quantitative [[id number]]  (Abnormal) Collected:  02/15/17 1000    Specimen:  Blood Updated:  02/15/17 1022     D-Dimer, Quantitative 3.12 (H) mg/L (FEU)     Narrative:       Reference Range is 0-0.50 mg/L FEU. However, results <0.50 mg/L FEU tends to rule out DVT or PE. Results >0.50 mg/L FEU are not useful in predicting absence or presence of DVT or PE.    Protime-INR [[id number]]  (Abnormal) Collected:  02/15/17 1000    Specimen:  Blood Updated:  02/15/17 1022     Protime 14.8 (H) Seconds      INR 1.12 (H)     aPTT [[id number]]  (Normal) Collected:  02/15/17 1000    Specimen:  Blood Updated:  02/15/17 1022     PTT 30.7 seconds     BNP [[id number]]  (Abnormal) Collected:  02/15/17 1000    Specimen:  Blood Updated:  02/15/17 1029     proBNP 5920.0 (H) pg/mL     CBC Auto Differential [[id number]]  (Abnormal) Collected:  02/15/17 1000    Specimen:  Blood Updated:  02/15/17 1051     WBC 13.71 (H) 10*3/mm3      RBC 3.37 (L) 10*6/mm3      Hemoglobin 9.0 (L) g/dL      Hematocrit 28.9 (L) %      MCV 85.8 fL      MCH 26.7 (L) pg      MCHC 31.1 (L) g/dL      RDW 19.1 (H) %      RDW-SD 58.8 (H) fl      MPV 10.2 fL      Platelets 127 (L) 10*3/mm3      Neutrophil % 83.6 (H) %      Lymphocyte % 9.0 (L) %       Monocyte % 3.5 (L) %      Eosinophil % 0.0 %      Basophil % 0.3 %      Immature Grans % 3.6 %      Neutrophils, Absolute 11.46 (H) 10*3/mm3      Lymphocytes, Absolute 1.24 10*3/mm3      Monocytes, Absolute 0.48 10*3/mm3      Eosinophils, Absolute 0.00 10*3/mm3      Basophils, Absolute 0.04 10*3/mm3      Immature Grans, Absolute 0.49 (H) 10*3/mm3     Scan Slide [[id number]] Collected:  02/15/17 1000    Specimen:  Blood Updated:  02/15/17 1051     Anisocytosis Slight/1+      Hypochromia Mod/2+      WBC Morphology Normal      Platelet Morphology Normal     POC Troponin, Rapid [[id number]]  (Normal) Collected:  02/15/17 1006    Specimen:  Blood Updated:  02/15/17 1021     Troponin I 0.00 ng/mL       Serial Number: [id number]    : [id number]       POC Troponin, Rapid [[id number]]  (Normal) Collected:  02/15/17 1246    Specimen:  Blood Updated:  02/15/17 1300     Troponin I 0.00 ng/mL       Serial Number: [id number]    : [id number]       Blood Gas, Arterial [[id number]]  (Abnormal) Collected:  02/15/17 1342    Specimen:  Arterial Blood Updated:  02/15/17 1343     Site Arterial: left radial      Artem's Test --       Documented in Rapid Comm        pH, Arterial 7.511 (H) pH units      pCO2, Arterial 37.1 mm Hg      pO2, Arterial 74.9 (L) mm Hg      HCO3, Arterial 29.0 (H) mmol/L      Base Excess, Arterial 5.9 (H) mmol/L      O2 Saturation, Arterial 96.2 %      O2 Saturation Calculated 96.2 %      Barometric Pressure for Blood Gas -- mmHg       Component not reported at this site.        Modality Cannula      Flow Rate 2.00 lpm     Narrative:       Serial Number: [id number]    : [id number]          Ct Abdomen Pelvis Without Contrast    Result Date: 1/17/2017  Narrative: CT ABDOMEN PELVIS WO CONTRAST- 1/17/2017 2:18 AM CST  HISTORY: right flank pain  COMPARISON: None  DLP: 838 mGy cm. Automated exposure control was utilized to diminish patient radiation dose.  TECHNIQUE: Noncontrast enhanced images of the abdomen and pelvis  obtained without oral contrast.  FINDINGS: There is mild bibasilar atelectasis. The base of the heart is unremarkable..  LIVER: There are 3 hypodense lesions within the right lobe of liver including a 9 mm lesion within the anterior segment of the right lobe of the liver as well as a 18 and 13 mm hypodense lesion within the posterior segment of the right lobe of the liver. These are likely representative of hepatic cysts. The liver is otherwise normal in appearance. Ultrasound could be helpful for further characterization..  BILIARY SYSTEM: The gallbladder is unremarkable. No intrahepatic or extrahepatic ductal dilatation.  PANCREAS: No focal pancreatic lesion.  SPLEEN: There is mild splenomegaly with a uvil-tn-seqf length of 15 cm..   KIDNEYS AND ADRENALS: No discrete renal mass is identified. There is a 9 mm nonobstructing calculus involving the interpolar aspect of the right kidney. There is mild dilatation of the upper tracts of the right kidney as well as some proximal right periureteral stranding. This would suggest the patient may have recently passed a stone. There is no evidence of a discrete ureteral stone at this time. The left renal collecting system and ureter is unremarkable..     .  RETROPERITONEUM: No mass, lymphadenopathy or hemorrhage.  GI TRACT: There is mild constipation. There is no evidence of mechanical obstruction but there are multiple fluid-filled small bowel loops. A few scattered air-fluid levels are present.. The appendix has been surgically removed..  OTHER: There is no mesenteric mass, lymphadenopathy or fluid collection. The osseous structures and soft tissues demonstrate no worrisome lesions. 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  PELVIS: No mass lesion, fluid collection or significant lymphadenopathy is seen in the pelvis. The urinary bladder is normal in appearance. The prostate gland is mildly enlarged.      Impression: 1. There is 9 mm nonobstructing  calculus involving the interpolar aspect of the right kidney. There is mild dilatation of the upper tracts of the right kidney and proximal right ureter with proximal periureteral stranding. I see no evidence of a discrete ureteral stone but this may represent sequela of a recently passed stone. Mild right-sided obstructive uropathy secondary to another etiology would also be a differential and if the patient's hematuria and flank pain are persistent I would suggest urology consultation with retrograde examination of the right renal collecting system and ureter. No evidence of left-sided nephrolithiasis or ureteral calculus. 2. Suspected hepatic cysts within the right lobe of the liver. This could be confirmed with ultrasound. 3. Nonspecific bowel gas pattern with some scattered fluid-filled bowel loops. This is likely related to mild constipation with increased stool noted throughout the colon..  4. Prostatic enlargement. A small fat-containing periumbilical hernia is present.  Electronically Signed By-Dr. Jarvis Morgan MD On:1/17/2017 8:33 AM EST This report was finalized on 01/17/2017 07:33 by Dr. Jarvis Morgan MD.    Xr Chest 1 View    Result Date: 2/15/2017  Narrative: EXAM:   XR CHEST 1 VW-   DATE:  02/15/2017  HISTORY:  Male, age  47 years;chest pain  COMPARISON:  None.  FINDINGS: The heart mediastinum are normal in size. Lungs are without focal infiltrate, mass or effusions.  The bones show no acute pathology. Right-sided dialysis catheter with tip terminating at the expected location of the atriocaval junction.      Impression: No acute cardiopulmonary disease.  This report was finalized on  by Dr. Kristyn Rodriguez MD.    Ct Needle Biopsy Kidney    Result Date: 1/26/2017  Narrative: HISTORY: Acute kidney injury.  The risks of procedure were explained to the patient. Informed consent was obtained.  Under sterile conditions, using 1% lidocaine as local anesthetic, and CT guidance, a 17-gauge guide needle  was placed into the posterior cortex of the lower pole of the left kidney.  Two 2 cm 18-gauge cores were obtained, sectioned and placed in three different vials as per standard procedure.  The patient tolerated the procedure well without immediate complications.  Minimal blood loss observed, less than 1 mL.  Radiation dose equals  mGy-cm.  Automated exposure control dose reduction technique was implemented.      Impression: CT-guided renal biopsy performed as described above, without immediate, complications. This report was finalized on 01/26/2017 15:48 by Dr. Robby Park MD.    Fl C Arm During Surgery    Result Date: 1/23/2017  Narrative: Performed in surgery by Dr. Grimes. Please see operative report.  This report was finalized on 01/23/2017 15:55 by Dr. Ian Grimes MD.    Ir Insert Tunneled Cv Catheter Without Port 5 Plus    Result Date: 1/23/2017  Narrative: Performed in surgery by Dr. Grimes. Please see operative report.  This report was finalized on 01/23/2017 15:55 by Dr. Ian Grimes MD.      ED Course  ED Course   reviewed thsi case with Dr. Benitez who assessed the paitent.  The patient's atrial fibrillation RVR did respond to Cardizem 5mg/kg drip.  However, he did go back up until 130s.  We increased the cardizem to 10mg/kg drip  And Dr. Benitez contacted the heart group who has a provider here in the ED to evaluate the patient presently.           Macie Carrion, nurse practitioner with heart group informed us that the family and patient informed her that the patient had left arm weakness early this morning.  He did not inform this examiner or Dr. Benitez about this.  We have now ordered a CT scan of his head without contrast.  The heart group will order a VQ scan on the patient.    Emmanuel has spoken with DR. Vergara, hospitalist on call, who will admit the patient under her services.     MDM    Final diagnoses:   Persistent atrial fibrillation   Other chest pain   Renal failure   Chronic  anemia         Thu-Peg DENAE Mak  02/15/17 1504       Electronically signed by Rao Benitez Jr., MD at 2/15/2017  3:33 PM        Vital Signs (last 24 hours)       02/15 0700  -  02/16 0659 02/16 0700  -  02/16 0851   Most Recent    Temp (°F) 96.9 -  98.9      97.2     97.2 (36.2)    Heart Rate 92 -  (!)144      107     107    Resp 18 -  24      18     18    /75 -  128/87      132/69     132/69    SpO2 (%) 93 -  99      96     96          Hospital Medications (all)       Dose Frequency Start End    acetaminophen (TYLENOL) tablet 650 mg 650 mg Every 4 Hours PRN 2/15/2017     Sig - Route: Take 2 tablets by mouth Every 4 (Four) Hours As Needed for mild pain (1-3). - Oral    aluminum-magnesium hydroxide-simethicone (MAALOX/MYLANTA) suspension 30 mL 30 mL Every 6 Hours PRN 2/15/2017     Sig - Route: Take 30 mL by mouth Every 6 (Six) Hours As Needed for heartburn. - Oral    diltiaZEM (CARDIZEM) 125mg/125 mL infusion 5-15 mg/hr Titrated 2/15/2017     Sig - Route: Infuse 5-15 mg/hr into a venous catheter Dose Adjusted By Provider As Needed. - Intravenous    Cosign for Ordering: Accepted by Rao Benitez Jr., MD on 2/15/2017  2:39 PM    diltiaZEM (CARDIZEM) injection 10 mg 10 mg Once 2/15/2017 2/15/2017    Sig - Route: Infuse 2 mL into a venous catheter 1 (One) Time. - Intravenous    Cosign for Ordering: Accepted by Rao Benitez Jr., MD on 2/15/2017  2:39 PM    diltiaZEM (CARDIZEM) tablet 90 mg 90 mg Every 6 Hours Scheduled 2/15/2017     Sig - Route: Take 1 tablet by mouth Every 6 (Six) Hours. - Oral    docusate sodium (COLACE) capsule 100 mg 100 mg 2 Times Daily 2/15/2017     Sig - Route: Take 1 capsule by mouth 2 (Two) Times a Day. - Oral    enoxaparin (LOVENOX) syringe 30 mg 30 mg Daily 2/15/2017     Sig - Route: Inject 0.3 mL under the skin Daily. - Subcutaneous    metoprolol tartrate (LOPRESSOR) tablet 25 mg 25 mg Every 12 Hours Scheduled 2/15/2017     Sig - Route: Take 1 tablet by mouth Every  12 (Twelve) Hours. - Oral    ondansetron (ZOFRAN) injection 4 mg 4 mg Every 6 Hours PRN 2/15/2017     Sig - Route: Infuse 2 mL into a venous catheter Every 6 (Six) Hours As Needed for nausea or vomiting. - Intravenous    predniSONE (DELTASONE) tablet 40 mg 40 mg 2 Times Daily With Meals 2/15/2017     Sig - Route: Take 2 tablets by mouth 2 (Two) Times a Day With Meals. - Oral    sodium chloride 0.9 % flush 1-10 mL 1-10 mL As Needed 2/15/2017     Sig - Route: Infuse 1-10 mL into a venous catheter As Needed for line care. - Intravenous    tamsulosin (FLOMAX) 24 hr capsule 0.4 mg 0.4 mg Nightly 2/15/2017     Sig - Route: Take 1 capsule by mouth Every Night. - Oral    technetium albumin aggregated (MAA) solution 1 dose 1 dose Once in Imaging 2/15/2017 2/15/2017    Sig - Route: Infuse 1 dose into a venous catheter Once. - Intravenous    xenon xe 133 gas 10 mCi 9.9 millicurie 9.9 millicurie Once in Imaging 2/15/2017 2/15/2017    Sig - Route: Inhale 9.9 millicuries Once. - Inhalation    diltiaZEM CD (CARDIZEM CD) 24 hr capsule 360 mg (Discontinued) 360 mg Every 24 Hours Scheduled 2/15/2017 2/15/2017    Sig - Route: Take 2 capsules by mouth Daily. - Oral    Reason for Discontinue: Duplicate order    metoprolol tartrate (LOPRESSOR) tablet 25 mg (Discontinued) 25 mg 2 Times Daily 2/15/2017 2/15/2017    Sig - Route: Take 1 tablet by mouth 2 (Two) Times a Day. - Oral    Reason for Discontinue: Duplicate order            Lab Results (last 24 hours)     Procedure Component Value Units Date/Time    POC Troponin, Rapid [[id number]]  (Normal) Collected:  02/15/17 1006    Specimen:  Blood Updated:  02/15/17 1021     Troponin I 0.00 ng/mL       Serial Number: [id number]    : [id number]       D-dimer, Quantitative [[id number]]  (Abnormal) Collected:  02/15/17 1000    Specimen:  Blood Updated:  02/15/17 1022     D-Dimer, Quantitative 3.12 (H) mg/L (FEU)     Narrative:       Reference Range is 0-0.50 mg/L FEU. However, results <0.50 mg/L  FEU tends to rule out DVT or PE. Results >0.50 mg/L FEU are not useful in predicting absence or presence of DVT or PE.    Protime-INR [[id number]]  (Abnormal) Collected:  02/15/17 1000    Specimen:  Blood Updated:  02/15/17 1022     Protime 14.8 (H) Seconds      INR 1.12 (H)     aPTT [[id number]]  (Normal) Collected:  02/15/17 1000    Specimen:  Blood Updated:  02/15/17 1022     PTT 30.7 seconds     Comprehensive Metabolic Panel [[id number]]  (Abnormal) Collected:  02/15/17 1000    Specimen:  Blood Updated:  02/15/17 1026     Glucose 311 (H) mg/dL      BUN 64 (H) mg/dL      Creatinine 1.68 (H) mg/dL      Sodium 137 mmol/L      Potassium 3.8 mmol/L      Chloride 95 (L) mmol/L      CO2 28.0 mmol/L      Calcium 8.9 mg/dL      Total Protein 6.3 g/dL      Albumin 3.30 (L) g/dL      ALT (SGPT) 59 (H) U/L      AST (SGOT) 25 U/L      Alkaline Phosphatase 114 U/L      Total Bilirubin 0.4 mg/dL      eGFR Non African Amer 44 (L) mL/min/1.73      Globulin 3.0 gm/dL      A/G Ratio 1.1 g/dL      BUN/Creatinine Ratio 38.1 (H)      Anion Gap 14.0 (H) mmol/L     BNP [[id number]]  (Abnormal) Collected:  02/15/17 1000    Specimen:  Blood Updated:  02/15/17 1029     proBNP 5920.0 (H) pg/mL     CBC & Differential [[id number]] Collected:  02/15/17 1000    Specimen:  Blood Updated:  02/15/17 1051    Narrative:       The following orders were created for panel order CBC & Differential.  Procedure                               Abnormality         Status                     ---------                               -----------         ------                     Scan Slide[[id number]]                                        Final result               CBC Auto Differential[[id number]]         Abnormal            Final result                 Please view results for these tests on the individual orders.    CBC Auto Differential [[id number]]  (Abnormal) Collected:  02/15/17 1000    Specimen:  Blood Updated:  02/15/17 1051     WBC 13.71 (H) 10*3/mm3      RBC 3.37  (L) 10*6/mm3      Hemoglobin 9.0 (L) g/dL      Hematocrit 28.9 (L) %      MCV 85.8 fL      MCH 26.7 (L) pg      MCHC 31.1 (L) g/dL      RDW 19.1 (H) %      RDW-SD 58.8 (H) fl      MPV 10.2 fL      Platelets 127 (L) 10*3/mm3      Neutrophil % 83.6 (H) %      Lymphocyte % 9.0 (L) %      Monocyte % 3.5 (L) %      Eosinophil % 0.0 %      Basophil % 0.3 %      Immature Grans % 3.6 %      Neutrophils, Absolute 11.46 (H) 10*3/mm3      Lymphocytes, Absolute 1.24 10*3/mm3      Monocytes, Absolute 0.48 10*3/mm3      Eosinophils, Absolute 0.00 10*3/mm3      Basophils, Absolute 0.04 10*3/mm3      Immature Grans, Absolute 0.49 (H) 10*3/mm3     Scan Slide [[id number]] Collected:  02/15/17 1000    Specimen:  Blood Updated:  02/15/17 1051     Anisocytosis Slight/1+      Hypochromia Mod/2+      WBC Morphology Normal      Platelet Morphology Normal     POC Troponin, Rapid [[id number]]  (Normal) Collected:  02/15/17 1246    Specimen:  Blood Updated:  02/15/17 1300     Troponin I 0.00 ng/mL       Serial Number: [id number]    : [id number]       Blood Gas, Arterial [[id number]]  (Abnormal) Collected:  02/15/17 1342    Specimen:  Arterial Blood Updated:  02/15/17 1343     Site Arterial: left radial      Artem's Test --       Documented in Rapid Comm        pH, Arterial 7.511 (H) pH units      pCO2, Arterial 37.1 mm Hg      pO2, Arterial 74.9 (L) mm Hg      HCO3, Arterial 29.0 (H) mmol/L      Base Excess, Arterial 5.9 (H) mmol/L      O2 Saturation, Arterial 96.2 %      O2 Saturation Calculated 96.2 %      Barometric Pressure for Blood Gas -- mmHg       Component not reported at this site.        Modality Cannula      Flow Rate 2.00 lpm     Narrative:       Serial Number: [id number]    : [id number]    Magnesium [[id number]]  (Normal) Collected:  02/15/17 1615    Specimen:  Blood Updated:  02/15/17 1645     Magnesium 1.9 mg/dL     Troponin [[id number]]  (Normal) Collected:  02/15/17 1615    Specimen:  Blood Updated:  02/15/17 1656     Troponin I  <0.012 ng/mL     Basic Metabolic Panel [[id number]]  (Abnormal) Collected:  02/15/17 1615    Specimen:  Blood Updated:  02/15/17 1713     Glucose 132 (H) mg/dL      BUN 63 (H) mg/dL      Creatinine 1.72 (H) mg/dL      Sodium 138 mmol/L      Potassium 4.3 mmol/L      Chloride 96 (L) mmol/L      CO2 32.0 (H) mmol/L      Calcium 9.1 mg/dL      eGFR Non African Amer 43 (L) mL/min/1.73      BUN/Creatinine Ratio 36.6 (H)      Anion Gap 10.0 mmol/L     Narrative:       GFR Normal >60  Chronic Kidney Disease <60  Kidney Failure <15    TSH [[id number]]  (Normal) Collected:  02/15/17 1615    Specimen:  Blood Updated:  02/15/17 1716     TSH 1.070 mIU/mL     CBC (No Diff) [[id number]]  (Abnormal) Collected:  02/16/17 0442    Specimen:  Blood Updated:  02/16/17 0534     WBC 11.65 (H) 10*3/mm3      RBC 3.21 (L) 10*6/mm3      Hemoglobin 8.6 (L) g/dL      Hematocrit 27.5 (L) %      MCV 85.7 fL      MCH 26.8 (L) pg      MCHC 31.3 (L) g/dL      RDW 19.2 (H) %      RDW-SD 59.7 (H) fl      MPV 10.3 fL      Platelets 112 (L) 10*3/mm3     Basic Metabolic Panel [[id number]]  (Abnormal) Collected:  02/16/17 0442    Specimen:  Blood Updated:  02/16/17 0541     Glucose 209 (H) mg/dL      BUN 59 (H) mg/dL      Creatinine 1.86 (H) mg/dL      Sodium 139 mmol/L      Potassium 3.8 mmol/L      Chloride 96 (L) mmol/L      CO2 31.0 mmol/L      Calcium 8.8 mg/dL      eGFR Non African Amer 39 (L) mL/min/1.73      BUN/Creatinine Ratio 31.7 (H)      Anion Gap 12.0 mmol/L     Narrative:       GFR Normal >60  Chronic Kidney Disease <60  Kidney Failure <15    TSH [[id number]]  (Normal) Collected:  02/16/17 0442    Specimen:  Blood Updated:  02/16/17 0611     TSH 0.912 mIU/mL         Imaging Results (last 24 hours)     Procedure Component Value Units Date/Time    CT Head Without Contrast [[id number]] Collected:  02/15/17 1538     Updated:  02/15/17 1541    Narrative:       CT HEAD WO CONTRAST- 2/15/2017 3:15 PM CST     HISTORY: weakness; I48.1-Persistent atrial  fibrillation; R07.89-Other  chest pain; N19-Unspecified kidney failure; D64.9-Anemia, unspecified       COMPARISON: None      DOSE LENGTH PRODUCT: 748 mGy cm. Automated exposure control was also  utilized to decrease patient radiation dose.     TECHNIQUE: Helical tomographic images of the brain were obtained without  the use of intravenous contrast.      FINDINGS:   Hemorrhage: None.     Mass effect: No midline shift or mass effect.     Ventricles: Normal and symmetric without hydrocephalus.     Basilar cisterns: Normal.     Sulci: Normal in configuration. No sulcal effacement.     Extra-axial fluid: No abnormal extra-axial fluid collections.     Grey and white matter differentiation: Normal.     Posterior fossa and brainstem: Normal.     Bones: No fractures or lesions.     Soft tissues: No acute process.     Sinuses and mastoid air cells: Clear.       Impression:       1. No acute intracranial process.        This report was finalized on 02/15/2017 15:39 by Dr. Stanton Romano MD.    NM Lung Ventilation Perfusion [[id number]] Collected:  02/15/17 1539     Updated:  02/15/17 1545    Narrative:       NM LUNG VENTILATION PERFUSION- 02/15/2017     History: Dyspnea and elevated D dimer       Comparison: Chest x-ray 02/15/2017      Radiopharmaceutical: 9.9 mCi of Xenon-133 for the ventilation study and  5.3 mCi Tc 99m MAA for the perfusion study.      Technique: Following inhalation of the aerosolized radiopharmaceutical,  the ventilation study was performed with images of the thorax being  obtained in posterior projection. Thereafter, the perfusion study was  performed following the injection of radiolabeled MAA particles with  images of the thorax obtained in 8 projections.      FINDINGS:    No pleural based, wedge-shape, matched or mismatched segmental  ventilation perfusion defects are demonstrated.         Impression:       Findings are most commonly associated with low probability for acute  pulmonary embolism.      This report was finalized on 02/15/2017 15:40 by Dr. Stanton Romano MD.    XR Chest 1 View [[id number]] Collected:  02/15/17 1137     Updated:  02/15/17 1722    Narrative:       EXAM:   XR CHEST 1 VW-       DATE:  02/15/2017      HISTORY:  Male, age  47 years;chest pain     COMPARISON:  None.     FINDINGS:  The heart mediastinum are normal in size. Lungs are without focal  infiltrate, mass or effusions.  The bones show no acute pathology.   Right-sided dialysis catheter with tip terminating at the expected  location of the atriocaval junction.       Impression:       No acute cardiopulmonary disease.     This report was finalized on 02/15/2017 17:20 by Dr. Kristyn Rodriguez MD.          ECG/EMG Results (last 24 hours)     Procedure Component Value Units Date/Time    ECG 12 Lead [[id number]] Collected:  02/15/17 0942     Updated:  02/15/17 0943    ECG 12 Lead [[id number]] Collected:  02/15/17 1236     Updated:  02/15/17 1238          Orders (last 24 hrs)     Start     Ordered    02/16/17 0600  CBC (No Diff)  Morning Draw      02/15/17 1623    02/16/17 0600  TSH  Morning Draw      02/15/17 1623    02/15/17 2200  diltiaZEM (CARDIZEM) tablet 90 mg  Every 6 Hours Scheduled      02/15/17 1428    02/15/17 2100  tamsulosin (FLOMAX) 24 hr capsule 0.4 mg  Nightly      02/15/17 1623    02/15/17 2000  Vital Signs  Every 4 Hours      02/15/17 1623    02/15/17 1800  metoprolol tartrate (LOPRESSOR) tablet 25 mg  2 Times Daily,   Status:  Discontinued      02/15/17 1623    02/15/17 1800  predniSONE (DELTASONE) tablet 40 mg  2 Times Daily With Meals      02/15/17 1623    02/15/17 1800  docusate sodium (COLACE) capsule 100 mg  2 Times Daily      02/15/17 1623    02/15/17 1800  enoxaparin (LOVENOX) syringe 30 mg  Daily      02/15/17 1623    02/15/17 1724  Inpatient Consult to Case Management   Once     Provider:  (Not yet assigned)    02/15/17 1724    02/15/17 1700  diltiaZEM CD (CARDIZEM CD) 24 hr capsule 360 mg  Every 24  Hours Scheduled,   Status:  Discontinued      02/15/17 1623    02/15/17 1700  Strict Intake and Output  Every Hour      02/15/17 1623    02/15/17 1645  metoprolol tartrate (LOPRESSOR) tablet 25 mg  Every 12 Hours Scheduled      02/15/17 1428    02/15/17 1624  Weigh patient  Once      02/15/17 1623    02/15/17 1624  Oxygen Therapy-  Continuous      02/15/17 1623    02/15/17 1624  Insert Peripheral IV  Once      02/15/17 1623    02/15/17 1624  Saline Lock & Maintain IV Access  Continuous      02/15/17 1623    02/15/17 1624  Full Code  Continuous      02/15/17 1623    02/15/17 1624  VTE Risk Assessment - Moderate Risk  Once      02/15/17 1623    02/15/17 1624  Remove & Replace Compression Stockings (TEDS) Daily  Daily      02/15/17 1623    02/15/17 1624  Place Compression Stockings (TEDs)  Once      02/15/17 1623    02/15/17 1624  Pulse Oximetry,  Spot  Once      02/15/17 1623    02/15/17 1624  Cardiac Monitoring  Continuous      02/15/17 1623    02/15/17 1624  Diet Regular; Cardiac, Renal  Diet Effective Now      02/15/17 1623    02/15/17 1624  Basic Metabolic Panel  Daily      02/15/17 1623    02/15/17 1624  Inpatient Consult to Nephrology  Once     Specialty:  Nephrology  Provider:  Reinaldo Foley MD    02/15/17 1623    02/15/17 1624  Inpatient Consult to Cardiology  Once     Specialty:  Cardiology  Provider:  Luis Carlos Edge MD    02/15/17 1623    02/15/17 1623  sodium chloride 0.9 % flush 1-10 mL  As Needed      02/15/17 1623    02/15/17 1623  acetaminophen (TYLENOL) tablet 650 mg  Every 4 Hours PRN      02/15/17 1623    02/15/17 1623  aluminum-magnesium hydroxide-simethicone (MAALOX/MYLANTA) suspension 30 mL  Every 6 Hours PRN      02/15/17 1623    02/15/17 1623  ondansetron (ZOFRAN) injection 4 mg  Every 6 Hours PRN      02/15/17 1623    02/15/17 1600  Troponin  Once      02/15/17 1428    02/15/17 1546  xenon xe 133 gas 10 mCi 9.9 millicurie  Once in Imaging      02/15/17 1544    02/15/17 1543   technetium albumin aggregated (MAA) solution 1 dose  Once in Imaging      02/15/17 1544    02/15/17 1429  Wean cardizem gtt off as tolerated for hr less than 120. Start oral cardizem tomorrow- pt had 360 mg PO dose today at home  Once     Comments:  Wean cardizem gtt off as tolerated for hr less than 120. Start oral cardizem tomorrow- pt had 360 mg PO dose today at home    02/15/17 1428    02/15/17 1429  Magnesium  Once      02/15/17 1428    02/15/17 1429  TSH  Once      02/15/17 1428    02/15/17 1402  Inpatient Admission  Once      02/15/17 1401    02/15/17 1349  NM Lung Ventilation Perfusion  1 Time Imaging      02/15/17 1348    02/15/17 1344  Blood Gas, Arterial  Once      02/15/17 1343    02/15/17 1331  CT Head Without Contrast  1 Time Imaging      02/15/17 1330    02/15/17 1331  Blood Gas, Arterial  STAT      02/15/17 1330    02/15/17 1301  POC Troponin, Rapid  Once      02/15/17 1300    02/15/17 1028  Scan Slide  Once      02/15/17 1028    02/15/17 1022  POC Troponin, Rapid  Once      02/15/17 1021    02/15/17 1000  diltiaZEM (CARDIZEM) injection 10 mg  Once      02/15/17 0958    02/15/17 1000  diltiaZEM (CARDIZEM) 125mg/125 mL infusion  Titrated      02/15/17 0958    02/15/17 0956  POCT Troponin, rapid  Now Then Every 3 Hours      02/15/17 0958    02/15/17 0955  CBC & Differential  Once      02/15/17 0958    02/15/17 0955  Comprehensive Metabolic Panel  Once      02/15/17 0958    02/15/17 0955  D-dimer, Quantitative  Once      02/15/17 0958    02/15/17 0955  Protime-INR  Once      02/15/17 0958    02/15/17 0955  aPTT  Once      02/15/17 0958    02/15/17 0955  BNP  Once      02/15/17 0958    02/15/17 0955  ECG 12 Lead  Now & in 3 Hours      02/15/17 0958    02/15/17 0955  XR Chest 1 View  1 Time Imaging      02/15/17 0958    02/15/17 0955  CBC Auto Differential  PROCEDURE ONCE      02/15/17 0958    02/15/17 0940  ECG 12 Lead  STAT      02/15/17 0940    Unscheduled  Up with assistance  As Needed      02/15/17  1623          Physician Progress Notes (last 24 hours) (Notes from 2/15/2017  8:51 AM through 2/16/2017  8:51 AM)     No notes of this type exist for this encounter.           Consult Notes (last 24 hours) (Notes from 2/15/2017  8:51 AM through 2/16/2017  8:51 AM)      BHARATHI Givens at 2/15/2017  1:52 PM  Version 1 of 1    Attestation signed by Alex Cortez MD at 2/15/2017 11:04 PM        I have seen and evaluated this patient personally. I agree with the findings, assessment and plan as outlined by mid level provider. In addition, I have the following to add.    Face to face encounter: 2 PM     LOS: 0 days   Patient Care Team:  Moises Montes MD as PCP - General (Internal Medicine)  Luis Carlos Awan MD as Consulting Physician (Urology)  Alex Cortez MD as Cardiologist (Cardiology)    Chief Complaint: Shortness of breath AF RVR  Transient LUE weakness    Telemetry: no malignant arrhythmia. No significant pauses. AF currently rate controlled    Review of Systems:    The following systems were reviewed and negative; ENT, respiratory,  gastrointestinal, integument, hematologic / lymphatic,  behavioral/psych and allergies / immunologic    Labs:    WBC WBC   Date Value Ref Range Status   02/15/2017 13.71 (H) 4.80 - 10.80 10*3/mm3 Final      HGB HEMOGLOBIN   Date Value Ref Range Status   02/15/2017 9.0 (L) 14.0 - 18.0 g/dL Final      HCT HEMATOCRIT   Date Value Ref Range Status   02/15/2017 28.9 (L) 40.0 - 52.0 % Final      Platlets PLATELETS   Date Value Ref Range Status   02/15/2017 127 (L) 130 - 400 10*3/mm3 Final      MCV MCV   Date Value Ref Range Status   02/15/2017 85.8 82.0 - 95.0 fL Final        Results from last 7 days  Lab Units 02/15/17  1615 02/15/17  1000   SODIUM mmol/L 138 137   POTASSIUM mmol/L 4.3 3.8   CHLORIDE mmol/L 96* 95*   TOTAL CO2 mmol/L 32.0* 28.0   BUN mg/dL 63* 64*   CREATININE mg/dL 1.72* 1.68*   CALCIUM mg/dL 9.1 8.9   BILIRUBIN mg/dL  --  0.4   ALK PHOS U/L  --  114   ALT (SGPT)  U/L  --  59*   AST (SGOT) U/L  --  25   GLUCOSE mg/dL 132* 311*     Lab Results   Component Value Date    TROPONINI <0.012 02/15/2017     PT/INR:    PROTIME   Date Value Ref Range Status   02/15/2017 14.8 (H) 11.9 - 14.6 Seconds Final   /  INR   Date Value Ref Range Status   02/15/2017 1.12 (H) 0.91 - 1.09 Final       Imaging Results (last 72 hours)     Procedure Component Value Units Date/Time    CT Head Without Contrast [[id number]] Collected:  02/15/17 1538     Updated:  02/15/17 1541    Narrative:       CT HEAD WO CONTRAST- 2/15/2017 3:15 PM CST     HISTORY: weakness; I48.1-Persistent atrial fibrillation; R07.89-Other  chest pain; N19-Unspecified kidney failure; D64.9-Anemia, unspecified       COMPARISON: None      DOSE LENGTH PRODUCT: 748 mGy cm. Automated exposure control was also  utilized to decrease patient radiation dose.     TECHNIQUE: Helical tomographic images of the brain were obtained without  the use of intravenous contrast.      FINDINGS:   Hemorrhage: None.     Mass effect: No midline shift or mass effect.     Ventricles: Normal and symmetric without hydrocephalus.     Basilar cisterns: Normal.     Sulci: Normal in configuration. No sulcal effacement.     Extra-axial fluid: No abnormal extra-axial fluid collections.     Grey and white matter differentiation: Normal.     Posterior fossa and brainstem: Normal.     Bones: No fractures or lesions.     Soft tissues: No acute process.     Sinuses and mastoid air cells: Clear.       Impression:       1. No acute intracranial process.        This report was finalized on 02/15/2017 15:39 by Dr. Stanton Romano MD.    NM Lung Ventilation Perfusion [[id number]] Collected:  02/15/17 1539     Updated:  02/15/17 1545    Narrative:       NM LUNG VENTILATION PERFUSION- 02/15/2017     History: Dyspnea and elevated D dimer       Comparison: Chest x-ray 02/15/2017      Radiopharmaceutical: 9.9 mCi of Xenon-133 for the ventilation study and  5.3 mCi Tc 99m MAA for the  perfusion study.      Technique: Following inhalation of the aerosolized radiopharmaceutical,  the ventilation study was performed with images of the thorax being  obtained in posterior projection. Thereafter, the perfusion study was  performed following the injection of radiolabeled MAA particles with  images of the thorax obtained in 8 projections.      FINDINGS:    No pleural based, wedge-shape, matched or mismatched segmental  ventilation perfusion defects are demonstrated.         Impression:       Findings are most commonly associated with low probability for acute  pulmonary embolism.     This report was finalized on 02/15/2017 15:40 by Dr. Stanton Romano MD.    XR Chest 1 View [[id number]] Collected:  02/15/17 1137     Updated:  02/15/17 1722    Narrative:       EXAM:   XR CHEST 1 VW-       DATE:  02/15/2017      HISTORY:  Male, age  47 years;chest pain     COMPARISON:  None.     FINDINGS:  The heart mediastinum are normal in size. Lungs are without focal  infiltrate, mass or effusions.  The bones show no acute pathology.   Right-sided dialysis catheter with tip terminating at the expected  location of the atriocaval junction.       Impression:       No acute cardiopulmonary disease.     This report was finalized on 02/15/2017 17:20 by Dr. Kristyn Rodriguez MD.          Objective     Medication Review:   Current Facility-Administered Medications   Medication Dose Route Frequency Provider Last Rate Last Dose   • acetaminophen (TYLENOL) tablet 650 mg  650 mg Oral Q4H PRN BHARATHI Diaz   650 mg at 02/15/17 2156   • aluminum-magnesium hydroxide-simethicone (MAALOX/MYLANTA) suspension 30 mL  30 mL Oral Q6H PRN BHARATHI Diaz       • diltiaZEM (CARDIZEM) 125mg/125 mL infusion  5-15 mg/hr Intravenous Titrated u-Peg DENAE Mak 12.5 mL/hr at 02/15/17 2159 12.5 mg/hr at 02/15/17 2159   • diltiaZEM (CARDIZEM) tablet 90 mg  90 mg Oral Q6H BHARATHI Givens   90 mg at 02/15/17 2153   • docusate  "sodium (COLACE) capsule 100 mg  100 mg Oral BID BHARATHI Diaz   100 mg at 02/15/17 1944   • enoxaparin (LOVENOX) syringe 30 mg  30 mg Subcutaneous Daily BHARATHI Diaz   30 mg at 02/15/17 1941   • metoprolol tartrate (LOPRESSOR) tablet 25 mg  25 mg Oral Q12H BHARATHI Givens   25 mg at 02/15/17 1942   • ondansetron (ZOFRAN) injection 4 mg  4 mg Intravenous Q6H PRN BHARATHI Diaz       • predniSONE (DELTASONE) tablet 40 mg  40 mg Oral BID With Meals BHARATHI Diaz   40 mg at 02/15/17 1941   • sodium chloride 0.9 % flush 1-10 mL  1-10 mL Intravenous PRN BHARATHI Diaz       • tamsulosin (FLOMAX) 24 hr capsule 0.4 mg  0.4 mg Oral Nightly BHARATHI Diaz   0.4 mg at 02/15/17 2154       Vital Sign Min/Max for last 24 hours  Temp  Min: 98.2 °F (36.8 °C)  Max: 98.9 °F (37.2 °C)   BP  Min: 109/82  Max: 128/87   Pulse  Min: 102  Max: 144   Resp  Min: 18  Max: 24   SpO2  Min: 93 %  Max: 99 %   Flow (L/min)  Min: 2  Max: 2   Weight  Min: 222 lb 14.4 oz (101 kg)  Max: 224 lb (102 kg)     Flowsheet Rows         First Filed Value    Admission Height  73\" (185.4 cm) Documented at 02/15/2017 0953    Admission Weight  224 lb (102 kg) Documented at 02/15/2017 0953          Physical Exam:  Ears ears appear intact with no abnormalities noted  Nose nares normal, septum midline, mucosa normal and no drainage  Neck suppple, trachea midline, no thyromegaly, no carotid bruit and no JVD  Back no kyphosis present, no scoliosis present, no skin lesions, erythema, or scars, no tenderness to percussion or palpation and range of motion normal  Lungs respirations regular, respirations even and respirations unlabored  Heart normal S1, S2,  2/6 pansystolic murmur in the left sternal border,  Irregular, no rub and no click  Abdomen normal bowel sounds, no masses, no hepatomegaly, no splenomegaly, no guarding and no rebound tenderness  Skin no bleeding, bruising or rash     Results Review:   I reviewed " the patient's new clinical results.  I reviewed the patient's new imaging results and agree with the interpretation.  I reviewed the patient's other test results and agree with the interpretation  I personally viewed and interpreted the patient's EKG/Telemetry data  Discussed with patient    Medication Review: Performed    Assessment/Plan   Agree with assessment and plan of mid level provider as below with any changes if made as noted    Active Problems:  Atrial fibrillation, RVR  LUE weakness and pain  Questionable slurred speech and expressive aphasia  Henoch Schonlein purpura nephritis on 3x week HD   Anemia  Recent hematuria  Tobacco use  Mild to Moderate mitral regurgitation   Leukocytosis  Elevated d-dimer   Elevated BNP     Plan-  Agree with IV cardizem. Will resume oral cardizem and wean gtt as tolerated.  Resume oral beta blocker  No anticoagulation at this time due to recent hematuria, anemia, and reported frequent injuries and falls  VQ scan  Deep vein thrombosis prophylaxis/precautions  Low salt cardiac diet  Will follow up    Alex Cortez MD  02/15/17  11:01 PM                                       Patient Care Team:  Moises Montes MD as PCP - General (Internal Medicine)  Luis Carlos Awan MD as Consulting Physician (Urology)  Alex Cortez MD as Cardiologist (Cardiology)  No ref. provider found  REASON FOR REFERRAL: atrial fibrillation , rvr   Chief complaint: left arm and chest pain, dyspnea, weakness, speech difficulty     Subjective     Patient is a 47 y.o. male presents with c/o weakness, dyspnea, left sided chest pain with radiation to his back, neck and down his left arm, as well as speech difficulties and left arm weakness. The patient's wife states earlier today his lips appeared blue and he was slurring his speech. The patient reports the onset of symptoms was around 5 am. Upon arrival to the ER, he was found to be in afib, RVR with HR 140s- he has been treated with IV cardizem and his HR  has improved to low 100s-110s. BP is stable. Symptoms are improved. CXR negative. BNP elevated. Creat is chronically elevated. He is also chronically anemic. D-dimer is elevated at 3.12. Troponins are negative x 2. WBCs are mildly elevated.  No acute STT changes on EKG. CT of the head is pending.    The patient has a known history of PAF, microcytic anemia, and henoch schonlein purpura nephritis on HD 3 times per week. He was previously anticoagulated with warfarin, but the wife states this was stopped years ago. More recently, following an admission last month, the patient also had hematuria- he is not anticaogulated. He takes cardizem and metoprolol at home for rate control. He is followed by Dr. Cortez for his PAF. Echo last month revealed normal LVEF , mild LAE, grade 1 DD, and mild to moderate MR. He denies a CAD history.     Review of Systems   Review of Systems   Constitutional: Positive for fatigue. Negative for diaphoresis, fever and unexpected weight change.   HENT: Negative for nosebleeds.    Respiratory: Positive for shortness of breath. Negative for apnea, cough, chest tightness and wheezing.    Cardiovascular: Positive for chest pain (with left arm pain and weakness ). Negative for palpitations and leg swelling.   Gastrointestinal: Negative for abdominal distention, nausea and vomiting.   Genitourinary: Negative for hematuria.   Musculoskeletal: Negative for gait problem.   Skin: Negative for color change.   Neurological: Positive for speech difficulty and weakness. Negative for dizziness, syncope and light-headedness.       History  Past Medical History   Diagnosis Date   • A-fib    • Chronic renal failure    • Elevated PSA    • Kidney stone    • Purpura    • Wegener's granulomatosis with renal involvement      Past Surgical History   Procedure Laterality Date   • Appendectomy     • Insertion hemodialysis catheter N/A 1/20/2017     Procedure: INSERTION PERMCATH;  Surgeon: Ian Grimes DO;   Location: [facility] OR;  Service:      History reviewed. No pertinent family history.  Social History   Substance Use Topics   • Smoking status: Heavy Tobacco Smoker   • Smokeless tobacco: None   • Alcohol use Yes       (Not in a hospital admission)    Current Facility-Administered Medications:   •  diltiaZEM (CARDIZEM) 125mg/125 mL infusion, 5-15 mg/hr, Intravenous, Titrated, Suresh Teresa PA, Last Rate: 10 mL/hr at 02/15/17 1145, 10 mg/hr at 02/15/17 1145    Current Outpatient Prescriptions:   •  albuterol (PROVENTIL HFA;VENTOLIN HFA) 108 (90 BASE) MCG/ACT inhaler, Every 6 (Six) Hours., Disp: , Rfl:   •  diltiaZEM CD (CARDIZEM CD) 360 MG 24 hr capsule, Take 1 capsule by mouth Daily., Disp: 30 capsule, Rfl: 5  •  metoprolol tartrate (LOPRESSOR) 25 MG tablet, 25 mg 2 (Two) Times a Day., Disp: , Rfl:   •  predniSONE (DELTASONE) 20 MG tablet, Take 2 tablets by mouth 2 (Two) Times a Day With Meals. (Patient taking differently: Take 80 mg by mouth 2 (Two) Times a Day With Meals.), Disp: 60 tablet, Rfl: 0  •  tamsulosin (FLOMAX) 0.4 MG capsule 24 hr capsule, Take 1 capsule by mouth Every Night., Disp: 30 capsule, Rfl: 0  Allergies:  Review of patient's allergies indicates no known allergies.    Objective     Vital Signs  Temp:  [98.2 °F (36.8 °C)] 98.2 °F (36.8 °C)  Heart Rate:  [102-144] 113  Resp:  [18-22] 21  BP: (109-124)/(75-95) 124/91    Physical Exam:   Physical Exam   Constitutional: He is oriented to person, place, and time. He appears well-developed and well-nourished. No distress.   HENT:   Head: Normocephalic and atraumatic.   Eyes: Pupils are equal, round, and reactive to light.   Neck: Normal range of motion. Neck supple. No JVD present. No thyromegaly present.   Cardiovascular: Normal heart sounds and intact distal pulses.  An irregular rhythm present. Tachycardia present.  Exam reveals no gallop and no friction rub.  murmur heard.  Pulses:       Dorsalis pedis pulses are 2+ on the right side, and  2+ on the left side.   Pulmonary/Chest: Effort normal. No respiratory distress. He has no wheezes. He has rales (few rales bases). He exhibits no tenderness.   Abdominal: Soft. Bowel sounds are normal. He exhibits no distension. There is no tenderness.   Musculoskeletal: Normal range of motion. He exhibits edema (1+ BLE edema ).   Neurological: He is alert and oriented to person, place, and time.   Drift and tremor of LUE    Skin: Skin is warm and dry. He is not diaphoretic.   Psychiatric: He has a normal mood and affect. His behavior is normal.     Results Review:     Lab Results (last 72 hours)     Procedure Component Value Units Date/Time    POC Troponin, Rapid [[id number]]  (Normal) Collected:  02/15/17 1006    Specimen:  Blood Updated:  02/15/17 1021     Troponin I 0.00 ng/mL       Serial Number: [id number]    : [id number]       D-dimer, Quantitative [[id number]]  (Abnormal) Collected:  02/15/17 1000    Specimen:  Blood Updated:  02/15/17 1022     D-Dimer, Quantitative 3.12 (H) mg/L (FEU)     Narrative:       Reference Range is 0-0.50 mg/L FEU. However, results <0.50 mg/L FEU tends to rule out DVT or PE. Results >0.50 mg/L FEU are not useful in predicting absence or presence of DVT or PE.    Protime-INR [[id number]]  (Abnormal) Collected:  02/15/17 1000    Specimen:  Blood Updated:  02/15/17 1022     Protime 14.8 (H) Seconds      INR 1.12 (H)     aPTT [[id number]]  (Normal) Collected:  02/15/17 1000    Specimen:  Blood Updated:  02/15/17 1022     PTT 30.7 seconds     Comprehensive Metabolic Panel [[id number]]  (Abnormal) Collected:  02/15/17 1000    Specimen:  Blood Updated:  02/15/17 1026     Glucose 311 (H) mg/dL      BUN 64 (H) mg/dL      Creatinine 1.68 (H) mg/dL      Sodium 137 mmol/L      Potassium 3.8 mmol/L      Chloride 95 (L) mmol/L      CO2 28.0 mmol/L      Calcium 8.9 mg/dL      Total Protein 6.3 g/dL      Albumin 3.30 (L) g/dL      ALT (SGPT) 59 (H) U/L      AST (SGOT) 25 U/L      Alkaline Phosphatase 114  U/L      Total Bilirubin 0.4 mg/dL      eGFR Non African Amer 44 (L) mL/min/1.73      Globulin 3.0 gm/dL      A/G Ratio 1.1 g/dL      BUN/Creatinine Ratio 38.1 (H)      Anion Gap 14.0 (H) mmol/L     BNP [[id number]]  (Abnormal) Collected:  02/15/17 1000    Specimen:  Blood Updated:  02/15/17 1029     proBNP 5920.0 (H) pg/mL     CBC & Differential [[id number]] Collected:  02/15/17 1000    Specimen:  Blood Updated:  02/15/17 1051    Narrative:       The following orders were created for panel order CBC & Differential.  Procedure                               Abnormality         Status                     ---------                               -----------         ------                     Scan Slide[[id number]]                                        Final result               CBC Auto Differential[[id number]]         Abnormal            Final result                 Please view results for these tests on the individual orders.    CBC Auto Differential [[id number]]  (Abnormal) Collected:  02/15/17 1000    Specimen:  Blood Updated:  02/15/17 1051     WBC 13.71 (H) 10*3/mm3      RBC 3.37 (L) 10*6/mm3      Hemoglobin 9.0 (L) g/dL      Hematocrit 28.9 (L) %      MCV 85.8 fL      MCH 26.7 (L) pg      MCHC 31.1 (L) g/dL      RDW 19.1 (H) %      RDW-SD 58.8 (H) fl      MPV 10.2 fL      Platelets 127 (L) 10*3/mm3      Neutrophil % 83.6 (H) %      Lymphocyte % 9.0 (L) %      Monocyte % 3.5 (L) %      Eosinophil % 0.0 %      Basophil % 0.3 %      Immature Grans % 3.6 %      Neutrophils, Absolute 11.46 (H) 10*3/mm3      Lymphocytes, Absolute 1.24 10*3/mm3      Monocytes, Absolute 0.48 10*3/mm3      Eosinophils, Absolute 0.00 10*3/mm3      Basophils, Absolute 0.04 10*3/mm3      Immature Grans, Absolute 0.49 (H) 10*3/mm3     Scan Slide [[id number]] Collected:  02/15/17 1000    Specimen:  Blood Updated:  02/15/17 1051     Anisocytosis Slight/1+      Hypochromia Mod/2+      WBC Morphology Normal      Platelet Morphology Normal     POC Troponin,  Rapid [[id number]]  (Normal) Collected:  02/15/17 1246    Specimen:  Blood Updated:  02/15/17 1300     Troponin I 0.00 ng/mL       Serial Number: [id number]    : [id number]       Blood Gas, Arterial [[id number]]  (Abnormal) Collected:  02/15/17 1342    Specimen:  Arterial Blood Updated:  02/15/17 1343     Site Arterial: left radial      Artem's Test --       Documented in Rapid Comm        pH, Arterial 7.511 (H) pH units      pCO2, Arterial 37.1 mm Hg      pO2, Arterial 74.9 (L) mm Hg      HCO3, Arterial 29.0 (H) mmol/L      Base Excess, Arterial 5.9 (H) mmol/L      O2 Saturation, Arterial 96.2 %      O2 Saturation Calculated 96.2 %      Barometric Pressure for Blood Gas -- mmHg       Component not reported at this site.        Modality Cannula      Flow Rate 2.00 lpm     Narrative:       Serial Number: [id number]    : [id number]          Assessment&#47;Plan   fibrillation, RVR  LUE weakness and pain  Questionable slurred speech and expressive aphasia  Henoch Schonlein purpura nephritis on 3x week HD   Anemia  Recent hematuria  Tobacco use  Mild to Moderate mitral regurgitation   Leukocytosis  Elevated d-dimer   Elevated BNP    Plan-  Agree with IV cardizem. Will resume oral cardizem and wean gtt as tolerated.  Resume oral beta blocker  No anticoagulation at this time due to recent hematuria, anemia, and reported frequent injuries and falls  VQ scan  CT head ordered per ER  Discussed with Dr. Cortez- will follow as consultant; further recommendations following his evaluation of the patient     I discussed the patients findings and my recommendations with patient, family, primary care team and consulting provider.     Macie Carrion, APRN  02/15/17  1:55 PM             Electronically signed by Alex Cortez MD at 2/15/2017 11:04 PM

## 2017-02-17 LAB
ANION GAP SERPL CALCULATED.3IONS-SCNC: 12 MMOL/L (ref 4–13)
BUN BLD-MCNC: 42 MG/DL (ref 5–21)
BUN/CREAT SERPL: 28.2 (ref 7–25)
CALCIUM SPEC-SCNC: 8.8 MG/DL (ref 8.4–10.4)
CHLORIDE SERPL-SCNC: 99 MMOL/L (ref 98–110)
CO2 SERPL-SCNC: 26 MMOL/L (ref 24–31)
CREAT BLD-MCNC: 1.49 MG/DL (ref 0.5–1.4)
DEPRECATED RDW RBC AUTO: 59.2 FL (ref 40–54)
ERYTHROCYTE [DISTWIDTH] IN BLOOD BY AUTOMATED COUNT: 19.3 % (ref 12–15)
GFR SERPL CREATININE-BSD FRML MDRD: 50 ML/MIN/1.73
GLUCOSE BLD-MCNC: 242 MG/DL (ref 70–100)
HCT VFR BLD AUTO: 27.4 % (ref 40–52)
HGB BLD-MCNC: 8.6 G/DL (ref 14–18)
IRON 24H UR-MRATE: 47 MCG/DL (ref 42–180)
IRON SATN MFR SERPL: 20 % (ref 20–45)
MCH RBC QN AUTO: 26.9 PG (ref 28–32)
MCHC RBC AUTO-ENTMCNC: 31.4 G/DL (ref 33–36)
MCV RBC AUTO: 85.6 FL (ref 82–95)
PLATELET # BLD AUTO: 118 10*3/MM3 (ref 130–400)
PMV BLD AUTO: 10.2 FL (ref 6–12)
POTASSIUM BLD-SCNC: 4.4 MMOL/L (ref 3.5–5.3)
RBC # BLD AUTO: 3.2 10*6/MM3 (ref 4.8–5.9)
SODIUM BLD-SCNC: 137 MMOL/L (ref 135–145)
TIBC SERPL-MCNC: 239 MCG/DL (ref 225–420)
URATE SERPL-MCNC: 4.4 MG/DL (ref 3.5–8.5)
WBC NRBC COR # BLD: 13.63 10*3/MM3 (ref 4.8–10.8)

## 2017-02-17 PROCEDURE — 87185 SC STD ENZYME DETCJ PER NZM: CPT | Performed by: INTERNAL MEDICINE

## 2017-02-17 PROCEDURE — 87040 BLOOD CULTURE FOR BACTERIA: CPT | Performed by: INTERNAL MEDICINE

## 2017-02-17 PROCEDURE — 87070 CULTURE OTHR SPECIMN AEROBIC: CPT | Performed by: INTERNAL MEDICINE

## 2017-02-17 PROCEDURE — 87147 CULTURE TYPE IMMUNOLOGIC: CPT | Performed by: INTERNAL MEDICINE

## 2017-02-17 PROCEDURE — 84550 ASSAY OF BLOOD/URIC ACID: CPT | Performed by: INTERNAL MEDICINE

## 2017-02-17 PROCEDURE — 97165 OT EVAL LOW COMPLEX 30 MIN: CPT | Performed by: OCCUPATIONAL THERAPIST

## 2017-02-17 PROCEDURE — 36589 REMOVAL TUNNELED CV CATH: CPT | Performed by: SURGERY

## 2017-02-17 PROCEDURE — 05PY33Z REMOVAL OF INFUSION DEVICE FROM UPPER VEIN, PERCUTANEOUS APPROACH: ICD-10-PCS | Performed by: SURGERY

## 2017-02-17 PROCEDURE — G8978 MOBILITY CURRENT STATUS: HCPCS

## 2017-02-17 PROCEDURE — 87205 SMEAR GRAM STAIN: CPT | Performed by: INTERNAL MEDICINE

## 2017-02-17 PROCEDURE — 80048 BASIC METABOLIC PNL TOTAL CA: CPT | Performed by: INTERNAL MEDICINE

## 2017-02-17 PROCEDURE — 87150 DNA/RNA AMPLIFIED PROBE: CPT | Performed by: INTERNAL MEDICINE

## 2017-02-17 PROCEDURE — G8979 MOBILITY GOAL STATUS: HCPCS

## 2017-02-17 PROCEDURE — G8989 SELF CARE D/C STATUS: HCPCS | Performed by: OCCUPATIONAL THERAPIST

## 2017-02-17 PROCEDURE — 83540 ASSAY OF IRON: CPT | Performed by: INTERNAL MEDICINE

## 2017-02-17 PROCEDURE — 99233 SBSQ HOSP IP/OBS HIGH 50: CPT | Performed by: INTERNAL MEDICINE

## 2017-02-17 PROCEDURE — 87015 SPECIMEN INFECT AGNT CONCNTJ: CPT | Performed by: INTERNAL MEDICINE

## 2017-02-17 PROCEDURE — G8987 SELF CARE CURRENT STATUS: HCPCS | Performed by: OCCUPATIONAL THERAPIST

## 2017-02-17 PROCEDURE — 85027 COMPLETE CBC AUTOMATED: CPT | Performed by: INTERNAL MEDICINE

## 2017-02-17 PROCEDURE — 87186 SC STD MICRODIL/AGAR DIL: CPT | Performed by: INTERNAL MEDICINE

## 2017-02-17 PROCEDURE — 97161 PT EVAL LOW COMPLEX 20 MIN: CPT

## 2017-02-17 PROCEDURE — 83550 IRON BINDING TEST: CPT | Performed by: INTERNAL MEDICINE

## 2017-02-17 PROCEDURE — G8988 SELF CARE GOAL STATUS: HCPCS | Performed by: OCCUPATIONAL THERAPIST

## 2017-02-17 PROCEDURE — 63710000001 PREDNISONE PER 5 MG: Performed by: NURSE PRACTITIONER

## 2017-02-17 RX ORDER — METOPROLOL TARTRATE 5 MG/5ML
5 INJECTION INTRAVENOUS EVERY 6 HOURS PRN
Status: DISCONTINUED | OUTPATIENT
Start: 2017-02-17 | End: 2017-02-23 | Stop reason: HOSPADM

## 2017-02-17 RX ORDER — LINEZOLID 600 MG/1
600 TABLET, FILM COATED ORAL EVERY 12 HOURS SCHEDULED
Status: DISCONTINUED | OUTPATIENT
Start: 2017-02-17 | End: 2017-02-18

## 2017-02-17 RX ADMIN — PREDNISONE 40 MG: 20 TABLET ORAL at 17:02

## 2017-02-17 RX ADMIN — LINEZOLID 600 MG: 600 TABLET, FILM COATED ORAL at 08:08

## 2017-02-17 RX ADMIN — METOPROLOL TARTRATE 5 MG: 5 INJECTION INTRAVENOUS at 19:05

## 2017-02-17 RX ADMIN — DILTIAZEM HYDROCHLORIDE 90 MG: 90 TABLET, FILM COATED ORAL at 22:46

## 2017-02-17 RX ADMIN — DILTIAZEM HYDROCHLORIDE 90 MG: 90 TABLET, FILM COATED ORAL at 05:54

## 2017-02-17 RX ADMIN — DILTIAZEM HYDROCHLORIDE 90 MG: 90 TABLET, FILM COATED ORAL at 11:51

## 2017-02-17 RX ADMIN — METOPROLOL TARTRATE 75 MG: 50 TABLET ORAL at 20:45

## 2017-02-17 RX ADMIN — TAMSULOSIN HYDROCHLORIDE 0.4 MG: 0.4 CAPSULE ORAL at 20:45

## 2017-02-17 RX ADMIN — METOPROLOL TARTRATE 50 MG: 50 TABLET ORAL at 08:08

## 2017-02-17 RX ADMIN — LINEZOLID 600 MG: 600 TABLET, FILM COATED ORAL at 01:42

## 2017-02-17 RX ADMIN — LINEZOLID 600 MG: 600 TABLET, FILM COATED ORAL at 20:46

## 2017-02-17 RX ADMIN — DILTIAZEM HYDROCHLORIDE 90 MG: 90 TABLET, FILM COATED ORAL at 17:02

## 2017-02-17 RX ADMIN — PREDNISONE 40 MG: 20 TABLET ORAL at 08:08

## 2017-02-17 NOTE — PLAN OF CARE
Problem: Patient Care Overview (Adult)  Goal: Plan of Care Review  Outcome: Ongoing (interventions implemented as appropriate)    02/17/17 0523   Coping/Psychosocial Response Interventions   Plan Of Care Reviewed With patient   Patient Care Overview   Progress no change   Outcome Evaluation   Outcome Summary/Follow up Plan VSS. Pt heart rate more controlled this shift. c/o generalized pain, medicated with tylenol with relief. Good output. Purulent drainage noted from permacath site. Notifed Dr. Dinh who ordered a culture of drainage, blood cultures, and to notify nephro. Charlie with [facility] agreed line needed to be removed. No further orders from nephro. Dr. Dinh ordered PO antibiotics. Unable to remove permacath this shift, vascular surgeron required to remove. Will consult [place] for this AM.          Problem: Cardiac Output, Decreased (Adult)  Goal: Identify Related Risk Factors and Signs and Symptoms  Outcome: Ongoing (interventions implemented as appropriate)  Goal: Adequate Cardiac Output/Effective Tissue Perfusion  Outcome: Ongoing (interventions implemented as appropriate)

## 2017-02-17 NOTE — DISCHARGE INSTR - APPOINTMENTS
Dr Montes ([facility] [phone])  2/27/17 @ 1545      DR. BARRIOS.  FOLLOW-UP 02/28/17 @ 1:40 P.M.

## 2017-02-17 NOTE — PROGRESS NOTES
Acute Care - Occupational Therapy Initial Evaluation  Psychiatric     Patient Name: Gurjit Andrea  : 1970  MRN: [id number]  Today's Date: 2017  Onset of Illness/Injury or Date of Surgery Date: (P) 02/15/17  Date of Referral to OT: 17  Referring Physician: BHARATHI Borrego (L UE weakness)    Admit Date: 2/15/2017       ICD-10-CM ICD-9-CM   1. Persistent atrial fibrillation I48.1 427.31   2. Other chest pain R07.89 786.59   3. Renal failure N19 586   4. Chronic anemia D64.9 285.9   5. Impaired mobility and ADLs Z74.09 799.89     Patient Active Problem List   Diagnosis   • Henoch-Schonlein purpura nephritis   • Tobacco abuse   • Paroxysmal atrial fibrillation   • Elevated PSA   • Nephrolithiasis   • Microcytic anemia   • Hypertensive nephrosclerosis   • Moderate mitral regurgitation   • Persistent atrial fibrillation   • Wegener's granulomatosis with renal involvement     Past Medical History   Diagnosis Date   • A-fib    • Chronic renal failure    • Elevated PSA    • Kidney stone    • Purpura    • Wegener's granulomatosis with renal involvement      Past Surgical History   Procedure Laterality Date   • Appendectomy     • Insertion hemodialysis catheter N/A 2017     Procedure: INSERTION PERMCATH;  Surgeon: Ian Grimes DO;  Location: [facility];  Service:           OT ASSESSMENT FLOWSHEET (last 72 hours)      OT Evaluation       17 1026 17 1019 17 1312 17 1310 17 0850    Rehab Evaluation    Document Type (P)  evaluation   See MAR  -SM evaluation   See MAR  -TR --  -EL evaluation   see MAR  -MM     Subjective Information (P)  agree to therapy;complains of;pain;dyspnea  -SM agree to therapy;complains of;dyspnea  -TR       Evaluation Not Performed   other (see comments)  -EL      Evaluation Not Performed, Comment   Pt. in dialysis  -EL      Patient Effort, Rehab Treatment  good  -TR       Symptoms Noted During/After Treatment  shortness of breath  -TR        General Information    Patient Profile Review (P)  yes  -SM yes  -TR yes  -EL (r) SM (t) EL (c) yes  -MM     Onset of Illness/Injury or Date of Surgery Date (P)  02/15/17  -SM 02/15/17  -TR 02/15/17  -EL (r) SM (t) EL (c) 02/15/17  -MM     Referring Physician  BHARATHI Borrego UE weakness  -TR BHARATHI Borrego  -EL (r) RAO (t) EL (c) Dr. Vergara  -MM     General Observations (P)  sidelying fowlers in bed  -SM Sidelying in bed, sleeping but easily awoken.   -TR       Pertinent History Of Current Problem   Pt admitted from ED with c/o chest pain radiating down L arm, nausea, SOA, racing heart, LUE weakness and slurred speech. dx: a-fib, chronic swelling in BUE with pitting edema 1-2. Pt is currently receiving dialysis on TTS schedule.   No acute findings with MRI and CT Head  -TR Pt admitted from ED with c/o chest pain radiating down L arm, nausea, SOA, racing heart, LUE weakness and slurred speech. dx: a-fib, chronic swelling in BUE with pitting edema 1-2. Pt is currently receiving dialysis on TTS schedule. 2/16 H&H: 8.6/27.5.  -EL (r) RAO (t) EL (c) Pt admitted from ED with c/o chest pain radiating down L arm, nausea, SOA, racing heart, LUE weakness and slurred speech. dx: a-fib, chronic swelling in BUE with pitting edema 1-2. Pt is currently receiving dialysis on TTS schedule.  -MM     Precautions/Limitations  fall precautions  -TR fall precautions  -EL (r) SM (t) EL (c) fall precautions  -MM     Prior Level of Function (P)  independent:;all household mobility;community mobility;ADL's;driving;gait  -SM independent:;all household mobility;community mobility;ADL's;home management;driving  -TR       Equipment Currently Used at Home (P)  none  -SM none  -TR   none  -RYNA    Plans/Goals Discussed With  patient;agreed upon  -TR       Risks Reviewed  patient:;LOB;dizziness;increased discomfort;change in vital signs  -TR       Benefits Reviewed  patient:;improve function;increase independence;increase  strength;increase balance;increase knowledge  -TR       Barriers to Rehab  none identified  -TR       Living Environment    Lives With (P)  spouse  -SM spouse  -TR  spouse  -MM spouse  -RYAN    Living Arrangements (P)  house  -SM mobile home  -TR  mobile home  -MM mobile home  -RYAN    Home Accessibility (P)  stairs to enter home;bed and bath on same level;tub/shower is not walk in  -SM stairs to enter home;tub/shower is not walk in  -TR       Number of Stairs to Enter Home (P)  5   5 in front with 1 rail, 6 in back with no rails  -SM 5  -TR       Stair Railings at Home (P)  outside, present on right side  -SM outside, present on right side  -TR       Transportation Available     family or friend will provide  -RYAN    Living Environment Comment  very tall tub per pt.   -TR       Clinical Impression    Date of Referral to OT  02/16/17  -TR  02/16/17  -MM     OT Diagnosis  Decreased ADL  -TR       Impairments Found (describe specific impairments)  gait, locomotion, and balance  -TR       Patient/Family Goals Statement  Return home  -TR       Criteria for Skilled Therapeutic Interventions Met  no;current level of function same as previous level of function   with ADL  -TR       Therapy Frequency  evaluation only  -TR       Predicted Duration of Therapy Intervention (days/wks)  One time visit  -TR       Anticipated Equipment Needs At Discharge  front wheeled walker   Depending on progress with PT.   -TR       Anticipated Discharge Disposition  home with assist  -TR       Vital Signs    Pretreatment Heart Rate (beats/min) (P)  96  -SM 96  -TR       Pre SpO2 (%) (P)  93  -SM 93  -TR       O2 Delivery Pre Treatment (P)  room air  -SM room air  -TR       Intra SpO2 (%)  91  -TR       O2 Delivery Intra Treatment  room air  -TR       Pre Patient Position  Supine  -TR       Intra Patient Position  Standing   Mobility  -TR       Post Patient Position  Sitting  -TR       Pain Assessment    Pain Assessment (P)  0-10  -SM 0-10  -TR     "   Pain Score (P)  6  -SM 6  -TR       Pain Type (P)  Acute pain  - Acute pain  -TR       Pain Location (P)  --   \"all joints of upper body\"  - --   \"all UB joints\"  -TR       Pain Orientation  Upper  -TR       Pain Intervention(s)  Repositioned;Ambulation/increased activity  -TR       Response to Interventions  Tolerated  -TR       Vision Assessment/Intervention    Visual Impairment (P)  WFL with corrective lenses   per pt.  - WFL with corrective lenses   Per pt.   -TR       Cognitive Assessment/Intervention    Current Cognitive/Communication Assessment (P)  functional  -SM functional  -TR       Orientation Status (P)  oriented x 4  -SM oriented x 4  -TR       Follows Commands/Answers Questions  able to follow multi-step instructions;100% of the time  -TR       Personal Safety  decreased awareness, need for safety   Fall risk, impaired balance.   -TR       Personal Safety Interventions  nonskid shoes/slippers when out of bed;gait belt;fall prevention program maintained;supervised activity  -TR       ROM (Range of Motion)    General ROM Detail  B UE AROM WFL  -TR       MMT (Manual Muscle Testing)    General MMT Assessment Detail  B UE 4+/5  -TR       Muscle Tone Assessment    Muscle Tone Assessment  --   B UE WNL  -TR       Bed Mobility, Assessment/Treatment    Bed Mobility, Assistive Device  head of bed elevated  -TR       Bed Mob, Supine to Sit, [place]  supervision required  -TR       Bed Mobility, Impairments  impaired balance  -TR       Transfer Assessment/Treatment    Transfers, Sit-Stand [place]  contact guard assist  -TR       Transfers, Stand-Sit [place]  contact guard assist  -TR       Transfer, Impairments  impaired balance  -TR       Functional Mobility    Functional Mobility- Ind. Level  contact guard assist  -TR       Functional Mobility- Safety Issues  balance decreased during turns  -TR       Functional Mobility- Comment  In room and hallway  -TR       Upper Body Dressing " Assessment/Training    UB Dressing Assess/Train, Clothing Type  donning:;hospital gown  -TR       UB Dressing Assess/Train, Position  edge of bed  -TR       UB Dressing Assess/Train, [place]  supervision required  -TR       Lower Body Dressing Assessment/Training    LB Dressing Assess/Train, Clothing Type  donning:;shoes  -TR       LB Dressing Assess/Train, Position  edge of bed  -TR       LB Dressing Assess/Train, [place]  supervision required  -TR       Motor Skills/Interventions    Additional Documentation  Balance Skills Training (Group)  -TR       Balance Skills Training    Sitting-Level of Assistance  Distant supervision  -TR       Sitting-Balance Support  Feet supported  -TR       Standing-Level of Assistance  Contact guard  -TR       Static Standing Balance Support  No upper extremity supported  -TR       Sensory Assessment/Intervention    Sensory Impairment  --   B UE WNL per pt.  -TR       General Therapy Interventions    Planned Therapy Interventions  --   Eval only  -TR       Positioning and Restraints    Pre-Treatment Position  in bed  -TR       Post Treatment Position  chair  -TR       In Chair  reclined;call light within reach;encouraged to call for assist  -TR         02/16/17 0425 02/15/17 1724 02/15/17 1721          General Information    Equipment Currently Used at Home  none  -BK --   none  -BK      Living Environment    Lives With  spouse  -BK       Living Arrangements  mobile home  -BK       Home Accessibility  no concerns  -BK       Stair Railings at Home  none  -BK       Type of Financial/Environmental Concern  none  -BK       Transportation Available car;family or friend will provide  -LO none  -BK       Functional Level Prior    Ambulation  0-->independent  -BK       Transferring  0-->independent  -BK       Toileting  0-->independent  -BK       Bathing  0-->independent  -BK       Dressing  0-->independent  -BK       Eating  0-->independent  -BK       Communication   0-->understands/communicates without difficulty  -BK       Swallowing  0-->swallows foods/liquids without difficulty  -BK       Prior Functional Level Comment  n/a  -BK         User Key  (r) = Recorded By, (t) = Taken By, (c) = Cosigned By    Initials Name Effective Dates    EL Senior, PT DPT 08/02/16 -     JANETTE Castellanos, CHAIMN 08/02/16 -     BK Kati May, RN 08/02/16 -     RYAN Walton, MSW 09/15/16 -     TR Elis Bender, OTR/L 06/22/15 -     MM Vishnu Palacios, OTR/L 10/21/16 -     SM Shawna Mulvihill, PT Student 10/21/16 -            Occupational Therapy Education     Title: PT OT SLP Therapies (Resolved)     Topic: Occupational Therapy (Resolved)     Point: ADL training (Resolved)    Description: Instruct learner(s) on proper safety adaptation and remediation techniques during self care or transfers.   Instruct in proper use of assistive devices.    Learning Progress Summary    Learner Readiness Method Response Comment Documented by Status   Patient Acceptance E,D VU Pt educated on purpose of OT eval, balance impairments found, fall prevention techniques and d/c planning. TR 02/17/17 1102 Done               Point: Precautions (Resolved)    Description: Instruct learner(s) on prescribed precautions during self-care and functional transfers.    Learning Progress Summary    Learner Readiness Method Response Comment Documented by Status   Patient Acceptance E,D VU Pt educated on purpose of OT eval, balance impairments found, fall prevention techniques and d/c planning. TR 02/17/17 1102 Done               Point: Body mechanics (Resolved)    Description: Instruct learner(s) on proper positioning and spine alignment during self-care, functional mobility activities and/or exercises.    Learning Progress Summary    Learner Readiness Method Response Comment Documented by Status   Patient Acceptance E,D VU Pt educated on purpose of OT eval, balance impairments found, fall prevention techniques  and d/c planning. TR 02/17/17 1102 Done                      User Key     Initials Effective Dates Name Provider Type Discipline    TR 06/22/15 -  Elis Bender OTR/L Occupational Therapist OT                  OT Recommendation and Plan  Anticipated Equipment Needs At Discharge: front wheeled walker (Depending on progress with PT. )  Anticipated Discharge Disposition: home with assist  Planned Therapy Interventions:  (Eval only)  Therapy Frequency: evaluation only  Plan of Care Review  Plan Of Care Reviewed With: patient  Progress: improving  Outcome Summary/Follow up Plan: OT Eval completed. Pt displayed impaired standing balance, required CGA with bed mobility, transfers and functional mobility due to risk for falls. Pt completes ADL with supervision to Modified independence due to balance impairments. B UE strength was 4+/5. OT intervention is not needed at this time. PT plans to address balance/mobility impairments. Anticipated d/c is home with spouse.                Outcome Measures       02/17/17 1019          How much help from another is currently needed...    Putting on and taking off regular lower body clothing? 4   Supervision was provided during eval.   -TR      Bathing (including washing, rinsing, and drying) 3  -TR      Toileting (which includes using toilet bed pan or urinal) 4  -TR      Putting on and taking off regular upper body clothing 4  -TR      Taking care of personal grooming (such as brushing teeth) 4  -TR      Eating meals 4  -TR      Score 23  -TR      Functional Assessment    Outcome Measure Options AM-PAC 6 Clicks Daily Activity (OT)  -TR        User Key  (r) = Recorded By, (t) = Taken By, (c) = Cosigned By    Initials Name Provider Type    TR Elis Bender OTR/L Occupational Therapist          Time Calculation:   OT Start Time: 1019  OT Stop Time: 1103  OT Time Calculation (min): 44 min    Therapy Charges for Today     Code Description Service Date Service Provider Modifiers  Qty    35668903534 HC OT SELFCARE CURRENT 2/17/2017 Elis Bender OTR/L GO, CI 1    12053000609 HC OT SELFCARE PROJECTED 2/17/2017 Elis Bender OTR/L GO, CI 1    23137007280 HC OT SELFCARE DISCHARGE 2/17/2017 Elis Bender OTR/L GO, CI 1    61313264135 HC OT EVAL LOW COMPLEXITY 3 2/17/2017 Elis Bender OTR/L GO, KX 1          OT G-codes  OT Professional Judgement Used?: Yes  OT Functional Scales Options: AM-PAC 6 Clicks Daily Activity (OT)  Score: 23  Functional Limitation: Self care  Self Care Current Status (): At least 1 percent but less than 20 percent impaired, limited or restricted  Self Care Goal Status (): At least 1 percent but less than 20 percent impaired, limited or restricted  Self Care Discharge Status (): At least 1 percent but less than 20 percent impaired, limited or restricted    Elis Bender OTLIZ/L  2/17/2017

## 2017-02-17 NOTE — PROGRESS NOTES
Acute Care - Physical Therapy Initial Evaluation  [facility]     Patient Name: Gurjit Andrea  : 1970  MRN: [id number]  Today's Date: 2017   Onset of Illness/Injury or Date of Surgery Date: 02/15/17  Date of Referral to PT: 17  Referring Physician: BHARATHI Borrego      Admit Date: 2/15/2017     Visit Dx:    ICD-10-CM ICD-9-CM   1. Persistent atrial fibrillation I48.1 427.31   2. Other chest pain R07.89 786.59   3. Renal failure N19 586   4. Chronic anemia D64.9 285.9   5. Impaired mobility and ADLs Z74.09 799.89   6. Impaired functional mobility, balance, gait, and endurance Z74.09 V49.89     Patient Active Problem List   Diagnosis   • Henoch-Schonlein purpura nephritis   • Tobacco abuse   • Paroxysmal atrial fibrillation   • Elevated PSA   • Nephrolithiasis   • Microcytic anemia   • Hypertensive nephrosclerosis   • Moderate mitral regurgitation   • Persistent atrial fibrillation   • Wegener's granulomatosis with renal involvement     Past Medical History   Diagnosis Date   • A-fib    • Chronic renal failure    • Elevated PSA    • Kidney stone    • Purpura    • Wegener's granulomatosis with renal involvement      Past Surgical History   Procedure Laterality Date   • Appendectomy     • Insertion hemodialysis catheter N/A 2017     Procedure: INSERTION PERMCATH;  Surgeon: Ian Grimes DO;  Location: [facility];  Service:           PT ASSESSMENT (last 72 hours)      PT Evaluation       17 1026 17 1019    Rehab Evaluation    Document Type evaluation   See MAR  -EL (r) SM (t) EL (c) evaluation   See MAR  -TR    Subjective Information agree to therapy;complains of;pain;dyspnea  -EL (r) SM (t) EL (c) agree to therapy;complains of;dyspnea  -TR    Patient Effort, Rehab Treatment  good  -TR    Symptoms Noted During/After Treatment shortness of breath  -EL (r) SM (t) EL (c) shortness of breath  -TR    Symptoms Noted Comment chronic low back pain with mobility, pt. reported L ROSAS is  shorter than R LE and shoe lift is at home, not here  -EL (r) SM (t) EL (c)     General Information    Patient Profile Review yes  -EL (r) SM (t) EL (c) yes  -TR    Onset of Illness/Injury or Date of Surgery Date 02/15/17  -EL (r) SM (t) EL (c) 02/15/17  -TR    Referring Physician BHARATHI Borrego  -EL (r) SM (t) EL (c) BHARATHI Borrego   L UE weakness  -TR    General Observations sidelying in bed, sleeping but easily awoken, port on R chest  -EL (r) SM (t) EL (c) Sidelying in bed, sleeping but easily awoken.   -TR    Pertinent History Of Current Problem Pt admitted from ED with c/o chest pain radiating down L arm, nausea, SOA, racing heart, LUE weakness and slurred speech. dx: a-fib, chronic swelling in BUE with pitting edema 1-2. Pt is currently receiving dialysis on TTS schedule. No acute findings with MRI and CT head. 2/17 H&H: 8.6/27.4  -EL (r) RAO (t) EL (c)  Pt admitted from ED with c/o chest pain radiating down L arm, nausea, SOA, racing heart, LUE weakness and slurred speech. dx: a-fib, chronic swelling in BUE with pitting edema 1-2. Pt is currently receiving dialysis on TTS schedule.   No acute findings with MRI and CT Head  -TR    Precautions/Limitations fall precautions  -EL (r) SM (t) EL (c) fall precautions  -TR    Prior Level of Function independent:;all household mobility;community mobility;ADL's;driving;gait;home management  -EL (r) SM (t) EL (c) independent:;all household mobility;community mobility;ADL's;home management;driving  -TR    Equipment Currently Used at Home none  -EL (r) SM (t) EL (c) none  -TR    Plans/Goals Discussed With patient;agreed upon  -EL (r) SM (t) EL (c) patient;agreed upon  -TR    Risks Reviewed patient:;LOB;dizziness;increased discomfort;change in vital signs  -EL (r) SM (t) EL (c) patient:;LOB;dizziness;increased discomfort;change in vital signs  -TR    Benefits Reviewed patient:;improve function;increase independence;increase strength;increase balance;increase  knowledge  -EL (r) RAO (t) EL (khloe) patient:;improve function;increase independence;increase strength;increase balance;increase knowledge  -TR    Barriers to Rehab none identified  -EL (r) RAO (t) EL (khloe) none identified  -TR    Living Environment    Lives With spouse  -EL (r) RAO (jeffrey) EL nieves) spouse  -TR    Living Arrangements mobile home  -EL (r) RAO (jeffrey) EL (khloe) mobile home  -TR    Home Accessibility stairs to enter home;bed and bath on same level;tub/shower is not walk in  -EL (r) RAO (t) EL nieves) stairs to enter home;tub/shower is not walk in  -TR    Number of Stairs to Enter Home 5   5 in front w/ 1 rail, 6 in back w/o rails; pt. uses both  -EL (leandro) RAO (t) EL nieves) 5  -TR    Stair Railings at Home outside, present on right side  -EL peralta) RAO garnica) EL (khloe) outside, present on right side  -TR    Living Environment Comment  very tall tub per pt.   -TR    Clinical Impression    Date of Referral to PT 02/16/17  -EL (leandro) RAO (jeffrey) EL nieves)     PT Diagnosis impaired gait and balance  -EL (r) RAO (t) EL (khloe)     Functional Level At Time Of Evaluation Supervision bed mobility, CGA-supervision sit/stand, supervision stand/sit, CGA gait ~245 ft.  -EL (r) RAO (t) EL nieves)     Patient/Family Goals Statement return home  -EL (r) RAO (t) EL (khloe)     Criteria for Skilled Therapeutic Interventions Met yes;treatment indicated  -EL (r) RAO (t) EL nieves)     Impairments Found (describe specific impairments) gait, locomotion, and balance  -EL (r) RAO (t) LE (khloe)     Functional Limitations in Following Categories (Describe Specific Limitations) home management  -EL (r) RAO (t) EL nieves)     Disability: Inability to Perform Actions/Activities of Required Roles (describe specific disability) community/leisure  -EL Wayr) RAO (t) EL nieves)     Rehab Potential good, to achieve stated therapy goals  -EL (r) RAO (jeffrey) EL nieves)     Predicted Duration of Therapy Intervention (days/wks) until d/c  -EL (r) RAO (t) EL (c)     Vital Signs    Pretreatment Heart Rate (beats/min) 96  -EL (r) SM (t) EL  "(c) 96  -TR    Pre SpO2 (%) 93  -EL (r) SM (t) EL (c) 93  -TR    O2 Delivery Pre Treatment room air  -EL (r) SM (t) EL (c) room air  -TR    Intra SpO2 (%) 91  -EL (r) SM (t) EL (c) 91  -TR    O2 Delivery Intra Treatment room air  -EL (r) SM (t) EL (c) room air  -TR    Pre Patient Position Supine  -EL (r) SM (t) EL (c) Supine  -TR    Intra Patient Position Standing   mobility  -EL (r) SM (t) EL (c) Standing   Mobility  -TR    Post Patient Position Sitting  -EL (r) SM (t) EL (c) Sitting  -TR    Pain Assessment    Pain Assessment 0-10  -EL (r) SM (t) EL (c) 0-10  -TR    Pain Score 6  -EL (r) SM (t) EL (c) 6  -TR    Pain Type Acute pain  -EL (r) SM (t) EL (c) Acute pain  -TR    Pain Location --   \"all joints of upper body\"  -EL (r) SM (t) EL (c) --   \"all UB joints\"  -TR    Pain Orientation Upper  -EL (r) SM (t) EL (c) Upper  -TR    Pain Intervention(s) Medication (See MAR);Repositioned;Ambulation/increased activity  -EL (r) SM (t) EL (c) Repositioned;Ambulation/increased activity  -TR    Response to Interventions tolerated  -EL (r) SM (t) EL (c) Tolerated  -TR    Vision Assessment/Intervention    Visual Impairment WFL with corrective lenses   per pt.  -EL (r) SM (t) EL (c) WFL with corrective lenses   Per pt.   -TR    Cognitive Assessment/Intervention    Current Cognitive/Communication Assessment functional  -EL (r) SM (t) EL (c) functional  -TR    Orientation Status oriented x 4  -EL (r) SM (t) EL (c) oriented x 4  -TR    Follows Commands/Answers Questions able to follow multi-step instructions;100% of the time  -EL (r) SM (t) EL (c) able to follow multi-step instructions;100% of the time  -TR    Personal Safety decreased awareness, need for safety   impaired balance  -EL (r) SM (t) EL (c) decreased awareness, need for safety   Fall risk, impaired balance.   -TR    Personal Safety Interventions fall prevention program maintained;gait belt;muscle strengthening facilitated;nonskid shoes/slippers when out of " "bed;supervised activity  -EL (r) SM (t) EL (c) nonskid shoes/slippers when out of bed;gait belt;fall prevention program maintained;supervised activity  -TR    ROM (Range of Motion)    General ROM Detail B LE AROM WFL. See OT IE for B UE.  -EL (r) SM (t) EL (c) B UE AROM WFL  -TR    MMT (Manual Muscle Testing)    General MMT Assessment Detail B LE grossly 4/5. See OT IE for B UE.  -EL (r) SM (t) EL (c) B UE 4+/5  -TR    Muscle Tone Assessment    Muscle Tone Assessment  --   B UE WNL  -TR    Bed Mobility, Assessment/Treatment    Bed Mobility, Assistive Device head of bed elevated  -EL (r) SM (t) EL (c) head of bed elevated  -TR    Bed Mob, Supine to Sit, [place] supervision required  -EL (r) SM (t) EL (c) supervision required  -TR    Bed Mobility, Impairments impaired balance;pain  -EL (r) SM (t) EL (c) impaired balance  -TR    Transfer Assessment/Treatment    Transfers, Sit-Stand [place] contact guard assist;supervision required  -LE (r) SM (t) EL (c) contact guard assist  -TR    Transfers, Stand-Sit [place] supervision required  -EL (r) SM (t) EL (c) contact guard assist  -TR    Transfer, Impairments impaired balance;pain  -EL (r) SM (t) EL (c) impaired balance  -TR    Gait Assessment/Treatment    Gait, [place] Level contact guard assist;verbal cues required  -EL (r) SM (t) EL (c)     Gait, Distance (Feet) 245  -EL (r) SM (t) EL (c)     Gait, Gait Pattern Analysis swing-through gait  -EL (r) SM (t) EL (c)     Gait, Gait Deviations bilateral:;toe-to-floor clearance decreased;narrow base;forward flexed posture;decreased heel strike  -EL (r) SM (t) EL (c)     Gait, Impairments impaired balance;strength decreased;pain;postural control impaired  -EL (r) SM (t) EL (c)     Gait, Comment Pt. occasionally crossed midline with steps. Pt. reported \"more unsteady than I'd like\".  -EL (r) SM (t) EL (c)     Motor Skills/Interventions    Additional Documentation Balance Skills Training (Group)  -EL (leandro) RAO (t) " EL (c) Balance Skills Training (Group)  -TR    Balance Skills Training    Sitting-Level of Assistance Distant supervision  -EL (r) SM (t) EL (c) Distant supervision  -TR    Sitting-Balance Support Feet supported  -EL (r) RAO (t) EL (c) Feet supported  -TR    Standing-Level of Assistance Contact guard  -EL (r) SM (t) EL (c) Contact guard  -TR    Static Standing Balance Support No upper extremity supported  -EL (r) SM (t) EL (c) No upper extremity supported  -TR    Gait Balance-Level of Assistance Contact guard  -EL     Gait Balance Support No upper extremity supported  -EL     Sensory Assessment/Intervention    Sensory Impairment --   B LE WNL per pt.  -EL (r) SM (t) EL (c) --   B UE WNL per pt.  -TR    Positioning and Restraints    Pre-Treatment Position in bed  -EL (r) RAO (t) EL (c) in bed  -TR    Post Treatment Position chair  -EL (r) RAO (t) EL (c) chair  -TR    In Chair reclined;call light within reach;encouraged to call for assist;notified nsg  -EL reclined;call light within reach;encouraged to call for assist  -TR      02/16/17 1312 02/16/17 1310    Rehab Evaluation    Document Type --  -EL evaluation   see MAR  -MM    Evaluation Not Performed other (see comments)  -EL     Evaluation Not Performed, Comment Pt. in dialysis  -EL     General Information    Patient Profile Review yes  -EL (r) RAO (t) EL (c) yes  -MM    Onset of Illness/Injury or Date of Surgery Date 02/15/17  -EL (r) RAO (t) EL (c) 02/15/17  -MM    Referring Physician BHARATHI Borrego  -EL (r) RAO (t) EL (c) Dr. Vergara  -MM    Pertinent History Of Current Problem Pt admitted from ED with c/o chest pain radiating down L arm, nausea, SOA, racing heart, LUE weakness and slurred speech. dx: a-fib, chronic swelling in BUE with pitting edema 1-2. Pt is currently receiving dialysis on TTS schedule. 2/16 H&H: 8.6/27.5.  -EL (r) RAO (t) EL (c) Pt admitted from ED with c/o chest pain radiating down L arm, nausea, SOA, racing heart, LUE weakness and slurred  speech. dx: a-fib, chronic swelling in BUE with pitting edema 1-2. Pt is currently receiving dialysis on TTS schedule.  -MM    Precautions/Limitations fall precautions  -EL (r) SM (t) EL (c) fall precautions  -MM    Living Environment    Lives With  spouse  -MM    Living Arrangements  mobile home  -MM    Clinical Impression    Date of Referral to PT 02/16/17  -EL (r) SM (t) EL (c)       02/16/17 0850 02/16/17 0425    General Information    Equipment Currently Used at Home none  -RYAN     Living Environment    Lives With spouse  -YRAN     Living Arrangements mobile home  -RYAN     Transportation Available family or friend will provide  -RYAN car;family or friend will provide  -LO      02/15/17 1724 02/15/17 1721    General Information    Equipment Currently Used at Home none  -BK --   none  -BK    Living Environment    Lives With spouse  -BK     Living Arrangements mobile home  -BK     Home Accessibility no concerns  -BK     Stair Railings at Home none  -BK     Type of Financial/Environmental Concern none  -BK     Transportation Available none  -BK       User Key  (r) = Recorded By, (t) = Taken By, (c) = Cosigned By    Initials Name Provider Type    EL Senior, PT DPT Physical Therapist    JANETTE Castellanos, LPN Licensed Nurse    ELAN May, RN Registered Nurse    RYAN Walton, MSW     TR Elis Bender, OTR/L Occupational Therapist    MM Vishnu Palacios, OTR/L Occupational Therapist    SM Shawna Mulvihill, PT Student PT Student          Physical Therapy Education     Title: PT OT SLP Therapies (Active)     Topic: Physical Therapy (Active)     Point: Mobility training (Done)    Learning Progress Summary    Learner Readiness Method Response Comment Documented by Status   Patient Acceptance E VU Educated pt. on progression of PT POC and benefits of activity.  02/17/17 1026 Done                      User Key     Initials Effective Dates Name Provider Type Discipline     10/21/16 -   Shawna Mulvihill, PT Student PT Student PT                PT Recommendation and Plan  Anticipated Discharge Disposition: home with assist, home  Planned Therapy Interventions: balance training, bed mobility training, gait training, home exercise program, motor coordination training, patient/family education, postural re-education, stair training, strengthening, transfer training  PT Frequency: daily, 2 times/day  Plan of Care Review  Plan Of Care Reviewed With: patient  Outcome Summary/Follow up Plan: PT eval completed. Pt. demonstrated impaired balance with gait and transfers. Pt. required CGA-supervision for bed mobility, transfers, and gait due to risk for falls. Pt. demonstrated gait deviations of occasional crossing of midline and narrow base of support. Pt. demonstrated decreased activity tolerance. Pt. would benefit from skilled PT to address gait, balance, stairs, and strength for improved function and independence. Anticipated d/c is home vs. home with assist.          IP PT Goals       02/17/17 1026          Transfer Training PT LTG    Transfer Training PT LTG, Date Established 02/17/17  -EL (r) SM (t) EL (c)      Transfer Training PT LTG, Time to Achieve by discharge  -EL (r) SM (t) EL (c)      Transfer Training PT LTG, Activity Type bed to chair /chair to bed;sit to stand/stand to sit  -EL (r) SM (t) EL (c)      Transfer Training PT LTG, [place] Level independent  -EL (r) SM (t) EL (c)      Gait Training PT LTG    Gait Training Goal PT LTG, Date Established 02/17/17  -EL (r) SM (t) EL (c)      Gait Training Goal PT LTG, Time to Achieve by discharge  -EL (r) SM (t) EL (c)      Gait Training Goal PT LTG, [place] Level independent  -EL (r) SM (t) EL (c)      Gait Training Goal PT LTG, Distance to Achieve 400  -EL (r) SM (t) EL (c)      Stair Training PT LTG    Stair Training Goal PT LTG, Date Established 02/17/17  -EL (r) SM (t) EL (c)      Stair Training Goal PT LTG, Time to Achieve by discharge   -EL (r) SM (t) EL (c)      Stair Training Goal PT LTG, Number of Steps 5  -EL (r) SM (t) EL (c)      Stair Training Goal PT LTG, [place] Level conditional independence  -EL (r) SM (t) EL (c)      Stair Training Goal PT LTG, Assist Device 1 handrail  -EL (r) SM (t) EL (c)      Dynamic Standing Balance PT LTG    Dynamic Standing Balance PT LTG, Date Established 02/17/17  -EL (r) SM (t) EL (c)      Dynamic Standing Balance PT LTG, Time to Achieve by discharge  -EL (r) SM (t) LE (c)      Dynamic Standing Balance PT LTG, [place] Level supervision required  -EL (r) SM (t) EL (c)      Dynamic Standing Balance PT LTG, Additional Goal romberg stance, single limb stance, tandem stance x 20 sec each with eyes open for decreased fall risk  -EL (r) SM (t) EL (c)        User Key  (r) = Recorded By, (t) = Taken By, (c) = Cosigned By    Initials Name Provider Type    EL Senior, PT DPT Physical Therapist    SM Shawna Mulvihill, PT Student PT Student                Outcome Measures       02/17/17 1026 02/17/17 1019       How much help from another person do you currently need...    Turning from your back to your side while in flat bed without using bedrails? 4  -EL (r) SM (t) EL (c)      Moving from lying on back to sitting on the side of a flat bed without bedrails? 3  -EL (r) SM (t) EL (c)      Moving to and from a bed to a chair (including a wheelchair)? 3  -EL (r) SM (t) EL (c)      Standing up from a chair using your arms (e.g., wheelchair, bedside chair)? 3  -EL (r) SM (t) EL (c)      Climbing 3-5 steps with a railing? 3  -EL (r) SM (t) EL (c)      To walk in hospital room? 3  -EL (r) SM (t) EL (c)      AM-PAC 6 Clicks Score 19  -EL (r) SM (t)      How much help from another is currently needed...    Putting on and taking off regular lower body clothing?  4   Supervision was provided during eval.   -TR     Bathing (including washing, rinsing, and drying)  3  -TR     Toileting (which includes using toilet bed  pan or urinal)  4  -TR     Putting on and taking off regular upper body clothing  4  -TR     Taking care of personal grooming (such as brushing teeth)  4  -TR     Eating meals  4  -TR     Score  23  -TR     Functional Assessment    Outcome Measure Options AM-PAC 6 Clicks Basic Mobility (PT)  -EL (r) SM (t) EL (c) AM-PAC 6 Clicks Daily Activity (OT)  -TR       User Key  (r) = Recorded By, (t) = Taken By, (c) = Cosigned By    Initials Name Provider Type    EL Senior, PT DPT Physical Therapist    TR Elis Bender, OTR/L Occupational Therapist     Shawna Mulvihill, PT Student PT Student           Time Calculation:         PT Charges       02/17/17 1135          Time Calculation    Start Time 1026   Chart reviewed 2/16 1763-9310, but pt. at dialysis then MRI. PT with 40 total minutes.  -EL (r) SM (t) EL (c)      Stop Time 1050  -EL (r) SM (t) EL (c)      Time Calculation (min) 24 min  -EL (r) SM (t)      PT Received On 02/17/17  -LE (r) SM (t) EL (c)      PT Goal Re-Cert Due Date 02/27/17  -EL (r) SM (t) EL (c)        User Key  (r) = Recorded By, (t) = Taken By, (c) = Cosigned By    Initials Name Provider Type    EL Senior, PT DPT Physical Therapist     Shawna Mulvihill, PT Student PT Student          Therapy Charges for Today     Code Description Service Date Service Provider Modifiers Qty    [id number] HC PT EVAL LOW COMPLEXITY 3 2/17/2017 Shawna Mulvihill, PT Student GP, KX 1          PT G-Codes  Outcome Measure Options: AM-PAC 6 Clicks Basic Mobility (PT)  Score: 19  Functional Limitation: Mobility: Walking and moving around  Mobility: Walking and Moving Around Current Status (): At least 20 percent but less than 40 percent impaired, limited or restricted  Mobility: Walking and Moving Around Goal Status (): 0 percent impaired, limited or restricted      Shawna Mulvihill, PT Student  2/17/2017

## 2017-02-17 NOTE — PROGRESS NOTES
[facility] -  Progress Note     LOS: 2 days   Patient Care Team:  Moises Montes MD as PCP - General (Internal Medicine)  Luis Carlos Awan MD as Consulting Physician (Urology)  Alex Cortez MD as Cardiologist (Cardiology)    Chief Complaint: chest pain, palpitations, shortness of breath     Subjective     Interval History:     Patient Complaints: No pain and dyspnea today. Pt reports he is hungry and waiting for dialysis cath removal.       Review of Systems:     Review of Systems   Constitutional: Positive for fatigue. Negative for diaphoresis, fever and unexpected weight change.   HENT: Negative for nosebleeds.    Respiratory: Negative for apnea, cough, chest tightness, shortness of breath and wheezing.    Cardiovascular: Negative for chest pain, palpitations and leg swelling.   Gastrointestinal: Negative for abdominal distention, nausea and vomiting.   Genitourinary: Negative for hematuria.   Musculoskeletal: Negative for gait problem.   Skin: Negative for color change.   Neurological: Negative for dizziness, syncope, weakness and light-headedness.     Objective     Vital Sign Min/Max for last 24 hours  Temp  Min: 97.8 °F (36.6 °C)  Max: 98.9 °F (37.2 °C)   BP  Min: 106/70  Max: 130/91   Pulse  Min: 60  Max: 130   Resp  Min: 18  Max: 22   SpO2  Min: 96 %  Max: 100 %   No Data Recorded   Weight  Min: 222 lb (101 kg)  Max: 222 lb (101 kg)     Last Weight    02/15/17  1635   Weight: 222 lb 14.4 oz (101 kg)       Physical Exam:    Physical Exam   Constitutional: He is oriented to person, place, and time. He appears well-developed and well-nourished. No distress.   HENT:   Head: Normocephalic and atraumatic.   Eyes: Pupils are equal, round, and reactive to light.   Neck: Normal range of motion. Neck supple. No JVD present. No thyromegaly present.   Cardiovascular: Normal heart sounds and intact distal pulses.  An irregular rhythm present. Tachycardia present.  Exam reveals no gallop and  no friction rub.    No murmur heard.  Pulses:       Dorsalis pedis pulses are 2+ on the right side, and 2+ on the left side.   Pulmonary/Chest: Effort normal. No respiratory distress. He has no wheezes. He has rales (few bases). He exhibits no tenderness.   Abdominal: Soft. Bowel sounds are normal. He exhibits no distension. There is no tenderness.   Musculoskeletal: Normal range of motion. He exhibits edema (traace/1+ BLE edema ).   Neurological: He is alert and oriented to person, place, and time. No cranial nerve deficit.   Skin: Skin is warm and dry. He is not diaphoretic.   Psychiatric: He has a normal mood and affect. His behavior is normal.     Results Review:   Lab Results (last 72 hours)     Procedure Component Value Units Date/Time    POC Troponin, Rapid [[id number]]  (Normal) Collected:  02/15/17 1006    Specimen:  Blood Updated:  02/15/17 1021     Troponin I 0.00 ng/mL       Serial Number: [id number]    : [id number]       D-dimer, Quantitative [[id number]]  (Abnormal) Collected:  02/15/17 1000    Specimen:  Blood Updated:  02/15/17 1022     D-Dimer, Quantitative 3.12 (H) mg/L (FEU)     Narrative:       Reference Range is 0-0.50 mg/L FEU. However, results <0.50 mg/L FEU tends to rule out DVT or PE. Results >0.50 mg/L FEU are not useful in predicting absence or presence of DVT or PE.    Protime-INR [[id number]]  (Abnormal) Collected:  02/15/17 1000    Specimen:  Blood Updated:  02/15/17 1022     Protime 14.8 (H) Seconds      INR 1.12 (H)     aPTT [[id number]]  (Normal) Collected:  02/15/17 1000    Specimen:  Blood Updated:  02/15/17 1022     PTT 30.7 seconds     Comprehensive Metabolic Panel [[id number]]  (Abnormal) Collected:  02/15/17 1000    Specimen:  Blood Updated:  02/15/17 1026     Glucose 311 (H) mg/dL      BUN 64 (H) mg/dL      Creatinine 1.68 (H) mg/dL      Sodium 137 mmol/L      Potassium 3.8 mmol/L      Chloride 95 (L) mmol/L      CO2 28.0 mmol/L      Calcium 8.9 mg/dL      Total Protein 6.3 g/dL       Albumin 3.30 (L) g/dL      ALT (SGPT) 59 (H) U/L      AST (SGOT) 25 U/L      Alkaline Phosphatase 114 U/L      Total Bilirubin 0.4 mg/dL      eGFR Non African Amer 44 (L) mL/min/1.73      Globulin 3.0 gm/dL      A/G Ratio 1.1 g/dL      BUN/Creatinine Ratio 38.1 (H)      Anion Gap 14.0 (H) mmol/L     BNP [[id number]]  (Abnormal) Collected:  02/15/17 1000    Specimen:  Blood Updated:  02/15/17 1029     proBNP 5920.0 (H) pg/mL     CBC & Differential [[id number]] Collected:  02/15/17 1000    Specimen:  Blood Updated:  02/15/17 1051    Narrative:       The following orders were created for panel order CBC & Differential.  Procedure                               Abnormality         Status                     ---------                               -----------         ------                     Scan Slide[[id number]]                                        Final result               CBC Auto Differential[[id number]]         Abnormal            Final result                 Please view results for these tests on the individual orders.    CBC Auto Differential [[id number]]  (Abnormal) Collected:  02/15/17 1000    Specimen:  Blood Updated:  02/15/17 1051     WBC 13.71 (H) 10*3/mm3      RBC 3.37 (L) 10*6/mm3      Hemoglobin 9.0 (L) g/dL      Hematocrit 28.9 (L) %      MCV 85.8 fL      MCH 26.7 (L) pg      MCHC 31.1 (L) g/dL      RDW 19.1 (H) %      RDW-SD 58.8 (H) fl      MPV 10.2 fL      Platelets 127 (L) 10*3/mm3      Neutrophil % 83.6 (H) %      Lymphocyte % 9.0 (L) %      Monocyte % 3.5 (L) %      Eosinophil % 0.0 %      Basophil % 0.3 %      Immature Grans % 3.6 %      Neutrophils, Absolute 11.46 (H) 10*3/mm3      Lymphocytes, Absolute 1.24 10*3/mm3      Monocytes, Absolute 0.48 10*3/mm3      Eosinophils, Absolute 0.00 10*3/mm3      Basophils, Absolute 0.04 10*3/mm3      Immature Grans, Absolute 0.49 (H) 10*3/mm3     Scan Slide [[id number]] Collected:  02/15/17 1000    Specimen:  Blood Updated:  02/15/17 1051     Anisocytosis Slight/1+       Hypochromia Mod/2+      WBC Morphology Normal      Platelet Morphology Normal     POC Troponin, Rapid [[id number]]  (Normal) Collected:  02/15/17 1246    Specimen:  Blood Updated:  02/15/17 1300     Troponin I 0.00 ng/mL       Serial Number: [id number]    : [id number]       Blood Gas, Arterial [[id number]]  (Abnormal) Collected:  02/15/17 1342    Specimen:  Arterial Blood Updated:  02/15/17 1343     Site Arterial: left radial      Artem's Test --       Documented in Rapid Comm        pH, Arterial 7.511 (H) pH units      pCO2, Arterial 37.1 mm Hg      pO2, Arterial 74.9 (L) mm Hg      HCO3, Arterial 29.0 (H) mmol/L      Base Excess, Arterial 5.9 (H) mmol/L      O2 Saturation, Arterial 96.2 %      O2 Saturation Calculated 96.2 %      Barometric Pressure for Blood Gas -- mmHg       Component not reported at this site.        Modality Cannula      Flow Rate 2.00 lpm     Narrative:       Serial Number: [id number]    : [id number]    Magnesium [[id number]]  (Normal) Collected:  02/15/17 1615    Specimen:  Blood Updated:  02/15/17 1645     Magnesium 1.9 mg/dL     Troponin [[id number]]  (Normal) Collected:  02/15/17 1615    Specimen:  Blood Updated:  02/15/17 1656     Troponin I <0.012 ng/mL     Basic Metabolic Panel [[id number]]  (Abnormal) Collected:  02/15/17 1615    Specimen:  Blood Updated:  02/15/17 1713     Glucose 132 (H) mg/dL      BUN 63 (H) mg/dL      Creatinine 1.72 (H) mg/dL      Sodium 138 mmol/L      Potassium 4.3 mmol/L      Chloride 96 (L) mmol/L      CO2 32.0 (H) mmol/L      Calcium 9.1 mg/dL      eGFR Non African Amer 43 (L) mL/min/1.73      BUN/Creatinine Ratio 36.6 (H)      Anion Gap 10.0 mmol/L     Narrative:       GFR Normal >60  Chronic Kidney Disease <60  Kidney Failure <15    TSH [[id number]]  (Normal) Collected:  02/15/17 1615    Specimen:  Blood Updated:  02/15/17 1716     TSH 1.070 mIU/mL     CBC (No Diff) [[id number]]  (Abnormal) Collected:  02/16/17 0442    Specimen:  Blood Updated:  02/16/17 0534      WBC 11.65 (H) 10*3/mm3      RBC 3.21 (L) 10*6/mm3      Hemoglobin 8.6 (L) g/dL      Hematocrit 27.5 (L) %      MCV 85.7 fL      MCH 26.8 (L) pg      MCHC 31.3 (L) g/dL      RDW 19.2 (H) %      RDW-SD 59.7 (H) fl      MPV 10.3 fL      Platelets 112 (L) 10*3/mm3     Basic Metabolic Panel [[id number]]  (Abnormal) Collected:  02/16/17 0442    Specimen:  Blood Updated:  02/16/17 0541     Glucose 209 (H) mg/dL      BUN 59 (H) mg/dL      Creatinine 1.86 (H) mg/dL      Sodium 139 mmol/L      Potassium 3.8 mmol/L      Chloride 96 (L) mmol/L      CO2 31.0 mmol/L      Calcium 8.8 mg/dL      eGFR Non African Amer 39 (L) mL/min/1.73      BUN/Creatinine Ratio 31.7 (H)      Anion Gap 12.0 mmol/L     Narrative:       GFR Normal >60  Chronic Kidney Disease <60  Kidney Failure <15    TSH [[id number]]  (Normal) Collected:  02/16/17 0442    Specimen:  Blood Updated:  02/16/17 0611     TSH 0.912 mIU/mL     Body Fluid Culture [[id number]] Collected:  02/17/17 0004    Specimen:  Body Fluid from Blood, Central Line Updated:  02/17/17 0006    Blood Culture [[id number]] Collected:  02/17/17 0108    Specimen:  Blood from Hand, Left Updated:  02/17/17 0114    Blood Culture [[id number]] Collected:  02/17/17 0108    Specimen:  Blood from Arm, Left Updated:  02/17/17 0114    CBC (No Diff) [[id number]]  (Abnormal) Collected:  02/17/17 0107    Specimen:  Blood Updated:  02/17/17 0130     WBC 13.63 (H) 10*3/mm3      RBC 3.20 (L) 10*6/mm3      Hemoglobin 8.6 (L) g/dL      Hematocrit 27.4 (L) %      MCV 85.6 fL      MCH 26.9 (L) pg      MCHC 31.4 (L) g/dL      RDW 19.3 (H) %      RDW-SD 59.2 (H) fl      MPV 10.2 fL      Platelets 118 (L) 10*3/mm3     Uric Acid [[id number]]  (Normal) Collected:  02/17/17 0107    Specimen:  Blood Updated:  02/17/17 0130     Uric Acid 4.4 mg/dL     Basic Metabolic Panel [[id number]]  (Abnormal) Collected:  02/17/17 0107    Specimen:  Blood Updated:  02/17/17 0130     Glucose 242 (H) mg/dL      BUN 42 (H) mg/dL      Creatinine 1.49 (H)  mg/dL      Sodium 137 mmol/L      Potassium 4.4 mmol/L      Chloride 99 mmol/L      CO2 26.0 mmol/L      Calcium 8.8 mg/dL      eGFR Non African Amer 50 (L) mL/min/1.73      BUN/Creatinine Ratio 28.2 (H)      Anion Gap 12.0 mmol/L     Narrative:       GFR Normal >60  Chronic Kidney Disease <60  Kidney Failure <15    Iron Profile [[id number]]  (Normal) Collected:  02/17/17 0108    Specimen:  Blood Updated:  02/17/17 0233     Iron 47 mcg/dL      TIBC 239 mcg/dL      Iron Saturation 20 %               Echo EF Estimated  Lab Results   Component Value Date    ECHOEFEST 65 01/17/2017         Cath Ejection Fraction Quantitative  No results found for: CATHEF        Medication Review: yes  Current Facility-Administered Medications   Medication Dose Route Frequency Provider Last Rate Last Dose   • acetaminophen (TYLENOL) tablet 650 mg  650 mg Oral Q4H PRN BHARATHI Diaz   650 mg at 02/16/17 1931   • aluminum-magnesium hydroxide-simethicone (MAALOX/MYLANTA) suspension 30 mL  30 mL Oral Q6H PRN BHARATHI Diaz       • diltiaZEM (CARDIZEM) 125mg/125 mL infusion  5-15 mg/hr Intravenous Titrated [facility] DENAE Mak   Stopped at 02/16/17 1205   • diltiaZEM (CARDIZEM) tablet 90 mg  90 mg Oral Q6H BHARATHI Givens   90 mg at 02/17/17 0554   • docusate sodium (COLACE) capsule 100 mg  100 mg Oral BID BHARATHI Diaz   100 mg at 02/15/17 1944   • enoxaparin (LOVENOX) syringe 30 mg  30 mg Subcutaneous Daily BHARATHI Diaz   30 mg at 02/16/17 1733   • heparin (porcine) injection 1,800 Units  1,800 Units Intracatheter PRN Reinaldo Foley MD   1,800 Units at 02/16/17 1400   • linezolid (ZYVOX) tablet 600 mg  600 mg Oral Q12H Johan Dinh MD   600 mg at 02/17/17 0808   • metoprolol tartrate (LOPRESSOR) tablet 50 mg  50 mg Oral Q12H BHARATHI Portillo   50 mg at 02/17/17 0808   • ondansetron (ZOFRAN) injection 4 mg  4 mg Intravenous Q6H PRN Gio Rivera APRN       • predniSONE  (DELTASONE) tablet 40 mg  40 mg Oral BID With Meals BHARATHI Diaz   40 mg at 02/17/17 0808   • sodium chloride 0.9 % flush 1-10 mL  1-10 mL Intravenous PRN BHARATHI Diaz       • tamsulosin (FLOMAX) 24 hr capsule 0.4 mg  0.4 mg Oral Nightly BHARATHI Diaz   0.4 mg at 02/16/17 2042         Assessment/Plan     Principal Problem:  -Persistent atrial fibrillation RVR- Cardizem gtt discontinued earlier today and HR is currently 110's-120's. He is asymptomatic.      Active Problems:  -Henoch-Schonlein purpura nephritis  -Hypertensive nephrosclerosis  -Wegener's granulomatosis with renal involvement  -Tobacco abuse  -Microcytic anemia  -Suspected permacath infection     Plan-  Continue to titrate lopressor- increased to 75 mg BID  Continue cardizem-transition to CD at dc  Telemetry  No anticoag at this time due to anemia, falls, injuries, recent hematuria  Plans for permacath removal per Dr. Grimes- consult pending per nephrology     BHARATHI Davila  02/17/17  11:37 AM

## 2017-02-17 NOTE — PROGRESS NOTES
[facility]  INPATIENT PROGRESS NOTE    Length of Stay: 2  Date of Admission: 2/15/2017  Primary Care Physician: Moises Montes MD    Subjective   Chief Complaint: shortness of breath resolved. Drainage from dialysis catheter site.  HPI   Admitted 2/15/16 with chest pain shortness of breath and palpitations. Found to be in atrial flutter with RVR and was placed on cardizem drip.  He denies shortness of breath. His speech is fluent without any slurred speech. He denies any weakness left upper extremity.  He denies nausea, vomiting or abdominal pain.  He reports at 2 AM this morning he noticed drainage from dialysis catheter insertion site.  He denies chest pain or palpitations.  Occasional pain in right shoulder area where dialysis catheter located.    He is NPO in anticipation of possible permacath removal.  On telemetry, he has a ectopy issues of concern.  He is very hungry.  He is concerned because he is due to start his immunosuppressive medication for his Wegener's in the next couple of days.  No nesha hematuria     Review of Systems   Constitutional: Positive for fatigue.   HENT: Negative for congestion.   Eyes: Negative for visual disturbance.   Respiratory: Positive for shortness of breath improved. Negative for wheezing.   Cardiovascular: Positive for leg swelling (chronic).   Gastrointestinal: Negative for abdominal distention, constipation, diarrhea, nausea and vomiting.   Endocrine: Negative for polyuria.   Genitourinary: Negative for dysuria.   Skin: Positive for drainage from dialysis catheter site  Neurological: Positive for weakness improved.   Psychiatric/Behavioral: Negative for agitation.   All pertinent negatives and positives are as above. All other systems have been reviewed and are negative unless otherwise stated.     Objective    Temp:  [97.8 °F (36.6 °C)-98.9 °F (37.2 °C)] 98.9 °F (37.2 °C)  Heart Rate:  [] 112  Resp:  [18-22]  18  BP: (106-130)/(70-91) 130/91  Physical Exam  Constitutional: He is oriented to person, place, and time. He appears well-developed and well-nourished.   Head: Normocephalic and atraumatic.   Eyes: EOM are normal. Pupils are equal, round, and reactive to light.   Neck: Normal range of motion. Neck supple. No tracheal deviation present.   Cardiovascular: No murmur heard. Irregular rate, atrial fibrillation on telemetry    Pulmonary/Chest: Effort normal and breath sounds normal. No respiratory distress. He has no wheezes. He has no rales.   Abdominal: Soft. Bowel sounds are normal. He exhibits no distension.   Musculoskeletal: He exhibits edema (trace ankle edema) chronic per patient.  Neurological: He is alert and oriented to person, place, and time.   Skin: Skin is warm and dry. NO drainage from dialysis catheter site at present. Nurses and patient report purulent drainage from site earlier this morning.  Psychiatric:  flat   permacath: No nesha pus or blood at this time     Results Review:  I have reviewed the labs, radiology results, and diagnostic studies.    Laboratory Data:     Results from last 7 days  Lab Units 02/17/17  0107 02/16/17  0442 02/15/17  1000   WBC 10*3/mm3 13.63* 11.65* 13.71*   HEMOGLOBIN g/dL 8.6* 8.6* 9.0*   HEMATOCRIT % 27.4* 27.5* 28.9*   PLATELETS 10*3/mm3 118* 112* 127*          Results from last 7 days  Lab Units 02/17/17  0107 02/16/17  0442 02/15/17  1615 02/15/17  1000   SODIUM mmol/L 137 139 138 137   POTASSIUM mmol/L 4.4 3.8 4.3 3.8   CHLORIDE mmol/L 99 96* 96* 95*   TOTAL CO2 mmol/L 26.0 31.0 32.0* 28.0   BUN mg/dL 42* 59* 63* 64*   CREATININE mg/dL 1.49* 1.86* 1.72* 1.68*   CALCIUM mg/dL 8.8 8.8 9.1 8.9   BILIRUBIN mg/dL  --   --   --  0.4   ALK PHOS U/L  --   --   --  114   ALT (SGPT) U/L  --   --   --  59*   AST (SGOT) U/L  --   --   --  25   GLUCOSE mg/dL 242* 209* 132* 311*       Culture Data:    Blood cultures 2/17/17 in progress    Radiology Data:   MRI brain without  contrast 2/16/17  1.  No acute intracranial abnormalities.  2.  Nonspecific foci of gliosis in the bilateral white matter, likely reflecting mild chronic microvascular changes.    NM lung ventilation perfusion 12/15/17  Findings are most commonly associated with low probability for acute  pulmonary embolism.     CT head without contrast 2/15/17 no acute intracranial process     Chest x-ray 1 view 2/15/17 no acute cardiopulmonary disease.  Scheduled Meds    diltiaZEM 90 mg Oral Q6H   docusate sodium 100 mg Oral BID   enoxaparin 30 mg Subcutaneous Daily   linezolid 600 mg Oral Q12H   metoprolol tartrate 50 mg Oral Q12H   predniSONE 40 mg Oral BID With Meals   tamsulosin 0.4 mg Oral Nightly       I have reviewed the patient current medications.     Assessment/Plan     Hospital Problem List     * (Principal)Persistent atrial fibrillation    Henoch-Schonlein purpura nephritis    Tobacco abuse    Microcytic anemia    Hypertensive nephrosclerosis    Wegener's granulomatosis with renal involvement        Assessment:   1.  Atrial fibrillation with RVR, no anticoagulation due to recent hematuria, anemia, and reported frequent injuries and falls (per cardiology)  2.  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  Henoch Schollein purpura nephritis on HD  4.  Chronic anemia, anemia of chronic disease  5.  LUE weakness, resolved  6.  Elevated proBNP secondary to renal disease  7.  Elevated d-dimer with negative NM ventilation perfusion  8.  Jerome's granulomatosis  9.  Essential hypertension  10.  Dialysis catheter site infection  11.  Mild leukocytosis    Plan:  1.  Cardizem 90 mg every 6 hours.  Cardizem drip off.  No full-strength because of history of hematuria  2.  Cardiology adjusting medications for A. fib  3.  MRI reviewed and negative  4.  Blood cultures drawn for drainage from hemodialysis catheter site. Await results.  5.  Dr. Grimes has been consult for removal of dialysis catheter.  6.   Renal function improving, dialysis on hold per nephrology.  7.  Zyvox started  8.  PT/OT evaluation.  9.  BMP, CBC a.m.    The above documentation resulted from a face-to-face encounter by me Flores GARVIN, [facility].      Discharge Planning: I expect patient to be discharged to home in 2-3 days.    I personally evaluated and examined the patient in conjunction with BHARATHI Borrego and agree with the assessment, treatment plan, and disposition of the patient as recorded by her. My history, exam, and further recommendations are:     Patient currently stable.  Probably will hold off on further immunosuppressive medicines (patient on prednisone) until probable permacath infection cleared.  Await vascular consult, agree with Zyvox, given his history of renal failure.    BHARATHI Kramer   02/17/17   9:32 AM

## 2017-02-17 NOTE — PLAN OF CARE
Problem: Patient Care Overview (Adult)  Goal: Plan of Care Review  Outcome: Ongoing (interventions implemented as appropriate)    02/17/17 1103   Coping/Psychosocial Response Interventions   Plan Of Care Reviewed With patient   Patient Care Overview   Progress improving   Outcome Evaluation   Outcome Summary/Follow up Plan OT Eval completed. Pt displayed impaired standing balance, required CGA with bed mobility, transfers and functional mobility due to risk for falls. Pt completes ADL with supervision to Modified independence due to balance impairments. B UE strength was 4+/5. OT intervention is not needed at this time. PT plans to address balance/mobility impairments. Anticipated d/c is home with spouse.

## 2017-02-17 NOTE — PROGRESS NOTES
Nephrology ([facility]) Consult Note      Patient:  Gurjit Andrea  YOB: 1970  Date of Service: 2/17/2017  MRN: [id number]   Acct: [id number]   Primary Care Physician: Moises Montes MD  Advance Directive: Full Code  Admit Date: 2/15/2017       Hospital Day: 2  Referring Provider: Kalie Vergara DO      Patient Seen, Chart, Consults, Notes, Labs, Radiology studies reviewed.        Subjective:  Gurjit Andrea is a 47 y.o. male  whom we were consulted for End stage renal disease management. Pt has been on dialysis for approx one month secondary to biopsy-proven Wegener's granulomatosis. Admitted with atrial fibrillation with RVR. Rhythm converted following administration of Cardizem gtt. Complaint of bilateral joint aches today.  During the night; large amount purulent drainage was noted around Permcath site.    Allergies:  Review of patient's allergies indicates no known allergies.    Home Meds:  Prescriptions Prior to Admission   Medication Sig Dispense Refill Last Dose   • albuterol (PROVENTIL HFA;VENTOLIN HFA) 108 (90 BASE) MCG/ACT inhaler Every 6 (Six) Hours.   Taking   • diltiaZEM CD (CARDIZEM CD) 360 MG 24 hr capsule Take 1 capsule by mouth Daily. 30 capsule 5 Taking   • metoprolol tartrate (LOPRESSOR) 25 MG tablet 25 mg 2 (Two) Times a Day.   Taking   • predniSONE (DELTASONE) 20 MG tablet Take 2 tablets by mouth 2 (Two) Times a Day With Meals. (Patient taking differently: Take 80 mg by mouth 2 (Two) Times a Day With Meals.) 60 tablet 0 Taking   • tamsulosin (FLOMAX) 0.4 MG capsule 24 hr capsule Take 1 capsule by mouth Every Night. 30 capsule 0 Taking       Medicines:  Current Facility-Administered Medications   Medication Dose Route Frequency Provider Last Rate Last Dose   • acetaminophen (TYLENOL) tablet 650 mg  650 mg Oral Q4H PRN BHARATHI Diaz   650 mg at 02/16/17 1931   • aluminum-magnesium hydroxide-simethicone (MAALOX/MYLANTA) suspension 30 mL  30 mL  Oral Q6H PRN BHARATHI Diaz       • diltiaZEM (CARDIZEM) 125mg/125 mL infusion  5-15 mg/hr Intravenous Titrated DENAE Lau   Stopped at 02/16/17 1205   • diltiaZEM (CARDIZEM) tablet 90 mg  90 mg Oral Q6H Macie Carrion APRN   90 mg at 02/17/17 0554   • docusate sodium (COLACE) capsule 100 mg  100 mg Oral BID BHARATHI Diaz   100 mg at 02/15/17 1944   • enoxaparin (LOVENOX) syringe 30 mg  30 mg Subcutaneous Daily BHARATHI Diaz   30 mg at 02/16/17 1733   • heparin (porcine) injection 1,800 Units  1,800 Units Intracatheter PRN Reinaldo Foley MD   1,800 Units at 02/16/17 1400   • linezolid (ZYVOX) tablet 600 mg  600 mg Oral Q12H Johan Dinh MD   600 mg at 02/17/17 0808   • metoprolol tartrate (LOPRESSOR) tablet 50 mg  50 mg Oral Q12H Slmie Alexis APRN   50 mg at 02/17/17 0808   • ondansetron (ZOFRAN) injection 4 mg  4 mg Intravenous Q6H PRN BHARATHI Diaz       • predniSONE (DELTASONE) tablet 40 mg  40 mg Oral BID With Meals BHARATHI Diaz   40 mg at 02/17/17 0808   • sodium chloride 0.9 % flush 1-10 mL  1-10 mL Intravenous PRN BHARATHI Diaz       • tamsulosin (FLOMAX) 24 hr capsule 0.4 mg  0.4 mg Oral Nightly BHARATHI Diaz   0.4 mg at 02/16/17 2042       Past Medical History:  Past Medical History   Diagnosis Date   • A-fib    • Chronic renal failure    • Elevated PSA    • Kidney stone    • Purpura    • Wegener's granulomatosis with renal involvement        Past Surgical History:  Past Surgical History   Procedure Laterality Date   • Appendectomy     • Insertion hemodialysis catheter N/A 1/20/2017     Procedure: INSERTION PERMCATH;  Surgeon: Ian Grimes DO;  Location: [facility] OR;  Service:        Family History  History reviewed. No pertinent family history.    Social History  Social History     Social History   • Marital status:      Spouse name: N/A   • Number of children: N/A   • Years of education: N/A  "    Occupational History   • Not on file.     Social History Main Topics   • Smoking status: Heavy Tobacco Smoker   • Smokeless tobacco: Not on file   • Alcohol use Yes   • Drug use: No   • Sexual activity: Not on file     Other Topics Concern   • Not on file     Social History Narrative         Review of Systems:  History obtained from chart review and the patient  General ROS: No fever or chills  Respiratory ROS: No cough, shortness of breath, wheezing  Cardiovascular ROS: no chest pain or dyspnea on exertion  Gastrointestinal ROS: No abdominal pain or melena  Genito-Urinary ROS: No dysuria or hematuria  Musculoskeletal ROS: + arthralgias  14 point ROS reviewed with the patient and negative except as noted above and in the HPI unless unable to obtain.    Objective:  Visit Vitals   • /75   • Pulse 108   • Temp 98.1 °F (36.7 °C) (Oral)   • Resp 18   • Ht 73\" (185.4 cm)   • Wt 222 lb 14.4 oz (101 kg)   • SpO2 100%   • BMI 29.41 kg/m2       Intake/Output Summary (Last 24 hours) at 02/17/17 0840  Last data filed at 02/17/17 0800   Gross per 24 hour   Intake    240 ml   Output    925 ml   Net   -685 ml     General: awake/alert   Chest:  clear to auscultation bilaterally without respiratory distress  CVS: regular rate and rhythm  Abdominal: soft, nontender, normal bowel sounds  Extremities: no cyanosis or edema  Skin: warm and dry without rash      Labs:    Results from last 7 days  Lab Units 02/17/17  0107 02/16/17  0442 02/15/17  1000   WBC 10*3/mm3 13.63* 11.65* 13.71*   HEMOGLOBIN g/dL 8.6* 8.6* 9.0*   HEMATOCRIT % 27.4* 27.5* 28.9*   PLATELETS 10*3/mm3 118* 112* 127*           Results from last 7 days  Lab Units 02/17/17  0107 02/16/17  0442 02/15/17  1615 02/15/17  1000   SODIUM mmol/L 137 139 138 137   POTASSIUM mmol/L 4.4 3.8 4.3 3.8   CHLORIDE mmol/L 99 96* 96* 95*   TOTAL CO2 mmol/L 26.0 31.0 32.0* 28.0   BUN mg/dL 42* 59* 63* 64*   CREATININE mg/dL 1.49* 1.86* 1.72* 1.68*   CALCIUM mg/dL 8.8 8.8 9.1 8.9 "   BILIRUBIN mg/dL  --   --   --  0.4   ALK PHOS U/L  --   --   --  114   ALT (SGPT) U/L  --   --   --  59*   AST (SGOT) U/L  --   --   --  25   GLUCOSE mg/dL 242* 209* 132* 311*       Radiology:   Imaging Results (last 72 hours)     Procedure Component Value Units Date/Time    CT Head Without Contrast [[id number]] Collected:  02/15/17 1538     Updated:  02/15/17 1541    Narrative:       CT HEAD WO CONTRAST- 2/15/2017 3:15 PM CST     HISTORY: weakness; I48.1-Persistent atrial fibrillation; R07.89-Other  chest pain; N19-Unspecified kidney failure; D64.9-Anemia, unspecified       COMPARISON: None      DOSE LENGTH PRODUCT: 748 mGy cm. Automated exposure control was also  utilized to decrease patient radiation dose.     TECHNIQUE: Helical tomographic images of the brain were obtained without  the use of intravenous contrast.      FINDINGS:   Hemorrhage: None.     Mass effect: No midline shift or mass effect.     Ventricles: Normal and symmetric without hydrocephalus.     Basilar cisterns: Normal.     Sulci: Normal in configuration. No sulcal effacement.     Extra-axial fluid: No abnormal extra-axial fluid collections.     Grey and white matter differentiation: Normal.     Posterior fossa and brainstem: Normal.     Bones: No fractures or lesions.     Soft tissues: No acute process.     Sinuses and mastoid air cells: Clear.       Impression:       1. No acute intracranial process.        This report was finalized on 02/15/2017 15:39 by Dr. Stanton Romano MD.    NM Lung Ventilation Perfusion [[id number]] Collected:  02/15/17 1539     Updated:  02/15/17 154    Narrative:       NM LUNG VENTILATION PERFUSION- 02/15/2017     History: Dyspnea and elevated D dimer       Comparison: Chest x-ray 02/15/2017      Radiopharmaceutical: 9.9 mCi of Xenon-133 for the ventilation study and  5.3 mCi Tc 99m MAA for the perfusion study.      Technique: Following inhalation of the aerosolized radiopharmaceutical,  the ventilation study was  performed with images of the thorax being  obtained in posterior projection. Thereafter, the perfusion study was  performed following the injection of radiolabeled MAA particles with  images of the thorax obtained in 8 projections.      FINDINGS:    No pleural based, wedge-shape, matched or mismatched segmental  ventilation perfusion defects are demonstrated.         Impression:       Findings are most commonly associated with low probability for acute  pulmonary embolism.     This report was finalized on 02/15/2017 15:40 by Dr. Stanton Romano MD.    XR Chest 1 View [[id number]] Collected:  02/15/17 1137     Updated:  02/15/17 1722    Narrative:       EXAM:   XR CHEST 1 VW-       DATE:  02/15/2017      HISTORY:  Male, age  47 years;chest pain     COMPARISON:  None.     FINDINGS:  The heart mediastinum are normal in size. Lungs are without focal  infiltrate, mass or effusions.  The bones show no acute pathology.   Right-sided dialysis catheter with tip terminating at the expected  location of the atriocaval junction.       Impression:       No acute cardiopulmonary disease.     This report was finalized on 02/15/2017 17:20 by Dr. Kristyn Rodriguez MD.    MRI Brain Without Contrast [[id number]] Collected:  02/16/17 1600     Updated:  02/16/17 1722    Narrative:       EXAM: MR BRAIN WITHOUT IV CONTRAST 02/16/2017     COMPARISON: Head CT dated 02/15/2017.      HISTORY: 47 year-old male. Left lower extremity weakness.     TECHNIQUE:   Routine pulse sequences of the brain were obtained without IV contrast.      REPORT:   The ventricles and cerebrospinal fluid spaces are normal in size and  configuration for the patient's age. There is no evidence of mass-effect  or midline shift. No reduced diffusivity is demonstrated. A few  scattered foci of gliosis in the bilateral white matter  The brainstem, sella, pituitary, cerebellum are unremarkable. The  basilar cisterns are preserved. The intraorbital structures are  unremarkable.    The flow voids are preserved.   No acute osseous lesion.        The visualized portions of the paranasal sinuses and mastoid air cells  are unremarkable.           Impression:       1.  No acute intracranial abnormalities.  2.  Nonspecific foci of gliosis in the bilateral white matter, likely  reflecting mild chronic microvascular changes.  This report was finalized on 02/16/2017 17:20 by Dr. Kristyn Rodriguez MD.          Culture:         Assessment   1.  OSMANI secondary to Wegener's granulomatosis--nonoliguric  2.  Atrial fibrillation with RVR--rate now controlled  3.  Anemia  4.  Benign HTN  5.  Catheter-site infection    Plan:  1.  Consult to Vascular for removal of Permcath due to infection  2.  Continue ABX, cultures pending  3.  Some evidence of renal recovery, will hold dialysis for now; follow renal function closely.      Charlie Jacobo, APRN  2/17/2017  8:40 AM

## 2017-02-17 NOTE — PLAN OF CARE
Problem: Patient Care Overview (Adult)  Goal: Plan of Care Review  Outcome: Ongoing (interventions implemented as appropriate)    02/17/17 6667   Coping/Psychosocial Response Interventions   Plan Of Care Reviewed With patient   Patient Care Overview   Progress improving   Outcome Evaluation   Outcome Summary/Follow up Plan permacath to be removed today per Dr. Grimes. awaiting his consult at this time. no drainage noted from cath side since dressing change on prev. shift. no c/o pain or tenderness at cath site. VSS. sepsis screen positive. MD aware. p.o abx vs. IV due to poor kidney function.        Goal: Adult Individualization and Mutuality  Outcome: Ongoing (interventions implemented as appropriate)  Goal: Discharge Needs Assessment  Outcome: Ongoing (interventions implemented as appropriate)    Problem: Cardiac Output, Decreased (Adult)  Goal: Identify Related Risk Factors and Signs and Symptoms  Outcome: Ongoing (interventions implemented as appropriate)  Goal: Adequate Cardiac Output/Effective Tissue Perfusion  Outcome: Ongoing (interventions implemented as appropriate)    Problem: Respiratory Insufficiency (Adult)  Goal: Acid/Base Balance  Outcome: Ongoing (interventions implemented as appropriate)

## 2017-02-17 NOTE — PLAN OF CARE
Problem: Patient Care Overview (Adult)  Goal: Plan of Care Review  Outcome: Ongoing (interventions implemented as appropriate)    02/17/17 1026   Coping/Psychosocial Response Interventions   Plan Of Care Reviewed With patient   Outcome Evaluation   Outcome Summary/Follow up Plan PT eval completed. Pt. demonstrated impaired balance with gait and transfers. Pt. required CGA-supervision for bed mobility, transfers, and gait due to risk for falls. Pt. demonstrated gait deviations of occasional crossing of midline and narrow base of support. Pt. demonstrated decreased activity tolerance. Pt. would benefit from skilled PT to address gait, balance, stairs, and strength for improved function and independence. Anticipated d/c is home vs. home with assist.         Problem: Inpatient Physical Therapy  Goal: Transfer Training Goal 1 LTG- PT  Outcome: Ongoing (interventions implemented as appropriate)    02/17/17 1026   Transfer Training PT LTG   Transfer Training PT LTG, Date Established 02/17/17   Transfer Training PT LTG, Time to Achieve by discharge   Transfer Training PT LTG, Activity Type bed to chair /chair to bed;sit to stand/stand to sit   Transfer Training PT LTG, [place] Level independent       Goal: Gait Training Goal LTG- PT  Outcome: Ongoing (interventions implemented as appropriate)    02/17/17 1026   Gait Training PT LTG   Gait Training Goal PT LTG, Date Established 02/17/17   Gait Training Goal PT LTG, Time to Achieve by discharge   Gait Training Goal PT LTG, [place] Level independent   Gait Training Goal PT LTG, Distance to Achieve 400       Goal: Stair Training Goal LTG- PT  Outcome: Ongoing (interventions implemented as appropriate)    02/17/17 1026   Stair Training PT LTG   Stair Training Goal PT LTG, Date Established 02/17/17   Stair Training Goal PT LTG, Time to Achieve by discharge   Stair Training Goal PT LTG, Number of Steps 5   Stair Training Goal PT LTG, [place] Level conditional  independence   Stair Training Goal PT LTG, Assist Device 1 handrail       Goal: Dynamic Standing Balance Goal LTG- PT  Outcome: Ongoing (interventions implemented as appropriate)    02/17/17 1026   Dynamic Standing Balance PT LTG   Dynamic Standing Balance PT LTG, Date Established 02/17/17   Dynamic Standing Balance PT LTG, Time to Achieve by discharge   Dynamic Standing Balance PT LTG, [place] Level supervision required   Dynamic Standing Balance PT LTG, Additional Goal romberg stance, single limb stance, tandem stance x 20 sec each with eyes open for decreased fall risk

## 2017-02-18 LAB
ANION GAP SERPL CALCULATED.3IONS-SCNC: 14 MMOL/L (ref 4–13)
BACTERIA BLD CULT: ABNORMAL
BASOPHILS # BLD AUTO: 0.02 10*3/MM3 (ref 0–0.2)
BASOPHILS NFR BLD AUTO: 0.2 % (ref 0–2)
BUN BLD-MCNC: 52 MG/DL (ref 5–21)
BUN/CREAT SERPL: 29.1 (ref 7–25)
CALCIUM SPEC-SCNC: 8.6 MG/DL (ref 8.4–10.4)
CHLORIDE SERPL-SCNC: 101 MMOL/L (ref 98–110)
CO2 SERPL-SCNC: 23 MMOL/L (ref 24–31)
CREAT BLD-MCNC: 1.79 MG/DL (ref 0.5–1.4)
DEPRECATED RDW RBC AUTO: 58.7 FL (ref 40–54)
EOSINOPHIL # BLD AUTO: 0 10*3/MM3 (ref 0–0.7)
EOSINOPHIL NFR BLD AUTO: 0 % (ref 0–4)
ERYTHROCYTE [DISTWIDTH] IN BLOOD BY AUTOMATED COUNT: 19.1 % (ref 12–15)
GFR SERPL CREATININE-BSD FRML MDRD: 41 ML/MIN/1.73
GLUCOSE BLD-MCNC: 291 MG/DL (ref 70–100)
HCT VFR BLD AUTO: 26.4 % (ref 40–52)
HGB BLD-MCNC: 8.3 G/DL (ref 14–18)
IMM GRANULOCYTES # BLD: 0.59 10*3/MM3 (ref 0–0.03)
IMM GRANULOCYTES NFR BLD: 4.8 % (ref 0–5)
LYMPHOCYTES # BLD AUTO: 1.14 10*3/MM3 (ref 0.72–4.86)
LYMPHOCYTES NFR BLD AUTO: 9.4 % (ref 15–45)
MCH RBC QN AUTO: 26.7 PG (ref 28–32)
MCHC RBC AUTO-ENTMCNC: 31.4 G/DL (ref 33–36)
MCV RBC AUTO: 84.9 FL (ref 82–95)
MONOCYTES # BLD AUTO: 0.66 10*3/MM3 (ref 0.19–1.3)
MONOCYTES NFR BLD AUTO: 5.4 % (ref 4–12)
NEUTROPHILS # BLD AUTO: 9.76 10*3/MM3 (ref 1.87–8.4)
NEUTROPHILS NFR BLD AUTO: 80.2 % (ref 39–78)
PLATELET # BLD AUTO: 129 10*3/MM3 (ref 130–400)
PMV BLD AUTO: 10 FL (ref 6–12)
POTASSIUM BLD-SCNC: 4 MMOL/L (ref 3.5–5.3)
RBC # BLD AUTO: 3.11 10*6/MM3 (ref 4.8–5.9)
SODIUM BLD-SCNC: 138 MMOL/L (ref 135–145)
WBC NRBC COR # BLD: 12.17 10*3/MM3 (ref 4.8–10.8)

## 2017-02-18 PROCEDURE — 87181 SC STD AGAR DILUTION PER AGT: CPT | Performed by: INTERNAL MEDICINE

## 2017-02-18 PROCEDURE — 80048 BASIC METABOLIC PNL TOTAL CA: CPT | Performed by: INTERNAL MEDICINE

## 2017-02-18 PROCEDURE — 87150 DNA/RNA AMPLIFIED PROBE: CPT | Performed by: INTERNAL MEDICINE

## 2017-02-18 PROCEDURE — 87147 CULTURE TYPE IMMUNOLOGIC: CPT | Performed by: INTERNAL MEDICINE

## 2017-02-18 PROCEDURE — 87040 BLOOD CULTURE FOR BACTERIA: CPT | Performed by: INTERNAL MEDICINE

## 2017-02-18 PROCEDURE — 63710000001 PREDNISONE PER 5 MG: Performed by: NURSE PRACTITIONER

## 2017-02-18 PROCEDURE — 87185 SC STD ENZYME DETCJ PER NZM: CPT | Performed by: INTERNAL MEDICINE

## 2017-02-18 PROCEDURE — 25010000002 LINEZOLID 600 MG/300ML SOLUTION: Performed by: INTERNAL MEDICINE

## 2017-02-18 PROCEDURE — 87205 SMEAR GRAM STAIN: CPT | Performed by: INTERNAL MEDICINE

## 2017-02-18 PROCEDURE — 85025 COMPLETE CBC W/AUTO DIFF WBC: CPT | Performed by: NURSE PRACTITIONER

## 2017-02-18 PROCEDURE — 87186 SC STD MICRODIL/AGAR DIL: CPT | Performed by: INTERNAL MEDICINE

## 2017-02-18 RX ORDER — LINEZOLID 2 MG/ML
600 INJECTION, SOLUTION INTRAVENOUS EVERY 12 HOURS
Status: DISCONTINUED | OUTPATIENT
Start: 2017-02-18 | End: 2017-02-22

## 2017-02-18 RX ADMIN — DILTIAZEM HYDROCHLORIDE 90 MG: 90 TABLET, FILM COATED ORAL at 21:50

## 2017-02-18 RX ADMIN — LINEZOLID 600 MG: 600 INJECTION, SOLUTION INTRAVENOUS at 20:02

## 2017-02-18 RX ADMIN — DILTIAZEM HYDROCHLORIDE 90 MG: 90 TABLET, FILM COATED ORAL at 17:26

## 2017-02-18 RX ADMIN — TAMSULOSIN HYDROCHLORIDE 0.4 MG: 0.4 CAPSULE ORAL at 20:01

## 2017-02-18 RX ADMIN — LINEZOLID 600 MG: 600 TABLET, FILM COATED ORAL at 08:42

## 2017-02-18 RX ADMIN — PREDNISONE 40 MG: 20 TABLET ORAL at 17:26

## 2017-02-18 RX ADMIN — METOPROLOL TARTRATE 75 MG: 50 TABLET ORAL at 08:42

## 2017-02-18 RX ADMIN — METOPROLOL TARTRATE 75 MG: 50 TABLET ORAL at 20:01

## 2017-02-18 RX ADMIN — PREDNISONE 40 MG: 20 TABLET ORAL at 08:42

## 2017-02-18 RX ADMIN — DILTIAZEM HYDROCHLORIDE 90 MG: 90 TABLET, FILM COATED ORAL at 12:58

## 2017-02-18 RX ADMIN — DILTIAZEM HYDROCHLORIDE 90 MG: 90 TABLET, FILM COATED ORAL at 05:27

## 2017-02-18 NOTE — CONSULTS
INFECTIOUS DISEASES CONSULT NOTE    Patient:  Gurjit Andrea 47 y.o. male  ROOM # 408/1  YOB: 1970  MRN: [id number]  CSN:  [id number]  Admit date: 2/15/2017   Admitting Physician: Iliana Alexis MD  Primary Care Physician: Moises Montes MD  REFERRING PROVIDER: Kalie Vergara DO      REASON FOR CONSULTATION : MRSA bacteremia      HISTORY OF PRESENT ILLNESS:  She is a 47-year-old gentleman who reports she was recently diagnosed with Wegener's granulomatosis.  He was admitted to [facility] on February 15 and is been found to have bacteremia.    He had his permacath removed today and states that he has been on dialysis since about January 20.    He has not seen Dr. Pro Morin has been referred to him for treatment of his Wegener's granulomatosis.      Past Medical History   Diagnosis Date   • A-fib    • Chronic renal failure    • Elevated PSA    • Kidney stone    • Purpura    • Wegener's granulomatosis with renal involvement      Past Surgical History   Procedure Laterality Date   • Appendectomy     • Insertion hemodialysis catheter N/A 1/20/2017     Procedure: INSERTION PERMCATH;  Surgeon: Ian Grimes DO;  Location: [facility];  Service:      History reviewed. No pertinent family history.  Social History     Social History   • Marital status:      Spouse name: N/A   • Number of children: N/A   • Years of education: N/A     Occupational History   • Not on file.     Social History Main Topics   • Smoking status: Heavy Tobacco Smoker   • Smokeless tobacco: Not on file   • Alcohol use Yes   • Drug use: No   • Sexual activity: Not on file     Other Topics Concern   • Not on file     Social History Narrative           Current Meds:     Current Facility-Administered Medications:   •  acetaminophen (TYLENOL) tablet 650 mg, 650 mg, Oral, Q4H PRN, Gio Rivera, APRN, 650 mg at 02/16/17 1931  •  aluminum-magnesium hydroxide-simethicone (MAALOX/MYLANTA)  suspension 30 mL, 30 mL, Oral, Q6H PRN, BHARATHI Diaz  •  diltiaZEM (CARDIZEM) tablet 90 mg, 90 mg, Oral, Q6H, Macie Carrion, APRN, 90 mg at 02/18/17 1258  •  docusate sodium (COLACE) capsule 100 mg, 100 mg, Oral, BID, BHARATHI Diaz, 100 mg at 02/15/17 1944  •  enoxaparin (LOVENOX) syringe 30 mg, 30 mg, Subcutaneous, Daily, BHARATHI Diaz, 30 mg at 02/16/17 1733  •  heparin (porcine) injection 1,800 Units, 1,800 Units, Intracatheter, PRN, Reinaldo Foley MD, 1,800 Units at 02/16/17 1400  •  Linezolid (ZYVOX) 600 mg 300 mL, 600 mg, Intravenous, Q12H, Iliana Alexis MD  •  metoprolol tartrate (LOPRESSOR) injection 5 mg, 5 mg, Intravenous, Q6H PRN, Macie TAYLOR Knees, APRN, 5 mg at 02/17/17 1905  •  metoprolol tartrate (LOPRESSOR) tablet 75 mg, 75 mg, Oral, Q12H, Macie TAYLOR Knees, APRN, 75 mg at 02/18/17 0842  •  ondansetron (ZOFRAN) injection 4 mg, 4 mg, Intravenous, Q6H PRN, BHARATHI Diaz  •  predniSONE (DELTASONE) tablet 40 mg, 40 mg, Oral, BID With Meals, BHARATHI Diaz, 40 mg at 02/18/17 0842  •  sodium chloride 0.9 % flush 1-10 mL, 1-10 mL, Intravenous, PRN, BHARATHI Diaz  •  tamsulosin (FLOMAX) 24 hr capsule 0.4 mg, 0.4 mg, Oral, Nightly, BHARATHI Diaz, 0.4 mg at 02/17/17 2045    diltiaZEM 90 mg Oral Q6H   docusate sodium 100 mg Oral BID   enoxaparin 30 mg Subcutaneous Daily   Linezolid 600 mg Intravenous Q12H   metoprolol tartrate 75 mg Oral Q12H   predniSONE 40 mg Oral BID With Meals   tamsulosin 0.4 mg Oral Nightly     •  acetaminophen  •  aluminum-magnesium hydroxide-simethicone  •  heparin (porcine)  •  metoprolol tartrate  •  ondansetron  •  sodium chloride       No Known Allergies    Review of Systems   Constitutional: Negative for fever.   Skin: Positive for rash (lesions he associates with his Wegener's diagnosis).         Vital Signs:  Visit Vitals   • /74 (BP Location: Right arm, Patient Position: Sitting)   • Pulse 65   • Temp 97.4 °F (36.3  "°C) (Temporal Artery )   • Resp 18   • Ht 73\" (185.4 cm)   • Wt 216 lb 8 oz (98.2 kg)   • SpO2 100%   • BMI 28.56 kg/m2     Temp (24hrs), Av.6 °F (36.4 °C), Min:97 °F (36.1 °C), Max:98.2 °F (36.8 °C)      Physical Exam   Constitutional: He appears well-developed and well-nourished. No distress.   HENT:   Mouth/Throat: No oropharyngeal exudate.   Eyes: Conjunctivae are normal. No scleral icterus.   Neck: Neck supple.   Cardiovascular: Normal rate.  An irregular rhythm present.   Murmur (Systolic murmur heard in the left upper sternal border as well as the apex.  Possible diastolic murmur also in the left upper sternal border) heard.  Pulmonary/Chest: Effort normal. No respiratory distress.   Abdominal: Soft. He exhibits no distension. There is no tenderness.   Musculoskeletal: He exhibits edema.   Neurological: He is alert.   Skin: Skin is warm and dry. He is not diaphoretic.   Petechia noted distally on the bilateral feet.  There are some areas of excoriation with eschar.  No cellulitis or drainage.  No splinter hemorrhages Janeway lesions or Osler's nodes noted of the upper extremities    dressing seen in place over right chest where permacath was removed.  There is definite ecchymosis involving in that area      Line/IV site: Peripheral forearm right, condition patent and no redness    Results Review:    I reviewed the patient's new clinical results.    Lab Results:  CBC:   Results from last 7 days  Lab Units 17   WBC 10*3/mm3 12.17* 13.63* 11.65*   HEMOGLOBIN g/dL 8.3* 8.6* 8.6*   HEMATOCRIT % 26.4* 27.4* 27.5*   PLATELETS 10*3/mm3 129* 118* 112*         CMP:   Results from last 7 days  Lab Units 17  0107 17  0442  02/15/17  1000   SODIUM mmol/L 138 137 139  < > 137   POTASSIUM mmol/L 4.0 4.4 3.8  < > 3.8   CHLORIDE mmol/L 101 99 96*  < > 95*   TOTAL CO2 mmol/L 23.0* 26.0 31.0  < > 28.0   BUN mg/dL 52* 42* 59*  < > 64*   CREATININE mg/dL " 1.79* 1.49* 1.86*  < > 1.68*   CALCIUM mg/dL 8.6 8.8 8.8  < > 8.9   BILIRUBIN mg/dL  --   --   --   --  0.4   ALK PHOS U/L  --   --   --   --  114   ALT (SGPT) U/L  --   --   --   --  59*   AST (SGOT) U/L  --   --   --   --  25   GLUCOSE mg/dL 291* 242* 209*  < > 311*   < > = values in this interval not displayed.  ---  Lab Results (last 72 hours)     Procedure Component Value Units Date/Time    Magnesium [[id number]]  (Normal) Collected:  02/15/17 1615    Specimen:  Blood Updated:  02/15/17 1645     Magnesium 1.9 mg/dL     Troponin [[id number]]  (Normal) Collected:  02/15/17 1615    Specimen:  Blood Updated:  02/15/17 1656     Troponin I <0.012 ng/mL     Basic Metabolic Panel [[id number]]  (Abnormal) Collected:  02/15/17 1615    Specimen:  Blood Updated:  02/15/17 1713     Glucose 132 (H) mg/dL      BUN 63 (H) mg/dL      Creatinine 1.72 (H) mg/dL      Sodium 138 mmol/L      Potassium 4.3 mmol/L      Chloride 96 (L) mmol/L      CO2 32.0 (H) mmol/L      Calcium 9.1 mg/dL      eGFR Non African Amer 43 (L) mL/min/1.73      BUN/Creatinine Ratio 36.6 (H)      Anion Gap 10.0 mmol/L     Narrative:       GFR Normal >60  Chronic Kidney Disease <60  Kidney Failure <15    TSH [[id number]]  (Normal) Collected:  02/15/17 1615    Specimen:  Blood Updated:  02/15/17 1716     TSH 1.070 mIU/mL     CBC (No Diff) [[id number]]  (Abnormal) Collected:  02/16/17 0442    Specimen:  Blood Updated:  02/16/17 0534     WBC 11.65 (H) 10*3/mm3      RBC 3.21 (L) 10*6/mm3      Hemoglobin 8.6 (L) g/dL      Hematocrit 27.5 (L) %      MCV 85.7 fL      MCH 26.8 (L) pg      MCHC 31.3 (L) g/dL      RDW 19.2 (H) %      RDW-SD 59.7 (H) fl      MPV 10.3 fL      Platelets 112 (L) 10*3/mm3     Basic Metabolic Panel [[id number]]  (Abnormal) Collected:  02/16/17 0442    Specimen:  Blood Updated:  02/16/17 0541     Glucose 209 (H) mg/dL      BUN 59 (H) mg/dL      Creatinine 1.86 (H) mg/dL      Sodium 139 mmol/L      Potassium 3.8 mmol/L      Chloride 96 (L) mmol/L      CO2  31.0 mmol/L      Calcium 8.8 mg/dL      eGFR Non African Amer 39 (L) mL/min/1.73      BUN/Creatinine Ratio 31.7 (H)      Anion Gap 12.0 mmol/L     Narrative:       GFR Normal >60  Chronic Kidney Disease <60  Kidney Failure <15    TSH [[id number]]  (Normal) Collected:  02/16/17 0442    Specimen:  Blood Updated:  02/16/17 0611     TSH 0.912 mIU/mL     CBC (No Diff) [[id number]]  (Abnormal) Collected:  02/17/17 0107    Specimen:  Blood Updated:  02/17/17 0130     WBC 13.63 (H) 10*3/mm3      RBC 3.20 (L) 10*6/mm3      Hemoglobin 8.6 (L) g/dL      Hematocrit 27.4 (L) %      MCV 85.6 fL      MCH 26.9 (L) pg      MCHC 31.4 (L) g/dL      RDW 19.3 (H) %      RDW-SD 59.2 (H) fl      MPV 10.2 fL      Platelets 118 (L) 10*3/mm3     Uric Acid [[id number]]  (Normal) Collected:  02/17/17 0107    Specimen:  Blood Updated:  02/17/17 0130     Uric Acid 4.4 mg/dL     Basic Metabolic Panel [[id number]]  (Abnormal) Collected:  02/17/17 0107    Specimen:  Blood Updated:  02/17/17 0130     Glucose 242 (H) mg/dL      BUN 42 (H) mg/dL      Creatinine 1.49 (H) mg/dL      Sodium 137 mmol/L      Potassium 4.4 mmol/L      Chloride 99 mmol/L      CO2 26.0 mmol/L      Calcium 8.8 mg/dL      eGFR Non African Amer 50 (L) mL/min/1.73      BUN/Creatinine Ratio 28.2 (H)      Anion Gap 12.0 mmol/L     Narrative:       GFR Normal >60  Chronic Kidney Disease <60  Kidney Failure <15    Iron Profile [[id number]]  (Normal) Collected:  02/17/17 0108    Specimen:  Blood Updated:  02/17/17 0233     Iron 47 mcg/dL      TIBC 239 mcg/dL      Iron Saturation 20 %     Body Fluid Culture [[id number]] Collected:  02/17/17 0004    Specimen:  Body Fluid from Blood, Central Line Updated:  02/17/17 1151     Gram Stain Result Few (2+) WBCs seen              Few (2+) Gram positive cocci, intracellular and extracellular    Blood Culture [[id number]]  (Normal) Collected:  02/17/17 0108    Specimen:  Blood from Hand, Left Updated:  02/18/17 0203     Blood Culture No growth at 24 hours      Basic Metabolic Panel [[id number]]  (Abnormal) Collected:  02/18/17 0525    Specimen:  Blood Updated:  02/18/17 0627     Glucose 291 (H) mg/dL      BUN 52 (H) mg/dL      Creatinine 1.79 (H) mg/dL      Sodium 138 mmol/L      Potassium 4.0 mmol/L      Chloride 101 mmol/L      CO2 23.0 (L) mmol/L      Calcium 8.6 mg/dL      eGFR Non African Amer 41 (L) mL/min/1.73      BUN/Creatinine Ratio 29.1 (H)      Anion Gap 14.0 (H) mmol/L     Narrative:       GFR Normal >60  Chronic Kidney Disease <60  Kidney Failure <15    CBC & Differential [[id number]] Collected:  02/18/17 0525    Specimen:  Blood Updated:  02/18/17 0805    Narrative:       The following orders were created for panel order CBC & Differential.  Procedure                               Abnormality         Status                     ---------                               -----------         ------                     CBC Auto Differential[[id number]]         Abnormal            Final result                 Please view results for these tests on the individual orders.    CBC Auto Differential [[id number]]  (Abnormal) Collected:  02/18/17 0525    Specimen:  Blood Updated:  02/18/17 0805     WBC 12.17 (H) 10*3/mm3      RBC 3.11 (L) 10*6/mm3      Hemoglobin 8.3 (L) g/dL      Hematocrit 26.4 (L) %      MCV 84.9 fL      MCH 26.7 (L) pg      MCHC 31.4 (L) g/dL      RDW 19.1 (H) %      RDW-SD 58.7 (H) fl      MPV 10.0 fL      Platelets 129 (L) 10*3/mm3      Neutrophil % 80.2 (H) %      Lymphocyte % 9.4 (L) %      Monocyte % 5.4 %      Eosinophil % 0.0 %      Basophil % 0.2 %      Immature Grans % 4.8 %      Neutrophils, Absolute 9.76 (H) 10*3/mm3      Lymphocytes, Absolute 1.14 10*3/mm3      Monocytes, Absolute 0.66 10*3/mm3      Eosinophils, Absolute 0.00 10*3/mm3      Basophils, Absolute 0.02 10*3/mm3      Immature Grans, Absolute 0.59 (H) 10*3/mm3     Catheter Culture [[id number]] Collected:  02/17/17 1836    Specimen:  Cath Tip from Neck Updated:  02/18/17 0932     CATHETER  CULTURE No growth at 24 hours     Blood Culture [[id number]]  (Abnormal) Collected:  02/17/17 0108    Specimen:  Blood from Arm, Left Updated:  02/18/17 0956     Blood Culture Abnormal Stain (A)       Gram Positive Cocci (A)      Isolated from Anaerobic Bottle      Gram Stain Result Gram positive cocci     Narrative:       No growth aerobic bottle.      Blood Culture ID, PCR [[id number]]  (Abnormal) Collected:  02/17/17 0108    Specimen:  Blood from Arm, Left Updated:  02/18/17 0956     BCID, PCR        Staphylococcus aureus. mecA (methicillin resistance gene) detected. Identification by BCID PCR. (C)        Culture Results:    BLOOD CULTURE   Date Value Ref Range Status   02/17/2017 Abnormal Stain (A)  Preliminary   02/17/2017 Gram Positive Cocci (A)  Preliminary   02/17/2017 No growth at 24 hours  Preliminary         Radiology:   Imaging Results (last 72 hours)     Procedure Component Value Units Date/Time    CT Head Without Contrast [[id number]] Collected:  02/15/17 1538     Updated:  02/15/17 1541    Narrative:       CT HEAD WO CONTRAST- 2/15/2017 3:15 PM CST     HISTORY: weakness; I48.1-Persistent atrial fibrillation; R07.89-Other  chest pain; N19-Unspecified kidney failure; D64.9-Anemia, unspecified       COMPARISON: None      DOSE LENGTH PRODUCT: 748 mGy cm. Automated exposure control was also  utilized to decrease patient radiation dose.     TECHNIQUE: Helical tomographic images of the brain were obtained without  the use of intravenous contrast.      FINDINGS:   Hemorrhage: None.     Mass effect: No midline shift or mass effect.     Ventricles: Normal and symmetric without hydrocephalus.     Basilar cisterns: Normal.     Sulci: Normal in configuration. No sulcal effacement.     Extra-axial fluid: No abnormal extra-axial fluid collections.     Grey and white matter differentiation: Normal.     Posterior fossa and brainstem: Normal.     Bones: No fractures or lesions.     Soft tissues: No acute process.     Sinuses  and mastoid air cells: Clear.       Impression:       1. No acute intracranial process.        This report was finalized on 02/15/2017 15:39 by Dr. Stanton Romano MD.    NM Lung Ventilation Perfusion [[id number]] Collected:  02/15/17 1539     Updated:  02/15/17 1545    Narrative:       NM LUNG VENTILATION PERFUSION- 02/15/2017     History: Dyspnea and elevated D dimer       Comparison: Chest x-ray 02/15/2017      Radiopharmaceutical: 9.9 mCi of Xenon-133 for the ventilation study and  5.3 mCi Tc 99m MAA for the perfusion study.      Technique: Following inhalation of the aerosolized radiopharmaceutical,  the ventilation study was performed with images of the thorax being  obtained in posterior projection. Thereafter, the perfusion study was  performed following the injection of radiolabeled MAA particles with  images of the thorax obtained in 8 projections.      FINDINGS:    No pleural based, wedge-shape, matched or mismatched segmental  ventilation perfusion defects are demonstrated.         Impression:       Findings are most commonly associated with low probability for acute  pulmonary embolism.     This report was finalized on 02/15/2017 15:40 by Dr. Stanton Romano MD.    XR Chest 1 View [[id number]] Collected:  02/15/17 1137     Updated:  02/15/17 1722    Narrative:       EXAM:   XR CHEST 1 VW-       DATE:  02/15/2017      HISTORY:  Male, age  47 years;chest pain     COMPARISON:  None.     FINDINGS:  The heart mediastinum are normal in size. Lungs are without focal  infiltrate, mass or effusions.  The bones show no acute pathology.   Right-sided dialysis catheter with tip terminating at the expected  location of the atriocaval junction.       Impression:       No acute cardiopulmonary disease.     This report was finalized on 02/15/2017 17:20 by Dr. Kristyn Rodriguez MD.    MRI Brain Without Contrast [[id number]] Collected:  02/16/17 1600     Updated:  02/16/17 1722    Narrative:       EXAM: MR BRAIN WITHOUT IV  CONTRAST 02/16/2017     COMPARISON: Head CT dated 02/15/2017.      HISTORY: 47 year-old male. Left lower extremity weakness.     TECHNIQUE:   Routine pulse sequences of the brain were obtained without IV contrast.      REPORT:   The ventricles and cerebrospinal fluid spaces are normal in size and  configuration for the patient's age. There is no evidence of mass-effect  or midline shift. No reduced diffusivity is demonstrated. A few  scattered foci of gliosis in the bilateral white matter  The brainstem, sella, pituitary, cerebellum are unremarkable. The  basilar cisterns are preserved. The intraorbital structures are  unremarkable.   The flow voids are preserved.   No acute osseous lesion.        The visualized portions of the paranasal sinuses and mastoid air cells  are unremarkable.           Impression:       1.  No acute intracranial abnormalities.  2.  Nonspecific foci of gliosis in the bilateral white matter, likely  reflecting mild chronic microvascular changes.  This report was finalized on 02/16/2017 17:20 by Dr. Kristyn Rodriguez MD.            Assessment/Plan     Principal Problem:    Persistent atrial fibrillation  Active Problems:    Henoch-Schonlein purpura nephritis    Tobacco abuse    Microcytic anemia    Hypertensive nephrosclerosis    Wegener's granulomatosis with renal involvement      IMPRESSION:  1. Staph aureus bacteremia-thought to be catheter related.  Dialysis catheter is been removed  2. Wegener's granulomatosis with renal involvement  3. Chronic kidney disease with acute kidney injury-the patient was dialyzed for a few weeks and has now had catheter removed  4. Cardiac murmur (S)      RECOMMENDATION:   · Currently on Zyvox.  Suspect this was to avoid any potential worsening nephrotoxicity the patient.  He has some chronic kidney disease.  ·   Order follow-up surveillance blood cultures  · We will ask micro-to report susceptibilities to Zyvox.  · Review echocardiogram        Laura HOWE  MD T[place]  02/18/17  2:57 PM

## 2017-02-18 NOTE — OP NOTE
DATE OF PROCEDURE: 02/17/2017    PREOPERATIVE DIAGNOSIS: Acute kidney injury.    POSTOPERATIVE DIAGNOSIS: Acute kidney injury.     PROCEDURE: Removal of right internal jugular vein tunneled Permcath.     SURGEON: Ian Sosa DO    ASSISTANT: None.     ANESTHESIA: None.     ESTIMATED BLOOD LOSS: 5 mL.     COMPLICATIONS: None.     COUNTS: Correct.     SPECIMENS: Catheter tip sent for culture.     INDICATIONS FOR PROCEDURE: The patient is a 47-year-old  male who initially presented with acute renal failure and what appeared to be a vasculitis. He had acute kidney injury and needed Permcath placement. He has kidney recovery and no longer needs Permcath and there is noted purulent drainage out of the catheter site. Therefore, the decision was made to proceed with removal of Permcath.     DESCRIPTION OF PROCEDURE: After the patient was correctly identified, the patient was placed in the supine position in his hospital bed. The dressing was removed and the area was prepped and draped in standard sterile fashion. The sutures were cut and the Permcath was removed in its entirety. The tip was cut with the scissors and placed in the collection container for culture. Direct pressure was held over the neck insertion site for approximately 10-15 minutes to help ensure hemostasis. The subcutaneous tunnel was compressed with very minimal purulent drainage present. Sterile dressings were applied. The patient tolerated the procedure well and no hematoma was noted.         cc:            JAYLAN Osman/[id number]  D:  02/17/2017 19:45:51(Eastern Time)  T:  02/17/2017 23:48:05(Eastern Time)  Voice ID:  [id number]/Document ID:  [id number]

## 2017-02-18 NOTE — PROGRESS NOTES
"DIALYSIS PROGRESS NOTE.      Patient:  Gurjit Andrea  YOB: 1970  Date of Service: 2/18/2017  MRN: [id number]   Acct: [id number]   Primary Care Physician: Moises Montes MD  Advance Directive: Full Code  Admit Date: 2/15/2017       Hospital Day: 3  Referring Provider: Kalie Vergara DO      Patient Seen, Chart, Consults, Notes, Labs, Radiology studies reviewed.        Subjective:  Gurjit Andrea is a 47 y.o. male whom we were consulted for OSMANI. 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 Admitted with atrial fibrillation and RVR. He started having large amount of purulent drainage from around Permcath site. His dialysis catheter was removed and his blood cultures were positive for Gram + cocci. Today, he offered no other complaints.      Review of Systems:  History obtained from chart review and the patient  General ROS: No fever or chills  Respiratory ROS: No cough, shortness of breath, wheezing  Cardiovascular ROS: no chest pain or dyspnea on exertion  Gastrointestinal ROS: No abdominal pain or melena  Genito-Urinary ROS: No dysuria or hematuria  Musculoskeletal: negative  Skin: negative    Objective:  Visit Vitals   • /74 (BP Location: Right arm, Patient Position: Sitting)   • Pulse 65   • Temp 97.4 °F (36.3 °C) (Temporal Artery )   • Resp 18   • Ht 73\" (185.4 cm)   • Wt 216 lb 8 oz (98.2 kg)   • SpO2 100%   • BMI 28.56 kg/m2       Intake/Output Summary (Last 24 hours) at 02/18/17 1449  Last data filed at 02/18/17 1300   Gross per 24 hour   Intake    240 ml   Output   1250 ml   Net  -1010 ml       Physical examination:  General: awake/alert   Chest:  clear to auscultation bilaterally without respiratory distress  CVS: regular rate and rhythm  Abdominal: soft, nontender, normal bowel sounds  Extremities: trace leg edema  Skin: warm and dry without rash  Neuro: No focal motor deficits    Labs:  Lab Results (last 24 hours)     Procedure Component Value Units " Date/Time    Blood Culture [[id number]]  (Normal) Collected:  02/17/17 0108    Specimen:  Blood from Hand, Left Updated:  02/18/17 0203     Blood Culture No growth at 24 hours     Basic Metabolic Panel [[id number]]  (Abnormal) Collected:  02/18/17 0525    Specimen:  Blood Updated:  02/18/17 0627     Glucose 291 (H) mg/dL      BUN 52 (H) mg/dL      Creatinine 1.79 (H) mg/dL      Sodium 138 mmol/L      Potassium 4.0 mmol/L      Chloride 101 mmol/L      CO2 23.0 (L) mmol/L      Calcium 8.6 mg/dL      eGFR Non African Amer 41 (L) mL/min/1.73      BUN/Creatinine Ratio 29.1 (H)      Anion Gap 14.0 (H) mmol/L     Narrative:       GFR Normal >60  Chronic Kidney Disease <60  Kidney Failure <15    CBC & Differential [[id number]] Collected:  02/18/17 0525    Specimen:  Blood Updated:  02/18/17 0805    Narrative:       The following orders were created for panel order CBC & Differential.  Procedure                               Abnormality         Status                     ---------                               -----------         ------                     CBC Auto Differential[[id number]]         Abnormal            Final result                 Please view results for these tests on the individual orders.    CBC Auto Differential [[id number]]  (Abnormal) Collected:  02/18/17 0525    Specimen:  Blood Updated:  02/18/17 0805     WBC 12.17 (H) 10*3/mm3      RBC 3.11 (L) 10*6/mm3      Hemoglobin 8.3 (L) g/dL      Hematocrit 26.4 (L) %      MCV 84.9 fL      MCH 26.7 (L) pg      MCHC 31.4 (L) g/dL      RDW 19.1 (H) %      RDW-SD 58.7 (H) fl      MPV 10.0 fL      Platelets 129 (L) 10*3/mm3      Neutrophil % 80.2 (H) %      Lymphocyte % 9.4 (L) %      Monocyte % 5.4 %      Eosinophil % 0.0 %      Basophil % 0.2 %      Immature Grans % 4.8 %      Neutrophils, Absolute 9.76 (H) 10*3/mm3      Lymphocytes, Absolute 1.14 10*3/mm3      Monocytes, Absolute 0.66 10*3/mm3      Eosinophils, Absolute 0.00 10*3/mm3      Basophils, Absolute 0.02 10*3/mm3       Immature Grans, Absolute 0.59 (H) 10*3/mm3     Catheter Culture [[id number]] Collected:  02/17/17 1836    Specimen:  Cath Tip from Neck Updated:  02/18/17 0932     CATHETER CULTURE No growth at 24 hours     Blood Culture [[id number]]  (Abnormal) Collected:  02/17/17 0108    Specimen:  Blood from Arm, Left Updated:  02/18/17 0956     Blood Culture Abnormal Stain (A)       Gram Positive Cocci (A)      Isolated from Anaerobic Bottle      Gram Stain Result Gram positive cocci     Narrative:       No growth aerobic bottle.      Blood Culture ID, PCR [[id number]]  (Abnormal) Collected:  02/17/17 0108    Specimen:  Blood from Arm, Left Updated:  02/18/17 0956     BCID, PCR        Staphylococcus aureus. mecA (methicillin resistance gene) detected. Identification by BCID PCR. (C)          Radiology:   Imaging Results (last 24 hours)     ** No results found for the last 24 hours. **              Assessment     1. 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 Will hold dialysis for now, and continue to follow up labs. Patient is treated with steroids and he will follow up with rheumatology  2. Atrial fibrillation with RVR--rate now controlled  3. Anemia  4. Benign HTN  5. Line sepsis-on IV antibiotics    Plan:  As above.      Reinaldo Foley MD  2/18/2017  2:49 PM

## 2017-02-18 NOTE — PLAN OF CARE
Problem: Patient Care Overview (Adult)  Goal: Plan of Care Review  Outcome: Ongoing (interventions implemented as appropriate)    02/18/17 0400   Coping/Psychosocial Response Interventions   Plan Of Care Reviewed With patient   Patient Care Overview   Progress no change   Outcome Evaluation   Outcome Summary/Follow up Plan VSS. No c/o pain. Site where permacath was removed was C/D/I. No drainage noted. PO abx continue. Good output. Labs pending this AM to evaluate renal function.       Goal: Adult Individualization and Mutuality  Outcome: Ongoing (interventions implemented as appropriate)    Problem: Cardiac Output, Decreased (Adult)  Goal: Identify Related Risk Factors and Signs and Symptoms  Outcome: Ongoing (interventions implemented as appropriate)  Goal: Adequate Cardiac Output/Effective Tissue Perfusion  Outcome: Ongoing (interventions implemented as appropriate)

## 2017-02-18 NOTE — PROGRESS NOTES
[facility] -  Progress Note     LOS: 3 days   Patient Care Team:  Moises Montes MD as PCP - General (Internal Medicine)  Luis Carlos Awan MD as Consulting Physician (Urology)  Alex Cortez MD as Cardiologist (Cardiology)    Chief Complaint: chest pain, palpitations, shortness of breath     Subjective     Interval History:     Patient Complaints:  Concerned about positive blood cultures. Asymptomatic from a-fib.       Review of Systems:     Review of Systems   Constitutional: Positive for fatigue. Negative for diaphoresis, fever and unexpected weight change.   HENT: Negative for nosebleeds.    Respiratory: Negative for apnea, cough, chest tightness, shortness of breath and wheezing.    Cardiovascular: Negative for chest pain, palpitations and leg swelling.   Gastrointestinal: Negative for abdominal distention, nausea and vomiting.   Genitourinary: Negative for hematuria.   Musculoskeletal: Negative for gait problem.   Skin: Negative for color change.   Neurological: Negative for dizziness, syncope, weakness and light-headedness.     Objective     Vital Sign Min/Max for last 24 hours  Temp  Min: 97 °F (36.1 °C)  Max: 98.2 °F (36.8 °C)   BP  Min: 103/65  Max: 132/87   Pulse  Min: 59  Max: 133   Resp  Min: 18  Max: 20   SpO2  Min: 98 %  Max: 100 %   No Data Recorded   Weight  Min: 216 lb 8 oz (98.2 kg)  Max: 216 lb 8 oz (98.2 kg)     Last Weight    02/17/17  2141   Weight: 216 lb 8 oz (98.2 kg)       Physical Exam:    Physical Exam   Constitutional: He is oriented to person, place, and time. He appears well-developed and well-nourished. No distress.   HENT:   Head: Normocephalic and atraumatic.   Eyes: Pupils are equal, round, and reactive to light.   Neck: Normal range of motion. Neck supple. No JVD present. No thyromegaly present.   Cardiovascular: Normal heart sounds and intact distal pulses.  An irregular rhythm present. Tachycardia present.  Exam reveals no gallop and no friction rub.     No murmur heard.  Pulses:       Dorsalis pedis pulses are 2+ on the right side, and 2+ on the left side.   Pulmonary/Chest: Effort normal. No respiratory distress. He has no wheezes. He has rales (few bases). He exhibits no tenderness.   Abdominal: Soft. Bowel sounds are normal. He exhibits no distension. There is no tenderness.   Musculoskeletal: Normal range of motion. He exhibits edema (traace/1+ BLE edema ).   Neurological: He is alert and oriented to person, place, and time. No cranial nerve deficit.   Skin: Skin is warm and dry. He is not diaphoretic.   Psychiatric: He has a normal mood and affect. His behavior is normal.     Results Review:   Imaging Results (last 24 hours)     ** No results found for the last 24 hours. **         Echo EF Estimated  Lab Results   Component Value Date    ECHOEFEST 65 01/17/2017     Medication Review: yes  Current Facility-Administered Medications   Medication Dose Route Frequency Provider Last Rate Last Dose   • acetaminophen (TYLENOL) tablet 650 mg  650 mg Oral Q4H PRN BHARATHI Diaz   650 mg at 02/16/17 1931   • aluminum-magnesium hydroxide-simethicone (MAALOX/MYLANTA) suspension 30 mL  30 mL Oral Q6H PRN BHARATHI Diaz       • diltiaZEM (CARDIZEM) tablet 90 mg  90 mg Oral Q6H Macie Carrion APRN   90 mg at 02/18/17 1258   • docusate sodium (COLACE) capsule 100 mg  100 mg Oral BID BHARATHI Diaz   100 mg at 02/15/17 1944   • enoxaparin (LOVENOX) syringe 30 mg  30 mg Subcutaneous Daily BHARATHI Diaz   30 mg at 02/16/17 1733   • heparin (porcine) injection 1,800 Units  1,800 Units Intracatheter PRN Reinaldo Foley MD   1,800 Units at 02/16/17 1400   • Linezolid (ZYVOX) 600 mg 300 mL  600 mg Intravenous Q12H Iliana Alexis MD       • metoprolol tartrate (LOPRESSOR) injection 5 mg  5 mg Intravenous Q6H PRN Macie Carrion APRN   5 mg at 02/17/17 1905   • metoprolol tartrate (LOPRESSOR) tablet 75 mg  75 mg Oral Q12H Macie E Knees, APRN   75 mg  at 02/18/17 0842   • ondansetron (ZOFRAN) injection 4 mg  4 mg Intravenous Q6H PRN BHARATHI Diaz       • predniSONE (DELTASONE) tablet 40 mg  40 mg Oral BID With Meals BHARATHI Diaz   40 mg at 02/18/17 0842   • sodium chloride 0.9 % flush 1-10 mL  1-10 mL Intravenous PRN BHARATHI Diaz       • tamsulosin (FLOMAX) 24 hr capsule 0.4 mg  0.4 mg Oral Nightly BHARATHI Diaz   0.4 mg at 02/17/17 2045         Assessment/Plan     Principal Problem:  -Persistent atrial fibrillation RVR   Active Problems:  -Henoch-Schonlein purpura nephritis  -Hypertensive nephrosclerosis  -Wegener's granulomatosis with renal involvement  -Tobacco abuse  -Microcytic anemia  -Suspected permacath infection   - MRSA positive blood cultures  Plan-  Continue to titrate lopressor- increased to 75 mg BID yesterday  Cardizem 90 q6H- switch to CD at DC  Telemetry  No anticoag at this time due to anemia, falls, injuries, recent hematuria  Permacath removal per Dr. Grimes  Antibiotics per primary  Further orders per Dr. Kely Moore, BHARATHI  02/18/17  2:13 PM

## 2017-02-18 NOTE — PROGRESS NOTES
[facility] Medicine Services  INPATIENT PROGRESS NOTE    Length of Stay: 3  Date of Admission: 2/15/2017  Primary Care Physician: Moises Montes MD    Subjective   Chief Complaint: Denies shortness of breath. No further drainage from dialysis catheter site. Catheter removed  HPI   Sitting up in chair.  Denies chest pain or palpitations.  Rate controlled A. fib on telemetry. Denies shortness of breath.  Denies nausea, vomiting or abdominal pain.  Tolerates po, slept well overnight.  No further drainage from dialysis catheter insertion site.  Dialysis catheter removed yesterday afternoon. Denies extremity weakness.  Denies any speech difficulty.    Review of Systems   Constitutional: Positive for fatigue.   HENT: Negative for congestion.   Eyes: Negative for visual disturbance.   Respiratory: Positive for shortness of breath improved. Off oxygen Negative for wheezing.   Cardiovascular: Positive for leg swelling (chronic).   Gastrointestinal: Negative for abdominal distention, constipation, diarrhea, nausea and vomiting.   Endocrine: Negative for polyuria.   Genitourinary: Negative for dysuria.   Skin: No further drainage dialysis catheter site, Catheter removed 2/17  Neurological: Positive for weakness improved.   Psychiatric/Behavioral: Negative for agitation.   All pertinent negatives and positives are as above. All other systems have been reviewed and are negative unless otherwise stated.     Objective    Temp:  [97 °F (36.1 °C)-98.9 °F (37.2 °C)] 97 °F (36.1 °C)  Heart Rate:  [] 59  Resp:  [18-20] 18  BP: (103-132)/(60-91) 112/78  Physical Exam  Constitutional: He is oriented to person, place, and time. He appears well-developed and well-nourished.   Head: Normocephalic and atraumatic.   Eyes: EOM are normal. Pupils are equal, round, and reactive to light.   Neck: Normal range of motion. Neck supple. No tracheal deviation present.   Cardiovascular: No murmur heard.  Irregular rate, atrial fibrillation on telemetry    Pulmonary/Chest: Effort normal and breath sounds normal. No respiratory distress. He has no wheezes. He has no rales.   Abdominal: Soft. Bowel sounds are normal. He exhibits no distension.   Musculoskeletal: He exhibits edema ( ankle edema) chronic per patient.  Neurological: He is alert and oriented to person, place, and time.   Skin: Skin is warm and dry. NO drainage from dialysis catheter site at present. Nurses and patient report purulent drainage from site earlier this morning.  Psychiatric: flat  Extremities: pitting edema calves    Results Review:  I have reviewed the labs, radiology results, and diagnostic studies.    Laboratory Data:     Results from last 7 days  Lab Units 02/17/17  0107 02/16/17  0442 02/15/17  1000   WBC 10*3/mm3 13.63* 11.65* 13.71*   HEMOGLOBIN g/dL 8.6* 8.6* 9.0*   HEMATOCRIT % 27.4* 27.5* 28.9*   PLATELETS 10*3/mm3 118* 112* 127*          Results from last 7 days  Lab Units 02/18/17  0525 02/17/17  0107 02/16/17 0442  02/15/17  1000   SODIUM mmol/L 138 137 139  < > 137   POTASSIUM mmol/L 4.0 4.4 3.8  < > 3.8   CHLORIDE mmol/L 101 99 96*  < > 95*   TOTAL CO2 mmol/L 23.0* 26.0 31.0  < > 28.0   BUN mg/dL 52* 42* 59*  < > 64*   CREATININE mg/dL 1.79* 1.49* 1.86*  < > 1.68*   CALCIUM mg/dL 8.6 8.8 8.8  < > 8.9   BILIRUBIN mg/dL  --   --   --   --  0.4   ALK PHOS U/L  --   --   --   --  114   ALT (SGPT) U/L  --   --   --   --  59*   AST (SGOT) U/L  --   --   --   --  25   GLUCOSE mg/dL 291* 242* 209*  < > 311*   < > = values in this interval not displayed.    Culture Data:   BLOOD CULTURE   Date Value Ref Range Status   02/17/2017 No growth at 24 hours  Preliminary   02/17/2017 No growth at 24 hours  Preliminary     Scheduled Meds    diltiaZEM 90 mg Oral Q6H   docusate sodium 100 mg Oral BID   enoxaparin 30 mg Subcutaneous Daily   linezolid 600 mg Oral Q12H   metoprolol tartrate 75 mg Oral Q12H   predniSONE 40 mg Oral BID With Meals    tamsulosin 0.4 mg Oral Nightly       I have reviewed the patient current medications.     Assessment/Plan     Hospital Problem List     * (Principal)Persistent atrial fibrillation    Henoch-Schonlein purpura nephritis    Tobacco abuse    Microcytic anemia    Hypertensive nephrosclerosis    Wegener's granulomatosis with renal involvement        Assessment:   1. Atrial fibrillation with RVR, no anticoagulation due to recent hematuria, anemia, and reported frequent injuries and falls (per cardiology)  2. 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 Henoch Schollein purpura nephritis on HD  4. Chronic anemia, anemia of chronic disease  5. LUE weakness, resolved  6. Elevated proBNP secondary to renal disease  7. Elevated d-dimer with negative NM ventilation perfusion  8. Jerome's granulomatosis  9. Essential hypertension  10. Dialysis catheter site infection  11.  Mild leukocytosis    Plan:  1.  Cardizem 90 mg every 6 hours.  Cardizem drop off.  2.  Metoprolol dose uptitrated per cardiology.  Currently on 75 mg twice daily  3.  No anticoagulation per cardiology secondary to material, anemia, frequent injuries and falls   4.  Zyvox IV day 2  5.  Blood cultures no growth to date.  Await  culture results from dialysis catheter tip.  6.  Physical therapy to assess ambulation.  7.  BMP a.m. to monitor renal status creatinine 1.79 today  8.  Oral steroids per rheumatology   9.  Dialysis catheter removed 2/17 per Dr. Grimes.      The above documentation resulted from a face-to-face encounter by me Flores GARVIN, [facility].      Discharge Planning: I expect patient to be discharged to home in 2-3 days.    I personally evaluated and examined the patient in conjunction with BHARATHI Borrego and agree with the assessment, treatment plan, and disposition of the patient as recorded by her. My history, exam, and further recommendations are:     Patient clinically stable.  I just received a phone  call from nursing staff-the patient has gram-positive cocci in his blood, computer indicates that the MRSA by PCR.  Continue IV Zyvox, obtain infection disease consult.  Patient's creatinine is up today-deferred to nephrology regarding when/if patient requires further dialysis.  Suspect rheumatological medicines for Wegener's will have to be held until the bacteremia/sepsis has resolved    Flores Macias, BHARATHI   02/18/17   7:13 AM

## 2017-02-19 LAB
ANION GAP SERPL CALCULATED.3IONS-SCNC: 8 MMOL/L (ref 4–13)
B-LACTAMASE USUAL SUSC ISLT: POSITIVE
BACTERIA FLD CULT: ABNORMAL
BUN BLD-MCNC: 55 MG/DL (ref 5–21)
BUN/CREAT SERPL: 31.3 (ref 7–25)
CALCIUM SPEC-SCNC: 8.7 MG/DL (ref 8.4–10.4)
CHLORIDE SERPL-SCNC: 104 MMOL/L (ref 98–110)
CO2 SERPL-SCNC: 23 MMOL/L (ref 24–31)
CREAT BLD-MCNC: 1.76 MG/DL (ref 0.5–1.4)
DEPRECATED RDW RBC AUTO: 58.3 FL (ref 40–54)
ERYTHROCYTE [DISTWIDTH] IN BLOOD BY AUTOMATED COUNT: 18.9 % (ref 12–15)
GFR SERPL CREATININE-BSD FRML MDRD: 42 ML/MIN/1.73
GLUCOSE BLD-MCNC: 309 MG/DL (ref 70–100)
GLUCOSE BLDC GLUCOMTR-MCNC: 246 MG/DL (ref 70–130)
GLUCOSE BLDC GLUCOMTR-MCNC: 382 MG/DL (ref 70–130)
GLUCOSE BLDC GLUCOMTR-MCNC: 415 MG/DL (ref 70–130)
GRAM STN SPEC: ABNORMAL
GRAM STN SPEC: ABNORMAL
HCT VFR BLD AUTO: 26.6 % (ref 40–52)
HGB BLD-MCNC: 8.4 G/DL (ref 14–18)
MCH RBC QN AUTO: 26.8 PG (ref 28–32)
MCHC RBC AUTO-ENTMCNC: 31.6 G/DL (ref 33–36)
MCV RBC AUTO: 85 FL (ref 82–95)
PLATELET # BLD AUTO: 136 10*3/MM3 (ref 130–400)
PMV BLD AUTO: 9.6 FL (ref 6–12)
POTASSIUM BLD-SCNC: 4.8 MMOL/L (ref 3.5–5.3)
RBC # BLD AUTO: 3.13 10*6/MM3 (ref 4.8–5.9)
SODIUM BLD-SCNC: 135 MMOL/L (ref 135–145)
WBC NRBC COR # BLD: 13.85 10*3/MM3 (ref 4.8–10.8)

## 2017-02-19 PROCEDURE — 82962 GLUCOSE BLOOD TEST: CPT

## 2017-02-19 PROCEDURE — 97116 GAIT TRAINING THERAPY: CPT

## 2017-02-19 PROCEDURE — 25010000002 LINEZOLID 600 MG/300ML SOLUTION: Performed by: INTERNAL MEDICINE

## 2017-02-19 PROCEDURE — 63710000001 PREDNISONE PER 5 MG: Performed by: NURSE PRACTITIONER

## 2017-02-19 PROCEDURE — 80048 BASIC METABOLIC PNL TOTAL CA: CPT | Performed by: INTERNAL MEDICINE

## 2017-02-19 PROCEDURE — 85027 COMPLETE CBC AUTOMATED: CPT | Performed by: INTERNAL MEDICINE

## 2017-02-19 PROCEDURE — 63710000001 INSULIN LISPRO (HUMAN) PER 5 UNITS: Performed by: INTERNAL MEDICINE

## 2017-02-19 RX ORDER — NICOTINE POLACRILEX 4 MG
15 LOZENGE BUCCAL
Status: DISCONTINUED | OUTPATIENT
Start: 2017-02-19 | End: 2017-02-23 | Stop reason: HOSPADM

## 2017-02-19 RX ORDER — DEXTROSE MONOHYDRATE 25 G/50ML
25 INJECTION, SOLUTION INTRAVENOUS
Status: DISCONTINUED | OUTPATIENT
Start: 2017-02-19 | End: 2017-02-23 | Stop reason: HOSPADM

## 2017-02-19 RX ORDER — BUMETANIDE 1 MG/1
1 TABLET ORAL DAILY
Status: DISCONTINUED | OUTPATIENT
Start: 2017-02-19 | End: 2017-02-23

## 2017-02-19 RX ADMIN — DILTIAZEM HYDROCHLORIDE 90 MG: 90 TABLET, FILM COATED ORAL at 22:31

## 2017-02-19 RX ADMIN — METOPROLOL TARTRATE 75 MG: 50 TABLET ORAL at 20:23

## 2017-02-19 RX ADMIN — DILTIAZEM HYDROCHLORIDE 90 MG: 90 TABLET, FILM COATED ORAL at 12:40

## 2017-02-19 RX ADMIN — PREDNISONE 40 MG: 20 TABLET ORAL at 17:35

## 2017-02-19 RX ADMIN — BUMETANIDE 1 MG: 1 TABLET ORAL at 12:40

## 2017-02-19 RX ADMIN — ACETAMINOPHEN 650 MG: 325 TABLET, FILM COATED ORAL at 03:31

## 2017-02-19 RX ADMIN — DILTIAZEM HYDROCHLORIDE 90 MG: 90 TABLET, FILM COATED ORAL at 17:34

## 2017-02-19 RX ADMIN — METOPROLOL TARTRATE 75 MG: 50 TABLET ORAL at 09:28

## 2017-02-19 RX ADMIN — PREDNISONE 40 MG: 20 TABLET ORAL at 09:28

## 2017-02-19 RX ADMIN — TAMSULOSIN HYDROCHLORIDE 0.4 MG: 0.4 CAPSULE ORAL at 20:23

## 2017-02-19 RX ADMIN — LINEZOLID 600 MG: 600 INJECTION, SOLUTION INTRAVENOUS at 09:28

## 2017-02-19 RX ADMIN — LINEZOLID 600 MG: 600 INJECTION, SOLUTION INTRAVENOUS at 20:23

## 2017-02-19 RX ADMIN — INSULIN LISPRO 3 UNITS: 100 INJECTION, SOLUTION INTRAVENOUS; SUBCUTANEOUS at 20:22

## 2017-02-19 RX ADMIN — INSULIN LISPRO 6 UNITS: 100 INJECTION, SOLUTION INTRAVENOUS; SUBCUTANEOUS at 17:35

## 2017-02-19 RX ADMIN — DILTIAZEM HYDROCHLORIDE 90 MG: 90 TABLET, FILM COATED ORAL at 06:10

## 2017-02-19 NOTE — PROGRESS NOTES
[facility] -  Progress Note     LOS: 4 days   Patient Care Team:  Moises Montes MD as PCP - General (Internal Medicine)  Luis Carlos Awan MD as Consulting Physician (Urology)  Alex Cortez MD as Cardiologist (Cardiology)    Chief Complaint: chest pain, palpitations, shortness of breath     Subjective     Interval History:     Patient Complaints:  Rates better controlled. No new complaints      Review of Systems:     Review of Systems   Constitutional: Positive for fatigue. Negative for diaphoresis, fever and unexpected weight change.   HENT: Negative for nosebleeds.    Respiratory: Negative for apnea, cough, chest tightness, shortness of breath and wheezing.    Cardiovascular: Negative for chest pain, palpitations and leg swelling.   Gastrointestinal: Negative for abdominal distention, nausea and vomiting.   Genitourinary: Negative for hematuria.   Musculoskeletal: Negative for gait problem.   Skin: Negative for color change.   Neurological: Negative for dizziness, syncope, weakness and light-headedness.     Objective     Vital Sign Min/Max for last 24 hours  Temp  Min: 97 °F (36.1 °C)  Max: 97.6 °F (36.4 °C)   BP  Min: 112/71  Max: 124/82   Pulse  Min: 58  Max: 118   Resp  Min: 18  Max: 20   SpO2  Min: 97 %  Max: 100 %   No Data Recorded   Weight  Min: 216 lb 8 oz (98.2 kg)  Max: 216 lb 8 oz (98.2 kg)     Last Weight    02/18/17 2037   Weight: 216 lb 8 oz (98.2 kg)       Physical Exam:    Physical Exam   Constitutional: He is oriented to person, place, and time. He appears well-developed and well-nourished. No distress.   HENT:   Head: Normocephalic and atraumatic.   Eyes: Pupils are equal, round, and reactive to light.   Neck: Normal range of motion. Neck supple. No JVD present. No thyromegaly present.   Cardiovascular: Normal heart sounds and intact distal pulses.  An irregular rhythm present. Tachycardia present.  Exam reveals no gallop and no friction rub.    No murmur  heard.  Pulses:       Dorsalis pedis pulses are 2+ on the right side, and 2+ on the left side.   Pulmonary/Chest: Effort normal. No respiratory distress. He has no wheezes. He has rales (few bases). He exhibits no tenderness.   Abdominal: Soft. Bowel sounds are normal. He exhibits no distension. There is no tenderness.   Musculoskeletal: Normal range of motion. He exhibits edema (traace/1+ BLE edema ).   Neurological: He is alert and oriented to person, place, and time. No cranial nerve deficit.   Skin: Skin is warm and dry. He is not diaphoretic.   Psychiatric: He has a normal mood and affect. His behavior is normal.     Results Review:   Imaging Results (last 24 hours)     ** No results found for the last 24 hours. **         Echo EF Estimated  Lab Results   Component Value Date    ECHOEFEST 65 01/17/2017     Medication Review: yes  Current Facility-Administered Medications   Medication Dose Route Frequency Provider Last Rate Last Dose   • acetaminophen (TYLENOL) tablet 650 mg  650 mg Oral Q4H PRN BHARATHI Diaz   650 mg at 02/19/17 0331   • aluminum-magnesium hydroxide-simethicone (MAALOX/MYLANTA) suspension 30 mL  30 mL Oral Q6H PRN BHARATHI Diaz       • diltiaZEM (CARDIZEM) tablet 90 mg  90 mg Oral Q6H BHARATHI Givens   90 mg at 02/19/17 0610   • docusate sodium (COLACE) capsule 100 mg  100 mg Oral BID BHARATHI Diaz   100 mg at 02/15/17 1944   • enoxaparin (LOVENOX) syringe 30 mg  30 mg Subcutaneous Daily BHARATHI Diaz   30 mg at 02/16/17 1733   • heparin (porcine) injection 1,800 Units  1,800 Units Intracatheter PRN Reinaldo Foley MD   1,800 Units at 02/16/17 1400   • Linezolid (ZYVOX) 600 mg 300 mL  600 mg Intravenous Q12H Iliana Alexis  mL/hr at 02/19/17 0928 600 mg at 02/19/17 0928   • metoprolol tartrate (LOPRESSOR) injection 5 mg  5 mg Intravenous Q6H PRN Macie Carrion APRN   5 mg at 02/17/17 1905   • metoprolol tartrate (LOPRESSOR) tablet 75 mg  75 mg  Oral Q12H BHARATHI Givens   75 mg at 02/19/17 0928   • ondansetron (ZOFRAN) injection 4 mg  4 mg Intravenous Q6H PRN BHARATHI Diaz       • predniSONE (DELTASONE) tablet 40 mg  40 mg Oral BID With Meals BHARATHI Diaz   40 mg at 02/19/17 0928   • sodium chloride 0.9 % flush 1-10 mL  1-10 mL Intravenous PRN BHARATHI Diaz       • tamsulosin (FLOMAX) 24 hr capsule 0.4 mg  0.4 mg Oral Nightly BHARATHI Diaz   0.4 mg at 02/18/17 2001         Assessment/Plan     Principal Problem:  -Persistent atrial fibrillation RVR   Active Problems:  -Henoch-Schonlein purpura nephritis  -Hypertensive nephrosclerosis  -Wegener's granulomatosis with renal involvement  -Tobacco abuse  -Microcytic anemia  -Suspected permacath infection   - MRSA positive blood cultures    Plan-  Rates much better controlled 90-100s, titrate lopressor as needed  Cardizem 90 q6H- switch to CD at DC  Telemetry  No anticoag at this time due to anemia, falls, injuries, recent hematuria  Permacath removal per Dr. Grimes  Antibiotics per primary, I&D now following for Staph Aureus bactermia    Further orders per BHARATHI Monreal  02/19/17  10:19 AM

## 2017-02-19 NOTE — PROGRESS NOTES
"INFECTIOUS DISEASES PROGRESS NOTE    Patient:  Gurjit Andrea  YOB: 1970  MRN: [id number]   Admit date: 2/15/2017   Admitting Physician: Iliana Alexis MD  Primary Care Physician: Moises Montes MD    Chief Complaint: \"Okay I guess\"        Interval History: Patient reports that he is doing about the same.  He is not having any chest pain or shortness of breath.  He has had no discomfort or drainage from where his permacath site is dressed.    He states he has no new rash.  His lower extremity lesions are secondary to his fall prior to coming in when he was ill.  He reports that they are actually improving.        Allergies: No Known Allergies    Current Meds:     Current Facility-Administered Medications:   •  acetaminophen (TYLENOL) tablet 650 mg, 650 mg, Oral, Q4H PRN, Gio Rivera APRN, 650 mg at 02/19/17 0331  •  aluminum-magnesium hydroxide-simethicone (MAALOX/MYLANTA) suspension 30 mL, 30 mL, Oral, Q6H PRN, BHARATHI Diaz  •  diltiaZEM (CARDIZEM) tablet 90 mg, 90 mg, Oral, Q6H, Macie Carrion, APRN, 90 mg at 02/19/17 0610  •  docusate sodium (COLACE) capsule 100 mg, 100 mg, Oral, BID, Gio Rivera APRN, 100 mg at 02/15/17 1944  •  enoxaparin (LOVENOX) syringe 30 mg, 30 mg, Subcutaneous, Daily, BHARATHI Diaz, 30 mg at 02/16/17 1733  •  heparin (porcine) injection 1,800 Units, 1,800 Units, Intracatheter, PRN, Reinaldo Foley MD, 1,800 Units at 02/16/17 1400  •  Linezolid (ZYVOX) 600 mg 300 mL, 600 mg, Intravenous, Q12H, Iliana Alexis MD, Last Rate: 150 mL/hr at 02/19/17 0928, 600 mg at 02/19/17 0928  •  metoprolol tartrate (LOPRESSOR) injection 5 mg, 5 mg, Intravenous, Q6H PRN, Macie E Knees, APRN, 5 mg at 02/17/17 1905  •  metoprolol tartrate (LOPRESSOR) tablet 75 mg, 75 mg, Oral, Q12H, BHARATHI Givens, 75 mg at 02/19/17 0937  •  ondansetron (ZOFRAN) injection 4 mg, 4 mg, Intravenous, Q6H PRN, BHARATHI Diaz  •  predniSONE (DELTASONE) tablet 40 mg, 40 mg, " "Oral, BID With Meals, BHARATHI Diaz, 40 mg at 17 0928  •  sodium chloride 0.9 % flush 1-10 mL, 1-10 mL, Intravenous, PRN, BHARATHI Diaz  •  tamsulosin (FLOMAX) 24 hr capsule 0.4 mg, 0.4 mg, Oral, Nightly, BHARATHI Diaz, 0.4 mg at 17      Review of Systems   Constitutional: Negative for chills and fever.   Respiratory: Negative for cough.        Objective     Vital Signs:  Temp (24hrs), Av.3 °F (36.3 °C), Min:97 °F (36.1 °C), Max:97.6 °F (36.4 °C)      Visit Vitals   • /88 (BP Location: Left arm, Patient Position: Sitting)   • Pulse 109   • Temp 97.6 °F (36.4 °C) (Temporal Artery )   • Resp 18   • Ht 73\" (185.4 cm)   • Wt 216 lb 8 oz (98.2 kg)   • SpO2 99%   • BMI 28.56 kg/m2           Physical Exam   Constitutional: He appears well-developed and well-nourished.   Eyes: Right eye exhibits no exudate. Left eye exhibits no exudate. No scleral icterus.   No conjunctival petechiae   Neck: Neck supple.   Cardiovascular: Tachycardia present.    Skin: Skin is warm and dry. Rash (scattered areas of excoriations that are healing. few distal lesions on feet that appear more petechial ) noted.   Psychiatric: He has a normal mood and affect.   Vitals reviewed.    Line/IV site: Peripheral IV forearm right, condition patent and no redness    Results Review:    I reviewed the patient's new clinical results.    Lab Results:  CBC:   Results from last 7 days  Lab Units 17  0603 17  0525 17  010   WBC 10*3/mm3 13.85* 12.17* 13.63*   HEMOGLOBIN g/dL 8.4* 8.3* 8.6*   HEMATOCRIT % 26.6* 26.4* 27.4*   PLATELETS 10*3/mm3 136 129* 118*         CMP:   Results from last 7 days  Lab Units 17  0603 17  0525 17  0107  02/15/17  1000   SODIUM mmol/L 135 138 137  < > 137   POTASSIUM mmol/L 4.8 4.0 4.4  < > 3.8   CHLORIDE mmol/L 104 101 99  < > 95*   TOTAL CO2 mmol/L 23.0* 23.0* 26.0  < > 28.0   BUN mg/dL 55* 52* 42*  < > 64*   CREATININE mg/dL 1.76* 1.79* " 1.49*  < > 1.68*   CALCIUM mg/dL 8.7 8.6 8.8  < > 8.9   BILIRUBIN mg/dL  --   --   --   --  0.4   ALK PHOS U/L  --   --   --   --  114   ALT (SGPT) U/L  --   --   --   --  59*   AST (SGOT) U/L  --   --   --   --  25   GLUCOSE mg/dL 309* 291* 242*  < > 311*   < > = values in this interval not displayed.      Culture Results:    BLOOD CULTURE   Date Value Ref Range Status   02/18/2017 No growth at less than 24 hours  Preliminary   02/17/2017 Staphylococcus aureus (A)  Preliminary     Comment:       Consider infectious disease consult to rule out distant focus of infection.   02/17/2017 Staphylococcus aureus (A)  Preliminary     Comment:       Consider infectious disease consult to rule out distant focus of infection.      Body Fluid Culture [[id number]] (Abnormal)  Collected: 02/17/17 0004       Lab Status: Final result Specimen: Body Fluid from Blood, Central Line Updated: 02/19/17 0626        BF Culture           Moderate growth (3+) Staphylococcus aureus, MRSA (C)           Methicillin resistant Staphylococcus aureus, Patient may be an isolation risk.           BETA LACTAMASE Positive         Gram Stain Result Few (2+) WBCs seen                    Few (2+) Gram positive cocci, intracellular and extracellular         Susceptibility         Staphylococcus aureus, MRSA         JUAN J         Clindamycin >=8  Resistant         Erythromycin >=8  Resistant         Gentamicin <=0.5  Susceptible         Inducible Clindamycin Resistance NEG  Negative         Levofloxacin 4  Intermediate 1         Oxacillin >=4  Resistant         Penicillin G >=0.5  Resistant         Tetracycline <=1  Susceptible         Trimethoprim + Sulfamethoxazole <=10  Susceptible         Vancomycin 1  Susceptible              Catheter Culture [[id number]] (Abnormal) Collected: 02/17/17 1836        Lab Status: Preliminary result Specimen: Cath Tip from Neck Updated: 02/19/17 0736        CATHETER CULTURE Staphylococcus aureus (A)         BETA LACTAMASE  Positive             Radiology:   Imaging Results (last 72 hours)     Procedure Component Value Units Date/Time    MRI Brain Without Contrast [[id number]] Collected:  02/16/17 1600     Updated:  02/16/17 1722    Narrative:       EXAM: MR BRAIN WITHOUT IV CONTRAST 02/16/2017     COMPARISON: Head CT dated 02/15/2017.      HISTORY: 47 year-old male. Left lower extremity weakness.     TECHNIQUE:   Routine pulse sequences of the brain were obtained without IV contrast.      REPORT:   The ventricles and cerebrospinal fluid spaces are normal in size and  configuration for the patient's age. There is no evidence of mass-effect  or midline shift. No reduced diffusivity is demonstrated. A few  scattered foci of gliosis in the bilateral white matter  The brainstem, sella, pituitary, cerebellum are unremarkable. The  basilar cisterns are preserved. The intraorbital structures are  unremarkable.   The flow voids are preserved.   No acute osseous lesion.        The visualized portions of the paranasal sinuses and mastoid air cells  are unremarkable.           Impression:       1.  No acute intracranial abnormalities.  2.  Nonspecific foci of gliosis in the bilateral white matter, likely  reflecting mild chronic microvascular changes.  This report was finalized on 02/16/2017 17:20 by Dr. Kristyn Rodriguez MD.          Assessment/Plan     Hospital Problem List     * (Principal)Persistent atrial fibrillation    Henoch-Schonlein purpura nephritis    Tobacco abuse    Microcytic anemia    Hypertensive nephrosclerosis    Wegener's granulomatosis with renal involvement          IMPRESSION:    1. Staph aureus bacteremia-thought to be catheter related. Dialysis catheter is been removed  2. Wegener's granulomatosis with renal involvement  3. Chronic kidney disease with acute kidney injury-the patient was dialyzed for a few weeks and has now had catheter removed  4. Cardiac murmur - previous echo with mitral regurgitation        RECOMMENDATION:      · Currently on Zyvox. Suspect this was to avoid any potential worsening nephrotoxicity the patient. He has some chronic kidney disease.  · follow-up surveillance blood cultures  · We will ask micro-to report susceptibilities to Zyvox.  · Expect him to need a minimum of 2 weeks of IV antibiotics the start date from his last positive blood culture         Laura Eckert MD  02/19/17  11:01 AM

## 2017-02-19 NOTE — NURSING NOTE
Spoke with Micro, sensitivity of the MRSA in the blood to Zyvox should be reported tomorrow.     Ling Welsh RN, BSN, PCCN

## 2017-02-19 NOTE — PROGRESS NOTES
[facility] Medicine Services  INPATIENT PROGRESS NOTE    Length of Stay: 4  Date of Admission: 2/15/2017  Primary Care Physician: Moises Montes MD    Subjective   Chief Complaint: Denies complaints, denies shortness of breath  HPI   Sitting up in chair.  Denies nausea, vomiting or abdominal pain.  Denies chest pain or palpitations.  Remains atrial fibrillation rate controlled.  Tolerating diet.  No further drainage from dialysis catheter removal site.  Denies weakness.  Denies any speech difficulty.  Reports chronic bilateral lower extremity edema.  Rate controlled A. fib on telemetry.  Tolerates oral intake.  Admits to eating a lot of caramels, subsequently, his a.m. glucose was over 300.  He has never been diagnosed with diabetes before.    Review of Systems   Constitutional: Positive for fatigue.   HENT: Negative for congestion.   Eyes: Negative for visual disturbance.   Respiratory:  Negative for wheezing.   Cardiovascular: Positive for leg swelling (chronic).   Gastrointestinal: Negative for abdominal distention, constipation, diarrhea, nausea and vomiting.   Endocrine: Negative for polyuria.   Genitourinary: Negative for dysuria.   Skin: No further drainage dialysis catheter site, Catheter removed 2/17  Neurological: Positive for weakness improved.   Psychiatric/Behavioral: Negative for agitation.   All pertinent negatives and positives are as above. All other systems have been reviewed and are negative unless otherwise stated.     Objective    Temp:  [97 °F (36.1 °C)-97.4 °F (36.3 °C)] 97.4 °F (36.3 °C)  Heart Rate:  [] 58  Resp:  [18-20] 18  BP: (112-124)/(71-82) 124/82  Physical Exam  Constitutional: He is oriented to person, place, and time. He appears well-developed and well-nourished.   Head: Normocephalic and atraumatic.   Eyes: EOM are normal. Pupils are equal, round, and reactive to light.   Neck: Normal range of motion. Neck supple. No tracheal deviation  present.   Cardiovascular: No murmur heard. Irregular rate, atrial fibrillation on telemetry    Pulmonary/Chest: Effort normal and breath sounds normal. No respiratory distress. He has no wheezes. He has no rales.   Abdominal: Soft. Bowel sounds are normal. He exhibits no distension.   Musculoskeletal: He exhibits edema ( ankle to mid calf edema) chronic per patient.  Neurological: He is alert and oriented to person, place, and time.   Skin: Skin is warm and dry. NO drainage from dialysis catheter site.  Dressing dry and intact.  Purpura noted across back and lower extremities.  Psychiatric: flat    Results Review:  I have reviewed the labs, radiology results, and diagnostic studies.    Laboratory Data:     Results from last 7 days  Lab Units 02/19/17  0603 02/18/17  0525 02/17/17 0107   WBC 10*3/mm3 13.85* 12.17* 13.63*   HEMOGLOBIN g/dL 8.4* 8.3* 8.6*   HEMATOCRIT % 26.6* 26.4* 27.4*   PLATELETS 10*3/mm3 136 129* 118*          Results from last 7 days  Lab Units 02/19/17  0603 02/18/17  0525 02/17/17  0107  02/15/17  1000   SODIUM mmol/L 135 138 137  < > 137   POTASSIUM mmol/L 4.8 4.0 4.4  < > 3.8   CHLORIDE mmol/L 104 101 99  < > 95*   TOTAL CO2 mmol/L 23.0* 23.0* 26.0  < > 28.0   BUN mg/dL 55* 52* 42*  < > 64*   CREATININE mg/dL 1.76* 1.79* 1.49*  < > 1.68*   CALCIUM mg/dL 8.7 8.6 8.8  < > 8.9   BILIRUBIN mg/dL  --   --   --   --  0.4   ALK PHOS U/L  --   --   --   --  114   ALT (SGPT) U/L  --   --   --   --  59*   AST (SGOT) U/L  --   --   --   --  25   GLUCOSE mg/dL 309* 291* 242*  < > 311*   < > = values in this interval not displayed.    Culture Data:   BLOOD CULTURE   Date Value Ref Range Status   02/18/2017 No growth at less than 24 hours  Preliminary   02/17/2017 Staphylococcus aureus (A)  Preliminary     Comment:       Consider infectious disease consult to rule out distant focus of infection.   02/17/2017 Staphylococcus aureus (A)  Preliminary     Comment:       Consider infectious disease  consult to rule out distant focus of infection.     Moderate growth (3+) Staphylococcus aureus, MRSA (C)             Methicillin resistant Staphylococcus aureus, Patient may be an isolation risk.           BETA LACTAMASE Positive         Gram Stain Result Few (2+) WBCs seen                    Few (2+) Gram positive cocci, intracellular and extracellular         Susceptibility         Staphylococcus aureus, MRSA         JUAN J         Clindamycin >=8  Resistant         Erythromycin >=8  Resistant         Gentamicin <=0.5  Susceptible         Inducible Clindamycin Resistance NEG  Negative         Levofloxacin 4  Intermediate 1         Oxacillin >=4  Resistant         Penicillin G >=0.5  Resistant         Tetracycline <=1  Susceptible         Trimethoprim + Sulfamethoxazole <=10  Susceptible         Vancomycin 1  Susceptible                         Scheduled Meds    diltiaZEM 90 mg Oral Q6H   docusate sodium 100 mg Oral BID   enoxaparin 30 mg Subcutaneous Daily   Linezolid 600 mg Intravenous Q12H   metoprolol tartrate 75 mg Oral Q12H   predniSONE 40 mg Oral BID With Meals   tamsulosin 0.4 mg Oral Nightly       I have reviewed the patient current medications.     Assessment/Plan     Hospital Problem List     * (Principal)Persistent atrial fibrillation    Henoch-Schonlein purpura nephritis    Tobacco abuse    Microcytic anemia    Hypertensive nephrosclerosis    Wegener's granulomatosis with renal involvement        Assessment:   1. Atrial fibrillation with RVR, no anticoagulation due to recent hematuria, anemia, and reported frequent injuries and falls (per cardiology)  2. 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 Henoch Schollein purpura nephritis on HD  4. MRSA bacteremia likely dialysis catheter related  5. Dialysis catheter site infection, MRSA  6. Chronic anemia, anemia of chronic disease  7. LUE weakness, resolved  8. Elevated proBNP secondary to renal disease  9. Elevated d-dimer with  negative NM ventilation perfusion  10. Jerome's granulomatosis  11. Hypertensive nephrosclerosis  12. Mild leukocytosis    Plan:  1.  Cardizem 90 mg every 6 hours.  Will place on Cardizem CD at discharge.  2.  Metoprolol dose 75 mg twice daily.  Uptitrated by cardiology.  3.  No anticoagulation per cardiology secondary to anemia, falls, injuries, recent hematuria.  4.  Zyvox IV day 3  5.  Blood cultures and dialysis catheter tip MRSA  6.  Infectious disease consult and following.  7.  Culture sensitivities noted.  8.  Dialysis catheter removed 2/17/17 per Dr. Grimes.  9.  Dialysis currently on hold per nephrology.  10.  Oral steroids for Wegener's granulomatous  11.  CBC, BMP a.m.  12.  Surveillance blood cultures per infectious disease.      The above documentation resulted from a face-to-face encounter by me Flores GARVIN, [facility].    Discharge Planning: I expect patient to be discharged to home in 2-4 days.I personally evaluated and examined the patient in conjunction with BHARATHI Borrego and agree with the assessment, treatment plan, and disposition of the patient as recorded by her. My history, exam, and further recommendations are:     Patient has multiple issues which are all stable.  Renal function is stable.  Unfortunately, he has a bump in the road with his MRSA bacteremia/sepsis-continue IV Zyvox and await susceptibility results.  ID follows.  He does have significant hyperglycemia which could be steroid induced-we will add Levemir and sliding scale insulin with Accu-Cheks.    Of note, patient is concerned because of his history of elevated PSA-he was supposed to have a biopsy this week, clearly, this is not going to happen because of his bacteremia issue.  We recommend this be followed up once his acute, intervening issues stabilize    BHARATHI Kramer   02/19/17   9:12 AM

## 2017-02-19 NOTE — PLAN OF CARE
Problem: Patient Care Overview (Adult)  Goal: Plan of Care Review  Outcome: Ongoing (interventions implemented as appropriate)    02/19/17 2685   Coping/Psychosocial Response Interventions   Plan Of Care Reviewed With patient   Patient Care Overview   Progress progress toward functional goals as expected   Outcome Evaluation   Outcome Summary/Follow up Plan Pt. was independent for all bed mobility and transfers. Pt. walked 450' with supervision. Pt. went up and down 4 step with supervision. Pt. had no LOB or difficulty with these tasks.

## 2017-02-19 NOTE — PROGRESS NOTES
"DIALYSIS PROGRESS NOTE.      Patient:  Gurjit Andrea  YOB: 1970  Date of Service: 2/19/2017  MRN: [id number]   Acct: [id number]   Primary Care Physician: Moises Montes MD  Advance Directive: Full Code  Admit Date: 2/15/2017       Hospital Day: 4  Referring Provider: Kalie Vergara DO      Patient Seen, Chart, Consults, Notes, Labs, Radiology studies reviewed.        Subjective:    Gurjit Andrea is a 47 y.o. male whom we were consulted for OSMANI. 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 Admitted with atrial fibrillation and RVR. He started having large amount of purulent drainage from around Permcath site. His dialysis catheter was removed and his blood cultures were positive for Gram + cocci. Today, he offered no other complaints. His legs were puffy.     Review of Systems:  History obtained from chart review and the patient  General ROS: No fever or chills  Respiratory ROS: No cough, shortness of breath, wheezing  Cardiovascular ROS: no chest pain or dyspnea on exertion  Gastrointestinal ROS: No abdominal pain or melena  Genito-Urinary ROS: No dysuria or hematuria  Musculoskeletal: leg edema  Skin: negative    Objective:  Visit Vitals   • /88 (BP Location: Left arm, Patient Position: Sitting)   • Pulse 109   • Temp 97.6 °F (36.4 °C) (Temporal Artery )   • Resp 18   • Ht 73\" (185.4 cm)   • Wt 216 lb 8 oz (98.2 kg)   • SpO2 99%   • BMI 28.56 kg/m2       Intake/Output Summary (Last 24 hours) at 02/19/17 1200  Last data filed at 02/19/17 0928   Gross per 24 hour   Intake    970 ml   Output   2175 ml   Net  -1205 ml       Physical examination:  General: awake/alert   Chest:  clear to auscultation bilaterally without respiratory distress  CVS: regular rate and rhythm  Abdominal: soft, nontender, normal bowel sounds  Extremities:1+ leg edema  Skin: warm and dry without rash  Neuro: No focal motor deficits    Labs:  Lab Results (last 24 hours)     Procedure Component " Value Units Date/Time    Blood Culture With CHERYLE [[id number]]  (Normal) Collected:  02/18/17 1541    Specimen:  Blood from Arm, Left Updated:  02/19/17 0501     Blood Culture No growth at less than 24 hours     Body Fluid Culture [[id number]]  (Abnormal)  (Susceptibility) Collected:  02/17/17 0004    Specimen:  Body Fluid from Blood, Central Line Updated:  02/19/17 0626     BF Culture        Moderate growth (3+) Staphylococcus aureus, MRSA (C)        Methicillin resistant Staphylococcus aureus, Patient may be an isolation risk.        BETA LACTAMASE Positive      Gram Stain Result Few (2+) WBCs seen              Few (2+) Gram positive cocci, intracellular and extracellular    Susceptibility      Staphylococcus aureus, MRSA     JUAN J     Clindamycin >=8 ug/ml Resistant     Erythromycin >=8 ug/ml Resistant     Gentamicin <=0.5 ug/ml Susceptible     Inducible Clindamycin Resistance NEG  Negative     Levofloxacin 4 ug/ml Intermediate  [1]      Oxacillin >=4 ug/ml Resistant     Penicillin G >=0.5 ug/ml Resistant     Tetracycline <=1 ug/ml Susceptible     Trimethoprim + Sulfamethoxazole <=10 ug/ml Susceptible     Vancomycin 1 ug/ml Susceptible            [1]   Staphylococcus species may develop resistance during prolonged therapy with quinolones. Isolates that are initially susceptible may become resistant within three to four days after initiation of therapy. Testing of repeat isolates may be warranted.                  Basic Metabolic Panel [[id number]]  (Abnormal) Collected:  02/19/17 0603    Specimen:  Blood Updated:  02/19/17 0629     Glucose 309 (H) mg/dL      BUN 55 (H) mg/dL      Creatinine 1.76 (H) mg/dL      Sodium 135 mmol/L      Potassium 4.8 mmol/L      Chloride 104 mmol/L      CO2 23.0 (L) mmol/L      Calcium 8.7 mg/dL      eGFR Non African Amer 42 (L) mL/min/1.73      BUN/Creatinine Ratio 31.3 (H)      Anion Gap 8.0 mmol/L     Narrative:       GFR Normal >60  Chronic Kidney Disease <60  Kidney Failure <15    CBC (No  Diff) [[id number]]  (Abnormal) Collected:  02/19/17 0603    Specimen:  Blood Updated:  02/19/17 0630     WBC 13.85 (H) 10*3/mm3      RBC 3.13 (L) 10*6/mm3      Hemoglobin 8.4 (L) g/dL      Hematocrit 26.6 (L) %      MCV 85.0 fL      MCH 26.8 (L) pg      MCHC 31.6 (L) g/dL      RDW 18.9 (H) %      RDW-SD 58.3 (H) fl      MPV 9.6 fL      Platelets 136 10*3/mm3     Blood Culture [[id number]]  (Abnormal) Collected:  02/17/17 0108    Specimen:  Blood from Arm, Left Updated:  02/19/17 0649     Blood Culture Staphylococcus aureus (A)         Consider infectious disease consult to rule out distant focus of infection.        Isolated from Aerobic and Anaerobic Bottles      BETA LACTAMASE Positive      Gram Stain Result Gram positive cocci     Narrative:       No growth aerobic bottle.      Catheter Culture [[id number]]  (Abnormal) Collected:  02/17/17 1836    Specimen:  Cath Tip from Neck Updated:  02/19/17 0736     CATHETER CULTURE Staphylococcus aureus (A)      BETA LACTAMASE Positive     Blood Culture [[id number]]  (Abnormal) Collected:  02/17/17 0108    Specimen:  Blood from Hand, Left Updated:  02/19/17 0928     Blood Culture Staphylococcus aureus (A)         Consider infectious disease consult to rule out distant focus of infection.        Isolated from Aerobic and Anaerobic Bottles      BETA LACTAMASE Positive           Radiology:   Imaging Results (last 24 hours)     ** No results found for the last 24 hours. **              Assessment   1. 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 Dialysis is on hold and will continue to follow up labs. Patient is treated with steroids and he will follow up with rheumatology  2. Atrial fibrillation with RVR-- rate controlled now  3. Anemia  4. Benign HTN  5. Line sepsis-on IV antibiotics  6. Leg edema: will give Bumex po       Plan:  As above.       Reinaldo Foley MD  2/19/2017  12:00 PM

## 2017-02-19 NOTE — PLAN OF CARE
Problem: Patient Care Overview (Adult)  Goal: Plan of Care Review  Outcome: Ongoing (interventions implemented as appropriate)    02/18/17 3088   Coping/Psychosocial Response Interventions   Plan Of Care Reviewed With patient   Patient Care Overview   Progress improving   Outcome Evaluation   Outcome Summary/Follow up Plan No c/o pain or nausea this shift, dressing to Right upper chest c/d/i, appropriate u/o, VSS, will continue to monitor.        Goal: Adult Individualization and Mutuality  Outcome: Ongoing (interventions implemented as appropriate)  Goal: Discharge Needs Assessment  Outcome: Ongoing (interventions implemented as appropriate)    Problem: Cardiac Output, Decreased (Adult)  Goal: Identify Related Risk Factors and Signs and Symptoms  Outcome: Ongoing (interventions implemented as appropriate)  Goal: Adequate Cardiac Output/Effective Tissue Perfusion  Outcome: Ongoing (interventions implemented as appropriate)    Problem: Respiratory Insufficiency (Adult)  Goal: Acid/Base Balance  Outcome: Ongoing (interventions implemented as appropriate)

## 2017-02-19 NOTE — PLAN OF CARE
Problem: Patient Care Overview (Adult)  Goal: Plan of Care Review  Outcome: Ongoing (interventions implemented as appropriate)    02/19/17 0409   Coping/Psychosocial Response Interventions   Plan Of Care Reviewed With patient   Patient Care Overview   Progress no change   Outcome Evaluation   Outcome Summary/Follow up Plan VSS. Right permacath incision remains CDI. Blood cultures still pending. Labs pending for this AM. IV abx continue.          Problem: Cardiac Output, Decreased (Adult)  Goal: Identify Related Risk Factors and Signs and Symptoms  Outcome: Ongoing (interventions implemented as appropriate)  Goal: Adequate Cardiac Output/Effective Tissue Perfusion  Outcome: Ongoing (interventions implemented as appropriate)

## 2017-02-20 LAB
ANION GAP SERPL CALCULATED.3IONS-SCNC: 11 MMOL/L (ref 4–13)
B-LACTAMASE USUAL SUSC ISLT: POSITIVE
BACTERIA SPEC AEROBE CULT: ABNORMAL
BACTERIA SPEC AEROBE CULT: ABNORMAL
BUN BLD-MCNC: 57 MG/DL (ref 5–21)
BUN/CREAT SERPL: 35.6 (ref 7–25)
CALCIUM SPEC-SCNC: 8.5 MG/DL (ref 8.4–10.4)
CATHETER CULTURE: ABNORMAL
CHLORIDE SERPL-SCNC: 101 MMOL/L (ref 98–110)
CO2 SERPL-SCNC: 23 MMOL/L (ref 24–31)
CREAT BLD-MCNC: 1.6 MG/DL (ref 0.5–1.4)
DEPRECATED RDW RBC AUTO: 56.2 FL (ref 40–54)
ERYTHROCYTE [DISTWIDTH] IN BLOOD BY AUTOMATED COUNT: 18.5 % (ref 12–15)
GFR SERPL CREATININE-BSD FRML MDRD: 46 ML/MIN/1.73
GLUCOSE BLD-MCNC: 203 MG/DL (ref 70–100)
GLUCOSE BLDC GLUCOMTR-MCNC: 187 MG/DL (ref 70–130)
GLUCOSE BLDC GLUCOMTR-MCNC: 249 MG/DL (ref 70–130)
GLUCOSE BLDC GLUCOMTR-MCNC: 261 MG/DL (ref 70–130)
GLUCOSE BLDC GLUCOMTR-MCNC: 268 MG/DL (ref 70–130)
GRAM STN SPEC: ABNORMAL
HCT VFR BLD AUTO: 25.9 % (ref 40–52)
HGB BLD-MCNC: 8.3 G/DL (ref 14–18)
ISOLATED FROM: ABNORMAL
ISOLATED FROM: ABNORMAL
MCH RBC QN AUTO: 26.9 PG (ref 28–32)
MCHC RBC AUTO-ENTMCNC: 32 G/DL (ref 33–36)
MCV RBC AUTO: 84.1 FL (ref 82–95)
PLATELET # BLD AUTO: 149 10*3/MM3 (ref 130–400)
PMV BLD AUTO: 9.8 FL (ref 6–12)
POTASSIUM BLD-SCNC: 4.8 MMOL/L (ref 3.5–5.3)
RBC # BLD AUTO: 3.08 10*6/MM3 (ref 4.8–5.9)
SODIUM BLD-SCNC: 135 MMOL/L (ref 135–145)
WBC NRBC COR # BLD: 15.95 10*3/MM3 (ref 4.8–10.8)

## 2017-02-20 PROCEDURE — 02HV33Z INSERTION OF INFUSION DEVICE INTO SUPERIOR VENA CAVA, PERCUTANEOUS APPROACH: ICD-10-PCS | Performed by: INTERNAL MEDICINE

## 2017-02-20 PROCEDURE — 97110 THERAPEUTIC EXERCISES: CPT

## 2017-02-20 PROCEDURE — 63710000001 PREDNISONE PER 5 MG: Performed by: NURSE PRACTITIONER

## 2017-02-20 PROCEDURE — 99233 SBSQ HOSP IP/OBS HIGH 50: CPT | Performed by: INTERNAL MEDICINE

## 2017-02-20 PROCEDURE — 82962 GLUCOSE BLOOD TEST: CPT

## 2017-02-20 PROCEDURE — 25010000002 LINEZOLID 600 MG/300ML SOLUTION: Performed by: INTERNAL MEDICINE

## 2017-02-20 PROCEDURE — 85027 COMPLETE CBC AUTOMATED: CPT | Performed by: INTERNAL MEDICINE

## 2017-02-20 PROCEDURE — 63710000001 INSULIN DETEMIR PER 5 UNITS: Performed by: INTERNAL MEDICINE

## 2017-02-20 PROCEDURE — 80048 BASIC METABOLIC PNL TOTAL CA: CPT | Performed by: INTERNAL MEDICINE

## 2017-02-20 RX ADMIN — TAMSULOSIN HYDROCHLORIDE 0.4 MG: 0.4 CAPSULE ORAL at 20:39

## 2017-02-20 RX ADMIN — DILTIAZEM HYDROCHLORIDE 90 MG: 90 TABLET, FILM COATED ORAL at 17:04

## 2017-02-20 RX ADMIN — INSULIN LISPRO 3 UNITS: 100 INJECTION, SOLUTION INTRAVENOUS; SUBCUTANEOUS at 17:04

## 2017-02-20 RX ADMIN — METOPROLOL TARTRATE 75 MG: 50 TABLET ORAL at 08:37

## 2017-02-20 RX ADMIN — METOPROLOL TARTRATE 75 MG: 50 TABLET ORAL at 20:40

## 2017-02-20 RX ADMIN — DILTIAZEM HYDROCHLORIDE 90 MG: 90 TABLET, FILM COATED ORAL at 05:55

## 2017-02-20 RX ADMIN — INSULIN DETEMIR 10 UNITS: 100 INJECTION, SOLUTION SUBCUTANEOUS at 08:36

## 2017-02-20 RX ADMIN — BUMETANIDE 1 MG: 1 TABLET ORAL at 08:37

## 2017-02-20 RX ADMIN — PREDNISONE 40 MG: 20 TABLET ORAL at 08:37

## 2017-02-20 RX ADMIN — LINEZOLID 600 MG: 600 INJECTION, SOLUTION INTRAVENOUS at 08:33

## 2017-02-20 RX ADMIN — DILTIAZEM HYDROCHLORIDE 90 MG: 90 TABLET, FILM COATED ORAL at 10:45

## 2017-02-20 RX ADMIN — LINEZOLID 600 MG: 600 INJECTION, SOLUTION INTRAVENOUS at 20:40

## 2017-02-20 RX ADMIN — PREDNISONE 40 MG: 20 TABLET ORAL at 17:04

## 2017-02-20 RX ADMIN — DILTIAZEM HYDROCHLORIDE 90 MG: 90 TABLET, FILM COATED ORAL at 20:40

## 2017-02-20 RX ADMIN — INSULIN LISPRO 4 UNITS: 100 INJECTION, SOLUTION INTRAVENOUS; SUBCUTANEOUS at 10:42

## 2017-02-20 RX ADMIN — INSULIN LISPRO 4 UNITS: 100 INJECTION, SOLUTION INTRAVENOUS; SUBCUTANEOUS at 20:46

## 2017-02-20 RX ADMIN — DOCUSATE SODIUM 100 MG: 100 CAPSULE ORAL at 08:37

## 2017-02-20 RX ADMIN — INSULIN LISPRO 2 UNITS: 100 INJECTION, SOLUTION INTRAVENOUS; SUBCUTANEOUS at 08:36

## 2017-02-20 NOTE — PROGRESS NOTES
[facility] Medicine Services  INPATIENT PROGRESS NOTE    Length of Stay: 5  Date of Admission: 2/15/2017  Primary Care Physician: Moises Montes MD    Subjective   Chief Complaint: feels better, no shortness of breath  HPI   Sitting up in chair.  No family member present.  Denies chest pain or palpitations.  Denies shortness of breath.  Oxygen off.  Remains atrial fibrillation rate controlled at 110.  Denies nausea, vomiting or abdominal pain.  Denies drainage from dialysis catheter removal site.  Tolerating diet.  Lower extremity edema improved with TEDs in place.    Review of Systems   Constitutional: Positive for fatigue.   HENT: Negative for congestion.   Eyes: Negative for visual disturbance.   Respiratory: Negative for wheezing.   Cardiovascular: Positive for leg swelling (chronic).   Gastrointestinal: Negative for abdominal distention, constipation, diarrhea, nausea and vomiting.   Endocrine: Negative for polyuria.   Genitourinary: Negative for dysuria.   Skin: No further drainage dialysis catheter site, Catheter removed 2/17  Neurological: Positive for weakness improved.   Psychiatric/Behavioral: Negative for agitation.   All pertinent negatives and positives are as above. All other systems have been reviewed and are negative unless otherwise stated.     Objective    Temp:  [96.9 °F (36.1 °C)-98.2 °F (36.8 °C)] 97.1 °F (36.2 °C)  Heart Rate:  [84-94] 94  Resp:  [18-20] 18  BP: (115-131)/(74-86) 118/74  Physical Exam  Constitutional: He is oriented to person, place, and time. He appears well-developed and well-nourished.   Head: Normocephalic and atraumatic.   Eyes: EOM are normal. Pupils are equal, round, and reactive to light.   Neck: Normal range of motion. Neck supple. No tracheal deviation present.   Cardiovascular: No murmur heard. Irregular rate, atrial fibrillation on telemetry    Pulmonary/Chest: Effort normal and breath sounds normal. No respiratory  distress. He has no wheezes. He has no rales.   Abdominal: Soft. Bowel sounds are normal. He exhibits no distension.   Musculoskeletal: He exhibits edema (mild ankle) chronic per patient, improved with TEDs.  Neurological: He is alert and oriented to person, place, and time.   Skin: Skin is warm and dry. NO drainage from dialysis catheter site.  Dressing dry and intact. Purpura noted across back and lower extremities.  Psychiatric: flat    Results Review:  I have reviewed the labs, radiology results, and diagnostic studies.    Laboratory Data:     Results from last 7 days  Lab Units 02/20/17  0341 02/19/17  0603 02/18/17  0525   WBC 10*3/mm3 15.95* 13.85* 12.17*   HEMOGLOBIN g/dL 8.3* 8.4* 8.3*   HEMATOCRIT % 25.9* 26.6* 26.4*   PLATELETS 10*3/mm3 149 136 129*          Results from last 7 days  Lab Units 02/20/17  0341 02/19/17  0603 02/18/17  0525  02/15/17  1000   SODIUM mmol/L 135 135 138  < > 137   POTASSIUM mmol/L 4.8 4.8 4.0  < > 3.8   CHLORIDE mmol/L 101 104 101  < > 95*   TOTAL CO2 mmol/L 23.0* 23.0* 23.0*  < > 28.0   BUN mg/dL 57* 55* 52*  < > 64*   CREATININE mg/dL 1.60* 1.76* 1.79*  < > 1.68*   CALCIUM mg/dL 8.5 8.7 8.6  < > 8.9   BILIRUBIN mg/dL  --   --   --   --  0.4   ALK PHOS U/L  --   --   --   --  114   ALT (SGPT) U/L  --   --   --   --  59*   AST (SGOT) U/L  --   --   --   --  25   GLUCOSE mg/dL 203* 309* 291*  < > 311*   < > = values in this interval not displayed.    Culture Data:   BLOOD CULTURE   Date Value Ref Range Status   02/18/2017 No growth at 24 hours  Preliminary   02/17/2017 Staphylococcus aureus, MRSA (C)  Final     Comment:       Consider infectious disease consult to rule out distant focus of infection.  Methicillin resistant Staphylococcus aureus, Patient may be an isolation risk.   02/17/2017 Staphylococcus aureus, MRSA (C)  Final     Comment:       Consider infectious disease consult to rule out distant focus of infection.  Methicillin resistant Staphylococcus aureus, Patient  may be an isolation risk.      CATHETER CULTURE Staphylococcus aureus, MRSA (C)             Methicillin resistant Staphylococcus aureus, Patient may be an isolation risk.           BETA LACTAMASE Positive          Susceptibility         Staphylococcus aureus, MRSA         JUAN J         Clindamycin >=8  Resistant         Erythromycin >=8  Resistant         Gentamicin <=0.5  Susceptible         Inducible Clindamycin Resistance NEG  Negative         Levofloxacin 4  Intermediate 1         Oxacillin >=4  Resistant         Penicillin G >=0.5  Resistant         Tetracycline <=1  Susceptible         Trimethoprim + Sulfamethoxazole <=10  Susceptible         Vancomycin 2  Susceptible                   1 Staphylococcus species may develop resistance during prolonged therapy with quinolones. Isolates that are initially susceptible may become resistant within three to four days after initiation of therapy. Testing of repeat isolates may be warranted.                           Blood Culture [[id number]] (Abnormal)  Collected: 02/17/17 0108       Lab Status: Final result Specimen: Blood from Arm, Left Updated: 02/20/17 0741        Blood Culture Staphylococcus aureus, MRSA (C)            Consider infectious disease consult to rule out distant focus of infection.   Methicillin resistant Staphylococcus aureus, Patient may be an isolation risk.           Isolated from Aerobic and Anaerobic Bottles         BETA LACTAMASE Positive         Gram Stain Result Gram positive cocci          Susceptibility         Staphylococcus aureus, MRSA         JUAN J         Clindamycin >=8  Resistant         Erythromycin >=8  Resistant         Gentamicin <=0.5  Susceptible         Inducible Clindamycin Resistance NEG  Negative         Levofloxacin >=8  Resistant         Linezolid 2  Susceptible         Oxacillin >=4  Resistant         Penicillin G >=0.5  Resistant         Tetracycline 2  Susceptible         Trimethoprim + Sulfamethoxazole <=10  Susceptible          Vancomycin 2  Susceptible                              Glucose 261, 203, 246    Radiology Data:   MRI brain without contrast 2/16/17  1.  No acute intracranial abnormalities.  2.  Nonspecific foci of gliosis in the bilateral white matter, likely reflecting mild chronic microvascular changes.     NM lung ventilation perfusion 12/15/17  Findings are most commonly associated with low probability for acute  pulmonary embolism.      CT head without contrast 2/15/17 no acute intracranial process      Chest x-ray 1 view 2/15/17 no acute cardiopulmonary disease.    Scheduled Meds    bumetanide 1 mg Oral Daily   diltiaZEM 90 mg Oral Q6H   docusate sodium 100 mg Oral BID   enoxaparin 30 mg Subcutaneous Daily   insulin detemir 10 Units Subcutaneous QAM   insulin lispro 2-7 Units Subcutaneous 4x Daily AC & at Bedtime   Linezolid 600 mg Intravenous Q12H   metoprolol tartrate 75 mg Oral Q12H   predniSONE 40 mg Oral BID With Meals   tamsulosin 0.4 mg Oral Nightly       I have reviewed the patient current medications.     Assessment/Plan     Hospital Problem List     * (Principal)Persistent atrial fibrillation    Henoch-Schonlein purpura nephritis    Tobacco abuse    Microcytic anemia    Hypertensive nephrosclerosis    Wegener's granulomatosis with renal involvement        Assessment:  1. Atrial fibrillation with RVR, no anticoagulation due to recent hematuria, anemia, and reported frequent injuries and falls (per cardiology)  2. OSMANI secondary to Jairo's granulomatosis nonoliguric, HD T,TH, Sat (currently on hold)  3. Henoch Schollein purpura nephritis on HD  4. MRSA bacteremia likely dialysis catheter related  5. Dialysis catheter site infection, MRSA  6. Chronic anemia, anemia of chronic disease  7. LUE weakness, resolved  8. Elevated proBNP secondary to renal disease  9. Elevated d-dimer with negative NM ventilation perfusion  10. Jerome's granulomatosis  11. Hypertensive nephrosclerosis  12. Mild leukocytosis  13.  Elevated PSA      Plan:  1. Cardizem 90 mg every 6 hours. Place on Cardizem CD at discharge.  Discussed with cardiology today.  May go ahead and convert to CD today.  2. Metoprolol dose 75 mg twice daily. Uptitrated by cardiology.  3. No anticoagulation per cardiology secondary to anemia, falls, injuries, recent hematuria.  4. Zyvox IV day 4  5. Blood cultures and dialysis catheter tip MRSA  6. Infectious disease recommending antibiotics.  Will need 2 weeks IV antibiotics start date of positive blood culture 2/17/17.  7. Culture sensitivities reviewed.  8. Dialysis catheter removed 2/17/17 per Dr. Grimes.  9. Dialysis on hold per nephrology.  10. Oral steroids for Wegener's granulomatous  11. CBC, BMP a.m.  12. Surveillance blood cultures 2/18 no growth at 24 hours.  13.  Elevated PSA, biopsy previously planned for this week.  Will need to be placed on hold due to bacteremia.   14.  Required 15 units sliding scale insulin coverage.  No reported history of diabetes.  Hyperglycemia likely steroid induced.      The above documentation resulted from a face-to-face encounter by me Flores GARVIN, [facility].        Discharge Planning: I expect patient to be discharged to home in 2-3 days.    BHARATHI Kramer   02/20/17   11:12 AM    I personally evaluated and examined the patient in conjunction with BHARATHI Borrego and agree with the assessment, treatment plan, and disposition of the patient as recorded by her. My history, exam, and further recommendations are: Stay the same.

## 2017-02-20 NOTE — PLAN OF CARE
Problem: Patient Care Overview (Adult)  Goal: Plan of Care Review  Outcome: Ongoing (interventions implemented as appropriate)    02/19/17 7013   Coping/Psychosocial Response Interventions   Plan Of Care Reviewed With patient   Patient Care Overview   Progress improving   Outcome Evaluation   Outcome Summary/Follow up Plan Tolerating ABX well, no further questions or complaints at this time, will continue to monitor.        Goal: Adult Individualization and Mutuality  Outcome: Ongoing (interventions implemented as appropriate)  Goal: Discharge Needs Assessment  Outcome: Ongoing (interventions implemented as appropriate)    Problem: Cardiac Output, Decreased (Adult)  Goal: Identify Related Risk Factors and Signs and Symptoms  Outcome: Ongoing (interventions implemented as appropriate)  Goal: Adequate Cardiac Output/Effective Tissue Perfusion  Outcome: Ongoing (interventions implemented as appropriate)    Problem: Respiratory Insufficiency (Adult)  Goal: Acid/Base Balance  Outcome: Ongoing (interventions implemented as appropriate)

## 2017-02-20 NOTE — PLAN OF CARE
Problem: Patient Care Overview (Adult)  Goal: Plan of Care Review  Outcome: Ongoing (interventions implemented as appropriate)    02/20/17 1150   Coping/Psychosocial Response Interventions   Plan Of Care Reviewed With patient   Patient Care Overview   Progress improving   Outcome Evaluation   Outcome Summary/Follow up Plan Pt up ambulating Independent in room.Pt performed standing ex's x 20 reps with no loss of balance. Pt did have a loss of balance during single Limb stance, but was able to perform tandem stance with no loss of balance.

## 2017-02-20 NOTE — PAYOR COMM NOTE
ADMIT INPT 2-15-17  REFAXING CLINICAL FOR 2ND TIME  [id number]    Gurjit Andrea (47 y.o. Male)     Date of Birth Social Security Number Address Home Phone MRN    [id number]  [id number]  [facility] [phone] [id number]    Baptism Marital Status          None        Admission Date Admission Type Admitting Provider Attending Provider Department, Room/Bed    2/15/17 Emergency Blair Frausto MD Truong, Khai C, MD [facility] 4B, 408/1    Discharge Date Discharge Disposition Discharge Destination                      Active Insurance as of 2/15/2017     Primary Coverage     Payor Plan Insurance Group Employer/Plan Group    [employer] BL[employer]UE [employer] BLUE [employer] PPO [id number]     Payor Plan Address Payor Plan Phone Number Effective From Effective To    [address] [phone] 12/1/2016     [place] [address]       Subscriber Name Subscriber Birth Date Member ID       JORDAN ANDREA 1/1/2002 [id number]                  History & Physical      Kalie Vergara DO at 2/15/2017  2:10 PM              [facility] Medicine Services  HISTORY AND PHYSICAL    Date of Admission: 2/15/2017  Primary Care Physician: Moises Montes MD    Subjective     Chief Complaint: Chest pain, shortness of breath and palpitations    History of Present Illness  The patient is a 47-year-old  male who presented to [facility] emergency department today with chest pain, shortness of breath and palpitations.  His wife also states that he has been having some neurologic changes as well.  She states that he is having difficulties with his speech, mumbling and getting confused with his words.  He was recently discharged from our facility on 2/6/17.  He has recently been started on outpatient dialysis, Tuesday, Thursday, and Saturday regimen.  He states that he went to dialysis yesterday and felt fine.  He states he woke up at approximately 4 AM with  the above symptoms.  When he was recently admitted to our facility he had difficulties in controlling his rate with his atrial fibrillation.  He has been taking his Cardizem and metoprolol at home.  No recent sick contact.  No nausea vomiting or diarrhea.  He denies any dysuria or abdominal pain.  His wife has been at the bedside throughout this interview.  He is on long-term high dose steroid therapy in regards to his Jerome's granulomatosis disease.    Review of Systems   Constitutional: Positive for fatigue. Negative for activity change, appetite change, chills and fever.   HENT: Negative for hearing loss, nosebleeds, tinnitus and trouble swallowing.    Eyes: Negative for visual disturbance.   Respiratory: Positive for shortness of breath. Negative for cough, chest tightness and wheezing.    Cardiovascular: Positive for chest pain, palpitations and leg swelling.   Gastrointestinal: Negative for abdominal distention, abdominal pain, blood in stool, constipation, diarrhea, nausea and vomiting.   Endocrine: Negative for cold intolerance, heat intolerance, polydipsia, polyphagia and polyuria.   Genitourinary: Negative for decreased urine volume, difficulty urinating, dysuria, flank pain, frequency and hematuria.   Musculoskeletal: Negative for arthralgias, joint swelling and myalgias.   Skin: Negative for rash.   Allergic/Immunologic: Negative for immunocompromised state.   Neurological: Positive for weakness. Negative for dizziness, syncope, light-headedness and headaches.   Hematological: Negative for adenopathy. Does not bruise/bleed easily.   Psychiatric/Behavioral: Negative for confusion and sleep disturbance. The patient is not nervous/anxious.       Otherwise complete ROS reviewed and negative except as mentioned in the HPI.    Past Medical History:   Past Medical History   Diagnosis Date   • A-fib    • Chronic renal failure    • Elevated PSA    • Kidney stone    • Purpura    • Wegener's granulomatosis with  "renal involvement      Past Surgical History:  Past Surgical History   Procedure Laterality Date   • Appendectomy     • Insertion hemodialysis catheter N/A 1/20/2017     Procedure: INSERTION PERMCATH;  Surgeon: Ian Grimes DO;  Location: [facility];  Service:      Social History:  reports that he has been smoking.  He does not have any smokeless tobacco history on file. He reports that he drinks alcohol. He reports that he does not use illicit drugs.    Family History: Significant for having a half-brother on his mother's side of the family. His brother is in good health; however, he has a history of Kawasaki's disease as a child. His father is living but has a history of end-stage liver disease due to alcohol use, COPD, and diabetes mellitus type 2. His mother is alive and has a history of chronic kidney disease and hypothyroidism.     Allergies:  No Known Allergies    Medications:  Prior to Admission medications    Medication Sig Start Date End Date Taking? Authorizing Provider   albuterol (PROVENTIL HFA;VENTOLIN HFA) 108 (90 BASE) MCG/ACT inhaler Every 6 (Six) Hours.   Yes Historical Provider, MD   diltiaZEM CD (CARDIZEM CD) 360 MG 24 hr capsule Take 1 capsule by mouth Daily. 1/27/17  Yes BHARATHI Portillo   metoprolol tartrate (LOPRESSOR) 25 MG tablet 25 mg 2 (Two) Times a Day. 11/3/16  Yes Historical Provider, MD   predniSONE (DELTASONE) 20 MG tablet Take 2 tablets by mouth 2 (Two) Times a Day With Meals.  Patient taking differently: Take 80 mg by mouth 2 (Two) Times a Day With Meals. 1/27/17  Yes BHARATHI Cantor   tamsulosin (FLOMAX) 0.4 MG capsule 24 hr capsule Take 1 capsule by mouth Every Night. 1/27/17  Yes BHARATHI Cantor     Objective     Vital Signs:   Visit Vitals   • /91   • Pulse 113   • Temp 98.2 °F (36.8 °C) (Oral)   • Resp 21   • Ht 73\" (185.4 cm)   • Wt 224 lb (102 kg)   • SpO2 96%   • BMI 29.55 kg/m2     Physical Exam   Constitutional: He is oriented to person, " place, and time. He appears well-developed and well-nourished.   HENT:   Head: Normocephalic and atraumatic.   Eyes: Conjunctivae and EOM are normal. Pupils are equal, round, and reactive to light.   Neck: Neck supple. No JVD present. No thyromegaly present.   Cardiovascular: Normal heart sounds and intact distal pulses.  An irregular rhythm present. Tachycardia present.  Exam reveals no gallop and no friction rub.    No murmur heard.  Atrial fib   Pulmonary/Chest: Effort normal. No respiratory distress. He has decreased breath sounds (Bilateral bases). He has no wheezes. He has no rales. He exhibits no tenderness.   Abdominal: Soft. Bowel sounds are normal. He exhibits no distension. There is no tenderness. There is no rebound and no guarding.   Musculoskeletal: Normal range of motion. He exhibits edema (bilateral lower extremity pitting edema). He exhibits no tenderness or deformity.   Lymphadenopathy:     He has no cervical adenopathy.   Neurological: He is alert and oriented to person, place, and time. He displays normal reflexes. No cranial nerve deficit. He exhibits normal muscle tone.   Skin: Skin is warm and dry. No rash noted.   Psychiatric: His behavior is normal. Judgment and thought content normal.   Flat affect     Assessment / Plan     Assessment & Plan  Hospital Problem List     Persistent atrial fibrillation        1.  Atrial fibrillation with rapid ventricular response, no anticoagulation  2.  Jerome's granulomatosis  3.  Long-term dialysis  4.  Chest pain  5.  Elevated BNP  6.  Elevated d-dimer  7.  Anemia of chronic disease    Plan:    We will admit the patient to the hospitalist service for workup and management.  He will be placed to Dr. Pierre's service.  He will be placed on the telemetry unit with continuous telemetry.  We will continue the Cardizem drip. We will consult nephrology and cardiology.  He will need dialysis tomorrow.  Since he had an elevated d-dimer we will go ahead and check a  "VQ scan instead of a CTA angio of his chest due to his renal function.  We will resume his home medications.  He also complained of some left-sided weakness and speech issues therefore we will obtain a CT of the head.  Lovenox 30 mg for DVT prophylaxis.  We will also placed on SADIA hose, he has pitting bilateral lower extremity edema.  We will obtain laboratory data in the a.m.  Will also check a TSH now.  Please note this is initial evaluation and workup is ongoing.    Further orders per Dr. Pierre.  Dr. Vergara:  Patient seen and examined in the ER. Patient appears older than stated age. Has +1-2 pitting bilateral lower extremity edema, with mild chronic skin changes on bilateral lower legs. Lungs sounds decreased at the bases and heart irregularly irregular. Will ask above consultants to join the case.     Code Status: Full     I discussed the patients findings and my recommendations with the patient, his wife, and Dr. Pierre.    Estimated length of stay 3-5 days    Gio Callahan Miguel, APRN   02/15/17   2:10 PM               Electronically signed by Kalie Vergara DO at 2/15/2017  3:05 PM           Emergency Department Notes      Thu-Peg DENAE Mak at 2/15/2017  9:58 AM     Attestation signed by Rao Benitez Jr., MD at 2/15/2017  3:33 PM              For this patient encounter, I reviewed the NP or PA documentation, treatment plan, and medical decision making. Rao Benitez Jr, MD 2/15/2017 3:33 PM                               Subjective   Patient is a 47 y.o. male presenting with chest pain.   Chest Pain   Associated symptoms: fatigue, shortness of breath and weakness    Associated symptoms: no cough and no palpitations        is a pleasant 47-year-old  gentleman with chief complaint of chest pain.  He presents to ED with his wife who also provides history.  At 4 o'clock this morning, the patient suddenly felt a \"bad pain deep to the bone to my left side.\"  He specifically points to the " left side of his chest with the pain is radiating down his arm, through his left scapular region, and left side of his neck.  He complains of nausea and shortness of breath.  He feels his heart is racing.  The patient does have a history of atrial fibrillation which usually is controlled with Cardizem. He was recently placed on this by mouth in the past several weeks.  When his wife came home from work 8 o'clock this morning, he went to her vehicle and told her that he did not feel well.  She reports that his lips were blue and he was short of breath.  His heart rate sounded irregular.  She brought him here for further evaluation.    The patient was admitted to this facility 1 month ago.  He does have a history of renal failure - noted to have Wegener's granulomatosis after completing a renal biopsy.  The patient is on dialysis.  His last treatment was yesterday.  Wife denies any weight gain.  He has chronic swelling of his lower extremities which has improved.  He does have a history of hypoproteinemia and hypoalbuminemia.  He was in atrial fibrillation during his last admission as well.  He converted after being on Cardizem drip and placed on Cardizem by mouth.  The patient reports his last stress test [place].  He denies any disease.  He denies ever undergoing a cardiac catheterization.  Upon his last admission, he did complete a transthoracic echo and noted to have a mitral valve regurgitation.    The patient denies any fever.  He denies any change in cough.  He denies any history of congestive heart failure or COPD.  He continues to be on steroids.  Due to his vasculitis, he was advised not to take any aspirin or anticoagulants.    Review of Systems   Constitutional: Positive for activity change, appetite change and fatigue.   HENT: Negative.    Respiratory: Positive for chest tightness and shortness of breath. Negative for cough.    Cardiovascular: Positive for chest pain and  "leg swelling. Negative for palpitations.   Gastrointestinal: Negative.    Genitourinary: Negative.    Musculoskeletal: Negative.    Neurological: Positive for weakness and light-headedness.           Medications   diltiaZEM (CARDIZEM) 125mg/125 mL infusion (10 mg/hr Intravenous Rate/Dose Change 2/15/17 1145)   diltiaZEM (CARDIZEM) tablet 90 mg (not administered)   metoprolol tartrate (LOPRESSOR) tablet 25 mg (not administered)   diltiaZEM (CARDIZEM) injection 10 mg (10 mg Intravenous Given 2/15/17 1008)       Visit Vitals   • /64   • Pulse 107   • Temp 98.4 °F (36.9 °C)   • Resp 24   • Ht 73\" (185.4 cm)   • Wt 224 lb (102 kg)   • SpO2 95%   • BMI 29.55 kg/m2         Objective   Physical Exam   Constitutional: He is oriented to person, place, and time. He appears well-developed and well-nourished.   HENT:   Head: Normocephalic and atraumatic.   Right Ear: External ear normal.   Left Ear: External ear normal.   Nose: Nose normal.   Mouth/Throat: Oropharynx is clear and moist.   Eyes: Conjunctivae and EOM are normal. Pupils are equal, round, and reactive to light.   Neck: Normal range of motion. Neck supple. No tracheal deviation present.   Cardiovascular: Intact distal pulses.  An irregularly irregular rhythm present. Tachycardia present.  Exam reveals no gallop and no friction rub.    Murmur heard.  Pulmonary/Chest: Effort normal and breath sounds normal. No respiratory distress. He has no wheezes. He has no rales. He exhibits no tenderness.   Abdominal: Soft. Bowel sounds are normal. He exhibits distension. He exhibits no mass. There is no tenderness. There is no rebound and no guarding.   Musculoskeletal: Normal range of motion. He exhibits edema. He exhibits no tenderness or deformity.   3+ pitting edema.    Neurological: He is alert and oriented to person, place, and time.   Skin: Skin is warm and dry. No erythema. No pallor.   Psychiatric: He has a normal mood and affect. His behavior is normal. Judgment " and thought content normal. reviewed.      Procedures     Specimen:  Blood Updated:  02/15/17 1026     Glucose 311 (H) mg/dL      BUN 64 (H) mg/dL      Creatinine 1.68 (H) mg/dL      Sodium 137 mmol/L      Potassium 3.8 mmol/L      Chloride 95 (L) mmol/L      CO2 28.0 mmol/L      Calcium 8.9 mg/dL      Total Protein 6.3 g/dL      Albumin 3.30 (L) g/dL      ALT (SGPT) 59 (H) U/L      AST (SGOT) 25 U/L      Alkaline Phosphatase 114 U/L      Total Bilirubin 0.4 mg/dL      eGFR Non African Amer 44 (L) mL/min/1.73      Globulin 3.0 gm/dL      A/G Ratio 1.1 g/dL      BUN/Creatinine Ratio 38.1 (H)      Anion Gap 14.0 (H) mmol/L     D-dimer, Quantitative [[id number]]  (Abnormal) Collected:  02/15/17 1000    Specimen:  Blood Updated:  02/15/17 1022     D-Dimer, Quantitative 3.12 (H) mg/L (FEU)     Narrative:       Reference Range is 0-0.50 mg/L FEU. However, results <0.50 mg/L FEU tends to rule out DVT or PE. Results >0.50 mg/L FEU are not useful in predicting absence or presence of DVT or PE.    Protime-INR [[id number]]  (Abnormal) Collected:  02/15/17 1000    Specimen:  Blood Updated:  02/15/17 1022     Protime 14.8 (H) Seconds      INR 1.12 (H)     aPTT [[id number]]  (Normal) Collected:  02/15/17 1000    Specimen:  Blood Updated:  02/15/17 1022     PTT 30.7 seconds     BNP [[id number]]  (Abnormal) Collected:  02/15/17 1000    Specimen:  Blood Updated:  02/15/17 1029     proBNP 5920.0 (H) pg/mL     CBC Auto Differential [[id number]]  (Abnormal) Collected:  02/15/17 1000    Specimen:  Blood Updated:  02/15/17 1051     WBC 13.71 (H) 10*3/mm3      RBC 3.37 (L) 10*6/mm3      Hemoglobin 9.0 (L) g/dL      Hematocrit 28.9 (L) %      MCV 85.8 fL      MCH 26.7 (L) pg      MCHC 31.1 (L) g/dL      RDW 19.1 (H) %      RDW-SD 58.8 (H) fl      MPV 10.2 fL      Platelets 127 (L) 10*3/mm3      Neutrophil % 83.6 (H) %      Lymphocyte % 9.0 (L) %      Monocyte % 3.5 (L) %      Eosinophil % 0.0 %      Basophil % 0.3 %      Immature Grans % 3.6 %       Neutrophils, Absolute 11.46 (H) 10*3/mm3      Lymphocytes, Absolute 1.24 10*3/mm3      Monocytes, Absolute 0.48 10*3/mm3      Eosinophils, Absolute 0.00 10*3/mm3      Basophils, Absolute 0.04 10*3/mm3      Immature Grans, Absolute 0.49 (H) 10*3/mm3     Scan Slide [[id number]] Collected:  02/15/17 1000    Specimen:  Blood Updated:  02/15/17 1051     Anisocytosis Slight/1+      Hypochromia Mod/2+      WBC Morphology Normal      Platelet Morphology Normal     POC Troponin, Rapid [[id number]]  (Normal) Collected:  02/15/17 1006    Specimen:  Blood Updated:  02/15/17 1021     Troponin I 0.00 ng/mL       Serial Number: [id number]    : [id number]       POC Troponin, Rapid [[id number]]  (Normal) Collected:  02/15/17 1246    Specimen:  Blood Updated:  02/15/17 1300     Troponin I 0.00 ng/mL       Serial Number: [id number]    : [id number]       Blood Gas, Arterial [[id number]]  (Abnormal) Collected:  02/15/17 1342    Specimen:  Arterial Blood Updated:  02/15/17 1343     Site Arterial: left radial      Artem's Test --       Documented in Rapid Comm        pH, Arterial 7.511 (H) pH units      pCO2, Arterial 37.1 mm Hg      pO2, Arterial 74.9 (L) mm Hg      HCO3, Arterial 29.0 (H) mmol/L      Base Excess, Arterial 5.9 (H) mmol/L      O2 Saturation, Arterial 96.2 %      O2 Saturation Calculated 96.2 %      Barometric Pressure for Blood Gas -- mmHg       Component not reported at this site.        Modality Cannula      Flow Rate 2.00 lpm     Narrative:       Serial Number: [id number]    : [id number]          Ct Abdomen Pelvis Without Contrast    Result Date: 1/17/2017  Narrative: CT ABDOMEN PELVIS WO CONTRAST- 1/17/2017 2:18 AM CST  HISTORY: right flank pain  COMPARISON: None  DLP: 838 mGy cm. Automated exposure control was utilized to diminish patient radiation dose.  TECHNIQUE: Noncontrast enhanced images of the abdomen and pelvis obtained without oral contrast.  FINDINGS: There is mild bibasilar atelectasis. The base of the heart is  unremarkable..  LIVER: There are 3 hypodense lesions within the right lobe of liver including a 9 mm lesion within the anterior segment of the right lobe of the liver as well as a 18 and 13 mm hypodense lesion within the posterior segment of the right lobe of the liver. These are likely representative of hepatic cysts. The liver is otherwise normal in appearance. Ultrasound could be helpful for further characterization..  BILIARY SYSTEM: The gallbladder is unremarkable. No intrahepatic or extrahepatic ductal dilatation.  PANCREAS: No focal pancreatic lesion.  SPLEEN: There is mild splenomegaly with a xjmo-uz-[id number] length of 15 cm..   KIDNEYS AND ADRENALS: No discrete renal mass is identified. There is a 9 mm nonobstructing calculus involving the interpolar aspect of the right kidney. There is mild dilatation of the upper tracts of the right kidney as well as some proximal right periureteral stranding. This would suggest the patient may have recently passed a stone. There is no evidence of a discrete ureteral stone at this time. The left renal collecting system and ureter is unremarkable..     .  RETROPERITONEUM: No mass, lymphadenopathy or hemorrhage.  GI TRACT: There is mild constipation. There is no evidence of mechanical obstruction but there are multiple fluid-filled small bowel loops. A few scattered air-fluid levels are present.. The appendix has been surgically removed..  OTHER: There is no mesenteric mass, lymphadenopathy or fluid collection. The osseous structures and soft tissues demonstrate no worrisome lesions. 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  PELVIS: No mass lesion, fluid collection or significant lymphadenopathy is seen in the pelvis. The urinary bladder is normal in appearance. The prostate gland is mildly enlarged.      Impression: 1. There is 9 mm nonobstructing calculus involving the interpolar aspect of the right kidney. There is mild dilatation of the upper tracts  of the right kidney and proximal right ureter with proximal periureteral stranding. I see no evidence of a discrete ureteral stone but this may represent sequela of a recently passed stone. Mild right-sided obstructive uropathy secondary to another etiology would also be a differential and if the patient's hematuria and flank pain are persistent I would suggest urology consultation with retrograde examination of the right renal collecting system and ureter. No evidence of left-sided nephrolithiasis or ureteral calculus. 2. Suspected hepatic cysts within the right lobe of the liver. This could be confirmed with ultrasound. 3. Nonspecific bowel gas pattern with some scattered fluid-filled bowel loops. This is likely related to mild constipation with increased stool noted throughout the colon..  4. Prostatic enlargement. A small fat-containing periumbilical hernia is present.  Electronically Signed By-Dr. Jarvis Morgan MD On:1/17/2017 8:33 AM EST This report was finalized on 01/17/2017 07:33 by Dr. Jarvis Morgan MD.    Xr Chest 1 View    Result Date: 2/15/2017  Narrative: EXAM:   XR CHEST 1 VW-   DATE:  02/15/2017  HISTORY:  Male, age  47 years;chest pain  COMPARISON:  None.  FINDINGS: The heart mediastinum are normal in size. Lungs are without focal infiltrate, mass or effusions.  The bones show no acute pathology. Right-sided dialysis catheter with tip terminating at the expected location of the atriocaval junction.      Impression: No acute cardiopulmonary disease.  This report was finalized on  by Dr. Kristyn Rodriguez MD.    Ct Needle Biopsy Kidney    Result Date: 1/26/2017  Narrative: HISTORY: Acute kidney injury.  The risks of procedure were explained to the patient. Informed consent was obtained.  Under sterile conditions, using 1% lidocaine as local anesthetic, and CT guidance, a 17-gauge guide needle was placed into the posterior cortex of the lower pole of the left kidney.  Two 2 cm 18-gauge cores were  obtained, sectioned and placed in three different vials as per standard procedure.  The patient tolerated the procedure well without immediate complications.  Minimal blood loss observed, less than 1 mL.  Radiation dose equals  mGy-cm.  Automated exposure control dose reduction technique was implemented.      Impression: CT-guided renal biopsy performed as described above, without immediate, complications. This report was finalized on 01/26/2017 15:48 by Dr. Robby Park MD.    Fl C Arm During Surgery    Result Date: 1/23/2017  Narrative: Performed in surgery by Dr. Grimes. Please see operative report.  This report was finalized on 01/23/2017 15:55 by Dr. Ian Grimes MD.    Ir Insert Tunneled Cv Catheter Without Port 5 Plus    Result Date: 1/23/2017  Narrative: Performed in surgery by Dr. Grimes. Please see operative report.  This report was finalized on 01/23/2017 15:55 by Dr. Ian Grimes MD.      ED Course  ED Course   reviewed thsi case with Dr. Benitez who assessed the paitent.  The patient's atrial fibrillation RVR did respond to Cardizem 5mg/kg drip.  However, he did go back up until 130s.  We increased the cardizem to 10mg/kg drip  And Dr. Benitez contacted the heart group who has a provider here in the ED to evaluate the patient presently.           Macie Carrion, nurse practitioner with heart group informed us that the family and patient informed her that the patient had left arm weakness early this morning.  He did not inform this examiner or Dr. Benitez about this.  We have now ordered a CT scan of his head without contrast.  The heart group will order a VQ scan on the patient.    Emmanuel has spoken with DR. Vergara, hospitalist on call, who will admit the patient under her services.     MDM    Final diagnoses:   Persistent atrial fibrillation   Other chest pain   Renal failure   Chronic anemia         ThuDENAE Ambrosio  02/15/17 1262       Electronically signed by Rao KELLER  Emmanuel Peralta MD at 2/15/2017  3:33 PM        Vital Signs (last 7 days)       02/13 0700  -  02/14 0659 02/14 0700  -  02/15 0659 02/15 0700  -  02/16 0659 02/16 0700  -  02/17 0659 02/17 0700  -  02/18 0659 02/18 0700  -  02/19 0659 02/19 0700  -  02/20 0659 02/20 0700  -  02/20 0745   Most Recent    Temp (°F)     96.9 -  98.9    97.2 -  98.6    97 -  98.9    97 -  97.4    96.9 -  98.2       96.9 (36.1)    Heart Rate     92 -  (!)144    60 -  (!)130    59 -  (!)133    58 -  118    84 -  109       91    Resp     18 -  24    18 -  22    18 -  20    18 -  20    18 -  20       20    BP     109/75 -  128/87    106/70 -  132/69    103/65 -  132/87    112/71 -  130/86    115/80 -  131/79       115/80    SpO2 (%)     93 -  99    96 -  100    98 -  99    97 -  100    97 -  99       97          Intake & Output (last 7 days)       02/13 0701 - 02/14 0700 02/14 0701 - 02/15 0700 02/15 0701 - 02/16 0700 02/16 0701 - 02/17 0700 02/17 0701 - 02/18 0700 02/18 0701 - 02/19 0700 02/19 0701 - 02/20 0700 02/20 0701 - 02/21 0700    P.O.   590 480 240 370 400     IV Piggyback      300 600     Total Intake(mL/kg)   590 (5.8) 480 (4.8) 240 (2.4) 670 (6.8) 1000 (10)     Urine (mL/kg/hr)   1300 475 (0.2) 850 (0.4) 1550 (0.7) 2425 (1)     Stool     0 (0) 600 (0.3)      Total Output     1300  2425      Net     -710 +5 -610 -1480 -1425                  Unmeasured Urine Occurrence     1 x 1 x      Unmeasured Stool Occurrence     1 x           Hospital Medications (all)       Dose Frequency Start End    acetaminophen (TYLENOL) tablet 650 mg 650 mg Every 4 Hours PRN 2/15/2017     Sig - Route: Take 2 tablets by mouth Every 4 (Four) Hours As Needed for mild pain (1-3). - Oral    aluminum-magnesium hydroxide-simethicone (MAALOX/MYLANTA) suspension 30 mL 30 mL Every 6 Hours PRN 2/15/2017     Sig - Route: Take 30 mL by mouth Every 6 (Six) Hours As Needed for heartburn. - Oral    bumetanide (BUMEX) tablet 1 mg 1 mg Daily 2/19/2017     Sig -  Route: Take 1 tablet by mouth Daily. - Oral    dextrose (D50W) solution 25 g 25 g Every 15 Minutes PRN 2/19/2017     Sig - Route: Infuse 50 mL into a venous catheter Every 15 (Fifteen) Minutes As Needed for low blood sugar (Blood Sugar Less Than 70, Patient Has IV Access - Unresponsive, NPO or Unable To Safely Swallow). - Intravenous    dextrose (GLUTOSE) oral gel 15 g 15 g Every 15 Minutes PRN 2/19/2017     Sig - Route: Take 15 g by mouth Every 15 (Fifteen) Minutes As Needed for low blood sugar (Blood Sugar Less Than 70, Patient Alert, Is Not NPO & Can Safely Swallow). - Oral    diltiaZEM (CARDIZEM) injection 10 mg 10 mg Once 2/15/2017 2/15/2017    Sig - Route: Infuse 2 mL into a venous catheter 1 (One) Time. - Intravenous    Cosign for Ordering: Accepted by Rao Benitez Jr., MD on 2/15/2017  2:39 PM    diltiaZEM (CARDIZEM) tablet 90 mg 90 mg Every 6 Hours Scheduled 2/15/2017     Sig - Route: Take 1 tablet by mouth Every 6 (Six) Hours. - Oral    docusate sodium (COLACE) capsule 100 mg 100 mg 2 Times Daily 2/15/2017     Sig - Route: Take 1 capsule by mouth 2 (Two) Times a Day. - Oral    enoxaparin (LOVENOX) syringe 30 mg 30 mg Daily 2/15/2017     Sig - Route: Inject 0.3 mL under the skin Daily. - Subcutaneous    glucagon (human recombinant) (GLUCAGEN DIAGNOSTIC) injection 1 mg 1 mg Every 15 Minutes PRN 2/19/2017     Sig - Route: Inject 1 mg under the skin Every 15 (Fifteen) Minutes As Needed (Blood Glucose Less Than 70 - Patient Without IV Access - Unresponsive, NPO or Unable To Safely Swallow). - Subcutaneous    heparin (porcine) injection 1,800 Units 1,800 Units As Needed 2/16/2017     Sig - Route: 1.8 mL by Intracatheter route As Needed (permcath packing heparin 1000units/cc ues 1.8cc into venous limb of permcath after every hd tx on TUES ,THURS and SAY). - Intracatheter    insulin detemir (LEVEMIR) injection 10 Units 10 Units Every Morning 2/20/2017     Sig - Route: Inject 10 Units under the skin Every  Morning. - Subcutaneous    insulin lispro (humaLOG) injection 2-7 Units 2-7 Units 4 Times Daily Before Meals & Nightly 2/19/2017     Sig - Route: Inject 2-7 Units under the skin 4 (Four) Times a Day Before Meals & at Bedtime. - Subcutaneous    Linezolid (ZYVOX) 600 mg 300 mL 600 mg Every 12 Hours 2/18/2017     Sig - Route: Infuse 300 mL into a venous catheter Every 12 (Twelve) Hours. - Intravenous    metoprolol tartrate (LOPRESSOR) injection 5 mg 5 mg Every 6 Hours PRN 2/17/2017     Sig - Route: Infuse 5 mL into a venous catheter Every 6 (Six) Hours As Needed (for HR sustained above 120). - Intravenous    metoprolol tartrate (LOPRESSOR) tablet 75 mg 75 mg Every 12 Hours Scheduled 2/17/2017     Sig - Route: Take 75 mg by mouth Every 12 (Twelve) Hours. - Oral    ondansetron (ZOFRAN) injection 4 mg 4 mg Every 6 Hours PRN 2/15/2017     Sig - Route: Infuse 2 mL into a venous catheter Every 6 (Six) Hours As Needed for nausea or vomiting. - Intravenous    predniSONE (DELTASONE) tablet 40 mg 40 mg 2 Times Daily With Meals 2/15/2017     Sig - Route: Take 2 tablets by mouth 2 (Two) Times a Day With Meals. - Oral    sodium chloride 0.9 % flush 1-10 mL 1-10 mL As Needed 2/15/2017     Sig - Route: Infuse 1-10 mL into a venous catheter As Needed for line care. - Intravenous    tamsulosin (FLOMAX) 24 hr capsule 0.4 mg 0.4 mg Nightly 2/15/2017     Sig - Route: Take 1 capsule by mouth Every Night. - Oral    technetium albumin aggregated (MAA) solution 1 dose 1 dose Once in Imaging 2/15/2017 2/15/2017    Sig - Route: Infuse 1 dose into a venous catheter Once. - Intravenous    xenon xe 133 gas 10 mCi 9.9 millicurie 9.9 millicurie Once in Imaging 2/15/2017 2/15/2017    Sig - Route: Inhale 9.9 millicuries Once. - Inhalation    diltiaZEM (CARDIZEM) 125mg/125 mL infusion (Discontinued) 5-15 mg/hr Titrated 2/15/2017 2/18/2017    Sig - Route: Infuse 5-15 mg/hr into a venous catheter Dose Adjusted By Provider As Needed. - Intravenous     Cosign for Ordering: Accepted by Rao Benitez Jr., MD on 2/15/2017  2:39 PM    diltiaZEM CD (CARDIZEM CD) 24 hr capsule 360 mg (Discontinued) 360 mg Every 24 Hours Scheduled 2/15/2017 2/15/2017    Sig - Route: Take 2 capsules by mouth Daily. - Oral    Reason for Discontinue: Duplicate order    linezolid (ZYVOX) tablet 600 mg (Discontinued) 600 mg Every 12 Hours Scheduled 2/17/2017 2/18/2017    Sig - Route: Take 1 tablet by mouth Every 12 (Twelve) Hours. - Oral    metoprolol tartrate (LOPRESSOR) tablet 25 mg (Discontinued) 25 mg Every 12 Hours Scheduled 2/15/2017 2/16/2017    Sig - Route: Take 1 tablet by mouth Every 12 (Twelve) Hours. - Oral    metoprolol tartrate (LOPRESSOR) tablet 25 mg (Discontinued) 25 mg 2 Times Daily 2/15/2017 2/15/2017    Sig - Route: Take 1 tablet by mouth 2 (Two) Times a Day. - Oral    Reason for Discontinue: Duplicate order    metoprolol tartrate (LOPRESSOR) tablet 50 mg (Discontinued) 50 mg Every 12 Hours Scheduled 2/16/2017 2/17/2017    Sig - Route: Take 1 tablet by mouth Every 12 (Twelve) Hours. - Oral          Lab Results (all)     Procedure Component Value Units Date/Time    POC Troponin, Rapid [[id number]]  (Normal) Collected:  02/15/17 1006    Specimen:  Blood Updated:  02/15/17 1021     Troponin I 0.00 ng/mL       Serial Number: [id number]    : [id number]       D-dimer, Quantitative [[id number]]  (Abnormal) Collected:  02/15/17 1000    Specimen:  Blood Updated:  02/15/17 1022     D-Dimer, Quantitative 3.12 (H) mg/L (FEU)     Narrative:       Reference Range is 0-0.50 mg/L FEU. However, results <0.50 mg/L FEU tends to rule out DVT or PE. Results >0.50 mg/L FEU are not useful in predicting absence or presence of DVT or PE.    Protime-INR [[id number]]  (Abnormal) Collected:  02/15/17 1000    Specimen:  Blood Updated:  02/15/17 1022     Protime 14.8 (H) Seconds      INR 1.12 (H)     aPTT [[id number]]  (Normal) Collected:  02/15/17 1000    Specimen:  Blood Updated:  02/15/17 1022     PTT  30.7 seconds     Comprehensive Metabolic Panel [[id number]]  (Abnormal) Collected:  02/15/17 1000    Specimen:  Blood Updated:  02/15/17 1026     Glucose 311 (H) mg/dL      BUN 64 (H) mg/dL      Creatinine 1.68 (H) mg/dL      Sodium 137 mmol/L      Potassium 3.8 mmol/L      Chloride 95 (L) mmol/L      CO2 28.0 mmol/L      Calcium 8.9 mg/dL      Total Protein 6.3 g/dL      Albumin 3.30 (L) g/dL      ALT (SGPT) 59 (H) U/L      AST (SGOT) 25 U/L      Alkaline Phosphatase 114 U/L      Total Bilirubin 0.4 mg/dL      eGFR Non African Amer 44 (L) mL/min/1.73      Globulin 3.0 gm/dL      A/G Ratio 1.1 g/dL      BUN/Creatinine Ratio 38.1 (H)      Anion Gap 14.0 (H) mmol/L     BNP [[id number]]  (Abnormal) Collected:  02/15/17 1000    Specimen:  Blood Updated:  02/15/17 1029     proBNP 5920.0 (H) pg/mL     CBC & Differential [[id number]] Collected:  02/15/17 1000    Specimen:  Blood Updated:  02/15/17 1051    Narrative:       The following orders were created for panel order CBC & Differential.  Procedure                               Abnormality         Status                     ---------                               -----------         ------                     Scan Slide[[id number]]                                        Final result               CBC Auto Differential[[id number]]         Abnormal            Final result                 Please view results for these tests on the individual orders.    CBC Auto Differential [[id number]]  (Abnormal) Collected:  02/15/17 1000    Specimen:  Blood Updated:  02/15/17 1051     WBC 13.71 (H) 10*3/mm3      RBC 3.37 (L) 10*6/mm3      Hemoglobin 9.0 (L) g/dL      Hematocrit 28.9 (L) %      MCV 85.8 fL      MCH 26.7 (L) pg      MCHC 31.1 (L) g/dL      RDW 19.1 (H) %      RDW-SD 58.8 (H) fl      MPV 10.2 fL      Platelets 127 (L) 10*3/mm3      Neutrophil % 83.6 (H) %      Lymphocyte % 9.0 (L) %      Monocyte % 3.5 (L) %      Eosinophil % 0.0 %      Basophil % 0.3 %      Immature Grans % 3.6 %       Neutrophils, Absolute 11.46 (H) 10*3/mm3      Lymphocytes, Absolute 1.24 10*3/mm3      Monocytes, Absolute 0.48 10*3/mm3      Eosinophils, Absolute 0.00 10*3/mm3      Basophils, Absolute 0.04 10*3/mm3      Immature Grans, Absolute 0.49 (H) 10*3/mm3     Scan Slide [[id number]] Collected:  02/15/17 1000    Specimen:  Blood Updated:  02/15/17 1051     Anisocytosis Slight/1+      Hypochromia Mod/2+      WBC Morphology Normal      Platelet Morphology Normal     POC Troponin, Rapid [[id number]]  (Normal) Collected:  02/15/17 1246    Specimen:  Blood Updated:  02/15/17 1300     Troponin I 0.00 ng/mL       Serial Number: [id number]    : [id number]       Blood Gas, Arterial [[id number]]  (Abnormal) Collected:  02/15/17 1342    Specimen:  Arterial Blood Updated:  02/15/17 1343     Site Arterial: left radial      Artem's Test --       Documented in Rapid Comm        pH, Arterial 7.511 (H) pH units      pCO2, Arterial 37.1 mm Hg      pO2, Arterial 74.9 (L) mm Hg      HCO3, Arterial 29.0 (H) mmol/L      Base Excess, Arterial 5.9 (H) mmol/L      O2 Saturation, Arterial 96.2 %      O2 Saturation Calculated 96.2 %      Barometric Pressure for Blood Gas -- mmHg       Component not reported at this site.        Modality Cannula      Flow Rate 2.00 lpm     Narrative:       Serial Number: [id number]    : [id number]    Magnesium [[id number]]  (Normal) Collected:  02/15/17 1615    Specimen:  Blood Updated:  02/15/17 1645     Magnesium 1.9 mg/dL     Troponin [[id number]]  (Normal) Collected:  02/15/17 1615    Specimen:  Blood Updated:  02/15/17 1656     Troponin I <0.012 ng/mL     Basic Metabolic Panel [[id number]]  (Abnormal) Collected:  02/15/17 1615    Specimen:  Blood Updated:  02/15/17 1713     Glucose 132 (H) mg/dL      BUN 63 (H) mg/dL      Creatinine 1.72 (H) mg/dL      Sodium 138 mmol/L      Potassium 4.3 mmol/L      Chloride 96 (L) mmol/L      CO2 32.0 (H) mmol/L      Calcium 9.1 mg/dL      eGFR Non African Amer 43 (L) mL/min/1.73       BUN/Creatinine Ratio 36.6 (H)      Anion Gap 10.0 mmol/L     Narrative:       GFR Normal >60  Chronic Kidney Disease <60  Kidney Failure <15    TSH [[id number]]  (Normal) Collected:  02/15/17 1615    Specimen:  Blood Updated:  02/15/17 1716     TSH 1.070 mIU/mL     CBC (No Diff) [[id number]]  (Abnormal) Collected:  02/16/17 0442    Specimen:  Blood Updated:  02/16/17 0534     WBC 11.65 (H) 10*3/mm3      RBC 3.21 (L) 10*6/mm3      Hemoglobin 8.6 (L) g/dL      Hematocrit 27.5 (L) %      MCV 85.7 fL      MCH 26.8 (L) pg      MCHC 31.3 (L) g/dL      RDW 19.2 (H) %      RDW-SD 59.7 (H) fl      MPV 10.3 fL      Platelets 112 (L) 10*3/mm3     Basic Metabolic Panel [[id number]]  (Abnormal) Collected:  02/16/17 0442    Specimen:  Blood Updated:  02/16/17 0541     Glucose 209 (H) mg/dL      BUN 59 (H) mg/dL      Creatinine 1.86 (H) mg/dL      Sodium 139 mmol/L      Potassium 3.8 mmol/L      Chloride 96 (L) mmol/L      CO2 31.0 mmol/L      Calcium 8.8 mg/dL      eGFR Non African Amer 39 (L) mL/min/1.73      BUN/Creatinine Ratio 31.7 (H)      Anion Gap 12.0 mmol/L     Narrative:       GFR Normal >60  Chronic Kidney Disease <60  Kidney Failure <15    TSH [[id number]]  (Normal) Collected:  02/16/17 0442    Specimen:  Blood Updated:  02/16/17 0611     TSH 0.912 mIU/mL     CBC (No Diff) [[id number]]  (Abnormal) Collected:  02/17/17 0107    Specimen:  Blood Updated:  02/17/17 0130     WBC 13.63 (H) 10*3/mm3      RBC 3.20 (L) 10*6/mm3      Hemoglobin 8.6 (L) g/dL      Hematocrit 27.4 (L) %      MCV 85.6 fL      MCH 26.9 (L) pg      MCHC 31.4 (L) g/dL      RDW 19.3 (H) %      RDW-SD 59.2 (H) fl      MPV 10.2 fL      Platelets 118 (L) 10*3/mm3     Uric Acid [[id number]]  (Normal) Collected:  02/17/17 0107    Specimen:  Blood Updated:  02/17/17 0130     Uric Acid 4.4 mg/dL     Basic Metabolic Panel [[id number]]  (Abnormal) Collected:  02/17/17 0107    Specimen:  Blood Updated:  02/17/17 0130     Glucose 242 (H) mg/dL      BUN 42 (H) mg/dL       Creatinine 1.49 (H) mg/dL      Sodium 137 mmol/L      Potassium 4.4 mmol/L      Chloride 99 mmol/L      CO2 26.0 mmol/L      Calcium 8.8 mg/dL      eGFR Non African Amer 50 (L) mL/min/1.73      BUN/Creatinine Ratio 28.2 (H)      Anion Gap 12.0 mmol/L     Narrative:       GFR Normal >60  Chronic Kidney Disease <60  Kidney Failure <15    Iron Profile [[id number]]  (Normal) Collected:  02/17/17 0108    Specimen:  Blood Updated:  02/17/17 0233     Iron 47 mcg/dL      TIBC 239 mcg/dL      Iron Saturation 20 %     Basic Metabolic Panel [[id number]]  (Abnormal) Collected:  02/18/17 0525    Specimen:  Blood Updated:  02/18/17 0627     Glucose 291 (H) mg/dL      BUN 52 (H) mg/dL      Creatinine 1.79 (H) mg/dL      Sodium 138 mmol/L      Potassium 4.0 mmol/L      Chloride 101 mmol/L      CO2 23.0 (L) mmol/L      Calcium 8.6 mg/dL      eGFR Non African Amer 41 (L) mL/min/1.73      BUN/Creatinine Ratio 29.1 (H)      Anion Gap 14.0 (H) mmol/L     Narrative:       GFR Normal >60  Chronic Kidney Disease <60  Kidney Failure <15    CBC & Differential [[id number]] Collected:  02/18/17 0525    Specimen:  Blood Updated:  02/18/17 0805    Narrative:       The following orders were created for panel order CBC & Differential.  Procedure                               Abnormality         Status                     ---------                               -----------         ------                     CBC Auto Differential[[id number]]         Abnormal            Final result                 Please view results for these tests on the individual orders.    CBC Auto Differential [[id number]]  (Abnormal) Collected:  02/18/17 0525    Specimen:  Blood Updated:  02/18/17 0805     WBC 12.17 (H) 10*3/mm3      RBC 3.11 (L) 10*6/mm3      Hemoglobin 8.3 (L) g/dL      Hematocrit 26.4 (L) %      MCV 84.9 fL      MCH 26.7 (L) pg      MCHC 31.4 (L) g/dL      RDW 19.1 (H) %      RDW-SD 58.7 (H) fl      MPV 10.0 fL      Platelets 129 (L) 10*3/mm3      Neutrophil % 80.2  (H) %      Lymphocyte % 9.4 (L) %      Monocyte % 5.4 %      Eosinophil % 0.0 %      Basophil % 0.2 %      Immature Grans % 4.8 %      Neutrophils, Absolute 9.76 (H) 10*3/mm3      Lymphocytes, Absolute 1.14 10*3/mm3      Monocytes, Absolute 0.66 10*3/mm3      Eosinophils, Absolute 0.00 10*3/mm3      Basophils, Absolute 0.02 10*3/mm3      Immature Grans, Absolute 0.59 (H) 10*3/mm3     Blood Culture ID, PCR [[id number]]  (Abnormal) Collected:  02/17/17 0108    Specimen:  Blood from Arm, Left Updated:  02/18/17 0956     BCID, PCR        Staphylococcus aureus. mecA (methicillin resistance gene) detected. Identification by BCID PCR. (C)    Body Fluid Culture [[id number]]  (Abnormal)  (Susceptibility) Collected:  02/17/17 0004    Specimen:  Body Fluid from Blood, Central Line Updated:  02/19/17 0626     BF Culture        Moderate growth (3+) Staphylococcus aureus, MRSA (C)        Methicillin resistant Staphylococcus aureus, Patient may be an isolation risk.        BETA LACTAMASE Positive      Gram Stain Result Few (2+) WBCs seen              Few (2+) Gram positive cocci, intracellular and extracellular    Susceptibility      Staphylococcus aureus, MRSA     JUAN J     Clindamycin >=8 ug/ml Resistant     Erythromycin >=8 ug/ml Resistant     Gentamicin <=0.5 ug/ml Susceptible     Inducible Clindamycin Resistance NEG  Negative     Levofloxacin 4 ug/ml Intermediate  [1]      Oxacillin >=4 ug/ml Resistant     Penicillin G >=0.5 ug/ml Resistant     Tetracycline <=1 ug/ml Susceptible     Trimethoprim + Sulfamethoxazole <=10 ug/ml Susceptible     Vancomycin 1 ug/ml Susceptible            [1]   Staphylococcus species may develop resistance during prolonged therapy with quinolones. Isolates that are initially susceptible may become resistant within three to four days after initiation of therapy. Testing of repeat isolates may be warranted.                  Basic Metabolic Panel [[id number]]  (Abnormal) Collected:  02/19/17 0603    Specimen:   Blood Updated:  02/19/17 0629     Glucose 309 (H) mg/dL      BUN 55 (H) mg/dL      Creatinine 1.76 (H) mg/dL      Sodium 135 mmol/L      Potassium 4.8 mmol/L      Chloride 104 mmol/L      CO2 23.0 (L) mmol/L      Calcium 8.7 mg/dL      eGFR Non African Amer 42 (L) mL/min/1.73      BUN/Creatinine Ratio 31.3 (H)      Anion Gap 8.0 mmol/L     Narrative:       GFR Normal >60  Chronic Kidney Disease <60  Kidney Failure <15    CBC (No Diff) [[id number]]  (Abnormal) Collected:  02/19/17 0603    Specimen:  Blood Updated:  02/19/17 0630     WBC 13.85 (H) 10*3/mm3      RBC 3.13 (L) 10*6/mm3      Hemoglobin 8.4 (L) g/dL      Hematocrit 26.6 (L) %      MCV 85.0 fL      MCH 26.8 (L) pg      MCHC 31.6 (L) g/dL      RDW 18.9 (H) %      RDW-SD 58.3 (H) fl      MPV 9.6 fL      Platelets 136 10*3/mm3     Blood Culture [[id number]]  (Abnormal) Collected:  02/17/17 0108    Specimen:  Blood from Hand, Left Updated:  02/19/17 0928     Blood Culture Staphylococcus aureus (A)         Consider infectious disease consult to rule out distant focus of infection.        Isolated from Aerobic and Anaerobic Bottles      BETA LACTAMASE Positive     Blood Culture With CHERYLE [[id number]]  (Normal) Collected:  02/18/17 1541    Specimen:  Blood from Arm, Left Updated:  02/19/17 1701     Blood Culture No growth at 24 hours      WBC 15.95 (H) 10*3/mm3      RBC 3.08 (L) 10*6/mm3      Hemoglobin 8.3 (L) g/dL      Hematocrit 25.9 (L) %      MCV 84.1 fL      MCH 26.9 (L) pg      MCHC 32.0 (L) g/dL      RDW 18.5 (H) %      RDW-SD 56.2 (H) fl      MPV 9.8 fL      Platelets 149 10*3/mm3     Basic Metabolic Panel [[id number]]  (Abnormal) Collected:  02/20/17 0341    Specimen:  Blood Updated:  02/20/17 0442     Glucose 203 (H) mg/dL      BUN 57 (H) mg/dL      Creatinine 1.60 (H) mg/dL      Sodium 135 mmol/L      Potassium 4.8 mmol/L      Chloride 101 mmol/L      CO2 23.0 (L) mmol/L      Calcium 8.5 mg/dL      eGFR Non African Amer 46 (L) mL/min/1.73      BUN/Creatinine  Ratio 35.6 (H)      Anion Gap 11.0 mmol/L     Narrative:       GFR Normal >60  Chronic Kidney Disease <60  Kidney Failure <15    Catheter Culture [[id number]]  (Abnormal)  (Susceptibility) Collected:  02/17/17 1836    Specimen:  Cath Tip from Neck Updated:  02/20/17 0736     CATHETER CULTURE Staphylococcus aureus, MRSA (C)         Methicillin resistant Staphylococcus aureus, Patient may be an isolation risk.        BETA LACTAMASE Positive     Susceptibility      Staphylococcus aureus, MRSA     JUAN J     Clindamycin >=8 ug/ml Resistant     Erythromycin >=8 ug/ml Resistant     Gentamicin <=0.5 ug/ml Susceptible     Inducible Clindamycin Resistance NEG  Negative     Levofloxacin 4 ug/ml Intermediate  [1]      Oxacillin >=4 ug/ml Resistant     Penicillin G >=0.5 ug/ml Resistant     Tetracycline <=1 ug/ml Susceptible     Trimethoprim + Sulfamethoxazole <=10 ug/ml Susceptible     Vancomycin 2 ug/ml Susceptible            [1]   Staphylococcus species may develop resistance during prolonged therapy with quinolones. Isolates that are initially susceptible may become resistant within three to four days after initiation of therapy. Testing of repeat isolates may be warranted.                  Blood Culture [[id number]]  (Abnormal)  (Susceptibility) Collected:  02/17/17 0108    Specimen:  Blood from Arm, Left Updated:  02/20/17 0741     Blood Culture Staphylococcus aureus, MRSA (C)         Consider infectious disease consult to rule out distant focus of infection.  Methicillin resistant Staphylococcus aureus, Patient may be an isolation risk.        Isolated from Aerobic and Anaerobic Bottles      BETA LACTAMASE Positive      Gram Stain Result Gram positive cocci     Susceptibility      Staphylococcus aureus, MRSA     JUAN J     Clindamycin >=8 ug/ml Resistant     Erythromycin >=8 ug/ml Resistant     Gentamicin <=0.5 ug/ml Susceptible     Inducible Clindamycin Resistance NEG  Negative     Levofloxacin >=8 ug/ml Resistant     Linezolid 2  ug/ml Susceptible     Oxacillin >=4 ug/ml Resistant     Penicillin G >=0.5 ug/ml Resistant     Tetracycline 2 ug/ml Susceptible     Trimethoprim + Sulfamethoxazole <=10 ug/ml Susceptible     Vancomycin 2 ug/ml Susceptible                          Imaging Results (all)     Procedure Component Value Units Date/Time    CT Head Without Contrast [[id number]] Collected:  02/15/17 1538     Updated:  02/15/17 1541    Impression:       1. No acute intracranial process.        This report was finalized on 02/15/2017 15:39 by Dr. Stanton Romano MD.    NM Lung Ventilation Perfusion [[id number]] Collected:  02/15/17 1539     Updated:  02/15/17 1545    Narrative:       NM LUNG VENTILATION PERFUSION- 02/15/2017     History: Dyspnea and elevated D dimer       Comparison: Chest x-ray 02/15/2017      Radiopharmaceutical: 9.9 mCi of Xenon-133 for the ventilation study and  5.3 mCi Tc 99m MAA for the perfusion study.      Technique: Following inhalation of the aerosolized radiopharmaceutical,  the ventilation study was performed with images of the thorax being  obtained in posterior projection. Thereafter, the perfusion study was  performed following the injection of radiolabeled MAA particles with  images of the thorax obtained in 8 projections.      FINDINGS:    No pleural based, wedge-shape, matched or mismatched segmental  ventilation perfusion defects are demonstrated.         Impression:       Findings are most commonly associated with low probability for acute  pulmonary embolism.     This report was finalized on 02/15/2017 15:40 by Dr. Stanton Romano MD.    XR Chest 1 View [[id number]] Collected:  02/15/17 1137     Updated:  02/15/17 1722    Narrative:       EXAM:   XR CHEST 1 VW-       DATE:  02/15/2017      HISTORY:  Male, age  47 years;chest pain     COMPARISON:  None.     FINDINGS:  The heart mediastinum are normal in size. Lungs are without focal  infiltrate, mass or effusions.  The bones show no acute pathology.   Right-sided  dialysis catheter with tip terminating at the expected  location of the atriocaval junction.       Impression:       No acute cardiopulmonary disease.     This report was finalized on 02/15/2017 17:20 by Dr. Kristyn Rodriguez MD.    MRI Brain Without Contrast [[id number]] Collected:  02/16/17 1600     Updated:  02/16/17 1722    Impression:       1.  No acute intracranial abnormalities.  2.  Nonspecific foci of gliosis in the bilateral white matter, likely  reflecting mild chronic microvascular changes.  This report was finalized on 02/16/2017 17:20 by Dr. Kristyn Rodriguez MD.          Orders (all)     Start     Ordered    02/20/17 0700  insulin detemir (LEVEMIR) injection 10 Units  Every Morning      02/19/17 1319    02/20/17 0600  Basic Metabolic Panel  Morning Draw      02/19/17 1205    02/20/17 0600  CBC (No Diff)  Morning Draw      02/19/17 1319    02/19/17 2035  POC Glucose Fingerstick  Once      02/19/17 2034    02/19/17 1730  insulin lispro (humaLOG) injection 2-7 Units  4 Times Daily Before Meals & Nightly      02/19/17 1319    02/19/17 1708  POC Glucose Fingerstick  Once      02/19/17 1707    02/19/17 1707  POC Glucose Fingerstick  Once      02/19/17 1706    02/19/17 1700  POC Glucose Fingerstick  4 Times Daily Before Meals & at Bedtime      02/19/17 1319    02/19/17 1320  Do NOT Hold Basal Insulin When Patient is NPO, Hold Bolus Dose if NPO  Continuous      02/19/17 1319    02/19/17 1320  Follow [facility] Hypoglycemia Protocol For Blood Glucose Less Than 70 mg/dL  Until Discontinued      02/19/17 1319    02/19/17 1319  dextrose (GLUTOSE) oral gel 15 g  Every 15 Minutes PRN      02/19/17 1319    02/19/17 1319  dextrose (D50W) solution 25 g  Every 15 Minutes PRN      02/19/17 1319    02/19/17 1319  glucagon (human recombinant) (GLUCAGEN DIAGNOSTIC) injection 1 mg  Every 15 Minutes PRN      02/19/17 1319    02/19/17 1245  bumetanide (BUMEX) tablet 1 mg  Daily      02/19/17 1205    02/19/17 1104  Please ask microbiology  to report to linezolid susceptibilities for the MRSA in the blood  Once     Comments:  Please ask microbiology to report to linezolid susceptibilities for the MRSA in the blood    02/19/17 1104    02/19/17 0600  CBC (No Diff)  Morning Draw      02/18/17 1330    02/19/17 0600  Basic Metabolic Panel  Morning Draw      02/18/17 1456    02/18/17 2045  Linezolid (ZYVOX) 600 mg 300 mL  Every 12 Hours      02/18/17 1005    02/18/17 1532  Blood Culture With CHERYLE  Once     Comments:  Zyvox    02/18/17 1531    02/18/17 1331  Inpatient Consult to Infectious Diseases  Once     Specialty:  Infectious Diseases  Provider:  Laura Eckert MD    02/18/17 1330    02/18/17 0953  Blood Culture ID, PCR  Once      02/18/17 0952    02/18/17 0600  CBC & Differential  Morning Draw      02/17/17 0935    02/18/17 0600  CBC (No Diff)  Morning Draw,   Status:  Canceled      02/17/17 1528    02/18/17 0600  Basic Metabolic Panel  Morning Draw      02/17/17 1528    02/18/17 0600  CBC Auto Differential  PROCEDURE ONCE      02/18/17 0000    02/17/17 2100  metoprolol tartrate (LOPRESSOR) tablet 75 mg  Every 12 Hours Scheduled      02/17/17 1143    02/17/17 1756  Catheter Culture  Once      02/17/17 1755    02/17/17 1706  Diet Regular; Cardiac  Diet Effective Now      02/17/17 1705    02/17/17 1631  metoprolol tartrate (LOPRESSOR) injection 5 mg  Every 6 Hours PRN      02/17/17 1631    02/17/17 0815  Inpatient Consult to Vascular Surgery  Once     Provider:  Ian Grimes,     02/17/17 0815    02/17/17 0757  NPO Diet  Diet Effective Now,   Status:  Canceled      02/17/17 0757    02/17/17 0600  Basic Metabolic Panel  Morning Draw      02/16/17 1349    02/17/17 0600  Iron Profile  Morning Draw      02/16/17 1349    02/17/17 0600  Uric Acid  Morning Draw      02/16/17 1349    02/17/17 0600  CBC (No Diff)  Morning Draw      02/16/17 1349    02/17/17 0130  linezolid (ZYVOX) tablet 600 mg  Every 12 Hours Scheduled,   Status:  Discontinued       02/17/17 0052    02/17/17 0051  Blood Culture  Once      02/17/17 0052    02/17/17 0051  Blood Culture  Once     Comments:  From a different site than #1.    02/17/17 0052    02/17/17 0018  D / C Central Line  Once     Comments:  D/C permacath    02/17/17 0018    02/16/17 2335  Body Fluid Culture  Once     Comments:  Culture drainage from around perm-a-cath site    02/16/17 2336    02/16/17 2100  metoprolol tartrate (LOPRESSOR) tablet 50 mg  Every 12 Hours Scheduled,   Status:  Discontinued      02/16/17 1416    02/16/17 1256  PT Consult: Eval & Treat  Once      02/16/17 1255    02/16/17 1256  OT Consult: Eval & Treat LUE weaknessimproved  Once      02/16/17 1255    02/16/17 1229  MRI Brain Without Contrast  1 Time Imaging      02/16/17 1228    02/16/17 1011  heparin (porcine) injection 1,800 Units  As Needed      02/16/17 1014    02/16/17 1004  Routine central line care if present  Continuous     Comments:  May draw blood from line.    02/16/17 1011    02/16/17 1004  Hemodialysis inpatient  Once     Comments:  Pt runs every TUES ,THURS and SAT hd tx    02/16/17 1011    02/16/17 0600  CBC (No Diff)  Morning Draw      02/15/17 1623    02/16/17 0600  TSH  Morning Draw      02/15/17 1623    02/15/17 2200  diltiaZEM (CARDIZEM) tablet 90 mg  Every 6 Hours Scheduled      02/15/17 1428    02/15/17 2100  tamsulosin (FLOMAX) 24 hr capsule 0.4 mg  Nightly      02/15/17 1623    02/15/17 2000  Vital Signs  Every 4 Hours      02/15/17 1623    02/15/17 1800  metoprolol tartrate (LOPRESSOR) tablet 25 mg  2 Times Daily,   Status:  Discontinued      02/15/17 1623    02/15/17 1800  predniSONE (DELTASONE) tablet 40 mg  2 Times Daily With Meals      02/15/17 1623    02/15/17 1800  docusate sodium (COLACE) capsule 100 mg  2 Times Daily      02/15/17 1623    02/15/17 1800  enoxaparin (LOVENOX) syringe 30 mg  Daily      02/15/17 1623    02/15/17 1724  Inpatient Consult to Case Management   Once     Provider:  (Not yet  assigned)    02/15/17 1724    02/15/17 1700  diltiaZEM CD (CARDIZEM CD) 24 hr capsule 360 mg  Every 24 Hours Scheduled,   Status:  Discontinued      02/15/17 1623    02/15/17 1700  Strict Intake and Output  Every Hour      02/15/17 1623    02/15/17 1645  metoprolol tartrate (LOPRESSOR) tablet 25 mg  Every 12 Hours Scheduled,   Status:  Discontinued      02/15/17 1428    02/15/17 1624  Weigh patient  Once      02/15/17 1623    02/15/17 1624  Oxygen Therapy-  Continuous      02/15/17 1623    02/15/17 1624  Insert Peripheral IV  Once      02/15/17 1623    02/15/17 1624  Saline Lock & Maintain IV Access  Continuous      02/15/17 1623    02/15/17 1624  Full Code  Continuous      02/15/17 1623    02/15/17 1624  VTE Risk Assessment - Moderate Risk  Once      02/15/17 1623    02/15/17 1624  Remove & Replace Compression Stockings (TEDS) Daily  Daily      02/15/17 1623    02/15/17 1624  Place Compression Stockings (TEDs)  Once      02/15/17 1623    02/15/17 1624  Pulse Oximetry,  Spot  Once      02/15/17 1623    02/15/17 1624  Cardiac Monitoring  Continuous      02/15/17 1623    02/15/17 1624  Diet Regular; Cardiac, Renal  Diet Effective Now,   Status:  Canceled      02/15/17 1623    02/15/17 1624  Basic Metabolic Panel  Daily      02/15/17 1623    02/15/17 1624  Inpatient Consult to Nephrology  Once     Specialty:  Nephrology  Provider:  Reinaldo Foley MD    02/15/17 1623    02/15/17 1624  Inpatient Consult to Cardiology  Once     Specialty:  Cardiology  Provider:  Luis Carlos Edge MD    02/15/17 1623    02/15/17 1623  sodium chloride 0.9 % flush 1-10 mL  As Needed      02/15/17 1623    02/15/17 1623  acetaminophen (TYLENOL) tablet 650 mg  Every 4 Hours PRN      02/15/17 1623    02/15/17 1623  aluminum-magnesium hydroxide-simethicone (MAALOX/MYLANTA) suspension 30 mL  Every 6 Hours PRN      02/15/17 1623    02/15/17 1623  ondansetron (ZOFRAN) injection 4 mg  Every 6 Hours PRN      02/15/17 1623    02/15/17  1600  Troponin  Once      02/15/17 1428    02/15/17 1546  xenon xe 133 gas 10 mCi 9.9 millicurie  Once in Imaging      02/15/17 1544    02/15/17 1546  technetium albumin aggregated (MAA) solution 1 dose  Once in Imaging      02/15/17 1544    02/15/17 1429  Wean cardizem gtt off as tolerated for hr less than 120. Start oral cardizem tomorrow- pt had 360 mg PO dose today at home  Once     Comments:  Wean cardizem gtt off as tolerated for hr less than 120. Start oral cardizem tomorrow- pt had 360 mg PO dose today at home    02/15/17 1428    02/15/17 1429  Magnesium  Once      02/15/17 1428    02/15/17 1429  TSH  Once      02/15/17 1428    02/15/17 1402  Inpatient Admission  Once      02/15/17 1401    02/15/17 1349  NM Lung Ventilation Perfusion  1 Time Imaging      02/15/17 1348    02/15/17 1344  Blood Gas, Arterial  Once      02/15/17 1343    02/15/17 1331  CT Head Without Contrast  1 Time Imaging      02/15/17 1330    02/15/17 1331  Blood Gas, Arterial  STAT      02/15/17 1330    02/15/17 1301  POC Troponin, Rapid  Once      02/15/17 1300    02/15/17 1028  Scan Slide  Once      02/15/17 1028    02/15/17 1022  POC Troponin, Rapid  Once      02/15/17 1021    02/15/17 1000  diltiaZEM (CARDIZEM) injection 10 mg  Once      02/15/17 0958    02/15/17 1000  diltiaZEM (CARDIZEM) 125mg/125 mL infusion  Titrated,   Status:  Discontinued      02/15/17 0958    02/15/17 0956  POCT Troponin, rapid  Now Then Every 3 Hours,   Status:  Canceled      02/15/17 0958    02/15/17 0955  CBC & Differential  Once      02/15/17 0958    02/15/17 0955  Comprehensive Metabolic Panel  Once      02/15/17 0958    02/15/17 0955  D-dimer, Quantitative  Once      02/15/17 0958    02/15/17 0955  Protime-INR  Once      02/15/17 0958    02/15/17 0955  aPTT  Once      02/15/17 0958    02/15/17 0955  BNP  Once      02/15/17 0958    02/15/17 0955  ECG 12 Lead  Now & in 3 Hours      02/15/17 0958    02/15/17 0955  XR Chest 1 View  1 Time Imaging      02/15/17  0958    02/15/17 0955  CBC Auto Differential  PROCEDURE ONCE      02/15/17 0958    02/15/17 0940  ECG 12 Lead  STAT      02/15/17 0940    Unscheduled  Up with assistance  As Needed      02/15/17 1623    Signed and Held  acetaminophen (TYLENOL) tablet 1,000 mg  Every 4 Hours PRN,   Status:  Canceled      Signed and Held    Signed and Held  sodium chloride injection 40 mEq  Every 5 Minutes PRN,   Status:  Canceled      Signed and Held    Signed and Held  alteplase ((CATHFLO/ACTIVASE)) injection 2 mg  Once,   Status:  Canceled      Signed and Held    Signed and Held  diphenhydrAMINE (BENADRYL) injection 50 mg  Once As Needed,   Status:  Canceled      Signed and Held    Signed and Held  sodium chloride 0.9 % bolus 100 mL  As Needed,   Status:  Canceled      Signed and Held    Signed and Held  heparin (porcine) injection 1,800 Units  As Needed,   Status:  Canceled      Signed and Held    Signed and Held  albumin human 25 % IV SOLN 12.5 g  As Needed,   Status:  Canceled      Signed and Held          [facility] -  Progress Note     LOS: 1 day   Patient Care Team:  Moises Montes MD as PCP - General (Internal Medicine)  Luis Carlos Awan MD as Consulting Physician (Urology)  Alex Cortez MD as Cardiologist (Cardiology)    Chief Complaint: Chest pain, shortness of breath and palpitations    Reason for consultation: A-Fib     Subjective     Interval History:   gtt turned off during dialysis. Patient denies any cardiac concerns. He states that he is feeling well. His left upper extremity weakness has resolved.     Review of Systems:   Review of Systems   Constitution: Positive for weakness. Negative for diaphoresis and fever.   Cardiovascular: Positive for leg swelling. Negative for chest pain, irregular heartbeat, near-syncope, orthopnea, palpitations, paroxysmal nocturnal dyspnea and syncope.   Respiratory: Negative for cough, shortness of breath and sputum production.    Skin: Negative for rash.    Musculoskeletal: Negative for falls.   Gastrointestinal: Negative for bloating, abdominal pain, nausea and vomiting.   Neurological: Positive for focal weakness (LUE- resolved).   Psychiatric/Behavioral: Negative for altered mental status.     Objective     Vital Sign Min/Max for last 24 hours  Temp  Min: 96.9 °F (36.1 °C)  Max: 98.9 °F (37.2 °C)   BP  Min: 121/74  Max: 132/69   Pulse  Min: 92  Max: 116   Resp  Min: 18  Max: 24   SpO2  Min: 93 %  Max: 98 %   Flow (L/min)  Min: 2  Max: 2   Weight  Min: 222 lb 14.4 oz (101 kg)  Max: 222 lb 14.4 oz (101 kg)   Physical Exam:  Physical Exam   Constitutional: He is oriented to person, place, and time. He appears well-developed and well-nourished. He is cooperative. No distress.   Currently receiving dialysis.    HENT:   Head: Normocephalic and atraumatic.   Cardiovascular: Intact distal pulses.  An irregularly irregular rhythm present. Tachycardia present.  murmur heard.  Telemetry: A-Flutter 97- 117 BPM     Pulmonary/Chest: Effort normal and breath sounds normal. No respiratory distress. He exhibits no tenderness.   Abdominal: Soft. Bowel sounds are normal. He exhibits no distension. There is no tenderness.   Musculoskeletal: He exhibits edema (BLE 1+ pitting edema, left > right ).   Neurological: He is alert and oriented to person, place, and time.   Skin: Skin is warm and dry. No rash noted. He is not diaphoretic.   Psychiatric: He has a normal mood and affect. His speech is normal and behavior is normal.   Vitals reviewed.       Assessment&#47;Plan     Principal Problem:    -Persistent atrial fibrillation RVR- Cardizem gtt discontinued earlier today and HR is currently 110's-120's. He is asymptomatic.     Active Problems:    -Henoch-Schonlein purpura nephritis    -Hypertensive nephrosclerosis    -Wegener's granulomatosis with renal involvement    -Tobacco abuse    -Microcytic anemia      Plan   1. Monitor telemetry  2. No anticoagulation at this time due to recent  "hematuria, anemia, and reported frequent injuries and falls  Low salt cardiac diet  Continue Cardizem- switch to CD at discharge  5. Increase Lopresor to 50mg PO BID for rate control     Slime Alexis, APRN  02/16/17  2:15 PM          [facility] -  Progress Note     LOS: 1 day   Patient Care Team:  Moises Montes MD as PCP - General (Internal Medicine)  Luis Carlos Awan MD as Consulting Physician (Urology)  Alex Cortez MD as Cardiologist (Cardiology)    Chief Complaint: Chest pain, shortness of breath and palpitations    Reason for consultation: A-Fib     Subjective     Interval History:   currently off the floor in dialysis.     Review of Systems:   Review of Systems   Unable to perform ROS: other    Patient is off the floor in dialysis     Objective         Physical Exam:  Physical Exam   Cardiovascular:   Telemetry: A-Flutter 97- 117 BPM     Vitals reviewed.       Assessment&#47;Plan     Principal Problem:    Persistent atrial fibrillation    Active Problems:    Henoch-Schonlein purpura nephritis    Hypertensive nephrosclerosis    Wegener's granulomatosis with renal involvement    Tobacco abuse    Microcytic anemia      Plan   1. Monitor telemetry  2. No anticoagulation at this time due to recent hematuria, anemia, and reported frequent injuries and falls  Low salt cardiac diet  Continue BB and Cardizem     Slime Alexis, APRN  02/16/17  11:52 AM                [facility]  INPATIENT PROGRESS NOTE    Length of Stay: 1  Date of Admission: 2/15/2017  Primary Care Physician: Moises Montes MD    Subjective   Chief Complaint: follow up short of breath and palpitations. \" I feel fine today\"  HPI   Admitted 2/15/16 with chest pain shortness of breath and palpitations.  He was found to be in atrial flutter with RVR and was placed on cardizem drip.  He tells me that his wife stated he was having some difficulty with speech and left " arm difficulty.  He is currently in dialysis.  He denies chest pain or palpitations.  He denies shortness of breath.  His speech is fluent without any slurred speech.  He denies any weakness left upper extremity.    Review of Systems   Constitutional: Positive for fatigue.   HENT: Negative for congestion.    Eyes: Negative for visual disturbance.   Respiratory: Positive for shortness of breath. Negative for wheezing.    Cardiovascular: Positive for leg swelling (chronic).   Gastrointestinal: Negative for abdominal distention, constipation, diarrhea, nausea and vomiting.   Endocrine: Negative for polyuria.   Genitourinary: Negative for dysuria.   Skin: Negative for wound.   Neurological: Positive for weakness.   Psychiatric/Behavioral: Positive for agitation.      All pertinent negatives and positives are as above. All other systems have been reviewed and are negative unless otherwise stated.     Objective    Temp:  [96.9 °F (36.1 °C)-98.9 °F (37.2 °C)] 97.2 °F (36.2 °C)  Heart Rate:  [] 107  Resp:  [18-24] 18  BP: (119-132)/(64-91) 132/69  Physical Exam   Constitutional: He is oriented to person, place, and time. He appears well-developed and well-nourished.   HENT:   Head: Normocephalic and atraumatic.   Eyes: EOM are normal. Pupils are equal, round, and reactive to light.   Neck: Normal range of motion. Neck supple. No tracheal deviation present.   Cardiovascular:   No murmur heard.  Irregular rate, atrial fibrillation on telemetry    Pulmonary/Chest: Effort normal and breath sounds normal. No respiratory distress. He has no wheezes. He has no rales.   Abdominal: Soft. Bowel sounds are normal. He exhibits no distension.   Musculoskeletal: He exhibits edema (trace ankle edema).   Neurological: He is alert and oriented to person, place, and time.   Skin: Skin is warm and dry.   Psychiatric:   flat       Assessment:   1.  Atrial fibrillation with RVR, no anticoagulation due to recent hematuria, anemia,  and reported frequent injuries and falls (per cardiology)  2.  ESRD on hemodialysis T,TH, Sat  3.  Henoch Schollein purpura nephritis on HD  4.  Chronic anemia, anemia of chronic disease  5.  LUE weakness, resolved  6.  Elevated proBNP secondary to ESRD  7.  Elevated d-dimer with negative NM ventilation perfusion  8.  Jerome's granulomatosis  9.  Mild leukocytosis    Plan:  1.  Currently on Cardizem drip.  He has been seen by cardiology.  Cardizem has been resumed orally.  Will discontinue Cardizem drip.  2.  NM  Scan negative.  3.  Check MRI head without contrast due to reported left upper extremity weakness and slurred speech.  4.  Hemodialysis Tuesday Thursday Saturday.  5.  Cardiology adjusting medications for A. fib with RVR  6.  BMP a.m. to monitor renal statu.  7.  Physical therapy, occupational therapy consultation      The above documentation resulted from a face-to-face encounter by me Flores GARVIN, [facility].      Discharge Planning: I expect patient to be discharged to home in 1-2 days when atrial fibrillation is rate controlled.    BHARATHI Kramer   02/16/17   11:48 AM    I personally evaluated  the patient in conjunction with BHARATHI Tanner and agree with the assessment, treatment plan, and disposition of the patient as recorded by her. My history, exam, and further recommendations are:   Patient presented with chest pain and shortness of breath, was found to be in atrial fibrillation with rapid ventricular response.  He also is a end-stage renal disease and has henoch schonlein nephritis, wegener's granulomatosis.  He has now been started on oral Cardizem.  He underwent dialysis today.  Agree with above assessment and plan of care as outlined and discussed with Flores.  Fam Odom MD  02/16/17  6:36 PM       Electronically signed by Fam Odom MD at 2/16/2017  6:36 PM      Reinaldo Foley MD at 2/16/2017  1:45 PM  Version 1 of 1         Patient:  Gurjit  Zully  YOB: 1970  Date of Service: 2/16/2017  MRN: [id number]   Acct: [id number]   Primary Care Physician: Moises Montes MD  Advance Directive: Full Code  Admit Date: 2/15/2017       Hospital Day: 1  Referring Provider: Kalie Vergara DO      Pt was seen on RRT  Modality: Hemodialysis  Access: catheter  Location: right upper  QB: 450  QD: 600  UF: 3 liters      Review of Systems:  History obtained from chart review and the patient  General ROS: No fever or chills  Respiratory ROS: No cough, shortness of breath, wheezing  Cardiovascular ROS: +chest pain or dyspnea on exertion  Gastrointestinal ROS: No abdominal pain or melena  Genito-Urinary ROS: No dysuria or hematuria  Musculoskeletal: right shoulder pain  Skin: negative  Physical examination:  General: awake/alert   Chest:  clear to auscultation bilaterally without respiratory distress  CVS:+/- regular rate and rhythm  Abdominal: soft, nontender, normal bowel sounds  Extremities: no cyanosis or edema  Skin: warm and dry without rash  Neuro: No focal motor deficits      Assessment     1. OSMANI (due to Wegener's granulomatosis)- patient is followed by Rheumatology and is on immunosuppressants.   2. Atrial fibrillation with RVR--rate controlled now.  3. Anemia  4. Benign HTN  5-Rt shoulder pain-will check uric acid level for possible gout.    Plan:  As above.       Reinaldo Foley MD  2/16/2017  1:45 PM     Electronically signed by Reinadlo Foley MD at 2/16/2017  1:48 PM      BHARATHI Clifford at 2/17/2017  8:39 AM  Version 1 of 1    Attestation signed by Reinaldo Foley MD at 2/17/2017  3:28 PM (Updated)        I saw and examined patient independently. I agree with NP's exam and assessment. Patient was admitted with chest pain and palpitations. He was found with rapid A Fib. He recently developed OSMANI/CKD stage 3 and was diagnosed with pauci immune vasculitic glomerulonephritis He has been on po steroids per  rheumatology. He ws found this time as well with purulent discharge from his dialysis catheter. He is s/p dialysis yesterday. He was having good urine output and the impression now is that his renal function is significantly improving.       Vitals: stable. Phys exam: S1 S2 irreg, lungs-CTA, abd-soft, non tender, legs no edema. Labs: reviewed.  A/P:     1. OSMANI (due to Wegener's granulomatosis)- patient is followed by Rheumatology and is on steroids   2. Atrial fibrillation with RVR--rate controlled now.  3. Anemia  4. Benign HTN  5-Line sepsis vs tunnel catheter infection-follow up blood cultures and continue antibiotics. Vascular surgery was also consulted to pull out dialysis line.                                   Nephrology ([facility]) Consult Note      Patient:  Gurjit Andrea  YOB: 1970  Date of Service: 2/17/2017  MRN: [id number]   Acct: [id number]   Primary Care Physician: Moises Montes MD  Advance Directive: Full Code  Admit Date: 2/15/2017       Hospital Day: 2  Referring Provider: Kalie Vergara DO      Patient Seen, Chart, Consults, Notes, Labs, Radiology studies reviewed.        Subjective:  Gurjit Andrea is a 47 y.o. male  whom we were consulted for End stage renal disease management. Pt has been on dialysis for approx one month secondary to biopsy-proven Wegener's granulomatosis. Admitted with atrial fibrillation with RVR. Rhythm converted following administration of Cardizem gtt. Complaint of bilateral joint aches today.  During the night; large amount purulent drainage was noted around Permcath site.    Review of Systems:  History obtained from chart review and the patient  General ROS: No fever or chills  Respiratory ROS: No cough, shortness of breath, wheezing  Cardiovascular ROS: no chest pain or dyspnea on exertion  Gastrointestinal ROS: No abdominal pain or melena  Genito-Urinary ROS: No dysuria or hematuria  Musculoskeletal ROS: +  arthralgias  14 point ROS reviewed with the patient and negative except as noted above and in the HPI unless unable to obtain.      General: awake/alert   Chest:  clear to auscultation bilaterally without respiratory distress  CVS: regular rate and rhythm  Abdominal: soft, nontender, normal bowel sounds  Extremities: no cyanosis or edema  Skin: warm and dry without rash    Assessment   1.  OSMANI secondary to Wegener's granulomatosis--nonoliguric  2.  Atrial fibrillation with RVR--rate now controlled  3.  Anemia  4.  Benign HTN  5.  Catheter-site infection    Plan:  1.  Consult to Vascular for removal of Permcath due to infection  2.  Continue ABX, cultures pending  3.  Some evidence of renal recovery, will hold dialysis for now; follow renal function closely.      Charlie Jacboo, APRN  2/17/2017  8:40 AM      [facility] Services  INPATIENT PROGRESS NOTE    Length of Stay: 2  Date of Admission: 2/15/2017  Primary Care Physician: Moises Montes MD    Subjective   Chief Complaint: shortness of breath resolved. Drainage from dialysis catheter site.  HPI   Admitted 2/15/16 with chest pain shortness of breath and palpitations. Found to be in atrial flutter with RVR and was placed on cardizem drip.  He denies shortness of breath. His speech is fluent without any slurred speech. He denies any weakness left upper extremity.  He denies nausea, vomiting or abdominal pain.  He reports at 2 AM this morning he noticed drainage from dialysis catheter insertion site.  He denies chest pain or palpitations.  Occasional pain in right shoulder area where dialysis catheter located.    He is NPO in anticipation of possible permacath removal.  On telemetry, he has a ectopy issues of concern.  He is very hungry.  He is concerned because he is due to start his immunosuppressive medication for his Wegener's in the next couple of days.  No nesha hematuria     Review of Systems   Constitutional:  Positive for fatigue.   HENT: Negative for congestion.   Eyes: Negative for visual disturbance.   Respiratory: Positive for shortness of breath improved. Negative for wheezing.   Cardiovascular: Positive for leg swelling (chronic).   Gastrointestinal: Negative for abdominal distention, constipation, diarrhea, nausea and vomiting.   Endocrine: Negative for polyuria.   Genitourinary: Negative for dysuria.   Skin: Positive for drainage from dialysis catheter site  Neurological: Positive for weakness improved.   Psychiatric/Behavioral: Negative for agitation.   All pertinent negatives and positives are as above. All other systems have been reviewed and are negative unless otherwise stated.     Objective    Temp:  [97.8 °F (36.6 °C)-98.9 °F (37.2 °C)] 98.9 °F (37.2 °C)  Heart Rate:  [] 112  Resp:  [18-22] 18  BP: (106-130)/(70-91) 130/91  Physical Exam  Constitutional: He is oriented to person, place, and time. He appears well-developed and well-nourished.   Head: Normocephalic and atraumatic.   Eyes: EOM are normal. Pupils are equal, round, and reactive to light.   Neck: Normal range of motion. Neck supple. No tracheal deviation present.   Cardiovascular: No murmur heard. Irregular rate, atrial fibrillation on telemetry    Pulmonary/Chest: Effort normal and breath sounds normal. No respiratory distress. He has no wheezes. He has no rales.   Abdominal: Soft. Bowel sounds are normal. He exhibits no distension.   Musculoskeletal: He exhibits edema (trace ankle edema) chronic per patient.  Neurological: He is alert and oriented to person, place, and time.   Skin: Skin is warm and dry. NO drainage from dialysis catheter site at present. Nurses and patient report purulent drainage from site earlier this morning.  Psychiatric:  flat   permacath: No nesha pus or blood at this time       Assessment/Plan   Assessment:   1.  Atrial fibrillation with RVR, no anticoagulation due to recent hematuria, anemia, and reported  frequent injuries and falls (per cardiology)  2.  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  Henoch Schollein purpura nephritis on HD  4.  Chronic anemia, anemia of chronic disease  5.  LUE weakness, resolved  6.  Elevated proBNP secondary to renal disease  7.  Elevated d-dimer with negative NM ventilation perfusion  8.  Jerome's granulomatosis  9.  Essential hypertension  10.  Dialysis catheter site infection  11.  Mild leukocytosis    Plan:  1.  Cardizem 90 mg every 6 hours.  Cardizem drip off.  No full-strength because of history of hematuria  2.  Cardiology adjusting medications for A. fib  3.  MRI reviewed and negative  4.  Blood cultures drawn for drainage from hemodialysis catheter site. Await results.  5.  Dr. Grimes has been consult for removal of dialysis catheter.  6.  Renal function improving, dialysis on hold per nephrology.  7.  Zyvox started  8.  PT/OT evaluation.  9.  BMP, CBC a.m.    The above documentation resulted from a face-to-face encounter by me Flores GARVIN, [facility].      Discharge Planning: I expect patient to be discharged to home in 2-3 days.    I personally evaluated and examined the patient in conjunction with BHARATHI Borrego and agree with the assessment, treatment plan, and disposition of the patient as recorded by her. My history, exam, and further recommendations are:     Patient currently stable.  Probably will hold off on further immunosuppressive medicines (patient on prednisone) until probable permacath infection cleared.  Await vascular consult, agree with Zyvox, given his history of renal failure.    BHARATHI Kramer   02/17/17   9:32 AM        [facility] Medicine Services  INPATIENT PROGRESS NOTE    Length of Stay: 2  Date of Admission: 2/15/2017  Primary Care Physician: Moises Montes MD    Subjective   Chief Complaint: shortness of breath resolved. Drainage from dialysis catheter site.  HPI    Admitted 2/15/16 with chest pain shortness of breath and palpitations. Found to be in atrial flutter with RVR and was placed on cardizem drip.  He denies shortness of breath. His speech is fluent without any slurred speech. He denies any weakness left upper extremity.  He denies nausea, vomiting or abdominal pain.  He reports at 2 AM this morning he noticed drainage from dialysis catheter insertion site.  He denies chest pain or palpitations.  Occasional pain in right shoulder area where dialysis catheter located.    He is NPO in anticipation of possible permacath removal.  On telemetry, he has a ectopy issues of concern.  He is very hungry.  He is concerned because he is due to start his immunosuppressive medication for his Wegener's in the next couple of days.  No nesha hematuria     Review of Systems   Constitutional: Positive for fatigue.   HENT: Negative for congestion.   Eyes: Negative for visual disturbance.   Respiratory: Positive for shortness of breath improved. Negative for wheezing.   Cardiovascular: Positive for leg swelling (chronic).   Gastrointestinal: Negative for abdominal distention, constipation, diarrhea, nausea and vomiting.   Endocrine: Negative for polyuria.   Genitourinary: Negative for dysuria.   Skin: Positive for drainage from dialysis catheter site  Neurological: Positive for weakness improved.   Psychiatric/Behavioral: Negative for agitation.   All pertinent negatives and positives are as above. All other systems have been reviewed and are negative unless otherwise stated.     Objective    Temp:  [97.8 °F (36.6 °C)-98.9 °F (37.2 °C)] 98.9 °F (37.2 °C)  Heart Rate:  [] 112  Resp:  [18-22] 18  BP: (106-130)/(70-91) 130/91  Physical Exam  Constitutional: He is oriented to person, place, and time. He appears well-developed and well-nourished.   Head: Normocephalic and atraumatic.   Eyes: EOM are normal. Pupils are equal, round, and reactive to light.   Neck: Normal range of motion.  Neck supple. No tracheal deviation present.   Cardiovascular: No murmur heard. Irregular rate, atrial fibrillation on telemetry    Pulmonary/Chest: Effort normal and breath sounds normal. No respiratory distress. He has no wheezes. He has no rales.   Abdominal: Soft. Bowel sounds are normal. He exhibits no distension.   Musculoskeletal: He exhibits edema (trace ankle edema) chronic per patient.  Neurological: He is alert and oriented to person, place, and time.   Skin: Skin is warm and dry. NO drainage from dialysis catheter site at present. Nurses and patient report purulent drainage from site earlier this morning.  Psychiatric:  flat   permacath: No nesha pus or blood at this time   Radiology Data:   MRI brain without contrast 2/16/17  1.  No acute intracranial abnormalities.  2.  Nonspecific foci of gliosis in the bilateral white matter, likely reflecting mild chronic microvascular changes.    NM lung ventilation perfusion 12/15/17  Findings are most commonly associated with low probability for acute  pulmonary embolism.     CT head without contrast 2/15/17 no acute intracranial process     Chest x-ray 1 view 2/15/17 no acute cardiopulmonary disease.  Scheduled Meds    I have reviewed the patient current medications.     Assessment/Plan     Assessment:   1.  Atrial fibrillation with RVR, no anticoagulation due to recent hematuria, anemia, and reported frequent injuries and falls (per cardiology)  2.  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  Henoch Schollein purpura nephritis on HD  4.  Chronic anemia, anemia of chronic disease  5.  LUE weakness, resolved  6.  Elevated proBNP secondary to renal disease  7.  Elevated d-dimer with negative NM ventilation perfusion  8.  Jerome's granulomatosis  9.  Essential hypertension  10.  Dialysis catheter site infection  11.  Mild leukocytosis    Plan:  1.  Cardizem 90 mg every 6 hours.  Cardizem drip off.  No full-strength because of  history of hematuria  2.  Cardiology adjusting medications for A. fib  3.  MRI reviewed and negative  4.  Blood cultures drawn for drainage from hemodialysis catheter site. Await results.  5.  Dr. Grimes has been consult for removal of dialysis catheter.  6.  Renal function improving, dialysis on hold per nephrology.  7.  Zyvox started  8.  PT/OT evaluation.  9.  BMP, CBC a.m.    The above documentation resulted from a face-to-face encounter by me Flores GARVIN, [facility].      Discharge Planning: I expect patient to be discharged to home in 2-3 days.    Patient currently stable.  Probably will hold off on further immunosuppressive medicines (patient on prednisone) until probable permacath infection cleared.  Await vascular consult, agree with Zyvox, given his history of renal failure.    BHARATHI Kramer   02/17/17   9:32 AM        [facility] Medicine Services  INPATIENT PROGRESS NOTE    Length of Stay: 2  Date of Admission: 2/15/2017  Primary Care Physician: Moises Montes MD    Subjective   Chief Complaint: shortness of breath resolved. Drainage from dialysis catheter site.  HPI   Admitted 2/15/16 with chest pain shortness of breath and palpitations. Found to be in atrial flutter with RVR and was placed on cardizem drip.  He denies shortness of breath. His speech is fluent without any slurred speech. He denies any weakness left upper extremity.  He denies nausea, vomiting or abdominal pain.  He reports at 2 AM this morning he noticed drainage from dialysis catheter insertion site.  He denies chest pain or palpitations.  Occasional pain in right shoulder area where dialysis catheter located.    He is NPO in anticipation of possible permacath removal.  On telemetry, he has a ectopy issues of concern.  He is very hungry.  He is concerned because he is due to start his immunosuppressive medication for his Wegener's in the next couple of days.  No nesha  hematuria     Review of Systems   Constitutional: Positive for fatigue.   HENT: Negative for congestion.   Eyes: Negative for visual disturbance.   Respiratory: Positive for shortness of breath improved. Negative for wheezing.   Cardiovascular: Positive for leg swelling (chronic).   Gastrointestinal: Negative for abdominal distention, constipation, diarrhea, nausea and vomiting.   Endocrine: Negative for polyuria.   Genitourinary: Negative for dysuria.   Skin: Positive for drainage from dialysis catheter site  Neurological: Positive for weakness improved.   Psychiatric/Behavioral: Negative for agitation.   All pertinent negatives and positives are as above. All other systems have been reviewed and are negative unless otherwise stated.     Objective    Temp:  [97.8 °F (36.6 °C)-98.9 °F (37.2 °C)] 98.9 °F (37.2 °C)  Heart Rate:  [] 112  Resp:  [18-22] 18  BP: (106-130)/(70-91) 130/91  Physical Exam  Constitutional: He is oriented to person, place, and time. He appears well-developed and well-nourished.   Head: Normocephalic and atraumatic.   Eyes: EOM are normal. Pupils are equal, round, and reactive to light.   Neck: Normal range of motion. Neck supple. No tracheal deviation present.   Cardiovascular: No murmur heard. Irregular rate, atrial fibrillation on telemetry    Pulmonary/Chest: Effort normal and breath sounds normal. No respiratory distress. He has no wheezes. He has no rales.   Abdominal: Soft. Bowel sounds are normal. He exhibits no distension.   Musculoskeletal: He exhibits edema (trace ankle edema) chronic per patient.  Neurological: He is alert and oriented to person, place, and time.   Skin: Skin is warm and dry. NO drainage from dialysis catheter site at present. Nurses and patient report purulent drainage from site earlier this morning.  Psychiatric:  flat   permacath: No nesha pus or blood at this time   Assessment:   1.  Atrial fibrillation with RVR, no anticoagulation due to recent  hematuria, anemia, and reported frequent injuries and falls (per cardiology)  2.  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  Henoch Schollein purpura nephritis on HD  4.  Chronic anemia, anemia of chronic disease  5.  LUE weakness, resolved  6.  Elevated proBNP secondary to renal disease  7.  Elevated d-dimer with negative NM ventilation perfusion  8.  Jerome's granulomatosis  9.  Essential hypertension  10.  Dialysis catheter site infection  11.  Mild leukocytosis    Plan:  1.  Cardizem 90 mg every 6 hours.  Cardizem drip off.  No anticoagulation because of history of hematuria  2.  Cardiology adjusting medications for A. fib  3.  MRI reviewed and negative  4.  Blood cultures drawn for drainage from hemodialysis catheter site. Await results.  5.  Dr. Grimes has been consult for removal of dialysis catheter.  6.  Renal function improving, dialysis on hold per nephrology.  7.  Zyvox started  8.  PT/OT evaluation.  9.  BMP, CBC a.m.    The above documentation resulted from a face-to-face encounter by me Flores GARVIN, [facility].      Discharge Planning: I expect patient to be discharged to home in 2-3 days.    Patient currently stable.  Probably will hold off on anymedicines until probable permacath infection cleared.  Await vascular consult, agree with Zyvox, given his history of renal failure.    BHARATHI Kramer   02/17/17   9:32 AM        [facility] -  Progress Note     LOS: 2 days   Patient Care Team:  Moises Montes MD as PCP - General (Internal Medicine)  Luis Carlos Awan MD as Consulting Physician (Urology)  Alex Cortez MD as Cardiologist (Cardiology)    Chief Complaint: chest pain, palpitations, shortness of breath     Subjective     Interval History:     Patient Complaints: No pain and dyspnea today. Pt reports he is hungry and waiting for dialysis cath removal.       Review of Systems:     Review of Systems   Constitutional: Positive  for fatigue. Negative for diaphoresis, fever and unexpected weight change.   HENT: Negative for nosebleeds.    Respiratory: Negative for apnea, cough, chest tightness, shortness of breath and wheezing.    Cardiovascular: Negative for chest pain, palpitations and leg swelling.   Gastrointestinal: Negative for abdominal distention, nausea and vomiting.   Genitourinary: Negative for hematuria.   Musculoskeletal: Negative for gait problem.   Skin: Negative for color change.   Neurological: Negative for dizziness, syncope, weakness and light-headedness.       Physical Exam:    Physical Exam   Constitutional: He is oriented to person, place, and time. He appears well-developed and well-nourished. No distress.   HENT:   Head: Normocephalic and atraumatic.   Eyes: Pupils are equal, round, and reactive to light.   Neck: Normal range of motion. Neck supple. No JVD present. No thyromegaly present.   Cardiovascular: Normal heart sounds and intact distal pulses.  An irregular rhythm present. Tachycardia present.  Exam reveals no gallop and no friction rub.  murmur heard.  Pulses:       Dorsalis pedis pulses are 2+ on the right side, and 2+ on the left side.   Pulmonary/Chest: Effort normal. No respiratory distress. He has no wheezes. He has rales (few bases). He exhibits no tenderness.   Abdominal: Soft. Bowel sounds are normal. He exhibits no distension. There is no tenderness.   Musculoskeletal: Normal range of motion. He exhibits edema (traace/1+ BLE edema ).   Neurological: He is alert and oriented to person, place, and time. No cranial nerve deficit.   Skin: Skin is warm and dry. He is not diaphoretic.   Psychiatric: He has a normal mood and affect. His behavior is normal.   Assessment&#47;Plan     Principal Problem:  -Persistent atrial fibrillation RVR- Cardizem gtt discontinued earlier today and HR is currently 110's-120's. He is asymptomatic.      Active Problems:  -Henoch-Schonlein purpura nephritis  -Hypertensive  nephrosclerosis  -Wegener's granulomatosis with renal involvement  -Tobacco abuse  -Microcytic anemia  Suspected permacath infection     Plan-  Continue to titrate lopressor- increased to 75 mg BID  Continue cardizem-transition to CD at dc  Telemetry  No anticoag at this time due to anemia, falls, injuries, recent hematuria  Plans for permacath removal per Dr. Grimes- consult pending per nephrology     Macie Carrion, APRN  02/17/17  11:37 AM                [facility] Medicine Services  INPATIENT PROGRESS NOTE    Length of Stay: 3  Date of Admission: 2/15/2017  Primary Care Physician: Moises Montes MD    Subjective   Chief Complaint: Denies shortness of breath. No further drainage from dialysis catheter site. Catheter removed  HPI   Sitting up in chair.  Denies chest pain or palpitations.  Rate controlled A. fib on telemetry. Denies shortness of breath.  Denies nausea, vomiting or abdominal pain.  Tolerates po, slept well overnight.  No further drainage from dialysis catheter insertion site.  Dialysis catheter removed yesterday afternoon. Denies extremity weakness.  Denies any speech difficulty.    Review of Systems   Constitutional: Positive for fatigue.   HENT: Negative for congestion.   Eyes: Negative for visual disturbance.   Respiratory: Positive for shortness of breath improved. Off oxygen Negative for wheezing.   Cardiovascular: Positive for leg swelling (chronic).   Gastrointestinal: Negative for abdominal distention, constipation, diarrhea, nausea and vomiting.   Endocrine: Negative for polyuria.   Genitourinary: Negative for dysuria.   Skin: No further drainage dialysis catheter site, Catheter removed 2/17  Neurological: Positive for weakness improved.   Psychiatric/Behavioral: Negative for agitation.   All pertinent negatives and positives are as above. All other systems have been reviewed and are negative unless otherwise stated.     Objective    Temp:  [97 °F (36.1  °C)-98.9 °F (37.2 °C)] 97 °F (36.1 °C)  Heart Rate:  [] 59  Resp:  [18-20] 18  BP: (103-132)/(60-91) 112/78  Physical Exam  Constitutional: He is oriented to person, place, and time. He appears well-developed and well-nourished.   Head: Normocephalic and atraumatic.   Eyes: EOM are normal. Pupils are equal, round, and reactive to light.   Neck: Normal range of motion. Neck supple. No tracheal deviation present.   Cardiovascular: No murmur heard. Irregular rate, atrial fibrillation on telemetry    Pulmonary/Chest: Effort normal and breath sounds normal. No respiratory distress. He has no wheezes. He has no rales.   Abdominal: Soft. Bowel sounds are normal. He exhibits no distension.   Musculoskeletal: He exhibits edema ( ankle edema) chronic per patient.  Neurological: He is alert and oriented to person, place, and time.   Skin: Skin is warm and dry. NO drainage from dialysis catheter site at present. Nurses and patient report purulent drainage from site earlier this morning.  Psychiatric: flat  Extremities: pitting edema calves      Assessment:   1. Atrial fibrillation with RVR, no anticoagulation due to recent hematuria, anemia, and reported frequent injuries and falls (per cardiology)  2. OSMANI secondary to Jiaro's granulomatosis nonoliguric, HD T,TH, Sat  3. Henoch Schollein purpura nephritis on HD  4. Chronic anemia, anemia of chronic disease  5. LUE weakness, resolved  6. Elevated proBNP secondary to renal disease  7. Elevated d-dimer with negative NM ventilation perfusion  8. Jerome's granulomatosis  9. Essential hypertension  10. Dialysis catheter site infection  11.  Mild leukocytosis    Plan:  1.  Cardizem 90 mg every 6 hours.  Cardizem drop off.  2.  Metoprolol dose uptitrated per cardiology.  Currently on 75 mg twice daily  3.  No anticoagulation per cardiology secondary to material, anemia, frequent injuries and falls   4.  Zyvox IV day 2  5.  Blood cultures no growth to date.  Await  culture  results from dialysis catheter tip.  6.  Physical therapy to assess ambulation.  7.  BMP a.m. to monitor renal status creatinine 1.79 today  8.  Oral steroids per rheumatology   9.  Dialysis catheter removed 2/17 per Dr. Grimes.      The above documentation resulted from a face-to-face encounter by me Flores GARVIN, [facility].      Discharge Planning: I expect patient to be discharged to home in 2-3 days.    I personally evaluated and examined the patient in conjunction with BHARATHI Borrego and agree with the assessment, treatment plan, and disposition of the patient as recorded by her. My history, exam, and further recommendations are:     Patient clinically stable.  I just received a phone call from nursing staff-the patient has gram-positive cocci in his blood, computer indicates that the MRSA by PCR.  Continue IV Zyvox, obtain infection disease consult.  Patient's creatinine is up today-deferred to nephrology regarding when/if patient requires further dialysis.  Suspect rheumatological medicines for Wegener's will have to be held until the bacteremia/sepsis has resolved    BHARATHI Kramer   02/18/17   7:13 AM        [facility]ART GROUP -  Progress Note     LOS: 3 days   Patient Care Team:  Mioses Montes MD as PCP - General (Internal Medicine)  Luis Carlos Awan MD as Consulting Physician (Urology)  Alex Cortez MD as Cardiologist (Cardiology)    Chief Complaint: chest pain, palpitations, shortness of breath     Subjective     Interval History:     Patient Complaints:  Concerned about positive blood cultures. Asymptomatic from a-fib.       Review of Systems:     Review of Systems   Constitutional: Positive for fatigue. Negative for diaphoresis, fever and unexpected weight change.   HENT: Negative for nosebleeds.    Respiratory: Negative for apnea, cough, chest tightness, shortness of breath and wheezing.    Cardiovascular: Negative for chest pain, palpitations and leg  swelling.   Gastrointestinal: Negative for abdominal distention, nausea and vomiting.   Genitourinary: Negative for hematuria.   Musculoskeletal: Negative for gait problem.   Skin: Negative for color change.   Neurological: Negative for dizziness, syncope, weakness and light-headedness.         Physical Exam:    Physical Exam   Constitutional: He is oriented to person, place, and time. He appears well-developed and well-nourished. No distress.   HENT:   Head: Normocephalic and atraumatic.   Eyes: Pupils are equal, round, and reactive to light.   Neck: Normal range of motion. Neck supple. No JVD present. No thyromegaly present.   Cardiovascular: Normal heart sounds and intact distal pulses.  An irregular rhythm present. Tachycardia present.  Exam reveals no gallop and no friction rub.  murmur heard.  Pulses:       Dorsalis pedis pulses are 2+ on the right side, and 2+ on the left side.   Pulmonary/Chest: Effort normal. No respiratory distress. He has no wheezes. He has rales (few bases). He exhibits no tenderness.   Abdominal: Soft. Bowel sounds are normal. He exhibits no distension. There is no tenderness.   Musculoskeletal: Normal range of motion. He exhibits edema (traace/1+ BLE edema ).   Neurological: He is alert and oriented to person, place, and time. No cranial nerve deficit.   Skin: Skin is warm and dry. He is not diaphoretic.   Psychiatric: He has a normal mood and affect. His behavior is normal.   Assessment&#47;Plan     Principal Problem:  -Persistent atrial fibrillation RVR   Active Problems:  -Henoch-Schonlein purpura nephritis  -Hypertensive nephrosclerosis  -Wegener's granulomatosis with renal involvement  -Tobacco abuse  -Microcytic anemia  -Suspected permacath infection   - MRSA positive blood cultures  Plan-  Continue to titrate lopressor- increased to 75 mg BID yesterday  Cardizem 90 q6H- switch to CD at DC  Telemetry  No anticoag at this time due to anemia, falls, injuries, recent  hematuriaremoval per Dr. Sinha primaryorders per Dr. Kely Moore, APRN  02/18/17  2:13 PM          DIALYSIS PROGRESS NOTE.      Patient:  Gurjit Andrea  YOB: 1970  Date of Service: 2/18/2017  MRN: [id number]   Acct: [id number]   Primary Care Physician: Moises Montes MD  Advance Directive: Full Code  Admit Date: 2/15/2017       Hospital Day: 3  Referring Provider: Kalie Vergara DO      Patient Seen, Chart, Consults, Notes, Labs, Radiology studies reviewed.        Subjective:  Gurjit Andrea is a 47 y.o. male whom we were consulted for OSMANI. 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 Admitted with atrial fibrillation and RVR. He started having large amount of purulent drainage from around Permcath site. His dialysis catheter was removed and his blood cultures were positive for Gram + cocci. Today, he offered no other complaints.      Review of Systems:  History obtained from chart review and the patient  General ROS: No fever or chills  Respiratory ROS: No cough, shortness of breath, wheezing  Cardiovascular ROS: no chest pain or dyspnea on exertion  Gastrointestinal ROS: No abdominal pain or melena  Genito-Urinary ROS: No dysuria or hematuria  Musculoskeletal: negative  Skin: negative    Physical examination:  General: awake/alert   Chest:  clear to auscultation bilaterally without respiratory distress  CVS: regular rate and rhythm  Abdominal: soft, nontender, normal bowel sounds  Extremities: trace leg edema  Skin: warm and dry without rash  Neuro: No focal motor deficits      Assessment     1. 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 Will hold dialysis for now, and continue to follow up labs. Patient is treated with steroids and he will follow up with rheumatology  2. Atrial fibrillation with RVR--rate now controlled  3. Anemia  4. Benign HTN  5. Line sepsis-on IV antibiotics    Plan:  As  above.      Reinaldo Foley MD  2/18/2017  2:49 PM    [facility] Medicine Services  INPATIENT PROGRESS NOTE    Length of Stay: 4  Date of Admission: 2/15/2017  Primary Care Physician: Moises Montes MD    Subjective   Chief Complaint: Denies complaints, denies shortness of breath  HPI   Sitting up in chair.  Denies nausea, vomiting or abdominal pain.  Denies chest pain or palpitations.  Remains atrial fibrillation rate controlled.  Tolerating diet.  No further drainage from dialysis catheter removal site.  Denies weakness.  Denies any speech difficulty.  Reports chronic bilateral lower extremity edema.  Rate controlled A. fib on telemetry.  Tolerates oral intake.  Admits to eating a lot of caramels, subsequently, his a.m. glucose was over 300.  He has never been diagnosed with diabetes before.    Review of Systems   Constitutional: Positive for fatigue.   HENT: Negative for congestion.   Eyes: Negative for visual disturbance.   Respiratory:  Negative for wheezing.   Cardiovascular: Positive for leg swelling (chronic).   Gastrointestinal: Negative for abdominal distention, constipation, diarrhea, nausea and vomiting.   Endocrine: Negative for polyuria.   Genitourinary: Negative for dysuria.   Skin: No further drainage dialysis catheter site, Catheter removed 2/17  Neurological: Positive for weakness improved.   Psychiatric/Behavioral: Negative for agitation.   All pertinent negatives and positives are as above. All other systems have been reviewed and are negative unless otherwise stated.     Objective    Temp:  [97 °F (36.1 °C)-97.4 °F (36.3 °C)] 97.4 °F (36.3 °C)  Heart Rate:  [] 58  Resp:  [18-20] 18  BP: (112-124)/(71-82) 124/82  Physical Exam  Constitutional: He is oriented to person, place, and time. He appears well-developed and well-nourished.   Head: Normocephalic and atraumatic.   Eyes: EOM are normal. Pupils are equal, round, and reactive to light.   Neck:  Normal range of motion. Neck supple. No tracheal deviation present.   Cardiovascular: No murmur heard. Irregular rate, atrial fibrillation on telemetry    Pulmonary/Chest: Effort normal and breath sounds normal. No respiratory distress. He has no wheezes. He has no rales.   Abdominal: Soft. Bowel sounds are normal. He exhibits no distension.   Musculoskeletal: He exhibits edema ( ankle to mid calf edema) chronic per patient.  Neurological: He is alert and oriented to person, place, and time.   Skin: Skin is warm and dry. NO drainage from dialysis catheter site.  Dressing dry and intact.  Purpura noted across back and lower extremities.  Psychiatric: flat    Results Review:  I have reviewed the labs, radiology results, and diagnostic studies.  Assessment:   1. Atrial fibrillation with RVR, no anticoagulation due to recent hematuria, anemia, and reported frequent injuries and falls (per cardiology)  2. 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 Henoch Schollein purpura nephritis on HD  4. MRSA bacteremia likely dialysis catheter related  5. Dialysis catheter site infection, MRSA  6. Chronic anemia, anemia of chronic disease  7. LUE weakness, resolved  8. Elevated proBNP secondary to renal disease  9. Elevated d-dimer with negative NM ventilation perfusion  10. Jerome's granulomatosis  11. Hypertensive nephrosclerosis  12. Mild leukocytosis    Plan:  1.  Cardizem 90 mg every 6 hours.  Will place on Cardizem CD at discharge.  2.  Metoprolol dose 75 mg twice daily.  Uptitrated by cardiology.  3.  No anticoagulation per cardiology secondary to anemia, falls, injuries, recent hematuria.  4.  Zyvox IV day 3  5.  Blood cultures and dialysis catheter tip MRSA  6.  Infectious disease consult and following.  7.  Culture sensitivities noted.  8.  Dialysis catheter removed 2/17/17 per Dr. Grimes.  9.  Dialysis currently on hold per nephrology.  10.  Oral steroids for Wegener's granulomatous  11.   CBC, BMP a.m.  12.  Surveillance blood cultures per infectious disease.      The above documentation resulted from a face-to-face encounter by me Flores GARVIN, [facility].    Discharge Planning: I expect patient to be discharged to home in 2-4 days.I personally evaluated and examined the patient in conjunction with BHARATHI Borrego and agree with the assessment, treatment plan, and disposition of the patient as recorded by her. My history, exam, and further recommendations are:     Patient has multiple issues which are all stable.  Renal function is stable.  Unfortunately, he has a bump in the road with his MRSA bacteremia/sepsis-continue IV Zyvox and await susceptibility results.  ID follows.  He does have significant hyperglycemia which could be steroid induced-we will add Levemir and sliding scale insulin with Accu-Cheks.    Of note, patient is concerned because of his history of elevated PSA-he was supposed to have a biopsy this week, clearly, this is not going to happen because of his bacteremia issue.  We recommend this be followed up once his acute, intervening issues stabilize    BHARATHI Kramer   02/19/17   9:12 AM        [facility] -  Progress Note     LOS: 4 days   Patient Care Team:  Moises Montes MD as PCP - General (Internal Medicine)  Luis Carlos Awan MD as Consulting Physician (Urology)  Alex Cortez MD as Cardiologist (Cardiology)    Chief Complaint: chest pain, palpitations, shortness of breath     Subjective     Interval History:     Patient Complaints:  Rates better controlled. No new complaints      Review of Systems:     Review of Systems   Constitutional: Positive for fatigue. Negative for diaphoresis, fever and unexpected weight change.   HENT: Negative for nosebleeds.    Respiratory: Negative for apnea, cough, chest tightness, shortness of breath and wheezing.    Cardiovascular: Negative for chest pain, palpitations and leg swelling.    Gastrointestinal: Negative for abdominal distention, nausea and vomiting.   Genitourinary: Negative for hematuria.   Musculoskeletal: Negative for gait problem.   Skin: Negative for color change.   Neurological: Negative for dizziness, syncope, weakness and light-headedness.   Physical Exam:    Physical Exam   Constitutional: He is oriented to person, place, and time. He appears well-developed and well-nourished. No distress.   HENT:   Head: Normocephalic and atraumatic.   Eyes: Pupils are equal, round, and reactive to light.   Neck: Normal range of motion. Neck supple. No JVD present. No thyromegaly present.   Cardiovascular: Normal heart sounds and intact distal pulses.  An irregular rhythm present. Tachycardia present.  Exam reveals no gallop and no friction rub.  murmur heard.  Pulses:       Dorsalis pedis pulses are 2+ on the right side, and 2+ on the left side.   Pulmonary/Chest: Effort normal. No respiratory distress. He has no wheezes. He has rales (few bases). He exhibits no tenderness.   Abdominal: Soft. Bowel sounds are normal. He exhibits no distension. There is no tenderness.   Musculoskeletal: Normal range of motion. He exhibits edema (traace/1+ BLE edema ).   Neurological: He is alert and oriented to person, place, and time. No cranial nerve deficit.   Skin: Skin is warm and dry. He is not diaphoretic.   Psychiatric: He has a normal mood and affect. His behavior is normal.   Assessment&#47;Plan     Principal Problem:  -Persistent atrial fibrillation RVR   Active Problems:  -Henoch-Schonlein purpura nephritis  -Hypertensive nephrosclerosis  -Wegener's granulomatosis with renal involvement  -Tobacco abuse  -Microcytic anemia  -Suspected permacath infection   - MRSA positive blood cultures    Plan-much better controlled 90-100s, titrate lopressor as needed  Cardizem 90 q6H- switch to CD at DC  Telemetry  No anticoag at this time due to anemia, falls, injuries, recent hematuria  Permacath removal  per Dr. Grimes  Antibiotics per primary, I&D now following for Staph Aureus bactermia  orders per Dr. Kely Moore, APRN  02/19/17  10:19 AM            DIALYSIS PROGRESS NOTE.      Patient:  Gurjit Andrea  YOB: 1970  Date of Service: 2/19/2017  MRN: [id number]   Acct: [id number]   Primary Care Physician: Moises Montes MD  Advance Directive: Full Code  Admit Date: 2/15/2017       Hospital Day: 4  Referring Provider: Kalie Vergara DO      Patient Seen, Chart, Consults, Notes, Labs, Radiology studies reviewed.        Subjective:    Gurjit Adnrea is a 47 y.o. male whom we were consulted for OSMANI. 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 Admitted with atrial fibrillation and RVR. He started having large amount of purulent drainage from around Permcath site. His dialysis catheter was removed and his blood cultures were positive for Gram + cocci. Today, he offered no other complaints. His legs were puffy.     Review of Systems:  History obtained from chart review and the patient  General ROS: No fever or chills  Respiratory ROS: No cough, shortness of breath, wheezing  Cardiovascular ROS: no chest pain or dyspnea on exertion  Gastrointestinal ROS: No abdominal pain or melena  Genito-Urinary ROS: No dysuria or hematuria  Musculoskeletal: leg edema  Skin: negative        Physical examination:  General: awake/alert   Chest:  clear to auscultation bilaterally without respiratory distress  CVS: regular rate and rhythm  Abdominal: soft, nontender, normal bowel sounds  Extremities:1+ leg edema  Skin: warm and dry without rash  Neuro: No focal motor deficits      Assessment   1. 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 Dialysis is on hold and will continue to follow up labs. Patient is treated with steroids and he will follow up with rheumatology  2. Atrial fibrillation with RVR-- rate controlled now  3. Anemia  4. Benign  HTN  5. Line sepsis-on IV antibiotics  6. Leg edema: will give Bumex po       Plan:  As above.       Reinaldo Foley MD  2/19/2017  12:00 PM  Patient Care Team:  Moises Montes MD as PCP - General (Internal Medicine)  Luis Carlos Awan MD as Consulting Physician (Urology)  Alex Cortez MD as Cardiologist (Cardiology)  No ref. provider found  REASON FOR REFERRAL: atrial fibrillation , rvr   Chief complaint: left arm and chest pain, dyspnea, weakness, speech difficulty     Subjective     Patient is a 47 y.o. male presents with c/o weakness, dyspnea, left sided chest pain with radiation to his back, neck and down his left arm, as well as speech difficulties and left arm weakness. The patient's wife states earlier today his lips appeared blue and he was slurring his speech. The patient reports the onset of symptoms was around 5 am. Upon arrival to the ER, he was found to be in afib, RVR with HR 140s- he has been treated with IV cardizem and his HR has improved to low 100s-110s. BP is stable. Symptoms are improved. CXR negative. BNP elevated. Creat is chronically elevated. He is also chronically anemic. D-dimer is elevated at 3.12. Troponins are negative x 2. WBCs are mildly elevated.  No acute STT changes on EKG. CT of the head is pending.    The patient has a known history of PAF, microcytic anemia, and henoch schonlein purpura nephritis on HD 3 times per week. He was previously anticoagulated with warfarin, but the wife states this was stopped years ago. More recently, following an admission last month, the patient also had hematuria- he is not anticaogulated. He takes cardizem and metoprolol at home for rate control. He is followed by Dr. Cortez for his PAF. Echo last month revealed normal LVEF , mild LAE, grade 1 DD, and mild to moderate MR. He denies a CAD history.     Review of Systems   Review of Systems   Constitutional: Positive for fatigue. Negative for diaphoresis, fever and unexpected weight  change.   HENT: Negative for nosebleeds.    Respiratory: Positive for shortness of breath. Negative for apnea, cough, chest tightness and wheezing.    Cardiovascular: Positive for chest pain (with left arm pain and weakness ). Negative for palpitations and leg swelling.   Gastrointestinal: Negative for abdominal distention, nausea and vomiting.   Genitourinary: Negative for hematuria.   Musculoskeletal: Negative for gait problem.   Skin: Negative for color change.   Neurological: Positive for speech difficulty and weakness. Negative for dizziness, syncope and light-headedness.       Objective     Vital Signs  Temp:  [98.2 °F (36.8 °C)] 98.2 °F (36.8 °C)  Heart Rate:  [102-144] 113  Resp:  [18-22] 21  BP: (109-124)/(75-95) 124/91    Physical Exam:   Physical Exam   Constitutional: He is oriented to person, place, and time. He appears well-developed and well-nourished. No distress.   HENT:   Head: Normocephalic and atraumatic.   Eyes: Pupils are equal, round, and reactive to light.   Neck: Normal range of motion. Neck supple. No JVD present. No thyromegaly present.   Cardiovascular: Normal heart sounds and intact distal pulses.  An irregular rhythm present. Tachycardia present.  Exam reveals no gallop and no friction rub.  murmur heard.  Pulses:       Dorsalis pedis pulses are 2+ on the right side, and 2+ on the left side.   Pulmonary/Chest: Effort normal. No respiratory distress. He has no wheezes. He has rales (few rales bases). He exhibits no tenderness.   Abdominal: Soft. Bowel sounds are normal. He exhibits no distension. There is no tenderness.   Musculoskeletal: Normal range of motion. He exhibits edema (1+ BLE edema ).   Neurological: He is alert and oriented to person, place, and time.   Drift and tremor of LUE    Skin: Skin is warm and dry. He is not diaphoretic.   Psychiatric: He has a normal mood and affect. His behavior is normal.   Assessment&#47;Plan   fibrillation, RVR  LUE weakness and  pain  Questionable slurred speech and expressive aphasia  Henoch Schonlein purpura nephritis on 3x week HD   Anemia  Recent hematuria  Tobacco use  Mild to Moderate mitral regurgitation   Leukocytosis  Elevated d-dimer   Elevated BNP    Plan-  Agree with IV cardizem. Will resume oral cardizem and wean gtt as tolerated.  Resume oral beta blocker  No anticoagulation at this time due to recent hematuria, anemia, and reported frequent injuries and falls  VQ scan  CT head ordered per ER  Discussed with Dr. Cortez- will follow as consultant; further recommendations following his evaluation of the patient     I discussed the patients findings and my recommendations with patient, family, primary care team and consulting provider.     BHARATHI Davila  02/15/17  1:55 PM             Electronically signed by Alex Cortez MD at 2/15/2017 11:04 PM      BHARATHI Clifford at 2/16/2017 12:23 PM  Version 1 of 1     Consult Orders:    1. Inpatient Consult to Nephrology [[id number]] ordered by BHARATHI Diaz at 02/15/17 1441           Attestation signed by Reinaldo Foley MD at 2/16/2017  1:43 PM        I saw and examined patient independently. I agree with NP's exam and assessment. Patient was admitted with chest pain and palpitations. He was having malaise, and now is doing a bit better. Vitals:   Stable. Phys exam: S1 S2 irreg, lungs-CTA, abd-soft, non tender, legs no edema. Labs: reviewed.  A/P:    1. OSMANI (due to Wegener's granulomatosis)- patient is followed by Rheumatology and is on immunosuppressants.   2. Atrial fibrillation with RVR--rate controlled now.  3. Anemia  4. Benign HTN    Will provide dialysis today and hold dialysis afterwards and watch for renal recovery. Patient was complaining of right shoulder pain, will check his uric acid level.                                 Nephrology ([facility]) Consult Note      Patient:  Gurjit Andrea  YOB: 1970  Date of  Service: 2/16/2017  MRN: [id number]   Acct: [id number]   Primary Care Physician: Moises Montes MD  Advance Directive: Full Code  Admit Date: 2/15/2017       Hospital Day: 1  Referring Provider: Klaie Vergara DO      Patient Seen, Chart, Consults, Notes, Labs, Radiology studies reviewed.        Subjective:  Gurjit Andrea is a 47 y.o. male  whom we were consulted for End stage renal disease management.  Pt has been on dialysis for approx one month secondary to biopsy-proven HSP nephritis.  Admitted with atrial fibrillation with RVR.  Rhythm converted following administration of Cardizem gtt.  Pt feeling better today; seen during dialysis.  No pain/discomfort.  Denies dyspnea, denies N/V/D.    Review of Systems:  History obtained from chart review and the patient  General ROS: No fever or chills  Respiratory ROS: No cough, shortness of breath, wheezing  Cardiovascular ROS: no chest pain or dyspnea on exertion  Gastrointestinal ROS: No abdominal pain or melena  Genito-Urinary ROS: No dysuria or hematuria  14 point ROS reviewed with the patient and negative except as noted above and in the HPI unless unable to obtain.  Intake/Output Summary (Last 24 hours) at 02/16/17 1223  Last data filed at 02/16/17 0813   Gross per 24 hour   Intake    830 ml   Output   1300 ml   Net   -470 ml     General: awake/alert   Chest:  clear to auscultation bilaterally without respiratory distress  CVS: regular rate and rhythm  Abdominal: soft, nontender, normal bowel sounds  Extremities: no cyanosis or edema  Skin: warm and dry without rash      Assessment   1. End stage renal dialysis; maintenance HD Tue Thur Sat  2.  Atrial fibrillation with RVR--rate now controlled  3.  Anemia  4.  HTN    Plan:  1.  Dialysis today  2.  Further assessment and plan following Dr Foley's evaluation of patient    Dialysis   Pt was seen on RRT.  Tolerating well  Modality: Hemodialysis  Access: Catheter  Location: right upper  QB: 450  QD: 600  UF:  2000      Thank you for the consult, we appreciate the opportunity to provide care to your patients.  Feel free to contact me if I can be of any further assistance.      Charlie Jacobo, APRN  2/16/2017  12:23 PM    INFECTIOUS DISEASES CONSULT NOTE    Patient:  Gurjit Andrea 47 y.o. male  ROOM # 408/1  YOB: 1970  MRN: [id number]  CSN:  [id number]  Admit date: 2/15/2017   Admitting Physician: Iliana Alexis MD  Primary Care Physician: Moises Montes MD  REFERRING PROVIDER: Kalie Vergara DO      REASON FOR CONSULTATION : MRSA bacteremia      Physical Exam   Constitutional: He appears well-developed and well-nourished. No distress.   HENT:   Mouth/Throat: No oropharyngeal exudate.   Eyes: Conjunctivae are normal. No scleral icterus.   Neck: Neck supple.   Cardiovascular: Normal rate.  An irregular rhythm present.   Murmur (Systolic murmur heard in the left upper sternal border as well as the apex.  Possible diastolic murmur also in the left upper sternal border) heard.  Pulmonary/Chest: Effort normal. No respiratory distress.   Abdominal: Soft. He exhibits no distension. There is no tenderness.   Musculoskeletal: He exhibits edema.   Neurological: He is alert.   Skin: Skin is warm and dry. He is not diaphoretic.   Petechia noted distally on the bilateral feet.  There are some areas of excoriation with eschar.  No cellulitis or drainage.  No splinter hemorrhages Janeway lesions or Osler's nodes noted of the upper extremities    dressing seen in place over right chest where permacath was removed.  There is definite ecchymosis involving in that area      Line/IV site: Peripheral forearm right, condition patent and no redness    Assessment&#47;Plan     Principal Problem:    Persistent atrial fibrillation  Active Problems:    Henoch-Schonlein purpura nephritis    Tobacco abuse    Microcytic anemia    Hypertensive nephrosclerosis    Wegener's granulomatosis with renal  involvement      IMPRESSION:  1. Staph aureus bacteremia-thought to be catheter related.  Dialysis catheter is been removed  2. Wegener's granulomatosis with renal involvement  3. Chronic kidney disease with acute kidney injury-the patient was dialyzed for a few weeks and has now had catheter removed  4. Cardiac murmur (S)      RECOMMENDATION:   · Currently on Zyvox.  Suspect this was to avoid any potential worsening nephrotoxicity the patient.  He has some chronic kidney disease.  ·   Order follow-up surveillance blood cultures  · We will ask micro-to report susceptibilities to Zyvox.  · Review echocardiogram        Laura Eckert MD  02/18/17  2:57 PM           Electronically signed by Laura Eckert MD at 2/18/2017  3:32 PM      Laura Eckert MD at 2/18/2017  2:57 PM  Version 1 of 2         INFECTIOUS DISEASES CONSULT NOTE    Patient:  Gurjit Andrea 47 y.o. male  ROOM # 408/1  YOB: 1970  MRN: [id number]  CSN:  [id number]  Admit date: 2/15/2017   Admitting Physician: Iliana Alexis MD  Primary Care Physician: Moises Montes MD  REFERRING PROVIDER: Kalie Vergara DO      REASON FOR CONSULTATION : MRSA bacteremia      HISTORY OF PRESENT ILLNESS:  She is a 47-year-old gentleman who reports she was recently diagnosed with Wegener's granulomatosis.  He was admitted to [facility] on February 15 and is been found to have bacteremia.    He had his permacath removed today and states that he has been on dialysis since about January 20.    He has not seen Dr. Pro Morin has been referred to him for treatment of his Wegener's granulomatosis.      Review of Systems   Constitutional: Negative for fever.   Skin: Positive for rash (lesions he associates with his Wegener's diagnosis).         Physical Exam   Constitutional: He appears well-developed and well-nourished. No distress.   HENT:   Mouth/Throat: No oropharyngeal exudate.   Eyes: Conjunctivae are  normal. No scleral icterus.   Neck: Neck supple.   Cardiovascular: Normal rate.  An irregular rhythm present.   Murmur (Systolic murmur heard in the left upper sternal border as well as the apex.  Possible diastolic murmur also in the left upper sternal border) heard.  Pulmonary/Chest: Effort normal. No respiratory distress.   Abdominal: Soft. He exhibits no distension. There is no tenderness.   Musculoskeletal: He exhibits edema.   Neurological: He is alert.   Skin: Skin is warm and dry. He is not diaphoretic.   Petechia noted distally on the bilateral feet.  There are some areas of excoriation with eschar.  No cellulitis or drainage.  No splinter hemorrhages Janeway lesions or Osler's nodes noted of the upper extremities    dressing seen in place over right chest where permacath was removed.  There is definite ecchymosis involving in that area      Line/IV site: Peripheral forearm right, condition patent and no redness  Assessment&#47;Plan     Principal Problem:    Persistent atrial fibrillation  Active Problems:    Henoch-Schonlein purpura nephritis    Tobacco abuse    Microcytic anemia    Hypertensive nephrosclerosis    Wegener's granulomatosis with renal involvement      IMPRESSION:  5. Staph aureus bacteremia-thought to be catheter related.  Dialysis catheter is been removed  6. Wegener's granulomatosis with renal involvement  7. Chronic kidney disease with acute kidney injury-the patient was dialyzed for a few weeks and has now had catheter removed  8. Cardiac murmur (S)      RECOMMENDATION:   · Currently on Zyvox.  Suspect this was to avoid any potential worsening nephrotoxicity the patient.  He has some chronic kidney disease.  ·   Order follow-up surveillance blood cultures  · We will ask micro-to report susceptibilities to Zyvox.  ·         Laura Eckert MD  02/18/17  2:57 PM           Electronically signed by Laura Eckert MD at 2/18/2017  3:31 PM

## 2017-02-20 NOTE — PLAN OF CARE
Problem: Patient Care Overview (Adult)  Goal: Plan of Care Review  Outcome: Ongoing (interventions implemented as appropriate)    02/20/17 1406   Coping/Psychosocial Response Interventions   Plan Of Care Reviewed With patient   Patient Care Overview   Progress no change   Outcome Evaluation   Outcome Summary/Follow up Plan NO C/O PAIN THIS SHIFT. IV ZYVOX CONTINUES. RIGHT SUBCLAVIAN DRSG DRY/INTACT. AFIB PER TELE, CARDIZEM PO Q6H.       Goal: Adult Individualization and Mutuality  Outcome: Ongoing (interventions implemented as appropriate)  Goal: Discharge Needs Assessment  Outcome: Ongoing (interventions implemented as appropriate)    Problem: Cardiac Output, Decreased (Adult)  Goal: Identify Related Risk Factors and Signs and Symptoms  Outcome: Ongoing (interventions implemented as appropriate)  Goal: Adequate Cardiac Output/Effective Tissue Perfusion  Outcome: Ongoing (interventions implemented as appropriate)

## 2017-02-20 NOTE — PROGRESS NOTES
"INFECTIOUS DISEASES PROGRESS NOTE    Patient:  Gurjit Andrea  YOB: 1970  MRN: [id number]   Admit date: 2/15/2017   Admitting Physician: Blair Frausto MD  Primary Care Physician: Moises Montes MD    Chief Complaint: \"Better\"        Interval History: Not having any chest pain.  He reports he saw Dr. Cortez and everything is good                              He states he has no new rash.      Allergies: No Known Allergies    Current Meds:     Current Facility-Administered Medications:   •  acetaminophen (TYLENOL) tablet 650 mg, 650 mg, Oral, Q4H PRN, Gio Rivera APRN, 650 mg at 02/19/17 0331  •  aluminum-magnesium hydroxide-simethicone (MAALOX/MYLANTA) suspension 30 mL, 30 mL, Oral, Q6H PRN, Gio Rivera APRN  •  bumetanide (BUMEX) tablet 1 mg, 1 mg, Oral, Daily, Reinaldo Foley MD, 1 mg at 02/20/17 0837  •  dextrose (D50W) solution 25 g, 25 g, Intravenous, Q15 Min PRN, Iliana Alexis MD  •  dextrose (GLUTOSE) oral gel 15 g, 15 g, Oral, Q15 Min PRN, Iliana Alexis MD  •  diltiaZEM (CARDIZEM) tablet 90 mg, 90 mg, Oral, Q6H, Macie Carrion APRN, 90 mg at 02/20/17 1045  •  docusate sodium (COLACE) capsule 100 mg, 100 mg, Oral, BID, BHARATHI Diaz, 100 mg at 02/20/17 0837  •  enoxaparin (LOVENOX) syringe 30 mg, 30 mg, Subcutaneous, Daily, Gio Rivera APRN, 30 mg at 02/16/17 1733  •  glucagon (human recombinant) (GLUCAGEN DIAGNOSTIC) injection 1 mg, 1 mg, Subcutaneous, Q15 Min PRN, Iliana Alexis MD  •  heparin (porcine) injection 1,800 Units, 1,800 Units, Intracatheter, PRN, Reinaldo Foley MD, 1,800 Units at 02/16/17 1400  •  insulin detemir (LEVEMIR) injection 10 Units, 10 Units, Subcutaneous, QAM, Iliana Alexis MD, 10 Units at 02/20/17 0836  •  insulin lispro (humaLOG) injection 2-7 Units, 2-7 Units, Subcutaneous, 4x Daily AC & at Bedtime, Iliana Alexis MD, 4 Units at 02/20/17 1042  •  Linezolid (ZYVOX) 600 mg 300 mL, 600 mg, Intravenous, Q12H, Iliana Alexis MD, Last " "Rate: 150 mL/hr at 17 0833, 600 mg at 17 0833  •  metoprolol tartrate (LOPRESSOR) injection 5 mg, 5 mg, Intravenous, Q6H PRN, Macie TAYLOR Knees, APRN, 5 mg at 17 190  •  metoprolol tartrate (LOPRESSOR) tablet 75 mg, 75 mg, Oral, Q12H, Macie TAYLOR Knees, APRN, 75 mg at 17 08  •  ondansetron (ZOFRAN) injection 4 mg, 4 mg, Intravenous, Q6H PRN, BHARATHI Diaz  •  predniSONE (DELTASONE) tablet 40 mg, 40 mg, Oral, BID With Meals, BHARATHI Diaz, 40 mg at 17  •  sodium chloride 0.9 % flush 1-10 mL, 1-10 mL, Intravenous, PRN, BHARATHI Diaz  •  tamsulosin (FLOMAX) 24 hr capsule 0.4 mg, 0.4 mg, Oral, Nightly, BHARATHI Diaz, 0.4 mg at 17      Review of Systems   Constitutional: Negative for chills and fever.   Respiratory: Negative for cough.        Objective     Vital Signs:  Temp (24hrs), Av.4 °F (36.3 °C), Min:96.9 °F (36.1 °C), Max:98.2 °F (36.8 °C)      Visit Vitals   • /62 (BP Location: Left arm, Patient Position: Sitting)   • Pulse 107   • Temp 97.4 °F (36.3 °C) (Oral)   • Resp 18   • Ht 73\" (185.4 cm)   • Wt 221 lb 6 oz (100 kg)   • SpO2 94%   • BMI 29.21 kg/m2           Physical Exam   Constitutional: He appears well-developed and well-nourished.   Eyes: Right eye exhibits no exudate. Left eye exhibits no exudate. No scleral icterus.   No conjunctival petechiae   Neck: Neck supple.   Cardiovascular: Tachycardia present.    Murmur heard.   Systolic murmur is present   Musculoskeletal:   No splinter hemorrhages or Janeway lesions or Osler's nodes are appreciated   Skin: Skin is warm and dry.   Psychiatric: He has a normal mood and affect.   Vitals reviewed.    Line/IV site: Peripheral IV forearm right, condition patent and no redness    Results Review:    I reviewed the patient's new clinical results.    Lab Results:  CBC:     Results from last 7 days  Lab Units 17  0341 17  0603 17  0525   WBC 10*3/mm3 15.95* 13.85* " 12.17*   HEMOGLOBIN g/dL 8.3* 8.4* 8.3*   HEMATOCRIT % 25.9* 26.6* 26.4*   PLATELETS 10*3/mm3 149 136 129*         CMP:   Results from last 7 days  Lab Units 02/20/17  0341 02/19/17  0603 02/18/17  0525  02/15/17  1000   SODIUM mmol/L 135 135 138  < > 137   POTASSIUM mmol/L 4.8 4.8 4.0  < > 3.8   CHLORIDE mmol/L 101 104 101  < > 95*   TOTAL CO2 mmol/L 23.0* 23.0* 23.0*  < > 28.0   BUN mg/dL 57* 55* 52*  < > 64*   CREATININE mg/dL 1.60* 1.76* 1.79*  < > 1.68*   CALCIUM mg/dL 8.5 8.7 8.6  < > 8.9   BILIRUBIN mg/dL  --   --   --   --  0.4   ALK PHOS U/L  --   --   --   --  114   ALT (SGPT) U/L  --   --   --   --  59*   AST (SGOT) U/L  --   --   --   --  25   GLUCOSE mg/dL 203* 309* 291*  < > 311*   < > = values in this interval not displayed.      Culture Results:    BLOOD CULTURE   Date Value Ref Range Status   02/18/2017 No growth 2 days   Preliminary   02/17/2017 Staphylococcus aureus (A)  Preliminary     Comment:       Consider infectious disease consult to rule out distant focus of infection.   02/17/2017 Staphylococcus aureus (A)  Preliminary     Comment:       Consider infectious disease consult to rule out distant focus of infection.      Body Fluid Culture [[id number]] (Abnormal)  Collected: 02/17/17 0004       Lab Status: Final result Specimen: Body Fluid from Blood, Central Line Updated: 02/19/17 0626        BF Culture           Moderate growth (3+) Staphylococcus aureus, MRSA (C)           Methicillin resistant Staphylococcus aureus, Patient may be an isolation risk.           BETA LACTAMASE Positive         Gram Stain Result Few (2+) WBCs seen                    Few (2+) Gram positive cocci, intracellular and extracellular         Susceptibility         Staphylococcus aureus, MRSA         JUAN J         Clindamycin >=8  Resistant         Erythromycin >=8  Resistant         Gentamicin <=0.5  Susceptible         Inducible Clindamycin Resistance NEG  Negative         Levofloxacin 4  Intermediate 1          Oxacillin >=4  Resistant         Penicillin G >=0.5  Resistant         Tetracycline <=1  Susceptible         Trimethoprim + Sulfamethoxazole <=10  Susceptible         Vancomycin 1  Susceptible              Catheter Culture [[id number]] (Abnormal) Collected: 02/17/17 1836        Lab Status: Preliminary result Specimen: Cath Tip from Neck Updated: 02/19/17 0736        CATHETER CULTURE Staphylococcus aureus (A)         BETA LACTAMASE Positive         Blood Culture [[id number]] (Abnormal)  Collected: 02/17/17 0108       Lab Status: Final result Specimen: Blood from Arm, Left Updated: 02/20/17 0741        Blood Culture Staphylococcus aureus, MRSA (C)            Consider infectious disease consult to rule out distant focus of infection.   Methicillin resistant Staphylococcus aureus, Patient may be an isolation risk.           Isolated from Aerobic and Anaerobic Bottles         BETA LACTAMASE Positive         Gram Stain Result Gram positive cocci          Susceptibility         Staphylococcus aureus, MRSA         JUAN J         Clindamycin >=8  Resistant         Erythromycin >=8  Resistant         Gentamicin <=0.5  Susceptible         Inducible Clindamycin Resistance NEG  Negative         Levofloxacin >=8  Resistant         Linezolid 2  Susceptible         Oxacillin >=4  Resistant         Penicillin G >=0.5  Resistant         Tetracycline 2  Susceptible         Trimethoprim + Sulfamethoxazole <=10  Susceptible         Vancomycin 2  Susceptible                    Radiology:   Imaging Results (last 72 hours)     Procedure Component Value Units Date/Time    MRI Brain Without Contrast [[id number]] Collected:  02/16/17 1600     Updated:  02/16/17 1722    Narrative:       EXAM: MR BRAIN WITHOUT IV CONTRAST 02/16/2017     COMPARISON: Head CT dated 02/15/2017.      HISTORY: 47 year-old male. Left lower extremity weakness.     TECHNIQUE:   Routine pulse sequences of the brain were obtained without IV contrast.      REPORT:   The ventricles  and cerebrospinal fluid spaces are normal in size and  configuration for the patient's age. There is no evidence of mass-effect  or midline shift. No reduced diffusivity is demonstrated. A few  scattered foci of gliosis in the bilateral white matter  The brainstem, sella, pituitary, cerebellum are unremarkable. The  basilar cisterns are preserved. The intraorbital structures are  unremarkable.   The flow voids are preserved.   No acute osseous lesion.        The visualized portions of the paranasal sinuses and mastoid air cells  are unremarkable.           Impression:       1.  No acute intracranial abnormalities.  2.  Nonspecific foci of gliosis in the bilateral white matter, likely  reflecting mild chronic microvascular changes.  This report was finalized on 02/16/2017 17:20 by Dr. Kristyn Rodriguez MD.          Assessment/Plan     Hospital Problem List     * (Principal)Persistent atrial fibrillation    Henoch-Schonlein purpura nephritis    Tobacco abuse    Microcytic anemia    Hypertensive nephrosclerosis    Wegener's granulomatosis with renal involvement          IMPRESSION:    1. Staph aureus bacteremia-thought to be catheter related. Dialysis catheter is been removed and cultures positive from catheter  2. Wegener's granulomatosis with renal involvement  3. Chronic kidney disease with acute kidney injury-the patient was dialyzed for a few weeks and has now had catheter removed  4. Cardiac murmur - previous echo with mitral regurgitation  5. Leukocytosis-trending upward.  On prednisone-  Always a concern for potential metastatic focus of infection with staph aureus bacteremia.  No obvious stigmata of endocarditis in this patient with a significant systolic murmur and known mitral regurgitation.        RECOMMENDATION:     · Currently on Zyvox. Suspect this was to avoid any potential worsening nephrotoxicity the patient. He has some chronic kidney disease.  · follow-up surveillance blood cultures  · Check CBC with  manual differential tomorrow  · Last vascular access team to evaluate for midline or PICC for patient to receive linezolid as an outpatient  · We will ask micro-to report susceptibilities to daptomycin as well  · Expect him to need a minimum of 2 weeks of IV antibiotics the start date from his last positive blood culture         Laura Eckert MD  02/20/17  4:12 PM

## 2017-02-20 NOTE — PROGRESS NOTES
Continued Stay Note   Jerman     Patient Name: Gurjit Andrea  MRN: [id number]  Today's Date: 2/20/2017    Admit Date: 2/15/2017          Discharge Plan       02/20/17 1104    Case Management/Social Work Plan    Plan PT continues to plan to dc home when medically ready. PT will likely need IV ABX upon dc. Will await orders for set up and will follow.     Patient/Family In Agreement With Plan yes              Discharge Codes     None            SHANI Miranda

## 2017-02-20 NOTE — PLAN OF CARE
Problem: Patient Care Overview (Adult)  Goal: Plan of Care Review  Outcome: Ongoing (interventions implemented as appropriate)    Problem: Cardiac Output, Decreased (Adult)  Goal: Identify Related Risk Factors and Signs and Symptoms  Outcome: Ongoing (interventions implemented as appropriate)  Goal: Adequate Cardiac Output/Effective Tissue Perfusion  Outcome: Ongoing (interventions implemented as appropriate)

## 2017-02-20 NOTE — PROGRESS NOTES
" PROGRESS NOTE.      Patient:  Gurjit Andrea  YOB: 1970  Date of Service: 2/20/2017  MRN: [id number]   Acct: [id number]   Primary Care Physician: Moises Montes MD  Advance Directive: Full Code  Admit Date: 2/15/2017       Hospital Day: 5  Referring Provider: Kalie Vergara DO      Patient Seen, Chart, Consults, Notes, Labs, Radiology studies reviewed.        Subjective:    Gurjti Andrea is a 47 y.o. male whom we were consulted for OSMANI.  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  Admitted with atrial fibrillation and RVR.  Has shown sign of reversal so dislysis placed on hold; last dialysis was 2/16.  On 2/17 started having large amount of purulent drainage from around Permcath site.  His dialysis catheter was removed and his blood cultures were positive for MRSA. Today he reports feeling \"ok\".  No new issues.  Good urine output.    Review of Systems:  History obtained from chart review and the patient  General ROS: No fever or chills  Respiratory ROS: No cough, shortness of breath, wheezing  Cardiovascular ROS: no chest pain or dyspnea on exertion  Gastrointestinal ROS: No abdominal pain or melena  Genito-Urinary ROS: No dysuria or hematuria  Musculoskeletal: leg edema  Skin: negative    Objective:  Visit Vitals   • /74 (BP Location: Right arm, Patient Position: Sitting)   • Pulse 94   • Temp 97.1 °F (36.2 °C) (Temporal Artery )   • Resp 18   • Ht 73\" (185.4 cm)   • Wt 221 lb 6 oz (100 kg)   • SpO2 96%   • BMI 29.21 kg/m2       Intake/Output Summary (Last 24 hours) at 02/20/17 1109  Last data filed at 02/20/17 1000   Gross per 24 hour   Intake   1000 ml   Output   2500 ml   Net  -1500 ml       Physical examination:  General: awake/alert   Chest:  clear to auscultation bilaterally without respiratory distress  CVS: regular rate and rhythm  Abdominal: soft, nontender, normal bowel sounds  Extremities:1+ leg edema  Skin: warm and dry without rash  Neuro: No " focal motor deficits    Labs:  Lab Results (last 24 hours)     Procedure Component Value Units Date/Time    Blood Culture With CHERYLE [[id number]]  (Normal) Collected:  02/18/17 1541    Specimen:  Blood from Arm, Left Updated:  02/19/17 1701     Blood Culture No growth at 24 hours     POC Glucose Fingerstick [[id number]]  (Abnormal) Collected:  02/19/17 1650    Specimen:  Blood Updated:  02/19/17 1706     Glucose 415 (H) mg/dL       : [id number] Promise ChianeMeter ID: [id number]       POC Glucose Fingerstick [[id number]]  (Abnormal) Collected:  02/19/17 1654    Specimen:  Blood Updated:  02/19/17 1707     Glucose 382 (H) mg/dL       : [id number] Promise ChianeMeter ID: [id number]       POC Glucose Fingerstick [[id number]]  (Abnormal) Collected:  02/19/17 2022    Specimen:  Blood Updated:  02/19/17 2034     Glucose 246 (H) mg/dL       : [id number] Xavier Haywood MMeter ID: [id number]       CBC (No Diff) [[id number]]  (Abnormal) Collected:  02/20/17 0341    Specimen:  Blood Updated:  02/20/17 0426     WBC 15.95 (H) 10*3/mm3      RBC 3.08 (L) 10*6/mm3      Hemoglobin 8.3 (L) g/dL      Hematocrit 25.9 (L) %      MCV 84.1 fL      MCH 26.9 (L) pg      MCHC 32.0 (L) g/dL      RDW 18.5 (H) %      RDW-SD 56.2 (H) fl      MPV 9.8 fL      Platelets 149 10*3/mm3     Basic Metabolic Panel [[id number]]  (Abnormal) Collected:  02/20/17 0341    Specimen:  Blood Updated:  02/20/17 0442     Glucose 203 (H) mg/dL      BUN 57 (H) mg/dL      Creatinine 1.60 (H) mg/dL      Sodium 135 mmol/L      Potassium 4.8 mmol/L      Chloride 101 mmol/L      CO2 23.0 (L) mmol/L      Calcium 8.5 mg/dL      eGFR Non African Amer 46 (L) mL/min/1.73      BUN/Creatinine Ratio 35.6 (H)      Anion Gap 11.0 mmol/L     Narrative:       GFR Normal >60  Chronic Kidney Disease <60  Kidney Failure <15    Catheter Culture [[id number]]  (Abnormal)  (Susceptibility) Collected:  02/17/17 1836    Specimen:  Cath Tip from Neck Updated:  02/20/17 0736     CATHETER CULTURE  Staphylococcus aureus, MRSA (C)         Methicillin resistant Staphylococcus aureus, Patient may be an isolation risk.        BETA LACTAMASE Positive     Susceptibility      Staphylococcus aureus, MRSA     JUAN J     Clindamycin >=8 ug/ml Resistant     Erythromycin >=8 ug/ml Resistant     Gentamicin <=0.5 ug/ml Susceptible     Inducible Clindamycin Resistance NEG  Negative     Levofloxacin 4 ug/ml Intermediate  [1]      Oxacillin >=4 ug/ml Resistant     Penicillin G >=0.5 ug/ml Resistant     Tetracycline <=1 ug/ml Susceptible     Trimethoprim + Sulfamethoxazole <=10 ug/ml Susceptible     Vancomycin 2 ug/ml Susceptible            [1]   Staphylococcus species may develop resistance during prolonged therapy with quinolones. Isolates that are initially susceptible may become resistant within three to four days after initiation of therapy. Testing of repeat isolates may be warranted.                  Blood Culture [[id number]]  (Abnormal)  (Susceptibility) Collected:  02/17/17 0108    Specimen:  Blood from Arm, Left Updated:  02/20/17 0741     Blood Culture Staphylococcus aureus, MRSA (C)         Consider infectious disease consult to rule out distant focus of infection.  Methicillin resistant Staphylococcus aureus, Patient may be an isolation risk.        Isolated from Aerobic and Anaerobic Bottles      BETA LACTAMASE Positive      Gram Stain Result Gram positive cocci     Susceptibility      Staphylococcus aureus, MRSA     JUAN J     Clindamycin >=8 ug/ml Resistant     Erythromycin >=8 ug/ml Resistant     Gentamicin <=0.5 ug/ml Susceptible     Inducible Clindamycin Resistance NEG  Negative     Levofloxacin >=8 ug/ml Resistant     Linezolid 2 ug/ml Susceptible     Oxacillin >=4 ug/ml Resistant     Penicillin G >=0.5 ug/ml Resistant     Tetracycline 2 ug/ml Susceptible     Trimethoprim + Sulfamethoxazole <=10 ug/ml Susceptible     Vancomycin 2 ug/ml Susceptible                    POC Glucose Fingerstick [[id number]]  (Abnormal)  Collected:  02/20/17 0748    Specimen:  Blood Updated:  02/20/17 0830     Glucose 187 (H) mg/dL       : [id number] [facility] ID: [id number]       Blood Culture [[id number]]  (Abnormal) Collected:  02/17/17 0108    Specimen:  Blood from Hand, Left Updated:  02/20/17 0839     Blood Culture Staphylococcus aureus, MRSA (C)         Consider infectious disease consult to rule out distant focus of infection.  Methicillin resistant Staphylococcus aureus, Patient may be an isolation risk.        Isolated from Aerobic and Anaerobic Bottles      BETA LACTAMASE Positive     Narrative:       See Culture #[id number] Drawn 2/17 0108 for Ashwin    POC Glucose Fingerstick [[id number]]  (Abnormal) Collected:  02/20/17 1040    Specimen:  Blood Updated:  02/20/17 1052     Glucose 261 (H) mg/dL       : [id number] Hiral Lares WhitneyMeter ID: [id number]             Radiology:   Imaging Results (last 24 hours)     ** No results found for the last 24 hours. **              Assessment   1. 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   2. Atrial fibrillation with RVR--rate controlled  3. Anemia  4. Benign HTN  5. MRSA Line sepsis--on IV antibiotics  6. Edema--taking Bumex PO      Plan:  1.  Renal function continues to recover; will follow closely  2.  Currently taking Prednisone 40mg BID  3.  Continue IV antibiotics; infectious disease is following    BHARATHI Chun  2/20/2017  11:09 AM     Patient was examined, all the data was reviewed.    Assessment:  1.  Acute kidney injury secondary to Jairo's granulomatosis.  His renal function has improved.  Although he has received few hemodialysis treatment.  2.  MRSA bacteremia related to hemodialysis catheter which has been removed.  3.  Atrial fibrillation, currently rate controlled.  4.  Good control of hypertension.    Plan:  1.  Continue IV Zyvox  2.  By mouth prednisone.  3.  Intermittent Rituxan.

## 2017-02-20 NOTE — PLAN OF CARE
Problem: Patient Care Overview (Adult)  Goal: Plan of Care Review    02/20/17 0310   Outcome Evaluation   Outcome Summary/Follow up Plan VSS. No c/o pain. Good output tonight. Old permacath dressing CDI. IV abx cont. Labs pending for this AM

## 2017-02-21 ENCOUNTER — APPOINTMENT (OUTPATIENT)
Dept: GENERAL RADIOLOGY | Facility: HOSPITAL | Age: 47
End: 2017-02-21

## 2017-02-21 PROBLEM — B95.62 MRSA BACTEREMIA: Status: ACTIVE | Noted: 2017-02-21

## 2017-02-21 PROBLEM — R78.81 MRSA BACTEREMIA: Status: ACTIVE | Noted: 2017-02-21

## 2017-02-21 LAB
ANION GAP SERPL CALCULATED.3IONS-SCNC: 10 MMOL/L (ref 4–13)
BACTERIA BLD CULT: ABNORMAL
BUN BLD-MCNC: 62 MG/DL (ref 5–21)
BUN/CREAT SERPL: 39.5 (ref 7–25)
CALCIUM SPEC-SCNC: 8.7 MG/DL (ref 8.4–10.4)
CHLORIDE SERPL-SCNC: 100 MMOL/L (ref 98–110)
CO2 SERPL-SCNC: 26 MMOL/L (ref 24–31)
CREAT BLD-MCNC: 1.57 MG/DL (ref 0.5–1.4)
DEPRECATED RDW RBC AUTO: 57.9 FL (ref 40–54)
ERYTHROCYTE [DISTWIDTH] IN BLOOD BY AUTOMATED COUNT: 18.7 % (ref 12–15)
GFR SERPL CREATININE-BSD FRML MDRD: 47 ML/MIN/1.73
GLUCOSE BLD-MCNC: 146 MG/DL (ref 70–100)
GLUCOSE BLDC GLUCOMTR-MCNC: 164 MG/DL (ref 70–130)
GLUCOSE BLDC GLUCOMTR-MCNC: 173 MG/DL (ref 70–130)
GLUCOSE BLDC GLUCOMTR-MCNC: 266 MG/DL (ref 70–130)
GLUCOSE BLDC GLUCOMTR-MCNC: 303 MG/DL (ref 70–130)
HCT VFR BLD AUTO: 27.1 % (ref 40–52)
HGB BLD-MCNC: 8.7 G/DL (ref 14–18)
LYMPHOCYTES # BLD MANUAL: 1.75 10*3/MM3 (ref 0.72–4.86)
LYMPHOCYTES NFR BLD MANUAL: 10 % (ref 15–45)
LYMPHOCYTES NFR BLD MANUAL: 3 % (ref 4–12)
MCH RBC QN AUTO: 27.2 PG (ref 28–32)
MCHC RBC AUTO-ENTMCNC: 32.1 G/DL (ref 33–36)
MCV RBC AUTO: 84.7 FL (ref 82–95)
MONOCYTES # BLD AUTO: 0.53 10*3/MM3 (ref 0.19–1.3)
NEUTROPHILS # BLD AUTO: 15.08 10*3/MM3 (ref 1.87–8.4)
NEUTROPHILS NFR BLD MANUAL: 83 % (ref 39–78)
NEUTS BAND NFR BLD MANUAL: 3 % (ref 0–10)
PLAT MORPH BLD: NORMAL
PLATELET # BLD AUTO: 155 10*3/MM3 (ref 130–400)
PMV BLD AUTO: 9.6 FL (ref 6–12)
POIKILOCYTOSIS BLD QL SMEAR: ABNORMAL
POTASSIUM BLD-SCNC: 5.2 MMOL/L (ref 3.5–5.3)
RBC # BLD AUTO: 3.2 10*6/MM3 (ref 4.8–5.9)
SCAN SLIDE: NORMAL
SODIUM BLD-SCNC: 136 MMOL/L (ref 135–145)
TOXIC GRANULATION: ABNORMAL
VARIANT LYMPHS NFR BLD MANUAL: 1 % (ref 0–5)
WBC NRBC COR # BLD: 17.53 10*3/MM3 (ref 4.8–10.8)

## 2017-02-21 PROCEDURE — 80048 BASIC METABOLIC PNL TOTAL CA: CPT | Performed by: INTERNAL MEDICINE

## 2017-02-21 PROCEDURE — 85007 BL SMEAR W/DIFF WBC COUNT: CPT | Performed by: FAMILY MEDICINE

## 2017-02-21 PROCEDURE — 82962 GLUCOSE BLOOD TEST: CPT

## 2017-02-21 PROCEDURE — 63710000001 INSULIN DETEMIR PER 5 UNITS: Performed by: INTERNAL MEDICINE

## 2017-02-21 PROCEDURE — 87185 SC STD ENZYME DETCJ PER NZM: CPT | Performed by: INTERNAL MEDICINE

## 2017-02-21 PROCEDURE — 87147 CULTURE TYPE IMMUNOLOGIC: CPT | Performed by: INTERNAL MEDICINE

## 2017-02-21 PROCEDURE — 87150 DNA/RNA AMPLIFIED PROBE: CPT | Performed by: INTERNAL MEDICINE

## 2017-02-21 PROCEDURE — C1751 CATH, INF, PER/CENT/MIDLINE: HCPCS

## 2017-02-21 PROCEDURE — 85025 COMPLETE CBC W/AUTO DIFF WBC: CPT | Performed by: FAMILY MEDICINE

## 2017-02-21 PROCEDURE — 87186 SC STD MICRODIL/AGAR DIL: CPT | Performed by: INTERNAL MEDICINE

## 2017-02-21 PROCEDURE — 99232 SBSQ HOSP IP/OBS MODERATE 35: CPT | Performed by: INTERNAL MEDICINE

## 2017-02-21 PROCEDURE — 25010000002 LINEZOLID 600 MG/300ML SOLUTION: Performed by: INTERNAL MEDICINE

## 2017-02-21 PROCEDURE — 87040 BLOOD CULTURE FOR BACTERIA: CPT | Performed by: INTERNAL MEDICINE

## 2017-02-21 PROCEDURE — 71010 HC CHEST PA OR AP: CPT

## 2017-02-21 PROCEDURE — 63710000001 PREDNISONE PER 5 MG: Performed by: NURSE PRACTITIONER

## 2017-02-21 PROCEDURE — 87205 SMEAR GRAM STAIN: CPT | Performed by: INTERNAL MEDICINE

## 2017-02-21 RX ORDER — PREDNISONE 20 MG/1
40 TABLET ORAL
Status: DISCONTINUED | OUTPATIENT
Start: 2017-02-22 | End: 2017-02-23 | Stop reason: HOSPADM

## 2017-02-21 RX ORDER — DILTIAZEM HYDROCHLORIDE 180 MG/1
360 CAPSULE, COATED, EXTENDED RELEASE ORAL
Status: DISCONTINUED | OUTPATIENT
Start: 2017-02-21 | End: 2017-02-23 | Stop reason: HOSPADM

## 2017-02-21 RX ORDER — LIDOCAINE HYDROCHLORIDE 10 MG/ML
5 INJECTION, SOLUTION INFILTRATION; PERINEURAL ONCE
Status: COMPLETED | OUTPATIENT
Start: 2017-02-21 | End: 2017-02-21

## 2017-02-21 RX ORDER — METOPROLOL TARTRATE 75 MG/1
75 TABLET, FILM COATED ORAL EVERY 12 HOURS SCHEDULED
Qty: 60 TABLET | Refills: 5 | Status: SHIPPED | OUTPATIENT
Start: 2017-02-21 | End: 2020-08-26 | Stop reason: HOSPADM

## 2017-02-21 RX ORDER — DILTIAZEM HYDROCHLORIDE 360 MG/1
360 CAPSULE, EXTENDED RELEASE ORAL
Qty: 30 CAPSULE | Refills: 5 | Status: ON HOLD | OUTPATIENT
Start: 2017-02-21 | End: 2020-08-25

## 2017-02-21 RX ADMIN — LINEZOLID 600 MG: 600 INJECTION, SOLUTION INTRAVENOUS at 21:48

## 2017-02-21 RX ADMIN — INSULIN LISPRO 5 UNITS: 100 INJECTION, SOLUTION INTRAVENOUS; SUBCUTANEOUS at 17:38

## 2017-02-21 RX ADMIN — LINEZOLID 600 MG: 600 INJECTION, SOLUTION INTRAVENOUS at 11:20

## 2017-02-21 RX ADMIN — INSULIN DETEMIR 10 UNITS: 100 INJECTION, SOLUTION SUBCUTANEOUS at 08:21

## 2017-02-21 RX ADMIN — INSULIN LISPRO 2 UNITS: 100 INJECTION, SOLUTION INTRAVENOUS; SUBCUTANEOUS at 12:00

## 2017-02-21 RX ADMIN — INSULIN LISPRO 2 UNITS: 100 INJECTION, SOLUTION INTRAVENOUS; SUBCUTANEOUS at 08:20

## 2017-02-21 RX ADMIN — INSULIN LISPRO 4 UNITS: 100 INJECTION, SOLUTION INTRAVENOUS; SUBCUTANEOUS at 21:48

## 2017-02-21 RX ADMIN — METOPROLOL TARTRATE 75 MG: 50 TABLET ORAL at 22:03

## 2017-02-21 RX ADMIN — LIDOCAINE HYDROCHLORIDE 5 ML: 10 INJECTION, SOLUTION INFILTRATION; PERINEURAL at 11:20

## 2017-02-21 RX ADMIN — PREDNISONE 40 MG: 20 TABLET ORAL at 08:20

## 2017-02-21 RX ADMIN — DILTIAZEM HYDROCHLORIDE 90 MG: 90 TABLET, FILM COATED ORAL at 12:00

## 2017-02-21 RX ADMIN — BUMETANIDE 1 MG: 1 TABLET ORAL at 08:20

## 2017-02-21 RX ADMIN — METOPROLOL TARTRATE 75 MG: 50 TABLET ORAL at 08:20

## 2017-02-21 RX ADMIN — DILTIAZEM HYDROCHLORIDE 360 MG: 180 CAPSULE, COATED, EXTENDED RELEASE ORAL at 16:54

## 2017-02-21 RX ADMIN — DILTIAZEM HYDROCHLORIDE 90 MG: 90 TABLET, FILM COATED ORAL at 04:52

## 2017-02-21 RX ADMIN — TAMSULOSIN HYDROCHLORIDE 0.4 MG: 0.4 CAPSULE ORAL at 21:48

## 2017-02-21 NOTE — PROGRESS NOTES
PT has coverage for IV ABX per I. IV ABX delivery is being arranged by [facility]. PT will be dc home when medically ready. Veronica Walton MSW

## 2017-02-21 NOTE — PROGRESS NOTES
Continued Stay Note   Jerman     Patient Name: Gurjit Andrea  MRN: [id number]  Today's Date: 2/21/2017    Admit Date: 2/15/2017          Discharge Plan       02/21/17 0856    Case Management/Social Work Plan    Plan (P)  Gallo sent referral to [facility] to set up IV ABX. Gallo is waiting to hear back from [facility]. Will follow.     Patient/Family In Agreement With Plan (P)  yes              Discharge Codes     None            Cindy Navas

## 2017-02-21 NOTE — PROGRESS NOTES
LOS: 5 days   Patient Care Team:  Moises Montes MD as PCP - General (Internal Medicine)  Luis Carlos Awan MD as Consulting Physician (Urology)  Alex Cortez MD as Cardiologist (Cardiology)    Chief Complaint: Shortness of breath AF RVR  Subjective  Feeling  better  No chest pain   No excessive shortness of breath  No new complaints    Interval History: Improved overall    Patient Complaints:   Denies chest pain currently. Denies excessive shortness of breath. Denies abdominal pain, nausea vomiting or diarrhoea.    Telemetry: no malignant arrhythmia. No significant pauses. AF rate 100-110 BPM    Review of Systems:    The following systems were reviewed and negative; ENT, respiratory,  gastrointestinal, integument, hematologic / lymphatic, neurological, behavioral/psych and allergies / immunologic    Labs:    WBC WBC   Date Value Ref Range Status   02/20/2017 15.95 (H) 4.80 - 10.80 10*3/mm3 Final   02/19/2017 13.85 (H) 4.80 - 10.80 10*3/mm3 Final   02/18/2017 12.17 (H) 4.80 - 10.80 10*3/mm3 Final      HGB HEMOGLOBIN   Date Value Ref Range Status   02/20/2017 8.3 (L) 14.0 - 18.0 g/dL Final   02/19/2017 8.4 (L) 14.0 - 18.0 g/dL Final   02/18/2017 8.3 (L) 14.0 - 18.0 g/dL Final      HCT HEMATOCRIT   Date Value Ref Range Status   02/20/2017 25.9 (L) 40.0 - 52.0 % Final   02/19/2017 26.6 (L) 40.0 - 52.0 % Final   02/18/2017 26.4 (L) 40.0 - 52.0 % Final      Platlets PLATELETS   Date Value Ref Range Status   02/20/2017 149 130 - 400 10*3/mm3 Final   02/19/2017 136 130 - 400 10*3/mm3 Final   02/18/2017 129 (L) 130 - 400 10*3/mm3 Final      MCV MCV   Date Value Ref Range Status   02/20/2017 84.1 82.0 - 95.0 fL Final   02/19/2017 85.0 82.0 - 95.0 fL Final   02/18/2017 84.9 82.0 - 95.0 fL Final        Results from last 7 days  Lab Units 02/20/17  0341 02/19/17  0603 02/18/17  0525  02/15/17  1000   SODIUM mmol/L 135 135 138  < > 137   POTASSIUM mmol/L 4.8 4.8 4.0  < > 3.8   CHLORIDE mmol/L 101 104 101  < > 95*   TOTAL  CO2 mmol/L 23.0* 23.0* 23.0*  < > 28.0   BUN mg/dL 57* 55* 52*  < > 64*   CREATININE mg/dL 1.60* 1.76* 1.79*  < > 1.68*   CALCIUM mg/dL 8.5 8.7 8.6  < > 8.9   BILIRUBIN mg/dL  --   --   --   --  0.4   ALK PHOS U/L  --   --   --   --  114   ALT (SGPT) U/L  --   --   --   --  59*   AST (SGOT) U/L  --   --   --   --  25   GLUCOSE mg/dL 203* 309* 291*  < > 311*   < > = values in this interval not displayed.  Lab Results   Component Value Date    TROPONINI <0.012 02/15/2017     PT/INR:  No results found for: PROTIME/No results found for: INR    Imaging Results (last 72 hours)     ** No results found for the last 72 hours. **          Objective     Medication Review:   Current Facility-Administered Medications   Medication Dose Route Frequency Provider Last Rate Last Dose   • acetaminophen (TYLENOL) tablet 650 mg  650 mg Oral Q4H PRN BHARATHI Diaz   650 mg at 02/19/17 0331   • aluminum-magnesium hydroxide-simethicone (MAALOX/MYLANTA) suspension 30 mL  30 mL Oral Q6H PRN BHARATHI Diaz       • bumetanide (BUMEX) tablet 1 mg  1 mg Oral Daily Reinaldo Foley MD   1 mg at 02/20/17 0837   • dextrose (D50W) solution 25 g  25 g Intravenous Q15 Min PRN Iliana Alexis MD       • dextrose (GLUTOSE) oral gel 15 g  15 g Oral Q15 Min PRN Iliana Alexis MD       • diltiaZEM (CARDIZEM) tablet 90 mg  90 mg Oral Q6H BHARATHI Givens   90 mg at 02/20/17 2040   • docusate sodium (COLACE) capsule 100 mg  100 mg Oral BID BHARATHI Diaz   100 mg at 02/20/17 0837   • enoxaparin (LOVENOX) syringe 30 mg  30 mg Subcutaneous Daily BHARATHI Diaz   30 mg at 02/16/17 1733   • glucagon (human recombinant) (GLUCAGEN DIAGNOSTIC) injection 1 mg  1 mg Subcutaneous Q15 Min PRN Iliana Alexis MD       • heparin (porcine) injection 1,800 Units  1,800 Units Intracatheter PRN Reinaldo Foley MD   1,800 Units at 02/16/17 1400   • insulin detemir (LEVEMIR) injection 10 Units  10 Units Subcutaneous QAM Iliana Alexis,  "MD   10 Units at 02/20/17 0836   • insulin lispro (humaLOG) injection 2-7 Units  2-7 Units Subcutaneous 4x Daily AC & at Bedtime Iliana Alexis MD   4 Units at 02/20/17 2046   • Linezolid (ZYVOX) 600 mg 300 mL  600 mg Intravenous Q12H Iliana Alexis  mL/hr at 02/20/17 2040 600 mg at 02/20/17 2040   • metoprolol tartrate (LOPRESSOR) injection 5 mg  5 mg Intravenous Q6H PRN Macie E Knees, APRN   5 mg at 02/17/17 1905   • metoprolol tartrate (LOPRESSOR) tablet 75 mg  75 mg Oral Q12H Macie E Knees, APRN   75 mg at 02/20/17 2040   • ondansetron (ZOFRAN) injection 4 mg  4 mg Intravenous Q6H PRN BHARATHI Diaz       • predniSONE (DELTASONE) tablet 40 mg  40 mg Oral BID With Meals BHARATHI Diaz   40 mg at 02/20/17 1704   • sodium chloride 0.9 % flush 1-10 mL  1-10 mL Intravenous PRN BHARATHI Diaz       • tamsulosin (FLOMAX) 24 hr capsule 0.4 mg  0.4 mg Oral Nightly BHARATHI Diaz   0.4 mg at 02/20/17 2039       Vital Sign Min/Max for last 24 hours  Temp  Min: 96.9 °F (36.1 °C)  Max: 97.9 °F (36.6 °C)   BP  Min: 106/68  Max: 118/74   Pulse  Min: 84  Max: 107   Resp  Min: 18  Max: 22   SpO2  Min: 94 %  Max: 100 %   No Data Recorded   Weight  Min: 223 lb 2 oz (101 kg)  Max: 223 lb 2 oz (101 kg)     Flowsheet Rows         First Filed Value    Admission Height  73\" (185.4 cm) Documented at 02/15/2017 0953    Admission Weight  224 lb (102 kg) Documented at 02/15/2017 0953          Physical Exam:  Ears ears appear intact with no abnormalities noted  Nose nares normal, septum midline, mucosa normal and no drainage  Neck suppple, trachea midline, no thyromegaly, no carotid bruit and no JVD  Back no kyphosis present, no scoliosis present, no skin lesions, erythema, or scars, no tenderness to percussion or palpation and range of motion normal  Lungs respirations regular, respirations even and respirations unlabored  Heart normal S1, S2, irregular, no rub and no click  Abdomen normal bowel sounds, no " masses, no hepatomegaly, no splenomegaly, no guarding and no rebound tenderness  Skin no bleeding, bruising or rash     Results Review:   I reviewed the patient's new clinical results.  I reviewed the patient's new imaging results and agree with the interpretation.  I reviewed the patient's other test results and agree with the interpretation  I personally viewed and interpreted the patient's EKG/Telemetry data  Discussed with patient    Medication Review: Performed    Assessment/Plan     Principal Problem:    Persistent atrial fibrillation  Active Problems:    Henoch-Schonlein purpura nephritis    Tobacco abuse    Microcytic anemia    Hypertensive nephrosclerosis    Wegener's granulomatosis with renal involvement    Plan  Same treatment  Permacath catheter removed  Awaiting IV antibiotics at home  OK to discharge from cardiac standpoint  Supportive care  Telemetry  Optimal medical therapy  Deep vein thrombosis precautions  Low salt cardiac diet  Avoid NSAIDS    Alex Cortez MD  02/20/17  11:04 PM

## 2017-02-21 NOTE — PROGRESS NOTES
"INFECTIOUS DISEASES PROGRESS NOTE    Patient:  Gurjit Andrea  YOB: 1970  MRN: [id number]   Admit date: 2/15/2017   Admitting Physician: Blair Frausto MD  Primary Care Physician: Moises Montes MD    Chief Complaint: \"Better than when I got here\"        Interval History: Not having any chest pain.  He reports he saw Dr. Cortez and everything is good. He is packed and states he has a ride on the way.    I did verify that his prednisone dose was being decreased since he has been here.  He does not know what his dose was at home as his wife handles that but the admission information states he was taking 80 mg twice a day.  He has not received any Solu-Medrol this admission.    Another blood culture form 2/18 is NOW POSITIVE for MRSA - cath removed 2/17                                  Allergies: No Known Allergies    Current Meds:     Current Facility-Administered Medications:   •  acetaminophen (TYLENOL) tablet 650 mg, 650 mg, Oral, Q4H PRN, BHARATHI Diaz, 650 mg at 02/19/17 0331  •  aluminum-magnesium hydroxide-simethicone (MAALOX/MYLANTA) suspension 30 mL, 30 mL, Oral, Q6H PRN, BHARATHI Diaz  •  bumetanide (BUMEX) tablet 1 mg, 1 mg, Oral, Daily, Reinaldo Foley MD, 1 mg at 02/21/17 0820  •  dextrose (D50W) solution 25 g, 25 g, Intravenous, Q15 Min PRN, Iliana Alexis MD  •  dextrose (GLUTOSE) oral gel 15 g, 15 g, Oral, Q15 Min PRN, Iliana Alexis MD  •  diltiaZEM CD (CARDIZEM CD) 24 hr capsule 360 mg, 360 mg, Oral, Q24H, BHARATHI Portillo  •  docusate sodium (COLACE) capsule 100 mg, 100 mg, Oral, BID, BHARATHI Diaz, 100 mg at 02/20/17 0837  •  enoxaparin (LOVENOX) syringe 30 mg, 30 mg, Subcutaneous, Daily, BHARATHI Diaz, 30 mg at 02/16/17 1733  •  glucagon (human recombinant) (GLUCAGEN DIAGNOSTIC) injection 1 mg, 1 mg, Subcutaneous, Q15 Min PRN, Ilinaa Alexis MD  •  heparin (porcine) injection 1,800 Units, 1,800 Units, Intracatheter, PRN, Reinaldo Jeff" "MD Og, 1,800 Units at 17 1400  •  insulin detemir (LEVEMIR) injection 10 Units, 10 Units, Subcutaneous, QAM, Iliana Alexis MD, 10 Units at 17 0821  •  insulin lispro (humaLOG) injection 2-7 Units, 2-7 Units, Subcutaneous, 4x Daily AC & at Bedtime, Iliana Alexis MD, 2 Units at 17 1200  •  Linezolid (ZYVOX) 600 mg 300 mL, 600 mg, Intravenous, Q12H, Iliana Alexis MD, Last Rate: 150 mL/hr at 17 1120, 600 mg at 17 1120  •  metoprolol tartrate (LOPRESSOR) injection 5 mg, 5 mg, Intravenous, Q6H PRN, Macie E Knees, APRN, 5 mg at 17 1905  •  metoprolol tartrate (LOPRESSOR) tablet 75 mg, 75 mg, Oral, Q12H, Macie E Knees, APRN, 75 mg at 17 0820  •  ondansetron (ZOFRAN) injection 4 mg, 4 mg, Intravenous, Q6H PRN, BHARATHI Diaz  •  [START ON 2017] predniSONE (DELTASONE) tablet 40 mg, 40 mg, Oral, Daily With Breakfast, Hema Jade MD  •  sodium chloride 0.9 % flush 1-10 mL, 1-10 mL, Intravenous, PRN, BHARATHI Diaz  •  tamsulosin (FLOMAX) 24 hr capsule 0.4 mg, 0.4 mg, Oral, Nightly, BHARATHI Diaz, 0.4 mg at 17      Review of Systems   Constitutional: Negative for chills and fever.   Eyes: Negative for redness.   Respiratory: Negative for cough.    Cardiovascular: Negative for chest pain.   Gastrointestinal: Negative for abdominal pain.   Endocrine: Negative for polyuria.   Genitourinary: Negative for dysuria.   Musculoskeletal: Negative for joint swelling.   Allergic/Immunologic: Positive for immunocompromised state.   Neurological: Negative for facial asymmetry.   Psychiatric/Behavioral: Negative for confusion.       Objective     Vital Signs:  Temp (24hrs), Av.5 °F (36.4 °C), Min:96.9 °F (36.1 °C), Max:98.1 °F (36.7 °C)      Visit Vitals   • /82 (BP Location: Left arm, Patient Position: Sitting)   • Pulse 94   • Temp 98.1 °F (36.7 °C) (Temporal Artery )   • Resp 18   • Ht 73\" (185.4 cm)   • Wt 223 lb 2 oz (101 kg)   • SpO2 98%   • " BMI 29.44 kg/m2           Physical Exam   Constitutional: He is oriented to person, place, and time. He appears well-developed and well-nourished.   Eyes: Right eye exhibits no exudate. Left eye exhibits no exudate. No scleral icterus.   No conjunctival petechiae   Neck: Neck supple.   Cardiovascular: Normal rate.  An irregular rhythm present.   Murmur heard.   Systolic murmur is present with a grade of 2/6   Deuel along left sternal border and radiating to the axilla   Musculoskeletal:   No splinter hemorrhages or Janeway lesions or Osler's nodes are appreciated   Neurological: He is alert and oriented to person, place, and time. No cranial nerve deficit (III-XII).   Skin: Skin is warm and dry.   Psychiatric: He has a normal mood and affect.   Vitals reviewed.    Line/IV site: PICC RUE - dressing in place - dry intact    Results Review:    I reviewed the patient's new clinical results.    Lab Results:  CBC:     Results from last 7 days  Lab Units 02/21/17  0452 02/20/17  0341 02/19/17  0603   WBC 10*3/mm3 17.53* 15.95* 13.85*   HEMOGLOBIN g/dL 8.7* 8.3* 8.4*   HEMATOCRIT % 27.1* 25.9* 26.6*   PLATELETS 10*3/mm3 155 149 136   LYMPHOCYTE % % 10.0*  --   --    MONOCYTES % % 3.0*  --   --          CMP:   Results from last 7 days  Lab Units 02/21/17  0451 02/20/17  0341 02/19/17  0603  02/15/17  1000   SODIUM mmol/L 136 135 135  < > 137   POTASSIUM mmol/L 5.2 4.8 4.8  < > 3.8   CHLORIDE mmol/L 100 101 104  < > 95*   TOTAL CO2 mmol/L 26.0 23.0* 23.0*  < > 28.0   BUN mg/dL 62* 57* 55*  < > 64*   CREATININE mg/dL 1.57* 1.60* 1.76*  < > 1.68*   CALCIUM mg/dL 8.7 8.5 8.7  < > 8.9   BILIRUBIN mg/dL  --   --   --   --  0.4   ALK PHOS U/L  --   --   --   --  114   ALT (SGPT) U/L  --   --   --   --  59*   AST (SGOT) U/L  --   --   --   --  25   GLUCOSE mg/dL 146* 203* 309*  < > 311*   < > = values in this interval not displayed.      Culture Results:   Procedure Component Value - Date/Time       Blood Culture With CHERYLE [[id number]]  (Abnormal) Collected: 02/18/17 1541       Lab Status: Preliminary result Specimen: Blood from Arm, Left Updated: 02/21/17 1449        Blood Culture Abnormal Stain (A)          Gram Positive Cocci (A)         Isolated from Aerobic Bottle         Gram Stain Result Gram positive cocci        Blood Culture ID, PCR [[id number]] (Abnormal) Collected: 02/18/17 1541       Lab Status: Final result Specimen: Blood from Arm, Left Updated: 02/21/17 1448        BCID, PCR           Staphylococcus aureus. mecA (methicillin resistance gene) detected. Identification by BCID PCR. (C)       Catheter Culture [[id number]] (Abnormal)  Collected: 02/17/17 1836       Lab Status: Final result Specimen: Cath Tip from Neck Updated: 02/20/17 0736        CATHETER CULTURE Staphylococcus aureus, MRSA (C)            Methicillin resistant Staphylococcus aureus, Patient may be an isolation risk.           BETA LACTAMASE Positive          Susceptibility         Staphylococcus aureus, MRSA         JUAN J         Clindamycin >=8  Resistant         Erythromycin >=8  Resistant         Gentamicin <=0.5  Susceptible         Inducible Clindamycin Resistance NEG  Negative         Levofloxacin 4  Intermediate 1         Oxacillin >=4  Resistant         Penicillin G >=0.5  Resistant         Tetracycline <=1  Susceptible         Trimethoprim + Sulfamethoxazole <=10  Susceptible         Vancomycin 2  Susceptible                   1 Staphylococcus species may develop resistance during prolonged therapy with quinolones. Isolates that are initially susceptible may become resistant within three to four days after initiation of therapy. Testing of repeat isolates may be warranted.                           Blood Culture [[id number]] (Abnormal)  Collected: 02/17/17 0108       Lab Status: Final result Specimen: Blood from Arm, Left Updated: 02/20/17 0741        Blood Culture Staphylococcus aureus, MRSA (C)            Consider infectious disease consult to rule out distant focus  of infection.   Methicillin resistant Staphylococcus aureus, Patient may be an isolation risk.           Isolated from Aerobic and Anaerobic Bottles         BETA LACTAMASE Positive         Gram Stain Result Gram positive cocci          Susceptibility         Staphylococcus aureus, MRSA         ASHWIN         Clindamycin >=8  Resistant         Erythromycin >=8  Resistant         Gentamicin <=0.5  Susceptible         Inducible Clindamycin Resistance NEG  Negative         Levofloxacin >=8  Resistant         Linezolid 2  Susceptible         Oxacillin >=4  Resistant         Penicillin G >=0.5  Resistant         Tetracycline 2  Susceptible         Trimethoprim + Sulfamethoxazole <=10  Susceptible         Vancomycin 2  Susceptible                               Blood Culture [[id number]] (Abnormal) Collected: 02/17/17 0108       Lab Status: Final result Specimen: Blood from Hand, Left Updated: 02/20/17 0839        Blood Culture Staphylococcus aureus, MRSA (C)            Consider infectious disease consult to rule out distant focus of infection.   Methicillin resistant Staphylococcus aureus, Patient may be an isolation risk.           Isolated from Aerobic and Anaerobic Bottles         BETA LACTAMASE Positive        Narrative:         See Culture #[id number] Drawn 2/17 0108 for Ashwin       Blood Culture ID, PCR [[id number]] (Abnormal) Collected: 02/17/17 0108       Lab Status: Final result Specimen: Blood from Arm, Left Updated: 02/18/17 0956        BCID, PCR           Staphylococcus aureus. mecA (methicillin resistance gene) detected. Identification by BCID PCR. (C)       Body Fluid Culture [[id number]] (Abnormal)  Collected: 02/17/17 0004       Lab Status: Final result Specimen: Body Fluid from Blood, Central Line Updated: 02/19/17 0626        BF Culture           Moderate growth (3+) Staphylococcus aureus, MRSA (C)           Methicillin resistant Staphylococcus aureus, Patient may be an isolation risk.           BETA LACTAMASE  Positive         Gram Stain Result Few (2+) WBCs seen                    Few (2+) Gram positive cocci, intracellular and extracellular         Susceptibility         Staphylococcus aureus, MRSA         JUAN J         Clindamycin >=8  Resistant         Erythromycin >=8  Resistant         Gentamicin <=0.5  Susceptible         Inducible Clindamycin Resistance NEG  Negative         Levofloxacin 4  Intermediate 1         Oxacillin >=4  Resistant         Penicillin G >=0.5  Resistant         Tetracycline <=1  Susceptible         Trimethoprim + Sulfamethoxazole <=10  Susceptible         Vancomycin 1  Susceptible                   1 Staphylococcus species may develop resistance during prolonged therapy with quinolones. Isolates that are initially susceptible may become resistant within three to four days after initiation of therapy. Testing of repeat isolates may be warranted.                         Radiology:   Imaging Results (last 72 hours)     Procedure Component Value Units Date/Time    MRI Brain Without Contrast [[id number]] Collected:  02/16/17 1600     Updated:  02/16/17 1722    Narrative:       EXAM: MR BRAIN WITHOUT IV CONTRAST 02/16/2017     COMPARISON: Head CT dated 02/15/2017.      HISTORY: 47 year-old male. Left lower extremity weakness.     TECHNIQUE:   Routine pulse sequences of the brain were obtained without IV contrast.      REPORT:   The ventricles and cerebrospinal fluid spaces are normal in size and  configuration for the patient's age. There is no evidence of mass-effect  or midline shift. No reduced diffusivity is demonstrated. A few  scattered foci of gliosis in the bilateral white matter  The brainstem, sella, pituitary, cerebellum are unremarkable. The  basilar cisterns are preserved. The intraorbital structures are  unremarkable.   The flow voids are preserved.   No acute osseous lesion.        The visualized portions of the paranasal sinuses and mastoid air cells  are unremarkable.            Impression:       1.  No acute intracranial abnormalities.  2.  Nonspecific foci of gliosis in the bilateral white matter, likely  reflecting mild chronic microvascular changes.  This report was finalized on 02/16/2017 17:20 by Dr. Kristyn Rodriguez MD.          Assessment/Plan     Hospital Problem List     * (Principal)Persistent atrial fibrillation    Henoch-Schonlein purpura nephritis    Tobacco abuse    Microcytic anemia    Hypertensive nephrosclerosis    Wegener's granulomatosis with renal involvement    MRSA bacteremia          IMPRESSION:    1. Staph aureus bacteremia-thought to be catheter related. Dialysis catheter is been removed and cultures positive from catheter.  Blood culture drawn on February 18 is now positive for MRSA.    2. Wegener's granulomatosis with renal involvement  3. Chronic kidney disease with acute kidney injury-the patient was dialyzed for a few weeks and has now had catheter removed  4. Cardiac murmur - previous echo with mild to moderate  mitral regurgitation  5. Leukocytosis-trending upward.  On prednisone-  Always a concern for potential metastatic focus of infection with staph aureus bacteremia.  No obvious stigmata of endocarditis in this patient with a significant systolic murmur and known mitral regurgitation. did verify that it appears the prednisone dose is actually been decreasing and not increasing.  Additionally no documented Solu-Medrol was noted        RECOMMENDATION:     · Currently on Zyvox. Suspect this was to avoid any potential worsening nephrotoxicity the patient.    · follow-up surveillance blood culturesOrdered for today ×1 and tomorrow morning ×1   · JOSEPH the morning of February 22 is scheduled  ·  we will have micro-repeat linezolid susceptibilities and also report daptomycin       discussed with BHARATHI Song as well as nursing staff and communicated my concerns to Dr. Dana Eckert MD  02/21/17  3:21 PM

## 2017-02-21 NOTE — PROGRESS NOTES
" PROGRESS NOTE.      Patient:  Gurjit Andrea  YOB: 1970  Date of Service: 2/21/2017  MRN: [id number]   Acct: [id number]   Primary Care Physician: Moises Montes MD  Advance Directive: Full Code  Admit Date: 2/15/2017       Hospital Day: 6  Referring Provider: Kalie Vergara DO      Patient Seen, Chart, Consults, Notes, Labs, Radiology studies reviewed.        Subjective:    Gurjit Andrea is a 47 y.o. male whom we were consulted for OSMANI.  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  Admitted with atrial fibrillation and RVR.  Has shown sign of reversal so dislysis placed on hold; last dialysis was 2/16.  On 2/17 started having large amount of purulent drainage from around Permcath site.  His dialysis catheter was removed and his blood cultures were positive for MRSA.  Urine output is good, renal function continues to improve.  No new issues today.    Review of Systems:  History obtained from chart review and the patient  General ROS: No fever or chills  Respiratory ROS: No cough, shortness of breath, wheezing  Cardiovascular ROS: no chest pain or dyspnea on exertion  Gastrointestinal ROS: No abdominal pain or melena  Genito-Urinary ROS: No dysuria or hematuria  Musculoskeletal: leg edema  Skin: negative    Objective:  Visit Vitals   • /82 (BP Location: Left arm, Patient Position: Sitting)   • Pulse 94   • Temp 98.1 °F (36.7 °C) (Temporal Artery )   • Resp 18   • Ht 73\" (185.4 cm)   • Wt 223 lb 2 oz (101 kg)   • SpO2 98%   • BMI 29.44 kg/m2       Intake/Output Summary (Last 24 hours) at 02/21/17 1211  Last data filed at 02/21/17 1127   Gross per 24 hour   Intake   1380 ml   Output   1400 ml   Net    -20 ml       Physical examination:  General: awake/alert   Chest:  clear to auscultation bilaterally without respiratory distress  CVS: regular rate and rhythm  Abdominal: soft, nontender, normal bowel sounds  Extremities:1+ leg edema  Skin: warm and dry without " rash  Neuro: No focal motor deficits    Labs:  Lab Results (last 24 hours)     Procedure Component Value Units Date/Time    POC Glucose Fingerstick [[id number]]  (Abnormal) Collected:  02/20/17 1557    Specimen:  Blood Updated:  02/20/17 1627     Glucose 249 (H) mg/dL       : [id number] [facility] ID: [id number]       Blood Culture With CHERYLE [[id number]]  (Normal) Collected:  02/18/17 1541    Specimen:  Blood from Arm, Left Updated:  02/20/17 1701     Blood Culture No growth at 2 days     POC Glucose Fingerstick [[id number]]  (Abnormal) Collected:  02/20/17 2046    Specimen:  Blood Updated:  02/20/17 2121     Glucose 268 (H) mg/dL       : [id number] [facility] ID: [id number]       Basic Metabolic Panel [[id number]]  (Abnormal) Collected:  02/21/17 0451    Specimen:  Blood Updated:  02/21/17 0522     Glucose 146 (H) mg/dL      BUN 62 (H) mg/dL      Creatinine 1.57 (H) mg/dL      Sodium 136 mmol/L      Potassium 5.2 mmol/L      Chloride 100 mmol/L      CO2 26.0 mmol/L      Calcium 8.7 mg/dL      eGFR Non African Amer 47 (L) mL/min/1.73      BUN/Creatinine Ratio 39.5 (H)      Anion Gap 10.0 mmol/L     Narrative:       GFR Normal >60  Chronic Kidney Disease <60  Kidney Failure <15    CBC & Differential [[id number]] Collected:  02/21/17 0452    Specimen:  Blood Updated:  02/21/17 0542    Narrative:       The following orders were created for panel order CBC & Differential.  Procedure                               Abnormality         Status                     ---------                               -----------         ------                     Manual Differential[[id number]]           Abnormal            Final result               Scan Slide[[id number]]                                        Final result               CBC Auto Differential[[id number]]         Abnormal            Final result                 Please view results for these tests on the individual orders.    CBC Auto Differential [[id number]]   (Abnormal) Collected:  02/21/17 0452    Specimen:  Blood Updated:  02/21/17 0542     WBC 17.53 (H) 10*3/mm3      RBC 3.20 (L) 10*6/mm3      Hemoglobin 8.7 (L) g/dL      Hematocrit 27.1 (L) %      MCV 84.7 fL      MCH 27.2 (L) pg      MCHC 32.1 (L) g/dL      RDW 18.7 (H) %      RDW-SD 57.9 (H) fl      MPV 9.6 fL      Platelets 155 10*3/mm3     Narrative:       The previously reported component NRBC is no longer being reported.    Scan Slide [[id number]] Collected:  02/21/17 0452    Specimen:  Blood Updated:  02/21/17 0542     Scan Slide --       See Manual Differential Results       Manual Differential [[id number]]  (Abnormal) Collected:  02/21/17 0452    Specimen:  Blood Updated:  02/21/17 0542     Neutrophil % 83.0 (H) %      Lymphocyte % 10.0 (L) %      Monocyte % 3.0 (L) %      Bands %  3.0 %      Atypical Lymphocyte % 1.0 %      Neutrophils Absolute 15.08 (H) 10*3/mm3      Lymphocytes Absolute 1.75 10*3/mm3      Monocytes Absolute 0.53 10*3/mm3      Poikilocytes Slight/1+      Toxic Granulation Slight/1+      Platelet Morphology Normal     POC Glucose Fingerstick [[id number]]  (Abnormal) Collected:  02/21/17 0817    Specimen:  Blood Updated:  02/21/17 0848     Glucose 173 (H) mg/dL       : [id number] [facility] ID: [id number]       POC Glucose Fingerstick [[id number]]  (Abnormal) Collected:  02/21/17 1129    Specimen:  Blood Updated:  02/21/17 1206     Glucose 164 (H) mg/dL       : [id number] [facility] ID: [id number]             Radiology:   Imaging Results (last 24 hours)     ** No results found for the last 24 hours. **              Assessment   1. OSMANI secondary to Wegener's granulomatosis--nonoliguric, renal function has recovered.   2. Atrial fibrillation with RVR--rate controlled  3. Anemia  4. Benign HTN  5. MRSA Line sepsis--on IV antibiotics  6. Edema--taking Bumex PO      Plan:  1.  Renal function continues to improve; nearing baseline level of function  2.  Continue antibiotics per  ID recommendations.  3.  OK for discharge from renal standpoint; will follow up in office in 1 week    Charlie Jacobo, BHARATHI  2/21/2017  12:11 PM     Patient was examined, all the data was reviewed.    Assessment:  1.  Acute kidney injury secondary to Wegener's, resolving and has good urine output.  2.  MRSA bacteremia related to infected dialysis catheter  3.  Good control of hypertension.    Plan:  1.  Decrease prednisone to 40 mg by mouth daily  2.  Okay to discharge to home in follow-up needed  3.  Follow-up also needed with rheumatology for Rituxan.

## 2017-02-21 NOTE — CONSULTS
Pt had 4 [place] single lumen PICC placed in right basilic vein. Pt tolerated procedure well. 48 cm of catheter internal and 0 cm external. Tip confirmation by CXR. All lumens flush and draw well. Sterile dressing applied.

## 2017-02-21 NOTE — PLAN OF CARE
Problem: Patient Care Overview (Adult)  Goal: Plan of Care Review  Outcome: Ongoing (interventions implemented as appropriate)    02/21/17 0320   Coping/Psychosocial Response Interventions   Plan Of Care Reviewed With patient   Patient Care Overview   Progress progress toward functional goals as expected   Outcome Evaluation   Outcome Summary/Follow up Plan No c/o pain voiced. continue Zyvox IV ABX. PICC to be inserted later today. AF per tele. R subclavian dressing CDI, denies pain, no edema, no visible drainage. Continue to monitor.        Goal: Adult Individualization and Mutuality  Outcome: Ongoing (interventions implemented as appropriate)  Goal: Discharge Needs Assessment  Outcome: Ongoing (interventions implemented as appropriate)    Problem: Cardiac Output, Decreased (Adult)  Goal: Identify Related Risk Factors and Signs and Symptoms  Outcome: Ongoing (interventions implemented as appropriate)  Goal: Adequate Cardiac Output/Effective Tissue Perfusion  Outcome: Ongoing (interventions implemented as appropriate)    Problem: Respiratory Insufficiency (Adult)  Goal: Acid/Base Balance  Outcome: Ongoing (interventions implemented as appropriate)

## 2017-02-21 NOTE — PROGRESS NOTES
[facility] Medicine Services  INPATIENT PROGRESS NOTE    Length of Stay: 6  Date of Admission: 2/15/2017  Primary Care Physician: Moises Montes MD    Subjective   Chief Complaint: No shortness of breath  HPI   Sitting in chair.  No family present.  He denies shortness of breath.  Oxygen off.  He denies chest pain or palpitations.  He remains atrial fibrillation rate controlled  on telemetry.  He denies nausea, vomiting or abdominal pain.  No further drainage from dialysis catheter removal site.  Bilateral lower extremity edema continues, edema is chronic.  TEDs in place.    Review of Systems   Constitutional: Positive for fatigue.   HENT: Negative for congestion.   Eyes: Negative for visual disturbance.   Respiratory: Negative for wheezing.   Cardiovascular: Positive for leg swelling (chronic).   Gastrointestinal: Negative for abdominal distention, constipation, diarrhea, nausea and vomiting.   Endocrine: Negative for polyuria.   Genitourinary: Negative for dysuria.   Skin: No further drainage dialysis catheter site, Catheter removed 2/17  Neurological: Positive for weakness improved.   Psychiatric/Behavioral: Negative for agitation.   All pertinent negatives and positives are as above. All other systems have been reviewed and are negative unless otherwise stated.     Objective    Temp:  [96.9 °F (36.1 °C)-98.1 °F (36.7 °C)] 98.1 °F (36.7 °C)  Heart Rate:  [] 94  Resp:  [18-22] 18  BP: (104-128)/(76-86) 126/82  Physical Exam    Constitutional: He is oriented to person, place, and time. He appears well-developed and well-nourished.   Head: Normocephalic and atraumatic.   Eyes: EOM are normal. Pupils are equal, round, and reactive to light.   Neck: Normal range of motion. Neck supple. No tracheal deviation present.   Cardiovascular: murmur heard. Irregular rate, atrial fibrillation on telemetry    Pulmonary/Chest: Effort normal and breath sounds normal. No  respiratory distress. He has no wheezes. He has no rales.   Abdominal: Soft. Bowel sounds are normal. He exhibits no distension.   Musculoskeletal: He exhibits edema (mild ankle) chronic per patient, improved with TEDs.  Neurological: He is alert and oriented to person, place, and time.   Skin: Skin is warm and dry. NO drainage from dialysis catheter site. Dressing dry and intact. Purpura noted across back and lower extremities.  Psychiatric: flat     Results Review:  I have reviewed the labs, radiology results, and diagnostic studies.    Laboratory Data:     Results from last 7 days  Lab Units 02/21/17  0452 02/20/17  0341 02/19/17  0603   WBC 10*3/mm3 17.53* 15.95* 13.85*   HEMOGLOBIN g/dL 8.7* 8.3* 8.4*   HEMATOCRIT % 27.1* 25.9* 26.6*   PLATELETS 10*3/mm3 155 149 136          Results from last 7 days  Lab Units 02/21/17  0451 02/20/17  0341 02/19/17  0603  02/15/17  1000   SODIUM mmol/L 136 135 135  < > 137   POTASSIUM mmol/L 5.2 4.8 4.8  < > 3.8   CHLORIDE mmol/L 100 101 104  < > 95*   TOTAL CO2 mmol/L 26.0 23.0* 23.0*  < > 28.0   BUN mg/dL 62* 57* 55*  < > 64*   CREATININE mg/dL 1.57* 1.60* 1.76*  < > 1.68*   CALCIUM mg/dL 8.7 8.5 8.7  < > 8.9   BILIRUBIN mg/dL  --   --   --   --  0.4   ALK PHOS U/L  --   --   --   --  114   ALT (SGPT) U/L  --   --   --   --  59*   AST (SGOT) U/L  --   --   --   --  25   GLUCOSE mg/dL 146* 203* 309*  < > 311*   < > = values in this interval not displayed.    Culture Data:   BLOOD CULTURE   Date Value Ref Range Status   02/18/2017 Abnormal Stain (A)  Preliminary   02/18/2017 Gram Positive Cocci (A)  Preliminary   02/17/2017 Staphylococcus aureus, MRSA (C)  Final     Comment:       Consider infectious disease consult to rule out distant focus of infection.  Methicillin resistant Staphylococcus aureus, Patient may be an isolation risk.   02/17/2017 Staphylococcus aureus, MRSA (C)  Final     Comment:       Consider infectious disease consult to rule out distant focus of  infection.  Methicillin resistant Staphylococcus aureus, Patient may be an isolation risk.     Status: No result Specimen: Blood         Blood Culture With CHERYLE [[id number]] (Abnormal) Collected: 02/18/17 1541       Lab Status: Preliminary result Specimen: Blood from Arm, Left Updated: 02/21/17 1449        Blood Culture Abnormal Stain (A)          Gram Positive Cocci (A)         Isolated from Aerobic Bottle         Gram Stain Result Gram positive cocci        Blood Culture ID, PCR [[id number]] (Abnormal) Collected: 02/18/17 1541       Lab Status: Final result Specimen: Blood from Arm, Left Updated: 02/21/17 1448        BCID, PCR           Staphylococcus aureus. mecA (methicillin resistance gene) detected. Identification by BCID PCR. (C)       Catheter Culture [[id number]] (Abnormal)  Collected: 02/17/17 1836       Lab Status: Final result Specimen: Cath Tip from Neck Updated: 02/20/17 0736        CATHETER CULTURE Staphylococcus aureus, MRSA (C)       Glucose 164, 146, 268.    Radiology Data:   X-ray 1 view 2/21/17  1. Appropriate positioning of the right upper extremity PICC line.   2. Cardiomegaly.  3. Right infrahilar densities may be atelectasis.    MRI brain without contrast 2/16/17  1.  No acute intracranial abnormalities.  2.  Nonspecific foci of gliosis in the bilateral white matter, likely reflecting mild chronic microvascular changes.      NM lung ventilation perfusion 12/15/17  Findings are most commonly associated with low probability for acute  pulmonary embolism.      CT head without contrast 2/15/17 no acute intracranial process      Chest x-ray 1 view 2/15/17 no acute cardiopulmonary disease.    Scheduled Meds    bumetanide 1 mg Oral Daily   diltiaZEM  mg Oral Q24H   docusate sodium 100 mg Oral BID   enoxaparin 30 mg Subcutaneous Daily   insulin detemir 10 Units Subcutaneous QAM   insulin lispro 2-7 Units Subcutaneous 4x Daily AC & at Bedtime   Linezolid 600 mg Intravenous Q12H   metoprolol tartrate 75  mg Oral Q12H   [START ON 2/22/2017] predniSONE 40 mg Oral Daily With Breakfast   tamsulosin 0.4 mg Oral Nightly       I have reviewed the patient current medications.     Assessment/Plan     Hospital Problem List     * (Principal)Persistent atrial fibrillation    Henoch-Schonlein purpura nephritis    Tobacco abuse    Microcytic anemia    Hypertensive nephrosclerosis    Wegener's granulomatosis with renal involvement    MRSA bacteremia        1. Atrial fibrillation with RVR, no anticoagulation due to recent hematuria, anemia, and reported frequent injuries and falls (per cardiology)  2. OSMANI secondary to Jairo's granulomatosis nonoliguric, resolving HD discontinued  3. Henoch Schollein purpura nephritis on HD  4. MRSA bacteremia likely dialysis catheter related  5. Dialysis catheter site infection, MRSA  6. Chronic anemia, anemia of chronic disease  7. LUE weakness, resolved  8. Elevated proBNP secondary to renal disease  9. Elevated d-dimer with negative NM ventilation perfusion  10. Jerome's granulomatosis  11. Hypertensive nephrosclerosis  12. Leukocytosis, worsening  13. Elevated PSA    Plan:  1.  Cardizem switched to Cardizem  mg daily per cardiology.  2.  Prednisone decreased to 40 mg daily per nephrology.  3.  No further dialysis planned at this point per nephrology.  4.  Surveillande blood cultures drawn on 2/18 positive gram pos cocci at 2:48 today  5.  Zyvox Day 5  6.  Will need follow up with Dr. Morales, rheumatology for Rituxan after completes antibiotics.  7.   working on OP IV antibiotics which have been approved  8.  Discussed with Dr. Ramos, will repeat blood cultures today.  9.  JOSEPH in a.m. to assess valve, has murmur and positive blood cultures. Echo 1/18/17 EF 65%, mild to moderate MVR  10.  CBC a.m. to asess worsening leukocytosis    The above documentation resulted from a face-to-face encounter by me Flores GARVIN, [facility].    Discharge Planning: I expect patient to  be discharged to home with IV antibiotics in 2-3 days.    BHARATHI Kramer   02/21/17   4:01 PM    I personally evaluated and examined the patient in conjunction with BHARATHI Borrego and agree with the assessment, treatment plan, and disposition of the patient as recorded by her. My history, exam, and further recommendations are: Stay the same.

## 2017-02-21 NOTE — PLAN OF CARE
Problem: Inpatient Physical Therapy  Goal: Transfer Training Goal 1 LTG- PT  Outcome: Outcome(s) achieved Date Met:  02/21/17 02/17/17 1026 02/21/17 1527   Transfer Training PT LTG   Transfer Training PT LTG, Date Established 02/17/17 --    Transfer Training PT LTG, Time to Achieve by discharge --    Transfer Training PT LTG, Activity Type bed to chair /chair to bed;sit to stand/stand to sit --    Transfer Training PT LTG, [place] Level independent --    Transfer Training PT LTG, Date Goal Reviewed --  02/21/17   Transfer Training PT LTG, Outcome --  goal met       Goal: Gait Training Goal LTG- PT  Outcome: Ongoing (interventions implemented as appropriate)    02/17/17 1026 02/21/17 1527   Gait Training PT LTG   Gait Training Goal PT LTG, Date Established 02/17/17 --    Gait Training Goal PT LTG, Time to Achieve by discharge --    Gait Training Goal PT LTG, [place] Level independent --    Gait Training Goal PT LTG, Distance to Achieve 400 --    Gait Training Goal PT LTG, Date Goal Reviewed --  02/21/17   Gait Training Goal PT LTG, Outcome --  goal not met   Gait Training Goal PT LTG, Reason Goal Not Met --  other (see comments)  (pt request d/c from PT )       Goal: Stair Training Goal LTG- PT  Outcome: Ongoing (interventions implemented as appropriate)    02/17/17 1026 02/21/17 1527   Stair Training PT LTG   Stair Training Goal PT LTG, Date Established 02/17/17 --    Stair Training Goal PT LTG, Time to Achieve by discharge --    Stair Training Goal PT LTG, Number of Steps 5 --    Stair Training Goal PT LTG, [place] Level conditional independence --    Stair Training Goal PT LTG, Assist Device 1 handrail --    Stair Training Goal PT LTG, Date Goal Reviewed --  02/21/17   Stair Training Goal PT LTG, Outcome --  goal not met   Stair Training Goal PT LTG, Reason Goal Not Met --  other (see comments)  (pt request dc from PT)       Goal: Dynamic Standing Balance Goal LTG- PT  Outcome: Ongoing  (interventions implemented as appropriate)    02/17/17 1026 02/21/17 1527   Dynamic Standing Balance PT LTG   Dynamic Standing Balance PT LTG, Date Established 02/17/17 --    Dynamic Standing Balance PT LTG, Time to Achieve by discharge --    Dynamic Standing Balance PT LTG, [place] Level supervision required --    Dynamic Standing Balance PT LTG, Additional Goal romberg stance, single limb stance, tandem stance x 20 sec each with eyes open for decreased fall risk --    Dynamic Standing Balance PT LTG, Date Goal Reviewed --  02/21/17   Dynamic Standing Balance PT LTG, Outcome --  goal not met   Dynamic Standing Balance PT LTG, Reason Goal Not Met --  other (see comments)  (pt request dc from PT)

## 2017-02-21 NOTE — PLAN OF CARE
Problem: Patient Care Overview (Adult)  Goal: Plan of Care Review  Outcome: Ongoing (interventions implemented as appropriate)    02/21/17 1530   Coping/Psychosocial Response Interventions   Plan Of Care Reviewed With patient   Patient Care Overview   Progress progress toward functional goals as expected   Outcome Evaluation   Outcome Summary/Follow up Plan Pt states he is doing as much now as he was at home and requests to be discharged from PT services.

## 2017-02-21 NOTE — THERAPY DISCHARGE NOTE
Acute Care - Physical Therapy Discharge Summary  [facility]       Patient Name: Gurjit Andrea  : 1970  MRN: [id number]    Today's Date: 2017  Onset of Illness/Injury or Date of Surgery Date: 02/15/17    Date of Referral to PT: 17  Referring Physician: BHARATHI Borrego      Admit Date: 2/15/2017      PT Recommendation and Plan    Visit Dx:    ICD-10-CM ICD-9-CM   1. Persistent atrial fibrillation I48.1 427.31   2. Other chest pain R07.89 786.59   3. Renal failure N19 586   4. Chronic anemia D64.9 285.9   5. Impaired mobility and ADLs Z74.09 799.89   6. Impaired functional mobility, balance, gait, and endurance Z74.09 V49.89             Outcome Measures       17 1525          How much help from another person do you currently need...    Turning from your back to your side while in flat bed without using bedrails? 4  -MF      Moving from lying on back to sitting on the side of a flat bed without bedrails? 4  -MF      Moving to and from a bed to a chair (including a wheelchair)? 4  -MF      Standing up from a chair using your arms (e.g., wheelchair, bedside chair)? 4  -MF      Climbing 3-5 steps with a railing? 3  -MF      To walk in hospital room? 3  -MF      AM-PAC 6 Clicks Score 22  -      Functional Assessment    Outcome Measure Options AM-PAC 6 Clicks Basic Mobility (PT)  -MF        User Key  (r) = Recorded By, (t) = Taken By, (c) = Cosigned By    Initials Name Provider Type     Tiff Carmona PTA Physical Therapy Assistant                      IP PT Goals       17 1527 17 1026       Transfer Training PT LTG    Transfer Training PT LTG, Date Established  17  -EL (r) SM (t) EL (c)     Transfer Training PT LTG, Time to Achieve  by discharge  -EL (r) SM (t) EL (c)     Transfer Training PT LTG, Activity Type  bed to chair /chair to bed;sit to stand/stand to sit  -EL (r) SM (t) EL (c)     Transfer Training PT LTG, [place] Level  independent  -EL (r) SM (t) EL  (c)     Transfer Training PT  LTG, Date Goal Reviewed 02/21/17  -LG      Transfer Training PT LTG, Outcome goal met  -LG      Gait Training PT LTG    Gait Training Goal PT LTG, Date Established  02/17/17  -EL (r) SM (t) EL (c)     Gait Training Goal PT LTG, Time to Achieve  by discharge  -EL (r) SM (t) EL (c)     Gait Training Goal PT LTG, [place] Level  independent  -EL (r) SM (t) EL (c)     Gait Training Goal PT LTG, Distance to Achieve  400  -EL (r) SM (t) EL (c)     Gait Training Goal PT LTG, Date Goal Reviewed 02/21/17  -LG      Gait Training Goal PT LTG, Outcome goal not met  -LG      Gait Training Goal PT LTG, Reason Goal Not Met other (see comments)   pt request d/c from PT   -LG      Stair Training PT LTG    Stair Training Goal PT LTG, Date Established  02/17/17  -EL (r) SM (t) EL (c)     Stair Training Goal PT LTG, Time to Achieve  by discharge  -EL (r) SM (t) EL (c)     Stair Training Goal PT LTG, Number of Steps  5  -EL (r) SM (t) EL (c)     Stair Training Goal PT LTG, [place] Level  conditional independence  -EL (r) SM (t) EL (c)     Stair Training Goal PT LTG, Assist Device  1 handrail  -EL (r) SM (t) EL (c)     Stair Training Goal PT LTG, Date Goal Reviewed 02/21/17  -LG      Stair Training Goal PT LTG, Outcome goal not met  -LG      Stair Training Goal PT LTG, Reason Goal Not Met other (see comments)   pt request dc from PT  -LG      Dynamic Standing Balance PT LTG    Dynamic Standing Balance PT LTG, Date Established  02/17/17  -EL (r) SM (t) EL (c)     Dynamic Standing Balance PT LTG, Time to Achieve  by discharge  -EL (r) SM (t) EL (c)     Dynamic Standing Balance PT LTG, [place] Level  supervision required  -EL (r) SM (t) EL (c)     Dynamic Standing Balance PT LTG, Additional Goal  romberg stance, single limb stance, tandem stance x 20 sec each with eyes open for decreased fall risk  -EL (r) SM (t) EL (c)     Dynamic Standing Balance PT LTG, Date Goal Reviewed 02/21/17  -LG       Dynamic Standing Balance PT LTG, Outcome goal not met  -LG      Dynamic Standing Balance PT LTG, Reason Goal Not Met other (see comments)   pt request dc from PT  -LG        User Key  (r) = Recorded By, (t) = Taken By, (c) = Cosigned By    Initials Name Provider Type    LG Abdulaziz Ewing PTA Physical Therapy Assistant    EL Senior, PT DPT Physical Therapist    SM Shawna Mulvihill, PT Student PT Student              PT Discharge Summary  Anticipated Discharge Disposition: home with assist  Reason for Discharge: Patient/Caregiver request  Outcomes Achieved: Patient able to partially acheive established goals  Discharge Destination: Home with assist      Abdulaziz Ewing PTA   2/21/2017

## 2017-02-21 NOTE — DISCHARGE PLACEMENT REQUEST
"Gurjit Andrea (47 y.o. Male)     Date of Birth Social Security Number Address Home Phone MRN    [id number]  [facility] [address] [phone] [id number]    [employer] Marital Status          None        Admission Date Admission Type Admitting Provider Attending Provider Department, Room/Bed    2/15/17 Emergency Blair Frausto MD Truong, Khai C, MD [facility] 4B, 408/1    Discharge Date Discharge Disposition Discharge Destination                      Attending Provider: Blair Frausto MD     Allergies:  No Known Allergies    Isolation:  Contact   Infection:  MRSA (02/19/17)   Code Status:  FULL    Ht:  73\" (185.4 cm)   Wt:  223 lb 2 oz (101 kg)    Admission Cmt:  None   Principal Problem:  Persistent atrial fibrillation [I48.1]                 Active Insurance as of 2/15/2017     Primary Coverage     Payor Plan Insurance Group Employer/Plan Group    [employer] PPO [id number]     Payor Plan Address Payor Plan Phone Number Effective From Effective To    [address] [phone] 12/1/2016     [place] [address]       Subscriber Name Subscriber Birth Date Member ID       JORDAN ANDREA [birth year]2001 [id number]                 Emergency Contacts      (Rel.) Home Phone Work Phone Mobile Phone    Jordan Andrea (Spouse) [phone] -- [phone]               History & Physical      Kalie Vergara DO at 2/15/2017  2:10 PM              [facility]  HISTORY AND PHYSICAL    Date of Admission: 2/15/2017  Primary Care Physician: Moises Montes MD    Subjective     Chief Complaint: Chest pain, shortness of breath and palpitations    History of Present Illness  The patient is a 47-year-old  male who presented to [facility] emergency department today with chest pain, shortness of breath and palpitations.  His wife also states that he has been having some neurologic " changes as well.  She states that he is having difficulties with his speech, mumbling and getting confused with his words.  He was recently discharged from our facility on 2/6/17.  He has recently been started on outpatient dialysis, Tuesday, Thursday, and Saturday regimen.  He states that he went to dialysis yesterday and felt fine.  He states he woke up at approximately 4 AM with the above symptoms.  When he was recently admitted to our facility he had difficulties in controlling his rate with his atrial fibrillation.  He has been taking his Cardizem and metoprolol at home.  No recent sick contact.  No nausea vomiting or diarrhea.  He denies any dysuria or abdominal pain.  His wife has been at the bedside throughout this interview.  He is on long-term high dose steroid therapy in regards to his Jerome's granulomatosis disease.    Review of Systems   Constitutional: Positive for fatigue. Negative for activity change, appetite change, chills and fever.   HENT: Negative for hearing loss, nosebleeds, tinnitus and trouble swallowing.    Eyes: Negative for visual disturbance.   Respiratory: Positive for shortness of breath. Negative for cough, chest tightness and wheezing.    Cardiovascular: Positive for chest pain, palpitations and leg swelling.   Gastrointestinal: Negative for abdominal distention, abdominal pain, blood in stool, constipation, diarrhea, nausea and vomiting.   Endocrine: Negative for cold intolerance, heat intolerance, polydipsia, polyphagia and polyuria.   Genitourinary: Negative for decreased urine volume, difficulty urinating, dysuria, flank pain, frequency and hematuria.   Musculoskeletal: Negative for arthralgias, joint swelling and myalgias.   Skin: Negative for rash.   Allergic/Immunologic: Negative for immunocompromised state.   Neurological: Positive for weakness. Negative for dizziness, syncope, light-headedness and headaches.   Hematological: Negative for adenopathy. Does not bruise/bleed  easily.   Psychiatric/Behavioral: Negative for confusion and sleep disturbance. The patient is not nervous/anxious.       Otherwise complete ROS reviewed and negative except as mentioned in the HPI.    Past Medical History:   Past Medical History   Diagnosis Date   • A-fib    • Chronic renal failure    • Elevated PSA    • Kidney stone    • Purpura    • Wegener's granulomatosis with renal involvement      Past Surgical History:  Past Surgical History   Procedure Laterality Date   • Appendectomy     • Insertion hemodialysis catheter N/A 1/20/2017     Procedure: INSERTION PERMCATH;  Surgeon: Ian Grimes DO;  Location: [facility];  Service:      Social History:  reports that he has been smoking.  He does not have any smokeless tobacco history on file. He reports that he drinks alcohol. He reports that he does not use illicit drugs.    Family History: Significant for having a half-brother on his mother's side of the family. His brother is in good health; however, he has a history of Kawasaki's disease as a child. His father is living but has a history of end-stage liver disease due to alcohol use, COPD, and diabetes mellitus type 2. His mother is alive and has a history of chronic kidney disease and hypothyroidism.     Allergies:  No Known Allergies    Medications:  Prior to Admission medications    Medication Sig Start Date End Date Taking? Authorizing Provider   albuterol (PROVENTIL HFA;VENTOLIN HFA) 108 (90 BASE) MCG/ACT inhaler Every 6 (Six) Hours.   Yes Historical Provider, MD   diltiaZEM CD (CARDIZEM CD) 360 MG 24 hr capsule Take 1 capsule by mouth Daily. 1/27/17  Yes BHARATHI Portillo   metoprolol tartrate (LOPRESSOR) 25 MG tablet 25 mg 2 (Two) Times a Day. 11/3/16  Yes Historical Provider, MD   predniSONE (DELTASONE) 20 MG tablet Take 2 tablets by mouth 2 (Two) Times a Day With Meals.  Patient taking differently: Take 80 mg by mouth 2 (Two) Times a Day With Meals. 1/27/17  Yes BHARATHI Cantor  "  tamsulosin (FLOMAX) 0.4 MG capsule 24 hr capsule Take 1 capsule by mouth Every Night. 1/27/17  Yes BHARATHI Cantor     Objective     Vital Signs:   Visit Vitals   • /91   • Pulse 113   • Temp 98.2 °F (36.8 °C) (Oral)   • Resp 21   • Ht 73\" (185.4 cm)   • Wt 224 lb (102 kg)   • SpO2 96%   • BMI 29.55 kg/m2     Physical Exam   Constitutional: He is oriented to person, place, and time. He appears well-developed and well-nourished.   HENT:   Head: Normocephalic and atraumatic.   Eyes: Conjunctivae and EOM are normal. Pupils are equal, round, and reactive to light.   Neck: Neck supple. No JVD present. No thyromegaly present.   Cardiovascular: Normal heart sounds and intact distal pulses.  An irregular rhythm present. Tachycardia present.  Exam reveals no gallop and no friction rub.    No murmur heard.  Atrial fib   Pulmonary/Chest: Effort normal. No respiratory distress. He has decreased breath sounds (Bilateral bases). He has no wheezes. He has no rales. He exhibits no tenderness.   Abdominal: Soft. Bowel sounds are normal. He exhibits no distension. There is no tenderness. There is no rebound and no guarding.   Musculoskeletal: Normal range of motion. He exhibits edema (bilateral lower extremity pitting edema). He exhibits no tenderness or deformity.   Lymphadenopathy:     He has no cervical adenopathy.   Neurological: He is alert and oriented to person, place, and time. He displays normal reflexes. No cranial nerve deficit. He exhibits normal muscle tone.   Skin: Skin is warm and dry. No rash noted.   Psychiatric: His behavior is normal. Judgment and thought content normal.   Flat affect     Results Reviewed:  Lab Results (last 24 hours)     Procedure Component Value Units Date/Time    POC Troponin, Rapid [[id number]]  (Normal) Collected:  02/15/17 1006    Specimen:  Blood Updated:  02/15/17 1021     Troponin I 0.00 ng/mL       Serial Number: [id number]    : [id number]       D-dimer, Quantitative " [[id number]]  (Abnormal) Collected:  02/15/17 1000    Specimen:  Blood Updated:  02/15/17 1022     D-Dimer, Quantitative 3.12 (H) mg/L (FEU)     Narrative:       Reference Range is 0-0.50 mg/L FEU. However, results <0.50 mg/L FEU tends to rule out DVT or PE. Results >0.50 mg/L FEU are not useful in predicting absence or presence of DVT or PE.    Protime-INR [[id number]]  (Abnormal) Collected:  02/15/17 1000    Specimen:  Blood Updated:  02/15/17 1022     Protime 14.8 (H) Seconds      INR 1.12 (H)     aPTT [[id number]]  (Normal) Collected:  02/15/17 1000    Specimen:  Blood Updated:  02/15/17 1022     PTT 30.7 seconds     Comprehensive Metabolic Panel [[id number]]  (Abnormal) Collected:  02/15/17 1000    Specimen:  Blood Updated:  02/15/17 1026     Glucose 311 (H) mg/dL      BUN 64 (H) mg/dL      Creatinine 1.68 (H) mg/dL      Sodium 137 mmol/L      Potassium 3.8 mmol/L      Chloride 95 (L) mmol/L      CO2 28.0 mmol/L      Calcium 8.9 mg/dL      Total Protein 6.3 g/dL      Albumin 3.30 (L) g/dL      ALT (SGPT) 59 (H) U/L      AST (SGOT) 25 U/L      Alkaline Phosphatase 114 U/L      Total Bilirubin 0.4 mg/dL      eGFR Non African Amer 44 (L) mL/min/1.73      Globulin 3.0 gm/dL      A/G Ratio 1.1 g/dL      BUN/Creatinine Ratio 38.1 (H)      Anion Gap 14.0 (H) mmol/L     BNP [[id number]]  (Abnormal) Collected:  02/15/17 1000    Specimen:  Blood Updated:  02/15/17 1029     proBNP 5920.0 (H) pg/mL     CBC & Differential [[id number]] Collected:  02/15/17 1000    Specimen:  Blood Updated:  02/15/17 1051    Narrative:       The following orders were created for panel order CBC & Differential.  Procedure                               Abnormality         Status                     ---------                               -----------         ------                     Scan Slide[[id number]]                                        Final result               CBC Auto Differential[[id number]]         Abnormal            Final result                  Please view results for these tests on the individual orders.    CBC Auto Differential [[id number]]  (Abnormal) Collected:  02/15/17 1000    Specimen:  Blood Updated:  02/15/17 1051     WBC 13.71 (H) 10*3/mm3      RBC 3.37 (L) 10*6/mm3      Hemoglobin 9.0 (L) g/dL      Hematocrit 28.9 (L) %      MCV 85.8 fL      MCH 26.7 (L) pg      MCHC 31.1 (L) g/dL      RDW 19.1 (H) %      RDW-SD 58.8 (H) fl      MPV 10.2 fL      Platelets 127 (L) 10*3/mm3      Neutrophil % 83.6 (H) %      Lymphocyte % 9.0 (L) %      Monocyte % 3.5 (L) %      Eosinophil % 0.0 %      Basophil % 0.3 %      Immature Grans % 3.6 %      Neutrophils, Absolute 11.46 (H) 10*3/mm3      Lymphocytes, Absolute 1.24 10*3/mm3      Monocytes, Absolute 0.48 10*3/mm3      Eosinophils, Absolute 0.00 10*3/mm3      Basophils, Absolute 0.04 10*3/mm3      Immature Grans, Absolute 0.49 (H) 10*3/mm3     Scan Slide [[id number]] Collected:  02/15/17 1000    Specimen:  Blood Updated:  02/15/17 1051     Anisocytosis Slight/1+      Hypochromia Mod/2+      WBC Morphology Normal      Platelet Morphology Normal     POC Troponin, Rapid [[id number]]  (Normal) Collected:  02/15/17 1246    Specimen:  Blood Updated:  02/15/17 1300     Troponin I 0.00 ng/mL       Serial Number: [id number]    : [id number]       Blood Gas, Arterial [[id number]]  (Abnormal) Collected:  02/15/17 1342    Specimen:  Arterial Blood Updated:  02/15/17 1343     Site Arterial: left radial      Artem's Test --       Documented in Rapid Comm        pH, Arterial 7.511 (H) pH units      pCO2, Arterial 37.1 mm Hg      pO2, Arterial 74.9 (L) mm Hg      HCO3, Arterial 29.0 (H) mmol/L      Base Excess, Arterial 5.9 (H) mmol/L      O2 Saturation, Arterial 96.2 %      O2 Saturation Calculated 96.2 %      Barometric Pressure for Blood Gas -- mmHg       Component not reported at this site.        Modality Cannula      Flow Rate 2.00 lpm     Narrative:       Serial Number: [id number]    : [id number]        Imaging Results  (last 24 hours)     Procedure Component Value Units Date/Time    XR Chest 1 View [[id number]] Collected:  02/15/17 1137     Updated:  02/15/17 1137    Narrative:       EXAM:   XR CHEST 1 VW-       DATE:  02/15/2017      HISTORY:  Male, age  47 years;chest pain     COMPARISON:  None.     FINDINGS:  The heart mediastinum are normal in size. Lungs are without focal  infiltrate, mass or effusions.  The bones show no acute pathology.   Right-sided dialysis catheter with tip terminating at the expected  location of the atriocaval junction.       Impression:       No acute cardiopulmonary disease.     This report was finalized on  by Dr. Kristyn Rodriguez MD.        I have personally reviewed and interpreted the radiology studies and ECG obtained at time of admission.     Assessment / Plan     Assessment & Plan  Hospital Problem List     Persistent atrial fibrillation        1.  Atrial fibrillation with rapid ventricular response, no anticoagulation  2.  Jerome's granulomatosis  3.  Long-term dialysis  4.  Chest pain  5.  Elevated BNP  6.  Elevated d-dimer  7.  Anemia of chronic disease    Plan:    We will admit the patient to the hospitalist service for workup and management.  He will be placed to Dr. Pierre's service.  He will be placed on the telemetry unit with continuous telemetry.  We will continue the Cardizem drip. We will consult nephrology and cardiology.  He will need dialysis tomorrow.  Since he had an elevated d-dimer we will go ahead and check a VQ scan instead of a CTA angio of his chest due to his renal function.  We will resume his home medications.  He also complained of some left-sided weakness and speech issues therefore we will obtain a CT of the head.  Lovenox 30 mg for DVT prophylaxis.  We will also placed on SADIA hose, he has pitting bilateral lower extremity edema.  We will obtain laboratory data in the a.m.  Will also check a TSH now.  Please note this is initial evaluation and workup is  ongoing.    Further orders per Dr. Pierre.  Dr. Vergara:  Patient seen and examined in the ER. Patient appears older than stated age. Has +1-2 pitting bilateral lower extremity edema, with mild chronic skin changes on bilateral lower legs. Lungs sounds decreased at the bases and heart irregularly irregular. Will ask above consultants to join the case.     Code Status: Full     I discussed the patients findings and my recommendations with the patient, his wife, and Dr. Pierre.    Estimated length of stay 3-5 days    Gio Callahan Miguel, APRN   02/15/17   2:10 PM               Electronically signed by Kalie Vergara, DO at 2/15/2017  3:05 PM        Hospital Medications (active)       Dose Frequency Start End    acetaminophen (TYLENOL) tablet 650 mg 650 mg Every 4 Hours PRN 2/15/2017     Sig - Route: Take 2 tablets by mouth Every 4 (Four) Hours As Needed for mild pain (1-3). - Oral    aluminum-magnesium hydroxide-simethicone (MAALOX/MYLANTA) suspension 30 mL 30 mL Every 6 Hours PRN 2/15/2017     Sig - Route: Take 30 mL by mouth Every 6 (Six) Hours As Needed for heartburn. - Oral    bumetanide (BUMEX) tablet 1 mg 1 mg Daily 2/19/2017     Sig - Route: Take 1 tablet by mouth Daily. - Oral    dextrose (D50W) solution 25 g 25 g Every 15 Minutes PRN 2/19/2017     Sig - Route: Infuse 50 mL into a venous catheter Every 15 (Fifteen) Minutes As Needed for low blood sugar (Blood Sugar Less Than 70, Patient Has IV Access - Unresponsive, NPO or Unable To Safely Swallow). - Intravenous    dextrose (GLUTOSE) oral gel 15 g 15 g Every 15 Minutes PRN 2/19/2017     Sig - Route: Take 15 g by mouth Every 15 (Fifteen) Minutes As Needed for low blood sugar (Blood Sugar Less Than 70, Patient Alert, Is Not NPO & Can Safely Swallow). - Oral    diltiaZEM (CARDIZEM) tablet 90 mg 90 mg Every 6 Hours Scheduled 2/15/2017     Sig - Route: Take 1 tablet by mouth Every 6 (Six) Hours. - Oral    docusate sodium (COLACE) capsule 100 mg 100 mg 2 Times Daily  2/15/2017     Sig - Route: Take 1 capsule by mouth 2 (Two) Times a Day. - Oral    enoxaparin (LOVENOX) syringe 30 mg 30 mg Daily 2/15/2017     Sig - Route: Inject 0.3 mL under the skin Daily. - Subcutaneous    glucagon (human recombinant) (GLUCAGEN DIAGNOSTIC) injection 1 mg 1 mg Every 15 Minutes PRN 2/19/2017     Sig - Route: Inject 1 mg under the skin Every 15 (Fifteen) Minutes As Needed (Blood Glucose Less Than 70 - Patient Without IV Access - Unresponsive, NPO or Unable To Safely Swallow). - Subcutaneous    heparin (porcine) injection 1,800 Units 1,800 Units As Needed 2/16/2017     Sig - Route: 1.8 mL by Intracatheter route As Needed (permcath packing heparin 1000units/cc ues 1.8cc into venous limb of permcath after every hd tx on TUES ,THURS and SAY). - Intracatheter    insulin detemir (LEVEMIR) injection 10 Units 10 Units Every Morning 2/20/2017     Sig - Route: Inject 10 Units under the skin Every Morning. - Subcutaneous    insulin lispro (humaLOG) injection 2-7 Units 2-7 Units 4 Times Daily Before Meals & Nightly 2/19/2017     Sig - Route: Inject 2-7 Units under the skin 4 (Four) Times a Day Before Meals & at Bedtime. - Subcutaneous    Linezolid (ZYVOX) 600 mg 300 mL 600 mg Every 12 Hours 2/18/2017     Sig - Route: Infuse 300 mL into a venous catheter Every 12 (Twelve) Hours. - Intravenous    metoprolol tartrate (LOPRESSOR) injection 5 mg 5 mg Every 6 Hours PRN 2/17/2017     Sig - Route: Infuse 5 mL into a venous catheter Every 6 (Six) Hours As Needed (for HR sustained above 120). - Intravenous    metoprolol tartrate (LOPRESSOR) tablet 75 mg 75 mg Every 12 Hours Scheduled 2/17/2017     Sig - Route: Take 75 mg by mouth Every 12 (Twelve) Hours. - Oral    ondansetron (ZOFRAN) injection 4 mg 4 mg Every 6 Hours PRN 2/15/2017     Sig - Route: Infuse 2 mL into a venous catheter Every 6 (Six) Hours As Needed for nausea or vomiting. - Intravenous    predniSONE (DELTASONE) tablet 40 mg 40 mg 2 Times Daily With Meals  "2/15/2017     Sig - Route: Take 2 tablets by mouth 2 (Two) Times a Day With Meals. - Oral    sodium chloride 0.9 % flush 1-10 mL 1-10 mL As Needed 2/15/2017     Sig - Route: Infuse 1-10 mL into a venous catheter As Needed for line care. - Intravenous    tamsulosin (FLOMAX) 24 hr capsule 0.4 mg 0.4 mg Nightly 2/15/2017     Sig - Route: Take 1 capsule by mouth Every Night. - Oral             Physician Progress Notes (last 24 hours) (Notes from 2/20/2017  8:52 AM through 2/21/2017  8:52 AM)      Hema Jade MD at 2/20/2017 11:09 AM  Version 2 of 2          PROGRESS NOTE.      Patient:  Gurjit Andrea  YOB: 1970  Date of Service: 2/20/2017  MRN: [id number]   Acct: [id number]   Primary Care Physician: Moises Montes MD  Advance Directive: Full Code  Admit Date: 2/15/2017       Hospital Day: 5  Referring Provider: Kalie Vergara DO      Patient Seen, Chart, Consults, Notes, Labs, Radiology studies reviewed.        Subjective:    Gurjit Andrea is a 47 y.o. male whom we were consulted for OSMANI.  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  Admitted with atrial fibrillation and RVR.  Has shown sign of reversal so dislysis placed on hold; last dialysis was 2/16.  On 2/17 started having large amount of purulent drainage from around Permcath site.  His dialysis catheter was removed and his blood cultures were positive for MRSA. Today he reports feeling \"ok\".  No new issues.  Good urine output.    Review of Systems:  History obtained from chart review and the patient  General ROS: No fever or chills  Respiratory ROS: No cough, shortness of breath, wheezing  Cardiovascular ROS: no chest pain or dyspnea on exertion  Gastrointestinal ROS: No abdominal pain or melena  Genito-Urinary ROS: No dysuria or hematuria  Musculoskeletal: leg edema  Skin: negative    Objective:  Visit Vitals   • /74 (BP Location: Right arm, Patient Position: Sitting)   • Pulse 94   • Temp 97.1 °F (36.2 °C) " "(Temporal Artery )   • Resp 18   • Ht 73\" (185.4 cm)   • Wt 221 lb 6 oz (100 kg)   • SpO2 96%   • BMI 29.21 kg/m2       Intake/Output Summary (Last 24 hours) at 02/20/17 1109  Last data filed at 02/20/17 1000   Gross per 24 hour   Intake   1000 ml   Output   2500 ml   Net  -1500 ml       Physical examination:  General: awake/alert   Chest:  clear to auscultation bilaterally without respiratory distress  CVS: regular rate and rhythm  Abdominal: soft, nontender, normal bowel sounds  Extremities:1+ leg edema  Skin: warm and dry without rash  Neuro: No focal motor deficits    Labs:  Lab Results (last 24 hours)     Procedure Component Value Units Date/Time    Blood Culture With CHERYLE [[id number]]  (Normal) Collected:  02/18/17 1541    Specimen:  Blood from Arm, Left Updated:  02/19/17 1701     Blood Culture No growth at 24 hours     POC Glucose Fingerstick [[id number]]  (Abnormal) Collected:  02/19/17 1650    Specimen:  Blood Updated:  02/19/17 1706     Glucose 415 (H) mg/dL       : [id number] Promise ChianeMeter ID: [id number]       POC Glucose Fingerstick [[id number]]  (Abnormal) Collected:  02/19/17 1654    Specimen:  Blood Updated:  02/19/17 1707     Glucose 382 (H) mg/dL       : [id number] Promise ChianeMeter ID: [id number]       POC Glucose Fingerstick [[id number]]  (Abnormal) Collected:  02/19/17 2022    Specimen:  Blood Updated:  02/19/17 2034     Glucose 246 (H) mg/dL       : [id number] Tamayo ID: [id number]       CBC (No Diff) [[id number]]  (Abnormal) Collected:  02/20/17 0341    Specimen:  Blood Updated:  02/20/17 0426     WBC 15.95 (H) 10*3/mm3      RBC 3.08 (L) 10*6/mm3      Hemoglobin 8.3 (L) g/dL      Hematocrit 25.9 (L) %      MCV 84.1 fL      MCH 26.9 (L) pg      MCHC 32.0 (L) g/dL      RDW 18.5 (H) %      RDW-SD 56.2 (H) fl      MPV 9.8 fL      Platelets 149 10*3/mm3     Basic Metabolic Panel [[id number]]  (Abnormal) Collected:  02/20/17 0341    Specimen:  Blood Updated:  02/20/17 0442     " Glucose 203 (H) mg/dL      BUN 57 (H) mg/dL      Creatinine 1.60 (H) mg/dL      Sodium 135 mmol/L      Potassium 4.8 mmol/L      Chloride 101 mmol/L      CO2 23.0 (L) mmol/L      Calcium 8.5 mg/dL      eGFR Non African Amer 46 (L) mL/min/1.73      BUN/Creatinine Ratio 35.6 (H)      Anion Gap 11.0 mmol/L     Narrative:       GFR Normal >60  Chronic Kidney Disease <60  Kidney Failure <15    Catheter Culture [[id number]]  (Abnormal)  (Susceptibility) Collected:  02/17/17 1836    Specimen:  Cath Tip from Neck Updated:  02/20/17 0736     CATHETER CULTURE Staphylococcus aureus, MRSA (C)         Methicillin resistant Staphylococcus aureus, Patient may be an isolation risk.        BETA LACTAMASE Positive     Susceptibility      Staphylococcus aureus, MRSA     JUAN J     Clindamycin >=8 ug/ml Resistant     Erythromycin >=8 ug/ml Resistant     Gentamicin <=0.5 ug/ml Susceptible     Inducible Clindamycin Resistance NEG  Negative     Levofloxacin 4 ug/ml Intermediate  [1]      Oxacillin >=4 ug/ml Resistant     Penicillin G >=0.5 ug/ml Resistant     Tetracycline <=1 ug/ml Susceptible     Trimethoprim + Sulfamethoxazole <=10 ug/ml Susceptible     Vancomycin 2 ug/ml Susceptible            [1]   Staphylococcus species may develop resistance during prolonged therapy with quinolones. Isolates that are initially susceptible may become resistant within three to four days after initiation of therapy. Testing of repeat isolates may be warranted.                  Blood Culture [[id number]]  (Abnormal)  (Susceptibility) Collected:  02/17/17 0108    Specimen:  Blood from Arm, Left Updated:  02/20/17 0741     Blood Culture Staphylococcus aureus, MRSA (C)         Consider infectious disease consult to rule out distant focus of infection.  Methicillin resistant Staphylococcus aureus, Patient may be an isolation risk.        Isolated from Aerobic and Anaerobic Bottles      BETA LACTAMASE Positive      Gram Stain Result Gram positive cocci      Susceptibility      Staphylococcus aureus, MRSA     ASHWIN     Clindamycin >=8 ug/ml Resistant     Erythromycin >=8 ug/ml Resistant     Gentamicin <=0.5 ug/ml Susceptible     Inducible Clindamycin Resistance NEG  Negative     Levofloxacin >=8 ug/ml Resistant     Linezolid 2 ug/ml Susceptible     Oxacillin >=4 ug/ml Resistant     Penicillin G >=0.5 ug/ml Resistant     Tetracycline 2 ug/ml Susceptible     Trimethoprim + Sulfamethoxazole <=10 ug/ml Susceptible     Vancomycin 2 ug/ml Susceptible                    POC Glucose Fingerstick [[id number]]  (Abnormal) Collected:  02/20/17 0748    Specimen:  Blood Updated:  02/20/17 0830     Glucose 187 (H) mg/dL       : [id number] [facility] ID: [id number]       Blood Culture [[id number]]  (Abnormal) Collected:  02/17/17 0108    Specimen:  Blood from Hand, Left Updated:  02/20/17 0839     Blood Culture Staphylococcus aureus, MRSA (C)         Consider infectious disease consult to rule out distant focus of infection.  Methicillin resistant Staphylococcus aureus, Patient may be an isolation risk.        Isolated from Aerobic and Anaerobic Bottles      BETA LACTAMASE Positive     Narrative:       See Culture #[id number] Drawn 2/17 0108 for Ashwin    POC Glucose Fingerstick [[id number]]  (Abnormal) Collected:  02/20/17 1040    Specimen:  Blood Updated:  02/20/17 1052     Glucose 261 (H) mg/dL       : [id number] [facility] [place]: [id number]             Radiology:   Imaging Results (last 24 hours)     ** No results found for the last 24 hours. **              Assessment   1. 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   2. Atrial fibrillation with RVR--rate controlled  3. Anemia  4. Benign HTN  5. MRSA Line sepsis--on IV antibiotics  6. Edema--taking Bumex PO      Plan:  1.  Renal function continues to recover; will follow closely  2.  Currently taking Prednisone 40mg BID  3.  Continue IV antibiotics; infectious disease is  "following    Charlie Jacobo, APRJACK  2/20/2017  11:09 AM     Patient was examined, all the data was reviewed.    Assessment:  1.  Acute kidney injury secondary to Jairo's granulomatosis.  His renal function has improved.  Although he has received few hemodialysis treatment.  2.  MRSA bacteremia related to hemodialysis catheter which has been removed.  3.  Atrial fibrillation, currently rate controlled.  4.  Good control of hypertension.    Plan:  1.  Continue IV Zyvox  2.  By mouth prednisone.  3.  Intermittent Rituxan.     Electronically signed by Hema Jade MD at 2/20/2017  2:26 PM      Charlie Jacobo, APRN at 2/20/2017 11:09 AM  Version 1 of 2         DIALYSIS PROGRESS NOTE.      Patient:  Gurjit Andrea  YOB: 1970  Date of Service: 2/20/2017  MRN: [id number]   Acct: [id number]   Primary Care Physician: Moises Montes MD  Advance Directive: Full Code  Admit Date: 2/15/2017       Hospital Day: 5  Referring Provider: Kalie Vergara DO      Patient Seen, Chart, Consults, Notes, Labs, Radiology studies reviewed.        Subjective:    Gurjit Andrea is a 47 y.o. male whom we were consulted for OSMANI.  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  Admitted with atrial fibrillation and RVR.  Has shown sign of reversal so dislysis placed on hold; last dialysis was 2/16.  On 2/17 started having large amount of purulent drainage from around Permcath site.  His dialysis catheter was removed and his blood cultures were positive for MRSA. Today he reports feeling \"ok\".  No new issues.  Good urine output.    Review of Systems:  History obtained from chart review and the patient  General ROS: No fever or chills  Respiratory ROS: No cough, shortness of breath, wheezing  Cardiovascular ROS: no chest pain or dyspnea on exertion  Gastrointestinal ROS: No abdominal pain or melena  Genito-Urinary ROS: No dysuria or hematuria  Musculoskeletal: leg edema  Skin: " "negative    Objective:  Visit Vitals   • /74 (BP Location: Right arm, Patient Position: Sitting)   • Pulse 94   • Temp 97.1 °F (36.2 °C) (Temporal Artery )   • Resp 18   • Ht 73\" (185.4 cm)   • Wt 221 lb 6 oz (100 kg)   • SpO2 96%   • BMI 29.21 kg/m2       Intake/Output Summary (Last 24 hours) at 02/20/17 1109  Last data filed at 02/20/17 1000   Gross per 24 hour   Intake   1000 ml   Output   2500 ml   Net  -1500 ml       Physical examination:  General: awake/alert   Chest:  clear to auscultation bilaterally without respiratory distress  CVS: regular rate and rhythm  Abdominal: soft, nontender, normal bowel sounds  Extremities:1+ leg edema  Skin: warm and dry without rash  Neuro: No focal motor deficits    Labs:  Lab Results (last 24 hours)     Procedure Component Value Units Date/Time    Blood Culture With CHERYLE [[id number]]  (Normal) Collected:  02/18/17 1541    Specimen:  Blood from Arm, Left Updated:  02/19/17 1701     Blood Culture No growth at 24 hours     POC Glucose Fingerstick [[id number]]  (Abnormal) Collected:  02/19/17 1650    Specimen:  Blood Updated:  02/19/17 1706     Glucose 415 (H) mg/dL       : [id number] Promise ChianeMeter ID: [id number]       POC Glucose Fingerstick [[id number]]  (Abnormal) Collected:  02/19/17 1654    Specimen:  Blood Updated:  02/19/17 1707     Glucose 382 (H) mg/dL       : [id number] Promise ChianeMeter ID: [id number]       POC Glucose Fingerstick [[id number]]  (Abnormal) Collected:  02/19/17 2022    Specimen:  Blood Updated:  02/19/17 2034     Glucose 246 (H) mg/dL       : [id number] Tamayo ID: [id number]       CBC (No Diff) [[id number]]  (Abnormal) Collected:  02/20/17 0341    Specimen:  Blood Updated:  02/20/17 0426     WBC 15.95 (H) 10*3/mm3      RBC 3.08 (L) 10*6/mm3      Hemoglobin 8.3 (L) g/dL      Hematocrit 25.9 (L) %      MCV 84.1 fL      MCH 26.9 (L) pg      MCHC 32.0 (L) g/dL      RDW 18.5 (H) %      RDW-SD 56.2 (H) fl      MPV 9.8 fL      " Platelets 149 10*3/mm3     Basic Metabolic Panel [[id number]]  (Abnormal) Collected:  02/20/17 0341    Specimen:  Blood Updated:  02/20/17 0442     Glucose 203 (H) mg/dL      BUN 57 (H) mg/dL      Creatinine 1.60 (H) mg/dL      Sodium 135 mmol/L      Potassium 4.8 mmol/L      Chloride 101 mmol/L      CO2 23.0 (L) mmol/L      Calcium 8.5 mg/dL      eGFR Non African Amer 46 (L) mL/min/1.73      BUN/Creatinine Ratio 35.6 (H)      Anion Gap 11.0 mmol/L     Narrative:       GFR Normal >60  Chronic Kidney Disease <60  Kidney Failure <15    Catheter Culture [[id number]]  (Abnormal)  (Susceptibility) Collected:  02/17/17 1836    Specimen:  Cath Tip from Neck Updated:  02/20/17 0736     CATHETER CULTURE Staphylococcus aureus, MRSA (C)         Methicillin resistant Staphylococcus aureus, Patient may be an isolation risk.        BETA LACTAMASE Positive     Susceptibility      Staphylococcus aureus, MRSA     JUAN J     Clindamycin >=8 ug/ml Resistant     Erythromycin >=8 ug/ml Resistant     Gentamicin <=0.5 ug/ml Susceptible     Inducible Clindamycin Resistance NEG  Negative     Levofloxacin 4 ug/ml Intermediate  [1]      Oxacillin >=4 ug/ml Resistant     Penicillin G >=0.5 ug/ml Resistant     Tetracycline <=1 ug/ml Susceptible     Trimethoprim + Sulfamethoxazole <=10 ug/ml Susceptible     Vancomycin 2 ug/ml Susceptible            [1]   Staphylococcus species may develop resistance during prolonged therapy with quinolones. Isolates that are initially susceptible may become resistant within three to four days after initiation of therapy. Testing of repeat isolates may be warranted.                  Blood Culture [[id number]]  (Abnormal)  (Susceptibility) Collected:  02/17/17 0108    Specimen:  Blood from Arm, Left Updated:  02/20/17 0741     Blood Culture Staphylococcus aureus, MRSA (C)         Consider infectious disease consult to rule out distant focus of infection.  Methicillin resistant Staphylococcus aureus, Patient may be an  isolation risk.        Isolated from Aerobic and Anaerobic Bottles      BETA LACTAMASE Positive      Gram Stain Result Gram positive cocci     Susceptibility      Staphylococcus aureus, MRSA     ASHWIN     Clindamycin >=8 ug/ml Resistant     Erythromycin >=8 ug/ml Resistant     Gentamicin <=0.5 ug/ml Susceptible     Inducible Clindamycin Resistance NEG  Negative     Levofloxacin >=8 ug/ml Resistant     Linezolid 2 ug/ml Susceptible     Oxacillin >=4 ug/ml Resistant     Penicillin G >=0.5 ug/ml Resistant     Tetracycline 2 ug/ml Susceptible     Trimethoprim + Sulfamethoxazole <=10 ug/ml Susceptible     Vancomycin 2 ug/ml Susceptible                    POC Glucose Fingerstick [[id number]]  (Abnormal) Collected:  02/20/17 0748    Specimen:  Blood Updated:  02/20/17 0830     Glucose 187 (H) mg/dL       : [id number] [facility] ID: [id number]       Blood Culture [[id number]]  (Abnormal) Collected:  02/17/17 0108    Specimen:  Blood from Hand, Left Updated:  02/20/17 0839     Blood Culture Staphylococcus aureus, MRSA (C)         Consider infectious disease consult to rule out distant focus of infection.  Methicillin resistant Staphylococcus aureus, Patient may be an isolation risk.        Isolated from Aerobic and Anaerobic Bottles      BETA LACTAMASE Positive     Narrative:       See Culture #[id number] Drawn 2/17 0108 for Ashwin    POC Glucose Fingerstick [[id number]]  (Abnormal) Collected:  02/20/17 1040    Specimen:  Blood Updated:  02/20/17 1052     Glucose 261 (H) mg/dL       : [id number] Hiral Lares [place] ID: [id number]             Radiology:   Imaging Results (last 24 hours)     ** No results found for the last 24 hours. **              Assessment   1. 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   2. Atrial fibrillation with RVR--rate controlled  3. Anemia  4. Benign HTN  5. MRSA Line sepsis--on IV antibiotics  6. Edema--taking Bumex PO      Plan:  1.  Renal  function continues to recover; will follow closely  2.  Currently taking Prednisone 40mg BID  3.  Continue IV antibiotics; infectious disease is following    BHARATHI Chun  2/20/2017  11:09 AM     Electronically signed by BHARATHI Clifford at 2/20/2017 11:18 AM      Blair Frausto MD at 2/20/2017 11:12 AM  Version 1 of 1             [facility]  INPATIENT PROGRESS NOTE    Length of Stay: 5  Date of Admission: 2/15/2017  Primary Care Physician: Moises Montes MD    Subjective   Chief Complaint: feels better, no shortness of breath  HPI   Sitting up in chair.  No family member present.  Denies chest pain or palpitations.  Denies shortness of breath.  Oxygen off.  Remains atrial fibrillation rate controlled at 110.  Denies nausea, vomiting or abdominal pain.  Denies drainage from dialysis catheter removal site.  Tolerating diet.  Lower extremity edema improved with TEDs in place.    Review of Systems   Constitutional: Positive for fatigue.   HENT: Negative for congestion.   Eyes: Negative for visual disturbance.   Respiratory: Negative for wheezing.   Cardiovascular: Positive for leg swelling (chronic).   Gastrointestinal: Negative for abdominal distention, constipation, diarrhea, nausea and vomiting.   Endocrine: Negative for polyuria.   Genitourinary: Negative for dysuria.   Skin: No further drainage dialysis catheter site, Catheter removed 2/17  Neurological: Positive for weakness improved.   Psychiatric/Behavioral: Negative for agitation.   All pertinent negatives and positives are as above. All other systems have been reviewed and are negative unless otherwise stated.     Objective    Temp:  [96.9 °F (36.1 °C)-98.2 °F (36.8 °C)] 97.1 °F (36.2 °C)  Heart Rate:  [84-94] 94  Resp:  [18-20] 18  BP: (115-131)/(74-86) 118/74  Physical Exam  Constitutional: He is oriented to person, place, and time. He appears well-developed and well-nourished.   Head:  Normocephalic and atraumatic.   Eyes: EOM are normal. Pupils are equal, round, and reactive to light.   Neck: Normal range of motion. Neck supple. No tracheal deviation present.   Cardiovascular: No murmur heard. Irregular rate, atrial fibrillation on telemetry    Pulmonary/Chest: Effort normal and breath sounds normal. No respiratory distress. He has no wheezes. He has no rales.   Abdominal: Soft. Bowel sounds are normal. He exhibits no distension.   Musculoskeletal: He exhibits edema (mild ankle) chronic per patient, improved with TEDs.  Neurological: He is alert and oriented to person, place, and time.   Skin: Skin is warm and dry. NO drainage from dialysis catheter site.  Dressing dry and intact. Purpura noted across back and lower extremities.  Psychiatric: flat    Results Review:  I have reviewed the labs, radiology results, and diagnostic studies.    Laboratory Data:     Results from last 7 days  Lab Units 02/20/17  0341 02/19/17  0603 02/18/17  0525   WBC 10*3/mm3 15.95* 13.85* 12.17*   HEMOGLOBIN g/dL 8.3* 8.4* 8.3*   HEMATOCRIT % 25.9* 26.6* 26.4*   PLATELETS 10*3/mm3 149 136 129*          Results from last 7 days  Lab Units 02/20/17  0341 02/19/17  0603 02/18/17  0525  02/15/17  1000   SODIUM mmol/L 135 135 138  < > 137   POTASSIUM mmol/L 4.8 4.8 4.0  < > 3.8   CHLORIDE mmol/L 101 104 101  < > 95*   TOTAL CO2 mmol/L 23.0* 23.0* 23.0*  < > 28.0   BUN mg/dL 57* 55* 52*  < > 64*   CREATININE mg/dL 1.60* 1.76* 1.79*  < > 1.68*   CALCIUM mg/dL 8.5 8.7 8.6  < > 8.9   BILIRUBIN mg/dL  --   --   --   --  0.4   ALK PHOS U/L  --   --   --   --  114   ALT (SGPT) U/L  --   --   --   --  59*   AST (SGOT) U/L  --   --   --   --  25   GLUCOSE mg/dL 203* 309* 291*  < > 311*   < > = values in this interval not displayed.    Culture Data:   BLOOD CULTURE   Date Value Ref Range Status   02/18/2017 No growth at 24 hours  Preliminary   02/17/2017 Staphylococcus aureus, MRSA (C)  Final     Comment:       Consider  infectious disease consult to rule out distant focus of infection.  Methicillin resistant Staphylococcus aureus, Patient may be an isolation risk.   02/17/2017 Staphylococcus aureus, MRSA (C)  Final     Comment:       Consider infectious disease consult to rule out distant focus of infection.  Methicillin resistant Staphylococcus aureus, Patient may be an isolation risk.      CATHETER CULTURE Staphylococcus aureus, MRSA (C)             Methicillin resistant Staphylococcus aureus, Patient may be an isolation risk.           BETA LACTAMASE Positive          Susceptibility         Staphylococcus aureus, MRSA         JUAN J         Clindamycin >=8  Resistant         Erythromycin >=8  Resistant         Gentamicin <=0.5  Susceptible         Inducible Clindamycin Resistance NEG  Negative         Levofloxacin 4  Intermediate 1         Oxacillin >=4  Resistant         Penicillin G >=0.5  Resistant         Tetracycline <=1  Susceptible         Trimethoprim + Sulfamethoxazole <=10  Susceptible         Vancomycin 2  Susceptible                   1 Staphylococcus species may develop resistance during prolonged therapy with quinolones. Isolates that are initially susceptible may become resistant within three to four days after initiation of therapy. Testing of repeat isolates may be warranted.                           Blood Culture [[id number]] (Abnormal)  Collected: 02/17/17 0108       Lab Status: Final result Specimen: Blood from Arm, Left Updated: 02/20/17 0741        Blood Culture Staphylococcus aureus, MRSA (C)            Consider infectious disease consult to rule out distant focus of infection.   Methicillin resistant Staphylococcus aureus, Patient may be an isolation risk.           Isolated from Aerobic and Anaerobic Bottles         BETA LACTAMASE Positive         Gram Stain Result Gram positive cocci          Susceptibility         Staphylococcus aureus, MRSA         JUAN J         Clindamycin >=8  Resistant          Erythromycin >=8  Resistant         Gentamicin <=0.5  Susceptible         Inducible Clindamycin Resistance NEG  Negative         Levofloxacin >=8  Resistant         Linezolid 2  Susceptible         Oxacillin >=4  Resistant         Penicillin G >=0.5  Resistant         Tetracycline 2  Susceptible         Trimethoprim + Sulfamethoxazole <=10  Susceptible         Vancomycin 2  Susceptible                              Glucose 261, 203, 246    Radiology Data:   MRI brain without contrast 2/16/17  1.  No acute intracranial abnormalities.  2.  Nonspecific foci of gliosis in the bilateral white matter, likely reflecting mild chronic microvascular changes.     NM lung ventilation perfusion 12/15/17  Findings are most commonly associated with low probability for acute  pulmonary embolism.      CT head without contrast 2/15/17 no acute intracranial process      Chest x-ray 1 view 2/15/17 no acute cardiopulmonary disease.    Scheduled Meds    bumetanide 1 mg Oral Daily   diltiaZEM 90 mg Oral Q6H   docusate sodium 100 mg Oral BID   enoxaparin 30 mg Subcutaneous Daily   insulin detemir 10 Units Subcutaneous QAM   insulin lispro 2-7 Units Subcutaneous 4x Daily AC & at Bedtime   Linezolid 600 mg Intravenous Q12H   metoprolol tartrate 75 mg Oral Q12H   predniSONE 40 mg Oral BID With Meals   tamsulosin 0.4 mg Oral Nightly       I have reviewed the patient current medications.     Assessment/Plan     Hospital Problem List     * (Principal)Persistent atrial fibrillation    Henoch-Schonlein purpura nephritis    Tobacco abuse    Microcytic anemia    Hypertensive nephrosclerosis    Wegener's granulomatosis with renal involvement        Assessment:  1. Atrial fibrillation with RVR, no anticoagulation due to recent hematuria, anemia, and reported frequent injuries and falls (per cardiology)  2. OSMANI secondary to Jairo's granulomatosis nonoliguric, HD T,TH, Sat (currently on hold)  3. Henoch Schollein purpura nephritis on HD  4. MRSA  bacteremia likely dialysis catheter related  5. Dialysis catheter site infection, MRSA  6. Chronic anemia, anemia of chronic disease  7. LUE weakness, resolved  8. Elevated proBNP secondary to renal disease  9. Elevated d-dimer with negative NM ventilation perfusion  10. Jerome's granulomatosis  11. Hypertensive nephrosclerosis  12. Mild leukocytosis  13. Elevated PSA      Plan:  1. Cardizem 90 mg every 6 hours. Place on Cardizem CD at discharge.  Discussed with cardiology today.  May go ahead and convert to CD today.  2. Metoprolol dose 75 mg twice daily. Uptitrated by cardiology.  3. No anticoagulation per cardiology secondary to anemia, falls, injuries, recent hematuria.  4. Zyvox IV day 4  5. Blood cultures and dialysis catheter tip MRSA  6. Infectious disease recommending antibiotics.  Will need 2 weeks IV antibiotics start date of positive blood culture 2/17/17.  7. Culture sensitivities reviewed.  8. Dialysis catheter removed 2/17/17 per Dr. Grimes.  9. Dialysis on hold per nephrology.  10. Oral steroids for Wegener's granulomatous  11. CBC, BMP a.m.  12. Surveillance blood cultures 2/18 no growth at 24 hours.  13.  Elevated PSA, biopsy previously planned for this week.  Will need to be placed on hold due to bacteremia.   14.  Required 15 units sliding scale insulin coverage.  No reported history of diabetes.  Hyperglycemia likely steroid induced.      The above documentation resulted from a face-to-face encounter by me Flores GARVIN, [facility].        Discharge Planning: I expect patient to be discharged to home in 2-3 days.    BHARATHI Kramer   02/20/17   11:12 AM    I personally evaluated and examined the patient in conjunction with BHARATHI Borrego and agree with the assessment, treatment plan, and disposition of the patient as recorded by her. My history, exam, and further recommendations are: Stay the same.     Electronically signed by Blair Frausto MD at 2/20/2017  6:13 PM          "Laura Eckert MD at 2/20/2017  4:12 PM  Version 2 of 2         INFECTIOUS DISEASES PROGRESS NOTE    Patient:  Gurjit Andrea  YOB: 1970  MRN: [id number]   Admit date: 2/15/2017   Admitting Physician: Blair Frausto MD  Primary Care Physician: Moises Montes MD    Chief Complaint: \"Better\"        Interval History: Not having any chest pain.  He reports he saw Dr. Cortez and everything is good                              He states he has no new rash.      Allergies: No Known Allergies    Current Meds:     Current Facility-Administered Medications:   •  acetaminophen (TYLENOL) tablet 650 mg, 650 mg, Oral, Q4H PRN, BHARATHI Diaz, 650 mg at 02/19/17 0331  •  aluminum-magnesium hydroxide-simethicone (MAALOX/MYLANTA) suspension 30 mL, 30 mL, Oral, Q6H PRN, BHAARTHI Diaz  •  bumetanide (BUMEX) tablet 1 mg, 1 mg, Oral, Daily, Reinaldo Foley MD, 1 mg at 02/20/17 0837  •  dextrose (D50W) solution 25 g, 25 g, Intravenous, Q15 Min PRN, Iliana Alexis MD  •  dextrose (GLUTOSE) oral gel 15 g, 15 g, Oral, Q15 Min PRN, Iliana Alexis MD  •  diltiaZEM (CARDIZEM) tablet 90 mg, 90 mg, Oral, Q6H, Macie Carrion APRN, 90 mg at 02/20/17 1045  •  docusate sodium (COLACE) capsule 100 mg, 100 mg, Oral, BID, BHARATHI Diaz, 100 mg at 02/20/17 0837  •  enoxaparin (LOVENOX) syringe 30 mg, 30 mg, Subcutaneous, Daily, BHARATHI Diaz, 30 mg at 02/16/17 1733  •  glucagon (human recombinant) (GLUCAGEN DIAGNOSTIC) injection 1 mg, 1 mg, Subcutaneous, Q15 Min PRN, Iliana Alexis MD  •  heparin (porcine) injection 1,800 Units, 1,800 Units, Intracatheter, PRN, Reinaldo Foley MD, 1,800 Units at 02/16/17 1400  •  insulin detemir (LEVEMIR) injection 10 Units, 10 Units, Subcutaneous, QAM, Iliana Alexis MD, 10 Units at 02/20/17 0836  •  insulin lispro (humaLOG) injection 2-7 Units, 2-7 Units, Subcutaneous, 4x Daily AC & at Bedtime, Iliana Alexis MD, 4 Units at 02/20/17 1042  •  Linezolid " "(ZYVOX) 600 mg 300 mL, 600 mg, Intravenous, Q12H, Iliana Alexis MD, Last Rate: 150 mL/hr at 17, 600 mg at 17  •  metoprolol tartrate (LOPRESSOR) injection 5 mg, 5 mg, Intravenous, Q6H PRN, Macie E Knees, APRN, 5 mg at 17  •  metoprolol tartrate (LOPRESSOR) tablet 75 mg, 75 mg, Oral, Q12H, Macie E Knees, APRN, 75 mg at 17  •  ondansetron (ZOFRAN) injection 4 mg, 4 mg, Intravenous, Q6H PRN, BHARATHI Diaz  •  predniSONE (DELTASONE) tablet 40 mg, 40 mg, Oral, BID With Meals, BHARATHI Diaz, 40 mg at 17  •  sodium chloride 0.9 % flush 1-10 mL, 1-10 mL, Intravenous, PRN, BHARATHI Diaz  •  tamsulosin (FLOMAX) 24 hr capsule 0.4 mg, 0.4 mg, Oral, Nightly, BHARATHI Diaz, 0.4 mg at 17      Review of Systems   Constitutional: Negative for chills and fever.   Respiratory: Negative for cough.        Objective     Vital Signs:  Temp (24hrs), Av.4 °F (36.3 °C), Min:96.9 °F (36.1 °C), Max:98.2 °F (36.8 °C)      Visit Vitals   • /62 (BP Location: Left arm, Patient Position: Sitting)   • Pulse 107   • Temp 97.4 °F (36.3 °C) (Oral)   • Resp 18   • Ht 73\" (185.4 cm)   • Wt 221 lb 6 oz (100 kg)   • SpO2 94%   • BMI 29.21 kg/m2           Physical Exam   Constitutional: He appears well-developed and well-nourished.   Eyes: Right eye exhibits no exudate. Left eye exhibits no exudate. No scleral icterus.   No conjunctival petechiae   Neck: Neck supple.   Cardiovascular: Tachycardia present.    Murmur heard.   Systolic murmur is present   Musculoskeletal:   No splinter hemorrhages or Janeway lesions or Osler's nodes are appreciated   Skin: Skin is warm and dry.   Psychiatric: He has a normal mood and affect. reviewed.    Line/IV site: Peripheral IV forearm right, condition patent and no redness    Results Review:    I reviewed the patient's new clinical results.    Lab Results:  CBC:     Results from last 7 days  Lab Units 17  0341 " 02/19/17  0603 02/18/17  0525   WBC 10*3/mm3 15.95* 13.85* 12.17*   HEMOGLOBIN g/dL 8.3* 8.4* 8.3*   HEMATOCRIT % 25.9* 26.6* 26.4*   PLATELETS 10*3/mm3 149 136 129*         CMP:   Results from last 7 days  Lab Units 02/20/17  0341 02/19/17  0603 02/18/17  0525  02/15/17  1000   SODIUM mmol/L 135 135 138  < > 137   POTASSIUM mmol/L 4.8 4.8 4.0  < > 3.8   CHLORIDE mmol/L 101 104 101  < > 95*   TOTAL CO2 mmol/L 23.0* 23.0* 23.0*  < > 28.0   BUN mg/dL 57* 55* 52*  < > 64*   CREATININE mg/dL 1.60* 1.76* 1.79*  < > 1.68*   CALCIUM mg/dL 8.5 8.7 8.6  < > 8.9   BILIRUBIN mg/dL  --   --   --   --  0.4   ALK PHOS U/L  --   --   --   --  114   ALT (SGPT) U/L  --   --   --   --  59*   AST (SGOT) U/L  --   --   --   --  25   GLUCOSE mg/dL 203* 309* 291*  < > 311*   < > = values in this interval not displayed.      Culture Results:    BLOOD CULTURE   Date Value Ref Range Status   02/18/2017 No growth 2 days   Preliminary   02/17/2017 Staphylococcus aureus (A)  Preliminary     Comment:       Consider infectious disease consult to rule out distant focus of infection.   02/17/2017 Staphylococcus aureus (A)  Preliminary     Comment:       Consider infectious disease consult to rule out distant focus of infection.      Body Fluid Culture [[id number]] (Abnormal)  Collected: 02/17/17 0004       Lab Status: Final result Specimen: Body Fluid from Blood, Central Line Updated: 02/19/17 0626        BF Culture           Moderate growth (3+) Staphylococcus aureus, MRSA (C)           Methicillin resistant Staphylococcus aureus, Patient may be an isolation risk.           BETA LACTAMASE Positive         Gram Stain Result Few (2+) WBCs seen                    Few (2+) Gram positive cocci, intracellular and extracellular         Susceptibility         Staphylococcus aureus, MRSA         JUAN J         Clindamycin >=8  Resistant         Erythromycin >=8  Resistant         Gentamicin <=0.5  Susceptible         Inducible Clindamycin Resistance NEG   Negative         Levofloxacin 4  Intermediate 1         Oxacillin >=4  Resistant         Penicillin G >=0.5  Resistant         Tetracycline <=1  Susceptible         Trimethoprim + Sulfamethoxazole <=10  Susceptible         Vancomycin 1  Susceptible              Catheter Culture [[id number]] (Abnormal) Collected: 02/17/17 1836        Lab Status: Preliminary result Specimen: Cath Tip from Neck Updated: 02/19/17 0736        CATHETER CULTURE Staphylococcus aureus (A)         BETA LACTAMASE Positive         Blood Culture [[id number]] (Abnormal)  Collected: 02/17/17 0108       Lab Status: Final result Specimen: Blood from Arm, Left Updated: 02/20/17 0741        Blood Culture Staphylococcus aureus, MRSA (C)            Consider infectious disease consult to rule out distant focus of infection.   Methicillin resistant Staphylococcus aureus, Patient may be an isolation risk.           Isolated from Aerobic and Anaerobic Bottles         BETA LACTAMASE Positive         Gram Stain Result Gram positive cocci          Susceptibility         Staphylococcus aureus, MRSA         JUAN J         Clindamycin >=8  Resistant         Erythromycin >=8  Resistant         Gentamicin <=0.5  Susceptible         Inducible Clindamycin Resistance NEG  Negative         Levofloxacin >=8  Resistant         Linezolid 2  Susceptible         Oxacillin >=4  Resistant         Penicillin G >=0.5  Resistant         Tetracycline 2  Susceptible         Trimethoprim + Sulfamethoxazole <=10  Susceptible         Vancomycin 2  Susceptible                    Radiology:   Imaging Results (last 72 hours)     Procedure Component Value Units Date/Time    MRI Brain Without Contrast [[id number]] Collected:  02/16/17 1600     Updated:  02/16/17 1722    Narrative:       EXAM: MR BRAIN WITHOUT IV CONTRAST 02/16/2017     COMPARISON: Head CT dated 02/15/2017.      HISTORY: 47 year-old male. Left lower extremity weakness.     TECHNIQUE:   Routine pulse sequences of the brain were  obtained without IV contrast.      REPORT:   The ventricles and cerebrospinal fluid spaces are normal in size and  configuration for the patient's age. There is no evidence of mass-effect  or midline shift. No reduced diffusivity is demonstrated. A few  scattered foci of gliosis in the bilateral white matter  The brainstem, sella, pituitary, cerebellum are unremarkable. The  basilar cisterns are preserved. The intraorbital structures are  unremarkable.   The flow voids are preserved.   No acute osseous lesion.        The visualized portions of the paranasal sinuses and mastoid air cells  are unremarkable.           Impression:       1.  No acute intracranial abnormalities.  2.  Nonspecific foci of gliosis in the bilateral white matter, likely  reflecting mild chronic microvascular changes.  This report was finalized on 02/16/2017 17:20 by Dr. Kristyn Rodriguez MD.          Assessment&#47;Plan     Hospital Problem List     * (Principal)Persistent atrial fibrillation    Henoch-Schonlein purpura nephritis    Tobacco abuse    Microcytic anemia    Hypertensive nephrosclerosis    Wegener's granulomatosis with renal involvement          IMPRESSION:    1. Staph aureus bacteremia-thought to be catheter related. Dialysis catheter is been removed and cultures positive from catheter  2. Wegener's granulomatosis with renal involvement  3. Chronic kidney disease with acute kidney injury-the patient was dialyzed for a few weeks and has now had catheter removed  4. Cardiac murmur - previous echo with mitral regurgitation  5. Leukocytosis-trending upward.  On prednisone-  Always a concern for potential metastatic focus of infection with staph aureus bacteremia.  No obvious stigmata of endocarditis in this patient with a significant systolic murmur and known mitral regurgitation.        RECOMMENDATION:     · Currently on Zyvox. Suspect this was to avoid any potential worsening nephrotoxicity the patient. He has some chronic kidney  "disease.  · follow-up surveillance blood cultures  · Check CBC with manual differential tomorrow  · Last vascular access team to evaluate for midline or PICC for patient to receive linezolid as an outpatient  · We will ask micro-to report susceptibilities to daptomycin as well  · Expect him to need a minimum of 2 weeks of IV antibiotics the start date from his last positive blood culture         Laura Eckert MD  02/20/17  4:12 PM         Electronically signed by Laura Eckert MD at 2/20/2017  5:57 PM      Laura Eckert MD at 2/20/2017  4:12 PM  Version 1 of 2         INFECTIOUS DISEASES PROGRESS NOTE    Patient:  Gurjit Andrea  YOB: 1970  MRN: [id number]   Admit date: 2/15/2017   Admitting Physician: Blair Frausto MD  Primary Care Physician: Moises Montes MD    Chief Complaint: \"Better\"        Interval History: Not having any chest pain.  He reports he saw Dr. Cortez and everything is good                              He states he has no new rash.      Allergies: No Known Allergies    Current Meds:     Current Facility-Administered Medications:   •  acetaminophen (TYLENOL) tablet 650 mg, 650 mg, Oral, Q4H PRN, BHARATHI Diaz, 650 mg at 02/19/17 0331  •  aluminum-magnesium hydroxide-simethicone (MAALOX/MYLANTA) suspension 30 mL, 30 mL, Oral, Q6H PRN, BHARATHI Diaz  •  bumetanide (BUMEX) tablet 1 mg, 1 mg, Oral, Daily, Reinaldo Foley MD, 1 mg at 02/20/17 0837  •  dextrose (D50W) solution 25 g, 25 g, Intravenous, Q15 Min PRN, Iliana Alexis MD  •  dextrose (GLUTOSE) oral gel 15 g, 15 g, Oral, Q15 Min PRN, Iliana Alexis MD  •  diltiaZEM (CARDIZEM) tablet 90 mg, 90 mg, Oral, Q6H, Macie Carrion APRN, 90 mg at 02/20/17 1045  •  docusate sodium (COLACE) capsule 100 mg, 100 mg, Oral, BID, BHARATHI Diaz, 100 mg at 02/20/17 0837  •  enoxaparin (LOVENOX) syringe 30 mg, 30 mg, Subcutaneous, Daily, BHARATHI Diaz, 30 mg at 02/16/17 1733  •  " "glucagon (human recombinant) (GLUCAGEN DIAGNOSTIC) injection 1 mg, 1 mg, Subcutaneous, Q15 Min PRN, Iliana Alexis MD  •  heparin (porcine) injection 1,800 Units, 1,800 Units, Intracatheter, PRN, Reinaldo Foley MD, 1,800 Units at 17 1400  •  insulin detemir (LEVEMIR) injection 10 Units, 10 Units, Subcutaneous, QAM, Iliana Alexis MD, 10 Units at 17 0836  •  insulin lispro (humaLOG) injection 2-7 Units, 2-7 Units, Subcutaneous, 4x Daily AC & at Bedtime, Iliana Alexis MD, 4 Units at 17 1042  •  Linezolid (ZYVOX) 600 mg 300 mL, 600 mg, Intravenous, Q12H, Iliana Alexis MD, Last Rate: 150 mL/hr at 17 0833, 600 mg at 17 0833  •  metoprolol tartrate (LOPRESSOR) injection 5 mg, 5 mg, Intravenous, Q6H PRN, Macie E Knees, APRN, 5 mg at 17 1905  •  metoprolol tartrate (LOPRESSOR) tablet 75 mg, 75 mg, Oral, Q12H, Macie E Knees, APRN, 75 mg at 17 0837  •  ondansetron (ZOFRAN) injection 4 mg, 4 mg, Intravenous, Q6H PRN, BHARATHI Diaz  •  predniSONE (DELTASONE) tablet 40 mg, 40 mg, Oral, BID With Meals, BHARATHI Diaz, 40 mg at 17 0837  •  sodium chloride 0.9 % flush 1-10 mL, 1-10 mL, Intravenous, PRN, BHARATHI Diaz  •  tamsulosin (FLOMAX) 24 hr capsule 0.4 mg, 0.4 mg, Oral, Nightly, BHARATHI Diaz, 0.4 mg at 17      Review of Systems   Constitutional: Negative for chills and fever.   Respiratory: Negative for cough.        Objective     Vital Signs:  Temp (24hrs), Av.4 °F (36.3 °C), Min:96.9 °F (36.1 °C), Max:98.2 °F (36.8 °C)      Visit Vitals   • /62 (BP Location: Left arm, Patient Position: Sitting)   • Pulse 107   • Temp 97.4 °F (36.3 °C) (Oral)   • Resp 18   • Ht 73\" (185.4 cm)   • Wt 221 lb 6 oz (100 kg)   • SpO2 94%   • BMI 29.21 kg/m2           Physical Exam   Constitutional: He appears well-developed and well-nourished.   Eyes: Right eye exhibits no exudate. Left eye exhibits no exudate. No scleral icterus.   No " conjunctival petechiae   Neck: Neck supple.   Cardiovascular: Tachycardia present.    Murmur heard.   Systolic murmur is present   Musculoskeletal:   No splinter hemorrhages or Janeway lesions or Osler's nodes are appreciated   Skin: Skin is warm and dry.   Psychiatric: He has a normal mood and affect. reviewed.    Line/IV site: Peripheral IV forearm right, condition patent and no redness    Results Review:    I reviewed the patient's new clinical results.    Lab Results:  CBC:     Results from last 7 days  Lab Units 02/20/17 0341 02/19/17  0603 02/18/17  0525   WBC 10*3/mm3 15.95* 13.85* 12.17*   HEMOGLOBIN g/dL 8.3* 8.4* 8.3*   HEMATOCRIT % 25.9* 26.6* 26.4*   PLATELETS 10*3/mm3 149 136 129*         CMP:   Results from last 7 days  Lab Units 02/20/17  0341 02/19/17  0603 02/18/17  0525  02/15/17  1000   SODIUM mmol/L 135 135 138  < > 137   POTASSIUM mmol/L 4.8 4.8 4.0  < > 3.8   CHLORIDE mmol/L 101 104 101  < > 95*   TOTAL CO2 mmol/L 23.0* 23.0* 23.0*  < > 28.0   BUN mg/dL 57* 55* 52*  < > 64*   CREATININE mg/dL 1.60* 1.76* 1.79*  < > 1.68*   CALCIUM mg/dL 8.5 8.7 8.6  < > 8.9   BILIRUBIN mg/dL  --   --   --   --  0.4   ALK PHOS U/L  --   --   --   --  114   ALT (SGPT) U/L  --   --   --   --  59*   AST (SGOT) U/L  --   --   --   --  25   GLUCOSE mg/dL 203* 309* 291*  < > 311*   < > = values in this interval not displayed.      Culture Results:    BLOOD CULTURE   Date Value Ref Range Status   02/18/2017 No growth 2 days   Preliminary   02/17/2017 Staphylococcus aureus (A)  Preliminary     Comment:       Consider infectious disease consult to rule out distant focus of infection.   02/17/2017 Staphylococcus aureus (A)  Preliminary     Comment:       Consider infectious disease consult to rule out distant focus of infection.      Body Fluid Culture [[id number]] (Abnormal)  Collected: 02/17/17 0004       Lab Status: Final result Specimen: Body Fluid from Blood, Central Line Updated: 02/19/17 0626        BF Culture            Moderate growth (3+) Staphylococcus aureus, MRSA (C)           Methicillin resistant Staphylococcus aureus, Patient may be an isolation risk.           BETA LACTAMASE Positive         Gram Stain Result Few (2+) WBCs seen                    Few (2+) Gram positive cocci, intracellular and extracellular         Susceptibility         Staphylococcus aureus, MRSA         JUAN J         Clindamycin >=8  Resistant         Erythromycin >=8  Resistant         Gentamicin <=0.5  Susceptible         Inducible Clindamycin Resistance NEG  Negative         Levofloxacin 4  Intermediate 1         Oxacillin >=4  Resistant         Penicillin G >=0.5  Resistant         Tetracycline <=1  Susceptible         Trimethoprim + Sulfamethoxazole <=10  Susceptible         Vancomycin 1  Susceptible              Catheter Culture [[id number]] (Abnormal) Collected: 02/17/17 1836        Lab Status: Preliminary result Specimen: Cath Tip from Neck Updated: 02/19/17 0736        CATHETER CULTURE Staphylococcus aureus (A)         BETA LACTAMASE Positive         Blood Culture [[id number]] (Abnormal)  Collected: 02/17/17 0108       Lab Status: Final result Specimen: Blood from Arm, Left Updated: 02/20/17 0741        Blood Culture Staphylococcus aureus, MRSA (C)            Consider infectious disease consult to rule out distant focus of infection.   Methicillin resistant Staphylococcus aureus, Patient may be an isolation risk.           Isolated from Aerobic and Anaerobic Bottles         BETA LACTAMASE Positive         Gram Stain Result Gram positive cocci          Susceptibility         Staphylococcus aureus, MRSA         JUAN J         Clindamycin >=8  Resistant         Erythromycin >=8  Resistant         Gentamicin <=0.5  Susceptible         Inducible Clindamycin Resistance NEG  Negative         Levofloxacin >=8  Resistant         Linezolid 2  Susceptible         Oxacillin >=4  Resistant         Penicillin G >=0.5  Resistant         Tetracycline 2   Susceptible         Trimethoprim + Sulfamethoxazole <=10  Susceptible         Vancomycin 2  Susceptible                    Radiology:   Imaging Results (last 72 hours)     Procedure Component Value Units Date/Time    MRI Brain Without Contrast [[id number]] Collected:  02/16/17 1600     Updated:  02/16/17 1722    Narrative:       EXAM: MR BRAIN WITHOUT IV CONTRAST 02/16/2017     COMPARISON: Head CT dated 02/15/2017.      HISTORY: 47 year-old male. Left lower extremity weakness.     TECHNIQUE:   Routine pulse sequences of the brain were obtained without IV contrast.      REPORT:   The ventricles and cerebrospinal fluid spaces are normal in size and  configuration for the patient's age. There is no evidence of mass-effect  or midline shift. No reduced diffusivity is demonstrated. A few  scattered foci of gliosis in the bilateral white matter  The brainstem, sella, pituitary, cerebellum are unremarkable. The  basilar cisterns are preserved. The intraorbital structures are  unremarkable.   The flow voids are preserved.   No acute osseous lesion.        The visualized portions of the paranasal sinuses and mastoid air cells  are unremarkable.           Impression:       1.  No acute intracranial abnormalities.  2.  Nonspecific foci of gliosis in the bilateral white matter, likely  reflecting mild chronic microvascular changes.  This report was finalized on 02/16/2017 17:20 by Dr. Kristyn Rodriguez MD.          Assessment&#47;Plan     Hospital Problem List     * (Principal)Persistent atrial fibrillation    Henoch-Schonlein purpura nephritis    Tobacco abuse    Microcytic anemia    Hypertensive nephrosclerosis    Wegener's granulomatosis with renal involvement          IMPRESSION:    6. Staph aureus bacteremia-thought to be catheter related. Dialysis catheter is been removed and cultures positive from catheter  7. Wegener's granulomatosis with renal involvement  8. Chronic kidney disease with acute kidney injury-the patient was  dialyzed for a few weeks and has now had catheter removed  9. Cardiac murmur - previous echo with mitral regurgitation  10. Leukocytosis-trending upward.  Always a concern for potential metastatic focus of infection with staph aureus bacteremia.  No obvious stigmata of endocarditis in this patient with a significant systolic murmur and known mitral regurgitation.        RECOMMENDATION:     · Currently on Zyvox. Suspect this was to avoid any potential worsening nephrotoxicity the patient. He has some chronic kidney disease.  · follow-up surveillance blood cultures  · Check CBC with manual differential tomorrow  · Last vascular access team to evaluate for midline or PICC for patient to receive linezolid as an outpatient  · We will ask micro-to report susceptibilities to daptomycin as well  · Expect him to need a minimum of 2 weeks of IV antibiotics the start date from his last positive blood culture         Laura Eckert MD  02/20/17  4:12 PM         Electronically signed by Laura Eckert MD at 2/20/2017  5:53 PM        Inpatient Consult to Case Management  [[id number]] (Order [id number])   Consult    Date: 2/20/2017   Department: [address]   Ordering/Authorizing: Laura Eckert MD         Standing Order Information      Remaining Occurrences Interval Last Released              0/1 Once 2/20/2017          Order History  Inpatient     Date/Time Action Taken User Additional Information     02/20/17 1757 Sign Laura Eckert MD      02/20/17 1757 Release Instance Laura Eckert MD (auto-released) Released Order: [id number]     02/20/17 1757 Acknowledge Anjali Blackman RN New Order       Order Details      Frequency Duration Priority Order Class     Once 1  occurrence Routine Hospital Performed       Comments      Please evaluate for IV antibiotics to include Zyvox 600 mg IV every 12 hours for a minimum of 14 days with the start date February 18th 2017.   his  wife is an RN so he may not need home health services.  He is agreeable to assessing if [facility] is in network for him to supply his antibiotic.  [facility] has not been contacted by me.       Order Questions      Question Answer Comment     Reason for Consult? IV antibiotics at home        Source Order Set / Preference List      Preference List     [facility] PAD IP GENERAL CONSULTS [[id number]]       Consult Order Info      ID Description Priority Start Date Start Time     [id number] Inpatient Consult to Case Management  Routine 02/20/2017  5:55 PM         Provider Specialty Referred to     ______________________________________ _____________________________________       Acknowledgement Info      For At Acknowledged By Acknowledged On     Placing Order 02/20/17 1757 Anjali Blackman RN 02/20/17 1757       Reprint Order Requisition      Inpatient Consult to Case Management  (Order #[id number]) on 2/20/17       Encounter      View Encounter           Order Provider Info          Office phone Pager E-mail     Ordering User Laura Eckert [phone] -- --     Authorizing Provider Laura Eckert [phone] -- --     Attending Provider Blair Frausto [phone] -- --       Order Transmittal Tracking      Inpatient Consult to Case Management  (Order #[id number]) on 2/20/17

## 2017-02-22 ENCOUNTER — ANESTHESIA EVENT (OUTPATIENT)
Dept: CARDIOLOGY | Facility: HOSPITAL | Age: 47
End: 2017-02-22

## 2017-02-22 ENCOUNTER — ANESTHESIA (OUTPATIENT)
Dept: CARDIOLOGY | Facility: HOSPITAL | Age: 47
End: 2017-02-22

## 2017-02-22 ENCOUNTER — APPOINTMENT (OUTPATIENT)
Dept: CARDIOLOGY | Facility: HOSPITAL | Age: 47
End: 2017-02-22
Attending: INTERNAL MEDICINE

## 2017-02-22 LAB
ANION GAP SERPL CALCULATED.3IONS-SCNC: 8 MMOL/L (ref 4–13)
BH CV ECHO MEAS - BSA(HAYCOCK): 2.3 M^2
BH CV ECHO MEAS - BSA: 2.2 M^2
BH CV ECHO MEAS - BZI_BMI: 30.1 KILOGRAMS/M^2
BH CV ECHO MEAS - BZI_METRIC_HEIGHT: 182.9 CM
BH CV ECHO MEAS - BZI_METRIC_WEIGHT: 100.7 KG
BUN BLD-MCNC: 56 MG/DL (ref 5–21)
BUN/CREAT SERPL: 36.4 (ref 7–25)
CALCIUM SPEC-SCNC: 8.8 MG/DL (ref 8.4–10.4)
CHLORIDE SERPL-SCNC: 100 MMOL/L (ref 98–110)
CO2 SERPL-SCNC: 29 MMOL/L (ref 24–31)
CREAT BLD-MCNC: 1.54 MG/DL (ref 0.5–1.4)
DEPRECATED RDW RBC AUTO: 57.8 FL (ref 40–54)
ERYTHROCYTE [DISTWIDTH] IN BLOOD BY AUTOMATED COUNT: 18.8 % (ref 12–15)
GFR SERPL CREATININE-BSD FRML MDRD: 48 ML/MIN/1.73
GLUCOSE BLD-MCNC: 90 MG/DL (ref 70–100)
GLUCOSE BLDC GLUCOMTR-MCNC: 131 MG/DL (ref 70–130)
GLUCOSE BLDC GLUCOMTR-MCNC: 260 MG/DL (ref 70–130)
GLUCOSE BLDC GLUCOMTR-MCNC: 79 MG/DL (ref 70–130)
GLUCOSE BLDC GLUCOMTR-MCNC: 92 MG/DL (ref 70–130)
HCT VFR BLD AUTO: 28.9 % (ref 40–52)
HGB BLD-MCNC: 9.2 G/DL (ref 14–18)
LV EF 2D ECHO EST: 60 %
LYMPHOCYTES # BLD MANUAL: 2.35 10*3/MM3 (ref 0.72–4.86)
LYMPHOCYTES NFR BLD MANUAL: 1 % (ref 4–12)
LYMPHOCYTES NFR BLD MANUAL: 13 % (ref 15–45)
MCH RBC QN AUTO: 27.1 PG (ref 28–32)
MCHC RBC AUTO-ENTMCNC: 31.8 G/DL (ref 33–36)
MCV RBC AUTO: 85.3 FL (ref 82–95)
METAMYELOCYTES NFR BLD MANUAL: 1 % (ref 0–0)
MONOCYTES # BLD AUTO: 0.18 10*3/MM3 (ref 0.19–1.3)
MYELOCYTES NFR BLD MANUAL: 2 % (ref 0–0)
NEUTROPHILS # BLD AUTO: 15.01 10*3/MM3 (ref 1.87–8.4)
NEUTROPHILS NFR BLD MANUAL: 79 % (ref 39–78)
NEUTS BAND NFR BLD MANUAL: 4 % (ref 0–10)
PLAT MORPH BLD: NORMAL
PLATELET # BLD AUTO: 157 10*3/MM3 (ref 130–400)
PMV BLD AUTO: 9.4 FL (ref 6–12)
POIKILOCYTOSIS BLD QL SMEAR: ABNORMAL
POTASSIUM BLD-SCNC: 4.7 MMOL/L (ref 3.5–5.3)
RBC # BLD AUTO: 3.39 10*6/MM3 (ref 4.8–5.9)
SCAN SLIDE: NORMAL
SODIUM BLD-SCNC: 137 MMOL/L (ref 135–145)
WBC MORPH BLD: NORMAL
WBC NRBC COR # BLD: 18.09 10*3/MM3 (ref 4.8–10.8)

## 2017-02-22 PROCEDURE — 63710000001 PREDNISONE PER 5 MG: Performed by: INTERNAL MEDICINE

## 2017-02-22 PROCEDURE — 93325 DOPPLER ECHO COLOR FLOW MAPG: CPT | Performed by: INTERNAL MEDICINE

## 2017-02-22 PROCEDURE — 93312 ECHO TRANSESOPHAGEAL: CPT | Performed by: INTERNAL MEDICINE

## 2017-02-22 PROCEDURE — 93312 ECHO TRANSESOPHAGEAL: CPT

## 2017-02-22 PROCEDURE — 25010000002 PROPOFOL 10 MG/ML EMULSION: Performed by: NURSE ANESTHETIST, CERTIFIED REGISTERED

## 2017-02-22 PROCEDURE — 85025 COMPLETE CBC W/AUTO DIFF WBC: CPT | Performed by: INTERNAL MEDICINE

## 2017-02-22 PROCEDURE — 25010000002 LINEZOLID 600 MG/300ML SOLUTION: Performed by: INTERNAL MEDICINE

## 2017-02-22 PROCEDURE — 99232 SBSQ HOSP IP/OBS MODERATE 35: CPT | Performed by: INTERNAL MEDICINE

## 2017-02-22 PROCEDURE — 82962 GLUCOSE BLOOD TEST: CPT

## 2017-02-22 PROCEDURE — 85007 BL SMEAR W/DIFF WBC COUNT: CPT | Performed by: INTERNAL MEDICINE

## 2017-02-22 PROCEDURE — 93320 DOPPLER ECHO COMPLETE: CPT | Performed by: INTERNAL MEDICINE

## 2017-02-22 PROCEDURE — 93325 DOPPLER ECHO COLOR FLOW MAPG: CPT

## 2017-02-22 PROCEDURE — 87040 BLOOD CULTURE FOR BACTERIA: CPT | Performed by: INTERNAL MEDICINE

## 2017-02-22 PROCEDURE — 99255 IP/OBS CONSLTJ NEW/EST HI 80: CPT | Performed by: THORACIC SURGERY (CARDIOTHORACIC VASCULAR SURGERY)

## 2017-02-22 PROCEDURE — 87205 SMEAR GRAM STAIN: CPT | Performed by: INTERNAL MEDICINE

## 2017-02-22 PROCEDURE — 80048 BASIC METABOLIC PNL TOTAL CA: CPT | Performed by: NURSE PRACTITIONER

## 2017-02-22 PROCEDURE — 25010000002 DAPTOMYCIN PER 1 MG: Performed by: INTERNAL MEDICINE

## 2017-02-22 PROCEDURE — 87147 CULTURE TYPE IMMUNOLOGIC: CPT | Performed by: INTERNAL MEDICINE

## 2017-02-22 RX ORDER — PROPOFOL 10 MG/ML
VIAL (ML) INTRAVENOUS AS NEEDED
Status: DISCONTINUED | OUTPATIENT
Start: 2017-02-22 | End: 2017-02-22 | Stop reason: SURG

## 2017-02-22 RX ORDER — SODIUM CHLORIDE 9 MG/ML
INJECTION, SOLUTION INTRAVENOUS CONTINUOUS PRN
Status: DISCONTINUED | OUTPATIENT
Start: 2017-02-22 | End: 2017-02-22 | Stop reason: SURG

## 2017-02-22 RX ADMIN — PROPOFOL 50 MG: 10 INJECTION, EMULSION INTRAVENOUS at 14:38

## 2017-02-22 RX ADMIN — TAMSULOSIN HYDROCHLORIDE 0.4 MG: 0.4 CAPSULE ORAL at 20:03

## 2017-02-22 RX ADMIN — DAPTOMYCIN 810 MG: 500 INJECTION, POWDER, LYOPHILIZED, FOR SOLUTION INTRAVENOUS at 20:01

## 2017-02-22 RX ADMIN — DOCUSATE SODIUM 100 MG: 100 CAPSULE ORAL at 09:39

## 2017-02-22 RX ADMIN — PROPOFOL 30 MG: 10 INJECTION, EMULSION INTRAVENOUS at 14:52

## 2017-02-22 RX ADMIN — METOPROLOL TARTRATE 75 MG: 50 TABLET ORAL at 09:39

## 2017-02-22 RX ADMIN — INSULIN LISPRO 4 UNITS: 100 INJECTION, SOLUTION INTRAVENOUS; SUBCUTANEOUS at 20:01

## 2017-02-22 RX ADMIN — PREDNISONE 40 MG: 20 TABLET ORAL at 09:39

## 2017-02-22 RX ADMIN — LINEZOLID 600 MG: 600 INJECTION, SOLUTION INTRAVENOUS at 20:44

## 2017-02-22 RX ADMIN — PROPOFOL 30 MG: 10 INJECTION, EMULSION INTRAVENOUS at 14:45

## 2017-02-22 RX ADMIN — PROPOFOL 50 MG: 10 INJECTION, EMULSION INTRAVENOUS at 14:40

## 2017-02-22 RX ADMIN — METOPROLOL TARTRATE 5 MG: 5 INJECTION INTRAVENOUS at 23:08

## 2017-02-22 RX ADMIN — BUMETANIDE 1 MG: 1 TABLET ORAL at 09:39

## 2017-02-22 RX ADMIN — DILTIAZEM HYDROCHLORIDE 360 MG: 180 CAPSULE, COATED, EXTENDED RELEASE ORAL at 09:40

## 2017-02-22 RX ADMIN — PROPOFOL 30 MG: 10 INJECTION, EMULSION INTRAVENOUS at 14:43

## 2017-02-22 RX ADMIN — LINEZOLID 600 MG: 600 INJECTION, SOLUTION INTRAVENOUS at 09:40

## 2017-02-22 RX ADMIN — SODIUM CHLORIDE: 9 INJECTION, SOLUTION INTRAVENOUS at 14:36

## 2017-02-22 RX ADMIN — PROPOFOL 30 MG: 10 INJECTION, EMULSION INTRAVENOUS at 14:48

## 2017-02-22 NOTE — PROGRESS NOTES
LOS: 6 days   Patient Care Team:  Moises Montes MD as PCP - General (Internal Medicine)  Luis Carlos Awan MD as Consulting Physician (Urology)  Alex Cortez MD as Cardiologist (Cardiology)    Chief Complaint: Shortness of breath AF RVR  Subjective  Feeling  better  No chest pain   No excessive shortness of breath  No new complaints    Interval History: Improved overall    Patient Complaints:   Denies chest pain currently. Denies excessive shortness of breath. Denies abdominal pain, nausea vomiting or diarrhoea.    Telemetry: no malignant arrhythmia. No significant pauses. AF rate 100-110 BPM    Review of Systems:    The following systems were reviewed and negative; ENT, respiratory,  gastrointestinal, integument, hematologic / lymphatic, neurological, behavioral/psych and allergies / immunologic    Labs:    WBC WBC   Date Value Ref Range Status   02/21/2017 17.53 (H) 4.80 - 10.80 10*3/mm3 Final   02/20/2017 15.95 (H) 4.80 - 10.80 10*3/mm3 Final   02/19/2017 13.85 (H) 4.80 - 10.80 10*3/mm3 Final      HGB HEMOGLOBIN   Date Value Ref Range Status   02/21/2017 8.7 (L) 14.0 - 18.0 g/dL Final   02/20/2017 8.3 (L) 14.0 - 18.0 g/dL Final   02/19/2017 8.4 (L) 14.0 - 18.0 g/dL Final      HCT HEMATOCRIT   Date Value Ref Range Status   02/21/2017 27.1 (L) 40.0 - 52.0 % Final   02/20/2017 25.9 (L) 40.0 - 52.0 % Final   02/19/2017 26.6 (L) 40.0 - 52.0 % Final      Platlets PLATELETS   Date Value Ref Range Status   02/21/2017 155 130 - 400 10*3/mm3 Final   02/20/2017 149 130 - 400 10*3/mm3 Final   02/19/2017 136 130 - 400 10*3/mm3 Final      MCV MCV   Date Value Ref Range Status   02/21/2017 84.7 82.0 - 95.0 fL Final   02/20/2017 84.1 82.0 - 95.0 fL Final   02/19/2017 85.0 82.0 - 95.0 fL Final        Results from last 7 days  Lab Units 02/21/17  0451 02/20/17  0341 02/19/17  0603  02/15/17  1000   SODIUM mmol/L 136 135 135  < > 137   POTASSIUM mmol/L 5.2 4.8 4.8  < > 3.8   CHLORIDE mmol/L 100 101 104  < > 95*   TOTAL CO2  mmol/L 26.0 23.0* 23.0*  < > 28.0   BUN mg/dL 62* 57* 55*  < > 64*   CREATININE mg/dL 1.57* 1.60* 1.76*  < > 1.68*   CALCIUM mg/dL 8.7 8.5 8.7  < > 8.9   BILIRUBIN mg/dL  --   --   --   --  0.4   ALK PHOS U/L  --   --   --   --  114   ALT (SGPT) U/L  --   --   --   --  59*   AST (SGOT) U/L  --   --   --   --  25   GLUCOSE mg/dL 146* 203* 309*  < > 311*   < > = values in this interval not displayed.  Lab Results   Component Value Date    TROPONINI <0.012 02/15/2017     PT/INR:  No results found for: PROTIME/No results found for: INR    Imaging Results (last 72 hours)     ** No results found for the last 72 hours. **          Objective     Medication Review:   Current Facility-Administered Medications   Medication Dose Route Frequency Provider Last Rate Last Dose   • acetaminophen (TYLENOL) tablet 650 mg  650 mg Oral Q4H PRN BHARATHI Diaz   650 mg at 02/19/17 0331   • aluminum-magnesium hydroxide-simethicone (MAALOX/MYLANTA) suspension 30 mL  30 mL Oral Q6H PRN BHARATHI Diaz       • bumetanide (BUMEX) tablet 1 mg  1 mg Oral Daily Reinaldo Foley MD   1 mg at 02/21/17 0820   • dextrose (D50W) solution 25 g  25 g Intravenous Q15 Min PRN Iliana Alexis MD       • dextrose (GLUTOSE) oral gel 15 g  15 g Oral Q15 Min PRN Iliana Alexis MD       • diltiaZEM CD (CARDIZEM CD) 24 hr capsule 360 mg  360 mg Oral Q24H BHARATHI Portillo   360 mg at 02/21/17 1654   • docusate sodium (COLACE) capsule 100 mg  100 mg Oral BID BHARATHI Diaz   100 mg at 02/20/17 0837   • enoxaparin (LOVENOX) syringe 30 mg  30 mg Subcutaneous Daily BHARATHI Diaz   30 mg at 02/16/17 1733   • glucagon (human recombinant) (GLUCAGEN DIAGNOSTIC) injection 1 mg  1 mg Subcutaneous Q15 Min PRN Iliana Alexis MD       • heparin (porcine) injection 1,800 Units  1,800 Units Intracatheter PRN Reinaldo Foley MD   1,800 Units at 02/16/17 1400   • insulin detemir (LEVEMIR) injection 10 Units  10 Units Subcutaneous  "M Iliana Alexis MD   10 Units at 02/21/17 0821   • insulin lispro (humaLOG) injection 2-7 Units  2-7 Units Subcutaneous 4x Daily AC & at Bedtime Iliana Alexis MD   4 Units at 02/21/17 2148   • Linezolid (ZYVOX) 600 mg 300 mL  600 mg Intravenous Q12H Iliana Alexis  mL/hr at 02/21/17 2148 600 mg at 02/21/17 2148   • metoprolol tartrate (LOPRESSOR) injection 5 mg  5 mg Intravenous Q6H PRN Macie E Knees, APRN   5 mg at 02/17/17 1905   • metoprolol tartrate (LOPRESSOR) tablet 75 mg  75 mg Oral Q12H Macie E Knees, APRN   75 mg at 02/21/17 2203   • ondansetron (ZOFRAN) injection 4 mg  4 mg Intravenous Q6H PRN BHARATHI Diaz       • [START ON 2/22/2017] predniSONE (DELTASONE) tablet 40 mg  40 mg Oral Daily With Breakfast Hema Jade MD       • sodium chloride 0.9 % flush 1-10 mL  1-10 mL Intravenous PRN BHARATHI Diaz       • tamsulosin (FLOMAX) 24 hr capsule 0.4 mg  0.4 mg Oral Nightly BHARATHI Diaz   0.4 mg at 02/21/17 2148       Vital Sign Min/Max for last 24 hours  Temp  Min: 96.9 °F (36.1 °C)  Max: 98.1 °F (36.7 °C)   BP  Min: 104/76  Max: 128/86   Pulse  Min: 83  Max: 112   Resp  Min: 18  Max: 20   SpO2  Min: 98 %  Max: 100 %   No Data Recorded   Weight  Min: 222 lb 4 oz (101 kg)  Max: 222 lb 4 oz (101 kg)     Flowsheet Rows         First Filed Value    Admission Height  73\" (185.4 cm) Documented at 02/15/2017 0953    Admission Weight  224 lb (102 kg) Documented at 02/15/2017 0953          Physical Exam:  Ears ears appear intact with no abnormalities noted  Nose nares normal, septum midline, mucosa normal and no drainage  Neck suppple, trachea midline, no thyromegaly, no carotid bruit and no JVD  Back no kyphosis present, no scoliosis present, no skin lesions, erythema, or scars, no tenderness to percussion or palpation and range of motion normal  Lungs respirations regular, respirations even and respirations unlabored  Heart normal S1, S2, irregular,  2/6 pansystolic murmur in the left " sternal border,  no rub and no click  Abdomen normal bowel sounds, no masses, no hepatomegaly, no splenomegaly, no guarding and no rebound tenderness  Skin no bleeding, bruising or rash     Results Review:   I reviewed the patient's new clinical results.  I reviewed the patient's new imaging results and agree with the interpretation.  I reviewed the patient's other test results and agree with the interpretation  I personally viewed and interpreted the patient's EKG/Telemetry data  Discussed with patient    Medication Review: Performed    Assessment/Plan     Principal Problem:    Persistent atrial fibrillation  Active Problems:    Henoch-Schonlein purpura nephritis    Tobacco abuse    Microcytic anemia    Hypertensive nephrosclerosis    Wegener's granulomatosis with renal involvement    MRSA bacteremia    Plan  Will perform JOSEPH 2/22/17 2 PM  Same treatment  Permacath catheter removed  Awaiting IV antibiotics at home  OK to discharge from cardiac standpoint if JOSEPH unrevealing for IE  Supportive care  Telemetry  Optimal medical therapy  Deep vein thrombosis precautions  Low salt cardiac diet  Avoid NSAIDS  Continue rate control agents    lAex Cortez MD  02/21/17  10:59 PM

## 2017-02-22 NOTE — PLAN OF CARE
Problem: Patient Care Overview (Adult)  Goal: Plan of Care Review  Outcome: Ongoing (interventions implemented as appropriate)    02/22/17 1721   Coping/Psychosocial Response Interventions   Plan Of Care Reviewed With patient   Patient Care Overview   Progress no change   Outcome Evaluation   Outcome Summary/Follow up Plan PT HAD JOSEPH TODAY WITH A CT. CONSULT FOR DR. KWOK. PLAN IS TO TRANSFER PATIENT TO [place],.  WILL BEGIN WORKING ON THE TRANSFER TOMORROW 2/23/2017       Goal: Adult Individualization and Mutuality  Outcome: Ongoing (interventions implemented as appropriate)  Goal: Discharge Needs Assessment  Outcome: Ongoing (interventions implemented as appropriate)    Problem: Cardiac Output, Decreased (Adult)  Goal: Identify Related Risk Factors and Signs and Symptoms  Outcome: Ongoing (interventions implemented as appropriate)  Goal: Adequate Cardiac Output/Effective Tissue Perfusion  Outcome: Ongoing (interventions implemented as appropriate)    Problem: Infection, Risk/Actual (Adult)  Goal: Identify Related Risk Factors and Signs and Symptoms  Outcome: Ongoing (interventions implemented as appropriate)  Goal: Infection Prevention/Resolution  Outcome: Ongoing (interventions implemented as appropriate)

## 2017-02-22 NOTE — PLAN OF CARE
Problem: Patient Care Overview (Adult)  Goal: Plan of Care Review  Outcome: Ongoing (interventions implemented as appropriate)    02/22/17 0341   Coping/Psychosocial Response Interventions   Plan Of Care Reviewed With patient   Patient Care Overview   Progress no change   Outcome Evaluation   Outcome Summary/Follow up Plan VSS, no c/o pain this shift. JOSEPH today, consent signed. Possible DC after procedure. Cont IV ABX.         Problem: Cardiac Output, Decreased (Adult)  Goal: Adequate Cardiac Output/Effective Tissue Perfusion  Outcome: Ongoing (interventions implemented as appropriate)    02/22/17 0341   Cardiac Output, Decreased (Adult)   Adequate Cardiac Output/Effective Tissue Perfusion making progress toward outcome         Problem: Respiratory Insufficiency (Adult)  Goal: Acid/Base Balance  Outcome: Ongoing (interventions implemented as appropriate)    02/22/17 0341   Respiratory Insufficiency (Adult)   Acid/Base Balance making progress toward outcome         Problem: Infection, Risk/Actual (Adult)  Goal: Infection Prevention/Resolution  Outcome: Ongoing (interventions implemented as appropriate)    02/22/17 0341   Infection, Risk/Actual (Adult)   Infection Prevention/Resolution making progress toward outcome

## 2017-02-22 NOTE — PAYOR COMM NOTE
"[facility]  ELA ELLIS RN [phone]  FAX [phone]  Gurjit Be (47 y.o. Male) [id number]    Date of Birth Social Security Number Address Home Phone MRN    1970  [id number]  [facility][note 3] [phone] [id number]    Buddhism Marital Status          None        Admission Date Admission Type Admitting Provider Attending Provider Department, Room/Bed    2/15/17 Emergency Blair Frausto MD Truong, Khai C, MD [facility] 4B, 408/1    Discharge Date Discharge Disposition Discharge Destination                      Attending Provider: Blair Frausto MD     Allergies:  No Known Allergies    Isolation:  Contact   Infection:  MRSA (02/19/17)   Code Status:  FULL    Ht:  73\" (185.4 cm)   Wt:  222 lb 4 oz (101 kg)    Admission Cmt:  None   Principal Problem:  Persistent atrial fibrillation [I48.1]                 Active Insurance as of 2/15/2017     Primary Coverage     Payor Plan Insurance Group Employer/Plan Group    [employer] [facility] [id number]     Payor Plan Address Payor Plan Phone Number Effective From Effective To    [address] [phone] 12/1/2016     [place] [address]       Subscriber Name Subscriber Birth Date Member ID       JORDAN BE [birth year]/2001 [id number]                 Emergency Contacts      (Rel.) Home Phone Work Phone Mobile Phone    Jordan Be (Spouse) [phone] -- [phone]            Vital Signs (last 24 hours)       02/21 0700  -  02/22 0659 02/22 0700  -  02/22 0833   Most Recent    Temp (°F) 97.9 -  98.1      98     98 (36.7)    Heart Rate 83 -  98      85     85    Resp 18 -  20      20     20    /75 -  128/86      134/72     134/72    SpO2 (%) 98 -  99      98     98          Intake & Output (last day)       02/21 0701 - 02/22 0700 02/22 0701 - 02/23 0700    P.O. 720     IV Piggyback      Total Intake(mL/kg) 720 (7.1)     Urine (mL/kg/hr) 700 (0.3)     Total Output 700  "     Net +20                Lab Results (last 24 hours)     Procedure Component Value Units Date/Time    POC Glucose Fingerstick [[id number]]  (Abnormal) Collected:  02/21/17 0817    Specimen:  Blood Updated:  02/21/17 0848     Glucose 173 (H) mg/dL       : [id number] [facility] ID: [id number]       POC Glucose Fingerstick [[id number]]  (Abnormal) Collected:  02/21/17 1129    Specimen:  Blood Updated:  02/21/17 1206     Glucose 164 (H) mg/dL       : [id number] [facility] ID: [id number]       Blood Culture ID, PCR [[id number]]  (Abnormal) Collected:  02/18/17 1541    Specimen:  Blood from Arm, Left Updated:  02/21/17 1448     BCID, PCR        Staphylococcus aureus. mecA (methicillin resistance gene) detected. Identification by BCID PCR. (C)    Blood Culture With CHERYLE [[id number]]  (Abnormal) Collected:  02/18/17 1541    Specimen:  Blood from Arm, Left Updated:  02/21/17 1449     Blood Culture Abnormal Stain (A)       Gram Positive Cocci (A)      Isolated from Aerobic Bottle      Gram Stain Result Gram positive cocci     POC Glucose Fingerstick [[id number]]  (Abnormal) Collected:  02/21/17 1640    Specimen:  Blood Updated:  02/21/17 1719     Glucose 303 (H) mg/dL       : [id number] [facility] ID: [id number]       POC Glucose Fingerstick [[id number]]  (Abnormal) Collected:  02/21/17 2010    Specimen:  Blood Updated:  02/21/17 2021     Glucose 266 (H) mg/dL       : [id number] [facility] ID: [id number]       Blood Culture With CHERYLE [[id number]] Collected:  02/22/17 0404    Specimen:  Blood from Arm, Left Updated:  02/22/17 0422    Basic Metabolic Panel [[id number]]  (Abnormal) Collected:  02/22/17 0404    Specimen:  Blood Updated:  02/22/17 0449     Glucose 90 mg/dL      BUN 56 (H) mg/dL      Creatinine 1.54 (H) mg/dL      Sodium 137 mmol/L      Potassium 4.7 mmol/L      Chloride 100 mmol/L      CO2 29.0 mmol/L      Calcium 8.8 mg/dL      eGFR Non African Amer 48 (L) mL/min/1.73       BUN/Creatinine Ratio 36.4 (H)      Anion Gap 8.0 mmol/L     Narrative:       GFR Normal >60  Chronic Kidney Disease <60  Kidney Failure <15    Blood Culture With CHERYLE [[id number]]  (Normal) Collected:  02/21/17 1617    Specimen:  Blood from Arm, Right Updated:  02/22/17 0501     Blood Culture No growth at less than 24 hours     CBC Auto Differential [[id number]]  (Abnormal) Collected:  02/22/17 0404    Specimen:  Blood Updated:  02/22/17 0524     WBC 18.09 (H) 10*3/mm3      RBC 3.39 (L) 10*6/mm3      Hemoglobin 9.2 (L) g/dL      Hematocrit 28.9 (L) %      MCV 85.3 fL      MCH 27.1 (L) pg      MCHC 31.8 (L) g/dL      RDW 18.8 (H) %      RDW-SD 57.8 (H) fl      MPV 9.4 fL      Platelets 157 10*3/mm3     CBC & Differential [[id number]] Collected:  02/22/17 0404    Specimen:  Blood Updated:  02/22/17 0524    Narrative:       The following orders were created for panel order CBC & Differential.  Procedure                               Abnormality         Status                     ---------                               -----------         ------                     Manual Differential[[id number]]           Abnormal            Final result               Scan Slide[[id number]]                                        Final result               CBC Auto Differential[[id number]]         Abnormal            Final result                 Please view results for these tests on the individual orders.    Scan Slide [[id number]] Collected:  02/22/17 0404    Specimen:  Blood Updated:  02/22/17 0524     Scan Slide --       See Manual Differential Results       Manual Differential [[id number]]  (Abnormal) Collected:  02/22/17 0404    Specimen:  Blood Updated:  02/22/17 0524     Neutrophil % 79.0 (H) %      Lymphocyte % 13.0 (L) %      Monocyte % 1.0 (L) %      Bands %  4.0 %      Metamyelocyte % 1.0 (H) %      Myelocyte % 2.0 (H) %      Neutrophils Absolute 15.01 (H) 10*3/mm3      Lymphocytes Absolute 2.35 10*3/mm3      Monocytes Absolute 0.18 (L)  10*3/mm3      Poikilocytes Slight/1+      WBC Morphology Normal      Platelet Morphology Normal     POC Glucose Fingerstick [[id number]]  (Normal) Collected:  02/22/17 0759    Specimen:  Blood Updated:  02/22/17 0810     Glucose 79 mg/dL       : [id number] [facility] ID: [id number]           Imaging Results (last 24 hours)     Procedure Component Value Units Date/Time    XR Chest 1 View [[id number]] Collected:  02/21/17 1241     Updated:  02/21/17 1610    Narrative:       HISTORY: PICC line placement.     CXR: Frontal view of the chest is obtained. Comparison made to a  02/15/2017 study.     The right-sided permacatheter has been removed. A right upper extremity  PICC line has been placed. The tip is positioned appropriately over the  lower SVC.     The cardiac silhouette remains enlarged. Right infrahilar densities may  be atelectasis. There is no convincing edema. There is no pleural  effusion or pneumothorax.       Impression:       1. Appropriate positioning of the right upper extremity PICC line.   2. Cardiomegaly.  3. Right infrahilar densities may be atelectasis.  This report was finalized on 02/21/2017 16:08 by Dr. Jahaira Capellan MD.          Orders (last 24 hrs)     Start     Ordered    02/22/17 1400  Adult Transesophageal Echo  Once      02/21/17 2305    02/22/17 0811  POC Glucose Fingerstick  Once      02/22/17 0810    02/22/17 0800  predniSONE (DELTASONE) tablet 40 mg  Daily With Breakfast      02/21/17 1244    02/22/17 0800  NPO Diet  After Breakfast (DIET)     Comments:  Liquid breakfast prior to 8 AM 2/22/17 then keep NPO for JOSEPH at 1400 2/22/17 02/21/17 2304    02/22/17 0600  Basic Metabolic Panel  Morning Draw,   Status:  Canceled      02/21/17 1244    02/22/17 0600  CBC & Differential  Morning Draw      02/21/17 1244    02/22/17 0600  Blood Culture With CHERYLE  Once     Comments:  Linezolid    02/21/17 1605    02/22/17 0600  CBC Auto Differential  PROCEDURE ONCE      02/22/17 0000     02/22/17 0523  Manual Differential  Once      02/22/17 0522    02/22/17 0426  Scan Slide  Once      02/22/17 0425    02/22/17 0001  NPO Diet  Diet Effective Midnight,   Status:  Canceled      02/21/17 1554    02/22/17 0000  Adult Transesophageal Echo  Once,   Status:  Canceled      02/21/17 1554    02/21/17 2022  POC Glucose Fingerstick  Once      02/21/17 2021    02/21/17 1720  POC Glucose Fingerstick  Once      02/21/17 1719    02/21/17 1606  Please ask microbiology lab to report linezolid and daptomycin susceptibilities for blood cultures from February 18  Once     Comments:  Please ask microbiology lab to report linezolid and daptomycin susceptibilities for blood cultures from February 18 02/21/17 1605    02/21/17 1603  Please change dressing after cleaning opened area from previous permacath site.  Once     Comments:  Please change dressing after cleaning opened area from previous permacath site.    02/21/17 1605    02/21/17 1602  Blood Culture With CHERYLE  Once     Comments:  Linezolid    02/21/17 1605    02/21/17 1530  diltiaZEM CD (CARDIZEM CD) 24 hr capsule 360 mg  Every 24 Hours Scheduled      02/21/17 1449    02/21/17 1447  Blood Culture ID, PCR  Once     Comments:  Zyvox    02/21/17 1446    02/21/17 1207  POC Glucose Fingerstick  Once      02/21/17 1206    02/21/17 1115  lidocaine (XYLOCAINE) 1 % injection 5 mL  Once      02/21/17 1036    02/21/17 1059  XR Chest 1 View  1 Time Imaging      02/21/17 1059    02/21/17 0849  POC Glucose Fingerstick  Once      02/21/17 0848    02/21/17 0000  metoprolol tartrate 75 MG tablet  Every 12 Hours Scheduled      02/21/17 1452    02/21/17 0000  diltiaZEM CD (CARDIZEM CD) 360 MG 24 hr capsule  Every 24 Hours Scheduled      02/21/17 1452    02/21/17 0000  Discharge Follow-up with Specialty      02/21/17 1513    02/20/17 0700  insulin detemir (LEVEMIR) injection 10 Units  Every Morning      02/19/17 1319    02/19/17 1730  insulin lispro (humaLOG) injection 2-7 Units  4  Times Daily Before Meals & Nightly      02/19/17 1319    02/19/17 1700  POC Glucose Fingerstick  4 Times Daily Before Meals & at Bedtime      02/19/17 1319    02/19/17 1319  dextrose (GLUTOSE) oral gel 15 g  Every 15 Minutes PRN      02/19/17 1319    02/19/17 1319  dextrose (D50W) solution 25 g  Every 15 Minutes PRN      02/19/17 1319    02/19/17 1319  glucagon (human recombinant) (GLUCAGEN DIAGNOSTIC) injection 1 mg  Every 15 Minutes PRN      02/19/17 1319    02/19/17 1245  bumetanide (BUMEX) tablet 1 mg  Daily      02/19/17 1205    02/18/17 2045  Linezolid (ZYVOX) 600 mg 300 mL  Every 12 Hours      02/18/17 1005    02/17/17 2100  metoprolol tartrate (LOPRESSOR) tablet 75 mg  Every 12 Hours Scheduled      02/17/17 1143    02/17/17 1631  metoprolol tartrate (LOPRESSOR) injection 5 mg  Every 6 Hours PRN      02/17/17 1631    02/16/17 1011  heparin (porcine) injection 1,800 Units  As Needed      02/16/17 1014    02/15/17 2200  diltiaZEM (CARDIZEM) tablet 90 mg  Every 6 Hours Scheduled,   Status:  Discontinued      02/15/17 1428    02/15/17 2100  tamsulosin (FLOMAX) 24 hr capsule 0.4 mg  Nightly      02/15/17 1623    02/15/17 1800  predniSONE (DELTASONE) tablet 40 mg  2 Times Daily With Meals,   Status:  Discontinued      02/15/17 1623    02/15/17 1800  docusate sodium (COLACE) capsule 100 mg  2 Times Daily      02/15/17 1623    02/15/17 1800  enoxaparin (LOVENOX) syringe 30 mg  Daily      02/15/17 1623    02/15/17 1624  Basic Metabolic Panel  Daily      02/15/17 1623    02/15/17 1623  sodium chloride 0.9 % flush 1-10 mL  As Needed      02/15/17 1623    02/15/17 1623  acetaminophen (TYLENOL) tablet 650 mg  Every 4 Hours PRN      02/15/17 1623    02/15/17 1623  aluminum-magnesium hydroxide-simethicone (MAALOX/MYLANTA) suspension 30 mL  Every 6 Hours PRN      02/15/17 1623    02/15/17 1623  ondansetron (ZOFRAN) injection 4 mg  Every 6 Hours PRN      02/15/17 1623    Unscheduled  Up with assistance  As Needed       02/15/17 1623    Signed and Held  acetaminophen (TYLENOL) tablet 1,000 mg  Every 4 Hours PRN,   Status:  Canceled      Signed and Held    Signed and Held  sodium chloride injection 40 mEq  Every 5 Minutes PRN,   Status:  Canceled      Signed and Held    Signed and Held  alteplase ((CATHFLO/ACTIVASE)) injection 2 mg  Once,   Status:  Canceled      Signed and Held    Signed and Held  diphenhydrAMINE (BENADRYL) injection 50 mg  Once As Needed,   Status:  Canceled      Signed and Held    Signed and Held  sodium chloride 0.9 % bolus 100 mL  As Needed,   Status:  Canceled      Signed and Held    Signed and Held  heparin (porcine) injection 1,800 Units  As Needed,   Status:  Canceled      Signed and Held    Signed and Held  albumin human 25 % IV SOLN 12.5 g  As Needed,   Status:  Canceled      Signed and Held             Physician Progress Notes (last 24 hours) (Notes from 2/21/2017  8:33 AM through 2/22/2017  8:33 AM)      Hema Jade MD at 2/21/2017 12:11 PM  Version 2 of 2          PROGRESS NOTE.      Patient:  Gurjit Andrea  YOB: 1970  Date of Service: 2/21/2017  MRN: [id number]   Acct: [id number]   Primary Care Physician: Moises Montes MD  Advance Directive: Full Code  Admit Date: 2/15/2017       Hospital Day: 6  Referring Provider: Kalie Vergara DO      Patient Seen, Chart, Consults, Notes, Labs, Radiology studies reviewed.        Subjective:    Gurjit Andrea is a 47 y.o. male whom we were consulted for OSMANI.  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  Admitted with atrial fibrillation and RVR.  Has shown sign of reversal so dislysis placed on hold; last dialysis was 2/16.  On 2/17 started having large amount of purulent drainage from around Permcath site.  His dialysis catheter was removed and his blood cultures were positive for MRSA.  Urine output is good, renal function continues to improve.  No new issues today.    Review of Systems:  History obtained from  "chart review and the patient  General ROS: No fever or chills  Respiratory ROS: No cough, shortness of breath, wheezing  Cardiovascular ROS: no chest pain or dyspnea on exertion  Gastrointestinal ROS: No abdominal pain or melena  Genito-Urinary ROS: No dysuria or hematuria  Musculoskeletal: leg edema  Skin: negative    Objective:  Visit Vitals   • /82 (BP Location: Left arm, Patient Position: Sitting)   • Pulse 94   • Temp 98.1 °F (36.7 °C) (Temporal Artery )   • Resp 18   • Ht 73\" (185.4 cm)   • Wt 223 lb 2 oz (101 kg)   • SpO2 98%   • BMI 29.44 kg/m2       Intake/Output Summary (Last 24 hours) at 02/21/17 1211  Last data filed at 02/21/17 1127   Gross per 24 hour   Intake   1380 ml   Output   1400 ml   Net    -20 ml       Physical examination:  General: awake/alert   Chest:  clear to auscultation bilaterally without respiratory distress  CVS: regular rate and rhythm  Abdominal: soft, nontender, normal bowel sounds  Extremities:1+ leg edema  Skin: warm and dry without rash  Neuro: No focal motor deficits    Labs:  Lab Results (last 24 hours)     Procedure Component Value Units Date/Time    POC Glucose Fingerstick [[id number]]  (Abnormal) Collected:  02/20/17 1557    Specimen:  Blood Updated:  02/20/17 1627     Glucose 249 (H) mg/dL       : [id number] [facility] ID: [id number]       Blood Culture With CHERYLE [[id number]]  (Normal) Collected:  02/18/17 1541    Specimen:  Blood from Arm, Left Updated:  02/20/17 1701     Blood Culture No growth at 2 days     POC Glucose Fingerstick [[id number]]  (Abnormal) Collected:  02/20/17 2046    Specimen:  Blood Updated:  02/20/17 2121     Glucose 268 (H) mg/dL       : [id number] [facility] ID: [id number]       Basic Metabolic Panel [[id number]]  (Abnormal) Collected:  02/21/17 0451    Specimen:  Blood Updated:  02/21/17 0522     Glucose 146 (H) mg/dL      BUN 62 (H) mg/dL      Creatinine 1.57 (H) mg/dL      Sodium 136 mmol/L      Potassium 5.2 mmol/L      " Chloride 100 mmol/L      CO2 26.0 mmol/L      Calcium 8.7 mg/dL      eGFR Non African Amer 47 (L) mL/min/1.73      BUN/Creatinine Ratio 39.5 (H)      Anion Gap 10.0 mmol/L     Narrative:       GFR Normal >60  Chronic Kidney Disease <60  Kidney Failure <15    CBC & Differential [[id number]] Collected:  02/21/17 0452    Specimen:  Blood Updated:  02/21/17 0542    Narrative:       The following orders were created for panel order CBC & Differential.  Procedure                               Abnormality         Status                     ---------                               -----------         ------                     Manual Differential[[id number]]           Abnormal            Final result               Scan Slide[[id number]]                                        Final result               CBC Auto Differential[[id number]]         Abnormal            Final result                 Please view results for these tests on the individual orders.    CBC Auto Differential [[id number]]  (Abnormal) Collected:  02/21/17 0452    Specimen:  Blood Updated:  02/21/17 0542     WBC 17.53 (H) 10*3/mm3      RBC 3.20 (L) 10*6/mm3      Hemoglobin 8.7 (L) g/dL      Hematocrit 27.1 (L) %      MCV 84.7 fL      MCH 27.2 (L) pg      MCHC 32.1 (L) g/dL      RDW 18.7 (H) %      RDW-SD 57.9 (H) fl      MPV 9.6 fL      Platelets 155 10*3/mm3     Narrative:       The previously reported component NRBC is no longer being reported.    Scan Slide [[id number]] Collected:  02/21/17 0452    Specimen:  Blood Updated:  02/21/17 0542     Scan Slide --       See Manual Differential Results       Manual Differential [[id number]]  (Abnormal) Collected:  02/21/17 0452    Specimen:  Blood Updated:  02/21/17 0542     Neutrophil % 83.0 (H) %      Lymphocyte % 10.0 (L) %      Monocyte % 3.0 (L) %      Bands %  3.0 %      Atypical Lymphocyte % 1.0 %      Neutrophils Absolute 15.08 (H) 10*3/mm3      Lymphocytes Absolute 1.75 10*3/mm3      Monocytes Absolute 0.53 10*3/mm3       Poikilocytes Slight/1+      Toxic Granulation Slight/1+      Platelet Morphology Normal     POC Glucose Fingerstick [[id number]]  (Abnormal) Collected:  02/21/17 0817    Specimen:  Blood Updated:  02/21/17 0848     Glucose 173 (H) mg/dL       : [id number] [facility] ID: [id number]       POC Glucose Fingerstick [[id number]]  (Abnormal) Collected:  02/21/17 1129    Specimen:  Blood Updated:  02/21/17 1206     Glucose 164 (H) mg/dL       : [id number] [facility] ID: [id number]             Radiology:   Imaging Results (last 24 hours)     ** No results found for the last 24 hours. **              Assessment   1. OSMANI secondary to Wegener's granulomatosis--nonoliguric, renal function has recovered.   2. Atrial fibrillation with RVR--rate controlled  3. Anemia  4. Benign HTN  5. MRSA Line sepsis--on IV antibiotics  6. Edema--taking Bumex PO      Plan:  1.  Renal function continues to improve; nearing baseline level of function  2.  Continue antibiotics per ID recommendations.  3.  OK for discharge from renal standpoint; will follow up in office in 1 week    BHARATHI Chun  2/21/2017  12:11 PM     Patient was examined, all the data was reviewed.    Assessment:  1.  Acute kidney injury secondary to Wegener's, resolving and has good urine output.  2.  MRSA bacteremia related to infected dialysis catheter  3.  Good control of hypertension.    Plan:  1.  Decrease prednisone to 40 mg by mouth daily  2.  Okay to discharge to home in follow-up needed  3.  Follow-up also needed with rheumatology for Rituxan.     Electronically signed by Hema Jade MD at 2/21/2017 12:44 PM      BHARATHI Clifford at 2/21/2017 12:11 PM  Version 1 of 2          PROGRESS NOTE.      Patient:  Gurjit Andrea  YOB: 1970  Date of Service: 2/21/2017  MRN: [id number]   Acct: [id number]   Primary Care Physician: Moises Montes MD  Advance Directive: Full Code  Admit Date: 2/15/2017       Hospital Day:  "6  Referring Provider: Kalie Vergara DO      Patient Seen, Chart, Consults, Notes, Labs, Radiology studies reviewed.        Subjective:    Gurjit Andrea is a 47 y.o. male whom we were consulted for OSMANI.  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  Admitted with atrial fibrillation and RVR.  Has shown sign of reversal so dislysis placed on hold; last dialysis was 2/16.  On 2/17 started having large amount of purulent drainage from around Permcath site.  His dialysis catheter was removed and his blood cultures were positive for MRSA.  Urine output is good, renal function continues to improve.  No new issues today.    Review of Systems:  History obtained from chart review and the patient  General ROS: No fever or chills  Respiratory ROS: No cough, shortness of breath, wheezing  Cardiovascular ROS: no chest pain or dyspnea on exertion  Gastrointestinal ROS: No abdominal pain or melena  Genito-Urinary ROS: No dysuria or hematuria  Musculoskeletal: leg edema  Skin: negative    Objective:  Visit Vitals   • /82 (BP Location: Left arm, Patient Position: Sitting)   • Pulse 94   • Temp 98.1 °F (36.7 °C) (Temporal Artery )   • Resp 18   • Ht 73\" (185.4 cm)   • Wt 223 lb 2 oz (101 kg)   • SpO2 98%   • BMI 29.44 kg/m2       Intake/Output Summary (Last 24 hours) at 02/21/17 1211  Last data filed at 02/21/17 1127   Gross per 24 hour   Intake   1380 ml   Output   1400 ml   Net    -20 ml       Physical examination:  General: awake/alert   Chest:  clear to auscultation bilaterally without respiratory distress  CVS: regular rate and rhythm  Abdominal: soft, nontender, normal bowel sounds  Extremities:1+ leg edema  Skin: warm and dry without rash  Neuro: No focal motor deficits    Labs:  Lab Results (last 24 hours)     Procedure Component Value Units Date/Time    POC Glucose Fingerstick [[id number]]  (Abnormal) Collected:  02/20/17 1557    Specimen:  Blood Updated:  02/20/17 1627     Glucose 249 (H) " mg/dL       [facility] [id number] [facility] [id number]       Blood Culture With CHERYLE [[id number]]  (Normal) Collected:  02/18/17 1541    Specimen:  Blood from Arm, Left Updated:  02/20/17 1701     Blood Culture No growth at 2 days     POC Glucose Fingerstick [[id number]]  (Abnormal) Collected:  02/20/17 2046    Specimen:  Blood Updated:  02/20/17 2121     Glucose 268 (H) mg/dL       : [id number] [facility] ID: [id number]       Basic Metabolic Panel [[id number]]  (Abnormal) Collected:  02/21/17 0451    Specimen:  Blood Updated:  02/21/17 0522     Glucose 146 (H) mg/dL      BUN 62 (H) mg/dL      Creatinine 1.57 (H) mg/dL      Sodium 136 mmol/L      Potassium 5.2 mmol/L      Chloride 100 mmol/L      CO2 26.0 mmol/L      Calcium 8.7 mg/dL      eGFR Non African Amer 47 (L) mL/min/1.73      BUN/Creatinine Ratio 39.5 (H)      Anion Gap 10.0 mmol/L     Narrative:       GFR Normal >60  Chronic Kidney Disease <60  Kidney Failure <15    CBC & Differential [[id number]] Collected:  02/21/17 0452    Specimen:  Blood Updated:  02/21/17 0542    Narrative:       The following orders were created for panel order CBC & Differential.  Procedure                               Abnormality         Status                     ---------                               -----------         ------                     Manual Differential[[id number]]           Abnormal            Final result               Scan Slide[[id number]]                                        Final result               CBC Auto Differential[[id number]]         Abnormal            Final result                 Please view results for these tests on the individual orders.    CBC Auto Differential [[id number]]  (Abnormal) Collected:  02/21/17 0452    Specimen:  Blood Updated:  02/21/17 0542     WBC 17.53 (H) 10*3/mm3      RBC 3.20 (L) 10*6/mm3      Hemoglobin 8.7 (L) g/dL      Hematocrit 27.1 (L) %      MCV 84.7 fL      MCH 27.2 (L) pg      MCHC 32.1 (L) g/dL      RDW 18.7  (H) %      RDW-SD 57.9 (H) fl      MPV 9.6 fL      Platelets 155 10*3/mm3     Narrative:       The previously reported component NRBC is no longer being reported.    Scan Slide [[id number]] Collected:  02/21/17 0452    Specimen:  Blood Updated:  02/21/17 0542     Scan Slide --       See Manual Differential Results       Manual Differential [[id number]]  (Abnormal) Collected:  02/21/17 0452    Specimen:  Blood Updated:  02/21/17 0542     Neutrophil % 83.0 (H) %      Lymphocyte % 10.0 (L) %      Monocyte % 3.0 (L) %      Bands %  3.0 %      Atypical Lymphocyte % 1.0 %      Neutrophils Absolute 15.08 (H) 10*3/mm3      Lymphocytes Absolute 1.75 10*3/mm3      Monocytes Absolute 0.53 10*3/mm3      Poikilocytes Slight/1+      Toxic Granulation Slight/1+      Platelet Morphology Normal     POC Glucose Fingerstick [[id number]]  (Abnormal) Collected:  02/21/17 0817    Specimen:  Blood Updated:  02/21/17 0848     Glucose 173 (H) mg/dL       : [id number] [facility] ID: [id number]       POC Glucose Fingerstick [[id number]]  (Abnormal) Collected:  02/21/17 1129    Specimen:  Blood Updated:  02/21/17 1206     Glucose 164 (H) mg/dL       : [id number] [facility] ID: [id number]             Radiology:   Imaging Results (last 24 hours)     ** No results found for the last 24 hours. **              Assessment   1. OSMANI secondary to Wegener's granulomatosis--nonoliguric, renal function has recovered.   2. Atrial fibrillation with RVR--rate controlled  3. Anemia  4. Benign HTN  5. MRSA Line sepsis--on IV antibiotics  6. Edema--taking Bumex PO      Plan:  1.  Renal function continues to improve; nearing baseline level of function  2.  Continue antibiotics per ID recommendations.  3.  OK for discharge from renal standpoint; will follow up in office in 1 week    BHARATHI Chun  2/21/2017  12:11 PM        Electronically signed by BHARATHI Clifford at 2/21/2017 12:15 PM      Laura Eckert MD at  "2/21/2017  3:21 PM  Version 1 of 1         INFECTIOUS DISEASES PROGRESS NOTE    Patient:  Gurjit Andrea  YOB: 1970  MRN: [id number]   Admit date: 2/15/2017   Admitting Physician: Blair Frausto MD  Primary Care Physician: Moises Montes MD    Chief Complaint: \"Better than when I got here\"        Interval History: Not having any chest pain.  He reports he saw Dr. Cortez and everything is good. He is packed and states he has a ride on the way.    I did verify that his prednisone dose was being decreased since he has been here.  He does not know what his dose was at home as his wife handles that but the admission information states he was taking 80 mg twice a day.  He has not received any Solu-Medrol this admission.    Another blood culture form 2/18 is NOW POSITIVE for MRSA - cath removed 2/17                                  Allergies: No Known Allergies    Current Meds:     Current Facility-Administered Medications:   •  acetaminophen (TYLENOL) tablet 650 mg, 650 mg, Oral, Q4H PRN, BHARATHI Diaz, 650 mg at 02/19/17 0331  •  aluminum-magnesium hydroxide-simethicone (MAALOX/MYLANTA) suspension 30 mL, 30 mL, Oral, Q6H PRN, BHARATHI Diaz  •  bumetanide (BUMEX) tablet 1 mg, 1 mg, Oral, Daily, Reinaldo Foley MD, 1 mg at 02/21/17 0820  •  dextrose (D50W) solution 25 g, 25 g, Intravenous, Q15 Min PRN, Iliana Alexis MD  •  dextrose (GLUTOSE) oral gel 15 g, 15 g, Oral, Q15 Min PRN, Iliana Alexis MD  •  diltiaZEM CD (CARDIZEM CD) 24 hr capsule 360 mg, 360 mg, Oral, Q24H, BHARATHI Portillo  •  docusate sodium (COLACE) capsule 100 mg, 100 mg, Oral, BID, BHARATHI Diaz, 100 mg at 02/20/17 0837  •  enoxaparin (LOVENOX) syringe 30 mg, 30 mg, Subcutaneous, Daily, BHARATHI Diaz, 30 mg at 02/16/17 1733  •  glucagon (human recombinant) (GLUCAGEN DIAGNOSTIC) injection 1 mg, 1 mg, Subcutaneous, Q15 Min PRN, Iliana Alexis MD  •  heparin (porcine) injection 1,800 Units, 1,800 " "Units, Intracatheter, PRN, Reinaldo Foley MD, 1,800 Units at 17 1400  •  insulin detemir (LEVEMIR) injection 10 Units, 10 Units, Subcutaneous, QAM, Iliana Alexis MD, 10 Units at 17 0821  •  insulin lispro (humaLOG) injection 2-7 Units, 2-7 Units, Subcutaneous, 4x Daily AC & at Bedtime, Iliana Alexis MD, 2 Units at 17 1200  •  Linezolid (ZYVOX) 600 mg 300 mL, 600 mg, Intravenous, Q12H, Iliana Alexis MD, Last Rate: 150 mL/hr at 17 1120, 600 mg at 17 1120  •  metoprolol tartrate (LOPRESSOR) injection 5 mg, 5 mg, Intravenous, Q6H PRN, Macie E Knees, APRN, 5 mg at 17 1905  •  metoprolol tartrate (LOPRESSOR) tablet 75 mg, 75 mg, Oral, Q12H, Macie E Knees, APRN, 75 mg at 17 0820  •  ondansetron (ZOFRAN) injection 4 mg, 4 mg, Intravenous, Q6H PRN, BHARATHI Diaz  •  [START ON 2017] predniSONE (DELTASONE) tablet 40 mg, 40 mg, Oral, Daily With Breakfast, Hema Jade MD  •  sodium chloride 0.9 % flush 1-10 mL, 1-10 mL, Intravenous, PRN, BHARATHI Diaz  •  tamsulosin (FLOMAX) 24 hr capsule 0.4 mg, 0.4 mg, Oral, Nightly, BHARATHI Diaz, 0.4 mg at 17      Review of Systems   Constitutional: Negative for chills and fever.   Eyes: Negative for redness.   Respiratory: Negative for cough.    Cardiovascular: Negative for chest pain.   Gastrointestinal: Negative for abdominal pain.   Endocrine: Negative for polyuria.   Genitourinary: Negative for dysuria.   Musculoskeletal: Negative for joint swelling.   Allergic/Immunologic: Positive for immunocompromised state.   Neurological: Negative for facial asymmetry.   Psychiatric/Behavioral: Negative for confusion.       Objective     Vital Signs:  Temp (24hrs), Av.5 °F (36.4 °C), Min:96.9 °F (36.1 °C), Max:98.1 °F (36.7 °C)      Visit Vitals   • /82 (BP Location: Left arm, Patient Position: Sitting)   • Pulse 94   • Temp 98.1 °F (36.7 °C) (Temporal Artery )   • Resp 18   • Ht 73\" (185.4 cm)   • " Wt 223 lb 2 oz (101 kg)   • SpO2 98%   • BMI 29.44 kg/m2           Physical Exam   Constitutional: He is oriented to person, place, and time. He appears well-developed and well-nourished.   Eyes: Right eye exhibits no exudate. Left eye exhibits no exudate. No scleral icterus.   No conjunctival petechiae   Neck: Neck supple.   Cardiovascular: Normal rate.  An irregular rhythm present.   Murmur heard.   Systolic murmur is present with a grade of 2/6   [place] along left sternal border and radiating to the axilla   Musculoskeletal:   No splinter hemorrhages or Janeway lesions or Osler's nodes are appreciated   Neurological: He is alert and oriented to person, place, and time. No cranial nerve deficit (III-XII).   Skin: Skin is warm and dry.   Psychiatric: He has a normal mood and affect. reviewed.    Line/IV site: PICC RUE - dressing in place - dry intact    Results Review:    I reviewed the patient's new clinical results.    Lab Results:  CBC:     Results from last 7 days  Lab Units 02/21/17  0452 02/20/17  0341 02/19/17  0603   WBC 10*3/mm3 17.53* 15.95* 13.85*   HEMOGLOBIN g/dL 8.7* 8.3* 8.4*   HEMATOCRIT % 27.1* 25.9* 26.6*   PLATELETS 10*3/mm3 155 149 136   LYMPHOCYTE % % 10.0*  --   --    MONOCYTES % % 3.0*  --   --          CMP:   Results from last 7 days  Lab Units 02/21/17  0451 02/20/17  0341 02/19/17  0603  02/15/17  1000   SODIUM mmol/L 136 135 135  < > 137   POTASSIUM mmol/L 5.2 4.8 4.8  < > 3.8   CHLORIDE mmol/L 100 101 104  < > 95*   TOTAL CO2 mmol/L 26.0 23.0* 23.0*  < > 28.0   BUN mg/dL 62* 57* 55*  < > 64*   CREATININE mg/dL 1.57* 1.60* 1.76*  < > 1.68*   CALCIUM mg/dL 8.7 8.5 8.7  < > 8.9   BILIRUBIN mg/dL  --   --   --   --  0.4   ALK PHOS U/L  --   --   --   --  114   ALT (SGPT) U/L  --   --   --   --  59*   AST (SGOT) U/L  --   --   --   --  25   GLUCOSE mg/dL 146* 203* 309*  < > 311*   < > = values in this interval not displayed.      Culture Results:   Procedure Component Value - Date/Time        Blood Culture With CHERYLE [[id number]] (Abnormal) Collected: 02/18/17 1541       Lab Status: Preliminary result Specimen: Blood from Arm, Left Updated: 02/21/17 1449        Blood Culture Abnormal Stain (A)          Gram Positive Cocci (A)         Isolated from Aerobic Bottle         Gram Stain Result Gram positive cocci        Blood Culture ID, PCR [[id number]] (Abnormal) Collected: 02/18/17 1541       Lab Status: Final result Specimen: Blood from Arm, Left Updated: 02/21/17 1448        BCID, PCR           Staphylococcus aureus. mecA (methicillin resistance gene) detected. Identification by BCID PCR. (C)       Catheter Culture [[id number]] (Abnormal)  Collected: 02/17/17 1836       Lab Status: Final result Specimen: Cath Tip from Neck Updated: 02/20/17 0736        CATHETER CULTURE Staphylococcus aureus, MRSA (C)            Methicillin resistant Staphylococcus aureus, Patient may be an isolation risk.           BETA LACTAMASE Positive          Susceptibility         Staphylococcus aureus, MRSA         JUAN J         Clindamycin >=8  Resistant         Erythromycin >=8  Resistant         Gentamicin <=0.5  Susceptible         Inducible Clindamycin Resistance NEG  Negative         Levofloxacin 4  Intermediate 1         Oxacillin >=4  Resistant         Penicillin G >=0.5  Resistant         Tetracycline <=1  Susceptible         Trimethoprim + Sulfamethoxazole <=10  Susceptible         Vancomycin 2  Susceptible                   1 Staphylococcus species may develop resistance during prolonged therapy with quinolones. Isolates that are initially susceptible may become resistant within three to four days after initiation of therapy. Testing of repeat isolates may be warranted.                           Blood Culture [[id number]] (Abnormal)  Collected: 02/17/17 0108       Lab Status: Final result Specimen: Blood from Arm, Left Updated: 02/20/17 0741        Blood Culture Staphylococcus aureus, MRSA (C)            Consider infectious disease  consult to rule out distant focus of infection.   Methicillin resistant Staphylococcus aureus, Patient may be an isolation risk.           Isolated from Aerobic and Anaerobic Bottles         BETA LACTAMASE Positive         Gram Stain Result Gram positive cocci          Susceptibility         Staphylococcus aureus, MRSA         ASHWIN         Clindamycin >=8  Resistant         Erythromycin >=8  Resistant         Gentamicin <=0.5  Susceptible         Inducible Clindamycin Resistance NEG  Negative         Levofloxacin >=8  Resistant         Linezolid 2  Susceptible         Oxacillin >=4  Resistant         Penicillin G >=0.5  Resistant         Tetracycline 2  Susceptible         Trimethoprim + Sulfamethoxazole <=10  Susceptible         Vancomycin 2  Susceptible                               Blood Culture [[id number]] (Abnormal) Collected: 02/17/17 0108       Lab Status: Final result Specimen: Blood from Hand, Left Updated: 02/20/17 0839        Blood Culture Staphylococcus aureus, MRSA (C)            Consider infectious disease consult to rule out distant focus of infection.   Methicillin resistant Staphylococcus aureus, Patient may be an isolation risk.           Isolated from Aerobic and Anaerobic Bottles         BETA LACTAMASE Positive        Narrative:         See Culture #[id number] Drawn 2/17 0108 for Ashwin       Blood Culture ID, PCR [[id number]] (Abnormal) Collected: 02/17/17 0108       Lab Status: Final result Specimen: Blood from Arm, Left Updated: 02/18/17 0956        BCID, PCR           Staphylococcus aureus. mecA (methicillin resistance gene) detected. Identification by BCID PCR. (C)       Body Fluid Culture [[id number]] (Abnormal)  Collected: 02/17/17 0004       Lab Status: Final result Specimen: Body Fluid from Blood, Central Line Updated: 02/19/17 0626        BF Culture           Moderate growth (3+) Staphylococcus aureus, MRSA (C)           Methicillin resistant Staphylococcus aureus, Patient may be an isolation  risk.           BETA LACTAMASE Positive         Gram Stain Result Few (2+) WBCs seen                    Few (2+) Gram positive cocci, intracellular and extracellular         Susceptibility         Staphylococcus aureus, MRSA         JUAN J         Clindamycin >=8  Resistant         Erythromycin >=8  Resistant         Gentamicin <=0.5  Susceptible         Inducible Clindamycin Resistance NEG  Negative         Levofloxacin 4  Intermediate 1         Oxacillin >=4  Resistant         Penicillin G >=0.5  Resistant         Tetracycline <=1  Susceptible         Trimethoprim + Sulfamethoxazole <=10  Susceptible         Vancomycin 1  Susceptible                   1 Staphylococcus species may develop resistance during prolonged therapy with quinolones. Isolates that are initially susceptible may become resistant within three to four days after initiation of therapy. Testing of repeat isolates may be warranted.                         Radiology:   Imaging Results (last 72 hours)     Procedure Component Value Units Date/Time    MRI Brain Without Contrast [[id number]] Collected:  02/16/17 1600     Updated:  02/16/17 1722    Narrative:       EXAM: MR BRAIN WITHOUT IV CONTRAST 02/16/2017     COMPARISON: Head CT dated 02/15/2017.      HISTORY: 47 year-old male. Left lower extremity weakness.     TECHNIQUE:   Routine pulse sequences of the brain were obtained without IV contrast.      REPORT:   The ventricles and cerebrospinal fluid spaces are normal in size and  configuration for the patient's age. There is no evidence of mass-effect  or midline shift. No reduced diffusivity is demonstrated. A few  scattered foci of gliosis in the bilateral white matter  The brainstem, sella, pituitary, cerebellum are unremarkable. The  basilar cisterns are preserved. The intraorbital structures are  unremarkable.   The flow voids are preserved.   No acute osseous lesion.        The visualized portions of the paranasal sinuses and mastoid air cells  are  unremarkable.           Impression:       1.  No acute intracranial abnormalities.  2.  Nonspecific foci of gliosis in the bilateral white matter, likely  reflecting mild chronic microvascular changes.  This report was finalized on 02/16/2017 17:20 by Dr. Kristyn Rodriguez MD.          Assessment&#47;Plan     Hospital Problem List     * (Principal)Persistent atrial fibrillation    Henoch-Schonlein purpura nephritis    Tobacco abuse    Microcytic anemia    Hypertensive nephrosclerosis    Wegener's granulomatosis with renal involvement    MRSA bacteremia          IMPRESSION:    1. Staph aureus bacteremia-thought to be catheter related. Dialysis catheter is been removed and cultures positive from catheter.  Blood culture drawn on February 18 is now positive for MRSA.    2. Wegener's granulomatosis with renal involvement  3. Chronic kidney disease with acute kidney injury-the patient was dialyzed for a few weeks and has now had catheter removed  4. Cardiac murmur - previous echo with mild to moderate  mitral regurgitation  5. Leukocytosis-trending upward.  On prednisone-  Always a concern for potential metastatic focus of infection with staph aureus bacteremia.  No obvious stigmata of endocarditis in this patient with a significant systolic murmur and known mitral regurgitation. did verify that it appears the prednisone dose is actually been decreasing and not increasing.  Additionally no documented Solu-Medrol was noted        RECOMMENDATION:     · Currently on Zyvox. Suspect this was to avoid any potential worsening nephrotoxicity the patient.    · follow-up surveillance blood culturesOrdered for today ×1 and tomorrow morning ×1   · JOSEPH the morning of February 22 is scheduled  ·  we will have micro-repeat linezolid susceptibilities and also report daptomycin       discussed with BHARATHI Song as well as nursing staff and communicated my concerns to Dr. Dana Eckert MD  02/21/17  3:21  PM         Electronically signed by Laura Eckert MD at 2/21/2017  4:11 PM      Blair Frausto MD at 2/21/2017  4:01 PM  Version 1 of 1             [facility] Medicine Services  INPATIENT PROGRESS NOTE    Length of Stay: 6  Date of Admission: 2/15/2017  Primary Care Physician: Moises Montes MD    Subjective   Chief Complaint: No shortness of breath  HPI   Sitting in chair.  No family present.  He denies shortness of breath.  Oxygen off.  He denies chest pain or palpitations.  He remains atrial fibrillation rate controlled  on telemetry.  He denies nausea, vomiting or abdominal pain.  No further drainage from dialysis catheter removal site.  Bilateral lower extremity edema continues, edema is chronic.  TEDs in place.    Review of Systems   Constitutional: Positive for fatigue.   HENT: Negative for congestion.   Eyes: Negative for visual disturbance.   Respiratory: Negative for wheezing.   Cardiovascular: Positive for leg swelling (chronic).   Gastrointestinal: Negative for abdominal distention, constipation, diarrhea, nausea and vomiting.   Endocrine: Negative for polyuria.   Genitourinary: Negative for dysuria.   Skin: No further drainage dialysis catheter site, Catheter removed 2/17  Neurological: Positive for weakness improved.   Psychiatric/Behavioral: Negative for agitation.   All pertinent negatives and positives are as above. All other systems have been reviewed and are negative unless otherwise stated.     Objective    Temp:  [96.9 °F (36.1 °C)-98.1 °F (36.7 °C)] 98.1 °F (36.7 °C)  Heart Rate:  [] 94  Resp:  [18-22] 18  BP: (104-128)/(76-86) 126/82  Physical Exam    Constitutional: He is oriented to person, place, and time. He appears well-developed and well-nourished.   Head: Normocephalic and atraumatic.   Eyes: EOM are normal. Pupils are equal, round, and reactive to light.   Neck: Normal range of motion. Neck supple. No tracheal deviation present.    Cardiovascular: murmur heard. Irregular rate, atrial fibrillation on telemetry    Pulmonary/Chest: Effort normal and breath sounds normal. No respiratory distress. He has no wheezes. He has no rales.   Abdominal: Soft. Bowel sounds are normal. He exhibits no distension.   Musculoskeletal: He exhibits edema (mild ankle) chronic per patient, improved with TEDs.  Neurological: He is alert and oriented to person, place, and time.   Skin: Skin is warm and dry. NO drainage from dialysis catheter site. Dressing dry and intact. Purpura noted across back and lower extremities.  Psychiatric: flat     Results Review:  I have reviewed the labs, radiology results, and diagnostic studies.    Laboratory Data:     Results from last 7 days  Lab Units 02/21/17  0452 02/20/17  0341 02/19/17  0603   WBC 10*3/mm3 17.53* 15.95* 13.85*   HEMOGLOBIN g/dL 8.7* 8.3* 8.4*   HEMATOCRIT % 27.1* 25.9* 26.6*   PLATELETS 10*3/mm3 155 149 136          Results from last 7 days  Lab Units 02/21/17  0451 02/20/17  0341 02/19/17  0603  02/15/17  1000   SODIUM mmol/L 136 135 135  < > 137   POTASSIUM mmol/L 5.2 4.8 4.8  < > 3.8   CHLORIDE mmol/L 100 101 104  < > 95*   TOTAL CO2 mmol/L 26.0 23.0* 23.0*  < > 28.0   BUN mg/dL 62* 57* 55*  < > 64*   CREATININE mg/dL 1.57* 1.60* 1.76*  < > 1.68*   CALCIUM mg/dL 8.7 8.5 8.7  < > 8.9   BILIRUBIN mg/dL  --   --   --   --  0.4   ALK PHOS U/L  --   --   --   --  114   ALT (SGPT) U/L  --   --   --   --  59*   AST (SGOT) U/L  --   --   --   --  25   GLUCOSE mg/dL 146* 203* 309*  < > 311*   < > = values in this interval not displayed.    Culture Data:   BLOOD CULTURE   Date Value Ref Range Status   02/18/2017 Abnormal Stain (A)  Preliminary   02/18/2017 Gram Positive Cocci (A)  Preliminary   02/17/2017 Staphylococcus aureus, MRSA (C)  Final     Comment:       Consider infectious disease consult to rule out distant focus of infection.  Methicillin resistant Staphylococcus aureus, Patient may be an isolation  risk.   02/17/2017 Staphylococcus aureus, MRSA (C)  Final     Comment:       Consider infectious disease consult to rule out distant focus of infection.  Methicillin resistant Staphylococcus aureus, Patient may be an isolation risk.     Status: No result Specimen: Blood         Blood Culture With CHERYLE [[id number]] (Abnormal) Collected: 02/18/17 1541       Lab Status: Preliminary result Specimen: Blood from Arm, Left Updated: 02/21/17 1449        Blood Culture Abnormal Stain (A)          Gram Positive Cocci (A)         Isolated from Aerobic Bottle         Gram Stain Result Gram positive cocci        Blood Culture ID, PCR [[id number]] (Abnormal) Collected: 02/18/17 1541       Lab Status: Final result Specimen: Blood from Arm, Left Updated: 02/21/17 1448        BCID, PCR           Staphylococcus aureus. mecA (methicillin resistance gene) detected. Identification by BCID PCR. (C)       Catheter Culture [[id number]] (Abnormal)  Collected: 02/17/17 1836       Lab Status: Final result Specimen: Cath Tip from Neck Updated: 02/20/17 0736        CATHETER CULTURE Staphylococcus aureus, MRSA (C)       Glucose 164, 146, 268.    Radiology Data:   X-ray 1 view 2/21/17  1. Appropriate positioning of the right upper extremity PICC line.   2. Cardiomegaly.  3. Right infrahilar densities may be atelectasis.    MRI brain without contrast 2/16/17  1.  No acute intracranial abnormalities.  2.  Nonspecific foci of gliosis in the bilateral white matter, likely reflecting mild chronic microvascular changes.      NM lung ventilation perfusion 12/15/17  Findings are most commonly associated with low probability for acute  pulmonary embolism.      CT head without contrast 2/15/17 no acute intracranial process      Chest x-ray 1 view 2/15/17 no acute cardiopulmonary disease.    Scheduled Meds    bumetanide 1 mg Oral Daily   diltiaZEM  mg Oral Q24H   docusate sodium 100 mg Oral BID   enoxaparin 30 mg Subcutaneous Daily   insulin detemir 10 Units  Subcutaneous QAM   insulin lispro 2-7 Units Subcutaneous 4x Daily AC & at Bedtime   Linezolid 600 mg Intravenous Q12H   metoprolol tartrate 75 mg Oral Q12H   [START ON 2/22/2017] predniSONE 40 mg Oral Daily With Breakfast   tamsulosin 0.4 mg Oral Nightly       I have reviewed the patient current medications.     Assessment/Plan     Hospital Problem List     * (Principal)Persistent atrial fibrillation    Henoch-Schonlein purpura nephritis    Tobacco abuse    Microcytic anemia    Hypertensive nephrosclerosis    Wegener's granulomatosis with renal involvement    MRSA bacteremia        1. Atrial fibrillation with RVR, no anticoagulation due to recent hematuria, anemia, and reported frequent injuries and falls (per cardiology)  2. OSMANI secondary to Jairo's granulomatosis nonoliguric, resolving HD discontinued  3. Henoch Schollein purpura nephritis on HD  4. MRSA bacteremia likely dialysis catheter related  5. Dialysis catheter site infection, MRSA  6. Chronic anemia, anemia of chronic disease  7. LUE weakness, resolved  8. Elevated proBNP secondary to renal disease  9. Elevated d-dimer with negative NM ventilation perfusion  10. Jerome's granulomatosis  11. Hypertensive nephrosclerosis  12. Leukocytosis, worsening  13. Elevated PSA    Plan:  1.  Cardizem switched to Cardizem  mg daily per cardiology.  2.  Prednisone decreased to 40 mg daily per nephrology.  3.  No further dialysis planned at this point per nephrology.  4.  Surveillande blood cultures drawn on 2/18 positive gram pos cocci at 2:48 today  5.  Zyvox Day 5  6.  Will need follow up with Dr. Morales, rheumatology for Rituxan after completes antibiotics.  7.   working on OP IV antibiotics which have been approved  8.  Discussed with Dr. Ramos, will repeat blood cultures today.  9.  JOSEPH in a.m. to assess valve, has murmur and positive blood cultures. Echo 1/18/17 EF 65%, mild to moderate MVR  10.  CBC a.m. to asess worsening  leukocytosis    The above documentation resulted from a face-to-face encounter by me Flores GARVIN, [facility].    Discharge Planning: I expect patient to be discharged to home with IV antibiotics in 2-3 days.    BHARATHI Kramer   02/21/17   4:01 PM    I personally evaluated and examined the patient in conjunction with BHARATHI Borrego and agree with the assessment, treatment plan, and disposition of the patient as recorded by her. My history, exam, and further recommendations are: Stay the same.     Electronically signed by Blair Frausto MD at 2/21/2017  4:28 PM      Alex Cortez MD at 2/21/2017 10:58 PM  Version 1 of 1            LOS: 6 days   Patient Care Team:  Moises Montes MD as PCP - General (Internal Medicine)  Luis Carlos Awan MD as Consulting Physician (Urology)  Alex Cortez MD as Cardiologist (Cardiology)    Chief Complaint: Shortness of breath AF RVR  Subjective  Feeling  better  No chest pain   No excessive shortness of breath  No new complaints    Interval History: Improved overall    Patient Complaints:   Denies chest pain currently. Denies excessive shortness of breath. Denies abdominal pain, nausea vomiting or diarrhoea.    Telemetry: no malignant arrhythmia. No significant pauses. AF rate 100-110 BPM    Review of Systems:    The following systems were reviewed and negative; ENT, respiratory,  gastrointestinal, integument, hematologic / lymphatic, neurological, behavioral/psych and allergies / immunologic    Labs:    WBC WBC   Date Value Ref Range Status   02/21/2017 17.53 (H) 4.80 - 10.80 10*3/mm3 Final   02/20/2017 15.95 (H) 4.80 - 10.80 10*3/mm3 Final   02/19/2017 13.85 (H) 4.80 - 10.80 10*3/mm3 Final      HGB HEMOGLOBIN   Date Value Ref Range Status   02/21/2017 8.7 (L) 14.0 - 18.0 g/dL Final   02/20/2017 8.3 (L) 14.0 - 18.0 g/dL Final   02/19/2017 8.4 (L) 14.0 - 18.0 g/dL Final      HCT HEMATOCRIT   Date Value Ref Range Status   02/21/2017 27.1 (L) 40.0 - 52.0 % Final    02/20/2017 25.9 (L) 40.0 - 52.0 % Final   02/19/2017 26.6 (L) 40.0 - 52.0 % Final      Platlets PLATELETS   Date Value Ref Range Status   02/21/2017 155 130 - 400 10*3/mm3 Final   02/20/2017 149 130 - 400 10*3/mm3 Final   02/19/2017 136 130 - 400 10*3/mm3 Final      MCV MCV   Date Value Ref Range Status   02/21/2017 84.7 82.0 - 95.0 fL Final   02/20/2017 84.1 82.0 - 95.0 fL Final   02/19/2017 85.0 82.0 - 95.0 fL Final        Results from last 7 days  Lab Units 02/21/17  0451 02/20/17  0341 02/19/17  0603  02/15/17  1000   SODIUM mmol/L 136 135 135  < > 137   POTASSIUM mmol/L 5.2 4.8 4.8  < > 3.8   CHLORIDE mmol/L 100 101 104  < > 95*   TOTAL CO2 mmol/L 26.0 23.0* 23.0*  < > 28.0   BUN mg/dL 62* 57* 55*  < > 64*   CREATININE mg/dL 1.57* 1.60* 1.76*  < > 1.68*   CALCIUM mg/dL 8.7 8.5 8.7  < > 8.9   BILIRUBIN mg/dL  --   --   --   --  0.4   ALK PHOS U/L  --   --   --   --  114   ALT (SGPT) U/L  --   --   --   --  59*   AST (SGOT) U/L  --   --   --   --  25   GLUCOSE mg/dL 146* 203* 309*  < > 311*   < > = values in this interval not displayed.  Lab Results   Component Value Date    TROPONINI <0.012 02/15/2017     PT/INR:  No results found for: PROTIME/No results found for: INR    Imaging Results (last 72 hours)     ** No results found for the last 72 hours. **          Objective     Medication Review:   Current Facility-Administered Medications   Medication Dose Route Frequency Provider Last Rate Last Dose   • acetaminophen (TYLENOL) tablet 650 mg  650 mg Oral Q4H PRN BHARATHI Diaz   650 mg at 02/19/17 0331   • aluminum-magnesium hydroxide-simethicone (MAALOX/MYLANTA) suspension 30 mL  30 mL Oral Q6H PRN BHARATHI Diaz       • bumetanide (BUMEX) tablet 1 mg  1 mg Oral Daily Reinaldo Foley MD   1 mg at 02/21/17 0820   • dextrose (D50W) solution 25 g  25 g Intravenous Q15 Min PRN Iliana Alexis MD       • dextrose (GLUTOSE) oral gel 15 g  15 g Oral Q15 Min PRN Iliana Alexis MD       • diltiaZEM  CD (CARDIZEM CD) 24 hr capsule 360 mg  360 mg Oral Q24H BHARATHI Portillo   360 mg at 02/21/17 1654   • docusate sodium (COLACE) capsule 100 mg  100 mg Oral BID BHARATHI Diaz   100 mg at 02/20/17 0837   • enoxaparin (LOVENOX) syringe 30 mg  30 mg Subcutaneous Daily BHARATHI Diaz   30 mg at 02/16/17 1733   • glucagon (human recombinant) (GLUCAGEN DIAGNOSTIC) injection 1 mg  1 mg Subcutaneous Q15 Min PRN Iliana Alexis MD       • heparin (porcine) injection 1,800 Units  1,800 Units Intracatheter PRN Reinaldo Foley MD   1,800 Units at 02/16/17 1400   • insulin detemir (LEVEMIR) injection 10 Units  10 Units Subcutaneous QAM Iliana Alexis MD   10 Units at 02/21/17 0821   • insulin lispro (humaLOG) injection 2-7 Units  2-7 Units Subcutaneous 4x Daily AC & at Bedtime Iliana Alexis MD   4 Units at 02/21/17 2148   • Linezolid (ZYVOX) 600 mg 300 mL  600 mg Intravenous Q12H Iliana Alexis  mL/hr at 02/21/17 2148 600 mg at 02/21/17 2148   • metoprolol tartrate (LOPRESSOR) injection 5 mg  5 mg Intravenous Q6H PRN Macie Carrion, APRN   5 mg at 02/17/17 1905   • metoprolol tartrate (LOPRESSOR) tablet 75 mg  75 mg Oral Q12H Macie Carrion, APRN   75 mg at 02/21/17 2203   • ondansetron (ZOFRAN) injection 4 mg  4 mg Intravenous Q6H PRN BHARATHI Diaz       • [START ON 2/22/2017] predniSONE (DELTASONE) tablet 40 mg  40 mg Oral Daily With Breakfast Hema Jade MD       • sodium chloride 0.9 % flush 1-10 mL  1-10 mL Intravenous PRN BHARATHI Diaz       • tamsulosin (FLOMAX) 24 hr capsule 0.4 mg  0.4 mg Oral Nightly BHARATHI Diaz   0.4 mg at 02/21/17 2148       Vital Sign Min/Max for last 24 hours  Temp  Min: 96.9 °F (36.1 °C)  Max: 98.1 °F (36.7 °C)   BP  Min: 104/76  Max: 128/86   Pulse  Min: 83  Max: 112   Resp  Min: 18  Max: 20   SpO2  Min: 98 %  Max: 100 %   No Data Recorded   Weight  Min: 222 lb 4 oz (101 kg)  Max: 222 lb 4 oz (101 kg)     Flowsheet Rows         First Filed  "Value    Admission Height  73\" (185.4 cm) Documented at 02/15/2017 0953    Admission Weight  224 lb (102 kg) Documented at 02/15/2017 0953          Physical Exam:  Ears ears appear intact with no abnormalities noted  Nose nares normal, septum midline, mucosa normal and no drainage  Neck suppple, trachea midline, no thyromegaly, no carotid bruit and no JVD  Back no kyphosis present, no scoliosis present, no skin lesions, erythema, or scars, no tenderness to percussion or palpation and range of motion normal  Lungs respirations regular, respirations even and respirations unlabored  Heart normal S1, S2, irregular,  2/6 pansystolic murmur in the left sternal border,rub and no click  Abdomen normal bowel sounds, no masses, no hepatomegaly, no splenomegaly, no guarding and no rebound tenderness  Skin no bleeding, bruising or rash     Results Review:   I reviewed the patient's new clinical results.  I reviewed the patient's new imaging results and agree with the interpretation.  I reviewed the patient's other test results and agree with the interpretation  I personally viewed and interpreted the patient's EKG/Telemetry data  Discussed with patient    Medication Review: Performed    Assessment&#47;Plan     Principal Problem:    Persistent atrial fibrillation  Active Problems:    Henoch-Schonlein purpura nephritis    Tobacco abuse    Microcytic anemia    Hypertensive nephrosclerosis    Wegener's granulomatosis with renal involvement    MRSA bacteremia    Planperform JOSEPH 2/22/17 2 PMtreatment  Permacath catheter removed  Awaiting IV antibiotics at home  OK to discharge from cardiac standpoint if JOESPH unrevealing for IE  Supportive care  Telemetry  Optimal medical therapy  Deep vein thrombosis precautions  Low salt cardiac diet  Avoid NSAIDSrate control agents    Alex Cortez MD  02/21/17  10:59 PM           Electronically signed by Alex Cortez MD at 2/21/2017 11:01 PM             "

## 2017-02-22 NOTE — ANESTHESIA POSTPROCEDURE EVALUATION
Patient: Gurjit Andrea    Procedure Summary     Date Anesthesia Start Anesthesia Stop Room / Location    02/22/17 1436 1500 [facility] CLOSE OBSERVATION UNIT       Procedure Diagnosis Scheduled Providers Provider    ADULT TRANSESOPHAGEAL ECHO (JOSEPH) (Valvular Disease) MD Sagar Hurtado CRNA          Anesthesia Type: MAC  Last vitals  /88 (02/22/17 1413)    Temp      Pulse 104 (02/22/17 1411)   Resp 14 (02/22/17 1411)    SpO2 100 % (02/22/17 1411)      Post Anesthesia Care and Evaluation    Patient location during evaluation: PHASE II  Patient participation: complete - patient participated  Level of consciousness: awake and alert  Pain score: 0  Pain management: adequate  Airway patency: patent  Anesthetic complications: No anesthetic complications  PONV Status: none  Cardiovascular status: acceptable  Respiratory status: acceptable  Hydration status: acceptable

## 2017-02-22 NOTE — ANESTHESIA PREPROCEDURE EVALUATION
Anesthesia Evaluation     Patient summary reviewed   no history of anesthetic complications:  NPO Status: > 8 hours   Airway   Mallampati: II  TM distance: >3 FB  Neck ROM: full  Dental          Pulmonary    (+) a smoker,   (-) COPD, asthma, sleep apnea  Cardiovascular     ECG reviewed    (+) valvular problems/murmurs MR, dysrhythmias Atrial Fib,   (-) pacemaker, past MI, angina, CHF, cardiac stents      Neuro/Psych  (-) seizures, CVA  GI/Hepatic/Renal/Endo    (+)  chronic renal disease (HSP nephtitis improving) ARF,   (-) liver disease, diabetes    Musculoskeletal     Abdominal    Substance History      OB/GYN          Other          Other Comment: Steroids for vasculitis                              Anesthesia Plan    ASA 3     MAC     intravenous induction   Anesthetic plan and risks discussed with patient.

## 2017-02-22 NOTE — PLAN OF CARE
Problem: Patient Care Overview (Adult)  Goal: Plan of Care Review  Outcome: Ongoing (interventions implemented as appropriate)    02/21/17 3310   Coping/Psychosocial Response Interventions   Plan Of Care Reviewed With patient   Patient Care Overview   Progress progress toward functional goals as expected   Outcome Evaluation   Outcome Summary/Follow up Plan Pt was set to go home today then his 2nd set of blood cultures came back positive. Pt had PICC line placed today for iv antibiotics today. Plan for a JOSEPH schedule tomorrow with Dr. Cortez.       Goal: Adult Individualization and Mutuality  Outcome: Ongoing (interventions implemented as appropriate)  Goal: Discharge Needs Assessment  Outcome: Ongoing (interventions implemented as appropriate)    Problem: Cardiac Output, Decreased (Adult)  Goal: Identify Related Risk Factors and Signs and Symptoms  Outcome: Ongoing (interventions implemented as appropriate)  Goal: Adequate Cardiac Output/Effective Tissue Perfusion  Outcome: Ongoing (interventions implemented as appropriate)    Problem: Respiratory Insufficiency (Adult)  Goal: Acid/Base Balance  Outcome: Ongoing (interventions implemented as appropriate)    Problem: Infection, Risk/Actual (Adult)  Goal: Identify Related Risk Factors and Signs and Symptoms  Outcome: Ongoing (interventions implemented as appropriate)  Goal: Infection Prevention/Resolution  Outcome: Ongoing (interventions implemented as appropriate)

## 2017-02-22 NOTE — CONSULTS
Referring Provider: Dr. 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     History of Present illness:  Mr. Andrea is a 47-year-old  male with a fairly complicated history.  He states that he awoke on 01/03/2017 with a rash on his legs and his wife took him to the local emergency room.  He had a diagnosis of Henoch-Scholein Purpura made.  He apparently was treated with steroids and did not get better.  Wife thought he should be seen at [facility] and they came here.    He was admitted on 01/17/2017 and discharged on 01/27/2017.  The records of this hospitalization he presented with nausea and vomiting, dark urine, and flank pain he was seen by nephrology and found to have right kidney calcinosis and renal dysfunction.  Nephrology obtained a biopsy of the kidneys and the diagnosis was made of Wegener's granulomatosis and his renal function declined to the point where he required a dialysis catheter was placed and a permacath was placed on 01/26/2017 and he was scheduled for outpatient dialysis.  He was treated with high-dose steroids.    Also at this hospitalization he was seen by cardiology for atrial fibrillation with rapid ventricular response and treated medically.  Her cardiogram at that time showed moderate mitral regurgitation.    He required readmission on 02/15/2017 as he came to the emergency department with shortness of breath rapid atrial fibrillation neurologic changes.  He had anemia at that time an elevated BNP course his kidney failure.  Urology saw him again and was able to control his rate but did not give anticoagulation secondary to his anemia and ongoing kidney problems.   He underwent one dialysis treatment and then there was purulence coming from the catheter site and it was removed.  He had a blood culture that was positive for staphlococcus aureus and this also was cultured in the permacath.  He was seen by infectious disease and maintained on antibiotics with a PICC line in place.  Repeat blood cultures this morning were still positive.  He underwent a transesophageal echocardiogram today which showed wide open MR with full destroyed leaflets and possible abscess.  The thoracic surgery service was consulted at this time.    History:  Past Medical History   Diagnosis Date   • A-fib    • Chronic renal failure    • Elevated PSA    • Kidney stone    • MRSA bacteremia 2/21/2017   • Purpura    • Wegener's granulomatosis with renal involvement    ,   Past Surgical History   Procedure Laterality Date   • Appendectomy     • Insertion hemodialysis catheter N/A 1/20/2017     Procedure: INSERTION PERMCATH;  Surgeon: Ian Grimes DO;  Location: [facility] OR;  Service:    ,   History reviewed. No pertinent family history.,  Social History   Substance Use Topics   • Smoking status: Heavy Tobacco Smoker   • Smokeless tobacco: None   • Alcohol use Yes   ,  Prescriptions Prior to Admission   Medication Sig Dispense Refill Last Dose   • albuterol (PROVENTIL HFA;VENTOLIN HFA) 108 (90 BASE) MCG/ACT inhaler Every 6 (Six) Hours.   Taking   • metoprolol tartrate (LOPRESSOR) 25 MG tablet 25 mg 2 (Two) Times a Day.   Taking   • predniSONE (DELTASONE) 20 MG tablet Take 2 tablets by mouth 2 (Two) Times a Day With Meals. (Patient taking differently: Take 80 mg by mouth 2 (Two) Times a Day With Meals.) 60 tablet 0 Taking   • tamsulosin (FLOMAX) 0.4 MG capsule 24 hr capsule Take 1 capsule by mouth Every Night. 30 capsule 0 Taking   ,  Allergies: Review of patient's allergies indicates no known allergies.    Review of Systems:  Pertinent items are noted in HPI    Objective     Vital Signs:   Temp:  [97.9 °F  (36.6 °C)-98 °F (36.7 °C)] 98 °F (36.7 °C)  Heart Rate:  [] 95  Resp:  [14-20] 19  BP: ()/(72-88) 107/81    Physical Exam:  General:  He 7-year-old male appearing slightly older than his stated age in mild but  no acute distress.  Eyes: Pupils equal round and react to light. EOMs are intact.  ENT: Nose, mouth,throat are benign. Neck is supple without thyromegaly, mass or bruit.  Chest: Fairly Chest is clear to auscultation.  No wheezes or rales heard.  Heart: Irregular rhythm with tachycardia.  3/6 holosystolic murmur best heard in the mitral position  Abdomen:  Soft, nontender without rebound guarding or mass  Extremities:  Without clubbing cyanosis with 1/2+ edema edema.  Pulses:  2+ bilateral and equal throughout.  Neurologic: Alert and oriented.Motor and sensation are grossly intact    LAB:   CBC:  Results from last 7 days  Lab Units 02/22/17  0404 02/21/17  0452 02/20/17  0341   WBC 10*3/mm3 18.09* 17.53* 15.95*   HEMATOCRIT % 28.9* 27.1* 25.9*   PLATELETS 10*3/mm3 157 155 149        BMP:  Results from last 7 days  Lab Units 02/22/17  0404 02/21/17  0451 02/20/17  0341   SODIUM mmol/L 137 136 135   POTASSIUM mmol/L 4.7 5.2 4.8   CHLORIDE mmol/L 100 100 101   TOTAL CO2 mmol/L 29.0 26.0 23.0*   GLUCOSE mg/dL 90 146* 203*   BUN mg/dL 56* 62* 57*   CREATININE mg/dL 1.54* 1.57* 1.60*        COAG:      Invalid input(s): PT        IMAGES:      Imaging Results (last 24 hours)     ** No results found for the last 24 hours. **      Chest x-ray had shown no acute cardiopulmonary disease CT of the head was normal    Problem List:  Principal Problem:    Persistent atrial fibrillation  Active Problems:    Henoch-Schonlein purpura nephritis    Tobacco abuse    Microcytic anemia    Hypertensive nephrosclerosis    Wegener's granulomatosis with renal involvement    MRSA bacteremia      ASSESSMENT: Very ill young  male.  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  Unfortunately, he is been on high-dose steroids and continues to be and has severe kidney disease secondary to Wegener's granulomatosis and requires dialysis.  In addition he has rapid ventricular response atrial fibrillation.    He needs debridement and replacement of his mitral valve.  His coronary artery situation is unknown at this time.  He also may need treatment for his atrial fibrillation.    In light of the severity of his disease specially with his comorbidities of renal failure requiring steroids and Wegener's granulomatosis etc. I feel that his best option would be treatment at tertiary center.  I talked with the patient, Dr. Cortez and .  They will make arrangements for transfer to [facility] with possible      Plan: As above.  I will be glad to participate in any postoperative or locally if needed.      Sagar Park MD  02/22/17  4:52 PM

## 2017-02-22 NOTE — PROGRESS NOTES
[facility] Medicine Services  INPATIENT PROGRESS NOTE    Length of Stay: 7  Date of Admission: 2/15/2017  Primary Care Physician: Moises Montes MD    Subjective   Chief Complaint: less short of breath  HPI   He is sitting in chair.  No family present in room.  Oxygen is off.  He denies shortness of breath.  He denies palpitations or chest pain.  He remains atrial fibrillation rate controlled on telemetry.  He denies nausea, vomiting or abdominal pain.  There has been no further drainage from dialysis catheter removal site.  He continues to have bilateral lower extremity edema.  This is chronic.  TEDs in place.  Dr. Park has just spoken with patient regarding results of JOSEPH.  Wife was on cell phone examination.  Both Dr. Frausto and myself spoke with wife regarding results of JOSEPH and upcoming plans to arrange transfer to [place].    Review of Systems   Constitutional: Positive for fatigue but improving.   HENT: Negative for congestion.   Eyes: Negative for visual disturbance.   Respiratory: Negative for wheezing.   Cardiovascular: Positive for leg swelling (chronic).   Gastrointestinal: Negative for abdominal distention, constipation, diarrhea, nausea and vomiting.   Endocrine: Negative for polyuria.   Genitourinary: Negative for dysuria.   Skin: No further drainage dialysis catheter site, Catheter removed 2/17  Neurological: Positive for weakness improved.   Psychiatric/Behavioral: Negative for agitation.   All pertinent negatives and positives are as above. All other systems have been reviewed and are negative unless otherwise stated.     Objective    Temp:  [97.9 °F (36.6 °C)-98 °F (36.7 °C)] 98 °F (36.7 °C)  Heart Rate:  [] 95  Resp:  [14-20] 19  BP: ()/(72-88) 107/81  Physical Exam  Constitutional: He is oriented to person, place, and time. He appears well-developed and well-nourished.   Head: Normocephalic and atraumatic.   Eyes: EOM are normal. Pupils are  equal, round, and reactive to light.   Neck: Normal range of motion. Neck supple. No tracheal deviation present.   Cardiovascular: murmur heard. Irregular rate, atrial fibrillation on [id number]   Pulmonary/Chest: Effort normal and breath sounds normal. No respiratory distress. He has no wheezes. He has no rales.   Abdominal: Soft. Bowel sounds are normal. He exhibits no distension.   Musculoskeletal: He exhibits edema (mild ankle) chronic per patient, improved with TEDs.  Neurological: He is alert and oriented to person, place, and time.   Skin: Skin is warm and dry. NO drainage from dialysis catheter site. Dressing dry and intact. Purpura noted across back and lower extremities.  Psychiatric: flat    Results Review:  I have reviewed the labs, radiology results, and diagnostic studies.    Laboratory Data:     Results from last 7 days  Lab Units 02/22/17  0404 02/21/17  0452 02/20/17  0341   WBC 10*3/mm3 18.09* 17.53* 15.95*   HEMOGLOBIN g/dL 9.2* 8.7* 8.3*   HEMATOCRIT % 28.9* 27.1* 25.9*   PLATELETS 10*3/mm3 157 155 149          Results from last 7 days  Lab Units 02/22/17  0404 02/21/17  0451 02/20/17  0341   SODIUM mmol/L 137 136 135   POTASSIUM mmol/L 4.7 5.2 4.8   CHLORIDE mmol/L 100 100 101   TOTAL CO2 mmol/L 29.0 26.0 23.0*   BUN mg/dL 56* 62* 57*   CREATININE mg/dL 1.54* 1.57* 1.60*   CALCIUM mg/dL 8.8 8.7 8.5   GLUCOSE mg/dL 90 146* 203*       Culture Data:   BLOOD CULTURE   Date Value Ref Range Status   02/21/2017 No growth at less than 24 hours  Preliminary   02/18/2017 Abnormal Stain (A)  Preliminary   02/18/2017 Staphylococcus aureus (A)  Preliminary     Comment:       Consider infectious disease consult to rule out distant focus of infection.   02/17/2017 Staphylococcus aureus, MRSA (C)  Final     Comment:       Consider infectious disease consult to rule out distant focus of infection.  Methicillin resistant Staphylococcus aureus, Patient may be an isolation risk.   02/17/2017 Staphylococcus  aureus, MRSA (C)  Final     Comment:       Consider infectious disease consult to rule out distant focus of infection.  Methicillin resistant Staphylococcus aureus, Patient may be an isolation risk.       Echo 2/22/17  · All left ventricular wall segments contract normally.  · Left ventricular function is normal. Estimated EF = 60%.  · Severe mitral valve regurgitation is present  · Anterior leaflet perforation and posterior leaflet perforation with possible small vegetation that is 0.5 cm in diameter of the mitral valve.  ·     Radiology Data:   X-ray 1 view 2/21/17  1. Appropriate positioning of the right upper extremity PICC line.   2. Cardiomegaly.  3. Right infrahilar densities may be atelectasis.     MRI brain without contrast 2/16/17  1.  No acute intracranial abnormalities.  2.  Nonspecific foci of gliosis in the bilateral white matter, likely reflecting mild chronic microvascular changes.      NM lung ventilation perfusion 12/15/17  Findings are most commonly associated with low probability for acute  pulmonary embolism.      CT head without contrast 2/15/17 no acute intracranial process      Chest x-ray 1 view 2/15/17 no acute cardiopulmonary disease.  Scheduled Meds    bumetanide 1 mg Oral Daily   diltiaZEM  mg Oral Q24H   docusate sodium 100 mg Oral BID   enoxaparin 30 mg Subcutaneous Daily   insulin detemir 10 Units Subcutaneous QAM   insulin lispro 2-7 Units Subcutaneous 4x Daily AC & at Bedtime   Linezolid 600 mg Intravenous Q12H   metoprolol tartrate 75 mg Oral Q12H   predniSONE 40 mg Oral Daily With Breakfast   tamsulosin 0.4 mg Oral Nightly       I have reviewed the patient current medications.     Assessment/Plan     Hospital Problem List     * (Principal)Persistent atrial fibrillation    Henoch-Schonlein purpura nephritis    Tobacco abuse    Microcytic anemia    Hypertensive nephrosclerosis    Wegener's granulomatosis with renal involvement    MRSA bacteremia        Assessment:  1.  Severe  mitral regurgitation with leaflet perforation and small vegetation per JOSEPH 2/22/17  2.  Atrial fibrillation with RVR, no anticoagulation due to recent hematuria, anemia, and reported frequent injuries and falls (per cardiology)  3.  OSMANI secondary to Jairo's granulomatosis nonoliguric, resolving HD discontinued  4.  Henoch Schollein purpura nephritis on HD  5.  MRSA bacteremia likely dialysis catheter related  6.  Dialysis catheter site infection, MRSA  7.  Chronic anemia, anemia of chronic disease  8.  LUE weakness, resolved  9.  Elevated proBNP secondary to renal disease  10.  Elevated d-dimer with negative NM ventilation perfusion  11.  Jerome's granulomatosis  12.  Hypertensive nephrosclerosis  13.  Leukocytosis, worsening  14.  Elevated PSA    Plan:  1.  JOSEPH today EF 60%.  Severe mitral regurgitation.  Anterior and posterior left perforation with vegetation.  2.  Dr. Cortez has consult to CTS.  Dr. Park has seen patient tonight.  Dr. Park recommends that patient be transferred to [place] for further evaluation and treatment of mitral valve endocarditis MRSA.  3.  Will consult  to assist in arrangements to transfer to [place].  4.  Cardizem  mg daily  5.  Prednisone dose decreased to 40 mg daily on 2/21 per nephrology  6.  Repeat, surveillance blood culture drawn 2/21/17 no growth less than 24 hours.  7.  Zyvox day 6  8.  CBC, BMP in a.m. to monitor leukocytosis and renal function.  9.  Metoprolol dose uptitrated per cardiology for atrial fibrillation.  10.  Will need follow up with Dr. Morales, rheumatology for Rituxan after completes antibiotics       The above documentation resulted from a face-to-face encounter by me Flores GARVIN, [facility].      Discharge Planning: I expect patient to be discharged to [place] tomorrow if arrangements can be completed.    BHARATHI Kramer   02/22/17   4:35 PM  I personally evaluated and examined the patient in conjunction with  BHARATHI Borrego and agree with the assessment, treatment plan, and disposition of the patient as recorded by her. My history, exam, and further recommendations are: Discuss the patient severe valvular disease.  Patient will need higher level care.  We will continue to wean off steroids.  We will make arrangement for transfer to [place] tomorrow.

## 2017-02-23 VITALS
TEMPERATURE: 98.2 F | OXYGEN SATURATION: 95 % | DIASTOLIC BLOOD PRESSURE: 74 MMHG | SYSTOLIC BLOOD PRESSURE: 132 MMHG | HEART RATE: 113 BPM | HEIGHT: 73 IN | BODY MASS INDEX: 29.34 KG/M2 | WEIGHT: 221.38 LBS | RESPIRATION RATE: 18 BRPM

## 2017-02-23 LAB
ANION GAP SERPL CALCULATED.3IONS-SCNC: 12 MMOL/L (ref 4–13)
ANISOCYTOSIS BLD QL: ABNORMAL
BACTERIA SPEC AEROBE CULT: ABNORMAL
BACTERIA SPEC AEROBE CULT: ABNORMAL
BUN BLD-MCNC: 59 MG/DL (ref 5–21)
BUN/CREAT SERPL: 33.5 (ref 7–25)
CALCIUM SPEC-SCNC: 8.6 MG/DL (ref 8.4–10.4)
CHLORIDE SERPL-SCNC: 100 MMOL/L (ref 98–110)
CO2 SERPL-SCNC: 25 MMOL/L (ref 24–31)
CREAT BLD-MCNC: 1.76 MG/DL (ref 0.5–1.4)
DEPRECATED RDW RBC AUTO: 58.8 FL (ref 40–54)
ERYTHROCYTE [DISTWIDTH] IN BLOOD BY AUTOMATED COUNT: 19.1 % (ref 12–15)
GFR SERPL CREATININE-BSD FRML MDRD: 42 ML/MIN/1.73
GLUCOSE BLD-MCNC: 99 MG/DL (ref 70–100)
GLUCOSE BLDC GLUCOMTR-MCNC: 136 MG/DL (ref 70–130)
GLUCOSE BLDC GLUCOMTR-MCNC: 188 MG/DL (ref 70–130)
GLUCOSE BLDC GLUCOMTR-MCNC: 225 MG/DL (ref 70–130)
GLUCOSE BLDC GLUCOMTR-MCNC: 231 MG/DL (ref 70–130)
GLUCOSE BLDC GLUCOMTR-MCNC: 97 MG/DL (ref 70–130)
GRAM STN SPEC: ABNORMAL
HCT VFR BLD AUTO: 29.9 % (ref 40–52)
HGB BLD-MCNC: 9.5 G/DL (ref 14–18)
ISOLATED FROM: ABNORMAL
LYMPHOCYTES # BLD MANUAL: 2.23 10*3/MM3 (ref 0.72–4.86)
LYMPHOCYTES NFR BLD MANUAL: 12 % (ref 15–45)
LYMPHOCYTES NFR BLD MANUAL: 6 % (ref 4–12)
MCH RBC QN AUTO: 27.1 PG (ref 28–32)
MCHC RBC AUTO-ENTMCNC: 31.8 G/DL (ref 33–36)
MCV RBC AUTO: 85.4 FL (ref 82–95)
MONOCYTES # BLD AUTO: 1.12 10*3/MM3 (ref 0.19–1.3)
MYELOCYTES NFR BLD MANUAL: 3 % (ref 0–0)
NEUTROPHILS # BLD AUTO: 14.7 10*3/MM3 (ref 1.87–8.4)
NEUTROPHILS NFR BLD MANUAL: 74 % (ref 39–78)
NEUTS BAND NFR BLD MANUAL: 5 % (ref 0–10)
NRBC SPEC MANUAL: 1 /100 WBC (ref 0–0)
PLAT MORPH BLD: NORMAL
PLATELET # BLD AUTO: 145 10*3/MM3 (ref 130–400)
PMV BLD AUTO: 9.1 FL (ref 6–12)
POIKILOCYTOSIS BLD QL SMEAR: ABNORMAL
POTASSIUM BLD-SCNC: 5 MMOL/L (ref 3.5–5.3)
RBC # BLD AUTO: 3.5 10*6/MM3 (ref 4.8–5.9)
SCAN SLIDE: NORMAL
SODIUM BLD-SCNC: 137 MMOL/L (ref 135–145)
WBC MORPH BLD: NORMAL
WBC NRBC COR # BLD: 18.61 10*3/MM3 (ref 4.8–10.8)

## 2017-02-23 PROCEDURE — 82962 GLUCOSE BLOOD TEST: CPT

## 2017-02-23 PROCEDURE — 63710000001 PREDNISONE PER 5 MG: Performed by: INTERNAL MEDICINE

## 2017-02-23 PROCEDURE — 85007 BL SMEAR W/DIFF WBC COUNT: CPT | Performed by: NURSE PRACTITIONER

## 2017-02-23 PROCEDURE — 80048 BASIC METABOLIC PNL TOTAL CA: CPT | Performed by: NURSE PRACTITIONER

## 2017-02-23 PROCEDURE — 85025 COMPLETE CBC W/AUTO DIFF WBC: CPT | Performed by: NURSE PRACTITIONER

## 2017-02-23 RX ORDER — PREDNISONE 20 MG/1
40 TABLET ORAL
Status: ON HOLD
Start: 2017-02-23 | End: 2020-08-25

## 2017-02-23 RX ORDER — BUMETANIDE 1 MG/1
1 TABLET ORAL DAILY
Status: ON HOLD
Start: 2017-02-23 | End: 2020-08-25

## 2017-02-23 RX ADMIN — PREDNISONE 40 MG: 20 TABLET ORAL at 10:13

## 2017-02-23 RX ADMIN — METOPROLOL TARTRATE 75 MG: 50 TABLET ORAL at 03:17

## 2017-02-23 RX ADMIN — DILTIAZEM HYDROCHLORIDE 360 MG: 180 CAPSULE, COATED, EXTENDED RELEASE ORAL at 10:14

## 2017-02-23 RX ADMIN — BUMETANIDE 1 MG: 1 TABLET ORAL at 10:13

## 2017-02-23 RX ADMIN — METOPROLOL TARTRATE 75 MG: 50 TABLET ORAL at 10:13

## 2017-02-23 NOTE — PROGRESS NOTES
"INFECTIOUS DISEASES PROGRESS NOTE    Patient:  Gurjit Andrea  YOB: 1970  MRN: [id number]   Admit date: 2/15/2017   Admitting Physician: Blair Frausto MD  Primary Care Physician: Moises Montes MD    Chief Complaint: \"going to  now instead of [place]\"        Interval History:  MV abscess with perf of leaflet. Evaluated by Dr Sagar Park and rec to transfer  Pt denies visual changes, focal weakness and only complaint is he wants juice.  He had all of his lunch and wanted some more.    He is just \"bored\"           Complained of sore throat                         Allergies: No Known Allergies    Current Meds:     Current Facility-Administered Medications:   •  acetaminophen (TYLENOL) tablet 650 mg, 650 mg, Oral, Q4H PRN, BHARATHI Diaz, 650 mg at 02/19/17 0331  •  aluminum-magnesium hydroxide-simethicone (MAALOX/MYLANTA) suspension 30 mL, 30 mL, Oral, Q6H PRN, BHARATHI Diaz  •  DAPTOmycin (CUBICIN) 810 mg in sodium chloride 0.9 % 50 mL IVPB, 8 mg/kg, Intravenous, Q24H, Laura Eckert MD, Stopped at 02/22/17 2048  •  dextrose (D50W) solution 25 g, 25 g, Intravenous, Q15 Min PRN, Iliana Alexis MD  •  dextrose (GLUTOSE) oral gel 15 g, 15 g, Oral, Q15 Min PRN, Iliana Alexis MD  •  diltiaZEM CD (CARDIZEM CD) 24 hr capsule 360 mg, 360 mg, Oral, Q24H, BHARATHI Portillo, 360 mg at 02/23/17 1014  •  docusate sodium (COLACE) capsule 100 mg, 100 mg, Oral, BID, BHARATHI Diaz, 100 mg at 02/22/17 0939  •  enoxaparin (LOVENOX) syringe 30 mg, 30 mg, Subcutaneous, Daily, BHARATHI Diaz, 30 mg at 02/16/17 1733  •  glucagon (human recombinant) (GLUCAGEN DIAGNOSTIC) injection 1 mg, 1 mg, Subcutaneous, Q15 Min PRN, Iliana Alexis MD  •  heparin (porcine) injection 1,800 Units, 1,800 Units, Intracatheter, PRN, Reinaldo Foley MD, 1,800 Units at 02/16/17 1400  •  insulin detemir (LEVEMIR) injection 10 Units, 10 Units, Subcutaneous, QAM, Iliana Alexis MD, 10 Units " "at 17 0821  •  insulin lispro (humaLOG) injection 2-7 Units, 2-7 Units, Subcutaneous, 4x Daily AC & at Bedtime, Iliana Alexis MD, 4 Units at 17  •  metoprolol tartrate (LOPRESSOR) injection 5 mg, 5 mg, Intravenous, Q6H PRN, Macie E Knees, APRN, 5 mg at 17 2308  •  metoprolol tartrate (LOPRESSOR) tablet 75 mg, 75 mg, Oral, Q12H, Macie E Knees, APRN, 75 mg at 17 1013  •  ondansetron (ZOFRAN) injection 4 mg, 4 mg, Intravenous, Q6H PRN, BHARATHI Diaz  •  predniSONE (DELTASONE) tablet 40 mg, 40 mg, Oral, Daily With Breakfast, Hema Jade MD, 40 mg at 17 1013  •  sodium chloride 0.9 % flush 1-10 mL, 1-10 mL, Intravenous, PRN, BHARATHI Diaz  •  tamsulosin (FLOMAX) 24 hr capsule 0.4 mg, 0.4 mg, Oral, Nightly, BHARATHI Diaz, 0.4 mg at 17      Review of Systems   Constitutional: Negative for chills and fever.   Eyes: Negative for redness.   Respiratory: Negative for cough.    Cardiovascular: Negative for chest pain.   Gastrointestinal: Negative for abdominal pain.   Endocrine: Negative for polyuria.   Genitourinary: Negative for dysuria.   Musculoskeletal: Negative for joint swelling.   Allergic/Immunologic: Positive for immunocompromised state.   Neurological: Negative for facial asymmetry.   Psychiatric/Behavioral: Negative for confusion.       Objective     Vital Signs:  Temp (24hrs), Av °F (36.7 °C), Min:97.9 °F (36.6 °C), Max:98.1 °F (36.7 °C)      Visit Vitals   • /78 (BP Location: Left arm, Patient Position: Sitting)   • Pulse (!) 122   • Temp 98.1 °F (36.7 °C) (Temporal Artery )   • Resp 20   • Ht 73\" (185.4 cm)   • Wt 221 lb 6 oz (100 kg)   • SpO2 94%   • BMI 29.21 kg/m2           Physical Exam   Constitutional: He is oriented to person, place, and time. He appears well-developed and well-nourished.   HENT:   Posterior pharyngeal thrush   Eyes: Right eye exhibits no exudate. Left eye exhibits no exudate. No scleral icterus.   Neck: Neck " supple.   Cardiovascular: Normal rate.  An irregular rhythm present.   Murmur heard.   Systolic murmur is present with a grade of 2/6   [place] throughout - greatest left sternal border to axilla   Musculoskeletal:   No splinter hemorrhages or Janeway lesions or Osler's nodes are appreciated   Neurological: He is alert and oriented to person, place, and time. No cranial nerve deficit (III-XII).   Skin: Skin is warm and dry.   Psychiatric: He has a normal mood and affect.   Vitals reviewed.    Line/IV site: PICC RUE - dressing in place - dry intact    Results Review:    I reviewed the patient's new clinical results.    Lab Results:  CBC:     Results from last 7 days  Lab Units 02/23/17  0423 02/22/17  0404 02/21/17  0452   WBC 10*3/mm3 18.61* 18.09* 17.53*   HEMOGLOBIN g/dL 9.5* 9.2* 8.7*   HEMATOCRIT % 29.9* 28.9* 27.1*   PLATELETS 10*3/mm3 145 157 155   LYMPHOCYTE % % 12.0* 13.0* 10.0*   MONOCYTES % % 6.0 1.0* 3.0*                                                   segs 74%                                                  bands 5 %    CMP:     Results from last 7 days  Lab Units 02/23/17  0423 02/22/17  0404 02/21/17  0451   SODIUM mmol/L 137 137 136   POTASSIUM mmol/L 5.0 4.7 5.2   CHLORIDE mmol/L 100 100 100   TOTAL CO2 mmol/L 25.0 29.0 26.0   BUN mg/dL 59* 56* 62*   CREATININE mg/dL 1.76* 1.54* 1.57*   CALCIUM mg/dL 8.6 8.8 8.7   GLUCOSE mg/dL 99 90 146*         Culture Results:    Blood Culture With CHERYLE [[id number]] (Normal) Collected: 02/22/17 0404        Lab Status: Preliminary result Specimen: Blood from Arm, Left Updated: 02/22/17 1701        Blood Culture No growth at  24 hours        Blood Culture With CHERYLE [[id number]] (Normal) Collected: 02/21/17 1617       Lab Status: Preliminary result Specimen: Blood from Arm, Right Updated: 02/22/17 1701        Blood Culture No growth at 24 hours         Blood Culture With CHERYLE [[id number]] (Abnormal)  Collected: 02/18/17 1545       Lab Status: Final result Specimen: Blood  from Arm, Left Updated: 02/23/17 1203        Blood Culture Abnormal Stain (A)          Staphylococcus aureus, MRSA (C)            Consider infectious disease consult to rule out distant focus of infection.   Methicillin resistant Staphylococcus aureus, Patient may be an isolation risk.           Isolated from Aerobic and Anaerobic Bottles         Gram Stain Result Gram positive cocci          Susceptibility         Staphylococcus aureus, MRSA         JUAN J Not Specified         Clindamycin >=8  Resistant          Daptomycin  0.50  Susceptible         Erythromycin >=8  Resistant          Gentamicin <=0.5  Susceptible          Inducible Clindamycin Resistance NEG  Negative          Levofloxacin >=8  Resistant          Linezolid 4  Susceptible          Oxacillin >=4  Resistant          Penicillin G >=0.5  Resistant          Tetracycline 2  Susceptible          Trimethoprim + Sulfamethoxazole <=10  Susceptible          Vancomycin 1  Susceptible                           Radiology:   Imaging Results (last 72 hours)     Procedure Component Value Units Date/Time    MRI Brain Without Contrast [[id number]] Collected:  02/16/17 1600     Updated:  02/16/17 1722    Narrative:       EXAM: MR BRAIN WITHOUT IV CONTRAST 02/16/2017     COMPARISON: Head CT dated 02/15/2017.      HISTORY: 47 year-old male. Left lower extremity weakness.     TECHNIQUE:   Routine pulse sequences of the brain were obtained without IV contrast.      REPORT:   The ventricles and cerebrospinal fluid spaces are normal in size and  configuration for the patient's age. There is no evidence of mass-effect  or midline shift. No reduced diffusivity is demonstrated. A few  scattered foci of gliosis in the bilateral white matter  The brainstem, sella, pituitary, cerebellum are unremarkable. The  basilar cisterns are preserved. The intraorbital structures are  unremarkable.   The flow voids are preserved.   No acute osseous lesion.        The visualized portions of the  paranasal sinuses and mastoid air cells  are unremarkable.           Impression:       1.  No acute intracranial abnormalities.  2.  Nonspecific foci of gliosis in the bilateral white matter, likely  reflecting mild chronic microvascular changes.  This report was finalized on 02/16/2017 17:20 by Dr. Kristyn Rodriguez MD.          Assessment/Plan     Hospital Problem List     * (Principal)Persistent atrial fibrillation    Henoch-Schonlein purpura nephritis    Tobacco abuse    Microcytic anemia    Hypertensive nephrosclerosis    Wegener's granulomatosis with renal involvement    MRSA bacteremia          IMPRESSION:    1. Methicillin resistant Staph aureus bacteremia/MVE with abscess-thought to be catheter related. Dialysis catheter was removed removed and cultures positive from catheter.  Blood culture drawn on February 18 is now positive for MRSA.  Additional follow cultures are pending  2. Wegener's granulomatosis with renal involvement  3. Chronic kidney disease with acute kidney injury-the patient was dialyzed for a few weeks and has now had catheter removed  4. Leukocytosis-Stable  On decreasing dose of prednisone-  Always a concern for potential metastatic focus of infection with staph aureus bacteremia.    5. Thrush    RECOMMENDATION:     · Continue daptomycin.  He was on linezolid originally by the primary service.  This was presumably secondary to recent dialysis and concerns for vancomycin dosing    · follow-up surveillance blood cultures pending  ·  micro-repeating linezolid susceptibilities and also report daptomycin  · Nystatin swish and swallow  · Check HIV - d/w patient and he was agreeable.        Laura Eckert MD  02/23/17  2:46 PM

## 2017-02-23 NOTE — PLAN OF CARE
Problem: Patient Care Overview (Adult)  Goal: Plan of Care Review  Outcome: Ongoing (interventions implemented as appropriate)    Problem: Cardiac Output, Decreased (Adult)  Goal: Adequate Cardiac Output/Effective Tissue Perfusion  Outcome: Ongoing (interventions implemented as appropriate)    02/23/17 0111   Cardiac Output, Decreased (Adult)   Adequate Cardiac Output/Effective Tissue Perfusion making progress toward outcome         Problem: Respiratory Insufficiency (Adult)  Goal: Acid/Base Balance  Outcome: Ongoing (interventions implemented as appropriate)    02/23/17 0111   Respiratory Insufficiency (Adult)   Acid/Base Balance making progress toward outcome         Problem: Infection, Risk/Actual (Adult)  Goal: Infection Prevention/Resolution  Outcome: Ongoing (interventions implemented as appropriate)    02/23/17 0111   Infection, Risk/Actual (Adult)   Infection Prevention/Resolution making progress toward outcome

## 2017-02-23 NOTE — PROGRESS NOTES
" PROGRESS NOTE.      Patient:  Gurjit Andrea  YOB: 1970  Date of Service: 2/23/2017  MRN: [id number]   Acct: [id number]   Primary Care Physician: Moises Montes MD  Advance Directive: Full Code  Admit Date: 2/15/2017       Hospital Day: 8  Referring Provider: Kalie Vergara DO      Patient Seen, Chart, Consults, Notes, Labs, Radiology studies reviewed.        Subjective:    Gurjit Andrea is a 47 y.o. male whom we were consulted for OSMANI.  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  Admitted with atrial fibrillation and RVR.  Has shown sign of reversal so dialysis placed on hold; last dialysis was 2/16.  On 2/17 started having large amount of purulent drainage from around Permcath site.  His dialysis catheter was removed and his blood cultures were positive for MRSA.  Urine output is good, renal function continues to improve.  Unfortunately, JOSEPH has revealed severe mitral valve regurgitation with leaflet destruction and possible abscess.  Pt is awaiting transfer to tertiary care center for valve replacement surgery.    Review of Systems:  History obtained from chart review and the patient  General ROS: No fever or chills  Respiratory ROS: No cough, shortness of breath, wheezing  Cardiovascular ROS: no chest pain or dyspnea on exertion  Gastrointestinal ROS: No abdominal pain or melena  Genito-Urinary ROS: No dysuria or hematuria  Musculoskeletal: leg edema  Skin: negative    Objective:  Visit Vitals   • /78 (BP Location: Left arm, Patient Position: Sitting)   • Pulse (!) 122   • Temp 98.1 °F (36.7 °C) (Temporal Artery )   • Resp 20   • Ht 73\" (185.4 cm)   • Wt 221 lb 6 oz (100 kg)   • SpO2 94%   • BMI 29.21 kg/m2       Intake/Output Summary (Last 24 hours) at 02/23/17 1052  Last data filed at 02/22/17 2044   Gross per 24 hour   Intake    550 ml   Output      0 ml   Net    550 ml       Physical examination:  General: awake/alert   Chest:  clear to auscultation " bilaterally without respiratory distress  CVS: regular rate and rhythm  Abdominal: soft, nontender, normal bowel sounds  Extremities:1+ leg edema  Skin: warm and dry without rash  Neuro: No focal motor deficits    Labs:  Lab Results (last 24 hours)     Procedure Component Value Units Date/Time    POC Glucose Fingerstick [[id number]]  (Normal) Collected:  02/22/17 1217    Specimen:  Blood Updated:  02/22/17 1257     Glucose 92 mg/dL       : [id number] [facility] ID: [id number]       Blood Culture With CHERYLE [[id number]]  (Normal) Collected:  02/21/17 1617    Specimen:  Blood from Arm, Right Updated:  02/22/17 1701     Blood Culture No growth at 24 hours     POC Glucose Fingerstick [[id number]]  (Abnormal) Collected:  02/22/17 1655    Specimen:  Blood Updated:  02/22/17 1724     Glucose 131 (H) mg/dL       : [id number] Promise [facility] ID: [id number]       POC Glucose Fingerstick [[id number]]  (Abnormal) Collected:  02/22/17 1947    Specimen:  Blood Updated:  02/22/17 1958     Glucose 260 (H) mg/dL       : [id number] [facility] ID: [id number]       Blood Culture With CHERYLE [[id number]]  (Normal) Collected:  02/22/17 0404    Specimen:  Blood from Arm, Left Updated:  02/23/17 0501     Blood Culture No growth at 24 hours     Basic Metabolic Panel [[id number]]  (Abnormal) Collected:  02/23/17 0423    Specimen:  Blood Updated:  02/23/17 0504     Glucose 99 mg/dL      BUN 59 (H) mg/dL      Creatinine 1.76 (H) mg/dL      Sodium 137 mmol/L      Potassium 5.0 mmol/L      Chloride 100 mmol/L      CO2 25.0 mmol/L      Calcium 8.6 mg/dL      eGFR Non African Amer 42 (L) mL/min/1.73      BUN/Creatinine Ratio 33.5 (H)      Anion Gap 12.0 mmol/L     Narrative:       GFR Normal >60  Chronic Kidney Disease <60  Kidney Failure <15    CBC & Differential [[id number]] Collected:  02/23/17 0423    Specimen:  Blood Updated:  02/23/17 0537    Narrative:       The following orders were created for panel order CBC &  Differential.  Procedure                               Abnormality         Status                     ---------                               -----------         ------                     Manual Differential[[id number]]           Abnormal            Final result               Scan Slide[[id number]]                                        Final result               CBC Auto Differential[[id number]]         Abnormal            Final result                 Please view results for these tests on the individual orders.    CBC Auto Differential [[id number]]  (Abnormal) Collected:  02/23/17 0423    Specimen:  Blood Updated:  02/23/17 0537     WBC 18.61 (H) 10*3/mm3      RBC 3.50 (L) 10*6/mm3      Hemoglobin 9.5 (L) g/dL      Hematocrit 29.9 (L) %      MCV 85.4 fL      MCH 27.1 (L) pg      MCHC 31.8 (L) g/dL      RDW 19.1 (H) %      RDW-SD 58.8 (H) fl      MPV 9.1 fL      Platelets 145 10*3/mm3     Scan Slide [[id number]] Collected:  02/23/17 0423    Specimen:  Blood Updated:  02/23/17 0537     Scan Slide --       See Manual Differential Results       Manual Differential [[id number]]  (Abnormal) Collected:  02/23/17 0423    Specimen:  Blood Updated:  02/23/17 0537     Neutrophil % 74.0 %      Lymphocyte % 12.0 (L) %      Monocyte % 6.0 %      Bands %  5.0 %      Myelocyte % 3.0 (H) %      Neutrophils Absolute 14.70 (H) 10*3/mm3      Lymphocytes Absolute 2.23 10*3/mm3      Monocytes Absolute 1.12 10*3/mm3      nRBC 1.0 (H) /100 WBC      Anisocytosis Slight/1+      Poikilocytes Slight/1+      WBC Morphology Normal      Platelet Morphology Normal     POC Glucose Fingerstick [[id number]]  (Normal) Collected:  02/23/17 0745    Specimen:  Blood Updated:  02/23/17 0815     Glucose 97 mg/dL       : [id number] Promise [facility]iane[facility] ID: [id number]       Blood Culture With CHERYLE [[id number]]  (Abnormal)  (Susceptibility) Collected:  02/18/17 1541    Specimen:  Blood from Arm, Left Updated:  02/23/17 0836     Blood Culture Abnormal Stain (A)        Staphylococcus aureus, MRSA (C)         Consider infectious disease consult to rule out distant focus of infection.  Methicillin resistant Staphylococcus aureus, Patient may be an isolation risk.        Isolated from Aerobic and Anaerobic Bottles      Gram Stain Result Gram positive cocci     Susceptibility      Staphylococcus aureus, MRSA     JUAN J (Preliminary)     Clindamycin >=8 ug/ml Resistant     Erythromycin >=8 ug/ml Resistant     Gentamicin <=0.5 ug/ml Susceptible     Inducible Clindamycin Resistance NEG  Negative     Levofloxacin >=8 ug/ml Resistant     Linezolid 4  Susceptible     Oxacillin >=4 ug/ml Resistant     Penicillin G >=0.5 ug/ml Resistant     Tetracycline 2 ug/ml Susceptible     Trimethoprim + Sulfamethoxazole <=10 ug/ml Susceptible     Vancomycin 1 ug/ml Susceptible                          Radiology:   Imaging Results (last 24 hours)     ** No results found for the last 24 hours. **              Assessment   1. OSMANI secondary to Wegener's granulomatosis--nonoliguric, renal function has recovered.   2. Atrial fibrillation with RVR--rate controlled  3. Anemia  4. Benign HTN  5. MRSA Line sepsis--on IV antibiotics  6. Severe mitral valve regurgitation      Plan:  1.  Renal function stable.  2.  Awaiting imminent transfer to  for mitral valve replacement,     Charlie Jacobo, APRN  2/23/2017  10:52 AM     Patient was examined, all the data was reviewed.    Assessment:  1.  Acute kidney injury secondary to Jairo's granulomatosis.  He has need for short-term dialysis.  His renal function has improved.  2.  MRSA bacteremia related to permacatheter infection.  3.  Severe mitral valve regurgitation with vegetations.  4.  Anemia secondary to multiple reason.    Plan:  1.  Once he is stable patient would need Rituxan for the treatment of Wegener's.  2.  Patient is in the process of moving to [facility] for mitral valve replacement.  3.  Meanwhile continue by mouth prednisone.  4.   Decreased by mouth diuretics dose.

## 2017-02-23 NOTE — DISCHARGE SUMMARY
[facility] Medicine Services  DISCHARGE SUMMARY       Date of Admission: 2/15/2017  Date of Discharge:  2017  Primary Care Physician: Moises Montes MD    Presenting Problem/History of Present Illness:  Persistent atrial fibrillation [I48.1]     Final Discharge Diagnoses:  Hospital Problem List     * (Principal)Persistent atrial fibrillation    Henoch-Schonlein purpura nephritis    Tobacco abuse    Microcytic anemia    Hypertensive nephrosclerosis    Wegener's granulomatosis with renal involvement    MRSA bacteremia        1. Severe mitral regurgitation with anterior and posterior leaflet perforation and small vegetation per JOSEPH 17  2. Atrial fibrillation with RVR, no anticoagulation due to recent hematuria, anemia, and reported frequent injuries and falls (per cardiology)  3. OSMANI secondary to Jairo's granulomatosis nonoliguric, resolving (HD discontinued)  4. Henoch Schollein purpura nephritis on HD  5. MRSA bacteremia likely dialysis catheter related  6. Dialysis catheter site infection, MRSA  7. Chronic anemia, anemia of chronic disease  8. LUE weakness, resolved  9. Elevated proBNP secondary to renal disease  10. Elevated d-dimer with negative NM ventilation perfusion  11. Jairo's granulomatosis  12. Hypertensive nephrosclerosis  13. Leukocytosis, worsening  14. Elevated PSA    Consults:   1. Dr. Laura Eckert, Infectious Disease  2. Dr. Sagar Park, Cardiothoracic Surgery   3. Dr. Camacho, Nephrology  4. Dr. Cortez, Cardiology     Procedures Performed:   1. Removal of right internal jugular vein tunneled Permcath. Catheter tip was sent for culture.   2. Transesophageal Echocardiogram done 17     [facility] echocardiogram transesophageal with color   Order# [id number]    Reading physician: Alex Cortez MD   Ordering physician: Alex Cortez MD   Study date: 17         Patient Information      Patient Name MRN Sex  (Age)     Gurjit  Zully [id number] Male 1970 (47 y.o.)       Admission Information      Admission Date/Time Discharge Date/Time Room/Bed     02/15/17  0935  408/1       Interpretation Summary      · All left ventricular wall segments contract normally.  · Left ventricular function is normal. Estimated EF = 60%.  · Severe mitral valve regurgitation is present  · Anterior leaflet perforation and posterior leaflet perforation with possible small vegetation that is 0.5 cm in diameter of the mitral valve.     Pertinent Test Results:   Lab Results (last 72 hours)     Procedure Component Value Units Date/Time    POC Glucose Fingerstick [[id number]]  (Abnormal) Collected:  02/20/17 1040    Specimen:  Blood Updated:  02/20/17 1052     Glucose 261 (H) mg/dL       : [id number] [facility] ID: [id number]       POC Glucose Fingerstick [[id number]]  (Abnormal) Collected:  02/20/17 1557    Specimen:  Blood Updated:  02/20/17 1627     Glucose 249 (H) mg/dL       : [id number] [facility] ID: [id number]       POC Glucose Fingerstick [[id number]]  (Abnormal) Collected:  02/20/17 2046    Specimen:  Blood Updated:  02/20/17 2121     Glucose 268 (H) mg/dL       : [id number] [facility] ID: [id number]       Basic Metabolic Panel [[id number]]  (Abnormal) Collected:  02/21/17 0451    Specimen:  Blood Updated:  02/21/17 0522     Glucose 146 (H) mg/dL      BUN 62 (H) mg/dL      Creatinine 1.57 (H) mg/dL      Sodium 136 mmol/L      Potassium 5.2 mmol/L      Chloride 100 mmol/L      CO2 26.0 mmol/L      Calcium 8.7 mg/dL      eGFR Non African Amer 47 (L) mL/min/1.73      BUN/Creatinine Ratio 39.5 (H)      Anion Gap 10.0 mmol/L     Narrative:       GFR Normal >60  Chronic Kidney Disease <60  Kidney Failure <15    CBC & Differential [[id number]] Collected:  02/21/17 0452    Specimen:  Blood Updated:  02/21/17 0542    Narrative:       The following orders were created for panel order CBC & Differential.  Procedure                                Abnormality         Status                     ---------                               -----------         ------                     Manual Differential[[id number]]           Abnormal            Final result               Scan Slide[[id number]]                                        Final result               CBC Auto Differential[[id number]]         Abnormal            Final result                 Please view results for these tests on the individual orders.    CBC Auto Differential [[id number]]  (Abnormal) Collected:  02/21/17 0452    Specimen:  Blood Updated:  02/21/17 0542     WBC 17.53 (H) 10*3/mm3      RBC 3.20 (L) 10*6/mm3      Hemoglobin 8.7 (L) g/dL      Hematocrit 27.1 (L) %      MCV 84.7 fL      MCH 27.2 (L) pg      MCHC 32.1 (L) g/dL      RDW 18.7 (H) %      RDW-SD 57.9 (H) fl      MPV 9.6 fL      Platelets 155 10*3/mm3     Narrative:       The previously reported component NRBC is no longer being reported.    Scan Slide [[id number]] Collected:  02/21/17 0452    Specimen:  Blood Updated:  02/21/17 0542     Scan Slide --       See Manual Differential Results       Manual Differential [[id number]]  (Abnormal) Collected:  02/21/17 0452    Specimen:  Blood Updated:  02/21/17 0542     Neutrophil % 83.0 (H) %      Lymphocyte % 10.0 (L) %      Monocyte % 3.0 (L) %      Bands %  3.0 %      Atypical Lymphocyte % 1.0 %      Neutrophils Absolute 15.08 (H) 10*3/mm3      Lymphocytes Absolute 1.75 10*3/mm3      Monocytes Absolute 0.53 10*3/mm3      Poikilocytes Slight/1+      Toxic Granulation Slight/1+      Platelet Morphology Normal     POC Glucose Fingerstick [[id number]]  (Abnormal) Collected:  02/21/17 0817    Specimen:  Blood Updated:  02/21/17 0848     Glucose 173 (H) mg/dL       : [id number] [facility] ID: [id number]       POC Glucose Fingerstick [[id number]]  (Abnormal) Collected:  02/21/17 1129    Specimen:  Blood Updated:  02/21/17 1206     Glucose 164 (H) mg/dL       : [id number]  [facility] ID: [id number]       Blood Culture ID, PCR [[id number]]  (Abnormal) Collected:  02/18/17 1541    Specimen:  Blood from Arm, Left Updated:  02/21/17 1448     BCID, PCR        Staphylococcus aureus. mecA (methicillin resistance gene) detected. Identification by BCID PCR. (C)    POC Glucose Fingerstick [[id number]]  (Abnormal) Collected:  02/21/17 1640    Specimen:  Blood Updated:  02/21/17 1719     Glucose 303 (H) mg/dL       : [id number] [facility] ID: [id number]       POC Glucose Fingerstick [[id number]]  (Abnormal) Collected:  02/21/17 2010    Specimen:  Blood Updated:  02/21/17 2021     Glucose 266 (H) mg/dL       : [id number] [facility] ID: [id number]       Basic Metabolic Panel [[id number]]  (Abnormal) Collected:  02/22/17 0404    Specimen:  Blood Updated:  02/22/17 0449     Glucose 90 mg/dL      BUN 56 (H) mg/dL      Creatinine 1.54 (H) mg/dL      Sodium 137 mmol/L      Potassium 4.7 mmol/L      Chloride 100 mmol/L      CO2 29.0 mmol/L      Calcium 8.8 mg/dL      eGFR Non African Amer 48 (L) mL/min/1.73      BUN/Creatinine Ratio 36.4 (H)      Anion Gap 8.0 mmol/L     Narrative:       GFR Normal >60  Chronic Kidney Disease <60  Kidney Failure <15    CBC Auto Differential [[id number]]  (Abnormal) Collected:  02/22/17 0404    Specimen:  Blood Updated:  02/22/17 0524     WBC 18.09 (H) 10*3/mm3      RBC 3.39 (L) 10*6/mm3      Hemoglobin 9.2 (L) g/dL      Hematocrit 28.9 (L) %      MCV 85.3 fL      MCH 27.1 (L) pg      MCHC 31.8 (L) g/dL      RDW 18.8 (H) %      RDW-SD 57.8 (H) fl      MPV 9.4 fL      Platelets 157 10*3/mm3     CBC & Differential [[id number]] Collected:  02/22/17 0404    Specimen:  Blood Updated:  02/22/17 0524    Narrative:       The following orders were created for panel order CBC & Differential.  Procedure                               Abnormality         Status                     ---------                               -----------         ------                      Manual Differential[[id number]]           Abnormal            Final result               Scan Slide[[id number]]                                        Final result               CBC Auto Differential[[id number]]         Abnormal            Final result                 Please view results for these tests on the individual orders.    Scan Slide [[id number]] Collected:  02/22/17 0404    Specimen:  Blood Updated:  02/22/17 0524     Scan Slide --       See Manual Differential Results       Manual Differential [[id number]]  (Abnormal) Collected:  02/22/17 0404    Specimen:  Blood Updated:  02/22/17 0524     Neutrophil % 79.0 (H) %      Lymphocyte % 13.0 (L) %      Monocyte % 1.0 (L) %      Bands %  4.0 %      Metamyelocyte % 1.0 (H) %      Myelocyte % 2.0 (H) %      Neutrophils Absolute 15.01 (H) 10*3/mm3      Lymphocytes Absolute 2.35 10*3/mm3      Monocytes Absolute 0.18 (L) 10*3/mm3      Poikilocytes Slight/1+      WBC Morphology Normal      Platelet Morphology Normal     POC Glucose Fingerstick [[id number]]  (Normal) Collected:  02/22/17 0759    Specimen:  Blood Updated:  02/22/17 0810     Glucose 79 mg/dL       : [id number] [facility] ID: [id number]       POC Glucose Fingerstick [[id number]]  (Normal) Collected:  02/22/17 1217    Specimen:  Blood Updated:  02/22/17 1257     Glucose 92 mg/dL       : [id number] [facility] ID: [id number]       Blood Culture With CHERYLE [[id number]]  (Normal) Collected:  02/21/17 1617    Specimen:  Blood from Arm, Right Updated:  02/22/17 1701     Blood Culture No growth at 24 hours     POC Glucose Fingerstick [[id number]]  (Abnormal) Collected:  02/22/17 1655    Specimen:  Blood Updated:  02/22/17 1724     Glucose 131 (H) mg/dL       : [id number] [facility]iane[facility] ID: [id number]       POC Glucose Fingerstick [[id number]]  (Abnormal) Collected:  02/22/17 1947    Specimen:  Blood Updated:  02/22/17 1958     Glucose 260 (H) mg/dL       : [id number] [facility] ID:  [id number]       Blood Culture With CHERYLE [[id number]]  (Normal) Collected:  02/22/17 0404    Specimen:  Blood from Arm, Left Updated:  02/23/17 0501     Blood Culture No growth at 24 hours     Basic Metabolic Panel [[id number]]  (Abnormal) Collected:  02/23/17 0423    Specimen:  Blood Updated:  02/23/17 0504     Glucose 99 mg/dL      BUN 59 (H) mg/dL      Creatinine 1.76 (H) mg/dL      Sodium 137 mmol/L      Potassium 5.0 mmol/L      Chloride 100 mmol/L      CO2 25.0 mmol/L      Calcium 8.6 mg/dL      eGFR Non African Amer 42 (L) mL/min/1.73      BUN/Creatinine Ratio 33.5 (H)      Anion Gap 12.0 mmol/L     Narrative:       GFR Normal >60  Chronic Kidney Disease <60  Kidney Failure <15    CBC & Differential [[id number]] Collected:  02/23/17 0423    Specimen:  Blood Updated:  02/23/17 0537    Narrative:       The following orders were created for panel order CBC & Differential.  Procedure                               Abnormality         Status                     ---------                               -----------         ------                     Manual Differential[[id number]]           Abnormal            Final result               Scan Slide[[id number]]                                        Final result               CBC Auto Differential[[id number]]         Abnormal            Final result                 Please view results for these tests on the individual orders.    CBC Auto Differential [[id number]]  (Abnormal) Collected:  02/23/17 0423    Specimen:  Blood Updated:  02/23/17 0537     WBC 18.61 (H) 10*3/mm3      RBC 3.50 (L) 10*6/mm3      Hemoglobin 9.5 (L) g/dL      Hematocrit 29.9 (L) %      MCV 85.4 fL      MCH 27.1 (L) pg      MCHC 31.8 (L) g/dL      RDW 19.1 (H) %      RDW-SD 58.8 (H) fl      MPV 9.1 fL      Platelets 145 10*3/mm3     Scan Slide [[id number]] Collected:  02/23/17 0423    Specimen:  Blood Updated:  02/23/17 0537     Scan Slide --       See Manual Differential Results       Manual Differential [[id number]]   (Abnormal) Collected:  02/23/17 0423    Specimen:  Blood Updated:  02/23/17 0537     Neutrophil % 74.0 %      Lymphocyte % 12.0 (L) %      Monocyte % 6.0 %      Bands %  5.0 %      Myelocyte % 3.0 (H) %      Neutrophils Absolute 14.70 (H) 10*3/mm3      Lymphocytes Absolute 2.23 10*3/mm3      Monocytes Absolute 1.12 10*3/mm3      nRBC 1.0 (H) /100 WBC      Anisocytosis Slight/1+      Poikilocytes Slight/1+      WBC Morphology Normal      Platelet Morphology Normal     POC Glucose Fingerstick [[id number]]  (Normal) Collected:  02/23/17 0745    Specimen:  Blood Updated:  02/23/17 0815     Glucose 97 mg/dL       : [id number] [facility] ID: [id number]       Blood Culture With CHERYLE [[id number]]  (Abnormal)  (Susceptibility) Collected:  02/18/17 1541    Specimen:  Blood from Arm, Left Updated:  02/23/17 0836     Blood Culture Abnormal Stain (A)       Staphylococcus aureus, MRSA (C)         Consider infectious disease consult to rule out distant focus of infection.  Methicillin resistant Staphylococcus aureus, Patient may be an isolation risk.        Isolated from Aerobic and Anaerobic Bottles      Gram Stain Result Gram positive cocci     Susceptibility      Staphylococcus aureus, MRSA     JUAN J (Preliminary)     Clindamycin >=8 ug/ml Resistant     Erythromycin >=8 ug/ml Resistant     Gentamicin <=0.5 ug/ml Susceptible     Inducible Clindamycin Resistance NEG  Negative     Levofloxacin >=8 ug/ml Resistant     Linezolid 4  Susceptible     Oxacillin >=4 ug/ml Resistant     Penicillin G >=0.5 ug/ml Resistant     Tetracycline 2 ug/ml Susceptible     Trimethoprim + Sulfamethoxazole <=10 ug/ml Susceptible     Vancomycin 1 ug/ml Susceptible                        Imaging Results (last 72 hours)     Procedure Component Value Units Date/Time    XR Chest 1 View [[id number]] Collected:  02/21/17 1241     Updated:  02/21/17 1610    Narrative:       HISTORY: PICC line placement.     CXR: Frontal view of the chest is obtained.  Comparison made to a  02/15/2017 study.     The right-sided permacatheter has been removed. A right upper extremity  PICC line has been placed. The tip is positioned appropriately over the  lower SVC.     The cardiac silhouette remains enlarged. Right infrahilar densities may  be atelectasis. There is no convincing edema. There is no pleural  effusion or pneumothorax.       Impression:       1. Appropriate positioning of the right upper extremity PICC line.   2. Cardiomegaly.  3. Right infrahilar densities may be atelectasis.  This report was finalized on 02/21/2017 16:08 by Dr. Jahaira Capellan MD.        Chief Complaint on Day of Discharge: None    History of Present Illness on Day of Discharge:   The patient is sitting up in bed with no complaints.  He denies any chest pain or shortness of breath.  No family currently present.  He is understanding as to the reason for transfer and willing to be transferred.     Hospital Course:  The patient is a 47 y.o. male who presented to  with chest pain, shortness of breath and palpitations.  He has a history of atrial fibrillation not on any anticoagulation therapy due to frequent falls and anemia.  He had recently been started on outpatient dialysis secondary to his Wegener's with renal involvement.  On arrival to the emergency department he was found to have atrial fibrillation and rapid ventricular response.  He stated that he had been taking his Cardizem and metoprolol at home.  They have had difficulties controlling his rate previously.  He was placed on IV Cardizem and was gradually weaned back on oral Cardizem as his heart rate tolerated.  Cardiology was consulted for his rapid A. fib.  Nephrology was consulted for dialysis.  He got dialysis on 2/16/17, then hold dialysis and watch for renal improvement.  His renal function did continue to improve with only a few dialysis treatments.  He is also being treated by rheumatology and on  "immunosuppressants and high-dose steroids.  Infectious disease was consulted on 2/18/17 for bacteremia.  He had a right internal jugular permacath placed previously for hemodialysis, it was removed on 2/20/17 due to suspected infection.  Dr. Grimes noted purulent drainage from the catheter whenever he removed it in the operating room.  The catheter tip was cut and sent for culture.  I believe he was initially placed on Zyvox to avoid any potentially worsening nephrotoxicity to the patient with his chronic kidney disease.  His blood cultures as well as the catheter tip all cultured out to be MRSA.  Surveillance cultures also grew MRSA.  Repeat cultures done on 2/22/17 are no growth to date.  Zyvox was discontinued and daptomycin was started on 2/22/17.  A JOSEPH was ordered due to systolic murmur as well as positive blood cultures.  JOSEPH revealed severe mitral valve regurgitation, anterior leaflet perforation and posterior leaflet perforation with possible small vegetation that is 0.5 cm in diameter of the mitral valve.  His left ventricular function is normal with an EF estimated at 60%.  Dr. Sagar Park with cardiothoracic surgery was consulted, he feels the patient needs debridement and replacement of his mitral valve.  He may also need treatment for his atrial fibrillation as well.  In light of his severity of disease and comorbidities he feels that this should be done at a tertiary care center.  I spoke with the patient this morning about being transferred to a tertiary care center, he is in understanding and in agreement to this.  He states, \"I will go wherever I need to to get the best care.\"  I spoke with Dr. Simeon Lucia at [facility] in [place], [place] this morning who talked to Dr. Amaya with cardiothoracic surgery there at the [facility] as well as Dr. Olive Orourke of cardiology.  Dr. Olive Orourke has accepted the patient at [facility] for " "transfer today.  The patient is aware and in stable condition for transfer today.  [facility] ambulance service is on standby for when bed is made available at the [facility].    Condition on Discharge:  Stable    Physical Exam on Discharge:  Visit Vitals   • /78 (BP Location: Left arm, Patient Position: Sitting)   • Pulse (!) 122   • Temp 98.1 °F (36.7 °C) (Temporal Artery )   • Resp 20   • Ht 73\" (185.4 cm)   • Wt 221 lb 6 oz (100 kg)   • SpO2 94%   • BMI 29.21 kg/m2     Physical Exam   Constitutional: He is oriented to person, place, and time. He appears well-developed and well-nourished.   HENT:   Head: Normocephalic and atraumatic.   Eyes: Conjunctivae and EOM are normal. Pupils are equal, round, and reactive to light.   Neck: Neck supple. No JVD present. No thyromegaly present.   Cardiovascular: Normal rate and intact distal pulses.  An irregular rhythm present. Exam reveals no gallop and no friction rub.    Murmur heard.  afib   Pulmonary/Chest: Effort normal and breath sounds normal. No respiratory distress. He has no wheezes. He has no rales. He exhibits no tenderness.   Abdominal: Soft. Bowel sounds are normal. He exhibits no distension. There is no tenderness. There is no rebound and no guarding.   Musculoskeletal: Normal range of motion. He exhibits no edema, tenderness or deformity.   Lymphadenopathy:     He has no cervical adenopathy.   Neurological: He is alert and oriented to person, place, and time. He displays normal reflexes. No cranial nerve deficit. He exhibits normal muscle tone.   Skin: Skin is warm and dry. No rash noted.   Psychiatric:   flat     Discharge Disposition:  Short Term Hospital (DC - External)    Discharge Medications:   Gurjit Andrea   Home Medication Instructions ZANE:[id number]    Printed on:02/23/17 6209   Medication Information                      albuterol (PROVENTIL HFA;VENTOLIN HFA) 108 (90 BASE) MCG/ACT inhaler  Every 6 (Six) " Hours.             bumetanide (BUMEX) 1 MG tablet  Take 1 tablet by mouth Daily.             DAPTOmycin 810 mg in sodium chloride 0.9 % 50 mL  Infuse 810 mg into a venous catheter Daily. Indications: Endocarditis             diltiaZEM CD (CARDIZEM CD) 360 MG 24 hr capsule  Take 1 capsule by mouth Daily.             insulin detemir (LEVEMIR) 100 UNIT/ML injection  Inject 10 Units under the skin Every Morning.             insulin lispro (humaLOG) 100 UNIT/ML injection  Inject 2-7 Units under the skin 4 (Four) Times a Day Before Meals & at Bedtime.             metoprolol tartrate 75 MG tablet  Take 75 mg by mouth Every 12 (Twelve) Hours.             predniSONE (DELTASONE) 20 MG tablet  Take 2 tablets by mouth Daily With Breakfast.             tamsulosin (FLOMAX) 0.4 MG capsule 24 hr capsule  Take 1 capsule by mouth Every Night.               Discharge Diet: NPO     Activity at Discharge: Bedrest until evaluated to     Follow-up Appointments:   1. Will need follow ups with PCP, ID, nephrology and Cardiology here in [place], [place] after discharge from .  No future appointments.    Test Results Pending at Discharge: BHARATHI Thompson  02/23/17  9:31 AM    Time: 60 minutes    I personally evaluated and examined the patient in conjunction with BHARATHI Whalen and agree with the assessment, treatment plan, and disposition of the patient as recorded by her. My history, exam, and further recommendations are:   Discussed plan with patient.  Otherwise, stay the same.

## 2017-02-23 NOTE — PROGRESS NOTES
"INFECTIOUS DISEASES PROGRESS NOTE    Patient:  Gurjit Andrea  YOB: 1970  MRN: [id number]   Admit date: 2/15/2017   Admitting Physician: Blair Frausto MD  Primary Care Physician: Moises Montes MD    Chief Complaint: \"going to [place]\"        Interval History: underwent JOSEPH today. MV abscess with perf of leaflet. Evaluated by Dr Sagar Park and rec to transfer to [place].    Pt denies visual changes, focal weakness and only complaint is he wants a second tray to eat.                                Allergies: No Known Allergies    Current Meds:     Current Facility-Administered Medications:   •  acetaminophen (TYLENOL) tablet 650 mg, 650 mg, Oral, Q4H PRN, BHARATHI Diaz, 650 mg at 02/19/17 0331  •  aluminum-magnesium hydroxide-simethicone (MAALOX/MYLANTA) suspension 30 mL, 30 mL, Oral, Q6H PRN, BHARATHI Diaz  •  bumetanide (BUMEX) tablet 1 mg, 1 mg, Oral, Daily, Reinaldo Foley MD, 1 mg at 02/22/17 0939  •  dextrose (D50W) solution 25 g, 25 g, Intravenous, Q15 Min PRN, Iliana Alexis MD  •  dextrose (GLUTOSE) oral gel 15 g, 15 g, Oral, Q15 Min PRN, Iliana Alexis MD  •  diltiaZEM CD (CARDIZEM CD) 24 hr capsule 360 mg, 360 mg, Oral, Q24H, BHARATHI Portillo, 360 mg at 02/22/17 0940  •  docusate sodium (COLACE) capsule 100 mg, 100 mg, Oral, BID, BHARATHI Diaz, 100 mg at 02/22/17 0939  •  enoxaparin (LOVENOX) syringe 30 mg, 30 mg, Subcutaneous, Daily, BHARATHI Diaz, 30 mg at 02/16/17 1733  •  glucagon (human recombinant) (GLUCAGEN DIAGNOSTIC) injection 1 mg, 1 mg, Subcutaneous, Q15 Min PRN, Iliana Alexis MD  •  heparin (porcine) injection 1,800 Units, 1,800 Units, Intracatheter, PRN, Reinaldo Foley MD, 1,800 Units at 02/16/17 1400  •  insulin detemir (LEVEMIR) injection 10 Units, 10 Units, Subcutaneous, QAM, Iliana Alexis MD, 10 Units at 02/21/17 0821  •  insulin lispro (humaLOG) injection 2-7 Units, 2-7 Units, Subcutaneous, 4x Daily AC & at " "Bedtime, Iliana Alexis MD, 4 Units at 17  •  Linezolid (ZYVOX) 600 mg 300 mL, 600 mg, Intravenous, Q12H, Iliana Alexis MD, Last Rate: 150 mL/hr at 1740, 600 mg at 17  •  metoprolol tartrate (LOPRESSOR) injection 5 mg, 5 mg, Intravenous, Q6H PRN, Macie E Knees, APRN, 5 mg at 17  •  metoprolol tartrate (LOPRESSOR) tablet 75 mg, 75 mg, Oral, Q12H, Macie E Knees, APRN, 75 mg at 17  •  ondansetron (ZOFRAN) injection 4 mg, 4 mg, Intravenous, Q6H PRN, BHARATHI Diaz  •  predniSONE (DELTASONE) tablet 40 mg, 40 mg, Oral, Daily With Breakfast, Hema Jade MD, 40 mg at 17  •  sodium chloride 0.9 % flush 1-10 mL, 1-10 mL, Intravenous, PRN, BHARATHI Diaz  •  tamsulosin (FLOMAX) 24 hr capsule 0.4 mg, 0.4 mg, Oral, Nightly, BHARATHI Diaz, 0.4 mg at 17      Review of Systems   Constitutional: Negative for chills and fever.   Eyes: Negative for redness.   Respiratory: Negative for cough.    Cardiovascular: Negative for chest pain.   Gastrointestinal: Negative for abdominal pain.   Endocrine: Negative for polyuria.   Genitourinary: Negative for dysuria.   Musculoskeletal: Negative for joint swelling.   Allergic/Immunologic: Positive for immunocompromised state.   Neurological: Negative for facial asymmetry.   Psychiatric/Behavioral: Negative for confusion.       Objective     Vital Signs:  Temp (24hrs), Av °F (36.7 °C), Min:97.9 °F (36.6 °C), Max:98 °F (36.7 °C)      Visit Vitals   • /81 (BP Location: Left arm, Patient Position: Lying)   • Pulse 95   • Temp 98 °F (36.7 °C) (Oral)   • Resp 19   • Ht 73\" (185.4 cm)   • Wt 222 lb 4 oz (101 kg)   • SpO2 95%   • BMI 29.32 kg/m2           Physical Exam   Constitutional: He is oriented to person, place, and time. He appears well-developed and well-nourished.   Eyes: Right eye exhibits no exudate. Left eye exhibits no exudate. No scleral icterus.   No conjunctival petechiae   Neck: " Neck supple.   Cardiovascular: Normal rate.  An irregular rhythm present.   Murmur heard.   Systolic murmur is present with a grade of 2/6   [place] throughout - greatest left sternal border to axilla   Musculoskeletal:   No splinter hemorrhages or Janeway lesions or Osler's nodes are appreciated   Neurological: He is alert and oriented to person, place, and time. No cranial nerve deficit (III-XII).   Skin: Skin is warm and dry.   Psychiatric: He has a normal mood and affect.   Vitals reviewed.    Line/IV site: PICC RUE - dressing in place - dry intact    Results Review:    I reviewed the patient's new clinical results.    Lab Results:  CBC:     Results from last 7 days  Lab Units 02/22/17  0404 02/21/17  0452 02/20/17  0341   WBC 10*3/mm3 18.09* 17.53* 15.95*   HEMOGLOBIN g/dL 9.2* 8.7* 8.3*   HEMATOCRIT % 28.9* 27.1* 25.9*   PLATELETS 10*3/mm3 157 155 149   LYMPHOCYTE % % 13.0* 10.0*  --    MONOCYTES % % 1.0* 3.0*  --          CMP:     Results from last 7 days  Lab Units 02/22/17  0404 02/21/17  0451 02/20/17  0341   SODIUM mmol/L 137 136 135   POTASSIUM mmol/L 4.7 5.2 4.8   CHLORIDE mmol/L 100 100 101   TOTAL CO2 mmol/L 29.0 26.0 23.0*   BUN mg/dL 56* 62* 57*   CREATININE mg/dL 1.54* 1.57* 1.60*   CALCIUM mg/dL 8.8 8.7 8.5   GLUCOSE mg/dL 90 146* 203*         Culture Results:    Blood Culture With CHERYLE [[id number]] (Normal) Collected: 02/22/17 0404        Lab Status: Preliminary result Specimen: Blood from Arm, Left Updated: 02/22/17 1701        Blood Culture No growth at less than 24 hours        Blood Culture With CHERYLE [[id number]] (Normal) Collected: 02/21/17 1617       Lab Status: Preliminary result Specimen: Blood from Arm, Right Updated: 02/22/17 1701        Blood Culture No growth at 24 hours              Procedure Component Value - Date/Time       Blood Culture With CHERYLE [[id number]] (Abnormal) Collected: 02/18/17 1549       Lab Status: Preliminary result Specimen: Blood from Arm, Left Updated: 02/21/17 6394         Blood Culture Abnormal Stain (A)          Gram Positive Cocci (A)         Isolated from Aerobic Bottle         Gram Stain Result Gram positive cocci        Blood Culture ID, PCR [[id number]] (Abnormal) Collected: 02/18/17 1541       Lab Status: Final result Specimen: Blood from Arm, Left Updated: 02/21/17 1448        BCID, PCR           Staphylococcus aureus. mecA (methicillin resistance gene) detected. Identification by BCID PCR. (C)       Catheter Culture [[id number]] (Abnormal)  Collected: 02/17/17 1836       Lab Status: Final result Specimen: Cath Tip from Neck Updated: 02/20/17 0736        CATHETER CULTURE Staphylococcus aureus, MRSA (C)            Methicillin resistant Staphylococcus aureus, Patient may be an isolation risk.           BETA LACTAMASE Positive          Susceptibility         Staphylococcus aureus, MRSA         JUAN J         Clindamycin >=8  Resistant         Erythromycin >=8  Resistant         Gentamicin <=0.5  Susceptible         Inducible Clindamycin Resistance NEG  Negative         Levofloxacin 4  Intermediate 1         Oxacillin >=4  Resistant         Penicillin G >=0.5  Resistant         Tetracycline <=1  Susceptible         Trimethoprim + Sulfamethoxazole <=10  Susceptible         Vancomycin 2  Susceptible                   1 Staphylococcus species may develop resistance during prolonged therapy with quinolones. Isolates that are initially susceptible may become resistant within three to four days after initiation of therapy. Testing of repeat isolates may be warranted.                           Blood Culture [[id number]] (Abnormal)  Collected: 02/17/17 0108       Lab Status: Final result Specimen: Blood from Arm, Left Updated: 02/20/17 0741        Blood Culture Staphylococcus aureus, MRSA (C)            Consider infectious disease consult to rule out distant focus of infection.   Methicillin resistant Staphylococcus aureus, Patient may be an isolation risk.           Isolated from Aerobic and  Anaerobic Bottles         BETA LACTAMASE Positive         Gram Stain Result Gram positive cocci          Susceptibility         Staphylococcus aureus, MRSA         ASHWIN         Clindamycin >=8  Resistant         Erythromycin >=8  Resistant         Gentamicin <=0.5  Susceptible         Inducible Clindamycin Resistance NEG  Negative         Levofloxacin >=8  Resistant         Linezolid 2  Susceptible         Oxacillin >=4  Resistant         Penicillin G >=0.5  Resistant         Tetracycline 2  Susceptible         Trimethoprim + Sulfamethoxazole <=10  Susceptible         Vancomycin 2  Susceptible                               Blood Culture [[id number]] (Abnormal) Collected: 02/17/17 0108       Lab Status: Final result Specimen: Blood from Hand, Left Updated: 02/20/17 0839        Blood Culture Staphylococcus aureus, MRSA (C)            Consider infectious disease consult to rule out distant focus of infection.   Methicillin resistant Staphylococcus aureus, Patient may be an isolation risk.           Isolated from Aerobic and Anaerobic Bottles         BETA LACTAMASE Positive        Narrative:         See Culture #[id number] Drawn 2/17 0108 for Ashwin       Blood Culture ID, PCR [[id number]] (Abnormal) Collected: 02/17/17 0108       Lab Status: Final result Specimen: Blood from Arm, Left Updated: 02/18/17 0956        BCID, PCR           Staphylococcus aureus. mecA (methicillin resistance gene) detected. Identification by BCID PCR. (C)       Body Fluid Culture [[id number]] (Abnormal)  Collected: 02/17/17 0004       Lab Status: Final result Specimen: Body Fluid from Blood, Central Line Updated: 02/19/17 0626        BF Culture           Moderate growth (3+) Staphylococcus aureus, MRSA (C)           Methicillin resistant Staphylococcus aureus, Patient may be an isolation risk.           BETA LACTAMASE Positive         Gram Stain Result Few (2+) WBCs seen                    Few (2+) Gram positive cocci, intracellular and extracellular          Susceptibility         Staphylococcus aureus, MRSA         JUAN J         Clindamycin >=8  Resistant         Erythromycin >=8  Resistant         Gentamicin <=0.5  Susceptible         Inducible Clindamycin Resistance NEG  Negative         Levofloxacin 4  Intermediate 1         Oxacillin >=4  Resistant         Penicillin G >=0.5  Resistant         Tetracycline <=1  Susceptible         Trimethoprim + Sulfamethoxazole <=10  Susceptible         Vancomycin 1  Susceptible                   1 Staphylococcus species may develop resistance during prolonged therapy with quinolones. Isolates that are initially susceptible may become resistant within three to four days after initiation of therapy. Testing of repeat isolates may be warranted.                         Radiology:   Imaging Results (last 72 hours)     Procedure Component Value Units Date/Time    MRI Brain Without Contrast [[id number]] Collected:  02/16/17 1600     Updated:  02/16/17 1722    Narrative:       EXAM: MR BRAIN WITHOUT IV CONTRAST 02/16/2017     COMPARISON: Head CT dated 02/15/2017.      HISTORY: 47 year-old male. Left lower extremity weakness.     TECHNIQUE:   Routine pulse sequences of the brain were obtained without IV contrast.      REPORT:   The ventricles and cerebrospinal fluid spaces are normal in size and  configuration for the patient's age. There is no evidence of mass-effect  or midline shift. No reduced diffusivity is demonstrated. A few  scattered foci of gliosis in the bilateral white matter  The brainstem, sella, pituitary, cerebellum are unremarkable. The  basilar cisterns are preserved. The intraorbital structures are  unremarkable.   The flow voids are preserved.   No acute osseous lesion.        The visualized portions of the paranasal sinuses and mastoid air cells  are unremarkable.           Impression:       1.  No acute intracranial abnormalities.  2.  Nonspecific foci of gliosis in the bilateral white matter, likely  reflecting  mild chronic microvascular changes.  This report was finalized on 02/16/2017 17:20 by Dr. Kristyn Rodriguez MD.          Assessment/Plan     Hospital Problem List     * (Principal)Persistent atrial fibrillation    Henoch-Schonlein purpura nephritis    Tobacco abuse    Microcytic anemia    Hypertensive nephrosclerosis    Wegener's granulomatosis with renal involvement    MRSA bacteremia          IMPRESSION:    1. Methicillin resistant Staph aureus bacteremia/MVE with abscess-thought to be catheter related. Dialysis catheter is been removed and cultures positive from catheter.  Blood culture drawn on February 18 is now positive for MRSA.    2. Wegener's granulomatosis with renal involvement  3. Chronic kidney disease with acute kidney injury-the patient was dialyzed for a few weeks and has now had catheter removed  4. Leukocytosis-trending upward.  On prednisone-  Always a concern for potential metastatic focus of infection with staph aureus bacteremia.  No obvious stigmata of endocarditis in this patient with a significant systolic murmur and known mitral regurgitation. did verify that it appears the prednisone dose is actually been decreasing and not increasing.  Additionally no documented Solu-Medrol was noted        RECOMMENDATION:     · Currently on Zyvox. Suspect this was to avoid any potential worsening nephrotoxicity the patient. Will change to daptomycin - micro should be reporting dapto prateek 2/23    · follow-up surveillance blood cultures pending  ·  micro-repeating linezolid susceptibilities and also report daptomycin       Laura Eckert MD  02/22/17  6:01 PM

## 2017-02-24 LAB — BACTERIA BLD CULT: ABNORMAL

## 2017-02-24 NOTE — PAYOR COMM NOTE
"[facility]  ELA ELLIS RN [id number]  FAX [phone]  Gurjit Andrea (47 y.o. Male) [id number] TRANSFER TO  2/23    Date of Birth Social Security Number Address Home Phone MRN    1970  [id number]  [facility] [phone] [id number]    [employer] Marital Status          None        Admission Date Admission Type Admitting Provider Attending Provider Department, Room/Bed    2/15/17 Emergency Blair Frausto MD  [facility] 4B, 408/1    Discharge Date Discharge Disposition Discharge Destination        2/23/2017 [facility] ([place] - External)             Attending Provider: (none)    Allergies:  No Known Allergies    Isolation:  Contact   Infection:  MRSA (02/19/17)   Code Status:  Prior    Ht:  73\" (185.4 cm)   Wt:  221 lb 6 oz (100 kg)    Admission Cmt:  None   Principal Problem:  Persistent atrial fibrillation [I48.1]                 Active Insurance as of 2/15/2017     Primary Coverage     Payor Plan Insurance Group Employer/Plan Group    [employer] [facility] [id number]     Payor Plan Address Payor Plan Phone Number Effective From Effective To    [address] [phone] 12/1/2016     [place] [address]       Subscriber Name Subscriber Birth Date Member ID       JORDAN ANDREA [birth year]2001 [id number]                 Emergency Contacts      (Rel.) Home Phone Work Phone Mobile Phone    Jordan Andrea (Spouse) [phone] -- [phone]               Discharge Summary      Blair Frausto MD at 2/23/2017  9:31 AM              [facility]  DISCHARGE SUMMARY       Date of Admission: 2/15/2017  Date of Discharge:  2/23/2017  Primary Care Physician: Moises Montes MD    Presenting Problem/History of Present Illness:  Persistent atrial fibrillation [I48.1]     Final Discharge Diagnoses:  Hospital Problem List     * (Principal)Persistent atrial fibrillation    " Henoch-Schonlein purpura nephritis    Tobacco abuse    Microcytic anemia    Hypertensive nephrosclerosis    Wegener's granulomatosis with renal involvement    MRSA bacteremia        1. Severe mitral regurgitation with anterior and posterior leaflet perforation and small vegetation per JOSEPH 17  2. Atrial fibrillation with RVR, no anticoagulation due to recent hematuria, anemia, and reported frequent injuries and falls (per cardiology)  3. OSMANI secondary to Jairo's granulomatosis nonoliguric, resolving (HD discontinued)  4. Henoch Schollein purpura nephritis on HD  5. MRSA bacteremia likely dialysis catheter related  6. Dialysis catheter site infection, MRSA  7. Chronic anemia, anemia of chronic disease  8. LUE weakness, resolved  9. Elevated proBNP secondary to renal disease  10. Elevated d-dimer with negative NM ventilation perfusion  11. Jairo's granulomatosis  12. Hypertensive nephrosclerosis  13. Leukocytosis, worsening  14. Elevated PSA    Consults:   1. Dr. Laura Eckert, Infectious Disease  2. Dr. Sagar Park, Cardiothoracic Surgery   3. Dr. Camacho, Nephrology  4. Dr. Cortez, Cardiology     Procedures Performed:   1. Removal of right internal jugular vein tunneled Permcath. Catheter tip was sent for culture.   2. Transesophageal Echocardiogram done 17     [facility]   Aquired echocardiogram transesophageal with color   Order# [id number]    Reading physician: Alex Cortez MD   Ordering physician: Alex Cortez MD   Study date: 17         Patient Information      Patient Name MRN Sex  (Age)     [facility] [id number] Male 1970 (47 y.o.)       Admission Information      Admission Date/Time Discharge Date/Time Room/Bed     02/15/17  0935  408/1       Interpretation Summary      · All left ventricular wall segments contract normally.  · Left ventricular function is normal. Estimated EF = 60%.  · Severe mitral valve regurgitation is present  · Anterior leaflet perforation and  posterior leaflet perforation with possible small vegetation that is 0.5 cm in diameter of the mitral valve.     Pertinent Test Results:   Lab Results (last 72 hours)     Procedure Component Value Units Date/Time    POC Glucose Fingerstick [[id number]]  (Abnormal) Collected:  02/20/17 1040    Specimen:  Blood Updated:  02/20/17 1052     Glucose 261 (H) mg/dL       : [id number] Hiral Lares WhitneyMeter ID: [id number]       POC Glucose Fingerstick [[id number]]  (Abnormal) Collected:  02/20/17 1557    Specimen:  Blood Updated:  02/20/17 1627     Glucose 249 (H) mg/dL       : [id number] [facility] ID: [id number]       POC Glucose Fingerstick [[id number]]  (Abnormal) Collected:  02/20/17 2046    Specimen:  Blood Updated:  02/20/17 2121     Glucose 268 (H) mg/dL       : [id number] Pherigo SusanMeter ID: [id number]       Basic Metabolic Panel [[id number]]  (Abnormal) Collected:  02/21/17 0451    Specimen:  Blood Updated:  02/21/17 0522     Glucose 146 (H) mg/dL      BUN 62 (H) mg/dL      Creatinine 1.57 (H) mg/dL      Sodium 136 mmol/L      Potassium 5.2 mmol/L      Chloride 100 mmol/L      CO2 26.0 mmol/L      Calcium 8.7 mg/dL      eGFR Non African Amer 47 (L) mL/min/1.73      BUN/Creatinine Ratio 39.5 (H)      Anion Gap 10.0 mmol/L     Narrative:       GFR Normal >60  Chronic Kidney Disease <60  Kidney Failure <15    CBC & Differential [[id number]] Collected:  02/21/17 0452    Specimen:  Blood Updated:  02/21/17 0542    Narrative:       The following orders were created for panel order CBC & Differential.  Procedure                               Abnormality         Status                     ---------                               -----------         ------                     Manual Differential[[id number]]           Abnormal            Final result               Scan Slide[[id number]]                                        Final result               CBC Auto Differential[[id number]]         Abnormal             Final result                 Please view results for these tests on the individual orders.    CBC Auto Differential [[id number]]  (Abnormal) Collected:  02/21/17 0452    Specimen:  Blood Updated:  02/21/17 0542     WBC 17.53 (H) 10*3/mm3      RBC 3.20 (L) 10*6/mm3      Hemoglobin 8.7 (L) g/dL      Hematocrit 27.1 (L) %      MCV 84.7 fL      MCH 27.2 (L) pg      MCHC 32.1 (L) g/dL      RDW 18.7 (H) %      RDW-SD 57.9 (H) fl      MPV 9.6 fL      Platelets 155 10*3/mm3     Narrative:       The previously reported component NRBC is no longer being reported.    Scan Slide [[id number]] Collected:  02/21/17 0452    Specimen:  Blood Updated:  02/21/17 0542     Scan Slide --       See Manual Differential Results       Manual Differential [[id number]]  (Abnormal) Collected:  02/21/17 0452    Specimen:  Blood Updated:  02/21/17 0542     Neutrophil % 83.0 (H) %      Lymphocyte % 10.0 (L) %      Monocyte % 3.0 (L) %      Bands %  3.0 %      Atypical Lymphocyte % 1.0 %      Neutrophils Absolute 15.08 (H) 10*3/mm3      Lymphocytes Absolute 1.75 10*3/mm3      Monocytes Absolute 0.53 10*3/mm3      Poikilocytes Slight/1+      Toxic Granulation Slight/1+      Platelet Morphology Normal     POC Glucose Fingerstick [[id number]]  (Abnormal) Collected:  02/21/17 0817    Specimen:  Blood Updated:  02/21/17 0848     Glucose 173 (H) mg/dL       : [id number] [facility] ID: [id number]       POC Glucose Fingerstick [[id number]]  (Abnormal) Collected:  02/21/17 1129    Specimen:  Blood Updated:  02/21/17 1206     Glucose 164 (H) mg/dL       : [id number] [facility] ID: [id number]       Blood Culture ID, PCR [[id number]]  (Abnormal) Collected:  02/18/17 1541    Specimen:  Blood from Arm, Left Updated:  02/21/17 1448     BCID, PCR        Staphylococcus aureus. mecA (methicillin resistance gene) detected. Identification by BCID PCR. (C)    POC Glucose Fingerstick [[id number]]  (Abnormal) Collected:  02/21/17 1640    Specimen:  Blood  Updated:  02/21/17 1719     Glucose 303 (H) mg/dL       : [id number] Emanuel Gan ID: [id number]       POC Glucose Fingerstick [[id number]]  (Abnormal) Collected:  02/21/17 2010    Specimen:  Blood Updated:  02/21/17 2021     Glucose 266 (H) mg/dL       : [id number] Hemant Dunlap ID: [id number]       Basic Metabolic Panel [[id number]]  (Abnormal) Collected:  02/22/17 0404    Specimen:  Blood Updated:  02/22/17 0449     Glucose 90 mg/dL      BUN 56 (H) mg/dL      Creatinine 1.54 (H) mg/dL      Sodium 137 mmol/L      Potassium 4.7 mmol/L      Chloride 100 mmol/L      CO2 29.0 mmol/L      Calcium 8.8 mg/dL      eGFR Non African Amer 48 (L) mL/min/1.73      BUN/Creatinine Ratio 36.4 (H)      Anion Gap 8.0 mmol/L     Narrative:       GFR Normal >60  Chronic Kidney Disease <60  Kidney Failure <15    CBC Auto Differential [[id number]]  (Abnormal) Collected:  02/22/17 0404    Specimen:  Blood Updated:  02/22/17 0524     WBC 18.09 (H) 10*3/mm3      RBC 3.39 (L) 10*6/mm3      Hemoglobin 9.2 (L) g/dL      Hematocrit 28.9 (L) %      MCV 85.3 fL      MCH 27.1 (L) pg      MCHC 31.8 (L) g/dL      RDW 18.8 (H) %      RDW-SD 57.8 (H) fl      MPV 9.4 fL      Platelets 157 10*3/mm3     CBC & Differential [[id number]] Collected:  02/22/17 0404    Specimen:  Blood Updated:  02/22/17 0524    Narrative:       The following orders were created for panel order CBC & Differential.  Procedure                               Abnormality         Status                     ---------                               -----------         ------                     Manual Differential[[id number]]           Abnormal            Final result               Scan Slide[[id number]]                                        Final result               CBC Auto Differential[[id number]]         Abnormal            Final result                 Please view results for these tests on the individual orders.    Scan Slide [[id number]] Collected:  02/22/17 0404     Specimen:  Blood Updated:  02/22/17 0524     Scan Slide --       See Manual Differential Results       Manual Differential [[id number]]  (Abnormal) Collected:  02/22/17 0404    Specimen:  Blood Updated:  02/22/17 0524     Neutrophil % 79.0 (H) %      Lymphocyte % 13.0 (L) %      Monocyte % 1.0 (L) %      Bands %  4.0 %      Metamyelocyte % 1.0 (H) %      Myelocyte % 2.0 (H) %      Neutrophils Absolute 15.01 (H) 10*3/mm3      Lymphocytes Absolute 2.35 10*3/mm3      Monocytes Absolute 0.18 (L) 10*3/mm3      Poikilocytes Slight/1+      WBC Morphology Normal      Platelet Morphology Normal     POC Glucose Fingerstick [[id number]]  (Normal) Collected:  02/22/17 0759    Specimen:  Blood Updated:  02/22/17 0810     Glucose 79 mg/dL       : [id number] Promise ChianeMeter ID: [id number]       POC Glucose Fingerstick [[id number]]  (Normal) Collected:  02/22/17 1217    Specimen:  Blood Updated:  02/22/17 1257     Glucose 92 mg/dL       : [id number] Promise ChianeMeter ID: [id number]       Blood Culture With CHERYLE [[id number]]  (Normal) Collected:  02/21/17 1617    Specimen:  Blood from Arm, Right Updated:  02/22/17 1701     Blood Culture No growth at 24 hours     POC Glucose Fingerstick [[id number]]  (Abnormal) Collected:  02/22/17 1655    Specimen:  Blood Updated:  02/22/17 1724     Glucose 131 (H) mg/dL       : [id number] Promise ChianeMeter ID: [id number]       POC Glucose Fingerstick [[id number]]  (Abnormal) Collected:  02/22/17 1947    Specimen:  Blood Updated:  02/22/17 1958     Glucose 260 (H) mg/dL       : [id number] Hemant Dunlap ID: [id number]       Blood Culture With CHERYLE [[id number]]  (Normal) Collected:  02/22/17 0404    Specimen:  Blood from Arm, Left Updated:  02/23/17 0501     Blood Culture No growth at 24 hours     Basic Metabolic Panel [[id number]]  (Abnormal) Collected:  02/23/17 0423    Specimen:  Blood Updated:  02/23/17 0504     Glucose 99 mg/dL      BUN 59 (H) mg/dL      Creatinine 1.76 (H) mg/dL       Sodium 137 mmol/L      Potassium 5.0 mmol/L      Chloride 100 mmol/L      CO2 25.0 mmol/L      Calcium 8.6 mg/dL      eGFR Non African Amer 42 (L) mL/min/1.73      BUN/Creatinine Ratio 33.5 (H)      Anion Gap 12.0 mmol/L     Narrative:       GFR Normal >60  Chronic Kidney Disease <60  Kidney Failure <15    CBC & Differential [[id number]] Collected:  02/23/17 0423    Specimen:  Blood Updated:  02/23/17 0537    Narrative:       The following orders were created for panel order CBC & Differential.  Procedure                               Abnormality         Status                     ---------                               -----------         ------                     Manual Differential[[id number]]           Abnormal            Final result               Scan Slide[[id number]]                                        Final result               CBC Auto Differential[[id number]]         Abnormal            Final result                 Please view results for these tests on the individual orders.    CBC Auto Differential [[id number]]  (Abnormal) Collected:  02/23/17 0423    Specimen:  Blood Updated:  02/23/17 0537     WBC 18.61 (H) 10*3/mm3      RBC 3.50 (L) 10*6/mm3      Hemoglobin 9.5 (L) g/dL      Hematocrit 29.9 (L) %      MCV 85.4 fL      MCH 27.1 (L) pg      MCHC 31.8 (L) g/dL      RDW 19.1 (H) %      RDW-SD 58.8 (H) fl      MPV 9.1 fL      Platelets 145 10*3/mm3     Scan Slide [[id number]] Collected:  02/23/17 0423    Specimen:  Blood Updated:  02/23/17 0537     Scan Slide --       See Manual Differential Results       Manual Differential [[id number]]  (Abnormal) Collected:  02/23/17 0423    Specimen:  Blood Updated:  02/23/17 0537     Neutrophil % 74.0 %      Lymphocyte % 12.0 (L) %      Monocyte % 6.0 %      Bands %  5.0 %      Myelocyte % 3.0 (H) %      Neutrophils Absolute 14.70 (H) 10*3/mm3      Lymphocytes Absolute 2.23 10*3/mm3      Monocytes Absolute 1.12 10*3/mm3      nRBC 1.0 (H) /100 WBC      Anisocytosis Slight/1+       Poikilocytes Slight/1+      WBC Morphology Normal      Platelet Morphology Normal     POC Glucose Fingerstick [[id number]]  (Normal) Collected:  02/23/17 0745    Specimen:  Blood Updated:  02/23/17 0815     Glucose 97 mg/dL       : [id number] Promise WhyteMeter ID: [id number]       Blood Culture With CHERYLE [[id number]]  (Abnormal)  (Susceptibility) Collected:  02/18/17 1541    Specimen:  Blood from Arm, Left Updated:  02/23/17 0836     Blood Culture Abnormal Stain (A)       Staphylococcus aureus, MRSA (C)         Consider infectious disease consult to rule out distant focus of infection.  Methicillin resistant Staphylococcus aureus, Patient may be an isolation risk.        Isolated from Aerobic and Anaerobic Bottles      Gram Stain Result Gram positive cocci     Susceptibility      Staphylococcus aureus, MRSA     JUAN J (Preliminary)     Clindamycin >=8 ug/ml Resistant     Erythromycin >=8 ug/ml Resistant     Gentamicin <=0.5 ug/ml Susceptible     Inducible Clindamycin Resistance NEG  Negative     Levofloxacin >=8 ug/ml Resistant     Linezolid 4  Susceptible     Oxacillin >=4 ug/ml Resistant     Penicillin G >=0.5 ug/ml Resistant     Tetracycline 2 ug/ml Susceptible     Trimethoprim + Sulfamethoxazole <=10 ug/ml Susceptible     Vancomycin 1 ug/ml Susceptible                        Imaging Results (last 72 hours)     Procedure Component Value Units Date/Time    XR Chest 1 View [[id number]] Collected:  02/21/17 1241     Updated:  02/21/17 1610    Narrative:       HISTORY: PICC line placement.     CXR: Frontal view of the chest is obtained. Comparison made to a  02/15/2017 study.     The right-sided permacatheter has been removed. A right upper extremity  PICC line has been placed. The tip is positioned appropriately over the  lower SVC.     The cardiac silhouette remains enlarged. Right infrahilar densities may  be atelectasis. There is no convincing edema. There is no pleural  effusion or pneumothorax.       Impression:        1. Appropriate positioning of the right upper extremity PICC line.   2. Cardiomegaly.  3. Right infrahilar densities may be atelectasis.  This report was finalized on 02/21/2017 16:08 by Dr. Jahaira Capellan MD.        Chief Complaint on Day of Discharge: None    History of Present Illness on Day of Discharge:   The patient is sitting up in bed with no complaints.  He denies any chest pain or shortness of breath.  No family currently present.  He is understanding as to the reason for transfer and willing to be transferred.     Hospital Course:  The patient is a 47 y.o. male who presented to [facility] with chest pain, shortness of breath and palpitations.  He has a history of atrial fibrillation not on any anticoagulation therapy due to frequent falls and anemia.  He had recently been started on outpatient dialysis secondary to his Wegener's with renal involvement.  On arrival to the emergency department he was found to have atrial fibrillation and rapid ventricular response.  He stated that he had been taking his Cardizem and metoprolol at home.  They have had difficulties controlling his rate previously.  He was placed on IV Cardizem and was gradually weaned back on oral Cardizem as his heart rate tolerated.  Cardiology was consulted for his rapid A. fib.  Nephrology was consulted for dialysis.  He got dialysis on 2/16/17, then hold dialysis and watch for renal improvement.  His renal function did continue to improve with only a few dialysis treatments.  He is also being treated by rheumatology and on immunosuppressants and high-dose steroids.  Infectious disease was consulted on 2/18/17 for bacteremia.  He had a right internal jugular permacath placed previously for hemodialysis, it was removed on 2/20/17 due to suspected infection.  Dr. Grimes noted purulent drainage from the catheter whenever he removed it in the operating room.  The catheter tip was cut and sent for culture.  I believe he was  "initially placed on Zyvox to avoid any potentially worsening nephrotoxicity to the patient with his chronic kidney disease.  His blood cultures as well as the catheter tip all cultured out to be MRSA.  Surveillance cultures also grew MRSA.  Repeat cultures done on 2/22/17 are no growth to date.  Zyvox was discontinued and daptomycin was started on 2/22/17.  A JOSEPH was ordered due to systolic murmur as well as positive blood cultures.  JOSEPH revealed severe mitral valve regurgitation, anterior leaflet perforation and posterior leaflet perforation with possible small vegetation that is 0.5 cm in diameter of the mitral valve.  His left ventricular function is normal with an EF estimated at 60%.  Dr. Sagar Park with cardiothoracic surgery was consulted, he feels the patient needs debridement and replacement of his mitral valve.  He may also need treatment for his atrial fibrillation as well.  In light of his severity of disease and comorbidities he feels that this should be done at a tertiary care center.  I spoke with the patient this morning about being transferred to a tertiary care center, he is in understanding and in agreement to this.  He states, \"I will go wherever I need to to get the best care.\"  I spoke with Dr. Simeon Lucia at [facility] in [place], [place] this morning who talked to Dr. Amaya with cardiothoracic surgery there at the [facility] as well as Dr. Olive Orourke of cardiology.  Dr. Olive Orourke has accepted the patient at [facility] for transfer today.  The patient is aware and in stable condition for transfer today.  [facility] ambulance service is on standby for when bed is made available at the [facility].    Condition on Discharge:  Stable    Physical Exam on Discharge:  Visit Vitals   • /78 (BP Location: Left arm, Patient Position: Sitting)   • Pulse (!) 122   • Temp 98.1 °F (36.7 °C) (Temporal " "Artery )   • Resp 20   • Ht 73\" (185.4 cm)   • Wt 221 lb 6 oz (100 kg)   • SpO2 94%   • BMI 29.21 kg/m2     Physical Exam   Constitutional: He is oriented to person, place, and time. He appears well-developed and well-nourished.   HENT:   Head: Normocephalic and atraumatic.   Eyes: Conjunctivae and EOM are normal. Pupils are equal, round, and reactive to light.   Neck: Neck supple. No JVD present. No thyromegaly present.   Cardiovascular: Normal rate and intact distal pulses.  An irregular rhythm present. Exam reveals no gallop and no friction rub.    Murmur heard.  afib   Pulmonary/Chest: Effort normal and breath sounds normal. No respiratory distress. He has no wheezes. He has no rales. He exhibits no tenderness.   Abdominal: Soft. Bowel sounds are normal. He exhibits no distension. There is no tenderness. There is no rebound and no guarding.   Musculoskeletal: Normal range of motion. He exhibits no edema, tenderness or deformity.   Lymphadenopathy:     He has no cervical adenopathy.   Neurological: He is alert and oriented to person, place, and time. He displays normal reflexes. No cranial nerve deficit. He exhibits normal muscle tone.   Skin: Skin is warm and dry. No rash noted.   Psychiatric:   flat     Discharge Disposition:  [facility] Term Hospital ([facility] - External)    Discharge Medications:   Gurjit Andrea   Home Medication Instructions ZANE:[id number]    Printed on:02/23/17 3021   Medication Information                      albuterol (PROVENTIL HFA;VENTOLIN HFA) 108 (90 BASE) MCG/ACT inhaler  Every 6 (Six) Hours.             bumetanide (BUMEX) 1 MG tablet  Take 1 tablet by mouth Daily.             DAPTOmycin 810 mg in sodium chloride 0.9 % 50 mL  Infuse 810 mg into a venous catheter Daily. Indications: Endocarditis             diltiaZEM CD (CARDIZEM CD) 360 MG 24 hr capsule  Take 1 capsule by mouth Daily.             insulin detemir (LEVEMIR) 100 UNIT/ML injection  Inject 10 Units under the skin Every " Morning.             insulin lispro (humaLOG) 100 UNIT/ML injection  Inject 2-7 Units under the skin 4 (Four) Times a Day Before Meals & at Bedtime.             metoprolol tartrate 75 MG tablet  Take 75 mg by mouth Every 12 (Twelve) Hours.             predniSONE (DELTASONE) 20 MG tablet  Take 2 tablets by mouth Daily With Breakfast.             tamsulosin (FLOMAX) 0.4 MG capsule 24 hr capsule  Take 1 capsule by mouth Every Night.               Discharge Diet: NPO     Activity at Discharge: Bedrest until evaluated to     Follow-up Appointments:   1. Will need follow ups with PCP, ID, nephrology and Cardiology here in [place], [place] after discharge from .  No future appointments.    Test Results Pending at Discharge: Cultures    BHARATHI Diaz  02/23/17  9:31 AM    Time: 60 minutes    I personally evaluated and examined the patient in conjunction with BHARATHI Whalen and agree with the assessment, treatment plan, and disposition of the patient as recorded by her. My history, exam, and further recommendations are:   Discussed plan with patient.  Otherwise, stay the same.     Electronically signed by Blair Frausto MD at 2/23/2017  3:43 PM

## 2017-02-26 LAB
BACTERIA SPEC AEROBE CULT: ABNORMAL
BACTERIA SPEC AEROBE CULT: ABNORMAL
GRAM STN SPEC: ABNORMAL
ISOLATED FROM: ABNORMAL

## 2017-03-23 ENCOUNTER — HOSPITAL ENCOUNTER (OUTPATIENT)
Dept: CARDIAC REHAB | Age: 47
Setting detail: THERAPIES SERIES
Discharge: HOME | End: 2017-03-23
Payer: COMMERCIAL

## 2017-03-24 ENCOUNTER — HOSPITAL ENCOUNTER (OUTPATIENT)
Dept: CARDIAC REHAB | Age: 47
Setting detail: THERAPIES SERIES
Discharge: HOME | End: 2017-03-24
Payer: COMMERCIAL

## 2017-03-24 PROCEDURE — 93798 PHYS/QHP OP CAR RHAB W/ECG: CPT

## 2017-03-27 ENCOUNTER — HOSPITAL ENCOUNTER (OUTPATIENT)
Dept: CARDIAC REHAB | Age: 47
Setting detail: THERAPIES SERIES
Discharge: HOME | End: 2017-03-27
Payer: COMMERCIAL

## 2017-03-27 PROCEDURE — 93798 PHYS/QHP OP CAR RHAB W/ECG: CPT

## 2017-03-29 ENCOUNTER — HOSPITAL ENCOUNTER (OUTPATIENT)
Dept: CARDIAC REHAB | Age: 47
Setting detail: THERAPIES SERIES
Discharge: HOME | End: 2017-03-29
Payer: COMMERCIAL

## 2017-03-29 PROCEDURE — 93798 PHYS/QHP OP CAR RHAB W/ECG: CPT

## 2017-04-03 ENCOUNTER — HOSPITAL ENCOUNTER (OUTPATIENT)
Dept: CARDIAC REHAB | Age: 47
Setting detail: THERAPIES SERIES
Discharge: HOME | End: 2017-04-03
Payer: COMMERCIAL

## 2017-04-03 PROCEDURE — 93798 PHYS/QHP OP CAR RHAB W/ECG: CPT

## 2017-04-05 ENCOUNTER — HOSPITAL ENCOUNTER (OUTPATIENT)
Dept: CARDIAC REHAB | Age: 47
Setting detail: THERAPIES SERIES
Discharge: HOME | End: 2017-04-05
Payer: COMMERCIAL

## 2017-04-05 PROCEDURE — 93798 PHYS/QHP OP CAR RHAB W/ECG: CPT

## 2017-04-07 ENCOUNTER — HOSPITAL ENCOUNTER (OUTPATIENT)
Dept: CARDIAC REHAB | Age: 47
Setting detail: THERAPIES SERIES
Discharge: HOME | End: 2017-04-07
Payer: COMMERCIAL

## 2017-04-07 PROCEDURE — 93798 PHYS/QHP OP CAR RHAB W/ECG: CPT

## 2017-04-10 ENCOUNTER — HOSPITAL ENCOUNTER (OUTPATIENT)
Dept: CARDIAC REHAB | Age: 47
Setting detail: THERAPIES SERIES
Discharge: HOME | End: 2017-04-10
Payer: COMMERCIAL

## 2017-04-10 PROCEDURE — 93798 PHYS/QHP OP CAR RHAB W/ECG: CPT

## 2017-04-12 ENCOUNTER — HOSPITAL ENCOUNTER (OUTPATIENT)
Dept: CARDIAC REHAB | Age: 47
Setting detail: THERAPIES SERIES
Discharge: HOME | End: 2017-04-12
Payer: COMMERCIAL

## 2017-04-12 PROCEDURE — 93798 PHYS/QHP OP CAR RHAB W/ECG: CPT

## 2017-04-17 ENCOUNTER — HOSPITAL ENCOUNTER (OUTPATIENT)
Dept: CARDIAC REHAB | Age: 47
Setting detail: THERAPIES SERIES
Discharge: HOME | End: 2017-04-17
Payer: COMMERCIAL

## 2017-04-17 PROCEDURE — 93798 PHYS/QHP OP CAR RHAB W/ECG: CPT

## 2017-04-19 ENCOUNTER — HOSPITAL ENCOUNTER (OUTPATIENT)
Dept: CARDIAC REHAB | Age: 47
Setting detail: THERAPIES SERIES
Discharge: HOME | End: 2017-04-19
Payer: COMMERCIAL

## 2017-04-19 PROCEDURE — 93798 PHYS/QHP OP CAR RHAB W/ECG: CPT

## 2017-04-24 ENCOUNTER — HOSPITAL ENCOUNTER (OUTPATIENT)
Dept: CARDIAC REHAB | Age: 47
Setting detail: THERAPIES SERIES
Discharge: HOME | End: 2017-04-24
Payer: COMMERCIAL

## 2017-04-24 PROCEDURE — 93798 PHYS/QHP OP CAR RHAB W/ECG: CPT

## 2017-04-26 ENCOUNTER — HOSPITAL ENCOUNTER (OUTPATIENT)
Dept: CARDIAC REHAB | Age: 47
Setting detail: THERAPIES SERIES
Discharge: HOME | End: 2017-04-26
Payer: COMMERCIAL

## 2017-04-26 PROCEDURE — 93798 PHYS/QHP OP CAR RHAB W/ECG: CPT

## 2019-09-18 ENCOUNTER — HOSPITAL ENCOUNTER (EMERGENCY)
Facility: HOSPITAL | Age: 49
Discharge: HOME | End: 2019-09-18
Attending: FAMILY MEDICINE | Admitting: FAMILY MEDICINE

## 2019-09-18 ENCOUNTER — APPOINTMENT (OUTPATIENT)
Dept: GENERAL RADIOLOGY | Facility: HOSPITAL | Age: 49
End: 2019-09-18

## 2019-09-18 VITALS
HEART RATE: 69 BPM | HEIGHT: 72 IN | BODY MASS INDEX: 33.05 KG/M2 | SYSTOLIC BLOOD PRESSURE: 152 MMHG | DIASTOLIC BLOOD PRESSURE: 91 MMHG | OXYGEN SATURATION: 96 % | TEMPERATURE: 98 F | RESPIRATION RATE: 18 BRPM | WEIGHT: 244 LBS

## 2019-09-18 DIAGNOSIS — R06.00 DYSPNEA, UNSPECIFIED TYPE: Primary | ICD-10-CM

## 2019-09-18 LAB
ALBUMIN SERPL-MCNC: 4 G/DL (ref 3.5–5.2)
ALBUMIN/GLOB SERPL: 1.3 G/DL
ALP SERPL-CCNC: 79 U/L (ref 39–117)
ALT SERPL W P-5'-P-CCNC: 17 U/L (ref 1–41)
ANION GAP SERPL CALCULATED.3IONS-SCNC: 11 MMOL/L (ref 5–15)
AST SERPL-CCNC: 15 U/L (ref 1–40)
BASOPHILS # BLD AUTO: 0.05 10*3/MM3 (ref 0–0.2)
BASOPHILS NFR BLD AUTO: 0.6 % (ref 0–1.5)
BILIRUB SERPL-MCNC: 0.3 MG/DL (ref 0.2–1.2)
BUN BLD-MCNC: 21 MG/DL (ref 6–20)
BUN/CREAT SERPL: 17.1 (ref 7–25)
CALCIUM SPEC-SCNC: 9 MG/DL (ref 8.6–10.5)
CHLORIDE SERPL-SCNC: 107 MMOL/L (ref 98–107)
CO2 SERPL-SCNC: 22 MMOL/L (ref 22–29)
CREAT BLD-MCNC: 1.23 MG/DL (ref 0.76–1.27)
DEPRECATED RDW RBC AUTO: 46.3 FL (ref 37–54)
EOSINOPHIL # BLD AUTO: 0.17 10*3/MM3 (ref 0–0.4)
EOSINOPHIL NFR BLD AUTO: 2.1 % (ref 0.3–6.2)
ERYTHROCYTE [DISTWIDTH] IN BLOOD BY AUTOMATED COUNT: 14.6 % (ref 12.3–15.4)
GFR SERPL CREATININE-BSD FRML MDRD: 62 ML/MIN/1.73
GLOBULIN UR ELPH-MCNC: 3 GM/DL
GLUCOSE BLD-MCNC: 101 MG/DL (ref 65–99)
HCT VFR BLD AUTO: 48.1 % (ref 37.5–51)
HGB BLD-MCNC: 16.2 G/DL (ref 13–17.7)
HOLD SPECIMEN: NORMAL
IMM GRANULOCYTES # BLD AUTO: 0.03 10*3/MM3 (ref 0–0.05)
IMM GRANULOCYTES NFR BLD AUTO: 0.4 % (ref 0–0.5)
LYMPHOCYTES # BLD AUTO: 2.52 10*3/MM3 (ref 0.7–3.1)
LYMPHOCYTES NFR BLD AUTO: 31.1 % (ref 19.6–45.3)
MCH RBC QN AUTO: 29.4 PG (ref 26.6–33)
MCHC RBC AUTO-ENTMCNC: 33.7 G/DL (ref 31.5–35.7)
MCV RBC AUTO: 87.3 FL (ref 79–97)
MONOCYTES # BLD AUTO: 0.74 10*3/MM3 (ref 0.1–0.9)
MONOCYTES NFR BLD AUTO: 9.1 % (ref 5–12)
NEUTROPHILS # BLD AUTO: 4.59 10*3/MM3 (ref 1.7–7)
NEUTROPHILS NFR BLD AUTO: 56.7 % (ref 42.7–76)
NRBC BLD AUTO-RTO: 0 /100 WBC (ref 0–0.2)
PLATELET # BLD AUTO: 202 10*3/MM3 (ref 140–450)
PMV BLD AUTO: 10 FL (ref 6–12)
POTASSIUM BLD-SCNC: 4.3 MMOL/L (ref 3.5–5.2)
PROT SERPL-MCNC: 7 G/DL (ref 6–8.5)
RBC # BLD AUTO: 5.51 10*6/MM3 (ref 4.14–5.8)
SODIUM BLD-SCNC: 140 MMOL/L (ref 136–145)
TROPONIN T SERPL-MCNC: <0.01 NG/ML (ref 0–0.03)
TSH SERPL DL<=0.05 MIU/L-ACNC: 2.21 UIU/ML (ref 0.27–4.2)
WBC NRBC COR # BLD: 8.1 10*3/MM3 (ref 3.4–10.8)
WHOLE BLOOD HOLD SPECIMEN: NORMAL
WHOLE BLOOD HOLD SPECIMEN: NORMAL

## 2019-09-18 PROCEDURE — 85025 COMPLETE CBC W/AUTO DIFF WBC: CPT | Performed by: FAMILY MEDICINE

## 2019-09-18 PROCEDURE — 71046 X-RAY EXAM CHEST 2 VIEWS: CPT

## 2019-09-18 PROCEDURE — 99284 EMERGENCY DEPT VISIT MOD MDM: CPT

## 2019-09-18 PROCEDURE — 84443 ASSAY THYROID STIM HORMONE: CPT | Performed by: FAMILY MEDICINE

## 2019-09-18 PROCEDURE — 93005 ELECTROCARDIOGRAM TRACING: CPT | Performed by: EMERGENCY MEDICINE

## 2019-09-18 PROCEDURE — 93010 ELECTROCARDIOGRAM REPORT: CPT | Performed by: INTERNAL MEDICINE

## 2019-09-18 PROCEDURE — 80053 COMPREHEN METABOLIC PANEL: CPT | Performed by: FAMILY MEDICINE

## 2019-09-18 PROCEDURE — 84484 ASSAY OF TROPONIN QUANT: CPT | Performed by: FAMILY MEDICINE

## 2019-09-19 NOTE — DISCHARGE INSTRUCTIONS
Shortness of Breath, Adult  Shortness of breath is when a person has trouble breathing enough air, or when a person feels like she or he is having trouble breathing in enough air. Shortness of breath could be a sign of medical problem.  Follow these instructions at home:  Pay attention to any changes in your symptoms. Take these actions to help with your condition:  · Do not smoke. Smoking is a common cause of shortness of breath. If you smoke and you need help quitting, ask your health care provider.  · Avoid things that can irritate your airways, such as:  ? Mold.  ? Dust.  ? Air pollution.  ? Chemical fumes.  ? Things that can cause allergy symptoms (allergens), if you have allergies.  · Keep your living space clean and free of mold and dust.  · Rest as needed. Slowly return to your usual activities.  · Take over-the-counter and prescription medicines, including oxygen and inhaled medicines, only as told by your health care provider.  · Keep all follow-up visits as told by your health care provider. This is important.  Contact a health care provider if:  · Your condition does not improve as soon as expected.  · You have a hard time doing your normal activities, even after you rest.  · You have new symptoms.  Get help right away if:  · Your shortness of breath gets worse.  · You have shortness of breath when you are resting.  · You feel light-headed or you faint.  · You have a cough that is not controlled with medicines.  · You cough up blood.  · You have pain with breathing.  · You have pain in your chest, arms, shoulders, or abdomen.  · You have a fever.  · You cannot walk up stairs or exercise the way that you normally do.  This information is not intended to replace advice given to you by your health care provider. Make sure you discuss any questions you have with your health care provider.  Document Released: 09/12/2002 Document Revised: 07/08/2017 Document Reviewed: 05/25/2017  [employer]  Education © 2019 [employer]

## 2019-09-19 NOTE — ED PROVIDER NOTES
Subjective   The patient is a 49-year-old gentleman with a history significant for atrial fibrillation, Wegener's granulomatosis with renal involvement and an MRSA infection that affected his heart valves.  More recently he has been well but this afternoon he had sudden episode of shortness of breath while driving.  He denies chest pain, fever, nausea vomiting.  By the time he got to the ED the symptoms are pretty much resolved.  He is currently without complaint            Review of Systems   Respiratory: Positive for shortness of breath.    All other systems reviewed and are negative.      Past Medical History:   Diagnosis Date   • A-fib (CMS/[facility])    • Chronic renal failure    • Elevated PSA    • Kidney stone    • MRSA bacteremia 2/21/2017   • Purpura (CMS/[facility])    • Wegener's granulomatosis with renal involvement (CMS/[facility])        No Known Allergies    Past Surgical History:   Procedure Laterality Date   • APPENDECTOMY     • INSERTION HEMODIALYSIS CATHETER N/A 1/20/2017    Procedure: INSERTION PERMCATH;  Surgeon: Ian Grimes DO;  Location: [facility] OR;  Service:        No family history on file.    Social History     Socioeconomic History   • Marital status:      Spouse name: Not on file   • Number of children: Not on file   • Years of education: Not on file   • Highest education level: Not on file   Tobacco Use   • Smoking status: Heavy Tobacco Smoker   Substance and Sexual Activity   • Alcohol use: Yes   • Drug use: No           Objective   Physical Exam   Constitutional: He is oriented to person, place, and time. He appears well-developed and well-nourished.   HENT:   Head: Normocephalic and atraumatic.   Right Ear: External ear normal.   Left Ear: External ear normal.   Nose: Nose normal.   Mouth/Throat: Oropharynx is clear and moist.   Eyes: Conjunctivae and EOM are normal.   Neck: Normal range of motion. Neck supple.   Cardiovascular: Normal rate, regular rhythm, normal heart sounds and intact  distal pulses.   Pulmonary/Chest: Effort normal and breath sounds normal.   Abdominal: Soft. Bowel sounds are normal.   Musculoskeletal: Normal range of motion.   Neurological: He is alert and oriented to person, place, and time.   Skin: Skin is warm and dry. Capillary refill takes less than 2 seconds.   Psychiatric: He has a normal mood and affect. His behavior is normal. Judgment and thought content normal.   Nursing note and vitals reviewed.      Procedures           ED Course                  MDM  Number of Diagnoses or Management Options     Amount and/or Complexity of Data Reviewed  Clinical lab tests: reviewed and ordered  Tests in the radiology section of CPT®: reviewed and ordered  Tests in the medicine section of CPT®: reviewed and ordered  Decide to obtain previous medical records or to obtain history from someone other than the patient: yes    Critical Care  Total time providing critical care: < 30 minutes    Patient Progress  Patient progress: stable      Final diagnoses:   Dyspnea, unspecified type     The work-up was essentially negative in the ED including a negative chest x-ray and normal labs.  Patient may have had a brief episode of atrial fibrillation but this certainly nothing on his EKG or anything that was seen on the monitor while he was in the ED.  The patient is currently extremely comfortable and stable for discharge home.  He will follow-up with his primary physician and/or his cardiologist.  He knows to return to the ED for any recurrence of symptoms.         Tommie Yañez MD  09/18/19 1940

## 2020-08-25 ENCOUNTER — APPOINTMENT (OUTPATIENT)
Dept: GENERAL RADIOLOGY | Facility: HOSPITAL | Age: 50
End: 2020-08-25

## 2020-08-25 ENCOUNTER — HOSPITAL ENCOUNTER (OUTPATIENT)
Facility: HOSPITAL | Age: 50
Setting detail: OBSERVATION
Discharge: HOME | End: 2020-08-26
Attending: EMERGENCY MEDICINE | Admitting: INTERNAL MEDICINE

## 2020-08-25 ENCOUNTER — APPOINTMENT (OUTPATIENT)
Dept: CARDIOLOGY | Facility: HOSPITAL | Age: 50
End: 2020-08-25

## 2020-08-25 DIAGNOSIS — R07.9 CHEST PAIN, UNSPECIFIED TYPE: ICD-10-CM

## 2020-08-25 DIAGNOSIS — I16.0 HYPERTENSIVE URGENCY: Primary | ICD-10-CM

## 2020-08-25 PROBLEM — N18.30 CHRONIC KIDNEY DISEASE, STAGE III (MODERATE) (HCC): Status: ACTIVE | Noted: 2020-08-25

## 2020-08-25 PROBLEM — R07.89 CHEST PAIN, ATYPICAL: Status: ACTIVE | Noted: 2020-08-25

## 2020-08-25 PROBLEM — E66.9 OBESITY (BMI 30-39.9): Status: ACTIVE | Noted: 2020-08-25

## 2020-08-25 LAB
ALBUMIN SERPL-MCNC: 4 G/DL (ref 3.5–5.2)
ALBUMIN/GLOB SERPL: 1.4 G/DL
ALP SERPL-CCNC: 81 U/L (ref 39–117)
ALT SERPL W P-5'-P-CCNC: 21 U/L (ref 1–41)
AMPHET+METHAMPHET UR QL: NEGATIVE
AMPHETAMINES UR QL: NEGATIVE
ANION GAP SERPL CALCULATED.3IONS-SCNC: 13 MMOL/L (ref 5–15)
AST SERPL-CCNC: 16 U/L (ref 1–40)
BARBITURATES UR QL SCN: NEGATIVE
BASOPHILS # BLD AUTO: 0.05 10*3/MM3 (ref 0–0.2)
BASOPHILS NFR BLD AUTO: 0.5 % (ref 0–1.5)
BENZODIAZ UR QL SCN: NEGATIVE
BILIRUB SERPL-MCNC: 0.2 MG/DL (ref 0–1.2)
BUN SERPL-MCNC: 17 MG/DL (ref 6–20)
BUN/CREAT SERPL: 12.3 (ref 7–25)
BUPRENORPHINE SERPL-MCNC: NEGATIVE NG/ML
CALCIUM SPEC-SCNC: 9.2 MG/DL (ref 8.6–10.5)
CANNABINOIDS SERPL QL: NEGATIVE
CHLORIDE SERPL-SCNC: 105 MMOL/L (ref 98–107)
CO2 SERPL-SCNC: 24 MMOL/L (ref 22–29)
COCAINE UR QL: NEGATIVE
CREAT SERPL-MCNC: 1.38 MG/DL (ref 0.76–1.27)
DEPRECATED RDW RBC AUTO: 42.6 FL (ref 37–54)
EOSINOPHIL # BLD AUTO: 0.19 10*3/MM3 (ref 0–0.4)
EOSINOPHIL NFR BLD AUTO: 1.8 % (ref 0.3–6.2)
ERYTHROCYTE [DISTWIDTH] IN BLOOD BY AUTOMATED COUNT: 13.4 % (ref 12.3–15.4)
GFR SERPL CREATININE-BSD FRML MDRD: 54 ML/MIN/1.73
GLOBULIN UR ELPH-MCNC: 2.9 GM/DL
GLUCOSE SERPL-MCNC: 144 MG/DL (ref 65–99)
HBA1C MFR BLD: 5.6 % (ref 4.8–5.6)
HCT VFR BLD AUTO: 49.3 % (ref 37.5–51)
HGB BLD-MCNC: 16.3 G/DL (ref 13–17.7)
HOLD SPECIMEN: NORMAL
HOLD SPECIMEN: NORMAL
IMM GRANULOCYTES # BLD AUTO: 0.03 10*3/MM3 (ref 0–0.05)
IMM GRANULOCYTES NFR BLD AUTO: 0.3 % (ref 0–0.5)
LYMPHOCYTES # BLD AUTO: 2.48 10*3/MM3 (ref 0.7–3.1)
LYMPHOCYTES NFR BLD AUTO: 23.8 % (ref 19.6–45.3)
MCH RBC QN AUTO: 28.9 PG (ref 26.6–33)
MCHC RBC AUTO-ENTMCNC: 33.1 G/DL (ref 31.5–35.7)
MCV RBC AUTO: 87.4 FL (ref 79–97)
METHADONE UR QL SCN: NEGATIVE
MONOCYTES # BLD AUTO: 0.58 10*3/MM3 (ref 0.1–0.9)
MONOCYTES NFR BLD AUTO: 5.6 % (ref 5–12)
NEUTROPHILS NFR BLD AUTO: 68 % (ref 42.7–76)
NEUTROPHILS NFR BLD AUTO: 7.11 10*3/MM3 (ref 1.7–7)
NRBC BLD AUTO-RTO: 0 /100 WBC (ref 0–0.2)
OPIATES UR QL: NEGATIVE
OXYCODONE UR QL SCN: NEGATIVE
PCP UR QL SCN: NEGATIVE
PLATELET # BLD AUTO: 210 10*3/MM3 (ref 140–450)
PMV BLD AUTO: 10.1 FL (ref 6–12)
POTASSIUM SERPL-SCNC: 3.9 MMOL/L (ref 3.5–5.2)
PROPOXYPH UR QL: NEGATIVE
PROT SERPL-MCNC: 6.9 G/DL (ref 6–8.5)
RBC # BLD AUTO: 5.64 10*6/MM3 (ref 4.14–5.8)
SARS-COV-2 RNA RESP QL NAA+PROBE: NOT DETECTED
SODIUM SERPL-SCNC: 142 MMOL/L (ref 136–145)
TRICYCLICS UR QL SCN: NEGATIVE
TROPONIN T SERPL-MCNC: <0.01 NG/ML (ref 0–0.03)
TROPONIN T SERPL-MCNC: <0.01 NG/ML (ref 0–0.03)
WBC # BLD AUTO: 10.44 10*3/MM3 (ref 3.4–10.8)
WHOLE BLOOD HOLD SPECIMEN: NORMAL
WHOLE BLOOD HOLD SPECIMEN: NORMAL

## 2020-08-25 PROCEDURE — 25010000002 ENOXAPARIN PER 10 MG: Performed by: INTERNAL MEDICINE

## 2020-08-25 PROCEDURE — 25010000002 PERFLUTREN 6.52 MG/ML SUSPENSION: Performed by: EMERGENCY MEDICINE

## 2020-08-25 PROCEDURE — 96372 THER/PROPH/DIAG INJ SC/IM: CPT

## 2020-08-25 PROCEDURE — 99284 EMERGENCY DEPT VISIT MOD MDM: CPT

## 2020-08-25 PROCEDURE — 80053 COMPREHEN METABOLIC PANEL: CPT | Performed by: EMERGENCY MEDICINE

## 2020-08-25 PROCEDURE — 93306 TTE W/DOPPLER COMPLETE: CPT

## 2020-08-25 PROCEDURE — 93306 TTE W/DOPPLER COMPLETE: CPT | Performed by: INTERNAL MEDICINE

## 2020-08-25 PROCEDURE — G0378 HOSPITAL OBSERVATION PER HR: HCPCS

## 2020-08-25 PROCEDURE — 80306 DRUG TEST PRSMV INSTRMNT: CPT | Performed by: INTERNAL MEDICINE

## 2020-08-25 PROCEDURE — 85025 COMPLETE CBC W/AUTO DIFF WBC: CPT | Performed by: EMERGENCY MEDICINE

## 2020-08-25 PROCEDURE — 84156 ASSAY OF PROTEIN URINE: CPT | Performed by: INTERNAL MEDICINE

## 2020-08-25 PROCEDURE — 93010 ELECTROCARDIOGRAM REPORT: CPT | Performed by: INTERNAL MEDICINE

## 2020-08-25 PROCEDURE — C9803 HOPD COVID-19 SPEC COLLECT: HCPCS

## 2020-08-25 PROCEDURE — 83036 HEMOGLOBIN GLYCOSYLATED A1C: CPT | Performed by: INTERNAL MEDICINE

## 2020-08-25 PROCEDURE — 96365 THER/PROPH/DIAG IV INF INIT: CPT

## 2020-08-25 PROCEDURE — 84484 ASSAY OF TROPONIN QUANT: CPT | Performed by: EMERGENCY MEDICINE

## 2020-08-25 PROCEDURE — 93005 ELECTROCARDIOGRAM TRACING: CPT | Performed by: EMERGENCY MEDICINE

## 2020-08-25 PROCEDURE — 71045 X-RAY EXAM CHEST 1 VIEW: CPT

## 2020-08-25 PROCEDURE — 87635 SARS-COV-2 COVID-19 AMP PRB: CPT | Performed by: EMERGENCY MEDICINE

## 2020-08-25 PROCEDURE — 82570 ASSAY OF URINE CREATININE: CPT | Performed by: INTERNAL MEDICINE

## 2020-08-25 RX ORDER — CLONIDINE HYDROCHLORIDE 0.1 MG/1
0.2 TABLET ORAL ONCE
Status: COMPLETED | OUTPATIENT
Start: 2020-08-25 | End: 2020-08-25

## 2020-08-25 RX ORDER — SODIUM CHLORIDE 0.9 % (FLUSH) 0.9 %
10 SYRINGE (ML) INJECTION AS NEEDED
Status: DISCONTINUED | OUTPATIENT
Start: 2020-08-25 | End: 2020-08-26 | Stop reason: HOSPADM

## 2020-08-25 RX ORDER — LISINOPRIL 20 MG/1
20 TABLET ORAL DAILY
Status: ON HOLD | COMMUNITY
End: 2020-08-26 | Stop reason: SDUPTHER

## 2020-08-25 RX ORDER — ASPIRIN 81 MG/1
324 TABLET, CHEWABLE ORAL ONCE
Status: DISCONTINUED | OUTPATIENT
Start: 2020-08-25 | End: 2020-08-25

## 2020-08-25 RX ORDER — CARVEDILOL 6.25 MG/1
6.25 TABLET ORAL 2 TIMES DAILY WITH MEALS
Status: DISCONTINUED | OUTPATIENT
Start: 2020-08-25 | End: 2020-08-26 | Stop reason: HOSPADM

## 2020-08-25 RX ORDER — LOSARTAN POTASSIUM 50 MG/1
50 TABLET ORAL
Status: DISCONTINUED | OUTPATIENT
Start: 2020-08-25 | End: 2020-08-25

## 2020-08-25 RX ORDER — HYDROCHLOROTHIAZIDE 25 MG/1
25 TABLET ORAL DAILY
Status: DISCONTINUED | OUTPATIENT
Start: 2020-08-25 | End: 2020-08-26 | Stop reason: HOSPADM

## 2020-08-25 RX ORDER — ASPIRIN 81 MG/1
81 TABLET ORAL DAILY
Status: DISCONTINUED | OUTPATIENT
Start: 2020-08-25 | End: 2020-08-26 | Stop reason: HOSPADM

## 2020-08-25 RX ORDER — HYDROCHLOROTHIAZIDE 25 MG/1
25 TABLET ORAL 2 TIMES DAILY
Status: ON HOLD | COMMUNITY
End: 2020-08-26 | Stop reason: SDUPTHER

## 2020-08-25 RX ORDER — SODIUM CHLORIDE 0.9 % (FLUSH) 0.9 %
10 SYRINGE (ML) INJECTION EVERY 12 HOURS SCHEDULED
Status: DISCONTINUED | OUTPATIENT
Start: 2020-08-25 | End: 2020-08-26 | Stop reason: HOSPADM

## 2020-08-25 RX ORDER — ASPIRIN 325 MG
325 TABLET ORAL 2 TIMES DAILY
COMMUNITY

## 2020-08-25 RX ORDER — ONDANSETRON 2 MG/ML
4 INJECTION INTRAMUSCULAR; INTRAVENOUS EVERY 6 HOURS PRN
Status: DISCONTINUED | OUTPATIENT
Start: 2020-08-25 | End: 2020-08-26 | Stop reason: HOSPADM

## 2020-08-25 RX ORDER — ACETAMINOPHEN 160 MG/5ML
650 SOLUTION ORAL EVERY 4 HOURS PRN
Status: DISCONTINUED | OUTPATIENT
Start: 2020-08-25 | End: 2020-08-26 | Stop reason: HOSPADM

## 2020-08-25 RX ORDER — ONDANSETRON 4 MG/1
4 TABLET, FILM COATED ORAL EVERY 6 HOURS PRN
Status: DISCONTINUED | OUTPATIENT
Start: 2020-08-25 | End: 2020-08-26 | Stop reason: HOSPADM

## 2020-08-25 RX ORDER — ACETAMINOPHEN 325 MG/1
650 TABLET ORAL EVERY 4 HOURS PRN
Status: DISCONTINUED | OUTPATIENT
Start: 2020-08-25 | End: 2020-08-26 | Stop reason: HOSPADM

## 2020-08-25 RX ORDER — ACETAMINOPHEN 650 MG/1
650 SUPPOSITORY RECTAL EVERY 4 HOURS PRN
Status: DISCONTINUED | OUTPATIENT
Start: 2020-08-25 | End: 2020-08-26 | Stop reason: HOSPADM

## 2020-08-25 RX ORDER — LISINOPRIL 20 MG/1
20 TABLET ORAL
Status: DISCONTINUED | OUTPATIENT
Start: 2020-08-25 | End: 2020-08-26 | Stop reason: HOSPADM

## 2020-08-25 RX ORDER — MULTIPLE VITAMINS W/ MINERALS TAB 9MG-400MCG
1 TAB ORAL DAILY
COMMUNITY

## 2020-08-25 RX ADMIN — SODIUM CHLORIDE, PRESERVATIVE FREE 10 ML: 5 INJECTION INTRAVENOUS at 21:48

## 2020-08-25 RX ADMIN — ENOXAPARIN SODIUM 40 MG: 40 INJECTION SUBCUTANEOUS at 17:38

## 2020-08-25 RX ADMIN — LISINOPRIL 20 MG: 20 TABLET ORAL at 15:09

## 2020-08-25 RX ADMIN — CLONIDINE HYDROCHLORIDE 0.2 MG: 0.1 TABLET ORAL at 15:05

## 2020-08-25 RX ADMIN — HYDROCHLOROTHIAZIDE 25 MG: 25 TABLET ORAL at 15:05

## 2020-08-25 RX ADMIN — PERFLUTREN 8.48 MG: 6.52 INJECTION, SUSPENSION INTRAVENOUS at 16:35

## 2020-08-25 RX ADMIN — CARVEDILOL 6.25 MG: 6.25 TABLET, FILM COATED ORAL at 21:48

## 2020-08-25 RX ADMIN — SODIUM CHLORIDE 5 MG/HR: 9 INJECTION, SOLUTION INTRAVENOUS at 14:10

## 2020-08-25 NOTE — ED PROVIDER NOTES
Subjective   Patient is a 50-year-old male who presents to the ER with chest pain.  Patient states he was sitting on a bench approximately 30 minutes ago when he developed midsternal chest pain.  Patient describes the pain as sharp and achy with radiation to the right arm.  Patient states the pain was constant since onset but resolved just prior to my evaluation.  Patient did receive aspirin in route but no other medications.  Patient states he has also had shortness of breath.  Patient does have a history of hypertension.  Patient does have a mitral valve replacement but denies any coronary stents.  He denies any fever, abdominal pain, nausea vomiting diarrhea, urinary changes, neurologic changes, cough, leg pain or swelling.          Review of Systems   Constitutional: Negative.    HENT: Negative.    Eyes: Negative.    Respiratory: Positive for shortness of breath.    Cardiovascular: Positive for chest pain.   Gastrointestinal: Negative.    Endocrine: Negative.    Genitourinary: Negative.    Musculoskeletal: Negative.    Skin: Negative.    Allergic/Immunologic: Negative.    Neurological: Negative.    Hematological: Negative.    Psychiatric/Behavioral: Negative.    All other systems reviewed and are negative.      Past Medical History:   Diagnosis Date   • A-fib (CMS/[facility])    • Chronic renal failure    • Elevated PSA    • Hypertension    • Kidney stone    • MRSA bacteremia 2/21/2017   • Purpura (CMS/[facility])    • Wegener's granulomatosis with renal involvement (CMS/[facility])        No Known Allergies    Past Surgical History:   Procedure Laterality Date   • APPENDECTOMY     • INSERTION HEMODIALYSIS CATHETER N/A 1/20/2017    Procedure: INSERTION PERMCATH;  Surgeon: Ian Grimes DO;  Location: [facility] OR;  Service:        History reviewed. No pertinent family history.    Social History     Socioeconomic History   • Marital status:      Spouse name: Not on file   • Number of children: Not on file   • Years of  education: Not on file   • Highest education level: Not on file   Tobacco Use   • Smoking status: Heavy Tobacco Smoker   Substance and Sexual Activity   • Alcohol use: Yes   • Drug use: No           Objective   Physical Exam   Constitutional: He is oriented to person, place, and time. He appears well-developed and well-nourished.   HENT:   Head: Normocephalic and atraumatic.   Eyes: Pupils are equal, round, and reactive to light. Conjunctivae are normal.   Neck: Normal range of motion.   Cardiovascular: Normal rate and regular rhythm.   Murmur heard.  Pulmonary/Chest: Effort normal and breath sounds normal.   Abdominal: Soft. There is no tenderness.   Musculoskeletal: Normal range of motion. He exhibits no edema or deformity.   Neurological: He is alert and oriented to person, place, and time. He has normal strength.   Skin: Skin is warm.   Psychiatric: He has a normal mood and affect. His behavior is normal.   Nursing note and vitals reviewed.      Procedures           ED Course      EKG: Sinus tachycardia with a rate of 105 with PACs, Q waves in the anterior leads    Blood pressure was extremely elevated upon arrival.  Patient was placed on a nicardipine drip.  Concern for hypertensive urgency.     Lab Results (last 24 hours)     Procedure Component Value Units Date/Time    CBC & Differential [[id number]] Collected:  08/25/20 1318    Specimen:  Blood Updated:  08/25/20 [id number]    Narrative:       The following orders were created for panel order CBC & Differential.  Procedure                               Abnormality         Status                     ---------                               -----------         ------                     CBC Auto Differential[[id number]]        Abnormal            Final result                 Please view results for these tests on the individual orders.    Comprehensive Metabolic Panel [[id number]]  (Abnormal) Collected:  08/25/20 1318    Specimen:  Blood Updated:  08/25/20 [id number]      Glucose 144 mg/dL      BUN 17 mg/dL      Creatinine 1.38 mg/dL      Sodium 142 mmol/L      Potassium 3.9 mmol/L      Chloride 105 mmol/L      CO2 24.0 mmol/L      Calcium 9.2 mg/dL      Total Protein 6.9 g/dL      Albumin 4.00 g/dL      ALT (SGPT) 21 U/L      AST (SGOT) 16 U/L      Alkaline Phosphatase 81 U/L      Total Bilirubin 0.2 mg/dL      eGFR Non African Amer 54 mL/min/1.73      Globulin 2.9 gm/dL      A/G Ratio 1.4 g/dL      BUN/Creatinine Ratio 12.3     Anion Gap 13.0 mmol/L     Narrative:       GFR Normal >60  Chronic Kidney Disease <60  Kidney Failure <15      Troponin [[id number]]  (Normal) Collected:  08/25/20 1318    Specimen:  Blood Updated:  08/25/20 1353     Troponin T <0.010 ng/mL     Narrative:       Troponin T Reference Range:  <= 0.03 ng/mL-   Negative for AMI  >0.03 ng/mL-     Abnormal for myocardial necrosis.  Clinicians would have to utilize clinical acumen, EKG, Troponin and serial changes to determine if it is an Acute Myocardial Infarction or myocardial injury due to an underlying chronic condition.       Results may be falsely decreased if patient taking Biotin.      CBC Auto Differential [[id number]]  (Abnormal) Collected:  08/25/20 1318    Specimen:  Blood Updated:  08/25/20 1338     WBC 10.44 10*3/mm3      RBC 5.64 10*6/mm3      Hemoglobin 16.3 g/dL      Hematocrit 49.3 %      MCV 87.4 fL      MCH 28.9 pg      MCHC 33.1 g/dL      RDW 13.4 %      RDW-SD 42.6 fl      MPV 10.1 fL      Platelets 210 10*3/mm3      Neutrophil % 68.0 %      Lymphocyte % 23.8 %      Monocyte % 5.6 %      Eosinophil % 1.8 %      Basophil % 0.5 %      Immature Grans % 0.3 %      Neutrophils, Absolute 7.11 10*3/mm3      Lymphocytes, Absolute 2.48 10*3/mm3      Monocytes, Absolute 0.58 10*3/mm3      Eosinophils, Absolute 0.19 10*3/mm3      Basophils, Absolute 0.05 10*3/mm3      Immature Grans, Absolute 0.03 10*3/mm3      nRBC 0.0 /100 WBC         XR Chest 1 View   Final Result   1. Stable chest with no acute  cardiopulmonary process.           This report was finalized on 08/25/2020 14:24 by Dr. Xavier Velazuqez MD.        Labs showed a minimally elevated creatinine.  Troponin was normal.  Chest x-ray showed no acute findings.  Blood pressure improving on nicardipine drip.  Patient was admitted to the hospitalist service by Dr Garcia for chest pain and hypertensive urgency.                              HEART Score (for prediction of 6-week risk of major adverse cardiac event) reviewed and/or performed as part of the patient evaluation and treatment planning process.  The result associated with this review/performance is: 5       MDM    Final diagnoses:   Hypertensive urgency   Chest pain, unspecified type            Debbie Augustin MD  08/25/20 9534

## 2020-08-25 NOTE — PLAN OF CARE
Problem: Patient Care Overview  Goal: Plan of Care Review  Outcome: Ongoing (interventions implemented as appropriate)  Flowsheets  Taken 8/25/2020 1801  Progress: improving  Outcome Summary: PATIENT ADMITTED TO 4B FROM ER. DENIES CHEST PAIN AT THIS TIME. /97 ON ADMIT TO FLOOR. NSR ON TELE. ADMISSION ASSESSMENTS COMPLETE. NPO AT 12 FOR STRESS. CONTINUE TO MONITOR.

## 2020-08-25 NOTE — H&P
"    [facility] Medicine Services  HISTORY AND PHYSICAL    Date of Admission: 8/25/2020  Primary Care Physician: Moises Montes MD    Subjective     Chief Complaint: chest pain     History of Present Illness    Mr. Andrea is a 51 yo M with a past medical history of vasculitis, hypertension.  Patient presents with an episode of about 40 minutes of substernal chest pain described initially as \"sharp\" that progressed to an \"ache\".  Patient described pain as severe.  It radiated down his right arm with associated shortness of breath.  The patient reportedly did not have any exertional or rest correlation.  Patient has not had pain like this before.  Patient was noted to have significantly elevated Bps with SBP > 200 and DBP > 120 upon presentation.  He was initially started on a cardene gtt by ER.  After I discussed with the patient he indicates he has not been consistent with taking his BP medications and indicates that he has been out of some of them for several days maybe even a \"couple weeks\".  Cardene gtt was discontinued and started PO medications with good response.      Denies fever or chills.  Patient smokes 1 ppd for about 30 years.  Patient denies known history of CAD.         Review of Systems   Constitutional: Negative for activity change, appetite change, chills, diaphoresis, fatigue and fever.   HENT: Negative for congestion and trouble swallowing.    Respiratory: Negative for cough and shortness of breath.    Cardiovascular: Positive for chest pain (resolved). Negative for palpitations.   Gastrointestinal: Negative for abdominal distention, abdominal pain, constipation, diarrhea and nausea.   Genitourinary: Negative for dysuria.   Musculoskeletal: Negative for arthralgias and myalgias.   All other systems reviewed and are negative.       Otherwise complete ROS reviewed and negative except as mentioned in the HPI.    Past Medical History:   Past Medical History: "   Diagnosis Date   • A-fib (CMS/[facility])    • Chronic renal failure    • Elevated PSA    • Hypertension    • Kidney stone    • MRSA bacteremia 2/21/2017   • Purpura (CMS/[facility])    • Wegener's granulomatosis with renal involvement (CMS/[facility])      Past Surgical History:  Past Surgical History:   Procedure Laterality Date   • APPENDECTOMY     • INSERTION HEMODIALYSIS CATHETER N/A 1/20/2017    Procedure: INSERTION PERMCATH;  Surgeon: Ian Grimes DO;  Location: [facility] OR;  Service:      Social History:  reports that he has been smoking. He does not have any smokeless tobacco history on file. He reports that he drinks alcohol. He reports that he does not use drugs.    Family History: Dad - Alcoholic; CAD; hypertension - Mother nad mothers side of family healthy    Allergies:  No Known Allergies  Medications:  Prior to Admission medications    Medication Sig Start Date End Date Taking? Authorizing Provider   albuterol (PROVENTIL HFA;VENTOLIN HFA) 108 (90 BASE) MCG/ACT inhaler Every 6 (Six) Hours.    Provider, MD Marco   bumetanide (BUMEX) 1 MG tablet Take 1 tablet by mouth Daily. 2/23/17   Gio Rivera APRN   DAPTOmycin 810 mg in sodium chloride 0.9 % 50 mL Infuse 810 mg into a venous catheter Daily. Indications: Endocarditis 2/23/17   Gio Rivera APRN   diltiaZEM CD (CARDIZEM CD) 360 MG 24 hr capsule Take 1 capsule by mouth Daily. 2/21/17   Slime Alexis APRN   insulin detemir (LEVEMIR) 100 UNIT/ML injection Inject 10 Units under the skin Every Morning. 2/23/17   Gio Rivera APRN   insulin lispro (humaLOG) 100 UNIT/ML injection Inject 2-7 Units under the skin 4 (Four) Times a Day Before Meals & at Bedtime. 2/23/17   Gio Rivera APRN   metoprolol tartrate 75 MG tablet Take 75 mg by mouth Every 12 (Twelve) Hours. 2/21/17   Slime Alexis APRN   predniSONE (DELTASONE) 20 MG tablet Take 2 tablets by mouth Daily With Breakfast. 2/23/17   Gio Rivera APRN   tamsulosin (FLOMAX)  "0.4 MG capsule 24 hr capsule Take 1 capsule by mouth Every Night. 1/27/17   Efrain Neva D, APRN     Objective     Vital Signs: BP (!) 166/125   Pulse 94   Temp 98.5 °F (36.9 °C) (Oral)   Resp 18   Ht 185.4 cm (73\")   Wt 124 kg (274 lb)   SpO2 92%   BMI 36.15 kg/m²   Physical Exam   Constitutional: He is oriented to person, place, and time. No distress.   HENT:   Head: Normocephalic and atraumatic.   Eyes: Conjunctivae are normal. No scleral icterus.   Neck: Neck supple. No JVD present.   Cardiovascular: Normal rate and regular rhythm.   No murmur heard.  Pulmonary/Chest: Effort normal and breath sounds normal. No stridor. No respiratory distress. He has no wheezes.   Abdominal: Soft. Bowel sounds are normal. He exhibits no distension and no mass. There is no tenderness. There is no guarding.   Musculoskeletal: He exhibits no edema.   Neurological: He is alert and oriented to person, place, and time.   Skin: Skin is warm and dry. He is not diaphoretic. No erythema.   Psychiatric: He has a normal mood and affect. His behavior is normal.   Nursing note and vitals reviewed.           Results Reviewed:  Lab Results (last 24 hours)     Procedure Component Value Units Date/Time    Troponin [[id number]] Collected:  08/25/20 1531    Specimen:  Blood from Hand, Right Updated:  08/25/20 1542    Urine Drug Screen - Urine, Clean Catch [[id number]]  (Normal) Collected:  08/25/20 1501    Specimen:  Urine, Clean Catch Updated:  08/25/20 1519     THC, Screen, Urine Negative     Phencyclidine (PCP), Urine Negative     Cocaine Screen, Urine Negative     Methamphetamine, Ur Negative     Opiate Screen Negative     Amphetamine Screen, Urine Negative     Benzodiazepine Screen, Urine Negative     Tricyclic Antidepressants Screen Negative     Methadone Screen, Urine Negative     Barbiturates Screen, Urine Negative     Oxycodone Screen, Urine Negative     Propoxyphene Screen Negative     Buprenorphine, Screen, Urine Negative    " Narrative:       Cutoff For Drugs Screened:    Amphetamines               500 ng/ml  Barbiturates               200 ng/ml  Benzodiazepines            150 ng/ml  Cocaine                    150 ng/ml  Methadone                  200 ng/ml  Opiates                    100 ng/ml  Phencyclidine               25 ng/ml  THC                            50 ng/ml  Methamphetamine            500 ng/ml  Tricyclic Antidepressants  300 ng/ml  Oxycodone                  100 ng/ml  Propoxyphene               300 ng/ml  Buprenorphine               10 ng/ml    The normal value for all drugs tested is negative. This report includes unconfirmed screening results, with the cutoff values listed, to be used for medical treatment purposes only.  Unconfirmed results must not be used for non-medical purposes such as employment or legal testing.  Clinical consideration should be applied to any drug of abuse test, particularly when unconfirmed results are used.      COVID PRE-OP / PRE-PROCEDURE SCREENING ORDER (NO ISOLATION) - Swab, Nasopharynx [[id number]] Collected:  08/25/20 1445    Specimen:  Swab from Nasopharynx Updated:  08/25/20 1458    Narrative:       The following orders were created for panel order COVID PRE-OP / PRE-PROCEDURE SCREENING ORDER (NO ISOLATION) - Swab, Nasopharynx.  Procedure                               Abnormality         Status                     ---------                               -----------         ------                     COVID-19,CEPHEID,COR/HOMERO...[[id number]]                      In process                   Please view results for these tests on the individual orders.    COVID-19,CEPHEID,COR/HOMERO/PAD IN-HOUSE(OR EMERGENT/ADD-ON),NP SWAB IN TRANSPORT MEDIA 3-4 HR TAT - Swab, Nasopharynx [[id number]] Collected:  08/25/20 1445    Specimen:  Swab from Nasopharynx Updated:  08/25/20 1458    Protein / Creatinine Ratio, Urine - Urine, Clean Catch [[id number]] Collected:  08/25/20 1450    Specimen:  Urine, Clean  Catch Updated:  08/25/20 1456    [place] Draw [[id number]] Collected:  08/25/20 1318    Specimen:  Blood Updated:  08/25/20 1431    Narrative:       The following orders were created for panel order [place] Draw.  Procedure                               Abnormality         Status                     ---------                               -----------         ------                     Light Blue Top[[id number]]                                   Final result               Green Top (Gel)[[id number]]                                  Final result               Lavender Top[[id number]]                                     Final result               Red Top[[id number]]                                          Final result                 Please view results for these tests on the individual orders.    Light Blue Top [[id number]] Collected:  08/25/20 1318    Specimen:  Blood Updated:  08/25/20 1431     Extra Tube hold for add-on     Comment: Auto resulted       Green Top (Gel) [[id number]] Collected:  08/25/20 1318    Specimen:  Blood Updated:  08/25/20 1431     Extra Tube Hold for add-ons.     Comment: Auto resulted.       Lavender Top [[id number]] Collected:  08/25/20 1318    Specimen:  Blood Updated:  08/25/20 1431     Extra Tube hold for add-on     Comment: Auto resulted       Red Top [[id number]] Collected:  08/25/20 1318    Specimen:  Blood Updated:  08/25/20 1431     Extra Tube Hold for add-ons.     Comment: Auto resulted.       Comprehensive Metabolic Panel [[id number]]  (Abnormal) Collected:  08/25/20 1318    Specimen:  Blood Updated:  08/25/20 1356     Glucose 144 mg/dL      BUN 17 mg/dL      Creatinine 1.38 mg/dL      Sodium 142 mmol/L      Potassium 3.9 mmol/L      Chloride 105 mmol/L      CO2 24.0 mmol/L      Calcium 9.2 mg/dL      Total Protein 6.9 g/dL      Albumin 4.00 g/dL      ALT (SGPT) 21 U/L      AST (SGOT) 16 U/L      Alkaline Phosphatase 81 U/L      Total Bilirubin 0.2 mg/dL      eGFR Non  Amer 54  mL/min/1.73      Globulin 2.9 gm/dL      A/G Ratio 1.4 g/dL      BUN/Creatinine Ratio 12.3     Anion Gap 13.0 mmol/L     Narrative:       GFR Normal >60  Chronic Kidney Disease <60  Kidney Failure <15      Troponin [[id number]]  (Normal) Collected:  08/25/20 1318    Specimen:  Blood Updated:  08/25/20 1353     Troponin T <0.010 ng/mL     Narrative:       Troponin T Reference Range:  <= 0.03 ng/mL-   Negative for AMI  >0.03 ng/mL-     Abnormal for myocardial necrosis.  Clinicians would have to utilize clinical acumen, EKG, Troponin and serial changes to determine if it is an Acute Myocardial Infarction or myocardial injury due to an underlying chronic condition.       Results may be falsely decreased if patient taking Biotin.      CBC & Differential [[id number]] Collected:  08/25/20 1318    Specimen:  Blood Updated:  08/25/20 1338    Narrative:       The following orders were created for panel order CBC & Differential.  Procedure                               Abnormality         Status                     ---------                               -----------         ------                     CBC Auto Differential[[id number]]        Abnormal            Final result                 Please view results for these tests on the individual orders.    CBC Auto Differential [[id number]]  (Abnormal) Collected:  08/25/20 1318    Specimen:  Blood Updated:  08/25/20 1338     WBC 10.44 10*3/mm3      RBC 5.64 10*6/mm3      Hemoglobin 16.3 g/dL      Hematocrit 49.3 %      MCV 87.4 fL      MCH 28.9 pg      MCHC 33.1 g/dL      RDW 13.4 %      RDW-SD 42.6 fl      MPV 10.1 fL      Platelets 210 10*3/mm3      Neutrophil % 68.0 %      Lymphocyte % 23.8 %      Monocyte % 5.6 %      Eosinophil % 1.8 %      Basophil % 0.5 %      Immature Grans % 0.3 %      Neutrophils, Absolute 7.11 10*3/mm3      Lymphocytes, Absolute 2.48 10*3/mm3      Monocytes, Absolute 0.58 10*3/mm3      Eosinophils, Absolute 0.19 10*3/mm3      Basophils, Absolute 0.05  10*3/mm3      Immature Grans, Absolute 0.03 10*3/mm3      nRBC 0.0 /100 WBC         Imaging Results (Last 24 Hours)     Procedure Component Value Units Date/Time    XR Chest 1 View [[id number]] Collected:  08/25/20 1423     Updated:  08/25/20 1427    Narrative:       Frontal upright radiograph of the chest 8/25/2020 2:13 PM CDT     COMPARISON: September 18, 2019.     FINDINGS:   The lungs are clear. Cardiac silhouette is normal. Left atrial appendage  clamp is present. Prosthetic cardiac valve is noted. Wires are present  previous median sternotomy. The superior wire is fractured but unchanged  cardiac monitoring leads are present..      The osseous structures and surrounding soft tissues demonstrate no acute  abnormality.       Impression:       1. Stable chest with no acute cardiopulmonary process.        This report was finalized on 08/25/2020 14:24 by Dr. Xavier Velazquez MD.        I have personally reviewed and interpreted the radiology studies and ECG obtained at time of admission.     Assessment / Plan     Assessment:   Active Hospital Problems    Diagnosis   • Hypertensive urgency   • Obesity (BMI 30-39.9)   • Chronic kidney disease, stage III (moderate) (CMS/HCC)   • Chest pain, atypical   • Wegener's granulomatosis with renal involvement (CMS/HCC)   • Tobacco abuse         Plan:   1.  Admit to telemetry  2.  Start carvedilol, lisinopril and HCTZ (hold home metoprolol in favor of carvedilol)  3.  Echo   4.  Serial troponins  5.  Stress echo in AM   6.  AM labs including lipid profile, CMP, trop, CBC  7.  Hemoglobin A1c  8.  NPO after midnight  9.  Lovenox for VTE PPx  10.  Standing telemetry orders   11.  Nicotine replacement available as needed  12.  Counseled on smoking cessation and diet      Code Status: Full     I discussed the patient's findings and my recommendations with the patient and RN    Estimated length of stay 1-2    Patient seen and examined by me on 8/25/2020 at 1544.    Electronically signed  by Brandt Garcia MD, 08/25/20, 15:44.

## 2020-08-26 ENCOUNTER — APPOINTMENT (OUTPATIENT)
Dept: CARDIOLOGY | Facility: HOSPITAL | Age: 50
End: 2020-08-26

## 2020-08-26 VITALS
BODY MASS INDEX: 36.73 KG/M2 | TEMPERATURE: 97.4 F | WEIGHT: 271.17 LBS | HEIGHT: 72 IN | SYSTOLIC BLOOD PRESSURE: 148 MMHG | DIASTOLIC BLOOD PRESSURE: 100 MMHG | RESPIRATION RATE: 22 BRPM | HEART RATE: 75 BPM | OXYGEN SATURATION: 93 %

## 2020-08-26 LAB
ALBUMIN SERPL-MCNC: 3.7 G/DL (ref 3.5–5.2)
ALBUMIN/GLOB SERPL: 1.4 G/DL
ALP SERPL-CCNC: 68 U/L (ref 39–117)
ALT SERPL W P-5'-P-CCNC: 20 U/L (ref 1–41)
ANION GAP SERPL CALCULATED.3IONS-SCNC: 12 MMOL/L (ref 5–15)
AST SERPL-CCNC: 17 U/L (ref 1–40)
BASOPHILS # BLD AUTO: 0.05 10*3/MM3 (ref 0–0.2)
BASOPHILS NFR BLD AUTO: 0.5 % (ref 0–1.5)
BH CV ECHO MEAS - AO MAX PG (FULL): 3 MMHG
BH CV ECHO MEAS - AO MAX PG: 5.9 MMHG
BH CV ECHO MEAS - AO MEAN PG (FULL): 1 MMHG
BH CV ECHO MEAS - AO MEAN PG: 3 MMHG
BH CV ECHO MEAS - AO ROOT AREA (BSA CORRECTED): 1.7
BH CV ECHO MEAS - AO ROOT AREA: 13.2 CM^2
BH CV ECHO MEAS - AO ROOT DIAM: 4.1 CM
BH CV ECHO MEAS - AO V2 MAX: 121 CM/SEC
BH CV ECHO MEAS - AO V2 MEAN: 83.2 CM/SEC
BH CV ECHO MEAS - AO V2 VTI: 20.8 CM
BH CV ECHO MEAS - AVA(I,A): 3.2 CM^2
BH CV ECHO MEAS - AVA(I,D): 3.2 CM^2
BH CV ECHO MEAS - AVA(V,A): 2.6 CM^2
BH CV ECHO MEAS - AVA(V,D): 2.6 CM^2
BH CV ECHO MEAS - BSA(HAYCOCK): 2.6 M^2
BH CV ECHO MEAS - BSA: 2.5 M^2
BH CV ECHO MEAS - BZI_BMI: 36.2 KILOGRAMS/M^2
BH CV ECHO MEAS - BZI_METRIC_HEIGHT: 185.4 CM
BH CV ECHO MEAS - BZI_METRIC_WEIGHT: 124.3 KG
BH CV ECHO MEAS - EDV(CUBED): 99.3 ML
BH CV ECHO MEAS - EDV(MOD-SP4): 135 ML
BH CV ECHO MEAS - EDV(TEICH): 98.8 ML
BH CV ECHO MEAS - EF(CUBED): 88.2 %
BH CV ECHO MEAS - EF(MOD-SP4): 57.6 %
BH CV ECHO MEAS - EF(TEICH): 82.3 %
BH CV ECHO MEAS - ESV(CUBED): 11.7 ML
BH CV ECHO MEAS - ESV(MOD-SP4): 57.2 ML
BH CV ECHO MEAS - ESV(TEICH): 17.5 ML
BH CV ECHO MEAS - FS: 51 %
BH CV ECHO MEAS - IVS/LVPW: 0.82
BH CV ECHO MEAS - IVSD: 1.2 CM
BH CV ECHO MEAS - LA DIMENSION: 5.1 CM
BH CV ECHO MEAS - LA/AO: 1.2
BH CV ECHO MEAS - LAT PEAK E' VEL: 4.5 CM/SEC
BH CV ECHO MEAS - LV DIASTOLIC VOL/BSA (35-75): 54.9 ML/M^2
BH CV ECHO MEAS - LV MASS(C)D: 229.9 GRAMS
BH CV ECHO MEAS - LV MASS(C)DI: 93.4 GRAMS/M^2
BH CV ECHO MEAS - LV MAX PG: 2.8 MMHG
BH CV ECHO MEAS - LV MEAN PG: 2 MMHG
BH CV ECHO MEAS - LV SYSTOLIC VOL/BSA (12-30): 23.3 ML/M^2
BH CV ECHO MEAS - LV V1 MAX: 83.9 CM/SEC
BH CV ECHO MEAS - LV V1 MEAN: 59 CM/SEC
BH CV ECHO MEAS - LV V1 VTI: 17.3 CM
BH CV ECHO MEAS - LVIDD: 4.6 CM
BH CV ECHO MEAS - LVIDS: 2.3 CM
BH CV ECHO MEAS - LVLD AP4: 8.6 CM
BH CV ECHO MEAS - LVLS AP4: 7.6 CM
BH CV ECHO MEAS - LVOT AREA (M): 3.8 CM^2
BH CV ECHO MEAS - LVOT AREA: 3.8 CM^2
BH CV ECHO MEAS - LVOT DIAM: 2.2 CM
BH CV ECHO MEAS - LVPWD: 1.4 CM
BH CV ECHO MEAS - MED PEAK E' VEL: 6.31 CM/SEC
BH CV ECHO MEAS - MV A MAX VEL: 99.8 CM/SEC
BH CV ECHO MEAS - MV DEC SLOPE: 496 CM/SEC^2
BH CV ECHO MEAS - MV DEC TIME: 0.35 SEC
BH CV ECHO MEAS - MV E MAX VEL: 139 CM/SEC
BH CV ECHO MEAS - MV E/A: 1.4
BH CV ECHO MEAS - MV MAX PG: 7.7 MMHG
BH CV ECHO MEAS - MV MEAN PG: 3 MMHG
BH CV ECHO MEAS - MV P1/2T MAX VEL: 143 CM/SEC
BH CV ECHO MEAS - MV P1/2T: 84.4 MSEC
BH CV ECHO MEAS - MV V2 MAX: 139 CM/SEC
BH CV ECHO MEAS - MV V2 MEAN: 87.3 CM/SEC
BH CV ECHO MEAS - MV V2 VTI: 37.5 CM
BH CV ECHO MEAS - MVA P1/2T LCG: 1.5 CM^2
BH CV ECHO MEAS - MVA(P1/2T): 2.6 CM^2
BH CV ECHO MEAS - MVA(VTI): 1.8 CM^2
BH CV ECHO MEAS - RAP SYSTOLE: 5 MMHG
BH CV ECHO MEAS - RVSP: 14 MMHG
BH CV ECHO MEAS - SI(AO): 111.6 ML/M^2
BH CV ECHO MEAS - SI(CUBED): 35.6 ML/M^2
BH CV ECHO MEAS - SI(LVOT): 26.7 ML/M^2
BH CV ECHO MEAS - SI(MOD-SP4): 31.6 ML/M^2
BH CV ECHO MEAS - SI(TEICH): 33 ML/M^2
BH CV ECHO MEAS - SV(AO): 274.6 ML
BH CV ECHO MEAS - SV(CUBED): 87.6 ML
BH CV ECHO MEAS - SV(LVOT): 65.8 ML
BH CV ECHO MEAS - SV(MOD-SP4): 77.8 ML
BH CV ECHO MEAS - SV(TEICH): 81.3 ML
BH CV ECHO MEAS - TR MAX VEL: 150 CM/SEC
BH CV ECHO MEASUREMENTS AVERAGE E/E' RATIO: 25.72
BH CV STRESS BP STAGE 1: NORMAL
BH CV STRESS DURATION MIN STAGE 1: 3
BH CV STRESS DURATION MIN STAGE 2: 2
BH CV STRESS DURATION SEC STAGE 1: 0
BH CV STRESS DURATION SEC STAGE 2: 0
BH CV STRESS GRADE STAGE 1: 10
BH CV STRESS GRADE STAGE 2: 12
BH CV STRESS HR STAGE 1: 112
BH CV STRESS HR STAGE 2: 122
BH CV STRESS METS STAGE 1: 5
BH CV STRESS METS STAGE 2: 7.5
BH CV STRESS PROTOCOL 1: NORMAL
BH CV STRESS PROTOCOL 2 BP STAGE 1: NORMAL
BH CV STRESS PROTOCOL 2 BP STAGE 2: NORMAL
BH CV STRESS PROTOCOL 2 BP STAGE 3: 144
BH CV STRESS PROTOCOL 2 DOSE DOBUTAMINE STAGE 1: 10
BH CV STRESS PROTOCOL 2 DOSE DOBUTAMINE STAGE 2: 20
BH CV STRESS PROTOCOL 2 DOSE DOBUTAMINE STAGE 3: 30
BH CV STRESS PROTOCOL 2 DURATION MIN STAGE 1: 3
BH CV STRESS PROTOCOL 2 DURATION MIN STAGE 2: 3
BH CV STRESS PROTOCOL 2 DURATION MIN STAGE 3: 3
BH CV STRESS PROTOCOL 2 DURATION SEC STAGE 1: 0
BH CV STRESS PROTOCOL 2 DURATION SEC STAGE 2: 0
BH CV STRESS PROTOCOL 2 DURATION SEC STAGE 3: 0
BH CV STRESS PROTOCOL 2 HR STAGE 1: 76
BH CV STRESS PROTOCOL 2 HR STAGE 2: 112
BH CV STRESS PROTOCOL 2 HR STAGE 3: NORMAL
BH CV STRESS PROTOCOL 2 STAGE 1: 1
BH CV STRESS PROTOCOL 2 STAGE 2: 2
BH CV STRESS PROTOCOL 2 STAGE 3: 3
BH CV STRESS PROTOCOL 2: NORMAL
BH CV STRESS RECOVERY BP: NORMAL MMHG
BH CV STRESS RECOVERY HR: 98 BPM
BH CV STRESS SPEED STAGE 1: 1.7
BH CV STRESS SPEED STAGE 2: 2.5
BH CV STRESS STAGE 1: 1
BH CV STRESS STAGE 2: 2
BILIRUB SERPL-MCNC: 0.3 MG/DL (ref 0–1.2)
BUN SERPL-MCNC: 17 MG/DL (ref 6–20)
BUN/CREAT SERPL: 12.3 (ref 7–25)
CALCIUM SPEC-SCNC: 8.9 MG/DL (ref 8.6–10.5)
CHLORIDE SERPL-SCNC: 98 MMOL/L (ref 98–107)
CHOLEST SERPL-MCNC: 150 MG/DL (ref 0–200)
CO2 SERPL-SCNC: 28 MMOL/L (ref 22–29)
CREAT SERPL-MCNC: 1.38 MG/DL (ref 0.76–1.27)
CREAT UR-MCNC: 129.7 MG/DL
DEPRECATED RDW RBC AUTO: 43.4 FL (ref 37–54)
EOSINOPHIL # BLD AUTO: 0.21 10*3/MM3 (ref 0–0.4)
EOSINOPHIL NFR BLD AUTO: 2.2 % (ref 0.3–6.2)
ERYTHROCYTE [DISTWIDTH] IN BLOOD BY AUTOMATED COUNT: 13.3 % (ref 12.3–15.4)
GFR SERPL CREATININE-BSD FRML MDRD: 54 ML/MIN/1.73
GLOBULIN UR ELPH-MCNC: 2.7 GM/DL
GLUCOSE SERPL-MCNC: 103 MG/DL (ref 65–99)
HCT VFR BLD AUTO: 48 % (ref 37.5–51)
HDLC SERPL-MCNC: 39 MG/DL (ref 40–60)
HGB BLD-MCNC: 15.5 G/DL (ref 13–17.7)
IMM GRANULOCYTES # BLD AUTO: 0.03 10*3/MM3 (ref 0–0.05)
IMM GRANULOCYTES NFR BLD AUTO: 0.3 % (ref 0–0.5)
LDLC SERPL CALC-MCNC: 72 MG/DL (ref 0–100)
LDLC/HDLC SERPL: 1.85 {RATIO}
LEFT ATRIUM VOLUME INDEX: 65 ML/M2
LV EF 2D ECHO EST: 58 %
LYMPHOCYTES # BLD AUTO: 3.22 10*3/MM3 (ref 0.7–3.1)
LYMPHOCYTES NFR BLD AUTO: 34.1 % (ref 19.6–45.3)
MAXIMAL PREDICTED HEART RATE: 169 BPM
MCH RBC QN AUTO: 28.9 PG (ref 26.6–33)
MCHC RBC AUTO-ENTMCNC: 32.3 G/DL (ref 31.5–35.7)
MCV RBC AUTO: 89.4 FL (ref 79–97)
MONOCYTES # BLD AUTO: 0.74 10*3/MM3 (ref 0.1–0.9)
MONOCYTES NFR BLD AUTO: 7.8 % (ref 5–12)
NEUTROPHILS NFR BLD AUTO: 5.18 10*3/MM3 (ref 1.7–7)
NEUTROPHILS NFR BLD AUTO: 55.1 % (ref 42.7–76)
NRBC BLD AUTO-RTO: 0 /100 WBC (ref 0–0.2)
PERCENT MAX PREDICTED HR: 85.21 %
PLATELET # BLD AUTO: 189 10*3/MM3 (ref 140–450)
PMV BLD AUTO: 10.2 FL (ref 6–12)
POTASSIUM SERPL-SCNC: 4 MMOL/L (ref 3.5–5.2)
PROT SERPL-MCNC: 6.4 G/DL (ref 6–8.5)
PROT UR-MCNC: 232 MG/DL
PROT/CREAT UR: 1788.7 MG/G CREA (ref 0–200)
RBC # BLD AUTO: 5.37 10*6/MM3 (ref 4.14–5.8)
SODIUM SERPL-SCNC: 138 MMOL/L (ref 136–145)
STRESS BASELINE BP: NORMAL MMHG
STRESS BASELINE HR: 75 BPM
STRESS PERCENT HR: 100 %
STRESS POST EXERCISE DUR MIN: 5 MIN
STRESS POST EXERCISE DUR SEC: 2 SEC
STRESS POST PEAK BP: NORMAL MMHG
STRESS POST PEAK HR: 144 BPM
STRESS TARGET HR: 144 BPM
TRIGL SERPL-MCNC: 195 MG/DL (ref 0–150)
TROPONIN T SERPL-MCNC: <0.01 NG/ML (ref 0–0.03)
VLDLC SERPL-MCNC: 39 MG/DL
WBC # BLD AUTO: 9.43 10*3/MM3 (ref 3.4–10.8)

## 2020-08-26 PROCEDURE — 85025 COMPLETE CBC W/AUTO DIFF WBC: CPT | Performed by: INTERNAL MEDICINE

## 2020-08-26 PROCEDURE — 93352 ADMIN ECG CONTRAST AGENT: CPT | Performed by: INTERNAL MEDICINE

## 2020-08-26 PROCEDURE — 25010000002 PERFLUTREN 6.52 MG/ML SUSPENSION: Performed by: INTERNAL MEDICINE

## 2020-08-26 PROCEDURE — 25010000003 DOBUTAMINE PER 250 MG: Performed by: INTERNAL MEDICINE

## 2020-08-26 PROCEDURE — 93017 CV STRESS TEST TRACING ONLY: CPT

## 2020-08-26 PROCEDURE — G0378 HOSPITAL OBSERVATION PER HR: HCPCS

## 2020-08-26 PROCEDURE — 93350 STRESS TTE ONLY: CPT

## 2020-08-26 PROCEDURE — 93350 STRESS TTE ONLY: CPT | Performed by: INTERNAL MEDICINE

## 2020-08-26 PROCEDURE — 94799 UNLISTED PULMONARY SVC/PX: CPT

## 2020-08-26 PROCEDURE — 80053 COMPREHEN METABOLIC PANEL: CPT | Performed by: INTERNAL MEDICINE

## 2020-08-26 PROCEDURE — 25010000003 ATROPINE SULFATE: Performed by: INTERNAL MEDICINE

## 2020-08-26 PROCEDURE — 80061 LIPID PANEL: CPT | Performed by: INTERNAL MEDICINE

## 2020-08-26 PROCEDURE — 93018 CV STRESS TEST I&R ONLY: CPT | Performed by: INTERNAL MEDICINE

## 2020-08-26 PROCEDURE — 84484 ASSAY OF TROPONIN QUANT: CPT | Performed by: INTERNAL MEDICINE

## 2020-08-26 RX ORDER — DOBUTAMINE HYDROCHLORIDE 100 MG/100ML
10-50 INJECTION INTRAVENOUS CONTINUOUS
Status: DISCONTINUED | OUTPATIENT
Start: 2020-08-26 | End: 2020-08-26

## 2020-08-26 RX ORDER — HYDROCHLOROTHIAZIDE 25 MG/1
25 TABLET ORAL DAILY
Qty: 30 TABLET | Refills: 2 | Status: SHIPPED | OUTPATIENT
Start: 2020-08-26

## 2020-08-26 RX ORDER — CARVEDILOL 6.25 MG/1
6.25 TABLET ORAL 2 TIMES DAILY WITH MEALS
Qty: 60 TABLET | Refills: 2 | Status: SHIPPED | OUTPATIENT
Start: 2020-08-26 | End: 2022-05-06

## 2020-08-26 RX ORDER — LISINOPRIL 20 MG/1
20 TABLET ORAL DAILY
Qty: 30 TABLET | Refills: 2 | Status: SHIPPED | OUTPATIENT
Start: 2020-08-26 | End: 2023-03-15

## 2020-08-26 RX ADMIN — HYDROCHLOROTHIAZIDE 25 MG: 25 TABLET ORAL at 09:13

## 2020-08-26 RX ADMIN — LISINOPRIL 20 MG: 20 TABLET ORAL at 09:12

## 2020-08-26 RX ADMIN — CARVEDILOL 6.25 MG: 6.25 TABLET, FILM COATED ORAL at 09:12

## 2020-08-26 RX ADMIN — ASPIRIN 81 MG: 81 TABLET ORAL at 09:13

## 2020-08-26 RX ADMIN — Medication 10 MCG/KG/MIN: at 08:17

## 2020-08-26 RX ADMIN — PERFLUTREN 8.48 MG: 6.52 INJECTION, SUSPENSION INTRAVENOUS at 07:40

## 2020-08-26 RX ADMIN — SODIUM CHLORIDE, PRESERVATIVE FREE 10 ML: 5 INJECTION INTRAVENOUS at 09:13

## 2020-08-26 NOTE — DISCHARGE SUMMARY
[facility] Medicine Services  DISCHARGE SUMMARY       Date of Admission: 8/25/2020  Date of Discharge:  8/26/2020  Primary Care Physician: Moises Montes MD    Presenting Problem/History of Present Illness:  Chest pain     Final Discharge Diagnoses:  Active Hospital Problems    Diagnosis   • **Chest pain, atypical   • Hypertensive urgency   • Obesity (BMI 30-39.9)   • Chronic kidney disease, stage III (moderate) (CMS/HCC)   • Wegener's granulomatosis with renal involvement (CMS/[facility])   • Tobacco abuse       Consults: None    Procedures Performed: None    Pertinent Test Results:   Imaging Results (Last 7 Days)     Procedure Component Value Units Date/Time    XR Chest 1 View [[id number]] Collected:  08/25/20 1423     Updated:  08/25/20 1427    Narrative:       Frontal upright radiograph of the chest 8/25/2020 2:13 PM CDT     COMPARISON: September 18, 2019.     FINDINGS:   The lungs are clear. Cardiac silhouette is normal. Left atrial appendage  clamp is present. Prosthetic cardiac valve is noted. Wires are present  previous median sternotomy. The superior wire is fractured but unchanged  cardiac monitoring leads are present..      The osseous structures and surrounding soft tissues demonstrate no acute  abnormality.       Impression:       1. Stable chest with no acute cardiopulmonary process.        This report was finalized on 08/25/2020 14:24 by Dr. Xavier Velazquez MD.        Lab Results (last 7 days)     Procedure Component Value Units Date/Time    Lipid Panel [[id number]]  (Abnormal) Collected:  08/26/20 0334    Specimen:  Blood Updated:  08/26/20 0425     Total Cholesterol 150 mg/dL      Triglycerides 195 mg/dL      HDL Cholesterol 39 mg/dL      LDL Cholesterol  72 mg/dL      VLDL Cholesterol 39 mg/dL      LDL/HDL Ratio 1.85    Narrative:       Cholesterol Reference Ranges  (U.S. Department of Health and Human Services ATP III Classifications)    Desirable          <200  mg/dL  Borderline High    200-239 mg/dL  High Risk          >240 mg/dL      Triglyceride Reference Ranges  (U.S. Department of Health and Human Services ATP III Classifications)    Normal           <150 mg/dL  Borderline High  150-199 mg/dL  High             200-499 mg/dL  Very High        >500 mg/dL    HDL Reference Ranges  (U.S. Department of Health and Human Services ATP III Classifcations)    Low     <40 mg/dl (major risk factor for CHD)  High    >60 mg/dl ('negative' risk factor for CHD)        LDL Reference Ranges  (U.S. Department of Health and Human Services ATP III Classifcations)    Optimal          <100 mg/dL  Near Optimal     100-129 mg/dL  Borderline High  130-159 mg/dL  High             160-189 mg/dL  Very High        >189 mg/dL    Comprehensive Metabolic Panel [[id number]]  (Abnormal) Collected:  08/26/20 0334    Specimen:  Blood Updated:  08/26/20 0425     Glucose 103 mg/dL      BUN 17 mg/dL      Creatinine 1.38 mg/dL      Sodium 138 mmol/L      Potassium 4.0 mmol/L      Comment: Specimen hemolyzed.  Results may be affected.        Chloride 98 mmol/L      CO2 28.0 mmol/L      Calcium 8.9 mg/dL      Total Protein 6.4 g/dL      Albumin 3.70 g/dL      ALT (SGPT) 20 U/L      AST (SGOT) 17 U/L      Alkaline Phosphatase 68 U/L      Total Bilirubin 0.3 mg/dL      eGFR Non African Amer 54 mL/min/1.73      Globulin 2.7 gm/dL      A/G Ratio 1.4 g/dL      BUN/Creatinine Ratio 12.3     Anion Gap 12.0 mmol/L     Narrative:       GFR Normal >60  Chronic Kidney Disease <60  Kidney Failure <15      Troponin [[id number]]  (Normal) Collected:  08/26/20 0334    Specimen:  Blood Updated:  08/26/20 0425     Troponin T <0.010 ng/mL     Narrative:       Troponin T Reference Range:  <= 0.03 ng/mL-   Negative for AMI  >0.03 ng/mL-     Abnormal for myocardial necrosis.  Clinicians would have to utilize clinical acumen, EKG, Troponin and serial changes to determine if it is an Acute Myocardial Infarction or myocardial injury due  to an underlying chronic condition.       Results may be falsely decreased if patient taking Biotin.      CBC Auto Differential [[id number]]  (Abnormal) Collected:  08/26/20 0334    Specimen:  Blood Updated:  08/26/20 0354     WBC 9.43 10*3/mm3      RBC 5.37 10*6/mm3      Hemoglobin 15.5 g/dL      Hematocrit 48.0 %      MCV 89.4 fL      MCH 28.9 pg      MCHC 32.3 g/dL      RDW 13.3 %      RDW-SD 43.4 fl      MPV 10.2 fL      Platelets 189 10*3/mm3      Neutrophil % 55.1 %      Lymphocyte % 34.1 %      Monocyte % 7.8 %      Eosinophil % 2.2 %      Basophil % 0.5 %      Immature Grans % 0.3 %      Neutrophils, Absolute 5.18 10*3/mm3      Lymphocytes, Absolute 3.22 10*3/mm3      Monocytes, Absolute 0.74 10*3/mm3      Eosinophils, Absolute 0.21 10*3/mm3      Basophils, Absolute 0.05 10*3/mm3      Immature Grans, Absolute 0.03 10*3/mm3      nRBC 0.0 /100 WBC     Protein / Creatinine Ratio, Urine - Urine, Clean Catch [[id number]]  (Abnormal) Collected:  08/25/20 1450    Specimen:  Urine, Clean Catch Updated:  08/26/20 0143     Protein/Creatinine Ratio, Urine 1,788.7 mg/G Crea      Creatinine, Urine 129.7 mg/dL      Total Protein, Urine 232.0 mg/dL     Hemoglobin A1c [[id number]]  (Normal) Collected:  08/25/20 1318    Specimen:  Blood Updated:  08/25/20 1719     Hemoglobin A1C 5.60 %     Narrative:       Hemoglobin A1C Ranges:    Increased Risk for Diabetes  5.7% to 6.4%  Diabetes                     >= 6.5%  Diabetic Goal                < 7.0%    Troponin [[id number]]  (Normal) Collected:  08/25/20 1531    Specimen:  Blood from Hand, Right Updated:  08/25/20 1603     Troponin T <0.010 ng/mL     Narrative:       Troponin T Reference Range:  <= 0.03 ng/mL-   Negative for AMI  >0.03 ng/mL-     Abnormal for myocardial necrosis.  Clinicians would have to utilize clinical acumen, EKG, Troponin and serial changes to determine if it is an Acute Myocardial Infarction or myocardial injury due to an underlying chronic condition.        Results may be falsely decreased if patient taking Biotin.      COVID PRE-OP / PRE-PROCEDURE SCREENING ORDER (NO ISOLATION) - Swab, Nasopharynx [[id number]] Collected:  08/25/20 1445    Specimen:  Swab from Nasopharynx Updated:  08/25/20 1602    Narrative:       The following orders were created for panel order COVID PRE-OP / PRE-PROCEDURE SCREENING ORDER (NO ISOLATION) - Swab, Nasopharynx.  Procedure                               Abnormality         Status                     ---------                               -----------         ------                     COVID-19,CEPHEID,COR/HOMERO...[[id number]]  Normal              Final result                 Please view results for these tests on the individual orders.    COVID-19,CEPHEID,COR/HOMERO/PAD IN-HOUSE(OR EMERGENT/ADD-ON),NP SWAB IN TRANSPORT MEDIA 3-4 HR TAT - Swab, Nasopharynx [[id number]]  (Normal) Collected:  08/25/20 1445    Specimen:  Swab from Nasopharynx Updated:  08/25/20 1602     COVID19 Not Detected    Narrative:       Fact sheet for providers: https://www.fda.gov/media/731493/download     Fact sheet for patients: https://www.fda.gov/media/609452/download    Urine Drug Screen - Urine, Clean Catch [[id number]]  (Normal) Collected:  08/25/20 1501    Specimen:  Urine, Clean Catch Updated:  08/25/20 1519     THC, Screen, Urine Negative     Phencyclidine (PCP), Urine Negative     Cocaine Screen, Urine Negative     Methamphetamine, Ur Negative     Opiate Screen Negative     Amphetamine Screen, Urine Negative     Benzodiazepine Screen, Urine Negative     Tricyclic Antidepressants Screen Negative     Methadone Screen, Urine Negative     Barbiturates Screen, Urine Negative     Oxycodone Screen, Urine Negative     Propoxyphene Screen Negative     Buprenorphine, Screen, Urine Negative    Narrative:       Cutoff For Drugs Screened:    Amphetamines               500 ng/ml  Barbiturates               200 ng/ml  Benzodiazepines            150 ng/ml  Cocaine                     150 ng/ml  Methadone                  200 ng/ml  Opiates                    100 ng/ml  Phencyclidine               25 ng/ml  THC                            50 ng/ml  Methamphetamine            500 ng/ml  Tricyclic Antidepressants  300 ng/ml  Oxycodone                  100 ng/ml  Propoxyphene               300 ng/ml  Buprenorphine               10 ng/ml    The normal value for all drugs tested is negative. This report includes unconfirmed screening results, with the cutoff values listed, to be used for medical treatment purposes only.  Unconfirmed results must not be used for non-medical purposes such as employment or legal testing.  Clinical consideration should be applied to any drug of abuse test, particularly when unconfirmed results are used.      [place] Draw [[id number]] Collected:  08/25/20 1318    Specimen:  Blood Updated:  08/25/20 1431    Narrative:       The following orders were created for panel order [place] Draw.  Procedure                               Abnormality         Status                     ---------                               -----------         ------                     Light Blue Top[[id number]]                                   Final result               Green Top (Gel)[[id number]]                                  Final result               Lavender Top[[id number]]                                     Final result               Red Top[[id number]]                                          Final result                 Please view results for these tests on the individual orders.    Light Blue Top [[id number]] Collected:  08/25/20 1318    Specimen:  Blood Updated:  08/25/20 1431     Extra Tube hold for add-on     Comment: Auto resulted       Green Top (Gel) [[id number]] Collected:  08/25/20 1318    Specimen:  Blood Updated:  08/25/20 1431     Extra Tube Hold for add-ons.     Comment: Auto resulted.       Lavender Top [[id number]] Collected:  08/25/20 1318    Specimen:  Blood Updated:   08/25/20 1431     Extra Tube hold for add-on     Comment: Auto resulted       Red Top [[id number]] Collected:  08/25/20 1318    Specimen:  Blood Updated:  08/25/20 1431     Extra Tube Hold for add-ons.     Comment: Auto resulted.       Comprehensive Metabolic Panel [[id number]]  (Abnormal) Collected:  08/25/20 1318    Specimen:  Blood Updated:  08/25/20 1356     Glucose 144 mg/dL      BUN 17 mg/dL      Creatinine 1.38 mg/dL      Sodium 142 mmol/L      Potassium 3.9 mmol/L      Chloride 105 mmol/L      CO2 24.0 mmol/L      Calcium 9.2 mg/dL      Total Protein 6.9 g/dL      Albumin 4.00 g/dL      ALT (SGPT) 21 U/L      AST (SGOT) 16 U/L      Alkaline Phosphatase 81 U/L      Total Bilirubin 0.2 mg/dL      eGFR Non African Amer 54 mL/min/1.73      Globulin 2.9 gm/dL      A/G Ratio 1.4 g/dL      BUN/Creatinine Ratio 12.3     Anion Gap 13.0 mmol/L     Narrative:       GFR Normal >60  Chronic Kidney Disease <60  Kidney Failure <15      Troponin [[id number]]  (Normal) Collected:  08/25/20 1318    Specimen:  Blood Updated:  08/25/20 1353     Troponin T <0.010 ng/mL     Narrative:       Troponin T Reference Range:  <= 0.03 ng/mL-   Negative for AMI  >0.03 ng/mL-     Abnormal for myocardial necrosis.  Clinicians would have to utilize clinical acumen, EKG, Troponin and serial changes to determine if it is an Acute Myocardial Infarction or myocardial injury due to an underlying chronic condition.       Results may be falsely decreased if patient taking Biotin.      CBC & Differential [[id number]] Collected:  08/25/20 1318    Specimen:  Blood Updated:  08/25/20 1338    Narrative:       The following orders were created for panel order CBC & Differential.  Procedure                               Abnormality         Status                     ---------                               -----------         ------                     CBC Auto Differential[[id number]]        Abnormal            Final result                 Please view results  "for these tests on the individual orders.    CBC Auto Differential [[id number]]  (Abnormal) Collected:  08/25/20 1318    Specimen:  Blood Updated:  08/25/20 1338     WBC 10.44 10*3/mm3      RBC 5.64 10*6/mm3      Hemoglobin 16.3 g/dL      Hematocrit 49.3 %      MCV 87.4 fL      MCH 28.9 pg      MCHC 33.1 g/dL      RDW 13.4 %      RDW-SD 42.6 fl      MPV 10.1 fL      Platelets 210 10*3/mm3      Neutrophil % 68.0 %      Lymphocyte % 23.8 %      Monocyte % 5.6 %      Eosinophil % 1.8 %      Basophil % 0.5 %      Immature Grans % 0.3 %      Neutrophils, Absolute 7.11 10*3/mm3      Lymphocytes, Absolute 2.48 10*3/mm3      Monocytes, Absolute 0.58 10*3/mm3      Eosinophils, Absolute 0.19 10*3/mm3      Basophils, Absolute 0.05 10*3/mm3      Immature Grans, Absolute 0.03 10*3/mm3      nRBC 0.0 /100 WBC           HPI 8/25/2020:    \"Mr. Andrea is a 49 yo M with a past medical history of vasculitis, hypertension.  Patient presents with an episode of about 40 minutes of substernal chest pain described initially as \"sharp\" that progressed to an \"ache\".  Patient described pain as severe.  It radiated down his right arm with associated shortness of breath.  The patient reportedly did not have any exertional or rest correlation.  Patient has not had pain like this before.  Patient was noted to have significantly elevated Bps with SBP > 200 and DBP > 120 upon presentation.  He was initially started on a cardene gtt by ER.  After I discussed with the patient he indicates he has not been consistent with taking his BP medications and indicates that he has been out of some of them for several days maybe even a \"couple weeks\".  Cardene gtt was discontinued and started PO medications with good response.       Denies fever or chills.  Patient smokes 1 ppd for about 30 years.  Patient denies known history of CAD. \"      Hospital Course:    Serial troponins were obtained, these remained negative.  Patient had been not taking his blood " pressure medication for several weeks.  Patient was started on his home lisinopril and hydrochlorothiazide.  I did switch his metoprolol over to carvedilol.  Adequate blood pressure control was obtained.  I told him given that he was used to a higher blood pressure for a long period time, did not want to drop his blood pressure significantly very quickly as it would make him feel poorly, and likely lead to noncompliance.  Patient's blood pressures were maintained at a reasonable level with the medications that were started.  I have prescribed him new prescriptions for all of his medications.  Given the patient's chest pain, a stress echocardiogram was performed, and found to be low risk for ischemia.  Patient also had a resting echocardiogram performed that showed diastolic dysfunction, but a normal ejection fraction.    Results for orders placed during the hospital encounter of 08/25/20   Adult Stress Echo W/ Cont or Stress Agent if Necessary Per Protocol    Narrative · Low risk stress test.  · No clinical, ECG, or echocardiographic evidence of ischemia.  · Patient did not reach target heart rate after 5 minutes on Baldomero   protocol, so was converted to dobutamine in order to achieve target heart   rate. After dobutamine infusion, echocardiographic images showed   appropriate increase in contractility of all LV wall segments.         TTE:  Interpretation Summary     · Estimated EF = 58%.  · Left ventricular systolic function is normal.  · Left ventricular wall thickness is consistent with mild concentric hypertrophy.  · Borderline dilation of the aortic root is present.  · Left ventricular diastolic dysfunction (grade I) consistent with impaired relaxation.  · Left atrial cavity size is severely dilated.  · There is a bioprosthetic mitral valve present. The prosthetic valve is grossly normal.  · No evidence of pulmonary hypertension is present         Physical Exam on Discharge:  /100 (BP Location: Left arm,  "Patient Position: Sitting) Comment: Reported to Jessika RN   Pulse 75   Temp 97.4 °F (36.3 °C) (Oral)   Resp 22   Ht 182.9 cm (72.01\")   Wt 123 kg (271 lb 2.7 oz)   SpO2 93%   BMI 36.77 kg/m²   Physical Exam  Constitutional: He is oriented to person, place, and time. No distress.   HENT:   Head: Normocephalic and atraumatic.   Eyes: Conjunctivae are normal. No scleral icterus.   Neck: Neck supple. No JVD present.   Cardiovascular: Normal rate and regular rhythm.   No murmur heard.  Pulmonary/Chest: Effort normal and breath sounds normal. No stridor. No respiratory distress. He has no wheezes.   Abdominal: Soft. Bowel sounds are normal. He exhibits no distension and no mass. There is no tenderness. There is no guarding.   Musculoskeletal: He exhibits no edema.   Neurological: He is alert and oriented to person, place, and time.   Skin: Skin is warm and dry. He is not diaphoretic. No erythema.   Psychiatric: He has a normal mood and affect. His behavior is normal.   Nursing note and vitals reviewed.         Condition on Discharge: Stable    Discharge Disposition:  Home or Self Care    Discharge Medications:     Discharge Medications      New Medications      Instructions Start Date   carvedilol 6.25 MG tablet  Commonly known as:  COREG   6.25 mg, Oral, 2 Times Daily With Meals         Changes to Medications      Instructions Start Date   hydroCHLOROthiazide 25 MG tablet  Commonly known as:  HYDRODIURIL  What changed:  when to take this   25 mg, Oral, Daily         Continue These Medications      Instructions Start Date   aspirin 325 MG tablet   325 mg, Oral, Daily      lisinopril 20 MG tablet  Commonly known as:  PRINIVIL,ZESTRIL   20 mg, Oral, Daily      multivitamin with minerals tablet tablet   1 tablet, Oral, Daily         Stop These Medications    Metoprolol Tartrate 75 MG tablet          Discharge Diet:   Diet Instructions     Diet: Regular; Thin      Discharge Diet:  Regular    Fluid Consistency:  Thin    "     Activity at Discharge:   Activity Instructions     Activity as Tolerated            Follow-up Appointments:   No future appointments.    Test Results Pending at Discharge: None    Electronically signed by Brandt Garcia MD, 08/26/20, 15:20.    Time: 40 minutes.

## 2020-08-26 NOTE — PAYOR COMM NOTE
"8/26/20 [facility] [phone]  [phone]    PATIENT ADMITTED ON 8/25/20. ER ADMISSION TO INPATIENT.  PENDING AUTH [id number]            Gia Be (50 y.o. Male)     Date of Birth Social Security Number Address Home Phone MRN    1970  [id number] [facility] [address] [phone] [id number]    [place] Marital Status          Other        Admission Date Admission Type Admitting Provider Attending Provider Department, Room/Bed    8/25/20 Emergency Brandt Garcia MD Fleming, John Eric, MD [facility] 4B, 434/1    Discharge Date Discharge Disposition Discharge Destination                       Attending Provider:  Brandt Garcia MD    Allergies:  No Known Allergies    Isolation:  None   Infection:  MRSA (02/19/17)   Code Status:  CPR    Ht:  182.9 cm (72.01\")   Wt:  123 kg (271 lb 2.7 oz)    Admission Cmt:  None   Principal Problem:  Chest pain, atypical [R07.89]                 Active Insurance as of 8/25/2020     Primary Coverage     Payor Plan Insurance Group Employer/Plan Group    [place] [id number]     Payor Plan Address Payor Plan Phone Number Payor Plan Fax Number Effective Dates    [employer] Claims Office - [address] [address] [phone]  4/1/2020 - None Entered    [facility] [id number]       Subscriber Name Subscriber Birth Date Member ID       GIA BE 1970 [id number]                 Emergency Contacts      (Rel.) Home Phone Work Phone Mobile Phone    Karine Be (Spouse) -- -- [phone]        Debbie Augustin MD   Physician   Emergency Medicine   ED Provider Notes   Signed   Date of Service:  08/25/20 1402   Creation Time:  08/25/20 1402            Signed        Expand All Collapse All      Show:Clear all  [x]Manual[x]Template[]Copied    Added by:  [x]Debbie Augustin MD    []Kelsie for details     Subjective      Patient is a 50-year-old male who presents to the ER with " chest pain.  Patient states he was sitting on a bench approximately 30 minutes ago when he developed midsternal chest pain.  Patient describes the pain as sharp and achy with radiation to the right arm.  Patient states the pain was constant since onset but resolved just prior to my evaluation.  Patient did receive aspirin in route but no other medications.  Patient states he has also had shortness of breath.  Patient does have a history of hypertension.  Patient does have a mitral valve replacement but denies any coronary stents.  He denies any fever, abdominal pain, nausea vomiting diarrhea, urinary changes, neurologic changes, cough, leg pain or swelling.              Review of Systems   Constitutional: Negative.    HENT: Negative.    Eyes: Negative.    Respiratory: Positive for shortness of breath.    Cardiovascular: Positive for chest pain.   Gastrointestinal: Negative.    Endocrine: Negative.    Genitourinary: Negative.    Musculoskeletal: Negative.    Skin: Negative.    Allergic/Immunologic: Negative.    Neurological: Negative.    Hematological: Negative.    Psychiatric/Behavioral: Negative.    All other systems reviewed and are negative.        Medical History:   Diagnosis Date   • A-fib (CMS/[facility])     • Chronic renal failure     • Elevated PSA     • Hypertension     • Kidney stone     • MRSA bacteremia 2/21/2017   • Purpura (CMS/[facility])     • Wegener's granulomatosis with renal involvement (CMS/[facility])                         Objective      Physical Exam   Constitutional: He is oriented to person, place, and time. He appears well-developed and well-nourished.   HENT:   Head: Normocephalic and atraumatic.   Eyes: Pupils are equal, round, and reactive to light. Conjunctivae are normal.   Neck: Normal range of motion.   Cardiovascular: Normal rate and regular rhythm.   Murmur heard.  Pulmonary/Chest: Effort normal and breath sounds normal.   Abdominal: Soft. There is no tenderness.   Musculoskeletal: Normal range of  motion. He exhibits no edema or deformity.   Neurological: He is alert and oriented to person, place, and time. He has normal strength.   Skin: Skin is warm.   Psychiatric: He has a normal mood and affect. His behavior is normal.   Nursing note and vitals reviewed.   ED Course      EKG: Sinus tachycardia with a rate of 105 with PACs, Q waves in the anterior leads     Blood pressure was extremely elevated upon arrival.  Patient was placed on a nicardipine drip.  Concern for hypertensive urgency.           Lab Results (last 24 hours)      Procedure Component Value Units Date/Time     CBC & Differential [[id number]] Collected:  08/25/20 1318     Specimen:  Blood Updated:  08/25/20 1338     Narrative:        The following orders were created for panel order CBC & Differential.  Procedure                               Abnormality         Status                     ---------                               -----------         ------                     CBC Auto Differential[[id number]]        Abnormal            Final result                  Please view results for these tests on the individual orders.     Comprehensive Metabolic Panel [[id number]]  (Abnormal) Collected:  08/25/20 1318     Specimen:  Blood Updated:  08/25/20 1356       Glucose 144 mg/dL         BUN 17 mg/dL         Creatinine 1.38 mg/dL         Sodium 142 mmol/L         Potassium 3.9 mmol/L         Chloride 105 mmol/L         CO2 24.0 mmol/L         Calcium 9.2 mg/dL         Total Protein 6.9 g/dL         Albumin 4.00 g/dL         ALT (SGPT) 21 U/L         AST (SGOT) 16 U/L         Alkaline Phosphatase 81 U/L         Total Bilirubin 0.2 mg/dL         eGFR Non African Amer 54 mL/min/1.73         Globulin 2.9 gm/dL         A/G Ratio 1.4 g/dL         BUN/Creatinine Ratio 12.3       Anion Gap 13.0 mmol/L       Narrative:        decreased if patient taking Biotin.         CBC Auto Differential [[id number]]  (Abnormal) Collected:  08/25/20 1318     Specimen:  Blood  Updated:  08/25/20 1338       WBC 10.44 10*3/mm3         RBC 5.64 10*6/mm3         Hemoglobin 16.3 g/dL         Hematocrit 49.3 %         MCV 87.4 fL         MCH 28.9 pg         MCHC 33.1 g/dL         RDW 13.4 %         RDW-SD 42.6 fl         MPV 10.1 fL         Platelets 210 10*3/mm3         Neutrophil % 68.0 %         Lymphocyte % 23.8 %         Monocyte % 5.6 %         Eosinophil % 1.8 %         Basophil % 0.5 %         Immature Grans % 0.3 %         Neutrophils, Absolute 7.11 10*3/mm3         Lymphocytes, Absolute 2.48 10*3/mm3         Monocytes, Absolute 0.58 10*3/mm3         Eosinophils, Absolute 0.19 10*3/mm3         Basophils, Absolute 0.05 10*3/mm3         Immature Grans, Absolute 0.03 10*3/mm3         nRBC 0.0 /100 WBC                  XR Chest 1 View   Final Result   1. Stable chest with no acute cardiopulmonary process.           This report was finalized on 08/25/2020 14:24 by Dr. Xavier Velazquez MD.          Labs showed a minimally elevated creatinine.  Troponin was normal.  Chest x-ray showed no acute findings.  Blood pressure improving on nicardipine drip.  Patient was admitted to the hospitalist service by Dr Garcia for chest pain and hypertensive urgency.                                HEART Score (for prediction of 6-week risk of major adverse cardiac event) reviewed and/or performed as part of the patient evaluation and treatment planning process.  The result associated with this review/performance is: 5     HEART Score (for prediction of 6-week risk of major adverse cardiac event) reviewed and/or performed as part of the patient evaluation and treatment planning process.  The result associated with this review/performance is: 5        MDM     Final diagnoses:   Hypertensive urgency   Chest pain, unspecified type               Debbie Augustin MD  08/25/20 1358                 Brandt Garcia MD   Physician   Medicine   H&P   Addendum   Date of Service:  08/25/20 154   Creation Time:   "08/25/20 1544            Expand All Collapse All      Show:Clear all  [x]Manual[x]Template[]Copied    Added by:  [x]Brandt Garcia MD    []Kelsie for details       [facility] Medicine Services  HISTORY AND PHYSICAL     Date of Admission: 8/25/2020  Primary Care Physician: Moises Montes MD     Subjective      Chief Complaint: chest pain      History of Present Illness     Mr. Andrea is a 51 yo M with a past medical history of vasculitis, hypertension.  Patient presents with an episode of about 40 minutes of substernal chest pain described initially as \"sharp\" that progressed to an \"ache\".  Patient described pain as severe.  It radiated down his right arm with associated shortness of breath.  The patient reportedly did not have any exertional or rest correlation.  Patient has not had pain like this before.  Patient was noted to have significantly elevated Bps with SBP > 200 and DBP > 120 upon presentation.  He was initially started on a cardene gtt by ER.  After I discussed with the patient he indicates he has not been consistent with taking his BP medications and indicates that he has been out of some of them for several days maybe even a \"couple weeks\".  Cardene gtt was discontinued and started PO medications with good response.       Denies fever or chills.  Patient smokes 1 ppd for about 30 years.  Patient denies known history of CAD        Denies fever or chills.  Patient smokes 1 ppd for about 30 years.  Patient denies known history of CAD.            Review of Systems   Constitutional: Negative for activity change, appetite change, chills, diaphoresis, fatigue and fever.   HENT: Negative for congestion and trouble swallowing.    Respiratory: Negative for cough and shortness of breath.    Cardiovascular: Positive for chest pain (resolved). Negative for palpitations.   Gastrointestinal: Negative for abdominal distention, abdominal pain, constipation, diarrhea and nausea. " "  Genitourinary: Negative for dysuria.   Musculoskeletal: Negative for arthralgias and myalgias.   All other systems reviewed and are negative.        Otherwise complete ROS reviewed and negative except as mentioned in the HPI.  Objective      Vital Signs: BP (!) 166/125   Pulse 94   Temp 98.5 °F (36.9 °C) (Oral)   Resp 18   Ht 185.4 cm (73\")   Wt 124 kg (274 lb)   SpO2 92%   BMI 36.15 kg/m²   Physical Exam   Constitutional: He is oriented to person, place, and time. No distress.   HENT:   Head: Normocephalic and atraumatic.   Eyes: Conjunctivae are normal. No scleral icterus.   Neck: Neck supple. No JVD present.   Cardiovascular: Normal rate and regular rhythm.   No murmur heard.  Pulmonary/Chest: Effort normal and breath sounds normal. No stridor. No respiratory distress. He has no wheezes.   Abdominal: Soft. Bowel sounds are normal. He exhibits no distension and no mass. There is no tenderness. There is no guarding.   Musculoskeletal: He exhibits no edema.   Neurological: He is alert and oriented to person, place, and time.   Skin: Skin is warm and dry. He is not diaphoretic. No erythema.   Psychiatric: He has a normal mood and affect. His behavior is normal.   Nursing note and vitals reviewed.     Assessment / Plan      Assessment:       Active Hospital Problems     Diagnosis   • Hypertensive urgency   • Obesity (BMI 30-39.9)   • Chronic kidney disease, stage III (moderate) (CMS/HCC)   • Chest pain, atypical   • Wegener's granulomatosis with renal involvement (CMS/[facility])   • Tobacco abuse            Plan:   1.  Admit to telemetry  2.  Start carvedilol, lisinopril and HCTZ (hold home metoprolol in favor of carvedilol)  3.  Echo   4.  Serial troponins  5.  Stress echo in AM   6.  AM labs including lipid profile, CMP, trop, CBC  7.  Hemoglobin A1c  8.  NPO after midnight  9.  Lovenox for VTE PPx  10.  Standing telemetry orders   11.  Nicotine replacement available as needed  12.  Counseled on smoking cessation " and diet        Code Status: Full     I discussed the patient's findings and my recommendations with the patient and RN     Estimated length of stay 1-2     Patient seen and examined by me on 8/25/2020 at 1544.     Electronically signed by Brandt Garcia MD, 08/25/20, 15:44.      (last 2 days)     Date/Time  Temp  Pulse  Resp  BP  Device (Oxygen Therapy)  Flow (L/min)  SpO2   08/26/20 0739  --  --  --  --  room air  --  98   08/26/20 0738  --  79  --  145/100  --  --  --   08/26/20 0730  --   --  --  --  --  --  --   Temp: pt.off the floor for test at 08/26/20 0730   08/26/20 0530  98.2 (36.8)  69  18  134/90  room air  --  98   08/26/20 0006  98 (36.7)  70  18  119/87  room air  --  96   08/25/20 2055  98.1 (36.7)  72  18  134/95   room air  --  99   BP: sony lawson notified at 08/25/20 2055 08/25/20 2000  --  --  --  --  room air  --  --   08/25/20 1703  97.7 (36.5)  85  18  146/97   room air  --  94   BP: RN Notified at 08/25/20 1703   08/25/20 1624  --  --  --  166/110Abnormal   --  --  --   08/25/20 1550  --  95  --  166/115Abnormal   --  --  95   08/25/20 1545  --  86  --  169/114Abnormal   --  --  93   08/25/20 1540  --  --  --  163/127Abnormal   --  --  --   08/25/20 1539  --  90  --  --  --  --  93   08/25/20 1536  --  87  --  --  --  --  94   08/25/20 1535  --  --  --  165/113Abnormal   --  --  --   08/25/20 1530  --  94  --  166/125Abnormal   --  --  92   08/25/20 1525  --  93  --  163/111Abnormal   --  --  92   08/25/20 1520  --  93  --  169/121Abnormal   --  --  91   08/25/20 1515  --  96  --  158/108Abnormal   --  --  91   08/25/20 1510  --  104  --  159/135Abnormal   --  --  94   08/25/20 1505  --  95  --  159/117Abnormal   --  --  90   08/25/20 1500  --  99  --  161/115Abnormal   --  --  94   08/25/20 1455  --  98  --  188/134Abnormal   --  --  94   08/25/20 1450  --  102  --  177/127Abnormal   --  --  96   08/25/20 1445  --  104  --  171/119Abnormal   --  --  97   08/25/20 1440  --  100  --   177/121Abnormal   --  --  92   08/25/20 1436  --  97  --  --  --  --  87Abnormal    08/25/20 1435  --  100  --  164/113Abnormal   --  --  94   08/25/20 1430  --  95  --  163/117Abnormal   --  --  92   08/25/20 1425  --  94  --  165/121Abnormal   --  --  92   08/25/20 1420  --  96  --  167/118Abnormal   --  --  93   08/25/20 1415  --  95  --  175/122Abnormal   --  --  93   08/25/20 1413  --  94  --  --  --  --  85Abnormal    08/25/20 1412  --  94  --  175/125Abnormal   --  --  94   08/25/20 1400  --  91  --  176/122Abnormal   --  --  96   08/25/20 1356  --  94  --  186/107Abnormal   --  --  96   08/25/20 1352  --  101  --  195/129Abnormal   --  --  --   08/25/20 13:37:45  --  --  --  --  nasal cannula  2  --   08/25/20 1314  98.5 (36.9)  107  18  215/127Abnormal   --  --  96      Intake/Output Detail Report     Date 08/24/20 0701 - 08/25/20 0700 08/25/20 0701 - 08/26/20 0700 08/26/20 0701 - 08/27/20 0700   Shift 7490-2989 8219-9950 Total 6439-4123 4394-4677 Total 7413-4208 8605-9288 Total   I  N  T  A  K  E P.O.    360 1060 1420         P.O.    360 1060 1420       Shift Total    360 1060 1420      O  U  T  P  U  T Urine     1000 1000 200  200      Urine     1000 1000 200  200    Shift Total     1000 1000 200  200   NET    360 60 420 -200  -200      Patient Lines/Drains/Airways Status     Active Lines, Drains, and Airways     Peripheral IV 08/25/20 1410 Left Hand   Placement date:  08/25/20  Site:  Hand    Placement time:  1410  Days:  less than 1      Orders and Labs       Linked Medications     Peripheral IV 08/25/20 1410 Left Hand     Order Name Port Current Infusion Rate   niCARdipine (CARDENE) 25 mg in 250 mL NS (0.1 mg/mL) infusion  0 mL/hr   Start: 08/25/20 1358   sodium chloride 0.9 % flush 10 mL     Start: 08/25/20 2100   perflutren (DEFINITY) lipid microspheres injection 8.476 mg     Start: 08/25/20 1637   perflutren (DEFINITY) lipid microspheres injection 8.476 mg     Start: 08/26/20 0830   DOBUTamine  (DOBUTREX) 100 mg in 100mL D5W (1 mg/ml) infusion (ECHO)  0 mL/hr   Start: 08/26/20 0915   atropine sulfate injection 0.3 mg     Start: 08/26/20 0915              Current Facility-Administered Medications   Medication Dose Route Frequency Provider Last Rate Last Dose   • acetaminophen (TYLENOL) tablet 650 mg  650 mg Oral Q4H PRN Brandt Garcia MD        Or   • acetaminophen (TYLENOL) 160 MG/5ML solution 650 mg  650 mg Oral Q4H PRN Brandt Garcia MD        Or   • acetaminophen (TYLENOL) suppository 650 mg  650 mg Rectal Q4H PRN Brandt Garcia MD       • aspirin EC tablet 81 mg  81 mg Oral Daily Brandt Garcia MD       • atropine sulfate injection 0.3 mg  0.3 mg Intravenous Once [place]       • carvedilol (COREG) tablet 6.25 mg  6.25 mg Oral BID With Meals Brandt Garcia MD   6.25 mg at 08/25/20 2148   • enoxaparin (LOVENOX) syringe 40 mg  40 mg Subcutaneous Q24H Brandt Garcia MD   40 mg at 08/25/20 1738   • hydroCHLOROthiazide (HYDRODIURIL) tablet 25 mg  25 mg Oral Daily Brandt Garcia MD   25 mg at 08/25/20 1505   • lisinopril (PRINIVIL,ZESTRIL) tablet 20 mg  20 mg Oral Q24H Brandt Garcia MD   20 mg at 08/25/20 1509   • ondansetron (ZOFRAN) tablet 4 mg  4 mg Oral Q6H PRN Brandt Garcia MD        Or   • ondansetron (ZOFRAN) injection 4 mg  4 mg Intravenous Q6H PRN Brandt Garcia MD       • sodium chloride 0.9 % flush 10 mL  10 mL Intravenous PRN Brandt Garcia MD       • sodium chloride 0.9 % flush 10 mL  10 mL Intravenous Q12H Brandt Garcia MD   10 mL at 08/25/20 2148   • sodium chloride 0.9 % flush 10 mL  10 mL Intravenous PRN Brandt Garcia MD

## 2020-08-26 NOTE — PLAN OF CARE
Problem: Patient Care Overview  Goal: Plan of Care Review  Flowsheets  Taken 8/25/2020 2000  Plan of Care Reviewed With: patient  Taken 8/26/2020 0410  Outcome Summary: pt denies pain this shift. VSS. S 63-77. pt has been NPO since midnight. awaiting stress test. safety maintained. will continue to monitor.

## 2020-08-27 NOTE — PAYOR COMM NOTE
"PR HOME 8-26-20  [id number]   [phone]    Gia Be (50 y.o. 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)   Code Status:  Prior    Ht:  182.9 cm (72.01\")   Wt:  123 kg (271 lb 2.7 oz)    Admission Cmt:  None   Principal Problem:  Chest pain, atypical [R07.89]                 Active Insurance as of 8/25/2020     Primary Coverage     Payor Plan Insurance Group Employer/Plan Group    [place] [id number]     Payor Plan Address Payor Plan Phone Number Payor Plan Fax Number Effective Dates    [employer] Claims Office - [address] [address] [phone]  4/1/2020 - None Entered    [facility] [address]       Subscriber Name Subscriber Birth Date Member ID       GIA BE 1970 [id number]                 Emergency Contacts      (Rel.) Home Phone Work Phone Mobile Phone    Karine Be (Spouse) -- -- [phone]               Discharge Summary      Brandt Garcia MD at 08/26/20 [facility]                [facility] Medicine Services  DISCHARGE SUMMARY       Date of Admission: 8/25/2020  Date of Discharge:  8/26/2020  Primary Care Physician: Moises Montes MD    Presenting Problem/History of Present Illness:  Chest pain     Final Discharge Diagnoses:  Active Hospital Problems    Diagnosis   • **Chest pain, atypical   • Hypertensive urgency   • Obesity (BMI 30-39.9)   • Chronic kidney disease, " stage III (moderate) (CMS/HCC)   • Wegener's granulomatosis with renal involvement (CMS/HCC)   • Tobacco abuse       Consults: None    Procedures Performed: None    Pertinent Test Results:   Imaging Results (Last 7 Days)     Procedure Component Value Units Date/Time    XR Chest 1 View [[id number]] Collected:  08/25/20 1423     Updated:  08/25/20 1427    Narrative:       Frontal upright radiograph of the chest 8/25/2020 2:13 PM CDT     COMPARISON: September 18, 2019.     FINDINGS:   The lungs are clear. Cardiac silhouette is normal. Left atrial appendage  clamp is present. Prosthetic cardiac valve is noted. Wires are present  previous median sternotomy. The superior wire is fractured but unchanged  cardiac monitoring leads are present..      The osseous structures and surrounding soft tissues demonstrate no acute  abnormality.       Impression:       1. Stable chest with no acute cardiopulmonary process.        This report was finalized on 08/25/2020 14:24 by Dr. Xavier Velazquez MD.        Lab Results (last 7 days)     Procedure Component Value Units Date/Time    Lipid Panel [[id number]]  (Abnormal) Collected:  08/26/20 0334    Specimen:  Blood Updated:  08/26/20 0425     Total Cholesterol 150 mg/dL      Triglycerides 195 mg/dL      HDL Cholesterol 39 mg/dL      LDL Cholesterol  72 mg/dL      VLDL Cholesterol 39 mg/dL      LDL/HDL Ratio 1.85    Narrative:       Cholesterol Reference Ranges  (U.S. Department of Health and Human Services ATP III Classifications)    Desirable          <200 mg/dL  Borderline High    200-239 mg/dL  High Risk          >240 mg/dL      Triglyceride Reference Ranges  (U.S. Department of Health and Human Services ATP III Classifications)    Normal           <150 mg/dL  Borderline High  150-199 mg/dL  High             200-499 mg/dL  Very High        >500 mg/dL    HDL Reference Ranges  (U.S. Department of Health and Human Services ATP III Classifcations)    Low     <40 mg/dl (major risk factor for  CHD)  High    >60 mg/dl ('negative' risk factor for CHD)        LDL Reference Ranges  (U.S. Department of Health and Human Services ATP III Classifcations)    Optimal          <100 mg/dL  Near Optimal     100-129 mg/dL  Borderline High  130-159 mg/dL  High             160-189 mg/dL  Very High        >189 mg/dL    Comprehensive Metabolic Panel [[id number]]  (Abnormal) Collected:  08/26/20 0334    Specimen:  Blood Updated:  08/26/20 0425     Glucose 103 mg/dL      BUN 17 mg/dL      Creatinine 1.38 mg/dL      Sodium 138 mmol/L      Potassium 4.0 mmol/L      Comment: Specimen hemolyzed.  Results may be affected.        Chloride 98 mmol/L      CO2 28.0 mmol/L      Calcium 8.9 mg/dL      Total Protein 6.4 g/dL      Albumin 3.70 g/dL      ALT (SGPT) 20 U/L      AST (SGOT) 17 U/L      Alkaline Phosphatase 68 U/L      Total Bilirubin 0.3 mg/dL      eGFR Non African Amer 54 mL/min/1.73      Globulin 2.7 gm/dL      A/G Ratio 1.4 g/dL      BUN/Creatinine Ratio 12.3     Anion Gap 12.0 mmol/L     Narrative:       GFR Normal >60  Chronic Kidney Disease <60  Kidney Failure <15      Troponin [[id number]]  (Normal) Collected:  08/26/20 0334    Specimen:  Blood Updated:  08/26/20 0425     Troponin T <0.010 ng/mL     Narrative:       Troponin T Reference Range:  <= 0.03 ng/mL-   Negative for AMI  >0.03 ng/mL-     Abnormal for myocardial necrosis.  Clinicians would have to utilize clinical acumen, EKG, Troponin and serial changes to determine if it is an Acute Myocardial Infarction or myocardial injury due to an underlying chronic condition.       Results may be falsely decreased if patient taking Biotin.      CBC Auto Differential [[id number]]  (Abnormal) Collected:  08/26/20 0334    Specimen:  Blood Updated:  08/26/20 0354     WBC 9.43 10*3/mm3      RBC 5.37 10*6/mm3      Hemoglobin 15.5 g/dL      Hematocrit 48.0 %      MCV 89.4 fL      MCH 28.9 pg      MCHC 32.3 g/dL      RDW 13.3 %      RDW-SD 43.4 fl      MPV 10.2 fL      Platelets  189 10*3/mm3      Neutrophil % 55.1 %      Lymphocyte % 34.1 %      Monocyte % 7.8 %      Eosinophil % 2.2 %      Basophil % 0.5 %      Immature Grans % 0.3 %      Neutrophils, Absolute 5.18 10*3/mm3      Lymphocytes, Absolute 3.22 10*3/mm3      Monocytes, Absolute 0.74 10*3/mm3      Eosinophils, Absolute 0.21 10*3/mm3      Basophils, Absolute 0.05 10*3/mm3      Immature Grans, Absolute 0.03 10*3/mm3      nRBC 0.0 /100 WBC     Protein / Creatinine Ratio, Urine - Urine, Clean Catch [[id number]]  (Abnormal) Collected:  08/25/20 1450    Specimen:  Urine, Clean Catch Updated:  08/26/20 0143     Protein/Creatinine Ratio, Urine 1,788.7 mg/G Crea      Creatinine, Urine 129.7 mg/dL      Total Protein, Urine 232.0 mg/dL     Hemoglobin A1c [[id number]]  (Normal) Collected:  08/25/20 1318    Specimen:  Blood Updated:  08/25/20 1719     Hemoglobin A1C 5.60 %     Narrative:       Hemoglobin A1C Ranges:    Increased Risk for Diabetes  5.7% to 6.4%  Diabetes                     >= 6.5%  Diabetic Goal                < 7.0%    Troponin [[id number]]  (Normal) Collected:  08/25/20 1531    Specimen:  Blood from Hand, Right Updated:  08/25/20 1603     Troponin T <0.010 ng/mL     Narrative:       Troponin T Reference Range:  <= 0.03 ng/mL-   Negative for AMI  >0.03 ng/mL-     Abnormal for myocardial necrosis.  Clinicians would have to utilize clinical acumen, EKG, Troponin and serial changes to determine if it is an Acute Myocardial Infarction or myocardial injury due to an underlying chronic condition.       Results may be falsely decreased if patient taking Biotin.      COVID PRE-OP / PRE-PROCEDURE SCREENING ORDER (NO ISOLATION) - Swab, Nasopharynx [[id number]] Collected:  08/25/20 1445    Specimen:  Swab from Nasopharynx Updated:  08/25/20 1602    Narrative:       The following orders were created for panel order COVID PRE-OP / PRE-PROCEDURE SCREENING ORDER (NO ISOLATION) - Swab, Nasopharynx.  Procedure                                Abnormality         Status                     ---------                               -----------         ------                     COVID-19,CEPHEID,COR/HOMERO...[[id number]]  Normal              Final result                 Please view results for these tests on the individual orders.    COVID-19,CEPHEID,COR/HOMERO/PAD IN-HOUSE(OR EMERGENT/ADD-ON),NP SWAB IN TRANSPORT MEDIA 3-4 HR TAT - Swab, Nasopharynx [[id number]]  (Normal) Collected:  08/25/20 1445    Specimen:  Swab from Nasopharynx Updated:  08/25/20 1602     COVID19 Not Detected    Narrative:       Fact sheet for providers: https://www.fda.gov/media/110173/download     Fact sheet for patients: https://www.fda.gov/media/215070/download    Urine Drug Screen - Urine, Clean Catch [[id number]]  (Normal) Collected:  08/25/20 1501    Specimen:  Urine, Clean Catch Updated:  08/25/20 1519     THC, Screen, Urine Negative     Phencyclidine (PCP), Urine Negative     Cocaine Screen, Urine Negative     Methamphetamine, Ur Negative     Opiate Screen Negative     Amphetamine Screen, Urine Negative     Benzodiazepine Screen, Urine Negative     Tricyclic Antidepressants Screen Negative     Methadone Screen, Urine Negative     Barbiturates Screen, Urine Negative     Oxycodone Screen, Urine Negative     Propoxyphene Screen Negative     Buprenorphine, Screen, Urine Negative    Narrative:       Cutoff For Drugs Screened:    Amphetamines               500 ng/ml  Barbiturates               200 ng/ml  Benzodiazepines            150 ng/ml  Cocaine                    150 ng/ml  Methadone                  200 ng/ml  Opiates                    100 ng/ml  Phencyclidine               25 ng/ml  THC                            50 ng/ml  Methamphetamine            500 ng/ml  Tricyclic Antidepressants  300 ng/ml  Oxycodone                  100 ng/ml  Propoxyphene               300 ng/ml  Buprenorphine               10 ng/ml    The normal value for all drugs tested is negative. This report includes  unconfirmed screening results, with the cutoff values listed, to be used for medical treatment purposes only.  Unconfirmed results must not be used for non-medical purposes such as employment or legal testing.  Clinical consideration should be applied to any drug of abuse test, particularly when unconfirmed results are used.      [place] Draw [[id number]] Collected:  08/25/20 1318    Specimen:  Blood Updated:  08/25/20 1431    Narrative:       The following orders were created for panel order [place] Draw.  Procedure                               Abnormality         Status                     ---------                               -----------         ------                     Light Blue Top[[id number]]                                   Final result               Green Top (Gel)[[id number]]                                  Final result               Lavender Top[[id number]]                                     Final result               Red Top[[id number]]                                          Final result                 Please view results for these tests on the individual orders.    Light Blue Top [[id number]] Collected:  08/25/20 1318    Specimen:  Blood Updated:  08/25/20 1431     Extra Tube hold for add-on     Comment: Auto resulted       Green Top (Gel) [[id number]] Collected:  08/25/20 1318    Specimen:  Blood Updated:  08/25/20 1431     Extra Tube Hold for add-ons.     Comment: Auto resulted.       Lavender Top [[id number]] Collected:  08/25/20 1318    Specimen:  Blood Updated:  08/25/20 1431     Extra Tube hold for add-on     Comment: Auto resulted       Red Top [[id number]] Collected:  08/25/20 1318    Specimen:  Blood Updated:  08/25/20 1431     Extra Tube Hold for add-ons.     Comment: Auto resulted.       Comprehensive Metabolic Panel [[id number]]  (Abnormal) Collected:  08/25/20 1318    Specimen:  Blood Updated:  08/25/20 1356     Glucose 144 mg/dL      BUN 17 mg/dL      Creatinine 1.38 mg/dL      Sodium 142  mmol/L      Potassium 3.9 mmol/L      Chloride 105 mmol/L      CO2 24.0 mmol/L      Calcium 9.2 mg/dL      Total Protein 6.9 g/dL      Albumin 4.00 g/dL      ALT (SGPT) 21 U/L      AST (SGOT) 16 U/L      Alkaline Phosphatase 81 U/L      Total Bilirubin 0.2 mg/dL      eGFR Non African Amer 54 mL/min/1.73      Globulin 2.9 gm/dL      A/G Ratio 1.4 g/dL      BUN/Creatinine Ratio 12.3     Anion Gap 13.0 mmol/L     Narrative:       GFR Normal >60  Chronic Kidney Disease <60  Kidney Failure <15      Troponin [[id number]]  (Normal) Collected:  08/25/20 1318    Specimen:  Blood Updated:  08/25/20 1353     Troponin T <0.010 ng/mL     Narrative:       Troponin T Reference Range:  <= 0.03 ng/mL-   Negative for AMI  >0.03 ng/mL-     Abnormal for myocardial necrosis.  Clinicians would have to utilize clinical acumen, EKG, Troponin and serial changes to determine if it is an Acute Myocardial Infarction or myocardial injury due to an underlying chronic condition.       Results may be falsely decreased if patient taking Biotin.      CBC & Differential [[id number]] Collected:  08/25/20 1318    Specimen:  Blood Updated:  08/25/20 1338    Narrative:       The following orders were created for panel order CBC & Differential.  Procedure                               Abnormality         Status                     ---------                               -----------         ------                     CBC Auto Differential[[id number]]        Abnormal            Final result                 Please view results for these tests on the individual orders.    CBC Auto Differential [[id number]]  (Abnormal) Collected:  08/25/20 1318    Specimen:  Blood Updated:  08/25/20 1338     WBC 10.44 10*3/mm3      RBC 5.64 10*6/mm3      Hemoglobin 16.3 g/dL      Hematocrit 49.3 %      MCV 87.4 fL      MCH 28.9 pg      MCHC 33.1 g/dL      RDW 13.4 %      RDW-SD 42.6 fl      MPV 10.1 fL      Platelets 210 10*3/mm3      Neutrophil % 68.0 %      Lymphocyte % 23.8 %   "    Monocyte % 5.6 %      Eosinophil % 1.8 %      Basophil % 0.5 %      Immature Grans % 0.3 %      Neutrophils, Absolute 7.11 10*3/mm3      Lymphocytes, Absolute 2.48 10*3/mm3      Monocytes, Absolute 0.58 10*3/mm3      Eosinophils, Absolute 0.19 10*3/mm3      Basophils, Absolute 0.05 10*3/mm3      Immature Grans, Absolute 0.03 10*3/mm3      nRBC 0.0 /100 WBC           HPI 8/25/2020:    \"Mr. Andrea is a 49 yo M with a past medical history of vasculitis, hypertension.  Patient presents with an episode of about 40 minutes of substernal chest pain described initially as \"sharp\" that progressed to an \"ache\".  Patient described pain as severe.  It radiated down his right arm with associated shortness of breath.  The patient reportedly did not have any exertional or rest correlation.  Patient has not had pain like this before.  Patient was noted to have significantly elevated Bps with SBP > 200 and DBP > 120 upon presentation.  He was initially started on a cardene gtt by ER.  After I discussed with the patient he indicates he has not been consistent with taking his BP medications and indicates that he has been out of some of them for several days maybe even a \"couple weeks\".  Cardene gtt was discontinued and started PO medications with good response.       Denies fever or chills.  Patient smokes 1 ppd for about 30 years.  Patient denies known history of CAD. \"      Hospital Course:    Serial troponins were obtained, these remained negative.  Patient had been not taking his blood pressure medication for several weeks.  Patient was started on his home lisinopril and hydrochlorothiazide.  I did switch his metoprolol over to carvedilol.  Adequate blood pressure control was obtained.  I told him given that he was used to a higher blood pressure for a long period time, did not want to drop his blood pressure significantly very quickly as it would make him feel poorly, and likely lead to noncompliance.  Patient's blood " "pressures were maintained at a reasonable level with the medications that were started.  I have prescribed him new prescriptions for all of his medications.  Given the patient's chest pain, a stress echocardiogram was performed, and found to be low risk for ischemia.  Patient also had a resting echocardiogram performed that showed diastolic dysfunction, but a normal ejection fraction.    Results for orders placed during the hospital encounter of 08/25/20   Adult Stress Echo W/ Cont or Stress Agent if Necessary Per Protocol    Narrative · Low risk stress test.  · No clinical, ECG, or echocardiographic evidence of ischemia.  · Patient did not reach target heart rate after 5 minutes on Baldomero   protocol, so was converted to dobutamine in order to achieve target heart   rate. After dobutamine infusion, echocardiographic images showed   appropriate increase in contractility of all LV wall segments.         TTE:  Interpretation Summary     · Estimated EF = 58%.  · Left ventricular systolic function is normal.  · Left ventricular wall thickness is consistent with mild concentric hypertrophy.  · Borderline dilation of the aortic root is present.  · Left ventricular diastolic dysfunction (grade I) consistent with impaired relaxation.  · Left atrial cavity size is severely dilated.  · There is a bioprosthetic mitral valve present. The prosthetic valve is grossly normal.  · No evidence of pulmonary hypertension is present         Physical Exam on Discharge:  /100 (BP Location: Left arm, Patient Position: Sitting) Comment: Reported to Jessika RN   Pulse 75   Temp 97.4 °F (36.3 °C) (Oral)   Resp 22   Ht 182.9 cm (72.01\")   Wt 123 kg (271 lb 2.7 oz)   SpO2 93%   BMI 36.77 kg/m²    Physical Exam  Constitutional: He is oriented to person, place, and time. No distress.   HENT:   Head: Normocephalic and atraumatic.   Eyes: Conjunctivae are normal. No scleral icterus.   Neck: Neck supple. No JVD present.   Cardiovascular: " Normal rate and regular rhythm.   No murmur heard.  Pulmonary/Chest: Effort normal and breath sounds normal. No stridor. No respiratory distress. He has no wheezes.   Abdominal: Soft. Bowel sounds are normal. He exhibits no distension and no mass. There is no tenderness. There is no guarding.   Musculoskeletal: He exhibits no edema.   Neurological: He is alert and oriented to person, place, and time.   Skin: Skin is warm and dry. He is not diaphoretic. No erythema.   Psychiatric: He has a normal mood and affect. His behavior is normal.   Nursing note and vitals reviewed.         Condition on Discharge: Stable    Discharge Disposition:  Home or Self Care    Discharge Medications:     Discharge Medications      New Medications      Instructions Start Date   carvedilol 6.25 MG tablet  Commonly known as:  COREG   6.25 mg, Oral, 2 Times Daily With Meals         Changes to Medications      Instructions Start Date   hydroCHLOROthiazide 25 MG tablet  Commonly known as:  HYDRODIURIL  What changed:  when to take this   25 mg, Oral, Daily         Continue These Medications      Instructions Start Date   aspirin 325 MG tablet   325 mg, Oral, Daily      lisinopril 20 MG tablet  Commonly known as:  PRINIVIL,ZESTRIL   20 mg, Oral, Daily      multivitamin with minerals tablet tablet   1 tablet, Oral, Daily         Stop These Medications    Metoprolol Tartrate 75 MG tablet          Discharge Diet:   Diet Instructions     Diet: Regular; Thin      Discharge Diet:  Regular    Fluid Consistency:  Thin        Activity at Discharge:   Activity Instructions     Activity as Tolerated            Follow-up Appointments:   No future appointments.    Test Results Pending at Discharge: None    Electronically signed by Brandt Garcia MD, 08/26/20, 15:20.    Time: 40 minutes.          Electronically signed by Brandt Garcia MD at 08/26/20 5236

## 2021-03-29 NOTE — PROGRESS NOTES
Subjective    Mr. Andrea is 51 y.o. male    Chief Complaint: Elevated PSA    History of Present Illness     Elevated PSA  Patient is here with an elevated PSA. The PSA was drawn1 week(s). He does not have a family history of prostate cancer. His AUA Symptom Score is 2 /35. Voiding symptoms include Frequency and Nocturia. Denies Incomplete emptying, Intermittency, Urgency, Weakened stream and Straining. Voiding symptoms began several years  . These have been gradual in onset. none  Previous PSA values are :   No results found for: PSA         PSA- 32.4 (03/23/2021)    The following portions of the patient's history were reviewed and updated as appropriate: allergies, current medications, past family history, past medical history, past social history, past surgical history and problem list.    Review of Systems   Constitutional: Negative for appetite change and fever.   HENT: Negative for hearing loss and sore throat.    Eyes: Negative for pain and redness.   Respiratory: Negative for cough and shortness of breath.    Cardiovascular: Negative for chest pain and leg swelling.   Gastrointestinal: Negative for anal bleeding, nausea and vomiting.   Endocrine: Negative for cold intolerance and heat intolerance.   Genitourinary: Positive for frequency. Negative for dysuria, flank pain, hematuria and urgency.   Musculoskeletal: Negative for joint swelling and myalgias.   Skin: Negative for color change and rash.   Allergic/Immunologic: Negative for immunocompromised state.   Neurological: Negative for dizziness and speech difficulty.   Hematological: Negative for adenopathy. Does not bruise/bleed easily.   Psychiatric/Behavioral: Negative for dysphoric mood and suicidal ideas.         Current Outpatient Medications:   •  amLODIPine (NORVASC) 5 MG tablet, Take 5 mg by mouth Daily., Disp: , Rfl:   •  aspirin 325 MG tablet, Take 325 mg by mouth Daily., Disp: , Rfl:   •  carvedilol (COREG) 6.25 MG tablet, Take 1 tablet by mouth  "2 (Two) Times a Day With Meals., Disp: 60 tablet, Rfl: 2  •  hydroCHLOROthiazide (HYDRODIURIL) 25 MG tablet, Take 1 tablet by mouth Daily., Disp: 30 tablet, Rfl: 2  •  lisinopril (PRINIVIL,ZESTRIL) 20 MG tablet, Take 1 tablet by mouth Daily., Disp: 30 tablet, Rfl: 2  •  Multiple Vitamins-Minerals (MULTIVITAMIN WITH MINERALS) tablet tablet, Take 1 tablet by mouth Daily., Disp: , Rfl:     Past Medical History:   Diagnosis Date   • A-fib (CMS/HCC)    • Chronic renal failure    • Elevated PSA    • Hypertension    • Kidney stone    • MRSA bacteremia 2/21/2017   • Purpura (CMS/HCC)    • Wegener's granulomatosis with renal involvement (CMS/HCC)        Past Surgical History:   Procedure Laterality Date   • APPENDECTOMY     • INSERTION HEMODIALYSIS CATHETER N/A 1/20/2017    Procedure: INSERTION PERMCATH;  Surgeon: Ian Grimes DO;  Location: [facility] OR;  Service:        Social History     Socioeconomic History   • Marital status:      Spouse name: Not on file   • Number of children: Not on file   • Years of education: Not on file   • Highest education level: Not on file   Tobacco Use   • Smoking status: Heavy Tobacco Smoker     Packs/day: 1.00     Types: Cigarettes   Vaping Use   • Vaping Use: Never used   Substance and Sexual Activity   • Alcohol use: Yes   • Drug use: No   • Sexual activity: Defer       History reviewed. No pertinent family history.    Objective    Temp 97.9 °F (36.6 °C) (Temporal)   Ht 182.9 cm (72\")   Wt 127 kg (281 lb)   BMI 38.11 kg/m²     Physical Exam  Vitals reviewed.   Constitutional:       General: He is not in acute distress.     Appearance: He is well-developed. He is not diaphoretic.   HENT:      Nose: Nose normal.   Neck:      Thyroid: No thyromegaly.      Trachea: No tracheal deviation.   Cardiovascular:      Rate and Rhythm: Normal rate and regular rhythm.      Comments: No significant edema or tenderness   Pulmonary:      Effort: No accessory muscle usage or respiratory " distress.      Breath sounds: Normal breath sounds.   Abdominal:      General: Bowel sounds are normal. There is no distension.      Palpations: Abdomen is soft. There is no mass.      Tenderness: There is no abdominal tenderness. There is no guarding or rebound.      Hernia: No hernia is present.      Comments: Stool specimen is not indicated for my portion of the exam   Genitourinary:     Penis: No tenderness (no lesion or deformities).       Testes:         Right: Mass, tenderness or swelling not present.         Left: Mass, tenderness or swelling not present.      Prostate: Tender: no nodules.      Rectum: Guaiac result negative. No mass or tenderness. Normal anal tone.      Comments: Anus and perineum without mass or tenderness. The seminal vesicle are without mass or enlargement. The prostate is approximately 20 ml. It is Symmetric, with a Firm consistency. There is a < 2cm nodule at the right apex of the gland. . The seminal vesicles are Not palpable due to the size of the prostate.    Musculoskeletal:      Cervical back: Normal range of motion and neck supple.   Lymphadenopathy:      Cervical: No cervical adenopathy.      Lower Body: No right inguinal adenopathy. No left inguinal adenopathy.   Skin:     General: Skin is warm and dry.      Coloration: Skin is not pale.      Findings: No rash.   Neurological:      Mental Status: He is alert and oriented to person, place, and time.   Psychiatric:         Behavior: Behavior normal.             Results for orders placed or performed in visit on 03/31/21   POC Urinalysis Dipstick, Multipro    Specimen: Urine   Result Value Ref Range    Color Yellow Yellow, Straw, Dark Yellow, Falguni    Clarity, UA Clear Clear    Glucose, UA Negative Negative, 1000 mg/dL (3+) mg/dL    Bilirubin Negative Negative    Ketones, UA Negative Negative    Specific Gravity  1.025 1.005 - 1.030    Blood, UA Negative Negative    pH, Urine 5.5 5.0 - 8.0    Protein, POC 1+ (A) Negative mg/dL     Urobilinogen, UA Normal Normal    Nitrite, UA Negative Negative    Leukocytes Negative Negative     Assessment and Plan    Diagnoses and all orders for this visit:    1. Elevated prostate specific antigen (PSA) (Primary)  -     POC Urinalysis Dipstick, Multipro    2. Nodular prostate with lower urinary tract symptoms    3. Nephrolithiasis      Patient was last seen in 2017 with an elevated PSA and a prostate biopsy was recommended at that time.  Patient did not undergo that.  He also has a history of nephrolithiasis.    He returns today with a PSA of 32.4.  PSA in February 2017 was 18.1.  He did receive antibiotics this only went down to 17.9.        Regarding his PSA,  I have recommended a transrectal ultrasound guided prostate biopsy.  We discussed risks including hematuria,  hematochezia, hematospermia.  I discussed the 4% risk of infection requiring hospitalization.  Also discussed 1% risk of urinary retention.  I discussed my antimicrobial regimen which includes 3 days of antibiotics prior plus shot of IM gentamicin  The day of the procedure.  The patient voiced understanding of this and wishes to proceed.

## 2021-03-31 ENCOUNTER — OFFICE VISIT (OUTPATIENT)
Dept: UROLOGY | Facility: CLINIC | Age: 51
End: 2021-03-31

## 2021-03-31 VITALS — HEIGHT: 72 IN | BODY MASS INDEX: 38.06 KG/M2 | WEIGHT: 281 LBS | TEMPERATURE: 97.9 F

## 2021-03-31 DIAGNOSIS — R97.20 ELEVATED PROSTATE SPECIFIC ANTIGEN (PSA): Primary | ICD-10-CM

## 2021-03-31 DIAGNOSIS — N40.3 NODULAR PROSTATE WITH LOWER URINARY TRACT SYMPTOMS: ICD-10-CM

## 2021-03-31 DIAGNOSIS — N20.0 NEPHROLITHIASIS: ICD-10-CM

## 2021-03-31 LAB
BILIRUB BLD-MCNC: NEGATIVE MG/DL
CLARITY, POC: CLEAR
COLOR UR: YELLOW
GLUCOSE UR STRIP-MCNC: NEGATIVE MG/DL
KETONES UR QL: NEGATIVE
LEUKOCYTE EST, POC: NEGATIVE
NITRITE UR-MCNC: NEGATIVE MG/ML
PH UR: 5.5 [PH] (ref 5–8)
PROT UR STRIP-MCNC: ABNORMAL MG/DL
RBC # UR STRIP: NEGATIVE /UL
SP GR UR: 1.02 (ref 1–1.03)
UROBILINOGEN UR QL: NORMAL

## 2021-03-31 PROCEDURE — 99204 OFFICE O/P NEW MOD 45 MIN: CPT | Performed by: UROLOGY

## 2021-03-31 RX ORDER — AMLODIPINE BESYLATE 5 MG/1
5 TABLET ORAL DAILY
COMMUNITY
Start: 2021-03-12 | End: 2022-07-27

## 2021-03-31 RX ORDER — CEPHALEXIN 500 MG/1
500 CAPSULE ORAL 3 TIMES DAILY
Qty: 9 CAPSULE | Refills: 0 | Status: SHIPPED | OUTPATIENT
Start: 2021-03-31 | End: 2021-04-03

## 2021-04-14 ENCOUNTER — PROCEDURE VISIT (OUTPATIENT)
Dept: UROLOGY | Facility: CLINIC | Age: 51
End: 2021-04-14

## 2021-04-14 DIAGNOSIS — R97.20 ELEVATED PROSTATE SPECIFIC ANTIGEN (PSA): Primary | ICD-10-CM

## 2021-04-14 DIAGNOSIS — N40.3 NODULAR PROSTATE WITH LOWER URINARY TRACT SYMPTOMS: ICD-10-CM

## 2021-04-14 PROCEDURE — 88342 IMHCHEM/IMCYTCHM 1ST ANTB: CPT | Performed by: UROLOGY

## 2021-04-14 PROCEDURE — 88341 IMHCHEM/IMCYTCHM EA ADD ANTB: CPT | Performed by: UROLOGY

## 2021-04-14 PROCEDURE — 96372 THER/PROPH/DIAG INJ SC/IM: CPT | Performed by: UROLOGY

## 2021-04-14 PROCEDURE — 55700 PR PROSTATE NEEDLE BIOPSY ANY APPROACH: CPT | Performed by: UROLOGY

## 2021-04-14 PROCEDURE — 76942 ECHO GUIDE FOR BIOPSY: CPT | Performed by: UROLOGY

## 2021-04-14 PROCEDURE — G0416 PROSTATE BIOPSY, ANY MTHD: HCPCS | Performed by: UROLOGY

## 2021-04-14 NOTE — PROGRESS NOTES
Indications:  Elevated PSA    Pre-operative prep:  fleets enema, oral antibiotic, IM gentamicin and stopped aspirin, coumadin, and other anticoagulants    Last PSA:  32.4  Procedure:    After proper identification of patient and procedure, patient was placed in the left later decubitus position.  2% lidocaine jelly was instilled per rectum  for topical anesthesia.  The ultrasound probe was gently inserted per rectum. Prostate was scanned from the base of the bladder to the apex.  20 cc of 1% lidocaine plain was then used to perform a prostate nerve block injecting the junction of the seminal vesicle and bladder laterally.      Prostate length: 5.77 cm    Prostate width: 5.26 cm    Prostate height: 3.96 cm    Prostate volume: 62.9 cc    PSA density: 0.51    Abnormal findings:  Transition zone enlargement and Hypoechoic lesions right mid    Median lobe:  no    A total of 12 biopsies were taken from the base, apex, and mid portion of the gland on both the right and the left sides.     Patient tolerated the procedure well    Complications: none    Estimated blood loss: minimal    Mr. Andrea was given instructions for follow up.  He will notify the office if he has excessive hematuria, hematochezia, fevers, perineal, or abdominal pain.    Follow up:   Call with pathology results and schedule follow up.

## 2021-04-16 RX ORDER — GENTAMICIN SULFATE 40 MG/ML
80 INJECTION, SOLUTION INTRAMUSCULAR; INTRAVENOUS ONCE
Status: COMPLETED | OUTPATIENT
Start: 2021-04-16 | End: 2021-04-16

## 2021-04-16 RX ADMIN — GENTAMICIN SULFATE 80 MG: 40 INJECTION, SOLUTION INTRAMUSCULAR; INTRAVENOUS at 08:50

## 2021-04-19 LAB
LAB AP CASE REPORT: NORMAL
Lab: NORMAL
PATH REPORT.FINAL DX SPEC: NORMAL
PATH REPORT.GROSS SPEC: NORMAL

## 2021-04-20 DIAGNOSIS — C61 PROSTATE CANCER (HCC): Primary | ICD-10-CM

## 2021-04-26 ENCOUNTER — HOSPITAL ENCOUNTER (OUTPATIENT)
Dept: NUCLEAR MEDICINE | Facility: HOSPITAL | Age: 51
Discharge: HOME | End: 2021-04-26

## 2021-04-26 ENCOUNTER — HOSPITAL ENCOUNTER (OUTPATIENT)
Dept: CT IMAGING | Facility: HOSPITAL | Age: 51
Discharge: HOME | End: 2021-04-26
Admitting: UROLOGY

## 2021-04-26 DIAGNOSIS — C61 PROSTATE CANCER (HCC): ICD-10-CM

## 2021-04-26 LAB — CREAT BLDA-MCNC: 1.8 MG/DL (ref 0.6–1.3)

## 2021-04-26 PROCEDURE — A9561 TC99M OXIDRONATE: HCPCS | Performed by: UROLOGY

## 2021-04-26 PROCEDURE — 25010000002 IOPAMIDOL 61 % SOLUTION: Performed by: UROLOGY

## 2021-04-26 PROCEDURE — 74177 CT ABD & PELVIS W/CONTRAST: CPT

## 2021-04-26 PROCEDURE — 0 TECHNETIUM OXIDRONATE KIT: Performed by: UROLOGY

## 2021-04-26 PROCEDURE — 78306 BONE IMAGING WHOLE BODY: CPT

## 2021-04-26 PROCEDURE — 82565 ASSAY OF CREATININE: CPT

## 2021-04-26 RX ADMIN — TECHNETIUM TC 99M OXIDRONATE 1 DOSE: 3.15 INJECTION, POWDER, LYOPHILIZED, FOR SOLUTION INTRAVENOUS at 09:12

## 2021-04-26 RX ADMIN — IOPAMIDOL 100 ML: 612 INJECTION, SOLUTION INTRAVENOUS at 09:04

## 2021-05-03 ENCOUNTER — OFFICE VISIT (OUTPATIENT)
Dept: UROLOGY | Facility: CLINIC | Age: 51
End: 2021-05-03

## 2021-05-03 VITALS — WEIGHT: 281 LBS | BODY MASS INDEX: 38.06 KG/M2 | TEMPERATURE: 97.4 F | HEIGHT: 72 IN

## 2021-05-03 DIAGNOSIS — C61 PROSTATE CANCER (HCC): Primary | ICD-10-CM

## 2021-05-03 PROCEDURE — 99214 OFFICE O/P EST MOD 30 MIN: CPT | Performed by: UROLOGY

## 2021-05-03 RX ORDER — SODIUM CHLORIDE 9 MG/ML
100 INJECTION, SOLUTION INTRAVENOUS CONTINUOUS
Status: CANCELLED | OUTPATIENT
Start: 2021-05-03

## 2021-05-24 ENCOUNTER — TRANSCRIBE ORDERS (OUTPATIENT)
Dept: ADMINISTRATIVE | Facility: HOSPITAL | Age: 51
End: 2021-05-24

## 2021-05-24 DIAGNOSIS — Z11.59 SCREENING FOR VIRAL DISEASE: Primary | ICD-10-CM

## 2021-05-25 ENCOUNTER — PRE-ADMISSION TESTING (OUTPATIENT)
Dept: PREADMISSION TESTING | Facility: HOSPITAL | Age: 51
End: 2021-05-25

## 2021-05-25 LAB
ANION GAP SERPL CALCULATED.3IONS-SCNC: 9 MMOL/L (ref 5–15)
BUN SERPL-MCNC: 20 MG/DL (ref 6–20)
BUN/CREAT SERPL: 12.7 (ref 7–25)
CALCIUM SPEC-SCNC: 10.1 MG/DL (ref 8.6–10.5)
CHLORIDE SERPL-SCNC: 102 MMOL/L (ref 98–107)
CO2 SERPL-SCNC: 31 MMOL/L (ref 22–29)
CREAT SERPL-MCNC: 1.57 MG/DL (ref 0.76–1.27)
DEPRECATED RDW RBC AUTO: 46.2 FL (ref 37–54)
ERYTHROCYTE [DISTWIDTH] IN BLOOD BY AUTOMATED COUNT: 14.1 % (ref 12.3–15.4)
GFR SERPL CREATININE-BSD FRML MDRD: 47 ML/MIN/1.73
GLUCOSE SERPL-MCNC: 106 MG/DL (ref 65–99)
HCT VFR BLD AUTO: 46.8 % (ref 37.5–51)
HGB BLD-MCNC: 15.8 G/DL (ref 13–17.7)
MCH RBC QN AUTO: 30.4 PG (ref 26.6–33)
MCHC RBC AUTO-ENTMCNC: 33.8 G/DL (ref 31.5–35.7)
MCV RBC AUTO: 90.2 FL (ref 79–97)
PLATELET # BLD AUTO: 194 10*3/MM3 (ref 140–450)
PMV BLD AUTO: 10.9 FL (ref 6–12)
POTASSIUM SERPL-SCNC: 3.9 MMOL/L (ref 3.5–5.2)
RBC # BLD AUTO: 5.19 10*6/MM3 (ref 4.14–5.8)
SODIUM SERPL-SCNC: 142 MMOL/L (ref 136–145)
WBC # BLD AUTO: 7.38 10*3/MM3 (ref 3.4–10.8)

## 2021-05-25 PROCEDURE — 85027 COMPLETE CBC AUTOMATED: CPT

## 2021-05-25 PROCEDURE — 93010 ELECTROCARDIOGRAM REPORT: CPT | Performed by: EMERGENCY MEDICINE

## 2021-05-25 PROCEDURE — 36415 COLL VENOUS BLD VENIPUNCTURE: CPT

## 2021-05-25 PROCEDURE — 80048 BASIC METABOLIC PNL TOTAL CA: CPT

## 2021-05-25 PROCEDURE — 93005 ELECTROCARDIOGRAM TRACING: CPT

## 2021-05-25 NOTE — DISCHARGE INSTRUCTIONS
DAY OF SURGERY INSTRUCTIONS        YOUR SURGEON: Dr. Luis Carlos Awan    PROCEDURE: Radical prostatectomy laparoscopic with Davinci robot with bilateral pelvic lymph node dissection    DATE OF SURGERY: Tuesday June 1, 2021    ARRIVAL TIME: AS DIRECTED BY OFFICE    YOU MAY TAKE THE FOLLOWING MEDICATION(S) THE MORNING OF SURGERY WITH A SIP OF WATER: Carvedilol (COREG)    Do NOT take your Lisinopril within 24 hours of surgery as directed by anesthesia      ALL OTHER HOME MEDICATION CHECK WITH YOUR PHYSICIAN      DO NOT TAKE ANY ERECTILE DYSFUNCTION MEDICATIONS (EX: CIALIS, VIAGRA) 24 HOURS PRIOR TO SURGERY                      MANAGING PAIN AFTER SURGERY    We know you are probably wondering what your pain will be like after surgery.  Following surgery it is unrealistic to expect you will not have pain.   Pain is how our bodies let us know that something is wrong or cautions us to be careful.  That said, our goal is to make your pain tolerable.    Methods we may use to treat your pain include (oral or IV medications, PCAs, epidurals, nerve blocks, etc.)   While some procedures require IV pain medications for a short time after surgery, transitioning to pain medications by mouth allows for better management of pain.   Your nurse will encourage you to take oral pain medications whenever possible.  IV medications work almost immediately, but only last a short while.  Taking medications by mouth allows for a more constant level of medication in your blood stream for a longer period of time.      Once your pain is out of control it is harder to get back under control.  It is important you are aware when your next dose of pain medication is due.  If you are admitted, your nurse may write the time of your next dose on the white board in your room to help you remember.      We are interested in your pain and encourage you to inform us about aggravating factors during your visit.   Many times a simple repositioning every few  hours can make a big difference.    If your physician says it is okay, do not let your pain prevent you from getting out of bed. Be sure to call your nurse for assistance prior to getting up so you do not fall.      Before surgery, please decide your tolerable pain goal.  These faces help describe the pain ratings we use on a 0-10 scale.   Be prepared to tell us your goal and whether or not you take pain or anxiety medications at home.          BEFORE YOU COME TO THE HOSPITAL  (Pre-op instructions)  • Do not eat, drink, smoke or chew gum after midnight the night before surgery.  This also includes no mints.  • Morning of surgery take only the medicines you have been instructed with a sip of water unless otherwise instructed  by your physician.  • Do not shave, wear makeup or dark nail polish.  • Remove all jewelry including rings.  • Leave anything you consider valuable at home.  • Leave your suitcase in the car until after your surgery.  • Bring the following with you if applicable:  o Picture ID and insurance, Medicare or Medicaid cards  o Co-pay/deductible required by insurance (cash, check, credit card)  o Copy of advance directive, living will or power-of- documents if not brought to PAT  o CPAP or BIPAP mask and tubing  o Relaxation aids ( book, magazine), etc.  o Hearing aids                        ON THE DAY OF SURGERY  · On the day of surgery check in at registration located at the main entrance of the hospital.   ? You will be registered and given a beeper with instructions where to wait in the main lobby.  ? When your beeper lights up and vibrates a member of the Outpatient Surgery staff will meet you at the double doors under the stair steps and escort you to your preoperative room.   · You may have cloth compression devices placed on your legs. These help to prevent blood clots and reduce swelling in your legs.  · An IV may be inserted into one of your veins.  · In the operating room, you may be  "given one or more of the following:  ? A medicine to help you relax (sedative).  ? A medicine to numb the area (local anesthetic).  ? A medicine to make you fall asleep (general anesthetic).  ? A medicine that is injected into an area of your body to numb everything below the injection site (regional anesthetic).  · Your surgical site will be marked or identified.  · You may be given an antibiotic through your IV to help prevent infection.  Contact a health care provider if you:  · Develop a fever of more than 100.4°F (38°C) or other feelings of illness during the 48 hours before your surgery.  · Have symptoms that get worse.  Have questions or concerns about your surgery    General Anesthesia/Surgery, Adult  General anesthesia is the use of medicines to make a person \"go to sleep\" (unconscious) for a medical procedure. General anesthesia must be used for certain procedures, and is often recommended for procedures that:  · Last a long time.  · Require you to be still or in an unusual position.  · Are major and can cause blood loss.  The medicines used for general anesthesia are called general anesthetics. As well as making you unconscious for a certain amount of time, these medicines:  · Prevent pain.  · Control your blood pressure.  · Relax your muscles.  Tell a health care provider about:  · Any allergies you have.  · All medicines you are taking, including vitamins, herbs, eye drops, creams, and over-the-counter medicines.  · Any problems you or family members have had with anesthetic medicines.  · Types of anesthetics you have had in the past.  · Any blood disorders you have.  · Any surgeries you have had.  · Any medical conditions you have.  · Any recent upper respiratory, chest, or ear infections.  · Any history of:  ? Heart or lung conditions, such as heart failure, sleep apnea, asthma, or chronic obstructive pulmonary disease (COPD).  ?  service.  ? Depression or anxiety.  · Any tobacco or drug use, " including marijuana or alcohol use.  · Whether you are pregnant or may be pregnant.  What are the risks?  Generally, this is a safe procedure. However, problems may occur, including:  · Allergic reaction.  · Lung and heart problems.  · Inhaling food or liquid from the stomach into the lungs (aspiration).  · Nerve injury.  · Air in the bloodstream, which can lead to stroke.  · Extreme agitation or confusion (delirium) when you wake up from the anesthetic.  · Waking up during your procedure and being unable to move. This is rare.  These problems are more likely to develop if you are having a major surgery or if you have an advanced or serious medical condition. You can prevent some of these complications by answering all of your health care provider's questions thoroughly and by following all instructions before your procedure.  General anesthesia can cause side effects, including:  · Nausea or vomiting.  · A sore throat from the breathing tube.  · Hoarseness.  · Wheezing or coughing.  · Shaking chills.  · Tiredness.  · Body aches.  · Anxiety.  · Sleepiness or drowsiness.  · Confusion or agitation.  RISKS AND COMPLICATIONS OF SURGERY  Your health care provider will discuss possible risks and complications with you before surgery. Common risks and complications include:    · Problems due to the use of anesthetics.  · Blood loss and replacement (does not apply to minor surgical procedures).  · Temporary increase in pain due to surgery.  · Uncorrected pain or problems that the surgery was meant to correct.  · Infection.  · New damage.    What happens before the procedure?    Medicines  Ask your health care provider about:  · Changing or stopping your regular medicines. This is especially important if you are taking diabetes medicines or blood thinners.  · Taking medicines such as aspirin and ibuprofen. These medicines can thin your blood. Do not take these medicines unless your health care provider tells you to take  them.  · Taking over-the-counter medicines, vitamins, herbs, and supplements. Do not take these during the week before your procedure unless your health care provider approves them.  General instructions  · Starting 3-6 weeks before the procedure, do not use any products that contain nicotine or tobacco, such as cigarettes and e-cigarettes. If you need help quitting, ask your health care provider.  · If you brush your teeth on the morning of the procedure, make sure to spit out all of the toothpaste.  · Tell your health care provider if you become ill or develop a cold, cough, or fever.  · If instructed by your health care provider, bring your sleep apnea device with you on the day of your surgery (if applicable).  · Ask your health care provider if you will be going home the same day, the following day, or after a longer hospital stay.  ? Plan to have someone take you home from the hospital or clinic.  ? Plan to have a responsible adult care for you for at least 24 hours after you leave the hospital or clinic. This is important.  What happens during the procedure?  · You will be given anesthetics through both of the following:  ? A mask placed over your nose and mouth.  ? An IV in one of your veins.  · You may receive a medicine to help you relax (sedative).  · After you are unconscious, a breathing tube may be inserted down your throat to help you breathe. This will be removed before you wake up.  · An anesthesia specialist will stay with you throughout your procedure. He or she will:  ? Keep you comfortable and safe by continuing to give you medicines and adjusting the amount of medicine that you get.  ? Monitor your blood pressure, pulse, and oxygen levels to make sure that the anesthetics do not cause any problems.  The procedure may vary among health care providers and hospitals.  What happens after the procedure?  · Your blood pressure, temperature, heart rate, breathing rate, and blood oxygen level will be  monitored until the medicines you were given have worn off.  · You will wake up in a recovery area. You may wake up slowly.  · If you feel anxious or agitated, you may be given medicine to help you calm down.  · If you will be going home the same day, your health care provider may check to make sure you can walk, drink, and urinate.  · Your health care provider will treat any pain or side effects you have before you go home.  · Do not drive for 24 hours if you were given a sedative.  Summary  · General anesthesia is used to keep you still and prevent pain during a procedure.  · It is important to tell your healthcare provider about your medical history and any surgeries you have had, and previous experience with anesthesia.  · Follow your healthcare provider’s instructions about when to stop eating, drinking, or taking certain medicines before your procedure.  · Plan to have someone take you home from the hospital or clinic.  This information is not intended to replace advice given to you by your health care provider. Make sure you discuss any questions you have with your health care provider.  Document Released: 03/26/2009 Document Revised: 08/03/2018 Document Reviewed: 08/03/2018  [employer] Interactive Patient Education © 2019 [employer]       Fall Prevention in Hospitals, Adult  As a hospital patient, your condition and the treatments you receive can increase your risk for falls. Some additional risk factors for falls in a hospital include:  · Being in an unfamiliar environment.  · Being on bed rest.  · Your surgery.  · Taking certain medicines.  · Your tubing requirements, such as intravenous (IV) therapy or catheters.  It is important that you learn how to decrease fall risks while at the hospital. Below are important tips that can help prevent falls.  SAFETY TIPS FOR PREVENTING FALLS  Talk about your risk of falling.  · Ask your health care provider why you are at risk for falling. Is it your medicine,  illness, tubing placement, or something else?  · Make a plan with your health care provider to keep you safe from falls.  · Ask your health care provider or pharmacist about side effects of your medicines. Some medicines can make you dizzy or affect your coordination.  Ask for help.  · Ask for help before getting out of bed. You may need to press your call button.  · Ask for assistance in getting safely to the toilet.  · Ask for a walker or cane to be put at your bedside. Ask that most of the side rails on your bed be placed up before your health care provider leaves the room.  · Ask family or friends to sit with you.  · Ask for things that are out of your reach, such as your glasses, hearing aids, telephone, bedside table, or call button.  Follow these tips to avoid falling:  · Stay lying or seated, rather than standing, while waiting for help.  · Wear rubber-soled slippers or shoes whenever you walk in the hospital.  · Avoid quick, sudden movements.  ¨ Change positions slowly.  ¨ Sit on the side of your bed before standing.  ¨ Stand up slowly and wait before you start to walk.  · Let your health care provider know if there is a spill on the floor.  · Pay careful attention to the medical equipment, electrical cords, and tubes around you.  · When you need help, use your call button by your bed or in the bathroom. Wait for one of your health care providers to help you.  · If you feel dizzy or unsure of your footing, return to bed and wait for assistance.  · Avoid being distracted by the TV, telephone, or another person in your room.  · Do not lean or support yourself on rolling objects, such as IV poles or bedside tables.     This information is not intended to replace advice given to you by your health care provider. Make sure you discuss any questions you have with your health care provider.     Document Released: 12/15/2001 Document Revised: 01/08/2016 Document Reviewed: 08/25/2013  [employer] Patient  Education ©2016 [employer]       [facility]  CHG 4% Patient Instruction Sheet    Chlorhexidine Before Surgery  Chlorhexidine gluconate (CHG) is a germ-killing (antiseptic) solution that is used to clean the skin. It gets rid of the bacteria that normally live on the skin. Cleaning your skin with CHG before surgery helps lower the risk for infection after surgery.    How to use CHG solution  · You will take 2 showers, one shower the night before surgery, the second shower the morning of surgery before coming to the hospital.  · Use CHG only as told by your health care provider, and follow the instructions on the label.  · Use CHG solution while taking a shower. Follow these steps when using CHG solution (unless your health care provider gives you different instructions):  1. Start the shower.  2. Use your normal soap and shampoo to wash your face and hair.  3. Turn off the shower or move out of the shower stream.  4. Pour the CHG onto a clean washcloth. Do not use any type of brush or rough-edged sponge.  5. Starting at your neck, lather your body down to your toes. Make sure you:  6. Pay special attention to the part of your body where you will be having surgery. Scrub this area for at least 1 minute.  7. Use the full amount of CHG as directed. Usually, this is one half bottle for each shower.  8. Do not use CHG on your head or face. If the solution gets into your ears or eyes, rinse them well with water.  9. Avoid your genital area.  10. Avoid any areas of skin that have broken skin, cuts, or scrapes.  11. Scrub your back and under your arms. Make sure to wash skin folds.  12. Let the lather sit on your skin for 1-2 minutes or as long as told by your health care  provider.  13. Thoroughly rinse your entire body in the shower. Make sure that all body creases and crevices are rinsed well.  14. Dry off with a clean towel. Do not put any substances on your body afterward, such as powder, lotion, or  perfume.  15. Put on clean clothes or pajamas.  16. If it is the night before your surgery, sleep in clean sheets.    What are the risks?  Risks of using CHG include:  · A skin reaction.  · Hearing loss, if CHG gets in your ears.  · Eye injury, if CHG gets in your eyes and is not rinsed out.  · The CHG product catching fire.  Make sure that you avoid smoking and flames after applying CHG to your skin.  Do not use CHG:  · If you have a chlorhexidine allergy or have previously reacted to chlorhexidine.  · On babies younger than 2 months of age.      On the day of surgery, when you are taken to your room in Outpatient Surgery you will be given a CHG prepackaged cloth to wipe the site for your surgery.  How to use CHG prepackaged cloths  · Follow the instructions on the label.  · Use the CHG cloth on clean, dry skin. Follow these steps when using a CHG cloth (unless your health care provider gives you different instructions):  1. Using the CHG cloth, vigorously scrub the part of your body where you will be having surgery. Scrub using a back-and-forth motion for 3 minutes. The area on your body should be completely wet with CHG when you are finished scrubbing.  2. Do not rinse. Discard the cloth and let the area air-dry for 1 minute. Do not put any substances on your body afterward, such as powder, lotion, or perfume.  Contact a health care provider if:  · Your skin gets irritated after scrubbing.  · You have questions about using your solution or cloth.  Get help right away if:  · Your eyes become very red or swollen.  · Your eyes itch badly.  · Your skin itches badly and is red or swollen.  · Your hearing changes.  · You have trouble seeing.  · You have swelling or tingling in your mouth or throat.  · You have trouble breathing.  · You swallow any chlorhexidine.  Summary  · Chlorhexidine gluconate (CHG) is a germ-killing (antiseptic) solution that is used to clean the skin. Cleaning your skin with CHG before surgery  helps lower the risk for infection after surgery.  · You may be given CHG to use at home. It may be in a bottle or in a prepackaged cloth to use on your skin. Carefully follow your health care provider's instructions and the instructions on the product label.  · Do not use CHG if you have a chlorhexidine allergy.  · Contact your health care provider if your skin gets irritated after scrubbing.  This information is not intended to replace advice given to you by your health care provider. Make sure you discuss any questions you have with your health care provider.  Document Released: 09/11/2013 Document Revised: 11/15/2018 Document Reviewed: 11/15/2018  [employer] Interactive Patient Education © 2019 [employer]          PATIENT/FAMILY/RESPONSIBLE PARTY VERBALIZES UNDERSTANDING OF ABOVE EDUCATION.  COPY OF PAIN SCALE GIVEN AND REVIEWED WITH VERBALIZED UNDERSTANDING.

## 2021-05-26 LAB
QT INTERVAL: 402 MS
QTC INTERVAL: 442 MS

## 2021-05-29 ENCOUNTER — LAB (OUTPATIENT)
Dept: LAB | Facility: HOSPITAL | Age: 51
End: 2021-05-29

## 2021-05-29 LAB — SARS-COV-2 ORF1AB RESP QL NAA+PROBE: NOT DETECTED

## 2021-05-29 PROCEDURE — C9803 HOPD COVID-19 SPEC COLLECT: HCPCS | Performed by: UROLOGY

## 2021-05-29 PROCEDURE — U0004 COV-19 TEST NON-CDC HGH THRU: HCPCS | Performed by: UROLOGY

## 2021-06-01 ENCOUNTER — ANESTHESIA EVENT (OUTPATIENT)
Dept: PERIOP | Facility: HOSPITAL | Age: 51
End: 2021-06-01

## 2021-06-01 ENCOUNTER — HOSPITAL ENCOUNTER (OUTPATIENT)
Facility: HOSPITAL | Age: 51
Setting detail: OBSERVATION
Discharge: HOME | End: 2021-06-02
Attending: UROLOGY | Admitting: UROLOGY

## 2021-06-01 ENCOUNTER — ANESTHESIA (OUTPATIENT)
Dept: PERIOP | Facility: HOSPITAL | Age: 51
End: 2021-06-01

## 2021-06-01 DIAGNOSIS — C61 PROSTATE CANCER (HCC): ICD-10-CM

## 2021-06-01 LAB
DEPRECATED RDW RBC AUTO: 46.5 FL (ref 37–54)
ERYTHROCYTE [DISTWIDTH] IN BLOOD BY AUTOMATED COUNT: 13.9 % (ref 12.3–15.4)
HCT VFR BLD AUTO: 46.1 % (ref 37.5–51)
HGB BLD-MCNC: 15.2 G/DL (ref 13–17.7)
MCH RBC QN AUTO: 30.2 PG (ref 26.6–33)
MCHC RBC AUTO-ENTMCNC: 33 G/DL (ref 31.5–35.7)
MCV RBC AUTO: 91.7 FL (ref 79–97)
PLATELET # BLD AUTO: 197 10*3/MM3 (ref 140–450)
PMV BLD AUTO: 10.5 FL (ref 6–12)
RBC # BLD AUTO: 5.03 10*6/MM3 (ref 4.14–5.8)
WBC # BLD AUTO: 17.82 10*3/MM3 (ref 3.4–10.8)

## 2021-06-01 PROCEDURE — G0378 HOSPITAL OBSERVATION PER HR: HCPCS

## 2021-06-01 PROCEDURE — 25010000002 CEFAZOLIN PER 500 MG: Performed by: UROLOGY

## 2021-06-01 PROCEDURE — C1889 IMPLANT/INSERT DEVICE, NOC: HCPCS | Performed by: UROLOGY

## 2021-06-01 PROCEDURE — 38571 LAPAROSCOPY LYMPHADENECTOMY: CPT | Performed by: UROLOGY

## 2021-06-01 PROCEDURE — 94799 UNLISTED PULMONARY SVC/PX: CPT

## 2021-06-01 PROCEDURE — 25010000002 ONDANSETRON PER 1 MG: Performed by: NURSE ANESTHETIST, CERTIFIED REGISTERED

## 2021-06-01 PROCEDURE — 85027 COMPLETE CBC AUTOMATED: CPT | Performed by: UROLOGY

## 2021-06-01 PROCEDURE — 25010000002 DEXAMETHASONE PER 1 MG: Performed by: NURSE ANESTHETIST, CERTIFIED REGISTERED

## 2021-06-01 PROCEDURE — 88307 TISSUE EXAM BY PATHOLOGIST: CPT | Performed by: UROLOGY

## 2021-06-01 PROCEDURE — 25010000002 HYDROMORPHONE 1 MG/ML SOLUTION: Performed by: NURSE ANESTHETIST, CERTIFIED REGISTERED

## 2021-06-01 PROCEDURE — 88309 TISSUE EXAM BY PATHOLOGIST: CPT | Performed by: UROLOGY

## 2021-06-01 PROCEDURE — 25010000002 PROPOFOL 10 MG/ML EMULSION: Performed by: NURSE ANESTHETIST, CERTIFIED REGISTERED

## 2021-06-01 PROCEDURE — 55866 LAPS SURG PRST8ECT RPBIC RAD: CPT | Performed by: UROLOGY

## 2021-06-01 DEVICE — CLIP LIG HEMOLOK PA LG 6CT PRP: Type: IMPLANTABLE DEVICE | Site: ABDOMEN | Status: FUNCTIONAL

## 2021-06-01 DEVICE — LIGAMAX 5 MM ENDOSCOPIC MULTIPLE CLIP APPLIER
Type: IMPLANTABLE DEVICE | Site: ABDOMEN | Status: FUNCTIONAL
Brand: LIGAMAX

## 2021-06-01 DEVICE — SEAL HEMO SURG ARISTA/AH ABS/PWDR 3GM: Type: IMPLANTABLE DEVICE | Site: ABDOMEN | Status: FUNCTIONAL

## 2021-06-01 DEVICE — DEV CONTRL TISS STRATAFIX SPIRAL PGA PGL 3/0RB 16X16: Type: IMPLANTABLE DEVICE | Site: ABDOMEN | Status: FUNCTIONAL

## 2021-06-01 RX ORDER — ONDANSETRON 2 MG/ML
4 INJECTION INTRAMUSCULAR; INTRAVENOUS ONCE AS NEEDED
Status: DISCONTINUED | OUTPATIENT
Start: 2021-06-01 | End: 2021-06-01 | Stop reason: HOSPADM

## 2021-06-01 RX ORDER — BUPIVACAINE HCL/0.9 % NACL/PF 0.1 %
2 PLASTIC BAG, INJECTION (ML) EPIDURAL ONCE
Status: COMPLETED | OUTPATIENT
Start: 2021-06-01 | End: 2021-06-01

## 2021-06-01 RX ORDER — LABETALOL HYDROCHLORIDE 5 MG/ML
5 INJECTION, SOLUTION INTRAVENOUS
Status: DISCONTINUED | OUTPATIENT
Start: 2021-06-01 | End: 2021-06-01 | Stop reason: HOSPADM

## 2021-06-01 RX ORDER — FENTANYL CITRATE 50 UG/ML
25 INJECTION, SOLUTION INTRAMUSCULAR; INTRAVENOUS
Status: DISCONTINUED | OUTPATIENT
Start: 2021-06-01 | End: 2021-06-01 | Stop reason: HOSPADM

## 2021-06-01 RX ORDER — SODIUM CHLORIDE, SODIUM LACTATE, POTASSIUM CHLORIDE, CALCIUM CHLORIDE 600; 310; 30; 20 MG/100ML; MG/100ML; MG/100ML; MG/100ML
1000 INJECTION, SOLUTION INTRAVENOUS CONTINUOUS
Status: DISCONTINUED | OUTPATIENT
Start: 2021-06-01 | End: 2021-06-01 | Stop reason: HOSPADM

## 2021-06-01 RX ORDER — FLUMAZENIL 0.1 MG/ML
0.2 INJECTION INTRAVENOUS AS NEEDED
Status: DISCONTINUED | OUTPATIENT
Start: 2021-06-01 | End: 2021-06-01 | Stop reason: HOSPADM

## 2021-06-01 RX ORDER — OXYBUTYNIN CHLORIDE 5 MG/1
5 TABLET, EXTENDED RELEASE ORAL DAILY
Status: DISCONTINUED | OUTPATIENT
Start: 2021-06-01 | End: 2021-06-02 | Stop reason: HOSPADM

## 2021-06-01 RX ORDER — CARVEDILOL 6.25 MG/1
6.25 TABLET ORAL 2 TIMES DAILY WITH MEALS
Status: DISCONTINUED | OUTPATIENT
Start: 2021-06-01 | End: 2021-06-02 | Stop reason: HOSPADM

## 2021-06-01 RX ORDER — SODIUM CHLORIDE, SODIUM LACTATE, POTASSIUM CHLORIDE, CALCIUM CHLORIDE 600; 310; 30; 20 MG/100ML; MG/100ML; MG/100ML; MG/100ML
125 INJECTION, SOLUTION INTRAVENOUS CONTINUOUS
Status: DISCONTINUED | OUTPATIENT
Start: 2021-06-01 | End: 2021-06-02 | Stop reason: HOSPADM

## 2021-06-01 RX ORDER — ACETAMINOPHEN 325 MG/1
650 TABLET ORAL EVERY 6 HOURS SCHEDULED
Status: DISCONTINUED | OUTPATIENT
Start: 2021-06-01 | End: 2021-06-02 | Stop reason: HOSPADM

## 2021-06-01 RX ORDER — ONDANSETRON 4 MG/1
4 TABLET, FILM COATED ORAL EVERY 6 HOURS PRN
Status: DISCONTINUED | OUTPATIENT
Start: 2021-06-01 | End: 2021-06-02 | Stop reason: HOSPADM

## 2021-06-01 RX ORDER — LIDOCAINE HYDROCHLORIDE 10 MG/ML
0.5 INJECTION, SOLUTION EPIDURAL; INFILTRATION; INTRACAUDAL; PERINEURAL ONCE AS NEEDED
Status: DISCONTINUED | OUTPATIENT
Start: 2021-06-01 | End: 2021-06-01 | Stop reason: HOSPADM

## 2021-06-01 RX ORDER — SODIUM CHLORIDE, SODIUM LACTATE, POTASSIUM CHLORIDE, CALCIUM CHLORIDE 600; 310; 30; 20 MG/100ML; MG/100ML; MG/100ML; MG/100ML
100 INJECTION, SOLUTION INTRAVENOUS CONTINUOUS
Status: DISCONTINUED | OUTPATIENT
Start: 2021-06-01 | End: 2021-06-01 | Stop reason: HOSPADM

## 2021-06-01 RX ORDER — SODIUM CHLORIDE 9 MG/ML
INJECTION, SOLUTION INTRAVENOUS AS NEEDED
Status: DISCONTINUED | OUTPATIENT
Start: 2021-06-01 | End: 2021-06-01 | Stop reason: HOSPADM

## 2021-06-01 RX ORDER — PROPOFOL 10 MG/ML
VIAL (ML) INTRAVENOUS AS NEEDED
Status: DISCONTINUED | OUTPATIENT
Start: 2021-06-01 | End: 2021-06-01 | Stop reason: SURG

## 2021-06-01 RX ORDER — ACETAMINOPHEN 500 MG
1000 TABLET ORAL ONCE
Status: COMPLETED | OUTPATIENT
Start: 2021-06-01 | End: 2021-06-01

## 2021-06-01 RX ORDER — SUFENTANIL CITRATE 50 UG/ML
INJECTION EPIDURAL; INTRAVENOUS AS NEEDED
Status: DISCONTINUED | OUTPATIENT
Start: 2021-06-01 | End: 2021-06-01 | Stop reason: SURG

## 2021-06-01 RX ORDER — KETAMINE HYDROCHLORIDE 50 MG/ML
INJECTION, SOLUTION, CONCENTRATE INTRAMUSCULAR; INTRAVENOUS AS NEEDED
Status: DISCONTINUED | OUTPATIENT
Start: 2021-06-01 | End: 2021-06-01 | Stop reason: SURG

## 2021-06-01 RX ORDER — ONDANSETRON 2 MG/ML
4 INJECTION INTRAMUSCULAR; INTRAVENOUS EVERY 6 HOURS PRN
Status: DISCONTINUED | OUTPATIENT
Start: 2021-06-01 | End: 2021-06-02 | Stop reason: HOSPADM

## 2021-06-01 RX ORDER — SODIUM CHLORIDE 9 MG/ML
100 INJECTION, SOLUTION INTRAVENOUS CONTINUOUS
Status: DISCONTINUED | OUTPATIENT
Start: 2021-06-01 | End: 2021-06-01 | Stop reason: HOSPADM

## 2021-06-01 RX ORDER — HYDROCODONE BITARTRATE AND ACETAMINOPHEN 7.5; 325 MG/1; MG/1
2 TABLET ORAL EVERY 4 HOURS PRN
Status: DISCONTINUED | OUTPATIENT
Start: 2021-06-01 | End: 2021-06-02 | Stop reason: HOSPADM

## 2021-06-01 RX ORDER — VECURONIUM BROMIDE 1 MG/ML
INJECTION, POWDER, LYOPHILIZED, FOR SOLUTION INTRAVENOUS AS NEEDED
Status: DISCONTINUED | OUTPATIENT
Start: 2021-06-01 | End: 2021-06-01 | Stop reason: SURG

## 2021-06-01 RX ORDER — ONDANSETRON 2 MG/ML
INJECTION INTRAMUSCULAR; INTRAVENOUS AS NEEDED
Status: DISCONTINUED | OUTPATIENT
Start: 2021-06-01 | End: 2021-06-01 | Stop reason: SURG

## 2021-06-01 RX ORDER — NEOSTIGMINE METHYLSULFATE 5 MG/5 ML
SYRINGE (ML) INTRAVENOUS AS NEEDED
Status: DISCONTINUED | OUTPATIENT
Start: 2021-06-01 | End: 2021-06-01 | Stop reason: SURG

## 2021-06-01 RX ORDER — BUPIVACAINE HYDROCHLORIDE 7.5 MG/ML
INJECTION, SOLUTION EPIDURAL; RETROBULBAR AS NEEDED
Status: DISCONTINUED | OUTPATIENT
Start: 2021-06-01 | End: 2021-06-01 | Stop reason: SURG

## 2021-06-01 RX ORDER — IBUPROFEN 600 MG/1
600 TABLET ORAL ONCE AS NEEDED
Status: DISCONTINUED | OUTPATIENT
Start: 2021-06-01 | End: 2021-06-01 | Stop reason: HOSPADM

## 2021-06-01 RX ORDER — SODIUM CHLORIDE 0.9 % (FLUSH) 0.9 %
3-10 SYRINGE (ML) INJECTION AS NEEDED
Status: DISCONTINUED | OUTPATIENT
Start: 2021-06-01 | End: 2021-06-01 | Stop reason: HOSPADM

## 2021-06-01 RX ORDER — SODIUM CHLORIDE 0.9 % (FLUSH) 0.9 %
3 SYRINGE (ML) INJECTION AS NEEDED
Status: DISCONTINUED | OUTPATIENT
Start: 2021-06-01 | End: 2021-06-01 | Stop reason: HOSPADM

## 2021-06-01 RX ORDER — SODIUM CHLORIDE 0.9 % (FLUSH) 0.9 %
3 SYRINGE (ML) INJECTION EVERY 12 HOURS SCHEDULED
Status: DISCONTINUED | OUTPATIENT
Start: 2021-06-01 | End: 2021-06-01 | Stop reason: HOSPADM

## 2021-06-01 RX ORDER — SODIUM CHLORIDE 0.9 % (FLUSH) 0.9 %
10 SYRINGE (ML) INJECTION AS NEEDED
Status: DISCONTINUED | OUTPATIENT
Start: 2021-06-01 | End: 2021-06-01 | Stop reason: HOSPADM

## 2021-06-01 RX ORDER — NALOXONE HCL 0.4 MG/ML
0.4 VIAL (ML) INJECTION AS NEEDED
Status: DISCONTINUED | OUTPATIENT
Start: 2021-06-01 | End: 2021-06-01 | Stop reason: HOSPADM

## 2021-06-01 RX ORDER — DEXAMETHASONE SODIUM PHOSPHATE 4 MG/ML
INJECTION, SOLUTION INTRA-ARTICULAR; INTRALESIONAL; INTRAMUSCULAR; INTRAVENOUS; SOFT TISSUE AS NEEDED
Status: DISCONTINUED | OUTPATIENT
Start: 2021-06-01 | End: 2021-06-01 | Stop reason: SURG

## 2021-06-01 RX ADMIN — HYDROMORPHONE HYDROCHLORIDE 900 MCG: 1 INJECTION, SOLUTION INTRAMUSCULAR; INTRAVENOUS; SUBCUTANEOUS at 16:26

## 2021-06-01 RX ADMIN — Medication 2 G: at 16:26

## 2021-06-01 RX ADMIN — SUFENTANIL CITRATE 10 MCG: 50 INJECTION EPIDURAL; INTRAVENOUS at 12:25

## 2021-06-01 RX ADMIN — SUFENTANIL CITRATE 20 MCG: 50 INJECTION EPIDURAL; INTRAVENOUS at 13:01

## 2021-06-01 RX ADMIN — HYDROMORPHONE HYDROCHLORIDE 100 MCG: 1 INJECTION, SOLUTION INTRAMUSCULAR; INTRAVENOUS; SUBCUTANEOUS at 12:21

## 2021-06-01 RX ADMIN — PROPOFOL 100 MG: 10 INJECTION, EMULSION INTRAVENOUS at 12:25

## 2021-06-01 RX ADMIN — PROPOFOL 100 MG: 10 INJECTION, EMULSION INTRAVENOUS at 16:29

## 2021-06-01 RX ADMIN — ACETAMINOPHEN 1000 MG: 500 TABLET, FILM COATED ORAL at 11:11

## 2021-06-01 RX ADMIN — SUFENTANIL CITRATE 10 MCG: 50 INJECTION EPIDURAL; INTRAVENOUS at 12:34

## 2021-06-01 RX ADMIN — KETAMINE HYDROCHLORIDE 50 MG: 50 INJECTION, SOLUTION INTRAMUSCULAR; INTRAVENOUS at 12:25

## 2021-06-01 RX ADMIN — ACETAMINOPHEN 650 MG: 325 TABLET, FILM COATED ORAL at 23:15

## 2021-06-01 RX ADMIN — GLYCOPYRROLATE 0.4 MG: 0.2 INJECTION, SOLUTION INTRAMUSCULAR; INTRAVENOUS at 16:48

## 2021-06-01 RX ADMIN — ONDANSETRON 4 MG: 2 INJECTION INTRAMUSCULAR; INTRAVENOUS at 16:27

## 2021-06-01 RX ADMIN — SUFENTANIL CITRATE 10 MCG: 50 INJECTION EPIDURAL; INTRAVENOUS at 12:21

## 2021-06-01 RX ADMIN — Medication 4 MG: at 16:50

## 2021-06-01 RX ADMIN — BUPIVACAINE HYDROCHLORIDE 1 ML: 7.5 INJECTION, SOLUTION EPIDURAL; RETROBULBAR at 12:21

## 2021-06-01 RX ADMIN — CARVEDILOL 6.25 MG: 6.25 TABLET, FILM COATED ORAL at 19:47

## 2021-06-01 RX ADMIN — KETAMINE HYDROCHLORIDE 50 MG: 50 INJECTION, SOLUTION INTRAMUSCULAR; INTRAVENOUS at 13:51

## 2021-06-01 RX ADMIN — SODIUM CHLORIDE, POTASSIUM CHLORIDE, SODIUM LACTATE AND CALCIUM CHLORIDE 1000 ML: 600; 310; 30; 20 INJECTION, SOLUTION INTRAVENOUS at 09:39

## 2021-06-01 RX ADMIN — Medication 2 G: at 12:31

## 2021-06-01 RX ADMIN — SODIUM CHLORIDE, POTASSIUM CHLORIDE, SODIUM LACTATE AND CALCIUM CHLORIDE 1000 ML: 600; 310; 30; 20 INJECTION, SOLUTION INTRAVENOUS at 09:47

## 2021-06-01 RX ADMIN — VECURONIUM BROMIDE 5 MG: 1 INJECTION, POWDER, LYOPHILIZED, FOR SOLUTION INTRAVENOUS at 13:51

## 2021-06-01 RX ADMIN — OXYBUTYNIN CHLORIDE 5 MG: 5 TABLET, EXTENDED RELEASE ORAL at 19:47

## 2021-06-01 RX ADMIN — DEXAMETHASONE SODIUM PHOSPHATE 4 MG: 4 INJECTION, SOLUTION INTRA-ARTICULAR; INTRALESIONAL; INTRAMUSCULAR; INTRAVENOUS; SOFT TISSUE at 16:26

## 2021-06-01 RX ADMIN — ACETAMINOPHEN 650 MG: 325 TABLET, FILM COATED ORAL at 19:47

## 2021-06-01 RX ADMIN — SODIUM CHLORIDE, POTASSIUM CHLORIDE, SODIUM LACTATE AND CALCIUM CHLORIDE 125 ML/HR: 600; 310; 30; 20 INJECTION, SOLUTION INTRAVENOUS at 18:44

## 2021-06-01 RX ADMIN — VECURONIUM BROMIDE 3 MG: 1 INJECTION, POWDER, LYOPHILIZED, FOR SOLUTION INTRAVENOUS at 13:02

## 2021-06-01 RX ADMIN — VECURONIUM BROMIDE 7 MG: 1 INJECTION, POWDER, LYOPHILIZED, FOR SOLUTION INTRAVENOUS at 12:25

## 2021-06-01 NOTE — ANESTHESIA PROCEDURE NOTES
Intrathecal Block      Patient location during procedure: OR  Performed By  CRNA: Gerard Davis CRNA  Preanesthetic Checklist  Completed: patient identified, site marked, surgical consent, pre-op evaluation, timeout performed, IV checked, risks and benefits discussed and monitors and equipment checked  Intrathecal Block Prep:  Pt Position:sitting  Sterile Tech:cap, gloves, mask and sterile barrier  Prep:chloraprep  Monitoring:blood pressure monitoring, continuous pulse oximetry and EKG  Intrathecal Block Procedure:  Approach:midline  Guidance:landmark technique and palpation technique  Location:L4-L5  Needle Type:Sprotte  Needle Gauge:24 G  Placement of Needle Event: cerebrospinal fluid  Fluid Appearance:clear  Post Assessment:  Patient Tolerance:patient tolerated the procedure well with no apparent complications  Complications:no

## 2021-06-01 NOTE — ANESTHESIA PROCEDURE NOTES
Airway  Date/Time: 6/1/2021 12:28 PM  Airway not difficult    General Information and Staff    Patient location during procedure: OR  CRNA: Gerard Davis CRNA    Indications and Patient Condition  Indications for airway management: airway protection    Preoxygenated: yes  Mask difficulty assessment: 0 - not attempted    Final Airway Details  Final airway type: endotracheal airway      Successful airway: ETT  Cuffed: yes   Successful intubation technique: direct laryngoscopy  Blade: Medrano  Blade size: 2  ETT size (mm): 7.5  Cormack-Lehane Classification: grade I - full view of glottis  Placement verified by: chest auscultation and capnometry   Cuff volume (mL): 8  Measured from: teeth  ETT/EBT  to teeth (cm): 23  Number of attempts at approach: 1  Assessment: lips, teeth, and gum same as pre-op and atraumatic intubation

## 2021-06-01 NOTE — ANESTHESIA PREPROCEDURE EVALUATION
Anesthesia Evaluation     Patient summary reviewed and Nursing notes reviewed   no history of anesthetic complications:  NPO Solid Status: > 8 hours  NPO Liquid Status: > 8 hours           Airway   Mallampati: III  TM distance: >3 FB  Neck ROM: full  Dental    (+) poor dentition        Pulmonary    (+) a smoker Current,   (-) asthma, sleep apnea  Cardiovascular   Exercise tolerance: good (4-7 METS)    Patient on routine beta blocker and Beta blocker given within 24 hours of surgery    (+) hypertension, valvular problems/murmurs (s/p MVR), dysrhythmias Atrial Fib,     ROS comment: Echo 8/2020  · Estimated EF = 58%.  · Left ventricular systolic function is normal.  · Left ventricular wall thickness is consistent with mild concentric hypertrophy.  · Borderline dilation of the aortic root is present.  · Left ventricular diastolic dysfunction (grade I) consistent with impaired relaxation.  · Left atrial cavity size is severely dilated.  · There is a bioprosthetic mitral valve present. The prosthetic valve is grossly normal.  · No evidence of pulmonary hypertension is present.       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

## 2021-06-02 VITALS
BODY MASS INDEX: 38.06 KG/M2 | DIASTOLIC BLOOD PRESSURE: 65 MMHG | SYSTOLIC BLOOD PRESSURE: 106 MMHG | HEIGHT: 72 IN | RESPIRATION RATE: 18 BRPM | HEART RATE: 71 BPM | OXYGEN SATURATION: 97 % | WEIGHT: 281 LBS | TEMPERATURE: 99.1 F

## 2021-06-02 LAB
ANION GAP SERPL CALCULATED.3IONS-SCNC: 10 MMOL/L (ref 5–15)
BUN SERPL-MCNC: 17 MG/DL (ref 6–20)
BUN/CREAT SERPL: 11 (ref 7–25)
CALCIUM SPEC-SCNC: 8.6 MG/DL (ref 8.6–10.5)
CHLORIDE SERPL-SCNC: 99 MMOL/L (ref 98–107)
CO2 SERPL-SCNC: 26 MMOL/L (ref 22–29)
CREAT FLD-MCNC: 1.3 MG/DL
CREAT SERPL-MCNC: 1.54 MG/DL (ref 0.76–1.27)
DEPRECATED RDW RBC AUTO: 45.5 FL (ref 37–54)
ERYTHROCYTE [DISTWIDTH] IN BLOOD BY AUTOMATED COUNT: 13.8 % (ref 12.3–15.4)
GFR SERPL CREATININE-BSD FRML MDRD: 48 ML/MIN/1.73
GLUCOSE SERPL-MCNC: 159 MG/DL (ref 65–99)
HCT VFR BLD AUTO: 37.2 % (ref 37.5–51)
HGB BLD-MCNC: 12.1 G/DL (ref 13–17.7)
MCH RBC QN AUTO: 29.4 PG (ref 26.6–33)
MCHC RBC AUTO-ENTMCNC: 32.5 G/DL (ref 31.5–35.7)
MCV RBC AUTO: 90.5 FL (ref 79–97)
PLATELET # BLD AUTO: 210 10*3/MM3 (ref 140–450)
PMV BLD AUTO: 11.4 FL (ref 6–12)
POTASSIUM SERPL-SCNC: 4.3 MMOL/L (ref 3.5–5.2)
RBC # BLD AUTO: 4.11 10*6/MM3 (ref 4.14–5.8)
SODIUM SERPL-SCNC: 135 MMOL/L (ref 136–145)
WBC # BLD AUTO: 12.78 10*3/MM3 (ref 3.4–10.8)

## 2021-06-02 PROCEDURE — G0378 HOSPITAL OBSERVATION PER HR: HCPCS

## 2021-06-02 PROCEDURE — 80048 BASIC METABOLIC PNL TOTAL CA: CPT | Performed by: UROLOGY

## 2021-06-02 PROCEDURE — 85027 COMPLETE CBC AUTOMATED: CPT | Performed by: UROLOGY

## 2021-06-02 PROCEDURE — 82570 ASSAY OF URINE CREATININE: CPT | Performed by: UROLOGY

## 2021-06-02 RX ORDER — DOCUSATE SODIUM 100 MG/1
100 CAPSULE, LIQUID FILLED ORAL 2 TIMES DAILY
Qty: 60 CAPSULE | Refills: 0 | Status: SHIPPED | OUTPATIENT
Start: 2021-06-02 | End: 2021-07-14

## 2021-06-02 RX ORDER — HYDROCODONE BITARTRATE AND ACETAMINOPHEN 10; 325 MG/1; MG/1
1 TABLET ORAL EVERY 6 HOURS PRN
Qty: 16 TABLET | Refills: 0 | Status: SHIPPED | OUTPATIENT
Start: 2021-06-02 | End: 2023-03-15

## 2021-06-02 RX ORDER — CEPHALEXIN 500 MG/1
500 CAPSULE ORAL 3 TIMES DAILY
Qty: 9 CAPSULE | Refills: 0 | Status: SHIPPED | OUTPATIENT
Start: 2021-06-02 | End: 2021-07-14

## 2021-06-02 RX ORDER — OXYBUTYNIN CHLORIDE 5 MG/1
5 TABLET, EXTENDED RELEASE ORAL DAILY
Qty: 14 TABLET | Refills: 0 | Status: SHIPPED | OUTPATIENT
Start: 2021-06-02 | End: 2021-07-14

## 2021-06-02 RX ADMIN — ACETAMINOPHEN 650 MG: 325 TABLET, FILM COATED ORAL at 05:25

## 2021-06-02 RX ADMIN — SODIUM CHLORIDE, POTASSIUM CHLORIDE, SODIUM LACTATE AND CALCIUM CHLORIDE 125 ML/HR: 600; 310; 30; 20 INJECTION, SOLUTION INTRAVENOUS at 02:15

## 2021-06-02 RX ADMIN — OXYBUTYNIN CHLORIDE 5 MG: 5 TABLET, EXTENDED RELEASE ORAL at 08:16

## 2021-06-02 RX ADMIN — CARVEDILOL 6.25 MG: 6.25 TABLET, FILM COATED ORAL at 08:16

## 2021-06-02 RX ADMIN — ACETAMINOPHEN 650 MG: 325 TABLET, FILM COATED ORAL at 12:13

## 2021-06-02 NOTE — PLAN OF CARE
Problem: Adult Inpatient Plan of Care  Goal: Plan of Care Review  Outcome: Ongoing, Progressing  Flowsheets (Taken 6/2/2021 0406)  Progress: no change  Plan of Care Reviewed With: patient  Outcome Summary: Pt resting comfortably between care. Periorbital edema has improved. Notified Dr Law due to VIKAS output, no new orders. VIKAS has had 640ml out so far this shift. Drsg changed around  site multiple times due to leaking. Herrera in place, 500 ml out this shift. Pt has ambulated in the patterson and sat up in the chair. IVF. Cont pulse ox. SCDs. VSS. Safety maintained.

## 2021-06-02 NOTE — DISCHARGE INSTR - LAB
5 days Giovany Park to check VIKAS output  VIKAS output is decreased significantly can be removed and he can have cath removed 5 days following with a nursing visit.   follow-up with Giovany Blount 5 days for removal if his drain output has decreased to 50 cc/day.  After that he can have his catheter removed in 5 days with this visit.

## 2021-06-02 NOTE — PAYOR COMM NOTE
"Gurjit Be (51 y.o. Male) [id number]  OBSERVATION STAY   6/1     [facility] phone    Fax        Date of Birth Social Security Number Address Home Phone MRN    [id number]  [id number] [address] [facility] [id number] [phone] [id number]    Alevism Marital Status          Other        Admission Date Admission Type Admitting Provider Attending Provider Department, Room/Bed    6/1/21 Elective Luis Carlos Awan MD Knox, Michael Landers, MD [facility] 3C, 371/1    Discharge Date Discharge Disposition Discharge Destination         Home or Self Care              Attending Provider: Luis Carlos Awan MD    Allergies: Cetirizine, Chantix [Varenicline]    Isolation: None   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                 Active Insurance as of 6/1/2021     Primary Coverage     Payor Plan Insurance Group Employer/Plan Group    [place] [id number]     Payor Plan Address Payor Plan Phone Number Payor Plan Fax Number Effective Dates    [employer] [address] [address] [phone]  4/1/2020 - None Entered    [facility] [id number]       Subscriber Name Subscriber Birth Date Member ID       GURJIT BE 1970 [id number]                 Emergency Contacts      (Rel.) Home Phone Work Phone Mobile Phone    Karine Be (Spouse) -- -- [phone]              Current Facility-Administered Medications   Medication Dose Route Frequency Provider Last Rate Last Admin   • acetaminophen (TYLENOL) tablet 650 mg  650 mg Oral Q6H Luis Carlos Awan MD   650 mg at 06/02/21 0525   • carvedilol (COREG) tablet 6.25 mg  6.25 mg Oral BID With Meals Luis Carlos Awan MD   6.25 mg at 06/02/21 0816   • HYDROcodone-acetaminophen (NORCO) 7.5-325 MG per tablet 2 " tablet  2 tablet Oral Q4H PRN Luis Carlos Awan MD       • lactated ringers infusion  125 mL/hr Intravenous Continuous Luis Carlos Awan  mL/hr at 06/02/21 0640 125 mL/hr at 06/02/21 0640   • ondansetron (ZOFRAN) tablet 4 mg  4 mg Oral Q6H PRN Luis Carlos Awan MD        Or   • ondansetron (ZOFRAN) injection 4 mg  4 mg Intravenous Q6H PRN Luis Carlos Awan MD       • oxybutynin XL (DITROPAN-XL) 24 hr tablet 5 mg  5 mg Oral Daily Luis Carlos Awan MD   5 mg at 06/02/21 0816        Operative/Procedure Notes (last 48 hours) (Notes from 05/31/21 1112 through 06/02/21 1112)      Luis Carlos Awan MD at 06/01/21 1151          PROSTATECTOMY LAPAROSCOPIC WITH DAVINCI ROBOT  Procedure Note    Gurjit Andrea  6/1/2021    Pre-op Diagnosis:   Prostate cancer (CMS/HCC) [C61]    Post-op Diagnosis:     Post-Op Diagnosis Codes:     * Prostate cancer (CMS/HCC) [C61]    Procedure/CPT® Codes:      Procedure(s):  RADICAL PROSTATECTOMY LAPAROSCOPIC WITH DAVINCI ROBOT WITH BILATERAL PELVIC LYMPH NODE DISSECTION    Surgeon(s):  Luis Carlos Awan MD    Anesthesia: General    Staff:   Circulator: Natalee Gonzalez RN; Arnie Morton RN  Scrub Person: Costa Nunez; Pooja Callahan; Gertrude Pickens  Assistant: Cynthia Ivy    Estimated Blood Loss: 650 mL    Specimens:                Specimens     ID Source Type Tests Collected By Collected At Frozen?    A Prostate Tissue · TISSUE PATHOLOGY EXAM   Luis Carlos Awan MD 6/1/21 1323 No    Description: PROSTATE    This specimen was not marked as sent.    B Lymph Node Lymph Node · TISSUE PATHOLOGY EXAM   Luis Carlos Awan MD 6/1/21 1323     Description: Left and Right Pelvic Lymph Nodes    This specimen was not marked as sent.            Drains:   Closed/Suction Drain 1 Right Abdomen Bulb 15 Fr. (Active)       Urethral Catheter Double-lumen;Silicone 16 Fr. (Active)       Findings: Watertight anastomosis  No rectal  injury  No UO injury  No enlarged LN      Complications: None      Luis Carlos Awan MD     Date: 6/1/2021  Time: 16:53 CDT     Indications: The patient was admitted to the hospital with adenocarcinoma of the prostate. The patient now presents for Robotic Prostatectomy after discussing therapeutic alternatives.   Informed consent was obtained prior to the procedure.        Surgeon: Luis Carlos Awan MD     Assistants: Cynthia Ivy CFA    Procedure Details   Patient was brought to the operating room.  General endotracheal anesthesia was administered in the supine position.  The patient was converted to the dorsal lithotomy position and prepped and draped in the usual sterile fashion.  Timeout was done to ensure the correct patient, procedure, and site. He received preoperative antibiotics in the holding room.  A 20 Fr Herrera catheter was placed under sterile conditions and 10 mL sterile water was used to inflate the balloon.    The peritoneal space was accessed using the Veress needle with confirmation with two clicks and on low flow.  I marked my trocar sites with a 12 mm supraumbilical, two 8 mm trocars along a line from the umbilicus on the right, an 8 mm and 12 mm along a line from the umbilicus on the left, and 5 mm in the left upper quadrant.  I insufflated the abdomen to 15 mmHg with CO2.  I placed my 12 mm supraumbilical trocar and then placed the remaining under direct vision. I used a 0 Vicryl on the Ross-Tamara device to close my 12 mm trocar in the left lower quadrant to be tied at the conclusion of the case. Patient was placed in steep Trendelenburg position and the robot was docked. I began the procedure by taking down sigmoid adhesions for 5 minutes.  I then incised the peritoneal reflection 1 cm above the rectum to access the seminal vesicles and vas deferens.  I identified the left vas deferens coursing laterally where I incised it to gain access to the tip of the seminal vesicles.  I  dissected the left seminal vesicle using clips at the tips to ensure no damage to the neurovascular bundle.  I performed the same maneuver on the patient's right vas and seminal vesicles.  I then placed these structures on traction and was able to get a plane between the prostate and the rectum dissecting towards the apex of the prostate.  This dissection was difficult and took a while due to his enlarged prostate and very narrow pelvis.    At this point, I turned my attention to developing the space of Retzius.  I identified the medial umbilical ligaments.  I incised lateral to the left and right  medial umbilical ligament down to the vas deferens.  I then connected my incision towards the right taking down the urachus and removing the bladder off of the pubic bone.  I defatted the anterior prostate and took down the superficial dorsal venous complex with bipolar cautery.  At that point, I incised the left endopelvic fascia exposing the lateral edge of the prostate.  I then took down the puboprostatic ligament exposing the notch of the dorsal venous complex.  Any apical bleeders were controlled with bipolar cautery.  I performed the same maneuver on the right side.  I then used a 0 Vicryl on a CT 1 needle to tie off the DVC in a figure of eight fashion.  I then placed a DVC suspension suture with 2-0 Vicryl anchoring this to the periosteum of the pubic bone on the left with a Hem-o-terry and Lapra-Ty and then doing a figure of eight suture around the DVC.  Again, I anchored on the right side with a Hem-o-terry and Lapra-Ty.    I placed the 30 degree down scope.  Patient bladder on traction I then swept the fibrofatty tissue off the left external iliac vein to the takeoff of the circumflex iliac vein and then off the obturator nerve.  This was sent off for pathologic analysis.  I performed the same year on the patient's right side of the same borders of dissection.    I had my assistant move the Herrera catheter back and  forth to identify the bladder neck.  I also performed the pinch maneuver to identify the bladder neck.  I incised the anterior bladder neck exposing the Herrera catheter in the midline.  I used my third arm to retract the Herrera catheter.  I identified the posterior bladder neck.  It appeared that I was well away from the ureteral orifices.  I incised the posterior bladder neck and was able to expose the previously dissected vas deferens and seminal vesicles.  I used the third arm to place these on traction.  I then incised the lateral prostatic fascia at the mid prostate and brought this back to the pedicles of the prostate.      I then took down the left pedicle of the prostate with Heme-o-Lock Clips to help with nerve sparing technique.  I then got a nice nerve sparing plane, interfascial, staying above the prostatic capsule and took this all the way to the apex of the prostate.  I performed the same maneuver on the right pedicle.  Again this was extremely difficult dissection due to his enlarged gland and very narrow pelvis.    I placed the prostate on traction and incised the dorsal venous complex with monopolar cautery.  I then exposed the lateral shoulders of the urethra.  I incised the anterior urethra exposing the Herrera catheter.  I then incised the posterior urethra leaving a nice urethral stump.  I incised the rectourethralis muscle and the posterior leaflet of Denovilliers' fascia completely freeing the prostate.  The prostate was placed in an Endo Catch bag.      I inspected the pelvis for any bleeding.  I used a 3-0 chromic to control some bleeding on the left bundle.  There was some bleeding noted on the right bundle this is also controlled with 3-0 chromic in a figure-of-eight fashion.  The pelvis was irrigated with sterile water.  I placed Home on the nerve bundles for hemostasis.      I then used a 3-0 Stratafix double armed suture to perform the anastomosis.  I ran one arm clockwise and one arm  counter clockwise getting great mucosal to mucosal apposition of the urethra to the bladder.  I then tied the two ends of the suture to themselves.  The final 20 Fr Herrera catheter was placed with 10 mL sterile water used to inflate the balloon.  I tested the anastomosis with 120 mL of saline and it was water tight.  I placed a 15 round VIKAS drain through the third arm that was secured with a 2-0 silk suture.     The robot was undocked.  The patient was placed in dorsal lithotomy position.  I incised my 12 mm supraumbilical trocar and removed the prostate.  I then closed the fascia with 0 Vicryl on a UR 6 needle in a figure of eight fashion.  Wounds were irrigated.  I tied my previously placed O Vicryl.  I then closed the skin incisions with 4-0 Vicryl in a subcuticular fashion.  Sterile dressings were applied. The Herrera was secured to the leg.  Patient was awakened from anesthesia in stable condition.  All sponge, needle, and instrument counts were correct.                      Electronically signed by Luis Carlos Awan MD at 06/01/21 1656          Discharge Summary      Luis Carlos Awan MD at 06/02/21 0748            Date of Discharge:  6/2/2021    Discharge Diagnosis: Prostate Cancer    Presenting Problem/History of Present Illness  Prostate cancer (CMS/HCC) [C61]       Hospital Course  Patient was admitted on 6/1/2021 underwent robotic assisted lap prostatectomy bilateral pelvic lymph node dissection.  He did well postoperatively.  He had expected acute blood loss anemia based on his EBL for the procedure.  VIKAS creatinine was performed which showed no evidence of anastomotic leak.  He did have high output from his VIKAS drain and this was left in place with plans to follow-up with Giovany Blount 5 days for removal if his drain output has decreased to 50 cc/day.  After that he can have his catheter removed in 5 days with this visit.    His abdominal exam was unremarkable.  His incisions were clean dry and  intact.  Patient was appropriately tender to palpation.  Herrera catheter had clear yellow urine.  VIKAS drain with serosanguineous.    Procedures Performed  Procedure(s):  RADICAL PROSTATECTOMY LAPAROSCOPIC WITH DAVINCI ROBOT WITH BILATERAL PELVIC LYMPH NODE DISSECTION       Consults:   Consults     No orders found for last 30 day(s).            Condition on Discharge: Good    Vital Signs  Temp:  [94.4 °F (34.7 °C)-98.3 °F (36.8 °C)] 98 °F (36.7 °C)  Heart Rate:  [] 105  Resp:  [10-18] 18  BP: (100-134)/(61-95) 116/61    Discharge Disposition  Home or Self Care    Discharge Medications     Discharge Medications      New Medications      Instructions Start Date   cephalexin 500 MG capsule  Commonly known as: KEFLEX   500 mg, Oral, 3 Times Daily, Start day prior to cath removal      docusate sodium 100 MG capsule  Commonly known as: COLACE   100 mg, Oral, 2 Times Daily      HYDROcodone-acetaminophen  MG per tablet  Commonly known as: NORCO   1 tablet, Oral, Every 6 Hours PRN      oxybutynin XL 5 MG 24 hr tablet  Commonly known as: DITROPAN-XL   5 mg, Oral, Daily         Continue These Medications      Instructions Start Date   amLODIPine 5 MG tablet  Commonly known as: NORVASC   5 mg, Oral, Daily      aspirin 325 MG tablet   325 mg, Oral, Daily      carvedilol 6.25 MG tablet  Commonly known as: COREG   6.25 mg, Oral, 2 Times Daily With Meals      hydroCHLOROthiazide 25 MG tablet  Commonly known as: HYDRODIURIL   25 mg, Oral, Daily      lisinopril 20 MG tablet  Commonly known as: PRINIVIL,ZESTRIL   20 mg, Oral, Daily      multivitamin with minerals tablet tablet   1 tablet, Oral, Daily      TYLENOL ARTHRITIS PAIN PO   2 tablets, Oral, As Needed             Discharge Diet:     Activity at Discharge:     Follow-up Appointments  No future appointments.  Additional Instructions for the Follow-ups that You Need to Schedule     PSA Diagnostic    Jul 14, 2021 (Approximate)      Release to patient: Immediate              5 days Giovany Park to check VIKAS output  VIKAS output is decreased significantly can be removed and he can have cath removed 5 days following with a nursing visit.    6 weeks Dr. Awan PSA prior  Test Results Pending at Discharge  Pending Labs     Order Current Status    Tissue Pathology Exam Collected (06/01/21 1323)           Luis Carlos Awan MD  06/02/21  07:48 CDT                Electronically signed by Luis Carlos Awan MD at 06/02/21 0751       Nurse note :   Pt resting comfortably between care. Periorbital edema has improved. Notified Dr Law due to VIKAS output, no new orders. VIKAS has had 640ml out so far this shift. Drsg changed around VIKAS site multiple times due to leaking. Herrera in place, 500 ml out this shift. Pt has ambulated in the patterson and sat up in the chair. IVF. Cont pulse ox. SCDs.      Wbc 17.82    Today  12.78       o2 2lit sat 93 and 96%      Dilaudid iv x2   ivfl's  LR @ 125 HR.

## 2021-06-02 NOTE — DISCHARGE INSTRUCTIONS
Please call MD for Temperature more than 101.5, nausea vomiting, yellow drainage from the wound, catheter not draining, blood clots in Herrera.  No driving while the catheter is in.  No lifting greater than 10 pounds for 3 weeks.  Please take your medications as prescribed.  Dr. Awan will contact you regarding final pathology results.

## 2021-06-02 NOTE — ANESTHESIA POSTPROCEDURE EVALUATION
"Patient: Gurjit Andrea    Procedure Summary     Date: 06/01/21 Room / Location: [facility] OR  /  [facility] OR    Anesthesia Start: 1211 Anesthesia Stop: 1705    Procedure: RADICAL PROSTATECTOMY LAPAROSCOPIC WITH DAVINCI ROBOT WITH BILATERAL PELVIC LYMPH NODE DISSECTION (Bilateral Abdomen) Diagnosis:       Prostate cancer (CMS/HCC)      (Prostate cancer (CMS/HCC) [C61])    Surgeons: Luis Carlos Awan MD Provider: Gerard Davis CRNA    Anesthesia Type: general, ITN ASA Status: 3          Anesthesia Type: general, ITN    Vitals  Vitals Value Taken Time   /87 06/01/21 1818   Temp 97.3 °F (36.3 °C) 06/01/21 1800   Pulse 81 06/01/21 1820   Resp 12 06/01/21 1815   SpO2 100 % 06/01/21 1818   Vitals shown include unvalidated device data.        Post Anesthesia Care and Evaluation    Patient location during evaluation: PACU  Patient participation: complete - patient participated  Level of consciousness: awake and alert  Pain management: adequate  Airway patency: patent  Anesthetic complications: No anesthetic complications  PONV Status: none  Cardiovascular status: acceptable and hemodynamically stable  Respiratory status: acceptable  Hydration status: acceptable    Comments: Blood pressure 116/61, pulse 105, temperature 98 °F (36.7 °C), temperature source Oral, resp. rate 18, height 182.9 cm (72.01\"), weight 127 kg (281 lb), SpO2 93 %.    Patient discharged from PACU based upon Cherie score. Please see RN notes for further details      "

## 2021-06-02 NOTE — DISCHARGE SUMMARY
Date of Discharge:  6/2/2021    Discharge Diagnosis: Prostate Cancer    Presenting Problem/History of Present Illness  Prostate cancer (CMS/HCC) [C61]       Hospital Course  Patient was admitted on 6/1/2021 underwent robotic assisted lap prostatectomy bilateral pelvic lymph node dissection.  He did well postoperatively.  He had expected acute blood loss anemia based on his EBL for the procedure.  VIKAS creatinine was performed which showed no evidence of anastomotic leak.  He did have high output from his VIKAS drain and this was left in place with plans to follow-up with Giovany Blount 5 days for removal if his drain output has decreased to 50 cc/day.  After that he can have his catheter removed in 5 days with this visit.    His abdominal exam was unremarkable.  His incisions were clean dry and intact.  Patient was appropriately tender to palpation.  Herrera catheter had clear yellow urine.  VIKAS drain with serosanguineous.    Procedures Performed  Procedure(s):  RADICAL PROSTATECTOMY LAPAROSCOPIC WITH DAVINCI ROBOT WITH BILATERAL PELVIC LYMPH NODE DISSECTION       Consults:   Consults     No orders found for last 30 day(s).            Condition on Discharge: Good    Vital Signs  Temp:  [94.4 °F (34.7 °C)-98.3 °F (36.8 °C)] 98 °F (36.7 °C)  Heart Rate:  [] 105  Resp:  [10-18] 18  BP: (100-134)/(61-95) 116/61    Discharge Disposition  Home or Self Care    Discharge Medications     Discharge Medications      New Medications      Instructions Start Date   cephalexin 500 MG capsule  Commonly known as: KEFLEX   500 mg, Oral, 3 Times Daily, Start day prior to cath removal      docusate sodium 100 MG capsule  Commonly known as: COLACE   100 mg, Oral, 2 Times Daily      HYDROcodone-acetaminophen  MG per tablet  Commonly known as: NORCO   1 tablet, Oral, Every 6 Hours PRN      oxybutynin XL 5 MG 24 hr tablet  Commonly known as: DITROPAN-XL   5 mg, Oral, Daily         Continue These Medications      Instructions Start  Date   amLODIPine 5 MG tablet  Commonly known as: NORVASC   5 mg, Oral, Daily      aspirin 325 MG tablet   325 mg, Oral, Daily      carvedilol 6.25 MG tablet  Commonly known as: COREG   6.25 mg, Oral, 2 Times Daily With Meals      hydroCHLOROthiazide 25 MG tablet  Commonly known as: HYDRODIURIL   25 mg, Oral, Daily      lisinopril 20 MG tablet  Commonly known as: PRINIVIL,ZESTRIL   20 mg, Oral, Daily      multivitamin with minerals tablet tablet   1 tablet, Oral, Daily      TYLENOL ARTHRITIS PAIN PO   2 tablets, Oral, As Needed             Discharge Diet:     Activity at Discharge:     Follow-up Appointments  No future appointments.  Additional Instructions for the Follow-ups that You Need to Schedule     PSA Diagnostic    Jul 14, 2021 (Approximate)      Release to patient: Immediate             5 days Giovany Park to check VIKAS output  VIKAS output is decreased significantly can be removed and he can have cath removed 5 days following with a nursing visit.    6 weeks Dr. Awan PSA prior  Test Results Pending at Discharge  Pending Labs     Order Current Status    Tissue Pathology Exam Collected (06/01/21 1323)           Luis Carlos Awan MD  06/02/21  07:48 CDT

## 2021-06-03 NOTE — PROGRESS NOTES
Subjective    Mr. Andrea is 51 y.o. male    Chief Complaint: VIKAS Drain Removal / Possible Catheter Removal     History of Present Illness  Patient is a 51-year-old gentleman who underwent robot-assisted laparoscopic prostatectomy and bilateral pelvic lymph node dissection done 06/01/2021.  He was discharged the next day postoperative he is done well has expected abdominal pain but not out of the ordinary.  Patient brought his log of his VIKAS drain output most recent output has been 64 cc and 71 this morning there is 25 cc in the drain which was from 8 PM last night.  I discussed with Dr. Awan we will go ahead and remove his VIKAS drain today.  He is due to return in 5 days for catheter removal.    The following portions of the patient's history were reviewed and updated as appropriate: allergies, current medications, past family history, past medical history, past social history, past surgical history and problem list.    Review of Systems   Constitutional: Negative for appetite change and fever.   HENT: Negative for hearing loss and sore throat.    Eyes: Negative for pain and redness.   Respiratory: Negative for cough and shortness of breath.    Cardiovascular: Negative for chest pain and leg swelling.   Gastrointestinal: Negative for anal bleeding, nausea and vomiting.   Endocrine: Negative for cold intolerance and heat intolerance.   Genitourinary: Negative for dysuria, flank pain, frequency, hematuria and urgency.   Musculoskeletal: Negative for joint swelling and myalgias.   Skin: Negative for color change and rash.   Allergic/Immunologic: Negative for immunocompromised state.   Neurological: Negative for dizziness and speech difficulty.   Hematological: Negative for adenopathy. Does not bruise/bleed easily.   Psychiatric/Behavioral: Negative for dysphoric mood and suicidal ideas.         Current Outpatient Medications:   •  Acetaminophen (TYLENOL ARTHRITIS PAIN PO), Take 2 tablets by mouth As Needed., Disp: , Rfl:    •  amLODIPine (NORVASC) 5 MG tablet, Take 5 mg by mouth Daily., Disp: , Rfl:   •  aspirin 325 MG tablet, Take 325 mg by mouth Daily., Disp: , Rfl:   •  carvedilol (COREG) 6.25 MG tablet, Take 1 tablet by mouth 2 (Two) Times a Day With Meals., Disp: 60 tablet, Rfl: 2  •  cephalexin (KEFLEX) 500 MG capsule, Take 1 capsule by mouth 3 (Three) Times a Day. Start day prior to cath removal, Disp: 9 capsule, Rfl: 0  •  docusate sodium (COLACE) 100 MG capsule, Take 1 capsule by mouth 2 (Two) Times a Day., Disp: 60 capsule, Rfl: 0  •  hydroCHLOROthiazide (HYDRODIURIL) 25 MG tablet, Take 1 tablet by mouth Daily., Disp: 30 tablet, Rfl: 2  •  HYDROcodone-acetaminophen (NORCO)  MG per tablet, Take 1 tablet by mouth Every 6 (Six) Hours As Needed for Moderate Pain ., Disp: 16 tablet, Rfl: 0  •  lisinopril (PRINIVIL,ZESTRIL) 20 MG tablet, Take 1 tablet by mouth Daily., Disp: 30 tablet, Rfl: 2  •  Multiple Vitamins-Minerals (MULTIVITAMIN WITH MINERALS) tablet tablet, Take 1 tablet by mouth Daily., Disp: , Rfl:   •  oxybutynin XL (DITROPAN-XL) 5 MG 24 hr tablet, Take 1 tablet by mouth Daily., Disp: 14 tablet, Rfl: 0    Past Medical History:   Diagnosis Date   • A-fib (CMS/HCC)    • Arthritis    • Chronic renal failure    • Coronary artery disease    • Elevated PSA    • GERD (gastroesophageal reflux disease)    • Hypertension    • Kidney stone    • MRSA bacteremia 02/21/2017    MRSA in heart valve   • Prostate cancer (CMS/HCC)    • Purpura (CMS/HCC)    • Wegener's granulomatosis with renal involvement (CMS/HCC)        Past Surgical History:   Procedure Laterality Date   • APPENDECTOMY     • INSERTION HEMODIALYSIS CATHETER N/A 1/20/2017    Procedure: INSERTION PERMCATH;  Surgeon: Ian Grimes DO;  Location: [facility] OR;  Service:    • LIP REPAIR     • MITRAL VALVE REPLACEMENT     • PROSTATECTOMY Bilateral 6/1/2021    Procedure: RADICAL PROSTATECTOMY LAPAROSCOPIC WITH DAVINCI ROBOT WITH BILATERAL PELVIC LYMPH NODE DISSECTION;  " Surgeon: Luis Carlos Awan MD;  Location: [facility] OR;  Service: Robotics - DaVinci;  Laterality: Bilateral;       Social History     Socioeconomic History   • Marital status:      Spouse name: Not on file   • Number of children: Not on file   • Years of education: Not on file   • Highest education level: Not on file   Tobacco Use   • Smoking status: Heavy Tobacco Smoker     Packs/day: 1.00     Types: Cigarettes   • Smokeless tobacco: Never Used   Vaping Use   • Vaping Use: Never used   Substance and Sexual Activity   • Alcohol use: Yes     Comment: rare   • Drug use: No   • Sexual activity: Defer       History reviewed. No pertinent family history.    Objective    Temp 97.6 °F (36.4 °C) (Temporal)   Ht 185.4 cm (73\")   Wt 127 kg (280 lb)   BMI 36.94 kg/m²     Physical Exam  Vitals reviewed.   Constitutional:       Appearance: Normal appearance.   HENT:      Head: Normocephalic and atraumatic.      Right Ear: External ear normal.      Left Ear: External ear normal.      Nose: No congestion.   Eyes:      Conjunctiva/sclera: Conjunctivae normal.   Pulmonary:      Effort: Pulmonary effort is normal.   Abdominal:      Comments: Patient has healed incisions of the abdomen without evidence of infection erythema.  No discharge noted.   Genitourinary:     Comments: Catheter inserted within urethral meatus draining yellow clear urine  Neurological:      General: No focal deficit present.      Mental Status: He is alert and oriented to person, place, and time.   Psychiatric:         Behavior: Behavior normal.               Assessment and Plan    Diagnoses and all orders for this visit:    1. Prostate cancer (CMS/HCC) (Primary)    Reviewed his VIKAS output drain log and urine looks like pink to red fluid in the VIKAS drain.  From 8 PM to now there is 25 cc produced.  I reviewed the log with Dr. Awan and VIKAS drain will be removed today he will follow-up in 5 days for catheter removal.            "

## 2021-06-07 ENCOUNTER — OFFICE VISIT (OUTPATIENT)
Dept: UROLOGY | Facility: CLINIC | Age: 51
End: 2021-06-07

## 2021-06-07 VITALS — TEMPERATURE: 97.6 F | WEIGHT: 280 LBS | HEIGHT: 73 IN | BODY MASS INDEX: 37.11 KG/M2

## 2021-06-07 DIAGNOSIS — C61 PROSTATE CANCER (HCC): Primary | ICD-10-CM

## 2021-06-07 PROCEDURE — 99024 POSTOP FOLLOW-UP VISIT: CPT | Performed by: PHYSICIAN ASSISTANT

## 2021-06-07 NOTE — PROGRESS NOTES
Patient of Dr. Awan states he is here today to have his VIKAS drain removed post R.A.L.P.  The patient denies any fever, chills or  N&V. Patient brought a diary of his drain levels, over the course of 24 hours. Drainage levels were 50cc and below. Removal of the drain was appropriate based on diary and visual inspection of drain currently 50cc. Surgical tape was removed using surgical scissors.  Using a suture removal kit, 2 sutures were successfully removed using forceps and scissors. Cap on the bulb was detached and the full length of the drain was successfully removed. Patient tolerated well with no complications.  Patient advised to call the office with any questions or concerns. The patient verbalized understanding and all questions were answered. Giovany Blount PA-C was in the office at the time of procedure. GERMAN Echevarria         Medical Assistant documentation is reviewed.  Agree with management as above.

## 2021-06-08 LAB
CYTO UR: NORMAL
LAB AP CASE REPORT: NORMAL
LAB AP SYNOPTIC CHECKLIST: NORMAL
PATH REPORT.FINAL DX SPEC: NORMAL
PATH REPORT.GROSS SPEC: NORMAL

## 2021-06-09 DIAGNOSIS — C61 PROSTATE CANCER (HCC): Primary | ICD-10-CM

## 2021-06-09 RX ORDER — PHENAZOPYRIDINE HYDROCHLORIDE 100 MG/1
100 TABLET, FILM COATED ORAL 3 TIMES DAILY PRN
Qty: 21 TABLET | Refills: 1 | Status: SHIPPED | OUTPATIENT
Start: 2021-06-09 | End: 2021-07-14

## 2021-06-11 ENCOUNTER — PROCEDURE VISIT (OUTPATIENT)
Dept: UROLOGY | Facility: CLINIC | Age: 51
End: 2021-06-11

## 2021-06-11 DIAGNOSIS — C61 PROSTATE CANCER (HCC): Primary | ICD-10-CM

## 2021-06-11 PROCEDURE — 99024 POSTOP FOLLOW-UP VISIT: CPT | Performed by: UROLOGY

## 2021-06-11 NOTE — PROGRESS NOTES
Patient of Dr. Awan states he is here today to have his catheter removed. The patient denies any fever, chills or  N&V. Balloon was deflated with 10cc syringe x2. Catheter was removed successfully. Patient tolerated well with no complications. Patient advised to call the office with any questions or concerns. The patient verbalized understanding. Giovany Blount PA-C  was in the office at the time of procedure.       Patient was advised to drink clear fluids for the next couple hours and urinate. he was advised he may experience some blood in the urine and burning with urination for the next couple days. If the patient is unable to urinate or develops fever, chills, N&V or suprapubic pain he will call to return for an appt at clinic or seek medical treatment at [facility] ER, PCP or Urgent Care after hours. Patient verbalized understanding and all questions were answered. GERMAN Echevarria            Medical Assistant documentation is reviewed.  Agree with management as above.

## 2021-07-01 NOTE — PROGRESS NOTES
Subjective    Mr. Andrea is 51 y.o. male    Chief Complaint: Prostate cancer    History of Present Illness  Preop PSA was 32.4.  He had a bone scan and CT which were negative for metastatic prostate cancer.  Preop erectile dysfunction.  Status post robotic assisted lap prostatectomy and bilateral pelvic lymph node dissection in June 2021.  This did reveal P T3a PN 0 Susie 4+3 equal 7 adenocarcinoma the prostate with a positive margin at the bladder neck.  This is a significant upgrade from [place] 3 posterior equal 6 and 1 out of 12 cores on his biopsy.  Postop PSA less than 0.1. 3 diapers/day.      Lab Results   Component Value Date    PSA <0.1 07/12/2021         The following portions of the patient's history were reviewed and updated as appropriate: allergies, current medications, past family history, past medical history, past social history, past surgical history and problem list.    Review of Systems   Constitutional: Negative for chills and fever.   Gastrointestinal: Negative for abdominal pain, anal bleeding and blood in stool.   Genitourinary: Positive for frequency. Negative for dysuria, hematuria and urgency.         Current Outpatient Medications:   •  Acetaminophen (TYLENOL ARTHRITIS PAIN PO), Take 2 tablets by mouth As Needed., Disp: , Rfl:   •  amLODIPine (NORVASC) 5 MG tablet, Take 5 mg by mouth Daily., Disp: , Rfl:   •  aspirin 325 MG tablet, Take 325 mg by mouth Daily., Disp: , Rfl:   •  carvedilol (COREG) 6.25 MG tablet, Take 1 tablet by mouth 2 (Two) Times a Day With Meals., Disp: 60 tablet, Rfl: 2  •  citalopram (CeleXA) 20 MG tablet, TAKE 1 2 (ONE HALF) TABLET BY MOUTH AT BEDTIME FOR 6 DAYS THEN 1 ONCE DAILY THEREAFTER, Disp: , Rfl:   •  hydroCHLOROthiazide (HYDRODIURIL) 25 MG tablet, Take 1 tablet by mouth Daily., Disp: 30 tablet, Rfl: 2  •  lisinopril (PRINIVIL,ZESTRIL) 20 MG tablet, Take 1 tablet by mouth Daily., Disp: 30 tablet, Rfl: 2  •  Multiple Vitamins-Minerals (MULTIVITAMIN WITH  "MINERALS) tablet tablet, Take 1 tablet by mouth Daily., Disp: , Rfl:   •  HYDROcodone-acetaminophen (NORCO)  MG per tablet, Take 1 tablet by mouth Every 6 (Six) Hours As Needed for Moderate Pain ., Disp: 16 tablet, Rfl: 0    Past Medical History:   Diagnosis Date   • A-fib (CMS/HCC)    • Arthritis    • Chronic renal failure    • Coronary artery disease    • Elevated PSA    • GERD (gastroesophageal reflux disease)    • Hypertension    • Kidney stone    • MRSA bacteremia 02/21/2017    MRSA in heart valve   • Prostate cancer (CMS/HCC)    • Purpura (CMS/HCC)    • Wegener's granulomatosis with renal involvement (CMS/HCC)        Past Surgical History:   Procedure Laterality Date   • APPENDECTOMY     • INSERTION HEMODIALYSIS CATHETER N/A 1/20/2017    Procedure: INSERTION PERMCATH;  Surgeon: Ian Grimes DO;  Location: [facility] OR;  Service:    • LIP REPAIR     • MITRAL VALVE REPLACEMENT     • PROSTATECTOMY Bilateral 6/1/2021    Procedure: RADICAL PROSTATECTOMY LAPAROSCOPIC WITH DAVINCI ROBOT WITH BILATERAL PELVIC LYMPH NODE DISSECTION;  Surgeon: Luis Carlos Awan MD;  Location: [facility] OR;  Service: Robotics - DaVinci;  Laterality: Bilateral;       Social History     Socioeconomic History   • Marital status:      Spouse name: Not on file   • Number of children: Not on file   • Years of education: Not on file   • Highest education level: Not on file   Tobacco Use   • Smoking status: Heavy Tobacco Smoker     Packs/day: 1.00     Types: Cigarettes   • Smokeless tobacco: Never Used   Vaping Use   • Vaping Use: Never used   Substance and Sexual Activity   • Alcohol use: Yes     Comment: rare   • Drug use: No   • Sexual activity: Defer       History reviewed. No pertinent family history.    Objective    Temp 97.8 °F (36.6 °C) (Temporal)   Ht 185.4 cm (73\")   Wt 120 kg (265 lb 6.4 oz)   BMI 35.02 kg/m²     Physical Exam  Skin:     Comments: Incisions healed  Midline incision with small opening, no evidence " of infection             Results for orders placed or performed in visit on 07/12/21   PSA DIAGNOSTIC   Result Value Ref Range    PSA <0.1 0.0 - 4.0 ng/mL     Assessment and Plan    Diagnoses and all orders for this visit:    1. Prostate cancer (CMS/[facility]) (Primary)  -     Cancel: POC Urinalysis Dipstick, Multipro  -     PSA DIAGNOSTIC; Future        Preop PSA was 32.4.  He had a bone scan and CT which were negative for metastatic prostate cancer.  Preop erectile dysfunction.  Status post robotic assisted lap prostatectomy and bilateral pelvic lymph node dissection in June 2021.  This did reveal P T3a PN 0 Susie 4+3 equal 7 adenocarcinoma the prostate with a positive margin at the bladder neck.  This is a significant upgrade from [place] 3 posterior equal 6 and 1 out of 12 cores on his biopsy.     3 diapers/day.  Continue Kegels.    Not interested in ED therapy at this point.    I did discuss his chance of recurrence given his aggressive disease.  We discussed adjuvant versus salvage radiation.  We discussed that we would not treat with radiation until he had actual PSA recurrence which was defined as 0.2 or higher.    Follow up in 3 months with PSA.

## 2021-07-13 LAB — PSA SERPL-MCNC: <0.1 NG/ML (ref 0–4)

## 2021-07-14 ENCOUNTER — OFFICE VISIT (OUTPATIENT)
Dept: UROLOGY | Facility: CLINIC | Age: 51
End: 2021-07-14

## 2021-07-14 VITALS — BODY MASS INDEX: 35.17 KG/M2 | WEIGHT: 265.4 LBS | HEIGHT: 73 IN | TEMPERATURE: 97.8 F

## 2021-07-14 DIAGNOSIS — N39.3 MALE STRESS INCONTINENCE: ICD-10-CM

## 2021-07-14 DIAGNOSIS — C61 PROSTATE CANCER (HCC): Primary | ICD-10-CM

## 2021-07-14 PROCEDURE — 99024 POSTOP FOLLOW-UP VISIT: CPT | Performed by: UROLOGY

## 2021-07-14 RX ORDER — CITALOPRAM 40 MG/1
40 TABLET ORAL DAILY
COMMUNITY
Start: 2021-06-23

## 2021-10-06 NOTE — PROGRESS NOTES
Subjective    Mr. Andrea is 51 y.o. male    Chief Complaint: Prostate Cancer.     History of Present Illness  Preop PSA was 32.4.  He had a bone scan and CT which were negative for metastatic prostate cancer.  Preop erectile dysfunction.  Status post robotic assisted lap prostatectomy and bilateral pelvic lymph node dissection in June 2021.  This did reveal P T3a PN 0 Susie 4+3 = 7 adenocarcinoma the prostate with a positive margin at the bladder neck.  This is a significant upgrade from [place] 3+3= 6 in 1 out of 12 cores on his biopsy.  Postop PSA < 0.1. Improving MANISHA.      Lab Results   Component Value Date    PSA <0.1 10/12/2021    PSA <0.1 07/12/2021         The following portions of the patient's history were reviewed and updated as appropriate: allergies, current medications, past family history, past medical history, past social history, past surgical history and problem list.    Review of Systems   Constitutional: Negative for chills and fever.   Gastrointestinal: Negative for abdominal pain, anal bleeding and blood in stool.   Genitourinary: Negative for dysuria, frequency, hematuria and urgency.         Current Outpatient Medications:   •  Acetaminophen (TYLENOL ARTHRITIS PAIN PO), Take 2 tablets by mouth As Needed., Disp: , Rfl:   •  amLODIPine (NORVASC) 5 MG tablet, Take 5 mg by mouth Daily., Disp: , Rfl:   •  aspirin 325 MG tablet, Take 325 mg by mouth Daily., Disp: , Rfl:   •  carvedilol (COREG) 6.25 MG tablet, Take 1 tablet by mouth 2 (Two) Times a Day With Meals., Disp: 60 tablet, Rfl: 2  •  citalopram (CeleXA) 20 MG tablet, TAKE 1 2 (ONE HALF) TABLET BY MOUTH AT BEDTIME FOR 6 DAYS THEN 1 ONCE DAILY THEREAFTER, Disp: , Rfl:   •  hydroCHLOROthiazide (HYDRODIURIL) 25 MG tablet, Take 1 tablet by mouth Daily., Disp: 30 tablet, Rfl: 2  •  lisinopril (PRINIVIL,ZESTRIL) 20 MG tablet, Take 1 tablet by mouth Daily., Disp: 30 tablet, Rfl: 2  •  Multiple Vitamins-Minerals (MULTIVITAMIN WITH MINERALS) tablet  "tablet, Take 1 tablet by mouth Daily., Disp: , Rfl:   •  HYDROcodone-acetaminophen (NORCO)  MG per tablet, Take 1 tablet by mouth Every 6 (Six) Hours As Needed for Moderate Pain ., Disp: 16 tablet, Rfl: 0    Past Medical History:   Diagnosis Date   • A-fib (HCC)    • Arthritis    • Chronic renal failure    • Coronary artery disease    • Elevated PSA    • GERD (gastroesophageal reflux disease)    • Hypertension    • Kidney stone    • MRSA bacteremia 02/21/2017    MRSA in heart valve   • Prostate cancer (HCC)    • Purpura (HCC)    • Wegener's granulomatosis with renal involvement (HCC)        Past Surgical History:   Procedure Laterality Date   • APPENDECTOMY     • INSERTION HEMODIALYSIS CATHETER N/A 1/20/2017    Procedure: INSERTION PERMCATH;  Surgeon: Ian Grimes DO;  Location:  [facility] OR;  Service:    • LIP REPAIR     • MITRAL VALVE REPLACEMENT     • PROSTATECTOMY Bilateral 6/1/2021    Procedure: RADICAL PROSTATECTOMY LAPAROSCOPIC WITH DAVINCI ROBOT WITH BILATERAL PELVIC LYMPH NODE DISSECTION;  Surgeon: Luis Carlos Awan MD;  Location:  [facility] OR;  Service: Robotics - DaVinci;  Laterality: Bilateral;       Social History     Socioeconomic History   • Marital status:    Tobacco Use   • Smoking status: Heavy Tobacco Smoker     Packs/day: 1.00     Types: Cigarettes   • Smokeless tobacco: Never Used   Vaping Use   • Vaping Use: Never used   Substance and Sexual Activity   • Alcohol use: Yes     Comment: rare   • Drug use: No   • Sexual activity: Defer       History reviewed. No pertinent family history.    Objective    Temp 98.1 °F (36.7 °C) (Temporal)   Ht 185.4 cm (73\")   Wt 120 kg (265 lb)   BMI 34.96 kg/m²     Physical Exam        Results for orders placed or performed in visit on 10/15/21   POC Urinalysis Dipstick, Multipro    Specimen: Urine   Result Value Ref Range    Color Yellow Yellow, Straw, Dark Yellow, Falguni    Clarity, UA Clear Clear    Glucose, UA Negative Negative, 1000 mg/dL " (3+) mg/dL    Bilirubin Negative Negative    Ketones, UA Negative Negative    Specific Gravity  1.020 1.005 - 1.030    Blood, UA Negative Negative    pH, Urine 5.5 5.0 - 8.0    Protein, POC 1+ (A) Negative mg/dL    Urobilinogen, UA Normal Normal    Nitrite, UA Negative Negative    Leukocytes Negative Negative     Assessment and Plan    Diagnoses and all orders for this visit:    1. Prostate cancer (HCC) (Primary)  -     POC Urinalysis Dipstick, Multipro  -     PSA DIAGNOSTIC; Future    2. Male stress incontinence    Preop PSA was 32.4.  He had a bone scan and CT which were negative for metastatic prostate cancer.  Preop erectile dysfunction.  Status post robotic assisted lap prostatectomy and bilateral pelvic lymph node dissection in June 2021.  This did reveal P T3a PN 0 Susie 4+3 = 7 adenocarcinoma the prostate with a positive margin at the bladder neck.  This is a significant upgrade from [place] 3+3= 6 in 1 out of 12 cores on his biopsy.      1 diapers/day.  Continue Kegels.     Not interested in ED therapy at this point.     Follow up in 3 months with PSA.

## 2021-10-15 ENCOUNTER — OFFICE VISIT (OUTPATIENT)
Dept: UROLOGY | Facility: CLINIC | Age: 51
End: 2021-10-15

## 2021-10-15 VITALS — HEIGHT: 73 IN | WEIGHT: 265 LBS | BODY MASS INDEX: 35.12 KG/M2 | TEMPERATURE: 98.1 F

## 2021-10-15 DIAGNOSIS — N39.3 MALE STRESS INCONTINENCE: ICD-10-CM

## 2021-10-15 DIAGNOSIS — C61 PROSTATE CANCER (HCC): Primary | ICD-10-CM

## 2021-10-15 PROCEDURE — 99213 OFFICE O/P EST LOW 20 MIN: CPT | Performed by: UROLOGY

## 2021-10-22 NOTE — OP NOTE
PROSTATECTOMY LAPAROSCOPIC WITH DAVINCI ROBOT  Procedure Note    Gurjit Andrea  6/1/2021    Pre-op Diagnosis:   Prostate cancer (CMS/HCC) [C61]    Post-op Diagnosis:     Post-Op Diagnosis Codes:     * Prostate cancer (CMS/HCC) [C61]    Procedure/CPT® Codes:      Procedure(s):  RADICAL PROSTATECTOMY LAPAROSCOPIC WITH DAVINCI ROBOT WITH BILATERAL PELVIC LYMPH NODE DISSECTION    Surgeon(s):  Luis Carlos Awan MD    Anesthesia: General    Staff:   Circulator: Natalee Gonazlez RN; Arnie Morton, CATALINO  Scrub Person: Costa Nunez; Pooja Callahan; Gertrude Pickens  Assistant: Cynthia Ivy    Estimated Blood Loss: 650 mL    Specimens:                Specimens     ID Source Type Tests Collected By Collected At Frozen?    A Prostate Tissue · TISSUE PATHOLOGY EXAM   Luis Carlos Awan MD 6/1/21 1323 No    Description: PROSTATE    This specimen was not marked as sent.    B Lymph Node Lymph Node · TISSUE PATHOLOGY EXAM   Luis Carlos Awan MD 6/1/21 1323     Description: Left and Right Pelvic Lymph Nodes    This specimen was not marked as sent.            Drains:   Closed/Suction Drain 1 Right Abdomen Bulb 15 Fr. (Active)       Urethral Catheter Double-lumen;Silicone 16 Fr. (Active)       Findings: Watertight anastomosis  No rectal injury  No UO injury  No enlarged LN      Complications: None      Luis Carlos Awan MD     Date: 6/1/2021  Time: 16:53 CDT     Indications: The patient was admitted to the hospital with adenocarcinoma of the prostate. The patient now presents for Robotic Prostatectomy after discussing therapeutic alternatives.   Informed consent was obtained prior to the procedure.        Surgeon: Luis Carlos Awan MD     Assistants: Cynthia Ivy CFA    Procedure Details   Patient was brought to the operating room.  General endotracheal anesthesia was administered in the supine position.  The patient was converted to the dorsal lithotomy position and prepped and  draped in the usual sterile fashion.  Timeout was done to ensure the correct patient, procedure, and site. He received preoperative antibiotics in the holding room.  A 20 Fr Herrera catheter was placed under sterile conditions and 10 mL sterile water was used to inflate the balloon.    The peritoneal space was accessed using the Veress needle with confirmation with two clicks and on low flow.  I marked my trocar sites with a 12 mm supraumbilical, two 8 mm trocars along a line from the umbilicus on the right, an 8 mm and 12 mm along a line from the umbilicus on the left, and 5 mm in the left upper quadrant.  I insufflated the abdomen to 15 mmHg with CO2.  I placed my 12 mm supraumbilical trocar and then placed the remaining under direct vision. I used a 0 Vicryl on the [employer] device to close my 12 mm trocar in the left lower quadrant to be tied at the conclusion of the case. Patient was placed in steep Trendelenburg position and the robot was docked. I began the procedure by taking down sigmoid adhesions for 5 minutes.  I then incised the peritoneal reflection 1 cm above the rectum to access the seminal vesicles and vas deferens.  I identified the left vas deferens coursing laterally where I incised it to gain access to the tip of the seminal vesicles.  I dissected the left seminal vesicle using clips at the tips to ensure no damage to the neurovascular bundle.  I performed the same maneuver on the patient's right vas and seminal vesicles.  I then placed these structures on traction and was able to get a plane between the prostate and the rectum dissecting towards the apex of the prostate.  This dissection was difficult and took a while due to his enlarged prostate and very narrow pelvis.    At this point, I turned my attention to developing the space of Retzius.  I identified the medial umbilical ligaments.  I incised lateral to the left and right  medial umbilical ligament down to the vas deferens.  I then  connected my incision towards the right taking down the urachus and removing the bladder off of the pubic bone.  I defatted the anterior prostate and took down the superficial dorsal venous complex with bipolar cautery.  At that point, I incised the left endopelvic fascia exposing the lateral edge of the prostate.  I then took down the puboprostatic ligament exposing the notch of the dorsal venous complex.  Any apical bleeders were controlled with bipolar cautery.  I performed the same maneuver on the right side.  I then used a 0 Vicryl on a CT 1 needle to tie off the DVC in a figure of eight fashion.  I then placed a DVC suspension suture with 2-0 Vicryl anchoring this to the periosteum of the pubic bone on the left with a Hem-o-terry and Lapra-Ty and then doing a figure of eight suture around the DVC.  Again, I anchored on the right side with a Hem-o-terry and Lapra-Ty.    I placed the 30 degree down scope.  Patient bladder on traction I then swept the fibrofatty tissue off the left external iliac vein to the takeoff of the circumflex iliac vein and then off the obturator nerve.  This was sent off for pathologic analysis.  I performed the same year on the patient's right side of the same borders of dissection.    I had my assistant move the Herrera catheter back and forth to identify the bladder neck.  I also performed the pinch maneuver to identify the bladder neck.  I incised the anterior bladder neck exposing the Herrera catheter in the midline.  I used my third arm to retract the Herrera catheter.  I identified the posterior bladder neck.  It appeared that I was well away from the ureteral orifices.  I incised the posterior bladder neck and was able to expose the previously dissected vas deferens and seminal vesicles.  I used the third arm to place these on traction.  I then incised the lateral prostatic fascia at the mid prostate and brought this back to the pedicles of the prostate.      I then took down the left  pedicle of the prostate with Heme-o-Lock Clips to help with nerve sparing technique.  I then got a nice nerve sparing plane, interfascial, staying above the prostatic capsule and took this all the way to the apex of the prostate.  I performed the same maneuver on the right pedicle.  Again this was extremely difficult dissection due to his enlarged gland and very narrow pelvis.    I placed the prostate on traction and incised the dorsal venous complex with monopolar cautery.  I then exposed the lateral shoulders of the urethra.  I incised the anterior urethra exposing the Herrera catheter.  I then incised the posterior urethra leaving a nice urethral stump.  I incised the rectourethralis muscle and the posterior leaflet of Denovilliers' fascia completely freeing the prostate.  The prostate was placed in an Endo Catch bag.      I inspected the pelvis for any bleeding.  I used a 3-0 chromic to control some bleeding on the left bundle.  There was some bleeding noted on the right bundle this is also controlled with 3-0 chromic in a figure-of-eight fashion.  The pelvis was irrigated with sterile water.  I placed Home on the nerve bundles for hemostasis.      I then used a 3-0 Stratafix double armed suture to perform the anastomosis.  I ran one arm clockwise and one arm counter clockwise getting great mucosal to mucosal apposition of the urethra to the bladder.  I then tied the two ends of the suture to themselves.  The final 20 Fr Herrera catheter was placed with 10 mL sterile water used to inflate the balloon.  I tested the anastomosis with 120 mL of saline and it was water tight.  I placed a 15 round VIKAS drain through the third arm that was secured with a 2-0 silk suture.     The robot was undocked.  The patient was placed in dorsal lithotomy position.  I incised my 12 mm supraumbilical trocar and removed the prostate.  I then closed the fascia with 0 Vicryl on a UR 6 needle in a figure of eight fashion.  Wounds were  irrigated.  I tied my previously placed O Vicryl.  I then closed the skin incisions with 4-0 Vicryl in a subcuticular fashion.  Sterile dressings were applied. The Herrera was secured to the leg.  Patient was awakened from anesthesia in stable condition.  All sponge, needle, and instrument counts were correct.                     ADMIT

## 2022-01-17 NOTE — PROGRESS NOTES
Subjective    Mr. Andrea is 52 y.o. male    Chief Complaint: Prostate Cancer.     History of Present Illness  Preop PSA was 32.4.  He had a bone scan and CT which were negative for metastatic prostate cancer.  Preop erectile dysfunction.  Status post robotic assisted lap prostatectomy and bilateral pelvic lymph node dissection in June 2021.  This did reveal P T3a PN 0 Susie 4+3 = 7 adenocarcinoma the prostate with a positive margin at the bladder neck.  This is a significant upgrade from Susie 3+3= 6 in 1 out of 12 cores on his biopsy.  Postop PSA < 0.1. Improving MANISHA one thin liner per day.   PSA 0.020    Lab Results   Component Value Date    PSA 0.020 01/18/2022    PSA <0.1 10/12/2021    PSA <0.1 07/12/2021         The following portions of the patient's history were reviewed and updated as appropriate: allergies, current medications, past family history, past medical history, past social history, past surgical history and problem list.    Review of Systems   Constitutional: Negative for chills and fever.   Gastrointestinal: Negative for abdominal pain, anal bleeding and blood in stool.   Genitourinary: Negative for dysuria, frequency, hematuria and urgency.         Current Outpatient Medications:   •  Acetaminophen (TYLENOL ARTHRITIS PAIN PO), Take 2 tablets by mouth As Needed., Disp: , Rfl:   •  amLODIPine (NORVASC) 5 MG tablet, Take 5 mg by mouth Daily., Disp: , Rfl:   •  aspirin 325 MG tablet, Take 325 mg by mouth Daily., Disp: , Rfl:   •  carvedilol (COREG) 6.25 MG tablet, Take 1 tablet by mouth 2 (Two) Times a Day With Meals., Disp: 60 tablet, Rfl: 2  •  citalopram (CeleXA) 20 MG tablet, , Disp: , Rfl:   •  diclofenac (VOLTAREN) 75 MG EC tablet, Take 75 mg by mouth 2 (Two) Times a Day., Disp: , Rfl:   •  hydroCHLOROthiazide (HYDRODIURIL) 25 MG tablet, Take 1 tablet by mouth Daily., Disp: 30 tablet, Rfl: 2  •  lisinopril (PRINIVIL,ZESTRIL) 20 MG tablet, Take 1 tablet by mouth Daily., Disp: 30 tablet,  "Rfl: 2  •  Multiple Vitamins-Minerals (MULTIVITAMIN WITH MINERALS) tablet tablet, Take 1 tablet by mouth Daily., Disp: , Rfl:   •  HYDROcodone-acetaminophen (NORCO)  MG per tablet, Take 1 tablet by mouth Every 6 (Six) Hours As Needed for Moderate Pain ., Disp: 16 tablet, Rfl: 0    Past Medical History:   Diagnosis Date   • A-fib (HCC)    • Arthritis    • Chronic renal failure    • Coronary artery disease    • Elevated PSA    • GERD (gastroesophageal reflux disease)    • Hypertension    • Kidney stone    • MRSA bacteremia 02/21/2017    MRSA in heart valve   • Prostate cancer (HCC)    • Purpura (HCC)    • Wegener's granulomatosis with renal involvement (HCC)        Past Surgical History:   Procedure Laterality Date   • APPENDECTOMY     • INSERTION HEMODIALYSIS CATHETER N/A 1/20/2017    Procedure: INSERTION PERMCATH;  Surgeon: Ian Grimes DO;  Location: [facility] OR;  Service:    • LIP REPAIR     • MITRAL VALVE REPLACEMENT     • PROSTATECTOMY Bilateral 6/1/2021    Procedure: RADICAL PROSTATECTOMY LAPAROSCOPIC WITH DAVINCI ROBOT WITH BILATERAL PELVIC LYMPH NODE DISSECTION;  Surgeon: Luis Carlos Awan MD;  Location: [facility] OR;  Service: Robotics - DaVinci;  Laterality: Bilateral;       Social History     Socioeconomic History   • Marital status:    Tobacco Use   • Smoking status: Heavy Tobacco Smoker     Packs/day: 1.00     Types: Cigarettes   • Smokeless tobacco: Never Used   Vaping Use   • Vaping Use: Never used   Substance and Sexual Activity   • Alcohol use: Yes     Comment: rare   • Drug use: No   • Sexual activity: Defer       History reviewed. No pertinent family history.    Objective    Temp 97.9 °F (36.6 °C) (Temporal)   Ht 185.4 cm (73\")   Wt 120 kg (264 lb)   BMI 34.83 kg/m²     Physical Exam  Abdominal:      Comments: Midline incisional hernia             Results for orders placed or performed in visit on 01/21/22   POC Urinalysis Dipstick, Multipro    Specimen: Urine   Result Value " Ref Range    Color Yellow Yellow, Straw, Dark Yellow, Falguni    Clarity, UA Clear Clear    Glucose, UA Negative Negative, 1000 mg/dL (3+) mg/dL    Bilirubin Negative Negative    Ketones, UA Negative Negative    Specific Gravity  1.025 1.005 - 1.030    Blood, UA Negative Negative    pH, Urine 5.0 5.0 - 8.0    Protein, POC Negative Negative mg/dL    Urobilinogen, UA Normal Normal    Nitrite, UA Negative Negative    Leukocytes Negative Negative     Assessment and Plan    Diagnoses and all orders for this visit:    1. Prostate cancer (HCC) (Primary)  -     POC Urinalysis Dipstick, Multipro  -     PSA DIAGNOSTIC; Future    2. Male stress incontinence    3. Nephrolithiasis    4. Impotence of organic origin    Preop PSA was 32.4.  He had a bone scan and CT which were negative for metastatic prostate cancer.  Preop erectile dysfunction.  Status post robotic assisted lap prostatectomy and bilateral pelvic lymph node dissection in June 2021.  This did reveal P T3a PN 0 Ellsworth 4+3 = 7 adenocarcinoma the prostate with a positive margin at the bladder neck.  This is a significant upgrade from Ellsworth 3+3= 6 in 1 out of 12 cores on his biopsy.     PSA today 0.020     1 thin liner/day.  Continue Kegels.     Not interested in ED therapy at this point.     Follow up in 3 months with PSA.      I offered him referral to general surgery to evaluate his incisional hernia.  He does not want this at this point.He is not bothered by it.

## 2022-01-21 ENCOUNTER — OFFICE VISIT (OUTPATIENT)
Dept: UROLOGY | Facility: CLINIC | Age: 52
End: 2022-01-21

## 2022-01-21 VITALS — WEIGHT: 264 LBS | TEMPERATURE: 97.9 F | HEIGHT: 73 IN | BODY MASS INDEX: 34.99 KG/M2

## 2022-01-21 DIAGNOSIS — N39.3 MALE STRESS INCONTINENCE: ICD-10-CM

## 2022-01-21 DIAGNOSIS — K43.2 INCISIONAL HERNIA WITHOUT OBSTRUCTION OR GANGRENE: ICD-10-CM

## 2022-01-21 DIAGNOSIS — N20.0 NEPHROLITHIASIS: ICD-10-CM

## 2022-01-21 DIAGNOSIS — N52.9 IMPOTENCE OF ORGANIC ORIGIN: ICD-10-CM

## 2022-01-21 DIAGNOSIS — C61 PROSTATE CANCER: Primary | ICD-10-CM

## 2022-01-21 LAB
BILIRUB BLD-MCNC: NEGATIVE MG/DL
CLARITY, POC: CLEAR
COLOR UR: YELLOW
GLUCOSE UR STRIP-MCNC: NEGATIVE MG/DL
KETONES UR QL: NEGATIVE
LEUKOCYTE EST, POC: NEGATIVE
NITRITE UR-MCNC: NEGATIVE MG/ML
PH UR: 5 [PH] (ref 5–8)
PROT UR STRIP-MCNC: NEGATIVE MG/DL
RBC # UR STRIP: NEGATIVE /UL
SP GR UR: 1.02 (ref 1–1.03)
UROBILINOGEN UR QL: NORMAL

## 2022-01-21 PROCEDURE — 99213 OFFICE O/P EST LOW 20 MIN: CPT | Performed by: UROLOGY

## 2022-01-21 RX ORDER — DICLOFENAC SODIUM 75 MG/1
75 TABLET, DELAYED RELEASE ORAL 2 TIMES DAILY
COMMUNITY
Start: 2022-01-02

## 2022-03-09 ENCOUNTER — APPOINTMENT (OUTPATIENT)
Dept: GENERAL RADIOLOGY | Facility: HOSPITAL | Age: 52
End: 2022-03-09

## 2022-03-09 ENCOUNTER — HOSPITAL ENCOUNTER (EMERGENCY)
Facility: HOSPITAL | Age: 52
Discharge: HOME | End: 2022-03-09
Attending: EMERGENCY MEDICINE | Admitting: EMERGENCY MEDICINE

## 2022-03-09 VITALS
HEIGHT: 73 IN | DIASTOLIC BLOOD PRESSURE: 89 MMHG | RESPIRATION RATE: 18 BRPM | HEART RATE: 80 BPM | BODY MASS INDEX: 36.45 KG/M2 | SYSTOLIC BLOOD PRESSURE: 127 MMHG | OXYGEN SATURATION: 95 % | TEMPERATURE: 97.9 F | WEIGHT: 275 LBS

## 2022-03-09 DIAGNOSIS — S82.892A CLOSED FRACTURE OF LEFT ANKLE, INITIAL ENCOUNTER: Primary | ICD-10-CM

## 2022-03-09 PROCEDURE — 99284 EMERGENCY DEPT VISIT MOD MDM: CPT

## 2022-03-09 PROCEDURE — 73502 X-RAY EXAM HIP UNI 2-3 VIEWS: CPT

## 2022-03-09 PROCEDURE — 73610 X-RAY EXAM OF ANKLE: CPT

## 2022-03-09 RX ORDER — OXYCODONE AND ACETAMINOPHEN 7.5; 325 MG/1; MG/1
1 TABLET ORAL EVERY 6 HOURS PRN
Qty: 12 TABLET | Refills: 0 | Status: SHIPPED | OUTPATIENT
Start: 2022-03-09 | End: 2022-07-27

## 2022-03-09 RX ORDER — OXYCODONE AND ACETAMINOPHEN 7.5; 325 MG/1; MG/1
1 TABLET ORAL ONCE
Status: COMPLETED | OUTPATIENT
Start: 2022-03-09 | End: 2022-03-09

## 2022-03-09 RX ADMIN — OXYCODONE HYDROCHLORIDE AND ACETAMINOPHEN 1 TABLET: 7.5; 325 TABLET ORAL at 22:21

## 2022-03-10 NOTE — ED PROVIDER NOTES
Subjective   52-year-old male presents to the ER for evaluation for mechanical fall from standing.  He was walking down a couple steps into his laundry room, twisted his ankle and fell to the ground.  Did not hit his head or neck, no loss of consciousness.  Complaining of mild left shoulder pain, mild left hip pain, moderate to severe left ankle pain.  Had been able to weight-bear on his left ankle since the fall.  EMS was called to the scene, he was placed in splint transported to the ER for evaluation.       History provided by:  Patient      Review of Systems   All other systems reviewed and are negative.      Past Medical History:   Diagnosis Date   • A-fib (HCC)    • Arthritis    • Chronic renal failure    • Coronary artery disease    • Elevated PSA    • GERD (gastroesophageal reflux disease)    • Hypertension    • Kidney stone    • MRSA bacteremia 02/21/2017    MRSA in heart valve   • Prostate cancer ([facility])    • Purpura ([facility])    • Wegener's granulomatosis with renal involvement ([facility])        Allergies   Allergen Reactions   • Cetirizine Nausea Only   • Chantix [Varenicline] Anxiety       Past Surgical History:   Procedure Laterality Date   • APPENDECTOMY     • INSERTION HEMODIALYSIS CATHETER N/A 1/20/2017    Procedure: INSERTION PERMCATH;  Surgeon: Ian Grimes DO;  Location:  [facility] OR;  Service:    • LIP REPAIR     • MITRAL VALVE REPLACEMENT     • PROSTATECTOMY Bilateral 6/1/2021    Procedure: RADICAL PROSTATECTOMY LAPAROSCOPIC WITH DAVINCI ROBOT WITH BILATERAL PELVIC LYMPH NODE DISSECTION;  Surgeon: Luis Carlos Awan MD;  Location: [facility] OR;  Service: Robotics - DaVinci;  Laterality: Bilateral;       History reviewed. No pertinent family history.    Social History     Socioeconomic History   • Marital status:    Tobacco Use   • Smoking status: Heavy Tobacco Smoker     Packs/day: 1.00     Types: Cigarettes   • Smokeless tobacco: Never Used   Vaping Use   • Vaping Use: Never used   Substance  and Sexual Activity   • Alcohol use: Yes     Comment: rare   • Drug use: No   • Sexual activity: Defer           Objective   Physical Exam  Vitals and nursing note reviewed.   Constitutional:       General: He is not in acute distress.     Appearance: Normal appearance. He is not ill-appearing.   HENT:      Head: Normocephalic and atraumatic.   Eyes:      Extraocular Movements: Extraocular movements intact.      Conjunctiva/sclera: Conjunctivae normal.      Pupils: Pupils are equal, round, and reactive to light.   Cardiovascular:      Rate and Rhythm: Normal rate and regular rhythm.      Pulses: Normal pulses.      Heart sounds: Normal heart sounds. No murmur heard.    No friction rub. No gallop.   Pulmonary:      Effort: Pulmonary effort is normal.      Breath sounds: Normal breath sounds. No wheezing, rhonchi or rales.   Abdominal:      General: Abdomen is flat. Bowel sounds are normal. There is no distension.      Palpations: Abdomen is soft.      Tenderness: There is no abdominal tenderness. There is no guarding or rebound.   Musculoskeletal:         General: Swelling, tenderness and signs of injury present. Normal range of motion.      Right lower leg: No edema.      Left lower leg: No edema.      Comments: Left hip tender to palpation  Left ankle diffusely swollen, tender to medial and lateral malleoli, distal tib-fib tenderness and swelling   Skin:     General: Skin is warm and dry.      Capillary Refill: Capillary refill takes less than 2 seconds.      Findings: No rash.   Neurological:      General: No focal deficit present.      Mental Status: He is alert and oriented to person, place, and time. Mental status is at baseline.         Procedures         Lab Results (last 24 hours)     ** No results found for the last 24 hours. **      XR Ankle 3+ View Left    Result Date: 3/9/2022  HISTORY: Fall, left ankle pain  Left ankle: 3 views of the left ankle are obtained.  There is a nondisplaced fracture of the  posterior medial tibia. The fibula is intact. Borderline widening of the medial ankle mortise. Prominent medial soft tissue swelling. Minimal bony spurring of the anterior tibia. Moderate calcaneal spurring.      1. Nondisplaced fracture of the posterior medial tibia. Distal fibula preserved. Mild widening of the medial ankle mortise. This report was finalized on 03/09/2022 21:56 by Dr. Jahaira Capellan MD.    XR Hip With or Without Pelvis 2 - 3 View Left    Result Date: 3/9/2022  HISTORY: Left hip pain  Left hip: Frontal view the pelvis with frontal and frog-leg lateral views left hip are obtained.  No fracture or dislocation identified. Mild narrowing of the left hip joint space.  No suspicious focal bony lesions.      1. No acute osseous injury identified of the pelvis or left hip. This report was finalized on 03/09/2022 21:57 by Dr. Jahaira Capellan MD.    ED Course  ED Course as of 03/09/22 2302   Wed Mar 09, 2022   2259 Distal tibia fracture with mild widening of the ankle mortise.  Patient was placed in a posterior short leg and ankle stirrup splint.  He is then instructed to remain nonweightbearing.  He was provided with some crutches and recommend he contact orthopedic surgery for appointment within the next 3 to 4 days. [AW]      ED Course User Index  [AW] Brendan Hinkle MD                                                 [facility]    Final diagnoses:   Closed fracture of left ankle, initial encounter       ED Disposition  ED Disposition     ED Disposition   Discharge    Condition   Stable    Comment   --             Jaspreet Wetzel MD  [address] [place] [address]  [phone]    Schedule an appointment as soon as possible for a visit in 5 days           Medication List      New Prescriptions    oxyCODONE-acetaminophen 7.5-325 MG per tablet  Commonly known as: PERCOCET  Take 1 tablet by mouth Every 6 (Six) Hours As Needed for Severe Pain .           Where to Get Your Medications       These medications were sent to [facility] - [place], [place] - [address] - [phone]  - [phone]   [address] [facility] [address]    Phone: [phone]   · oxyCODONE-acetaminophen 7.5-325 MG per tablet          Brendan Hinkle MD  03/09/22 3371

## 2022-03-24 ENCOUNTER — OFFICE VISIT (OUTPATIENT)
Dept: CARDIOLOGY | Facility: CLINIC | Age: 52
End: 2022-03-24

## 2022-03-24 ENCOUNTER — DOCUMENTATION (OUTPATIENT)
Dept: CARDIOLOGY | Facility: CLINIC | Age: 52
End: 2022-03-24

## 2022-03-24 VITALS
HEIGHT: 72 IN | SYSTOLIC BLOOD PRESSURE: 118 MMHG | BODY MASS INDEX: 37.38 KG/M2 | WEIGHT: 276 LBS | HEART RATE: 81 BPM | DIASTOLIC BLOOD PRESSURE: 90 MMHG

## 2022-03-24 DIAGNOSIS — Z95.2 H/O MITRAL VALVE REPLACEMENT: ICD-10-CM

## 2022-03-24 DIAGNOSIS — Z72.0 TOBACCO ABUSE: ICD-10-CM

## 2022-03-24 DIAGNOSIS — I10 PRIMARY HYPERTENSION: ICD-10-CM

## 2022-03-24 DIAGNOSIS — Z01.810 PREOP CARDIOVASCULAR EXAM: Primary | ICD-10-CM

## 2022-03-24 DIAGNOSIS — Z71.6 TOBACCO ABUSE COUNSELING: ICD-10-CM

## 2022-03-24 DIAGNOSIS — R06.09 DOE (DYSPNEA ON EXERTION): ICD-10-CM

## 2022-03-24 PROCEDURE — 93000 ELECTROCARDIOGRAM COMPLETE: CPT | Performed by: INTERNAL MEDICINE

## 2022-03-24 PROCEDURE — 99204 OFFICE O/P NEW MOD 45 MIN: CPT | Performed by: INTERNAL MEDICINE

## 2022-03-24 NOTE — PROGRESS NOTES
Gurjit Andrea  [id number]  1970  52 y.o.  male    Referring Provider: Guillermo Almeida APRN    Reason for  Visit: Here for routine follow up   Mitral valve replacement 03/2017    Prior atrial fibrillation   Ablation with no recurrence    no coronary artery disease     Subjective    Mild chronic exertional shortness of breath on exertion relieved with rest  No significant cough or wheezing    No palpitations  No associated chest pain  No significant pedal edema    No fever or chills  No significant expectoration    No hemoptysis  No presyncope or syncope    Tolerating current medications well with no untoward side effects   Compliant with prescribed medication regimen. Tries to adhere to cardiac diet.     Effort tolerance limited more by orthopedic rather than cardiac related issues, therefore difficult to assess functional capacity.     Overall shortness of breath is better     BP well controlled at home.     No bleeding, excessive bruising, gait instability or fall risks      Wheel chair bound       History of present illness:  Gurjit Andrea is a 52 y.o. yo male with mitral valve replacement 2017  who presents today for   Chief Complaint   Patient presents with   • Shortness of Breath   .    History  Past Medical History:   Diagnosis Date   • A-fib (HCC)    • Arthritis    • Chronic renal failure    • Coronary artery disease    • Elevated PSA    • GERD (gastroesophageal reflux disease)    • Hypertension    • Kidney stone    • MRSA bacteremia 02/21/2017    MRSA in heart valve   • Prostate cancer (HCC)    • Purpura (HCC)    • Wegener's granulomatosis with renal involvement (HCC)    ,   Past Surgical History:   Procedure Laterality Date   • APPENDECTOMY     • INSERTION HEMODIALYSIS CATHETER N/A 1/20/2017    Procedure: INSERTION PERMCATH;  Surgeon: Ian Grimes DO;  Location: [facility] OR;  Service:    • LIP REPAIR     • MITRAL VALVE REPLACEMENT     • PROSTATECTOMY Bilateral 6/1/2021     Procedure: RADICAL PROSTATECTOMY LAPAROSCOPIC WITH DAVINCI ROBOT WITH BILATERAL PELVIC LYMPH NODE DISSECTION;  Surgeon: Luis Carlos Awan MD;  Location: [facility] OR;  Service: Robotics - DaVinci;  Laterality: Bilateral;   ,   History reviewed. No pertinent family history.,   Social History     Tobacco Use   • Smoking status: Heavy Tobacco Smoker     Packs/day: 1.00     Types: Cigarettes   • Smokeless tobacco: Never Used   Vaping Use   • Vaping Use: Never used   Substance Use Topics   • Alcohol use: Yes     Comment: rare   • Drug use: No   ,     Medications  Current Outpatient Medications   Medication Sig Dispense Refill   • Acetaminophen (TYLENOL ARTHRITIS PAIN PO) Take 2 tablets by mouth As Needed.     • amLODIPine (NORVASC) 5 MG tablet Take 5 mg by mouth Daily.     • aspirin 325 MG tablet Take 325 mg by mouth Daily.     • carvedilol (COREG) 6.25 MG tablet Take 1 tablet by mouth 2 (Two) Times a Day With Meals. 60 tablet 2   • citalopram (CeleXA) 20 MG tablet      • diclofenac (VOLTAREN) 75 MG EC tablet Take 75 mg by mouth 2 (Two) Times a Day.     • hydroCHLOROthiazide (HYDRODIURIL) 25 MG tablet Take 1 tablet by mouth Daily. 30 tablet 2   • HYDROcodone-acetaminophen (NORCO)  MG per tablet Take 1 tablet by mouth Every 6 (Six) Hours As Needed for Moderate Pain . 16 tablet 0   • lisinopril (PRINIVIL,ZESTRIL) 20 MG tablet Take 1 tablet by mouth Daily. 30 tablet 2   • Multiple Vitamins-Minerals (MULTIVITAMIN WITH MINERALS) tablet tablet Take 1 tablet by mouth Daily.     • oxyCODONE-acetaminophen (PERCOCET) 7.5-325 MG per tablet Take 1 tablet by mouth Every 6 (Six) Hours As Needed for Severe Pain . 12 tablet 0     No current facility-administered medications for this visit.       Allergies:  Cetirizine and Chantix [varenicline]    Review of Systems  Review of Systems   Constitutional: Negative.   HENT: Negative.    Eyes: Negative.    Cardiovascular: Positive for dyspnea on exertion. Negative for chest pain,  "claudication, cyanosis, irregular heartbeat, leg swelling, near-syncope, orthopnea, palpitations, paroxysmal nocturnal dyspnea and syncope.   Respiratory: Negative.    Endocrine: Negative.    Hematologic/Lymphatic: Negative.    Skin: Negative.    Musculoskeletal: Positive for arthritis, back pain and joint pain.   Gastrointestinal: Negative for anorexia.   Genitourinary: Negative.    Neurological: Negative.    Psychiatric/Behavioral: Negative.        Objective     Physical Exam:  /90   Pulse 81   Ht 182.9 cm (72\")   Wt 125 kg (276 lb)   BMI 37.43 kg/m²     Physical Exam  Constitutional:       Appearance: He is well-developed.   HENT:      Head: Normocephalic.   Neck:      Vascular: Normal carotid pulses. No carotid bruit or JVD.      Trachea: No tracheal tenderness or tracheal deviation.   Cardiovascular:      Rate and Rhythm: Regular rhythm.      Pulses: Normal pulses.      Heart sounds: Murmur heard.    Systolic murmur is present with a grade of 2/6.  Pulmonary:      Effort: Pulmonary effort is normal.      Breath sounds: No stridor.   Abdominal:      General: There is no distension.      Palpations: Abdomen is soft.      Tenderness: There is no abdominal tenderness.   Musculoskeletal:      Cervical back: No edema.   Skin:     General: Skin is warm.   Neurological:      Mental Status: He is alert.      Cranial Nerves: No cranial nerve deficit.      Sensory: No sensory deficit.   Psychiatric:         Speech: Speech normal.         Behavior: Behavior normal.         Results Review:    Results for orders placed during the hospital encounter of 08/25/20    Adult Stress Echo W/ Cont or Stress Agent if Necessary Per Protocol    Interpretation Summary  · Low risk stress test.  · No clinical, ECG, or echocardiographic evidence of ischemia.  · Patient did not reach target heart rate after 5 minutes on Baldomero protocol, so was converted to dobutamine in order to achieve target heart rate. After dobutamine infusion, " echocardiographic images showed appropriate increase in contractility of all LV wall segments.     Gurjit Andrea  Echo Complete w/ Doppler, Color Flow and Contrast  Order# [id number]  Reading physician: Alex Cortez MD Ordering physician: Brandt Garcia MD Study date: 20       Patient Information    Patient Name   Gurjit Andrea MRN   [id number] Legal Sex   Male  (Age)   1970 (52 y.o.)     PACS Images     Show images for Adult Transthoracic Echo Complete W/ Cont if Necessary Per Protocol   Sedation Narrator Report    Sedation Narrator Report        Interpretation Summary    · Estimated EF = 58%.  · Left ventricular systolic function is normal.  · Left ventricular wall thickness is consistent with mild concentric hypertrophy.  · Borderline dilation of the aortic root is present.  · Left ventricular diastolic dysfunction (grade I) consistent with impaired relaxation.  · Left atrial cavity size is severely dilated.  · There is a bioprosthetic mitral valve present. The prosthetic valve is grossly normal.  · No evidence of pulmonary hypertension is present.              ____________________________________________________________________________________________________________________________________________  Health maintenance and recommendations    Low salt/ HTN/ Heart healthy carbohydrate restricted cardiac diet   The patient is advised to reduce or avoid caffeine or other cardiac stimulants.   Minimize or avoid  NSAID-type medications      Monitor for any signs of bleeding including red or dark stools. Fall precautions.   Advised staying uptodate with immunizations per established standard guidelines.    Offered to give patient  a copy of my notes     Questions were encouraged, asked and answered to the patient's  understanding and satisfaction. Questions if any regarding current medications and side effects, need for refills and importance of compliance to medications  stressed.    Reviewed available prior notes, consults, prior visits, laboratory findings, radiology and cardiology relevant reports. Updated chart as applicable. I have reviewed the patient's medical history in detail and updated the computerized patient record as relevant.      Updated patient regarding any new or relevant abnormalities on review of records or any new findings on physical exam. Mentioned to patient about purpose of visit and desirable health short and long term goals and objectives.    Primary to monitor CBC CMP Lipid panel and TSH as applicable    ___________________________________________________________________________________________________________________________________________     ECG 12 Lead    Date/Time: 3/24/2022 9:07 AM  Performed by: Alex Cortez MD  Authorized by: Alex Cortez MD   Comparison: compared with previous ECG from 5/25/2021  Similar to previous ECG  Rhythm: sinus rhythm  Rate: normal  Conduction: conduction normal  QRS axis: normal  Other findings: poor R wave progression    Clinical impression: abnormal EKG            Assessment/Plan   Diagnoses and all orders for this visit:    1. Preop cardiovascular exam left foot surgery (Primary)    2. Tobacco abuse    3. H/O mitral valve replacement 2017     4. Primary hypertension    5. Tobacco abuse counseling    6. GRAHAM (dyspnea on exertion)  -     Adult Transthoracic Echo Complete w/ Color, Spectral and Contrast if necessary per protocol; Future    Other orders  -     ECG 12 Lead          Plan      Orders Placed This Encounter   Procedures   • ECG 12 Lead     This order was created via procedure documentation     Order Specific Question:   Release to patient     Answer:   Immediate   • Adult Transthoracic Echo Complete w/ Color, Spectral and Contrast if necessary per protocol     Myocardial strain to be performed during echocardiogram as long as technically feasible     Standing Status:   Future     Standing Expiration Date:    3/24/2023     Order Specific Question:   Reason for exam?     Answer:   Dyspnea     Order Specific Question:   Release to patient     Answer:   Immediate      Patient expressed understanding  Encouraged and answered all questions   Discussed with the patient and all questioned fully answered. He will call me if any problems arise.   Discussed results of prior testing with patient : echo and stress test    as well electrocardiogram from today       Acceptable cardiovascular risk of planned procedure.  Can proceed with surgery with usual caution and perioperative hemodynamic and cardiac rhythm monitoring.   Endocarditis prophylaxis   Can get 2 gm Ancef 1 hour prior to surgery     Monitor mitral valve and left ventricular ejection fraction by serial echo    Check BP and heart rates twice daily initially till blood pressures and heart rates under good control and then at least 3x / week,   If blood pressures continue to be well-controlled then can check week a month  at home and bring a recording for review next visit  If BP >130/85 or < 100/60 persistently over 3 reading 30 mins apart or if heart rates persistently above 100 bpm or less than 55 bpm call sooner for evaluation and advise     I support the patient's decision to take the Covid -19 vaccine   Had required complete course   No major issues   Now fully immunized    Had booster too      Follow up with Kayla GARVIN , Mike GARVIN  or myself            Return in about 6 weeks (around 5/5/2022).

## 2022-03-25 DIAGNOSIS — Z11.52 ENCOUNTER FOR SCREENING FOR COVID-19: Primary | ICD-10-CM

## 2022-03-25 LAB — SARS-COV-2, PCR: NOT DETECTED

## 2022-03-29 ENCOUNTER — ANESTHESIA EVENT (OUTPATIENT)
Dept: OPERATING ROOM | Age: 52
End: 2022-03-29
Payer: MEDICAID

## 2022-03-29 ENCOUNTER — HOSPITAL ENCOUNTER (OUTPATIENT)
Age: 52
Setting detail: OUTPATIENT SURGERY
Discharge: HOME | End: 2022-03-29
Attending: ORTHOPAEDIC SURGERY | Admitting: ORTHOPAEDIC SURGERY
Payer: MEDICAID

## 2022-03-29 ENCOUNTER — ANESTHESIA (OUTPATIENT)
Dept: OPERATING ROOM | Age: 52
End: 2022-03-29
Payer: MEDICAID

## 2022-03-29 ENCOUNTER — APPOINTMENT (OUTPATIENT)
Dept: GENERAL RADIOLOGY | Age: 52
End: 2022-03-29
Attending: ORTHOPAEDIC SURGERY
Payer: MEDICAID

## 2022-03-29 VITALS
TEMPERATURE: 96.8 F | RESPIRATION RATE: 16 BRPM | SYSTOLIC BLOOD PRESSURE: 106 MMHG | DIASTOLIC BLOOD PRESSURE: 73 MMHG | HEART RATE: 75 BPM | WEIGHT: 274 LBS | HEIGHT: 73 IN | OXYGEN SATURATION: 93 % | BODY MASS INDEX: 36.31 KG/M2

## 2022-03-29 VITALS — TEMPERATURE: 95.4 F | SYSTOLIC BLOOD PRESSURE: 94 MMHG | DIASTOLIC BLOOD PRESSURE: 52 MMHG | OXYGEN SATURATION: 95 %

## 2022-03-29 PROBLEM — S82.892D CLOSED FRACTURE OF LEFT ANKLE WITH ROUTINE HEALING: Status: ACTIVE | Noted: 2022-03-29

## 2022-03-29 LAB
ANION GAP SERPL CALCULATED.3IONS-SCNC: 14 MMOL/L (ref 7–19)
BUN BLDV-MCNC: 33 MG/DL (ref 6–20)
CALCIUM SERPL-MCNC: 9.8 MG/DL (ref 8.6–10)
CHLORIDE BLD-SCNC: 102 MMOL/L (ref 98–111)
CO2: 25 MMOL/L (ref 22–29)
CREAT SERPL-MCNC: 2 MG/DL (ref 0.5–1.2)
GFR AFRICAN AMERICAN: 42
GFR NON-AFRICAN AMERICAN: 35
GLUCOSE BLD-MCNC: 103 MG/DL (ref 74–109)
HCT VFR BLD CALC: 44.7 % (ref 42–52)
HEMOGLOBIN: 14.3 G/DL (ref 14–18)
MCH RBC QN AUTO: 30.3 PG (ref 27–31)
MCHC RBC AUTO-ENTMCNC: 32 G/DL (ref 33–37)
MCV RBC AUTO: 94.7 FL (ref 80–94)
PDW BLD-RTO: 13.8 % (ref 11.5–14.5)
PLATELET # BLD: 223 K/UL (ref 130–400)
PMV BLD AUTO: 9.5 FL (ref 9.4–12.4)
POTASSIUM SERPL-SCNC: 4 MMOL/L (ref 3.5–4.9)
RBC # BLD: 4.72 M/UL (ref 4.7–6.1)
SODIUM BLD-SCNC: 141 MMOL/L (ref 136–145)
WBC # BLD: 10.3 K/UL (ref 4.8–10.8)

## 2022-03-29 PROCEDURE — 7100000011 HC PHASE II RECOVERY - ADDTL 15 MIN: Performed by: ORTHOPAEDIC SURGERY

## 2022-03-29 PROCEDURE — 3600000014 HC SURGERY LEVEL 4 ADDTL 15MIN: Performed by: ORTHOPAEDIC SURGERY

## 2022-03-29 PROCEDURE — 2580000003 HC RX 258: Performed by: ANESTHESIOLOGY

## 2022-03-29 PROCEDURE — 6370000000 HC RX 637 (ALT 250 FOR IP): Performed by: ORTHOPAEDIC SURGERY

## 2022-03-29 PROCEDURE — 3700000000 HC ANESTHESIA ATTENDED CARE: Performed by: ORTHOPAEDIC SURGERY

## 2022-03-29 PROCEDURE — 6360000002 HC RX W HCPCS: Performed by: ANESTHESIOLOGY

## 2022-03-29 PROCEDURE — 7100000000 HC PACU RECOVERY - FIRST 15 MIN: Performed by: ORTHOPAEDIC SURGERY

## 2022-03-29 PROCEDURE — 2709999900 HC NON-CHARGEABLE SUPPLY: Performed by: ORTHOPAEDIC SURGERY

## 2022-03-29 PROCEDURE — 7100000010 HC PHASE II RECOVERY - FIRST 15 MIN: Performed by: ORTHOPAEDIC SURGERY

## 2022-03-29 PROCEDURE — 64447 NJX AA&/STRD FEMORAL NRV IMG: CPT | Performed by: NURSE ANESTHETIST, CERTIFIED REGISTERED

## 2022-03-29 PROCEDURE — 2500000003 HC RX 250 WO HCPCS: Performed by: ANESTHESIOLOGY

## 2022-03-29 PROCEDURE — 3600000004 HC SURGERY LEVEL 4 BASE: Performed by: ORTHOPAEDIC SURGERY

## 2022-03-29 PROCEDURE — C1713 ANCHOR/SCREW BN/BN,TIS/BN: HCPCS | Performed by: ORTHOPAEDIC SURGERY

## 2022-03-29 PROCEDURE — 73610 X-RAY EXAM OF ANKLE: CPT

## 2022-03-29 PROCEDURE — 85027 COMPLETE CBC AUTOMATED: CPT

## 2022-03-29 PROCEDURE — 7100000001 HC PACU RECOVERY - ADDTL 15 MIN: Performed by: ORTHOPAEDIC SURGERY

## 2022-03-29 PROCEDURE — 76942 ECHO GUIDE FOR BIOPSY: CPT | Performed by: NURSE ANESTHETIST, CERTIFIED REGISTERED

## 2022-03-29 PROCEDURE — 6360000002 HC RX W HCPCS: Performed by: NURSE ANESTHETIST, CERTIFIED REGISTERED

## 2022-03-29 PROCEDURE — 3209999900 FLUORO FOR SURGICAL PROCEDURES

## 2022-03-29 PROCEDURE — 80048 BASIC METABOLIC PNL TOTAL CA: CPT

## 2022-03-29 PROCEDURE — 36415 COLL VENOUS BLD VENIPUNCTURE: CPT

## 2022-03-29 PROCEDURE — 3700000001 HC ADD 15 MINUTES (ANESTHESIA): Performed by: ORTHOPAEDIC SURGERY

## 2022-03-29 DEVICE — SCREW BNE L34MM DIA3.5MM CORT S STL ST NONCANNULATED LOK: Type: IMPLANTABLE DEVICE | Site: ANKLE | Status: FUNCTIONAL

## 2022-03-29 DEVICE — SCREW BNE L30MM DIA3.5MM CORT S STL ST NONCANNULATED LOK: Type: IMPLANTABLE DEVICE | Site: ANKLE | Status: FUNCTIONAL

## 2022-03-29 DEVICE — PLATE BNE L57MM 5 H S STL 1/3 TBLR LOK COMPR W/ CLLR FOR: Type: IMPLANTABLE DEVICE | Site: ANKLE | Status: FUNCTIONAL

## 2022-03-29 RX ORDER — HYDROCHLOROTHIAZIDE 12.5 MG/1
12.5 CAPSULE, GELATIN COATED ORAL 2 TIMES DAILY
COMMUNITY

## 2022-03-29 RX ORDER — HYDROMORPHONE HYDROCHLORIDE 1 MG/ML
0.5 INJECTION, SOLUTION INTRAMUSCULAR; INTRAVENOUS; SUBCUTANEOUS EVERY 5 MIN PRN
Status: DISCONTINUED | OUTPATIENT
Start: 2022-03-29 | End: 2022-03-29 | Stop reason: HOSPADM

## 2022-03-29 RX ORDER — MIDAZOLAM HYDROCHLORIDE 1 MG/ML
2 INJECTION INTRAMUSCULAR; INTRAVENOUS ONCE
Status: COMPLETED | OUTPATIENT
Start: 2022-03-29 | End: 2022-03-29

## 2022-03-29 RX ORDER — LIDOCAINE HYDROCHLORIDE 10 MG/ML
1 INJECTION, SOLUTION EPIDURAL; INFILTRATION; INTRACAUDAL; PERINEURAL
Status: DISCONTINUED | OUTPATIENT
Start: 2022-03-29 | End: 2022-03-29 | Stop reason: HOSPADM

## 2022-03-29 RX ORDER — SODIUM CHLORIDE 0.9 % (FLUSH) 0.9 %
5-40 SYRINGE (ML) INJECTION EVERY 12 HOURS SCHEDULED
Status: DISCONTINUED | OUTPATIENT
Start: 2022-03-29 | End: 2022-03-29 | Stop reason: HOSPADM

## 2022-03-29 RX ORDER — SODIUM CHLORIDE, SODIUM LACTATE, POTASSIUM CHLORIDE, CALCIUM CHLORIDE 600; 310; 30; 20 MG/100ML; MG/100ML; MG/100ML; MG/100ML
INJECTION, SOLUTION INTRAVENOUS CONTINUOUS
Status: DISCONTINUED | OUTPATIENT
Start: 2022-03-29 | End: 2022-03-29 | Stop reason: HOSPADM

## 2022-03-29 RX ORDER — LISINOPRIL 10 MG/1
10 TABLET ORAL DAILY
COMMUNITY

## 2022-03-29 RX ORDER — SODIUM CHLORIDE 9 MG/ML
25 INJECTION, SOLUTION INTRAVENOUS PRN
Status: DISCONTINUED | OUTPATIENT
Start: 2022-03-29 | End: 2022-03-29 | Stop reason: HOSPADM

## 2022-03-29 RX ORDER — ROPIVACAINE HYDROCHLORIDE 5 MG/ML
INJECTION, SOLUTION EPIDURAL; INFILTRATION; PERINEURAL
Status: COMPLETED | OUTPATIENT
Start: 2022-03-29 | End: 2022-03-29

## 2022-03-29 RX ORDER — SCOLOPAMINE TRANSDERMAL SYSTEM 1 MG/1
1 PATCH, EXTENDED RELEASE TRANSDERMAL
Status: DISCONTINUED | OUTPATIENT
Start: 2022-03-29 | End: 2022-03-29 | Stop reason: HOSPADM

## 2022-03-29 RX ORDER — SODIUM CHLORIDE 0.9 % (FLUSH) 0.9 %
5-40 SYRINGE (ML) INJECTION PRN
Status: DISCONTINUED | OUTPATIENT
Start: 2022-03-29 | End: 2022-03-29 | Stop reason: HOSPADM

## 2022-03-29 RX ORDER — MIDAZOLAM HYDROCHLORIDE 1 MG/ML
2 INJECTION INTRAMUSCULAR; INTRAVENOUS
Status: DISCONTINUED | OUTPATIENT
Start: 2022-03-29 | End: 2022-03-29 | Stop reason: HOSPADM

## 2022-03-29 RX ORDER — AMLODIPINE BESYLATE 5 MG/1
5 TABLET ORAL DAILY
COMMUNITY

## 2022-03-29 RX ORDER — FENTANYL CITRATE 50 UG/ML
INJECTION, SOLUTION INTRAMUSCULAR; INTRAVENOUS PRN
Status: DISCONTINUED | OUTPATIENT
Start: 2022-03-29 | End: 2022-03-29 | Stop reason: SDUPTHER

## 2022-03-29 RX ORDER — PROPOFOL 10 MG/ML
INJECTION, EMULSION INTRAVENOUS PRN
Status: DISCONTINUED | OUTPATIENT
Start: 2022-03-29 | End: 2022-03-29 | Stop reason: SDUPTHER

## 2022-03-29 RX ORDER — LIDOCAINE HYDROCHLORIDE 10 MG/ML
INJECTION, SOLUTION INFILTRATION; PERINEURAL
Status: COMPLETED | OUTPATIENT
Start: 2022-03-29 | End: 2022-03-29

## 2022-03-29 RX ORDER — DEXAMETHASONE SODIUM PHOSPHATE 10 MG/ML
INJECTION, SOLUTION INTRAMUSCULAR; INTRAVENOUS PRN
Status: DISCONTINUED | OUTPATIENT
Start: 2022-03-29 | End: 2022-03-29 | Stop reason: SDUPTHER

## 2022-03-29 RX ORDER — METOCLOPRAMIDE HYDROCHLORIDE 5 MG/ML
10 INJECTION INTRAMUSCULAR; INTRAVENOUS
Status: DISCONTINUED | OUTPATIENT
Start: 2022-03-29 | End: 2022-03-29 | Stop reason: HOSPADM

## 2022-03-29 RX ORDER — BISMUTH TRIBROMOPH/PETROLATUM 5"X9"
BANDAGE TOPICAL PRN
Status: DISCONTINUED | OUTPATIENT
Start: 2022-03-29 | End: 2022-03-29 | Stop reason: ALTCHOICE

## 2022-03-29 RX ORDER — CEFAZOLIN SODIUM 1 G/3ML
INJECTION, POWDER, FOR SOLUTION INTRAMUSCULAR; INTRAVENOUS PRN
Status: DISCONTINUED | OUTPATIENT
Start: 2022-03-29 | End: 2022-03-29 | Stop reason: SDUPTHER

## 2022-03-29 RX ORDER — TRAMADOL HYDROCHLORIDE 50 MG/1
50 TABLET ORAL EVERY 6 HOURS PRN
COMMUNITY

## 2022-03-29 RX ORDER — LIDOCAINE HYDROCHLORIDE 10 MG/ML
INJECTION, SOLUTION INFILTRATION; PERINEURAL PRN
Status: DISCONTINUED | OUTPATIENT
Start: 2022-03-29 | End: 2022-03-29 | Stop reason: SDUPTHER

## 2022-03-29 RX ORDER — ONDANSETRON 2 MG/ML
INJECTION INTRAMUSCULAR; INTRAVENOUS PRN
Status: DISCONTINUED | OUTPATIENT
Start: 2022-03-29 | End: 2022-03-29 | Stop reason: SDUPTHER

## 2022-03-29 RX ORDER — HYDROMORPHONE HYDROCHLORIDE 1 MG/ML
0.25 INJECTION, SOLUTION INTRAMUSCULAR; INTRAVENOUS; SUBCUTANEOUS EVERY 5 MIN PRN
Status: DISCONTINUED | OUTPATIENT
Start: 2022-03-29 | End: 2022-03-29 | Stop reason: HOSPADM

## 2022-03-29 RX ORDER — FAMOTIDINE 20 MG/1
20 TABLET, FILM COATED ORAL ONCE
Status: DISCONTINUED | OUTPATIENT
Start: 2022-03-29 | End: 2022-03-29 | Stop reason: HOSPADM

## 2022-03-29 RX ORDER — CITALOPRAM 20 MG/1
20 TABLET ORAL DAILY
COMMUNITY

## 2022-03-29 RX ORDER — DIPHENHYDRAMINE HYDROCHLORIDE 50 MG/ML
12.5 INJECTION INTRAMUSCULAR; INTRAVENOUS
Status: DISCONTINUED | OUTPATIENT
Start: 2022-03-29 | End: 2022-03-29 | Stop reason: HOSPADM

## 2022-03-29 RX ORDER — ROPIVACAINE HYDROCHLORIDE 5 MG/ML
INJECTION, SOLUTION EPIDURAL; INFILTRATION; PERINEURAL
Status: COMPLETED
Start: 2022-03-29 | End: 2022-03-29

## 2022-03-29 RX ORDER — MEPERIDINE HYDROCHLORIDE 25 MG/ML
12.5 INJECTION INTRAMUSCULAR; INTRAVENOUS; SUBCUTANEOUS EVERY 5 MIN PRN
Status: DISCONTINUED | OUTPATIENT
Start: 2022-03-29 | End: 2022-03-29 | Stop reason: HOSPADM

## 2022-03-29 RX ORDER — SODIUM CHLORIDE 9 MG/ML
INJECTION, SOLUTION INTRAVENOUS PRN
Status: DISCONTINUED | OUTPATIENT
Start: 2022-03-29 | End: 2022-03-29 | Stop reason: HOSPADM

## 2022-03-29 RX ADMIN — CEFAZOLIN SODIUM 3000 MG: 1 INJECTION, POWDER, FOR SOLUTION INTRAMUSCULAR; INTRAVENOUS at 15:54

## 2022-03-29 RX ADMIN — PROPOFOL 150 MG: 10 INJECTION, EMULSION INTRAVENOUS at 15:43

## 2022-03-29 RX ADMIN — LIDOCAINE HYDROCHLORIDE 50 MG: 10 INJECTION, SOLUTION INFILTRATION; PERINEURAL at 15:43

## 2022-03-29 RX ADMIN — PROPOFOL 50 MG: 10 INJECTION, EMULSION INTRAVENOUS at 15:45

## 2022-03-29 RX ADMIN — LIDOCAINE HYDROCHLORIDE 1 ML: 10 INJECTION, SOLUTION INFILTRATION; PERINEURAL at 14:19

## 2022-03-29 RX ADMIN — LIDOCAINE HYDROCHLORIDE 1 ML: 10 INJECTION, SOLUTION INFILTRATION; PERINEURAL at 14:18

## 2022-03-29 RX ADMIN — ROPIVACAINE HYDROCHLORIDE 15 ML: 5 INJECTION, SOLUTION EPIDURAL; INFILTRATION; PERINEURAL at 14:19

## 2022-03-29 RX ADMIN — ONDANSETRON 4 MG: 2 INJECTION INTRAMUSCULAR; INTRAVENOUS at 15:53

## 2022-03-29 RX ADMIN — SODIUM CHLORIDE, SODIUM LACTATE, POTASSIUM CHLORIDE, AND CALCIUM CHLORIDE: 600; 310; 30; 20 INJECTION, SOLUTION INTRAVENOUS at 15:39

## 2022-03-29 RX ADMIN — ROPIVACAINE HYDROCHLORIDE 15 ML: 5 INJECTION, SOLUTION EPIDURAL; INFILTRATION; PERINEURAL at 14:18

## 2022-03-29 RX ADMIN — FENTANYL CITRATE 50 MCG: 50 INJECTION, SOLUTION INTRAMUSCULAR; INTRAVENOUS at 15:53

## 2022-03-29 RX ADMIN — DEXAMETHASONE SODIUM PHOSPHATE 10 MG: 10 INJECTION, SOLUTION INTRAMUSCULAR; INTRAVENOUS at 15:53

## 2022-03-29 RX ADMIN — MIDAZOLAM 2 MG: 1 INJECTION, SOLUTION INTRAMUSCULAR; INTRAVENOUS at 14:09

## 2022-03-29 ASSESSMENT — PAIN SCALES - GENERAL
PAINLEVEL_OUTOF10: 0
PAINLEVEL_OUTOF10: 0

## 2022-03-29 ASSESSMENT — LIFESTYLE VARIABLES: SMOKING_STATUS: 0

## 2022-03-29 ASSESSMENT — PAIN - FUNCTIONAL ASSESSMENT: PAIN_FUNCTIONAL_ASSESSMENT: 0-10

## 2022-03-29 NOTE — ANESTHESIA POSTPROCEDURE EVALUATION
Department of Anesthesiology  Postprocedure Note    Patient: Lynn Baugh  MRN: [id number]  YOB: 1970  Date of evaluation: 3/29/2022  Time:  5:03 PM     Procedure Summary     Date: 03/29/22 Room / Location: [address]    Anesthesia Start: 1539 Anesthesia Stop: 0178    Procedure: LEFT  ANKLE OPEN REDUCTION INTERNAL FIXATION MEDIAL MALLEOLUS FRACTURE (Left Ankle) Diagnosis: (V89.366P)    Surgeons: Juan Goodwin MD Responsible Provider: JOSE Cai CRNA    Anesthesia Type: general, regional ASA Status: 3          Anesthesia Type: general, regional    Osito Phase I: Osito Score: 10    Osito Phase II:      Last vitals: Reviewed and per EMR flowsheets.        Anesthesia Post Evaluation    Patient location during evaluation: bedside  Patient participation: complete - patient participated  Level of consciousness: awake and alert  Pain score: 0  Airway patency: patent  Nausea & Vomiting: no nausea  Complications: no  Cardiovascular status: hemodynamically stable  Respiratory status: acceptable  Hydration status: euvolemic

## 2022-03-29 NOTE — ANESTHESIA PROCEDURE NOTES
Peripheral Block    Patient location during procedure: holding area  Staffing  Performed: anesthesiologist   Anesthesiologist: Elin Mccall MD  Preanesthetic Checklist  Completed: patient identified, IV checked, site marked, risks and benefits discussed, surgical consent, monitors and equipment checked, pre-op evaluation, timeout performed, anesthesia consent given, oxygen available and patient being monitored  Peripheral Block  Patient position: supine  Prep: ChloraPrep  Patient monitoring: cardiac monitor, continuous pulse ox, frequent blood pressure checks and IV access  Block type: Sciatic  Laterality: left  Injection technique: single-shot  Guidance: ultrasound guided  Popliteal  Provider prep: mask and sterile gloves  Needle  Needle type: combined needle/nerve stimulator   Needle gauge: 22 G  Needle length: 10 cm  Needle localization: ultrasound guidance  Assessment  Injection assessment: negative aspiration for heme, no paresthesia on injection and local visualized surrounding nerve on ultrasound  Paresthesia pain: none  Slow fractionated injection: yes  Hemodynamics: stable  Additional Notes  15 ml 0.5% Ropivacaine injected    Under ultrasound (\"US\") guidance, a 22 gauge needle was inserted and placed in close proximity to the sciatic nerve. Ultrasound was also used to visualize the spread of the anesthetic in close proximity to the nerve being blocked. The nerve appeared anatomically normal, and there were no abnormal pathological findings. A permanent image was recorded.    Medications Administered  Lidocaine injection 1%, 1 mL  ropivacaine (NAROPIN) injection 0.5%, 15 mL  Reason for block: post-op pain management

## 2022-03-29 NOTE — ANESTHESIA PROCEDURE NOTES
Peripheral Block    Patient location during procedure: holding area  Staffing  Performed: anesthesiologist   Anesthesiologist: Jorge A Smiley MD  Preanesthetic Checklist  Completed: patient identified, IV checked, site marked, risks and benefits discussed, surgical consent, monitors and equipment checked, pre-op evaluation, timeout performed, anesthesia consent given, oxygen available and patient being monitored  Peripheral Block  Patient position: supine  Prep: ChloraPrep  Patient monitoring: cardiac monitor, continuous pulse ox, frequent blood pressure checks and IV access  Block type: Femoral  Laterality: left  Injection technique: single-shot  Guidance: ultrasound guided  Local infiltration: ropivacaine  Infiltration strength: 0.5 %  Dose: 20 mL  Femoral crease  Provider prep: mask and sterile gloves  Local infiltration: ropivacaine  Needle  Needle type: combined needle/nerve stimulator   Needle gauge: 22 G  Needle length: 10 cm  Needle localization: ultrasound guidance  Assessment  Injection assessment: negative aspiration for heme, no paresthesia on injection and local visualized surrounding nerve on ultrasound  Paresthesia pain: none  Slow fractionated injection: yes  Hemodynamics: stable  Additional Notes  Under ultrasound (\"US\") guidance, a 22 gauge needle was inserted and placed in close proximity to the femoral nerve. Ultrasound was also used to visualize the spread of the anesthetic in close proximity to the nerve being blocked. The nerve appeared anatomically normal, and there were no abnormal pathological findings. A permanent image was recorded.      15 mL 0.5% Ropivacaine injected  Medications Administered  Lidocaine injection 1%, 1 mL  ropivacaine (NAROPIN) injection 0.5%, 15 mL  Reason for block: post-op pain management

## 2022-03-29 NOTE — OP NOTE
conservatively. However, with surveillance, repeat radiographs revealed interval displacement. Due to the concern for continued displacement due to the orientation of the fracture, we recommended operative intervention. Risk, benefits and alternatives were discussed. Risks including, but not limited to, bleeding, infection, wound healing problems, nonunion, malunion, hardware failure, hardware prominence, need for additional procedures, posttraumatic arthritis, DVT/PE, stiffness, weakness and issues with anesthesia were all discussed. All questions were answered preoperatively. Written and informed consent were obtained. Detailed Description of Procedure:   Patient was met in the preoperative holding area where the correct patient, procedure and side were confirmed. The operative site was marked by myself with the patient's agreement. The consent was signed by myself. Prior to transportation to the operating room, the anesthesia staff provided a regional block. He was then transported to the operating room and placed supine on the operating room table. He was secured to the table and all bony prominences were padded. A formal timeout was held in which myself, the operating team and patient, again, identify the correct patient, procedure and side. General anesthesia was induced. Preoperative antibiotics were administered within 1 hour of incision. A nonsterile tourniquet was placed on the left thigh. The left lower extremity was prepped and draped in normal sterile fashion. A skin marker was used to delineate approximately 5 cm incision centered over the medial malleolus fracture fragment. Left lower extremity was then exsanguinated with an Esmarch and the tourniquet was inflated to 250 mmHg for approximately 35 minutes. A 15 blade scalpel was used to incise only through the skin.   Blunt dissection was carried through the subcutaneous tissues and the saphenous vein and nerve were identified and retracted anteriorly. Sharp dissection was carried down onto the medial malleolus and the fracture was identified. Soft tissue was elevated at the fracture site and manual reduction was obtained which was confirmed on fluoroscopic images. A Synthes one third tubular plate was then selected and bent on the back table. It was positioned according to the apex of the fracture and a 3.5 mm cortical screw was placed in order to act as a buttress construct. A second cortical screw was placed in the proximal screw hole. Fluoroscopic images in both AP, mortise and lateral planes were obtained which confirmed adequate reduction and stabilization of the vertical shear medial malleolar fragment. The posterior malleolus fragment remained nondisplaced. Tibiotalar joint was well reduced. At this point, the wound was irrigated with normal saline. The periosteum was reapproximated over the plate with Vicryl suture. Subcutaneous tissues were reapproximated with Vicryl sutures. Skin edges were reapproximated with nylon sutures. A well-padded short leg posterior splint with side bars was applied. The tourniquet was deflated and adequate capillary refill was retained to all toes. General anesthesia was reversed, is transferred to hospital bed and moved to PACU in stable condition. All counts at the end procedure were correct. He tolerated procedure well and was without intraoperative complication. Postoperative plan: He will follow-up in 2 weeks for wound check and likely suture removal.  He will remain nonweightbearing to the left lower extremity in the interim. He was encouraged to keep his splint in place until follow-up. Pain medication as prescribed.     Electronically signed by Parveen Leger MD on 3/29/2022 at 5:10 PM

## 2022-03-29 NOTE — ANESTHESIA PRE PROCEDURE
Department of Anesthesiology  Preprocedure Note       Name:  Ciaran Tello   Age:  47 y.o.  :  1970                                          MRN:  [id number]         Date:  3/29/2022      Surgeon: Arely Kaye):  Mare Myers MD    Procedure: Procedure(s):  LEFT  ANKLE OPEN REDUCTION INTERNAL FIXATION MEDIAL MALLEOLUS FRACTURE    Medications prior to admission:   Prior to Admission medications    Medication Sig Start Date End Date Taking? Authorizing Provider   hydroCHLOROthiazide (MICROZIDE) 12.5 MG capsule Take 12.5 mg by mouth 2 times daily   Yes Historical Provider, MD   amLODIPine (NORVASC) 5 MG tablet Take 5 mg by mouth daily   Yes Historical Provider, MD   lisinopril (PRINIVIL;ZESTRIL) 10 MG tablet Take 10 mg by mouth daily   Yes Historical Provider, MD   citalopram (CELEXA) 20 MG tablet Take 20 mg by mouth daily   Yes Historical Provider, MD   traMADol (ULTRAM) 50 MG tablet Take 50 mg by mouth every 6 hours as needed for Pain. Yes Historical Provider, MD   albuterol sulfate  (90 BASE) MCG/ACT inhaler Inhale 2 puffs into the lungs every 6 hours as needed for Wheezing  Patient not taking: Reported on 3/29/2022    Historical Provider, MD   ciprofloxacin (CIPRO) 500 MG tablet Take 1 tablet by mouth 2 times daily Begin taking the day before the biopsy is scheduled.   Patient not taking: Reported on 3/29/2022 12/30/16   Tito Berry MD   metoprolol tartrate (LOPRESSOR) 25 MG tablet  11/3/16   Historical Provider, MD   lisinopril-hydrochlorothiazide (PRINZIDE;ZESTORETIC) 10-12.5 MG per tablet  11/3/16   Historical Provider, MD   b complex vitamins capsule Take 1 capsule by mouth daily  Patient not taking: Reported on 3/29/2022    Historical Provider, MD   magnesium 30 MG tablet Take 30 mg by mouth 2 times daily  Patient not taking: Reported on 3/29/2022    Historical Provider, MD   aspirin 81 MG tablet Take 81 mg by mouth daily    Historical Provider, MD       Current medications:    Current Facility-Administered Medications   Medication Dose Route Frequency Provider Last Rate Last Admin    ropivacaine (NAROPIN) 0.5% injection             midazolam (VERSED) injection 2 mg  2 mg IntraVENous Once Donny Reagan MD           Allergies:     Allergies   Allergen Reactions    Chantix [Varenicline]      Pt states 'it made me go crazy'    Zyrtec [Cetirizine] Rash       Problem List:    Patient Active Problem List   Diagnosis Code    A-fib ([facility] I48.91    Chronic renal disease, stage 3, moderately decreased glomerular filtration rate between 30-59 mL/min/1.73 square meter (HCC) N18.30    Chronic renal disease N18.9    Elevated PSA R97.20       Past Medical History:        Diagnosis Date    [facility]     Chronic renal disease     Elevated PSA     Hypertension     Renal calculi        Past Surgical History:        Procedure Laterality Date    APPENDECTOMY      ATRIAL ABLATION SURGERY  03/2017    CARDIAC SURGERY      COSMETIC SURGERY      lip    MITRAL VALVE REPLACEMENT  03/01/2017    done at [facility] 15  2021       Social History:    Social History     Tobacco Use    Smoking status: Current Every Day Smoker    Smokeless tobacco: Never Used    Tobacco comment: last cigarette 1 hour ago   Substance Use Topics    Alcohol use: Not on file                                Ready to quit: Not Answered  Counseling given: Not Answered  Comment: last cigarette 1 hour ago      Vital Signs (Current):   Vitals:    03/29/22 1319   BP: 127/81   Pulse: 88   Resp: 20   Temp: 97.5 °F (36.4 °C)   TempSrc: Temporal   SpO2: 97%   Weight: 274 lb (124.3 kg)   Height: 6' 1\" (1.854 m)                                              BP Readings from Last 3 Encounters:   03/29/22 127/81   12/30/16 130/90   11/18/16 110/70       NPO Status: Time of last liquid consumption: 2100                        Time of last solid consumption: 2100                        Date of last liquid consumption: 03/28/22                        Date of last solid food consumption: 03/28/22    BMI:   Wt Readings from Last 3 Encounters:   03/29/22 274 lb (124.3 kg)   12/30/16 219 lb (99.3 kg)   11/18/16 227 lb (103 kg)     Body mass index is 36.15 kg/m². CBC:   Lab Results   Component Value Date    WBC 10.3 03/29/2022    RBC 4.72 03/29/2022    HGB 14.3 03/29/2022    HCT 44.7 03/29/2022    MCV 94.7 03/29/2022    RDW 13.8 03/29/2022     03/29/2022       CMP: No results found for: NA, K, CL, CO2, BUN, CREATININE, GFRAA, AGRATIO, LABGLOM, GLUCOSE, GLU, PROT, CALCIUM, BILITOT, ALKPHOS, AST, ALT    POC Tests: No results for input(s): POCGLU, POCNA, POCK, POCCL, POCBUN, POCHEMO, POCHCT in the last 72 hours. Coags: No results found for: PROTIME, INR, APTT    HCG (If Applicable): No results found for: PREGTESTUR, PREGSERUM, HCG, HCGQUANT     ABGs: No results found for: PHART, PO2ART, AVO1UYE, YKU7SOB, BEART, V2AFXJZN     Type & Screen (If Applicable):  No results found for: LABABO, LABRH    Drug/Infectious Status (If Applicable):  No results found for: HIV, HEPCAB    COVID-19 Screening (If Applicable):   Lab Results   Component Value Date    COVID19 Not Detected 03/25/2022           Anesthesia Evaluation  Patient summary reviewed and Nursing notes reviewed history of anesthetic complications (very slow to wake up): Airway: Mallampati: II  TM distance: >3 FB   Neck ROM: full  Mouth opening: > = 3 FB Dental:          Pulmonary:normal exam    (+) asthma:     (-) not a current smoker                           Cardiovascular:    (+) hypertension:, dysrhythmias: atrial fibrillation,       ECG reviewed               Beta Blocker:  Dose within 24 Hrs      ROS comment: S/p MVR     Neuro/Psych:   Negative Neuro/Psych ROS     (-) seizures and CVA           GI/Hepatic/Renal:   (+) renal disease: CRI,          ROS comment: H/o HSP and Wegener's, was on HD in 2017, but kidney function has improved.    Endo/Other:        (-) diabetes mellitus               Abdominal:             Vascular: negative vascular ROS. Other Findings:           Anesthesia Plan      general and regional     ASA 3     (Femoral/sciatic nerve blocks)  Induction: intravenous. MIPS: Postoperative opioids intended and Prophylactic antiemetics administered. Anesthetic plan and risks discussed with patient and spouse.                       Breonna Browning MD   3/29/2022

## 2022-04-25 ENCOUNTER — TELEPHONE (OUTPATIENT)
Dept: UROLOGY | Facility: CLINIC | Age: 52
End: 2022-04-25

## 2022-04-25 DIAGNOSIS — C61 PROSTATE CANCER: Primary | ICD-10-CM

## 2022-04-25 NOTE — TELEPHONE ENCOUNTER
Pt and spouse called regarding 3mo follow up on 04/27    Spouse states they have viewed his PSA and lab and it seems to be okay.   Pt has broken his ankle and it is difficult to leave the house.     Please reschedule for 3mo PSA prior per pt request

## 2022-05-05 ENCOUNTER — HOSPITAL ENCOUNTER (OUTPATIENT)
Dept: CARDIOLOGY | Facility: HOSPITAL | Age: 52
Discharge: HOME | End: 2022-05-05
Admitting: INTERNAL MEDICINE

## 2022-05-05 VITALS
BODY MASS INDEX: 37.38 KG/M2 | DIASTOLIC BLOOD PRESSURE: 90 MMHG | HEIGHT: 72 IN | SYSTOLIC BLOOD PRESSURE: 118 MMHG | WEIGHT: 276 LBS

## 2022-05-05 DIAGNOSIS — R06.09 DOE (DYSPNEA ON EXERTION): ICD-10-CM

## 2022-05-05 PROCEDURE — 93306 TTE W/DOPPLER COMPLETE: CPT

## 2022-05-05 PROCEDURE — 25010000002 PERFLUTREN 6.52 MG/ML SUSPENSION: Performed by: INTERNAL MEDICINE

## 2022-05-05 PROCEDURE — 93306 TTE W/DOPPLER COMPLETE: CPT | Performed by: INTERNAL MEDICINE

## 2022-05-05 PROCEDURE — 93356 MYOCRD STRAIN IMG SPCKL TRCK: CPT

## 2022-05-05 PROCEDURE — 93356 MYOCRD STRAIN IMG SPCKL TRCK: CPT | Performed by: INTERNAL MEDICINE

## 2022-05-05 RX ADMIN — PERFLUTREN 8.48 MG: 6.52 INJECTION, SUSPENSION INTRAVENOUS at 15:13

## 2022-05-06 ENCOUNTER — OFFICE VISIT (OUTPATIENT)
Dept: CARDIOLOGY | Facility: CLINIC | Age: 52
End: 2022-05-06

## 2022-05-06 VITALS
HEART RATE: 98 BPM | BODY MASS INDEX: 37.11 KG/M2 | SYSTOLIC BLOOD PRESSURE: 138 MMHG | WEIGHT: 274 LBS | HEIGHT: 72 IN | DIASTOLIC BLOOD PRESSURE: 72 MMHG | OXYGEN SATURATION: 98 %

## 2022-05-06 DIAGNOSIS — N18.30 STAGE 3 CHRONIC KIDNEY DISEASE, UNSPECIFIED WHETHER STAGE 3A OR 3B CKD: ICD-10-CM

## 2022-05-06 DIAGNOSIS — I34.0 MODERATE MITRAL REGURGITATION: ICD-10-CM

## 2022-05-06 DIAGNOSIS — I10 PRIMARY HYPERTENSION: ICD-10-CM

## 2022-05-06 DIAGNOSIS — I48.0 PAROXYSMAL ATRIAL FIBRILLATION: ICD-10-CM

## 2022-05-06 DIAGNOSIS — Z95.2 H/O MITRAL VALVE REPLACEMENT: Primary | ICD-10-CM

## 2022-05-06 DIAGNOSIS — Z71.6 TOBACCO ABUSE COUNSELING: ICD-10-CM

## 2022-05-06 DIAGNOSIS — Z72.0 TOBACCO ABUSE: ICD-10-CM

## 2022-05-06 PROBLEM — I48.19 PERSISTENT ATRIAL FIBRILLATION (HCC): Status: RESOLVED | Noted: 2017-02-15 | Resolved: 2022-05-06

## 2022-05-06 PROBLEM — Z01.810 PREOP CARDIOVASCULAR EXAM: Status: RESOLVED | Noted: 2022-03-24 | Resolved: 2022-05-06

## 2022-05-06 PROCEDURE — 99214 OFFICE O/P EST MOD 30 MIN: CPT | Performed by: INTERNAL MEDICINE

## 2022-05-06 NOTE — PROGRESS NOTES
Gurjit Andrea  [id number]  1970  52 y.o.  male    Referring Provider: Guillermo Almeida APRN    Reason for  Visit: Here for routine follow up   Mitral valve replacement 03/2017    Prior atrial fibrillation   Ablation Maze procedure and SUNNY exclusion with no recurrence    No coronary artery disease   Had left leg surgery   Wants Lopressor as also helps with anxiety   Coreg does not help     Subjective    S/P uneventful surgery and recovered well  Surgical wound healed very well. No evidence of excessive bruising, pain, redness, swelling, discharge or foul odor noted post op per patient  Has cast left leg     Mild chronic exertional shortness of breath on exertion relieved with rest  No significant cough or wheezing    No palpitations  No associated chest pain  No significant pedal edema    No fever or chills  No significant expectoration    No hemoptysis  No presyncope or syncope    Tolerating current medications well with no untoward side effects   Compliant with prescribed medication regimen. Tries to adhere to cardiac diet.     Effort tolerance limited more by orthopedic rather than cardiac related issues, therefore difficult to assess functional capacity.     Overall shortness of breath is better     BP well controlled at home.     No bleeding, excessive bruising, gait instability or fall risks      Wheel chair bound   Will get cast off in 12 day  Surgeon in [facility]/ ortho Clinic   No new events or complaints since last visit       History of present illness:  Gurjit Andrea is a 52 y.o. yo male with mitral valve replacement 2017  who presents today for   Chief Complaint   Patient presents with   • Echo Results   .    History  Past Medical History:   Diagnosis Date   • A-fib (HCC)    • Arthritis    • Chronic renal failure    • Coronary artery disease    • Elevated PSA    • GERD (gastroesophageal reflux disease)    • Hypertension    • Kidney stone    • MRSA bacteremia 02/21/2017     MRSA in heart valve   • Prostate cancer (HCC)    • Purpura (HCC)    • Wegener's granulomatosis with renal involvement (HCC)    ,   Past Surgical History:   Procedure Laterality Date   • APPENDECTOMY     • INSERTION HEMODIALYSIS CATHETER N/A 1/20/2017    Procedure: INSERTION PERMCATH;  Surgeon: Ian Grimes DO;  Location:  [facility] OR;  Service:    • LIP REPAIR     • MITRAL VALVE REPLACEMENT     • PROSTATECTOMY Bilateral 6/1/2021    Procedure: RADICAL PROSTATECTOMY LAPAROSCOPIC WITH DAVINCI ROBOT WITH BILATERAL PELVIC LYMPH NODE DISSECTION;  Surgeon: Luis Carlos Awan MD;  Location:  [facility] OR;  Service: Robotics - DaVinci;  Laterality: Bilateral;   ,   History reviewed. No pertinent family history.,   Social History     Tobacco Use   • Smoking status: Heavy Tobacco Smoker     Packs/day: 1.00     Types: Cigarettes   • Smokeless tobacco: Never Used   Vaping Use   • Vaping Use: Never used   Substance Use Topics   • Alcohol use: Yes     Comment: rare   • Drug use: No   ,     Medications  Current Outpatient Medications   Medication Sig Dispense Refill   • Acetaminophen (TYLENOL ARTHRITIS PAIN PO) Take 2 tablets by mouth As Needed.     • amLODIPine (NORVASC) 5 MG tablet Take 5 mg by mouth Daily.     • aspirin 325 MG tablet Take 325 mg by mouth Daily.     • citalopram (CeleXA) 20 MG tablet      • diclofenac (VOLTAREN) 75 MG EC tablet Take 75 mg by mouth 2 (Two) Times a Day.     • hydroCHLOROthiazide (HYDRODIURIL) 25 MG tablet Take 1 tablet by mouth Daily. 30 tablet 2   • HYDROcodone-acetaminophen (NORCO)  MG per tablet Take 1 tablet by mouth Every 6 (Six) Hours As Needed for Moderate Pain . 16 tablet 0   • lisinopril (PRINIVIL,ZESTRIL) 20 MG tablet Take 1 tablet by mouth Daily. 30 tablet 2   • Multiple Vitamins-Minerals (MULTIVITAMIN WITH MINERALS) tablet tablet Take 1 tablet by mouth Daily.     • oxyCODONE-acetaminophen (PERCOCET) 7.5-325 MG per tablet Take 1 tablet by mouth Every 6 (Six) Hours As Needed for  "Severe Pain . 12 tablet 0   • metoprolol tartrate (LOPRESSOR) 25 MG tablet Take 1 tablet by mouth 2 (Two) Times a Day. 180 tablet 3     No current facility-administered medications for this visit.       Allergies:  Cetirizine and Chantix [varenicline]    Review of Systems  Review of Systems   Constitutional: Negative.   HENT: Negative.    Eyes: Negative.    Cardiovascular: Positive for dyspnea on exertion. Negative for chest pain, claudication, cyanosis, irregular heartbeat, leg swelling, near-syncope, orthopnea, palpitations, paroxysmal nocturnal dyspnea and syncope.   Respiratory: Negative.    Endocrine: Negative.    Hematologic/Lymphatic: Negative.    Skin: Negative.    Musculoskeletal: Positive for arthritis, back pain and joint pain.   Gastrointestinal: Negative for anorexia.   Genitourinary: Negative.    Neurological: Negative.    Psychiatric/Behavioral: Negative.        Objective     Physical Exam:  /72   Pulse 98   Ht 182.9 cm (72\")   Wt 124 kg (274 lb)   SpO2 98%   BMI 37.16 kg/m²     Physical Exam  Constitutional:       Appearance: He is well-developed.   HENT:      Head: Normocephalic.   Neck:      Vascular: Normal carotid pulses. No carotid bruit or JVD.      Trachea: No tracheal tenderness or tracheal deviation.   Cardiovascular:      Rate and Rhythm: Regular rhythm.      Pulses: Normal pulses.      Heart sounds: Murmur heard.    Systolic murmur is present with a grade of 2/6.  Pulmonary:      Effort: Pulmonary effort is normal.      Breath sounds: No stridor.   Abdominal:      General: There is no distension.      Palpations: Abdomen is soft.      Tenderness: There is no abdominal tenderness.   Musculoskeletal:      Cervical back: No edema.   Skin:     General: Skin is warm.   Neurological:      Mental Status: He is alert.      Cranial Nerves: No cranial nerve deficit.      Sensory: No sensory deficit.   Psychiatric:         Speech: Speech normal.         Behavior: Behavior normal. "         Results Review:    Results for orders placed during the hospital encounter of 20    Adult Stress Echo W/ Cont or Stress Agent if Necessary Per Protocol    Interpretation Summary  · Low risk stress test.  · No clinical, ECG, or echocardiographic evidence of ischemia.  · Patient did not reach target heart rate after 5 minutes on Baldomero protocol, so was converted to dobutamine in order to achieve target heart rate. After dobutamine infusion, echocardiographic images showed appropriate increase in contractility of all LV wall segments.     Gurjit Andrea  Echo Complete w/ Doppler, Color Flow and Contrast  Order# [id number]  Reading physician: Alex Cortez MD Ordering physician: Brandt Garcia MD Study date: 20       Patient Information    Patient Name   Gurjit Andrea MRN   [id number] Legal Sex   Male  (Age)   1970 (52 y.o.)     PACS Images     Show images for Adult Transthoracic Echo Complete W/ Cont if Necessary Per Protocol   Sedation Narrator Report    Sedation Narrator Report        Interpretation Summary    · Estimated EF = 58%.  · Left ventricular systolic function is normal.  · Left ventricular wall thickness is consistent with mild concentric hypertrophy.  · Borderline dilation of the aortic root is present.  · Left ventricular diastolic dysfunction (grade I) consistent with impaired relaxation.  · Left atrial cavity size is severely dilated.  · There is a bioprosthetic mitral valve present. The prosthetic valve is grossly normal.  · No evidence of pulmonary hypertension is present.              ____________________________________________________________________________________________________________________________________________  Health maintenance and recommendations    Low salt/ HTN/ Heart healthy carbohydrate restricted cardiac diet   The patient is advised to reduce or avoid caffeine or other cardiac stimulants.   Minimize or avoid  NSAID-type medications       Monitor for any signs of bleeding including red or dark stools. Fall precautions.   Advised staying uptodate with immunizations per established standard guidelines.    Offered to give patient  a copy of my notes     Questions were encouraged, asked and answered to the patient's  understanding and satisfaction. Questions if any regarding current medications and side effects, need for refills and importance of compliance to medications stressed.    Reviewed available prior notes, consults, prior visits, laboratory findings, radiology and cardiology relevant reports. Updated chart as applicable. I have reviewed the patient's medical history in detail and updated the computerized patient record as relevant.      Updated patient regarding any new or relevant abnormalities on review of records or any new findings on physical exam. Mentioned to patient about purpose of visit and desirable health short and long term goals and objectives.    Primary to monitor CBC CMP Lipid panel and TSH as applicable    ___________________________________________________________________________________________________________________________________________   Procedures    Assessment/Plan   Diagnoses and all orders for this visit:    1. H/O mitral valve replacement 2017  (Primary)    2. Stage 3 chronic kidney disease, unspecified whether stage 3a or 3b CKD (HCC)    3. Primary hypertension    4. Tobacco abuse    5. Tobacco abuse counseling    6. Paroxysmal atrial fibrillation (HCC) Had Maze procedure and SUNNY exclusion    7. Moderate mitral regurgitation    Other orders  -     metoprolol tartrate (LOPRESSOR) 25 MG tablet; Take 1 tablet by mouth 2 (Two) Times a Day.  Dispense: 180 tablet; Refill: 3          Plan      Requested Prescriptions     Signed Prescriptions Disp Refills   • metoprolol tartrate (LOPRESSOR) 25 MG tablet 180 tablet 3     Sig: Take 1 tablet by mouth 2 (Two) Times a Day.      Check BP and heart rates twice daily  initially till blood pressures and heart rates under good control and then at least 3x / week,   If blood pressures continue to be well-controlled then can check week a month  at home and bring a recording for review next visit  If BP >130/85 or < 100/60 persistently over 3 reading 30 mins apart or if heart rates persistently above 100 bpm or less than 55 bpm call sooner for evaluation and advise       Patient expressed understanding  Encouraged and answered all questions   Discussed with the patient and all questioned fully answered. He will call me if any problems arise.   Discussed results of prior testing with patient : echo that I reviewed while seeing him     Overall doing well no new cardiovascular symptoms and therefore no additional cardiac testing is required   If any interim issues arise will call me for further evaluation.     I support the patient's decision to take the Covid -19 vaccine   Had required complete course   No major issues   Now fully immunized    Had booster too      Follow up with Kayla GARVIN , Mike GARVIN  or myself            Return in about 6 months (around 11/6/2022).

## 2022-05-07 DIAGNOSIS — I77.89 ASCENDING AORTA ENLARGEMENT: Primary | ICD-10-CM

## 2022-05-07 LAB
BH CV ECHO LEFT VENTRICLE GLOBAL LONGITUDINAL STRAIN: -9 %
BH CV ECHO MEAS - AO MAX PG: 6.7 MMHG
BH CV ECHO MEAS - AO MEAN PG: 3 MMHG
BH CV ECHO MEAS - AO ROOT DIAM: 4.4 CM
BH CV ECHO MEAS - AO V2 MAX: 129 CM/SEC
BH CV ECHO MEAS - AO V2 VTI: 22.3 CM
BH CV ECHO MEAS - AVA(I,D): 1.93 CM2
BH CV ECHO MEAS - EDV(MOD-SP4): 91.5 ML
BH CV ECHO MEAS - EF(MOD-SP4): 60.1 %
BH CV ECHO MEAS - ESV(MOD-SP4): 36.5 ML
BH CV ECHO MEAS - LA DIMENSION: 5.5 CM
BH CV ECHO MEAS - LAT PEAK E' VEL: 4.1 CM/SEC
BH CV ECHO MEAS - LV DIASTOLIC VOL/BSA (35-75): 37.5 CM2
BH CV ECHO MEAS - LV MAX PG: 1.93 MMHG
BH CV ECHO MEAS - LV MEAN PG: 1 MMHG
BH CV ECHO MEAS - LV SYSTOLIC VOL/BSA (12-30): 14.9 CM2
BH CV ECHO MEAS - LV V1 MAX: 69.5 CM/SEC
BH CV ECHO MEAS - LV V1 VTI: 12.4 CM
BH CV ECHO MEAS - LVOT AREA: 3.5 CM2
BH CV ECHO MEAS - LVOT DIAM: 2.1 CM
BH CV ECHO MEAS - MED PEAK E' VEL: 5.7 CM/SEC
BH CV ECHO MEAS - MV A MAX VEL: 107 CM/SEC
BH CV ECHO MEAS - MV DEC SLOPE: 560.5 CM/SEC2
BH CV ECHO MEAS - MV DEC TIME: 0.23 MSEC
BH CV ECHO MEAS - MV E MAX VEL: 136 CM/SEC
BH CV ECHO MEAS - MV E/A: 1.27
BH CV ECHO MEAS - MV P1/2T: 77.3 MSEC
BH CV ECHO MEAS - MVA(P1/2T): 2.8 CM2
BH CV ECHO MEAS - PA V2 MAX: 78.2 CM/SEC
BH CV ECHO MEAS - RAP SYSTOLE: 5 MMHG
BH CV ECHO MEAS - RVSP: 35.5 MMHG
BH CV ECHO MEAS - SI(MOD-SP4): 22.5 ML/M2
BH CV ECHO MEAS - SV(LVOT): 42.9 ML
BH CV ECHO MEAS - SV(MOD-SP4): 55 ML
BH CV ECHO MEAS - TR MAX PG: 30.5 MMHG
BH CV ECHO MEAS - TR MAX VEL: 276 CM/SEC
BH CV ECHO MEASUREMENTS AVERAGE E/E' RATIO: 27.76
LEFT ATRIUM VOLUME INDEX: 67.6 ML/M2
LEFT ATRIUM VOLUME: 165 CM3
MAXIMAL PREDICTED HEART RATE: 167 BPM
STRESS TARGET HR: 142 BPM

## 2022-05-11 ENCOUNTER — TELEPHONE (OUTPATIENT)
Dept: CARDIOLOGY | Facility: CLINIC | Age: 52
End: 2022-05-11

## 2022-05-11 NOTE — TELEPHONE ENCOUNTER
----- Message from Alex Cortez MD sent at 5/7/2022  7:19 AM CDT -----  Regarding: Enlarged aorta with renal failure, ordered CT chest without contrast  His aorta is enlarged on echo  Ordered CT chest without contrast as has chronic kidney disease  Let patient know  Keep follow-up with BHARATHI Mcmahon

## 2022-05-13 ENCOUNTER — HOSPITAL ENCOUNTER (OUTPATIENT)
Dept: CT IMAGING | Facility: HOSPITAL | Age: 52
Discharge: HOME | End: 2022-05-13
Admitting: INTERNAL MEDICINE

## 2022-05-13 DIAGNOSIS — I77.89 ASCENDING AORTA ENLARGEMENT: ICD-10-CM

## 2022-05-13 PROCEDURE — 71250 CT THORAX DX C-: CPT

## 2022-07-20 DIAGNOSIS — C61 PROSTATE CANCER: Primary | ICD-10-CM

## 2022-07-21 LAB — PSA SERPL-MCNC: <0.1 NG/ML (ref 0–4)

## 2022-07-27 ENCOUNTER — OFFICE VISIT (OUTPATIENT)
Dept: UROLOGY | Facility: CLINIC | Age: 52
End: 2022-07-27

## 2022-07-27 VITALS — WEIGHT: 264.2 LBS | HEIGHT: 72 IN | BODY MASS INDEX: 35.78 KG/M2

## 2022-07-27 DIAGNOSIS — N20.0 NEPHROLITHIASIS: ICD-10-CM

## 2022-07-27 DIAGNOSIS — C61 PROSTATE CANCER: Primary | ICD-10-CM

## 2022-07-27 DIAGNOSIS — N52.9 IMPOTENCE OF ORGANIC ORIGIN: ICD-10-CM

## 2022-07-27 PROBLEM — I10 ESSENTIAL HYPERTENSION: Status: ACTIVE | Noted: 2018-08-24

## 2022-07-27 PROBLEM — I48.91 A-FIB (HCC): Status: ACTIVE | Noted: 2022-07-27

## 2022-07-27 PROBLEM — S82.892D CLOSED FRACTURE OF LEFT ANKLE WITH ROUTINE HEALING: Status: ACTIVE | Noted: 2022-03-29

## 2022-07-27 PROBLEM — N18.9 CHRONIC RENAL DISEASE: Status: ACTIVE | Noted: 2022-07-27

## 2022-07-27 PROBLEM — N18.30 CHRONIC RENAL DISEASE, STAGE 3, MODERATELY DECREASED GLOMERULAR FILTRATION RATE BETWEEN 30-59 ML/MIN/1.73 SQUARE METER: Status: ACTIVE | Noted: 2022-07-27

## 2022-07-27 LAB
BILIRUB BLD-MCNC: NEGATIVE MG/DL
CLARITY, POC: CLEAR
COLOR UR: YELLOW
GLUCOSE UR STRIP-MCNC: NEGATIVE MG/DL
KETONES UR QL: NEGATIVE
LEUKOCYTE EST, POC: NEGATIVE
NITRITE UR-MCNC: NEGATIVE MG/ML
PH UR: 5 [PH] (ref 5–8)
PROT UR STRIP-MCNC: ABNORMAL MG/DL
RBC # UR STRIP: NEGATIVE /UL
SP GR UR: 1.01 (ref 1–1.03)
UROBILINOGEN UR QL: NORMAL

## 2022-07-27 PROCEDURE — 99214 OFFICE O/P EST MOD 30 MIN: CPT | Performed by: UROLOGY

## 2022-07-27 NOTE — PROGRESS NOTES
Subjective    Mr. Andrea is 52 y.o. male    Chief Complaint: Prostate Cancer     History of Present Illness  Preop PSA was 32.4.  He had a bone scan and CT which were negative for metastatic prostate cancer.  Preop erectile dysfunction.  Status post robotic assisted lap prostatectomy and bilateral pelvic lymph node dissection in June 2021.  This did reveal P T3a PN 0 [place] 4+3 = 7 adenocarcinoma the prostate with a positive margin at the bladder neck.  This is a significant upgrade from Susie 3+3= 6 in 1 out of 12 cores on his biopsy.  Postop PSA < 0.1. Improving MANISHA one thin liner per day.       Lab Results   Component Value Date    PSA <0.1 07/19/2022    PSA <0.1 04/19/2022    PSA 0.020 01/18/2022       The following portions of the patient's history were reviewed and updated as appropriate: allergies, current medications, past family history, past medical history, past social history, past surgical history and problem list.    Review of Systems      Current Outpatient Medications:   •  Acetaminophen (TYLENOL ARTHRITIS PAIN PO), Take 2 tablets by mouth As Needed., Disp: , Rfl:   •  aspirin 325 MG tablet, Take 325 mg by mouth Daily., Disp: , Rfl:   •  citalopram (CeleXA) 20 MG tablet, , Disp: , Rfl:   •  diclofenac (VOLTAREN) 75 MG EC tablet, Take 75 mg by mouth 2 (Two) Times a Day., Disp: , Rfl:   •  hydroCHLOROthiazide (HYDRODIURIL) 25 MG tablet, Take 1 tablet by mouth Daily., Disp: 30 tablet, Rfl: 2  •  lisinopril (PRINIVIL,ZESTRIL) 20 MG tablet, Take 1 tablet by mouth Daily., Disp: 30 tablet, Rfl: 2  •  metoprolol tartrate (LOPRESSOR) 25 MG tablet, Take 1 tablet by mouth 2 (Two) Times a Day., Disp: 180 tablet, Rfl: 3  •  Multiple Vitamins-Minerals (MULTIVITAMIN WITH MINERALS) tablet tablet, Take 1 tablet by mouth Daily., Disp: , Rfl:   •  HYDROcodone-acetaminophen (NORCO)  MG per tablet, Take 1 tablet by mouth Every 6 (Six) Hours As Needed for Moderate Pain ., Disp: 16 tablet, Rfl: 0    Past Medical  "History:   Diagnosis Date   • A-fib (HCC)    • Arthritis    • Chronic renal failure    • Coronary artery disease    • Elevated PSA    • GERD (gastroesophageal reflux disease)    • Hypertension    • Kidney stone    • MRSA bacteremia 02/21/2017    MRSA in heart valve   • Prostate cancer (HCC)    • Purpura (HCC)    • Wegener's granulomatosis with renal involvement (HCC)        Past Surgical History:   Procedure Laterality Date   • APPENDECTOMY     • INSERTION HEMODIALYSIS CATHETER N/A 1/20/2017    Procedure: INSERTION PERMCATH;  Surgeon: Ian Grimes DO;  Location: [facility] OR;  Service:    • LIP REPAIR     • MITRAL VALVE REPLACEMENT     • PROSTATECTOMY Bilateral 6/1/2021    Procedure: RADICAL PROSTATECTOMY LAPAROSCOPIC WITH DAVINCI ROBOT WITH BILATERAL PELVIC LYMPH NODE DISSECTION;  Surgeon: Luis Carlos Awan MD;  Location:  [facility] OR;  Service: Robotics - DaVinci;  Laterality: Bilateral;       Social History     Socioeconomic History   • Marital status:    Tobacco Use   • Smoking status: Heavy Tobacco Smoker     Packs/day: 1.00     Types: Cigarettes   • Smokeless tobacco: Never Used   Vaping Use   • Vaping Use: Never used   Substance and Sexual Activity   • Alcohol use: Yes     Comment: rare   • Drug use: No   • Sexual activity: Defer       No family history on file.    Objective    Ht 182.9 cm (72\")   Wt 120 kg (264 lb 3.2 oz)   BMI 35.83 kg/m²     Physical Exam        Results for orders placed or performed in visit on 07/27/22   POC Urinalysis Dipstick, Multipro    Specimen: Urine   Result Value Ref Range    Color Yellow Yellow, Straw, Dark Yellow, Falguni    Clarity, UA Clear Clear    Glucose, UA Negative Negative mg/dL    Bilirubin Negative Negative    Ketones, UA Negative Negative    Specific Gravity  1.015 1.005 - 1.030    Blood, UA Negative Negative    pH, Urine 5.0 5.0 - 8.0    Protein, POC Trace (A) Negative mg/dL    Urobilinogen, UA Normal Normal    Nitrite, UA Negative Negative    Leukocytes " Negative Negative     Assessment and Plan    Diagnoses and all orders for this visit:    1. Prostate cancer (HCC) (Primary)  -     POC Urinalysis Dipstick, Multipro  -     PSA DIAGNOSTIC; Future    2. Impotence of organic origin    3. Nephrolithiasis  -     XR Abdomen KUB; Future      Preop PSA was 32.4.  He had a bone scan and CT which were negative for metastatic prostate cancer.  Preop erectile dysfunction.  Status post robotic assisted lap prostatectomy and bilateral pelvic lymph node dissection in June 2021.  This did reveal P T3a PN 0 [place] 4+3 = 7 adenocarcinoma the prostate with a positive margin at the bladder neck.  This is a significant upgrade from Susie 3+3= 6 in 1 out of 12 cores on his biopsy.      PSA today <0.014.     1 thin liner/day.  Continue Kegels.     Not interested in ED therapy at this point.     Follow up in 4 months with PSA.        I offered him referral to general surgery to evaluate his incisional hernia.  He does not want this at this point.He is not bothered by it.

## 2022-10-03 ENCOUNTER — HOSPITAL ENCOUNTER (OUTPATIENT)
Dept: ULTRASOUND IMAGING | Age: 52
Discharge: HOME | End: 2022-10-03
Payer: MEDICAID

## 2022-10-03 DIAGNOSIS — N18.32 STAGE 3B CHRONIC KIDNEY DISEASE (HCC): ICD-10-CM

## 2022-10-03 PROCEDURE — 76770 US EXAM ABDO BACK WALL COMP: CPT

## 2022-10-03 PROCEDURE — 76770 US EXAM ABDO BACK WALL COMP: CPT | Performed by: RADIOLOGY

## 2022-10-05 ENCOUNTER — HOSPITAL ENCOUNTER (EMERGENCY)
Age: 52
Discharge: HOME | End: 2022-10-05
Attending: EMERGENCY MEDICINE
Payer: MEDICAID

## 2022-10-05 VITALS
RESPIRATION RATE: 16 BRPM | HEART RATE: 98 BPM | OXYGEN SATURATION: 96 % | TEMPERATURE: 98.6 F | SYSTOLIC BLOOD PRESSURE: 109 MMHG | DIASTOLIC BLOOD PRESSURE: 80 MMHG

## 2022-10-05 DIAGNOSIS — F32.A DEPRESSION, UNSPECIFIED DEPRESSION TYPE: Primary | ICD-10-CM

## 2022-10-05 DIAGNOSIS — N18.9 CHRONIC KIDNEY DISEASE, UNSPECIFIED CKD STAGE: ICD-10-CM

## 2022-10-05 LAB
ALBUMIN SERPL-MCNC: 4.5 G/DL (ref 3.5–5.2)
ALP BLD-CCNC: 79 U/L (ref 40–130)
ALT SERPL-CCNC: 37 U/L (ref 5–41)
AMPHETAMINE SCREEN, URINE: NEGATIVE
ANION GAP SERPL CALCULATED.3IONS-SCNC: 16 MMOL/L (ref 7–19)
AST SERPL-CCNC: 24 U/L (ref 5–40)
BARBITURATE SCREEN URINE: NEGATIVE
BASOPHILS ABSOLUTE: 0.1 K/UL (ref 0–0.2)
BASOPHILS RELATIVE PERCENT: 0.5 % (ref 0–1)
BENZODIAZEPINE SCREEN, URINE: NEGATIVE
BILIRUB SERPL-MCNC: 0.3 MG/DL (ref 0.2–1.2)
BUN BLDV-MCNC: 35 MG/DL (ref 6–20)
BUPRENORPHINE URINE: NEGATIVE
CALCIUM SERPL-MCNC: 9.5 MG/DL (ref 8.6–10)
CANNABINOID SCREEN URINE: NEGATIVE
CHLORIDE BLD-SCNC: 98 MMOL/L (ref 98–111)
CO2: 22 MMOL/L (ref 22–29)
COCAINE METABOLITE SCREEN URINE: NEGATIVE
CREAT SERPL-MCNC: 2.1 MG/DL (ref 0.5–1.2)
EOSINOPHILS ABSOLUTE: 0.3 K/UL (ref 0–0.6)
EOSINOPHILS RELATIVE PERCENT: 2.3 % (ref 0–5)
ETHANOL: 10 MG/DL (ref 0–0.08)
GFR AFRICAN AMERICAN: 40
GFR NON-AFRICAN AMERICAN: 33
GLUCOSE BLD-MCNC: 99 MG/DL (ref 74–109)
HCT VFR BLD CALC: 48.2 % (ref 42–52)
HEMOGLOBIN: 16.5 G/DL (ref 14–18)
IMMATURE GRANULOCYTES #: 0 K/UL
LYMPHOCYTES ABSOLUTE: 2 K/UL (ref 1.1–4.5)
LYMPHOCYTES RELATIVE PERCENT: 17.8 % (ref 20–40)
Lab: NORMAL
MCH RBC QN AUTO: 30.7 PG (ref 27–31)
MCHC RBC AUTO-ENTMCNC: 34.2 G/DL (ref 33–37)
MCV RBC AUTO: 89.8 FL (ref 80–94)
METHADONE SCREEN, URINE: NEGATIVE
METHAMPHETAMINE, URINE: NEGATIVE
MONOCYTES ABSOLUTE: 0.7 K/UL (ref 0–0.9)
MONOCYTES RELATIVE PERCENT: 5.9 % (ref 0–10)
NEUTROPHILS ABSOLUTE: 8.3 K/UL (ref 1.5–7.5)
NEUTROPHILS RELATIVE PERCENT: 73.2 % (ref 50–65)
OPIATE SCREEN URINE: NEGATIVE
OXYCODONE URINE: NEGATIVE
PDW BLD-RTO: 13.7 % (ref 11.5–14.5)
PHENCYCLIDINE SCREEN URINE: NEGATIVE
PLATELET # BLD: 230 K/UL (ref 130–400)
PMV BLD AUTO: 11 FL (ref 9.4–12.4)
POTASSIUM SERPL-SCNC: 3.9 MMOL/L (ref 3.5–5)
PROPOXYPHENE SCREEN: NEGATIVE
RBC # BLD: 5.37 M/UL (ref 4.7–6.1)
SODIUM BLD-SCNC: 136 MMOL/L (ref 136–145)
TOTAL PROTEIN: 7.7 G/DL (ref 6.6–8.7)
TRICYCLIC, URINE: NEGATIVE
WBC # BLD: 11.4 K/UL (ref 4.8–10.8)

## 2022-10-05 PROCEDURE — 80053 COMPREHEN METABOLIC PANEL: CPT

## 2022-10-05 PROCEDURE — 36415 COLL VENOUS BLD VENIPUNCTURE: CPT

## 2022-10-05 PROCEDURE — 80306 DRUG TEST PRSMV INSTRMNT: CPT

## 2022-10-05 PROCEDURE — 85025 COMPLETE CBC W/AUTO DIFF WBC: CPT

## 2022-10-05 PROCEDURE — 82077 ASSAY SPEC XCP UR&BREATH IA: CPT

## 2022-10-05 PROCEDURE — 99283 EMERGENCY DEPT VISIT LOW MDM: CPT

## 2022-10-05 RX ORDER — 0.9 % SODIUM CHLORIDE 0.9 %
1000 INTRAVENOUS SOLUTION INTRAVENOUS ONCE
Status: DISCONTINUED | OUTPATIENT
Start: 2022-10-05 | End: 2022-10-05

## 2022-10-05 ASSESSMENT — ENCOUNTER SYMPTOMS
NAUSEA: 0
SORE THROAT: 0
ABDOMINAL PAIN: 0
SHORTNESS OF BREATH: 0
DIARRHEA: 0
RHINORRHEA: 0
VOMITING: 0
COUGH: 0
BACK PAIN: 0

## 2022-10-05 ASSESSMENT — PATIENT HEALTH QUESTIONNAIRE - PHQ9: SUM OF ALL RESPONSES TO PHQ QUESTIONS 1-9: 10

## 2022-10-05 ASSESSMENT — PAIN - FUNCTIONAL ASSESSMENT: PAIN_FUNCTIONAL_ASSESSMENT: NONE - DENIES PAIN

## 2022-10-05 ASSESSMENT — LIFESTYLE VARIABLES
HOW OFTEN DO YOU HAVE A DRINK CONTAINING ALCOHOL: MONTHLY OR LESS
HOW MANY STANDARD DRINKS CONTAINING ALCOHOL DO YOU HAVE ON A TYPICAL DAY: 1 OR 2
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
## 2022-10-05 NOTE — ED PROVIDER NOTES
[facility] EMERGENCY DEPT  eMERGENCY dEPARTMENT eNCOUnter      Pt Name: Flynn Small  MRN: [id number]  [place] 1970  Date of evaluation: 10/5/2022  Provider: Fred Villalobos MD    CHIEF COMPLAINT       Chief Complaint   Patient presents with    Mental Health Problem     Anxiety and depression         HISTORY OF PRESENT ILLNESS   (Location/Symptom, Timing/Onset,Context/Setting, Quality, Duration, Modifying Factors, Severity)  Note limiting factors. Flynn Small is a 47 y.o. male who presents to the emergency department for mental health evaluation. Patient admits to anxiety and depression denies any suicidal or homicidal thoughts. Tells me that he has a difficult time managing his wife at home who is always on the verge of a psychotic break. He is currently here with his 2 stepsons due to welfare check due to poor living conditions. HPI    NursingNotes were reviewed. REVIEW OF SYSTEMS    (2-9 systems for level 4, 10 or more for level 5)     Review of Systems   Constitutional:  Negative for chills and fever. HENT:  Negative for rhinorrhea and sore throat. Respiratory:  Negative for cough and shortness of breath. Cardiovascular:  Negative for chest pain and leg swelling. Gastrointestinal:  Negative for abdominal pain, diarrhea, nausea and vomiting. Genitourinary:  Negative for dysuria and frequency. Musculoskeletal:  Negative for back pain and neck pain. Neurological:  Negative for dizziness and headaches. Psychiatric/Behavioral:  Positive for dysphoric mood. Negative for suicidal ideas. The patient is nervous/anxious. All other systems reviewed and are negative.          PAST MEDICALHISTORY     Past Medical History:   Diagnosis Date    A-fib [facility])     Chronic renal disease     Elevated PSA     Hypertension     Renal calculi          SURGICAL HISTORY       Past Surgical History:   Procedure Laterality Date    ANKLE FRACTURE SURGERY Left 3/29/2022    LEFT  ANKLE OPEN REDUCTION INTERNAL FIXATION MEDIAL MALLEOLUS FRACTURE [facility]  03/2017    CARDIAC SURGERY      COSMETIC SURGERY      lip    MITRAL VALVE REPLACEMENT  03/01/2017    done at [place] [address] [address]  2021         CURRENT MEDICATIONS     Previous Medications    ALBUTEROL SULFATE  (90 BASE) MCG/ACT INHALER    Inhale 2 puffs into the lungs every 6 hours as needed for Wheezing    AMLODIPINE (NORVASC) 5 MG TABLET    Take 5 mg by mouth daily    ASPIRIN 81 MG TABLET    Take 81 mg by mouth daily    B COMPLEX VITAMINS CAPSULE    Take 1 capsule by mouth daily    CIPROFLOXACIN (CIPRO) 500 MG TABLET    Take 1 tablet by mouth 2 times daily Begin taking the day before the biopsy is scheduled. CITALOPRAM (CELEXA) 20 MG TABLET    Take 20 mg by mouth daily    HYDROCHLOROTHIAZIDE (MICROZIDE) 12.5 MG CAPSULE    Take 12.5 mg by mouth 2 times daily    LISINOPRIL (PRINIVIL;ZESTRIL) 10 MG TABLET    Take 10 mg by mouth daily    LISINOPRIL-HYDROCHLOROTHIAZIDE (PRINZIDE;ZESTORETIC) 10-12.5 MG PER TABLET        MAGNESIUM 30 MG TABLET    Take 30 mg by mouth 2 times daily    METOPROLOL TARTRATE (LOPRESSOR) 25 MG TABLET        TRAMADOL (ULTRAM) 50 MG TABLET    Take 50 mg by mouth every 6 hours as needed for Pain.        ALLERGIES     Chantix [varenicline] and Zyrtec [cetirizine]    FAMILY HISTORY       Family History   Problem Relation Age of Onset    Hypertension Father     Heart Disease Father           SOCIAL HISTORY       Social History     Socioeconomic History    Marital status:    Tobacco Use    Smoking status: Every Day    Smokeless tobacco: Never    Tobacco comments:     last cigarette 1 hour ago       SCREENINGS    Court Coma Scale  Eye Opening: Spontaneous  Best Verbal Response: Oriented  Best Motor Response: Obeys commands  Court Coma Scale Score: 15        PHYSICAL EXAM    (up to 7 for level 4, 8 or more for level 5)     ED Triage Vitals BP Temp Temp src Heart Rate Resp SpO2 Height Weight   10/05/22 1326 10/05/22 1336 -- 10/05/22 1326 10/05/22 1326 10/05/22 1326 -- --   109/80 98.6 °F (37 °C)  98 16 96 %         Physical Exam  Vitals and nursing note reviewed. Constitutional:       Appearance: He is well-developed. He is not ill-appearing or diaphoretic. Comments: Unkempt   HENT:      Head: Normocephalic and atraumatic. Eyes:      Conjunctiva/sclera: Conjunctivae normal.   Neck:      Trachea: No tracheal deviation. Cardiovascular:      Rate and Rhythm: Normal rate and regular rhythm. Heart sounds: Normal heart sounds. No murmur heard. Pulmonary:      Breath sounds: Normal breath sounds. No wheezing or rales. Abdominal:      Palpations: Abdomen is soft. Tenderness: There is no abdominal tenderness. Musculoskeletal:         General: Normal range of motion. Cervical back: Normal range of motion and neck supple. Skin:     General: Skin is warm and dry. Neurological:      Mental Status: He is alert and oriented to person, place, and time. Psychiatric:         Speech: Speech normal.         Behavior: Behavior is cooperative. Thought Content: Thought content is not paranoid or delusional. Thought content does not include homicidal or suicidal ideation.        DIAGNOSTIC RESULTS           No orders to display           LABS:  Labs Reviewed   CBC WITH AUTO DIFFERENTIAL - Abnormal; Notable for the following components:       Result Value    WBC 11.4 (*)     Neutrophils % 73.2 (*)     Lymphocytes % 17.8 (*)     Neutrophils Absolute 8.3 (*)     All other components within normal limits   COMPREHENSIVE METABOLIC PANEL - Abnormal; Notable for the following components:    BUN 35 (*)     Creatinine 2.1 (*)     GFR Non- 33 (*)     GFR  40 (*)     All other components within normal limits   DRUG SCRN, BUPRENORPHINE   ETHANOL       All other labs were within normal range or not returned as of this dictation. EMERGENCY DEPARTMENT COURSE and DIFFERENTIAL DIAGNOSIS/MDM:   Vitals:    Vitals:    10/05/22 1326 10/05/22 1336   BP: 109/80    Pulse: 98    Resp: 16    Temp:  98.6 °F (37 °C)   SpO2: 96%        MDM     Amount and/or Complexity of Data Reviewed  Clinical lab tests: ordered and reviewed  Discuss the patient with other providers: yes      Pt with stress reaction, depression and anxiety. Pt has been cleared by psych. Had initially lab error with potassium but it was not spun down im told and it has now been corrected. Has ckd which is stable. Pt stable for DC with police to halfway.  have been notified. CONSULTS:  None    PROCEDURES:  Unless otherwise noted below, none     Procedures    FINAL IMPRESSION      1. Depression, unspecified depression type    2.  Chronic kidney disease, unspecified CKD stage          DISPOSITION/PLAN   DISPOSITION Decision To Discharge 10/05/2022 04:34:02 PM      PATIENT REFERRED TO:  [address] [id number]  [phone]          [facility] EMERGENCY DEPT  [facility]  [phone]        DISCHARGE MEDICATIONS:  New Prescriptions    No medications on file          (Please note that portions of this note were completed with a voice recognition program.  Efforts were made to edit thedictations but occasionally words are mis-transcribed.)    Shreya Potter MD (electronically signed)  Attending Emergency Physician        Jihan Gonzalez MD  10/05/22 [address], [place]  10/05/22 3830

## 2022-10-05 NOTE — SUICIDE SAFETY PLAN
SAFETY PLAN    A suicide Safety Plan is a document that supports someone when they are having thoughts of suicide. Warning Signs that indicate a suicidal crisis may be developing: What (situations, thoughts, feelings, body sensations, behaviors, etc.) do you experience that lets you know you are beginning to think about suicide? 1. \"I don't think about suicide\"  2.   3. Internal Coping Strategies:  What things can I do (relaxation techniques, hobbies, physical activities, etc.) to take my mind off my problems without contacting another person? 1. Listen to my relaxing music  2. Games that are mentally taxing  3. Listening to D+D podcasts    People and social settings that provide distraction: Who can I call or where can I go to distract me? 1. Name:   Phone: [phone]  2. Name: Aquilino Johnson  Phone: [phone]   3. Place: [employer]            4. Place: [employer] whom I can ask for help: Who can I call when I need help - for example, friends, family, clergy, someone else? 1. Name: Mother                Phone: [phone]  2. Name: Ran Bangura  Phone:   3. Name: Aquilino Johnson  Phone: [phone]    Professionals or [address] [place], [place] [address]vd I can contact during a crisis: Who can I call for help - for example, my doctor, my psychiatrist, my psychologist, a mental health provider, a suicide hotline? 1. Clinician Name: [id number]    Phone: [phone]      Clinician Pager or Emergency Contact #: [phone]    2. Clinician Name: [address]   Phone: [phone]      Clinician Pager or Emergency Contact #: [phone]    3. Suicide Prevention Lifeline: 4-289-536-QSFX (6451)    4.  [address] [place] [address] Emergency Services -  for example, [address] [facility] suicide hotline, [facility] Hotline: [facility] Emergency Department      Emergency Services Address: [address] [place] [address] Emergency Services Phone: [phone]    Making the environment safe: How can I make my environment (house/apartment/living space) safer? For example, can I remove guns, medications, and other items? 1. Remove weapons from the home   2.  Remove extra medications from the home

## 2022-11-07 PROBLEM — Z98.890 H/O MAZE PROCEDURE: Status: ACTIVE | Noted: 2022-11-07

## 2022-11-07 PROBLEM — E66.01 CLASS 2 SEVERE OBESITY DUE TO EXCESS CALORIES WITH SERIOUS COMORBIDITY AND BODY MASS INDEX (BMI) OF 35.0 TO 35.9 IN ADULT: Status: ACTIVE | Noted: 2020-08-25

## 2022-11-07 PROBLEM — E66.812 CLASS 2 SEVERE OBESITY DUE TO EXCESS CALORIES WITH SERIOUS COMORBIDITY AND BODY MASS INDEX (BMI) OF 35.0 TO 35.9 IN ADULT: Status: ACTIVE | Noted: 2020-08-25

## 2022-11-07 PROBLEM — Z98.890 STATUS POST LIGATION OF LEFT ATRIAL APPENDAGE: Status: ACTIVE | Noted: 2022-11-07

## 2022-11-07 PROBLEM — I51.9 LEFT VENTRICULAR DIASTOLIC DYSFUNCTION: Status: ACTIVE | Noted: 2022-11-07

## 2022-11-18 ENCOUNTER — TELEPHONE (OUTPATIENT)
Dept: UROLOGY | Facility: CLINIC | Age: 52
End: 2022-11-18

## 2022-11-18 NOTE — TELEPHONE ENCOUNTER
Called Patient to remind them to get PSA and KUB 1 hour prior to appointment.  Left voicemail with Patient.  If patient calls back it is ok for the HUB to tell the pt the message.

## 2023-02-02 RX ORDER — LISINOPRIL 20 MG/1
TABLET ORAL
COMMUNITY
End: 2023-02-03 | Stop reason: SDUPTHER

## 2023-02-02 RX ORDER — AMLODIPINE BESYLATE 5 MG/1
TABLET ORAL
COMMUNITY
End: 2023-02-02

## 2023-02-02 RX ORDER — MULTIPLE VITAMINS W/ MINERALS TAB 9MG-400MCG
1 TAB ORAL DAILY
COMMUNITY

## 2023-02-02 RX ORDER — CYCLOBENZAPRINE HCL 10 MG
TABLET ORAL
COMMUNITY
End: 2023-02-03

## 2023-02-02 RX ORDER — CARVEDILOL 3.12 MG/1
TABLET ORAL
COMMUNITY
End: 2023-02-03

## 2023-02-02 RX ORDER — DICLOFENAC SODIUM 75 MG/1
75 TABLET, DELAYED RELEASE ORAL 2 TIMES DAILY
COMMUNITY
Start: 2022-01-02 | End: 2023-02-03 | Stop reason: SDUPTHER

## 2023-02-02 RX ORDER — ACETAMINOPHEN 500 MG
2 TABLET ORAL PRN
COMMUNITY
End: 2023-02-03

## 2023-02-02 RX ORDER — HYDROCODONE BITARTRATE AND ACETAMINOPHEN 10; 325 MG/1; MG/1
1 TABLET ORAL EVERY 6 HOURS PRN
COMMUNITY
Start: 2021-06-02 | End: 2023-02-03

## 2023-02-03 ENCOUNTER — OFFICE VISIT (OUTPATIENT)
Dept: PRIMARY CARE CLINIC | Age: 53
End: 2023-02-03

## 2023-02-03 VITALS
TEMPERATURE: 97.7 F | RESPIRATION RATE: 18 BRPM | WEIGHT: 282.4 LBS | HEART RATE: 71 BPM | SYSTOLIC BLOOD PRESSURE: 172 MMHG | HEIGHT: 73 IN | DIASTOLIC BLOOD PRESSURE: 106 MMHG | BODY MASS INDEX: 37.43 KG/M2

## 2023-02-03 DIAGNOSIS — I10 PRIMARY HYPERTENSION: ICD-10-CM

## 2023-02-03 DIAGNOSIS — R41.3 MEMORY LOSS: ICD-10-CM

## 2023-02-03 DIAGNOSIS — N18.32 STAGE 3B CHRONIC KIDNEY DISEASE (HCC): ICD-10-CM

## 2023-02-03 DIAGNOSIS — L98.9 SKIN LESION OF RIGHT LEG: ICD-10-CM

## 2023-02-03 DIAGNOSIS — Z76.89 ENCOUNTER TO ESTABLISH CARE: Primary | ICD-10-CM

## 2023-02-03 PROBLEM — I16.0 HYPERTENSIVE URGENCY: Status: ACTIVE | Noted: 2020-08-25

## 2023-02-03 PROBLEM — Z95.2 HX OF ARTIFICIAL HEART VALVE REPLACEMENT: Status: ACTIVE | Noted: 2022-03-24

## 2023-02-03 PROBLEM — I34.0 MODERATE MITRAL REGURGITATION: Status: ACTIVE | Noted: 2017-01-24

## 2023-02-03 PROBLEM — R78.81 MRSA BACTEREMIA: Status: ACTIVE | Noted: 2017-02-21

## 2023-02-03 PROBLEM — M31.31 WEGENER'S GRANULOMATOSIS WITH RENAL INVOLVEMENT (HCC): Status: ACTIVE | Noted: 2017-02-16

## 2023-02-03 PROBLEM — C61 PROSTATE CANCER (HCC): Status: ACTIVE | Noted: 2021-02-03

## 2023-02-03 PROBLEM — M19.90 ARTHRITIS: Status: ACTIVE | Noted: 2022-12-01

## 2023-02-03 PROBLEM — B95.62 MRSA BACTEREMIA: Status: ACTIVE | Noted: 2017-02-21

## 2023-02-03 PROBLEM — D69.0 HENOCH-SCHONLEIN PURPURA NEPHRITIS (HCC): Status: ACTIVE | Noted: 2017-01-17

## 2023-02-03 PROBLEM — F32.A DEPRESSIVE DISORDER: Status: ACTIVE | Noted: 2022-12-14

## 2023-02-03 PROBLEM — I51.9 LEFT VENTRICULAR DIASTOLIC DYSFUNCTION: Status: ACTIVE | Noted: 2022-11-07

## 2023-02-03 PROBLEM — I12.9 HYPERTENSIVE NEPHROSCLEROSIS: Status: ACTIVE | Noted: 2017-01-24

## 2023-02-03 PROBLEM — N08 HENOCH-SCHONLEIN PURPURA NEPHRITIS (HCC): Status: ACTIVE | Noted: 2017-01-17

## 2023-02-03 PROBLEM — R07.89 CHEST PAIN, ATYPICAL: Status: ACTIVE | Noted: 2020-08-25

## 2023-02-03 PROBLEM — N20.0 NEPHROLITHIASIS: Status: ACTIVE | Noted: 2017-01-24

## 2023-02-03 PROBLEM — D50.9 MICROCYTIC ANEMIA: Status: ACTIVE | Noted: 2017-01-24

## 2023-02-03 LAB
ALBUMIN SERPL-MCNC: 3.8 G/DL (ref 3.5–5.2)
ALP BLD-CCNC: 82 U/L (ref 40–130)
ALT SERPL-CCNC: 19 U/L (ref 5–41)
ANION GAP SERPL CALCULATED.3IONS-SCNC: 11 MMOL/L (ref 7–19)
AST SERPL-CCNC: 18 U/L (ref 5–40)
BASOPHILS ABSOLUTE: 0.1 K/UL (ref 0–0.2)
BASOPHILS RELATIVE PERCENT: 0.7 % (ref 0–1)
BILIRUB SERPL-MCNC: 0.3 MG/DL (ref 0.2–1.2)
BUN BLDV-MCNC: 14 MG/DL (ref 6–20)
CALCIUM SERPL-MCNC: 9.3 MG/DL (ref 8.6–10)
CHLORIDE BLD-SCNC: 106 MMOL/L (ref 98–111)
CHOLESTEROL, TOTAL: 167 MG/DL (ref 160–199)
CO2: 26 MMOL/L (ref 22–29)
CREAT SERPL-MCNC: 1.3 MG/DL (ref 0.5–1.2)
EOSINOPHILS ABSOLUTE: 0.4 K/UL (ref 0–0.6)
EOSINOPHILS RELATIVE PERCENT: 4.1 % (ref 0–5)
FOLATE: 16.9 NG/ML (ref 4.5–32.2)
GFR SERPL CREATININE-BSD FRML MDRD: >60 ML/MIN/{1.73_M2}
GLUCOSE BLD-MCNC: 103 MG/DL (ref 74–109)
HCT VFR BLD CALC: 48.7 % (ref 42–52)
HDLC SERPL-MCNC: 47 MG/DL (ref 55–121)
HEMOGLOBIN: 15.4 G/DL (ref 14–18)
IMMATURE GRANULOCYTES #: 0 K/UL
LDL CHOLESTEROL CALCULATED: 96 MG/DL
LYMPHOCYTES ABSOLUTE: 2.4 K/UL (ref 1.1–4.5)
LYMPHOCYTES RELATIVE PERCENT: 23 % (ref 20–40)
MCH RBC QN AUTO: 29.8 PG (ref 27–31)
MCHC RBC AUTO-ENTMCNC: 31.6 G/DL (ref 33–37)
MCV RBC AUTO: 94.2 FL (ref 80–94)
MONOCYTES ABSOLUTE: 0.6 K/UL (ref 0–0.9)
MONOCYTES RELATIVE PERCENT: 5.6 % (ref 0–10)
NEUTROPHILS ABSOLUTE: 6.8 K/UL (ref 1.5–7.5)
NEUTROPHILS RELATIVE PERCENT: 66.3 % (ref 50–65)
PDW BLD-RTO: 14.4 % (ref 11.5–14.5)
PLATELET # BLD: 226 K/UL (ref 130–400)
PMV BLD AUTO: 10.6 FL (ref 9.4–12.4)
POTASSIUM SERPL-SCNC: 4.1 MMOL/L (ref 3.5–5)
RBC # BLD: 5.17 M/UL (ref 4.7–6.1)
SODIUM BLD-SCNC: 143 MMOL/L (ref 136–145)
TOTAL PROTEIN: 6.9 G/DL (ref 6.6–8.7)
TRIGL SERPL-MCNC: 122 MG/DL (ref 0–149)
VITAMIN B-12: 366 PG/ML (ref 211–946)
WBC # BLD: 10.2 K/UL (ref 4.8–10.8)

## 2023-02-03 RX ORDER — HYDROCHLOROTHIAZIDE 25 MG/1
25 TABLET ORAL 2 TIMES DAILY
COMMUNITY
End: 2023-02-03 | Stop reason: SDUPTHER

## 2023-02-03 RX ORDER — HYDROCHLOROTHIAZIDE 25 MG/1
25 TABLET ORAL 2 TIMES DAILY
Qty: 180 TABLET | Refills: 3 | Status: SHIPPED | OUTPATIENT
Start: 2023-02-03

## 2023-02-03 RX ORDER — LISINOPRIL 20 MG/1
TABLET ORAL
Qty: 90 TABLET | Refills: 3 | Status: SHIPPED | OUTPATIENT
Start: 2023-02-03

## 2023-02-03 RX ORDER — DICLOFENAC SODIUM 75 MG/1
75 TABLET, DELAYED RELEASE ORAL 2 TIMES DAILY
Qty: 180 TABLET | Refills: 3 | Status: SHIPPED | OUTPATIENT
Start: 2023-02-03

## 2023-02-03 RX ORDER — CITALOPRAM 40 MG/1
40 TABLET ORAL DAILY
Qty: 90 TABLET | Refills: 2 | Status: SHIPPED | OUTPATIENT
Start: 2023-02-03

## 2023-02-03 RX ORDER — ASPIRIN 325 MG
325 TABLET ORAL 2 TIMES DAILY
COMMUNITY
Start: 2021-02-03

## 2023-02-03 RX ORDER — LISINOPRIL 20 MG/1
TABLET ORAL
Qty: 90 TABLET | Refills: 3 | Status: SHIPPED | OUTPATIENT
Start: 2023-02-03 | End: 2023-02-03 | Stop reason: SDUPTHER

## 2023-02-03 RX ORDER — SENNOSIDES 8.6 MG
650 CAPSULE ORAL EVERY 8 HOURS PRN
COMMUNITY

## 2023-02-03 SDOH — ECONOMIC STABILITY: HOUSING INSECURITY
IN THE LAST 12 MONTHS, WAS THERE A TIME WHEN YOU DID NOT HAVE A STEADY PLACE TO SLEEP OR SLEPT IN A SHELTER (INCLUDING NOW)?: NO

## 2023-02-03 SDOH — HEALTH STABILITY: PHYSICAL HEALTH: ON AVERAGE, HOW MANY DAYS PER WEEK DO YOU ENGAGE IN MODERATE TO STRENUOUS EXERCISE (LIKE A BRISK WALK)?: 6 DAYS

## 2023-02-03 SDOH — ECONOMIC STABILITY: INCOME INSECURITY: HOW HARD IS IT FOR YOU TO PAY FOR THE VERY BASICS LIKE FOOD, HOUSING, MEDICAL CARE, AND HEATING?: VERY HARD

## 2023-02-03 SDOH — ECONOMIC STABILITY: FOOD INSECURITY: WITHIN THE PAST 12 MONTHS, THE FOOD YOU BOUGHT JUST DIDN'T LAST AND YOU DIDN'T HAVE MONEY TO GET MORE.: OFTEN TRUE

## 2023-02-03 SDOH — ECONOMIC STABILITY: FOOD INSECURITY: WITHIN THE PAST 12 MONTHS, YOU WORRIED THAT YOUR FOOD WOULD RUN OUT BEFORE YOU GOT MONEY TO BUY MORE.: OFTEN TRUE

## 2023-02-03 SDOH — HEALTH STABILITY: PHYSICAL HEALTH: ON AVERAGE, HOW MANY MINUTES DO YOU ENGAGE IN EXERCISE AT THIS LEVEL?: 30 MIN

## 2023-02-03 ASSESSMENT — PATIENT HEALTH QUESTIONNAIRE - PHQ9
7. TROUBLE CONCENTRATING ON THINGS, SUCH AS READING THE NEWSPAPER OR WATCHING TELEVISION: 2
SUM OF ALL RESPONSES TO PHQ QUESTIONS 1-9: 17
2. FEELING DOWN, DEPRESSED OR HOPELESS: 3
SUM OF ALL RESPONSES TO PHQ QUESTIONS 1-9: 17
9. THOUGHTS THAT YOU WOULD BE BETTER OFF DEAD, OR OF HURTING YOURSELF: 1
10. IF YOU CHECKED OFF ANY PROBLEMS, HOW DIFFICULT HAVE THESE PROBLEMS MADE IT FOR YOU TO DO YOUR WORK, TAKE CARE OF THINGS AT HOME, OR GET ALONG WITH OTHER PEOPLE: 3
1. LITTLE INTEREST OR PLEASURE IN DOING THINGS: 1
3. TROUBLE FALLING OR STAYING ASLEEP: 2
SUM OF ALL RESPONSES TO PHQ9 QUESTIONS 1 & 2: 4
SUM OF ALL RESPONSES TO PHQ QUESTIONS 1-9: 16
4. FEELING TIRED OR HAVING LITTLE ENERGY: 2
5. POOR APPETITE OR OVEREATING: 3
SUM OF ALL RESPONSES TO PHQ QUESTIONS 1-9: 17
8. MOVING OR SPEAKING SO SLOWLY THAT OTHER PEOPLE COULD HAVE NOTICED. OR THE OPPOSITE, BEING SO FIGETY OR RESTLESS THAT YOU HAVE BEEN MOVING AROUND A LOT MORE THAN USUAL: 0
6. FEELING BAD ABOUT YOURSELF - OR THAT YOU ARE A FAILURE OR HAVE LET YOURSELF OR YOUR FAMILY DOWN: 3

## 2023-02-03 ASSESSMENT — ENCOUNTER SYMPTOMS
CHEST TIGHTNESS: 0
SHORTNESS OF BREATH: 0
WHEEZING: 0
ABDOMINAL PAIN: 0
BLOOD IN STOOL: 0

## 2023-02-03 ASSESSMENT — SOCIAL DETERMINANTS OF HEALTH (SDOH)
WITHIN THE LAST YEAR, HAVE YOU BEEN KICKED, HIT, SLAPPED, OR OTHERWISE PHYSICALLY HURT BY YOUR PARTNER OR EX-PARTNER?: NO
WITHIN THE LAST YEAR, HAVE YOU BEEN AFRAID OF YOUR PARTNER OR EX-PARTNER?: NO
WITHIN THE LAST YEAR, HAVE TO BEEN RAPED OR FORCED TO HAVE ANY KIND OF SEXUAL ACTIVITY BY YOUR PARTNER OR EX-PARTNER?: NO
WITHIN THE LAST YEAR, HAVE YOU BEEN HUMILIATED OR EMOTIONALLY ABUSED IN OTHER WAYS BY YOUR PARTNER OR EX-PARTNER?: NO

## 2023-02-03 ASSESSMENT — COLUMBIA-SUICIDE SEVERITY RATING SCALE - C-SSRS
2. HAVE YOU ACTUALLY HAD ANY THOUGHTS OF KILLING YOURSELF?: NO
1. WITHIN THE PAST MONTH, HAVE YOU WISHED YOU WERE DEAD OR WISHED YOU COULD GO TO SLEEP AND NOT WAKE UP?: NO
6. HAVE YOU EVER DONE ANYTHING, STARTED TO DO ANYTHING, OR PREPARED TO DO ANYTHING TO END YOUR LIFE?: NO

## 2023-02-03 NOTE — PROGRESS NOTES
Deanna Shine (:  1970) is a 46 y.o. male,New patient, here for evaluation of the following chief complaint(s):  New Patient (Pt is here to establish care. Pt has a spot on his left leg that he wants looked at. Pt needs medications refilled. )         ASSESSMENT/PLAN:  1. Encounter to establish care  2. Memory loss  -     Comprehensive Metabolic Panel  -     CBC with Auto Differential  -     Vitamin B12 & Folate  -     Lipid Panel  3. Primary hypertension  4. Skin lesion of right leg  5. Stage 3b chronic kidney disease ([address]    Lesion of left leg appears to be healing well. Recommended patient apply antibiotic ointment and keep area clean. This should heal on its own within a couple weeks. If area develops redness or drainage return for further assessment. Patient's memory loss very likely related to other chronic conditions. Will obtain B12 and labs at this time for further assessment of this. We may consider donepezil in the future to slow rate of memory loss. Recommended patient go home and take his blood pressure medicines. I discussed with patient the importance of these being taken regularly. We will recheck again at next appointment. Did suggest to patient that they talk to their nephrologist about potentially initiating fark [employer] as it may be beneficial for his chronic kidney disease. Screenings and care gaps were not discussed at this visit given time. We will discuss at wellness visit in 2 weeks        Return in about 2 weeks (around 2023) for AWV. Subjective   SUBJECTIVE/OBJECTIVE:  Deanna Shine is a 46 y.o. male who presents to establish care. Patient has a few concerns at this time. Of note patient did not take his blood pressure medicines today and is running high. Patient says he previously took amlodipine as well however Dr. Saritha Hand his cardiologist took him off of this. Patient regularly follows up with Dr. Carolann Hurtdao for his cardiac care.   Patient has a history of an artificial heart valve though states that he is not on anticoagulation per his cardiology team due to his high fall risk. Patient last fell 10 days ago and hit a tree branch. Patient scraped his left calf and is concerned about the wound at this time. Patient denies any drainage from this but notes it is mildly painful. They have been trying to keep the area clean. Patient also notes hernia in the mid abdomen region. Patient says that this is at the same site as a prior surgical incision. Patient notes that it is not causing pain nor has he had any change to his bowel habit but wanted to have it looked at. Wife is concerned about patient's ongoing worsening memory loss. She notes that this is been worsening ever since patient was hospitalized and put on life support. She notes that patient will often forget things if he is told them including whenever he goes to the store he will forget what he is supposed to get. Of note with this patient he has history of CKD 4 and has been on dialysis in the past though is not currently requiring it. Patient does have ongoing concerns about medication costs. Review of Systems   Constitutional:  Positive for fatigue. Negative for activity change and fever. HENT:  Negative for congestion. Eyes:  Negative for visual disturbance. Respiratory:  Negative for chest tightness, shortness of breath and wheezing. Cardiovascular:  Negative for chest pain. Gastrointestinal:  Negative for abdominal pain and blood in stool. Genitourinary:  Negative for difficulty urinating and urgency. Neurological:  Positive for weakness. Negative for headaches. Psychiatric/Behavioral:  Negative for confusion. Objective   Physical Exam  Vitals reviewed. Constitutional:       General: He is not in acute distress. Appearance: Normal appearance. He is obese. He is not ill-appearing. HENT:      Head: Normocephalic and atraumatic. Nose: No congestion. Mouth/Throat:      Mouth: Mucous membranes are moist.   Cardiovascular:      Rate and Rhythm: Normal rate and regular rhythm. Pulses: Normal pulses. Heart sounds: Normal heart sounds. No murmur heard. Pulmonary:      Effort: Pulmonary effort is normal. No respiratory distress. Breath sounds: Normal breath sounds. No wheezing or rhonchi. Chest:      Chest wall: No tenderness. Abdominal:      General: Abdomen is flat. Bowel sounds are normal. There is no distension. Palpations: Abdomen is soft. Tenderness: There is no abdominal tenderness. Hernia: A hernia (Incisional, mid inferior abdomen, nontender, reducible) is present. Musculoskeletal:         General: Swelling present. Right lower le+ Edema present. Left lower le+ Edema present. Skin:     General: Skin is warm and dry. Findings: Lesion (2 cm scabbed lesion on posterior right calf, clean, no drainage) present. Neurological:      General: No focal deficit present. Mental Status: He is alert. Vitals:    23 0851   BP: (!) 172/106   Pulse: 71   Resp: 18   Temp: 97.7 °F (36.5 °C)        Current Outpatient Medications   Medication Sig Dispense Refill    aspirin 325 MG tablet Take 325 mg by mouth in the morning and at bedtime      acetaminophen (TYLENOL 8 HOUR ARTHRITIS PAIN) 650 MG extended release tablet Take 650 mg by mouth every 8 hours as needed for Pain      vitamin D (CHOLECALCIFEROL) 125 MCG (5000 UT) CAPS capsule Take 5,000 Units by mouth daily      metoprolol tartrate (LOPRESSOR) 25 MG tablet Take 1 tablet by mouth 2 times daily 180 tablet 3    lisinopril (PRINIVIL;ZESTRIL) 20 MG tablet lisinopril 20 mg tablet   Take 1 tablet every day by oral route.  90 tablet 3    citalopram (CELEXA) 40 MG tablet Take 1 tablet by mouth daily 90 tablet 2    hydroCHLOROthiazide (HYDRODIURIL) 25 MG tablet Take 1 tablet by mouth in the morning and at bedtime 180 tablet 3    diclofenac (VOLTAREN) 75 MG EC tablet Take 1 tablet by mouth 2 times daily 180 tablet 3    Multiple Vitamins-Minerals (THERA M PLUS) TABS Take 1 tablet by mouth daily       No current facility-administered medications for this visit. Family History   Problem Relation Age of Onset    Kidney Disease Mother     Thyroid Disease Mother     Hypertension Father     Heart Disease Father     Heart Failure Father     Liver Disease Father     Emphysema Father       Past Medical History:   Diagnosis Date    A-fib ([address]     Arthritis     Chronic renal disease     Elevated PSA     Hypertension     Renal calculi       Past Surgical History:   Procedure Laterality Date    ANKLE FRACTURE SURGERY Left 3/29/2022    LEFT  ANKLE OPEN REDUCTION INTERNAL FIXATION MEDIAL MALLEOLUS FRACTURE performed by Tavon Medeiros MD at [address] [place] [address]  03/2017    CARDIAC SURGERY      COSMETIC SURGERY      lip    MITRAL VALVE REPLACEMENT  03/01/2017    done at [place] [address] [address]  2021      Allergies   Allergen Reactions    Chantix [Varenicline]      Pt states 'it made me go crazy'    Zyrtec [Cetirizine] Rash        Lab Results   Component Value Date     02/03/2023    K 4.1 02/03/2023     02/03/2023    CO2 26 02/03/2023    BUN 14 02/03/2023    CREATININE 1.3 (H) 02/03/2023    GLUCOSE 103 02/03/2023    CALCIUM 9.3 02/03/2023    PROT 6.9 02/03/2023    LABALBU 3.8 02/03/2023    BILITOT 0.3 02/03/2023    ALKPHOS 82 02/03/2023    AST 18 02/03/2023    ALT 19 02/03/2023    LABGLOM >60 02/03/2023    GFRAA 40 (L) 10/05/2022        Lab Results   Component Value Date    WBC 10.2 02/03/2023    HGB 15.4 02/03/2023    HCT 48.7 02/03/2023    MCV 94.2 (H) 02/03/2023     02/03/2023                EMR Dragon/transcription disclaimer:  Much of this encounter note is electronic transcription/translation of spoken language toprinted texts.   The electronic translation of spoken language may be erroneous, or at times, nonsensical words or phrases may be inadvertently transcribed. Although I have reviewed the note for such errors, some may stillexist.      An electronic signature was used to authenticate this note.     --Ginger Lanza MD

## 2023-02-17 ENCOUNTER — OFFICE VISIT (OUTPATIENT)
Dept: PRIMARY CARE CLINIC | Age: 53
End: 2023-02-17

## 2023-02-17 VITALS
TEMPERATURE: 98.6 F | DIASTOLIC BLOOD PRESSURE: 64 MMHG | RESPIRATION RATE: 16 BRPM | BODY MASS INDEX: 37.19 KG/M2 | HEART RATE: 70 BPM | OXYGEN SATURATION: 99 % | SYSTOLIC BLOOD PRESSURE: 138 MMHG | WEIGHT: 280.6 LBS | HEIGHT: 73 IN

## 2023-02-17 DIAGNOSIS — Z87.891 PERSONAL HISTORY OF TOBACCO USE: ICD-10-CM

## 2023-02-17 DIAGNOSIS — Z11.4 SCREENING FOR HIV WITHOUT PRESENCE OF RISK FACTORS: ICD-10-CM

## 2023-02-17 DIAGNOSIS — M54.32 SCIATICA OF LEFT SIDE: ICD-10-CM

## 2023-02-17 DIAGNOSIS — Z11.59 NEED FOR HEPATITIS C SCREENING TEST: ICD-10-CM

## 2023-02-17 DIAGNOSIS — Z00.00 ANNUAL PHYSICAL EXAM: Primary | ICD-10-CM

## 2023-02-17 DIAGNOSIS — F17.200 SMOKER: ICD-10-CM

## 2023-02-17 DIAGNOSIS — Z13.1 ENCOUNTER FOR SCREENING FOR DIABETES MELLITUS: ICD-10-CM

## 2023-02-17 DIAGNOSIS — Z12.11 COLON CANCER SCREENING: ICD-10-CM

## 2023-02-17 DIAGNOSIS — R41.3 MEMORY IMPAIRMENT: ICD-10-CM

## 2023-02-17 LAB
GLUCOSE FASTING: 139 MG/DL (ref 74–109)
HEPATITIS C ANTIBODY INTERPRETATION: NORMAL

## 2023-02-17 RX ORDER — LIDOCAINE 4 G/G
1 PATCH TOPICAL DAILY
Qty: 30 PATCH | Refills: 0 | Status: SHIPPED | OUTPATIENT
Start: 2023-02-17 | End: 2023-03-19

## 2023-02-17 RX ORDER — DONEPEZIL HYDROCHLORIDE 5 MG/1
5 TABLET, FILM COATED ORAL NIGHTLY
Qty: 90 TABLET | Refills: 1 | Status: SHIPPED | OUTPATIENT
Start: 2023-02-17

## 2023-02-17 ASSESSMENT — ENCOUNTER SYMPTOMS
SHORTNESS OF BREATH: 0
CHEST TIGHTNESS: 0
BACK PAIN: 1
ABDOMINAL PAIN: 0
BLOOD IN STOOL: 0
WHEEZING: 0

## 2023-02-17 NOTE — PROGRESS NOTES
Valerie Medrano (:  1970) is a 46 y.o. male,Established patient, here for evaluation of the following chief complaint(s):  2 Week Follow-Up (Here for a follow up, states he still forgetful, left oven on, burners on, forgetting to put his seat belt on) and Hip Pain (Left hip is very painful and has been bad the last 2 weeks, he thinks more so related to the weather)         ASSESSMENT/PLAN:  1. Annual physical exam  2. Need for hepatitis C screening test  -     Hepatitis C Antibody; Future  3. Screening for HIV without presence of risk factors  -     HIV-1,2 Combo Ag/Ab By KAVYA, Reflexive Panel; Future  4. Encounter for screening for diabetes mellitus  -     Glucose, Fasting [[id number]]; Future  5. Smoker  -     CT lung screen [Initial/Annual]; Future  6. Colon cancer screening  -     [facility] [place]  7. Personal history of tobacco use  -     DE VISIT TO DISCUSS LUNG CA SCREEN W LDCT  8. Memory impairment  9. Sciatica of left side    In regards to patient's memory problems I do have suspicions that this may be early onset Alzheimer's however I do believe a more thorough evaluation would be warranted. I did encourage them to follow-up for more complete memory testing in the near future given we do not have time to complete this at this visit. At this time will initiate Aricept for maintenance and I did encourage patient to perform memory games and brain games for improvement in his memory as this has shown some benefit in studies. We will order CT lung screen at this time. I did encourage patient to stop smoking  We will order referral to gastroenterology for screening colonoscopy  Patient's leg and hip pain is likely in the setting of sciatica. Exercise recommended the patient as this has shown benefit for this. Will prescribe lidocaine patches for application on the back or hip for maintenance of this pain.   Patient agreeable to this plan  We will obtain labs at this time for HIV and hepatitis C screening which patient is agreeable to  More than 45 minutes spent with patient      Return in about 3 months (around 5/17/2023). Subjective   SUBJECTIVE/OBJECTIVE:  Luisa Montanez is a 46 y.o. male who presents for annual wellness visit. Patient does have a couple of concerns at this time. Patient is continuing to have some memory problems. His wife notes that he went the wrong way down a one-way earlier this week (this is not a recurring issue) and often leaves the burners on at home. Patient does not have any history of stroke. 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 Patient is continuing to having some ongoing hip pain. Patient has been taking diclofenac and Tylenol with some improvement. Patient says it is particularly bad with movement. Patient thinks it may be secondary to the change in weather as the seem to coincide with this. Patient regularly gets his PSA checked with urology and last had checked late last year  Patient is agreeable to colonoscopy at this time              Review of Systems   Constitutional:  Positive for fatigue. Negative for activity change and fever. HENT:  Negative for congestion. Eyes:  Negative for visual disturbance. Respiratory:  Negative for chest tightness, shortness of breath and wheezing. Cardiovascular:  Negative for chest pain. Gastrointestinal:  Negative for abdominal pain and blood in stool. Genitourinary:  Negative for difficulty urinating and urgency. Musculoskeletal:  Positive for arthralgias and back pain. Negative for joint swelling. Neurological:  Negative for weakness and headaches. Psychiatric/Behavioral:  Positive for sleep disturbance. Negative for confusion and hallucinations. Objective   Physical Exam  Vitals reviewed. Constitutional:       General: He is not in acute distress. Appearance: Normal appearance. He is obese. He is not ill-appearing. HENT:      Head: Normocephalic and atraumatic. Nose: No congestion. Mouth/Throat:      Mouth: Mucous membranes are moist.   Cardiovascular:      Rate and Rhythm: Normal rate and regular rhythm. Pulses: Normal pulses. Heart sounds: Normal heart sounds. No murmur heard. Pulmonary:      Effort: Pulmonary effort is normal. No respiratory distress. Breath sounds: Normal breath sounds. No wheezing or rhonchi. Chest:      Chest wall: No tenderness. Abdominal:      General: Abdomen is flat. Bowel sounds are normal. There is no distension. Palpations: Abdomen is soft. Tenderness: There is no abdominal tenderness. Musculoskeletal:         General: Swelling present. Right lower le+ Edema present. Left lower le+ Edema present. Comments: Pain with straight leg raise on the left side   Skin:     General: Skin is warm and dry. Neurological:      General: No focal deficit present. Mental Status: He is alert and oriented to person, place, and time. Motor: No weakness.           Vitals:    23 0849   BP: 138/64   Pulse: 70   Resp: 16   Temp: 98.6 °F (37 °C)   SpO2: 99%        Current Outpatient Medications   Medication Sig Dispense Refill    donepezil (ARICEPT) 5 MG tablet Take 1 tablet by mouth nightly 90 tablet 1    lidocaine 4 % external patch Place 1 patch onto the skin daily 30 patch 0    aspirin 325 MG tablet Take 325 mg by mouth in the morning and at bedtime      acetaminophen (TYLENOL) 650 MG extended release tablet Take 650 mg by mouth every 8 hours as needed for Pain      vitamin D (CHOLECALCIFEROL) 125 MCG (5000 UT) CAPS capsule Take 5,000 Units by mouth daily      citalopram (CELEXA) 40 MG tablet Take 1 tablet by mouth daily 90 tablet 2    hydroCHLOROthiazide (HYDRODIURIL) 25 MG tablet Take 1 tablet by mouth in the morning and at bedtime 180 tablet 3    diclofenac (VOLTAREN) 75 MG EC tablet Take 1 tablet by mouth 2 times daily 180 tablet 3    lisinopril (PRINIVIL;ZESTRIL) 20 MG tablet lisinopril 20 mg tablet  Take 1 tablet every day by oral route. 90 tablet 3    metoprolol tartrate (LOPRESSOR) 25 MG tablet Take 1 tablet by mouth 2 times daily 180 tablet 3    Multiple Vitamins-Minerals (THERA M PLUS) TABS Take 1 tablet by mouth daily       No current facility-administered medications for this visit.       Family History   Problem Relation Age of Onset    Kidney Disease Mother     Thyroid Disease Mother     Hypertension Father     Heart Disease Father     Heart Failure Father     Liver Disease Father     Emphysema Father       Past Medical History:   Diagnosis Date    A-fib ([address]     Arthritis     Chronic renal disease     Elevated PSA     Hypertension     Renal calculi       Past Surgical History:   Procedure Laterality Date    ANKLE FRACTURE SURGERY Left 3/29/2022    LEFT  ANKLE OPEN REDUCTION INTERNAL FIXATION MEDIAL MALLEOLUS FRACTURE performed by Mick Mcleod MD at [facility]  03/2017    CARDIAC SURGERY      COSMETIC SURGERY      lip    MITRAL VALVE REPLACEMENT  03/01/2017    done at [place] [address] [address]  2021      Allergies   Allergen Reactions    Chantix [Varenicline]      Pt states 'it made me go crazy'    Zyrtec [Cetirizine] Rash        Lab Results   Component Value Date     02/03/2023    K 4.1 02/03/2023     02/03/2023    CO2 26 02/03/2023    BUN 14 02/03/2023    CREATININE 1.3 (H) 02/03/2023    GLUCOSE 103 02/03/2023    CALCIUM 9.3 02/03/2023    PROT 6.9 02/03/2023    LABALBU 3.8 02/03/2023    BILITOT 0.3 02/03/2023    ALKPHOS 82 02/03/2023    AST 18 02/03/2023    ALT 19 02/03/2023    LABGLOM >60 02/03/2023    GFRAA 40 (L) 10/05/2022        Lab Results   Component Value Date    WBC 10.2 02/03/2023    HGB 15.4 02/03/2023    HCT 48.7 02/03/2023    MCV 94.2 (H) 02/03/2023     02/03/2023 EMR Dragon/transcription disclaimer:  Much of this encounter note is electronic transcription/translation of spoken language toprinted texts. The electronic translation of spoken language may be erroneous, or at times, nonsensical words or phrases may be inadvertently transcribed. Although I have reviewed the note for such errors, some may stillexist.      An electronic signature was used to authenticate this note. --Laxmi Donaldson MD   Discussed with the patient the current USPSTF guidelines released March 9, 2021 for screening for lung cancer. For adults aged 48 to [de-identified] years who have a 20 pack-year smoking history and currently smoke or have quit within the past 15 years the grade B recommendation is to:  Screen for lung cancer with low-dose computed tomography (LDCT) every year. Stop screening once a person has not smoked for 15 years or has a health problem that limits life expectancy or the ability to have lung surgery. The patient  reports that he has been smoking cigarettes. He has a 60.00 pack-year smoking history. He has never used smokeless tobacco.. Discussed with patient the risks and benefits of screening, including over-diagnosis, false positive rate, and total radiation exposure. The patient currently exhibits no signs or symptoms suggestive of lung cancer. Discussed with patient the importance of compliance with yearly annual lung cancer screenings and willingness to undergo diagnosis and treatment if screening scan is positive. In addition, the patient was counseled regarding the importance of remaining smoke free and/or total smoking cessation.     Also reviewed the following if the patient has Medicare that as of February 10, 2022, Medicare only covers LDCT screening in patients aged 51-72 with at least a 20 pack-year smoking history who currently smoke or have quit in the last 15 years

## 2023-02-17 NOTE — PATIENT INSTRUCTIONS

## 2023-02-18 LAB — HIV 1,2 COMBO ANTIGEN/ANTIBODY: NEGATIVE

## 2023-02-20 DIAGNOSIS — Z13.1 ENCOUNTER FOR SCREENING FOR DIABETES MELLITUS: Primary | ICD-10-CM

## 2023-02-22 DIAGNOSIS — Z13.1 ENCOUNTER FOR SCREENING FOR DIABETES MELLITUS: ICD-10-CM

## 2023-02-22 LAB — HBA1C MFR BLD: 5.9 % (ref 4–6)

## 2023-02-27 ENCOUNTER — HOSPITAL ENCOUNTER (OUTPATIENT)
Dept: CT IMAGING | Age: 53
Discharge: HOME | End: 2023-02-27
Payer: MEDICAID

## 2023-02-27 DIAGNOSIS — F17.200 SMOKER: ICD-10-CM

## 2023-02-27 PROCEDURE — 71271 CT THORAX LUNG CANCER SCR C-: CPT | Performed by: RADIOLOGY

## 2023-02-27 PROCEDURE — 71271 CT THORAX LUNG CANCER SCR C-: CPT

## 2023-02-28 ENCOUNTER — TELEPHONE (OUTPATIENT)
Dept: OTHER | Age: 53
End: 2023-02-28

## 2023-03-06 ENCOUNTER — OFFICE VISIT (OUTPATIENT)
Dept: PRIMARY CARE CLINIC | Age: 53
End: 2023-03-06
Payer: MEDICAID

## 2023-03-06 VITALS
BODY MASS INDEX: 35.94 KG/M2 | DIASTOLIC BLOOD PRESSURE: 96 MMHG | SYSTOLIC BLOOD PRESSURE: 144 MMHG | HEIGHT: 73 IN | WEIGHT: 271.2 LBS | HEART RATE: 65 BPM | RESPIRATION RATE: 18 BRPM | TEMPERATURE: 97.6 F | OXYGEN SATURATION: 99 %

## 2023-03-06 DIAGNOSIS — C61 PROSTATE CANCER (HCC): ICD-10-CM

## 2023-03-06 DIAGNOSIS — E04.1 THYROID NODULE: ICD-10-CM

## 2023-03-06 DIAGNOSIS — Q25.79 PULMONARY ARTERY ABNORMALITY: ICD-10-CM

## 2023-03-06 DIAGNOSIS — I77.9 AORTIC DISEASE (HCC): ICD-10-CM

## 2023-03-06 DIAGNOSIS — Q25.79 PULMONARY ARTERY ABNORMALITY: Primary | ICD-10-CM

## 2023-03-06 LAB
MAGNESIUM: 2.3 MG/DL (ref 1.6–2.6)
PROSTATE SPECIFIC ANTIGEN: 0.08 NG/ML (ref 0–4)

## 2023-03-06 PROCEDURE — 99214 OFFICE O/P EST MOD 30 MIN: CPT | Performed by: FAMILY MEDICINE

## 2023-03-06 PROCEDURE — 3077F SYST BP >= 140 MM HG: CPT | Performed by: FAMILY MEDICINE

## 2023-03-06 PROCEDURE — 3080F DIAST BP >= 90 MM HG: CPT | Performed by: FAMILY MEDICINE

## 2023-03-06 RX ORDER — AMLODIPINE BESYLATE 2.5 MG/1
2.5 TABLET ORAL DAILY
Qty: 90 TABLET | Refills: 1 | Status: SHIPPED | OUTPATIENT
Start: 2023-03-06

## 2023-03-06 ASSESSMENT — ENCOUNTER SYMPTOMS
CHEST TIGHTNESS: 0
WHEEZING: 0
SHORTNESS OF BREATH: 0
ABDOMINAL PAIN: 0
BLOOD IN STOOL: 0

## 2023-03-06 NOTE — PROGRESS NOTES
Toan Chacon (:  1970) is a 53 y.o. male,Established patient, here for evaluation of the following chief complaint(s):  Follow-up (Pt is here for a follow up. No new concerns. )         ASSESSMENT/PLAN:  1. Pulmonary artery abnormality  -     Echocardiogram complete; Future  -     Magnesium; Future  2. Aortic disease (HCC)  -     Echocardiogram complete; Future  -     Magnesium; Future  3. Thyroid nodule  -     US THYROID; Future  -     Magnesium; Future  4. Prostate cancer (HCC)  -     PSA Screening; Future  -     Magnesium; Future    CT notable for enlarged pulmonary artery of 3.2 cm and enlarged ascending aorta at 4.3 cm.  Will obtain echocardiogram to better characterize this.  Did encourage patient to follow-up with his cardiologist to further discuss this and recommended they schedule appointment in the upcoming weeks.  Given historical finding of 1.3 cm thyroid nodule that was never worked up will order thyroid ultrasound at this time for better characterization of this nodule.  If it is greater than 1 cm and looks concerning on ultrasound he may follow-up with fine-needle aspiration biopsy with ENT.  PSA and magnesium ordered at this time per patient request and for ongoing monitoring given history of prostate cancer  We will start amlodipine 2.5 mg at this time given patient's elevated blood pressure.  It is very important we get his blood pressure under as good control as we can given the above blood vessel dilations.  I do believe it may have additional benefit if indeed patient has pulmonary hypertension.        Return in about 3 weeks (around 3/27/2023).         Subjective   SUBJECTIVE/OBJECTIVE:  Toan Chacon is a 53 y.o. male who presents to follow-up on unexpected findings on CT lung screening.  CT lung was notable for dilated aortic root and pulmonary artery.  Patient's wife did mention that this had been mentioned to them before after a prior study however they did not discuss this  with her provider at the time and it seems to have slightly enlarged and since the prior study early last year.  Patient denies any new symptoms and notes that he does follow-up with Dr. Arthur for cardiology fairly regularly.  They do not have any scheduled appointments at this time.  Of note on the patient's prior CT a 1.3 cm thyroid nodule was noted however this was not noted on the most recent CT.  This was never followed up or evaluated.  Patient is also interested in potentially initiating disability.  Patient has multiple comorbidities and states that his ongoing worsening memory is making things difficult for him.  Patient does not currently work anywhere.  Patient's wife says that she is currently undergoing the same thing though the paperwork is still pending            Review of Systems   Constitutional:  Positive for fatigue. Negative for activity change and fever.   HENT:  Negative for congestion.    Eyes:  Negative for visual disturbance.   Respiratory:  Negative for chest tightness, shortness of breath and wheezing.    Cardiovascular:  Negative for chest pain.   Gastrointestinal:  Negative for abdominal pain and blood in stool.   Genitourinary:  Negative for difficulty urinating and urgency.   Neurological:  Negative for weakness and headaches.   Psychiatric/Behavioral:  Negative for confusion.         Objective   Physical Exam  Vitals reviewed.   Constitutional:       General: He is not in acute distress.     Appearance: Normal appearance. He is obese. He is not ill-appearing.   HENT:      Head: Normocephalic and atraumatic.      Nose: No congestion.      Mouth/Throat:      Mouth: Mucous membranes are moist.   Cardiovascular:      Rate and Rhythm: Normal rate and regular rhythm.      Pulses: Normal pulses.      Heart sounds: Normal heart sounds. No murmur heard.  Pulmonary:      Effort: Pulmonary effort is normal. No respiratory distress.      Breath sounds: Normal breath sounds. No wheezing or  rhonchi. Chest:      Chest wall: No tenderness. Abdominal:      General: Abdomen is flat. Bowel sounds are normal. There is no distension. Palpations: Abdomen is soft. Tenderness: There is no abdominal tenderness. Musculoskeletal:         General: No swelling. Right lower le+ Edema present. Left lower le+ Edema present. Skin:     General: Skin is warm and dry. Neurological:      General: No focal deficit present. Mental Status: He is alert and oriented to person, place, and time. Motor: No weakness. Vitals:    23 0859   BP: (!) 144/96   Pulse: 65   Resp: 18   Temp: 97.6 °F (36.4 °C)   SpO2: 99%        Current Outpatient Medications   Medication Sig Dispense Refill    amLODIPine (NORVASC) 2.5 MG tablet Take 1 tablet by mouth daily 90 tablet 1    donepezil (ARICEPT) 5 MG tablet Take 1 tablet by mouth nightly 90 tablet 1    aspirin 325 MG tablet Take 325 mg by mouth in the morning and at bedtime      acetaminophen (TYLENOL) 650 MG extended release tablet Take 650 mg by mouth every 8 hours as needed for Pain      vitamin D (CHOLECALCIFEROL) 125 MCG (5000 UT) CAPS capsule Take 5,000 Units by mouth daily      citalopram (CELEXA) 40 MG tablet Take 1 tablet by mouth daily 90 tablet 2    hydroCHLOROthiazide (HYDRODIURIL) 25 MG tablet Take 1 tablet by mouth in the morning and at bedtime 180 tablet 3    diclofenac (VOLTAREN) 75 MG EC tablet Take 1 tablet by mouth 2 times daily 180 tablet 3    lisinopril (PRINIVIL;ZESTRIL) 20 MG tablet lisinopril 20 mg tablet  Take 1 tablet every day by oral route.  90 tablet 3    metoprolol tartrate (LOPRESSOR) 25 MG tablet Take 1 tablet by mouth 2 times daily 180 tablet 3    Multiple Vitamins-Minerals (THERA M PLUS) TABS Take 1 tablet by mouth daily      lidocaine 4 % external patch Place 1 patch onto the skin daily (Patient not taking: Reported on 3/6/2023) 30 patch 0     No current facility-administered medications for this visit. Family History   Problem Relation Age of Onset    Kidney Disease Mother     Thyroid Disease Mother     Hypertension Father     Heart Disease Father     Heart Failure Father     Liver Disease Father     Emphysema Father       Past Medical History:   Diagnosis Date    A-fib ([address]     Arthritis     Chronic renal disease     Elevated PSA     Hypertension     Renal calculi       Past Surgical History:   Procedure Laterality Date    ANKLE FRACTURE SURGERY Left 3/29/2022    LEFT  ANKLE OPEN REDUCTION INTERNAL FIXATION MEDIAL MALLEOLUS FRACTURE performed by Jenn Sam MD at [address]  03/2017    CARDIAC SURGERY      COSMETIC SURGERY      lip    MITRAL VALVE REPLACEMENT  03/01/2017    done at [place] [address] [address]  2021      Allergies   Allergen Reactions    Chantix [Varenicline]      Pt states 'it made me go crazy'    Zyrtec [Cetirizine] Rash        Lab Results   Component Value Date     02/03/2023    K 4.1 02/03/2023     02/03/2023    CO2 26 02/03/2023    BUN 14 02/03/2023    CREATININE 1.3 (H) 02/03/2023    GLUCOSE 103 02/03/2023    CALCIUM 9.3 02/03/2023    PROT 6.9 02/03/2023    LABALBU 3.8 02/03/2023    BILITOT 0.3 02/03/2023    ALKPHOS 82 02/03/2023    AST 18 02/03/2023    ALT 19 02/03/2023    LABGLOM >60 02/03/2023    GFRAA 40 (L) 10/05/2022        Lab Results   Component Value Date    WBC 10.2 02/03/2023    HGB 15.4 02/03/2023    HCT 48.7 02/03/2023    MCV 94.2 (H) 02/03/2023     02/03/2023                EMR Dragon/transcription disclaimer:  Much of this encounter note is electronic transcription/translation of spoken language toprinted texts. The electronic translation of spoken language may be erroneous, or at times, nonsensical words or phrases may be inadvertently transcribed.   Although I have reviewed the note for such errors, some may stillexist.      An electronic signature was used to authenticate this note.    --Junior Mars MD

## 2023-03-09 ENCOUNTER — HOSPITAL ENCOUNTER (OUTPATIENT)
Dept: NON INVASIVE DIAGNOSTICS | Age: 53
Discharge: HOME | End: 2023-03-09
Payer: MEDICAID

## 2023-03-09 ENCOUNTER — HOSPITAL ENCOUNTER (OUTPATIENT)
Dept: ULTRASOUND IMAGING | Age: 53
Discharge: HOME | End: 2023-03-09
Payer: MEDICAID

## 2023-03-09 DIAGNOSIS — Q25.79 PULMONARY ARTERY ABNORMALITY: ICD-10-CM

## 2023-03-09 DIAGNOSIS — I77.9 AORTIC DISEASE (HCC): ICD-10-CM

## 2023-03-09 PROCEDURE — 76536 US EXAM OF HEAD AND NECK: CPT

## 2023-03-09 PROCEDURE — 6360000004 HC RX CONTRAST MEDICATION: Performed by: INTERNAL MEDICINE

## 2023-03-09 PROCEDURE — C8929 TTE W OR WO FOL WCON,DOPPLER: HCPCS

## 2023-03-09 RX ADMIN — PERFLUTREN 1.5 ML: 6.52 INJECTION, SUSPENSION INTRAVENOUS at 13:09

## 2023-03-14 DIAGNOSIS — E04.1 THYROID NODULE: Primary | ICD-10-CM

## 2023-03-15 ENCOUNTER — OFFICE VISIT (OUTPATIENT)
Dept: CARDIOLOGY | Facility: CLINIC | Age: 53
End: 2023-03-15
Payer: MEDICAID

## 2023-03-15 VITALS
HEIGHT: 72 IN | SYSTOLIC BLOOD PRESSURE: 132 MMHG | WEIGHT: 269 LBS | BODY MASS INDEX: 36.44 KG/M2 | DIASTOLIC BLOOD PRESSURE: 78 MMHG | OXYGEN SATURATION: 98 % | HEART RATE: 68 BPM

## 2023-03-15 DIAGNOSIS — Z72.0 TOBACCO ABUSE: ICD-10-CM

## 2023-03-15 DIAGNOSIS — I50.32 CHRONIC DIASTOLIC CONGESTIVE HEART FAILURE: ICD-10-CM

## 2023-03-15 DIAGNOSIS — E66.01 CLASS 2 SEVERE OBESITY DUE TO EXCESS CALORIES WITH SERIOUS COMORBIDITY AND BODY MASS INDEX (BMI) OF 36.0 TO 36.9 IN ADULT: ICD-10-CM

## 2023-03-15 DIAGNOSIS — I71.21 ANEURYSM OF ASCENDING AORTA WITHOUT RUPTURE: ICD-10-CM

## 2023-03-15 DIAGNOSIS — Z98.890 H/O MAZE PROCEDURE: ICD-10-CM

## 2023-03-15 DIAGNOSIS — N18.32 STAGE 3B CHRONIC KIDNEY DISEASE: ICD-10-CM

## 2023-03-15 DIAGNOSIS — I34.0 NONRHEUMATIC MITRAL VALVE REGURGITATION: Primary | ICD-10-CM

## 2023-03-15 DIAGNOSIS — Z98.890 STATUS POST LIGATION OF LEFT ATRIAL APPENDAGE: ICD-10-CM

## 2023-03-15 DIAGNOSIS — I48.0 PAROXYSMAL ATRIAL FIBRILLATION: ICD-10-CM

## 2023-03-15 DIAGNOSIS — Z95.2 H/O MITRAL VALVE REPLACEMENT: ICD-10-CM

## 2023-03-15 DIAGNOSIS — I10 PRIMARY HYPERTENSION: ICD-10-CM

## 2023-03-15 DIAGNOSIS — I28.8 DILATION OF PULMONARY ARTERY: ICD-10-CM

## 2023-03-15 PROCEDURE — 93000 ELECTROCARDIOGRAM COMPLETE: CPT | Performed by: NURSE PRACTITIONER

## 2023-03-15 PROCEDURE — 1159F MED LIST DOCD IN RCRD: CPT | Performed by: NURSE PRACTITIONER

## 2023-03-15 PROCEDURE — 3075F SYST BP GE 130 - 139MM HG: CPT | Performed by: NURSE PRACTITIONER

## 2023-03-15 PROCEDURE — 3078F DIAST BP <80 MM HG: CPT | Performed by: NURSE PRACTITIONER

## 2023-03-15 PROCEDURE — 99214 OFFICE O/P EST MOD 30 MIN: CPT | Performed by: NURSE PRACTITIONER

## 2023-03-15 PROCEDURE — 1160F RVW MEDS BY RX/DR IN RCRD: CPT | Performed by: NURSE PRACTITIONER

## 2023-03-15 RX ORDER — AMLODIPINE BESYLATE 2.5 MG/1
1 TABLET ORAL DAILY
COMMUNITY
Start: 2023-03-06

## 2023-03-15 RX ORDER — SACUBITRIL AND VALSARTAN 49; 51 MG/1; MG/1
1 TABLET, FILM COATED ORAL 2 TIMES DAILY
Qty: 60 TABLET | Refills: 11 | Status: SHIPPED | OUTPATIENT
Start: 2023-03-15 | End: 2023-03-15 | Stop reason: SDUPTHER

## 2023-03-15 RX ORDER — SACUBITRIL AND VALSARTAN 49; 51 MG/1; MG/1
1 TABLET, FILM COATED ORAL 2 TIMES DAILY
Qty: 60 TABLET | Refills: 11 | Status: SHIPPED | OUTPATIENT
Start: 2023-03-17

## 2023-03-15 RX ORDER — DONEPEZIL HYDROCHLORIDE 5 MG/1
5 TABLET, FILM COATED ORAL NIGHTLY
COMMUNITY
Start: 2023-02-17

## 2023-03-15 NOTE — PROGRESS NOTES
Chief Complaint  Cardiac Valve Problem (9mo F/U. Hx of MVR)    Subjective          Gurjit Andrea presents to [id number] for routine follow-up.  He had mitral valve regurgitation status post mitral valve replacement in 2017 at , paroxysmal atrial fibrillation status post maze procedure and left atrial appendage exclusion with no recent recurrence, chronic diastolic congestive heart failure, stage III CKD, ventricular diastolic dysfunction, hypertension, obesity and tobacco use.  Patient denies chest pain, shortness of breath, palpitations, dizziness, syncope, orthopnea, PND, edema or decreased stamina.  Patient denies any signs of bleeding.    Atrial Fibrillation  Presents for follow-up visit. Symptoms are negative for an AICD problem, bradycardia, chest pain, dizziness, hemodynamic instability, hypertension, hypotension, pacemaker problem, palpitations, shortness of breath, syncope, tachycardia and weakness. The symptoms have been resolved. Past medical history includes atrial fibrillation and CHF. There are no medication compliance problems.   Hypertension  This is a chronic problem. The current episode started more than 1 year ago. The problem is controlled. Pertinent negatives include no anxiety, blurred vision, chest pain, headaches, malaise/fatigue, neck pain, orthopnea, palpitations, peripheral edema, PND, shortness of breath or sweats. Risk factors for coronary artery disease include obesity, male gender and smoking/tobacco exposure. Current antihypertension treatment includes diuretics, ACE inhibitors and beta blockers. The current treatment provides significant improvement.   Congestive Heart Failure  Presents for follow-up visit. Pertinent negatives include no abdominal pain, chest pain, chest pressure, claudication, edema, fatigue, muscle weakness, near-syncope, nocturia, orthopnea, palpitations, paroxysmal nocturnal dyspnea, shortness of breath or unexpected  "weight change. The symptoms have been stable. Compliance with total regimen is 51-75%. Compliance with diet is 51-75%. Compliance with exercise is 51-75%. Compliance with medications is %.       Objective     Current Outpatient Medications:   •  Acetaminophen (TYLENOL ARTHRITIS PAIN PO), Take 2 tablets by mouth As Needed., Disp: , Rfl:   •  amLODIPine (NORVASC) 2.5 MG tablet, Take 1 tablet by mouth Daily., Disp: , Rfl:   •  aspirin 325 MG tablet, Take 1 tablet by mouth 2 (Two) Times a Day., Disp: , Rfl:   •  citalopram (CeleXA) 40 MG tablet, Take 1 tablet by mouth Daily., Disp: , Rfl:   •  diclofenac (VOLTAREN) 75 MG EC tablet, Take 1 tablet by mouth 2 (Two) Times a Day., Disp: , Rfl:   •  donepezil (ARICEPT) 5 MG tablet, Take 1 tablet by mouth Every Night., Disp: , Rfl:   •  hydroCHLOROthiazide (HYDRODIURIL) 25 MG tablet, Take 1 tablet by mouth Daily. (Patient taking differently: Take 1 tablet by mouth 2 (Two) Times a Day.), Disp: 30 tablet, Rfl: 2  •  metoprolol tartrate (LOPRESSOR) 25 MG tablet, Take 1 tablet by mouth 2 (Two) Times a Day., Disp: 180 tablet, Rfl: 3  •  Multiple Vitamins-Minerals (MULTIVITAMIN WITH MINERALS) tablet tablet, Take 1 tablet by mouth Daily., Disp: , Rfl:   •  [START ON 3/17/2023] sacubitril-valsartan (Entresto) 49-51 MG tablet, Take 1 tablet by mouth 2 (Two) Times a Day., Disp: 60 tablet, Rfl: 11  •  vitamin D3 125 MCG (5000 UT) capsule capsule, Take 1 capsule by mouth Every Other Day., Disp: , Rfl:   Vital Signs:   /78   Pulse 68   Ht 182.9 cm (72\")   Wt 122 kg (269 lb)   SpO2 98%   BMI 36.48 kg/m²     Vitals and nursing note reviewed.   Constitutional:       General: Awake.      Appearance: Normal and healthy appearance. Well-developed and not in distress. Obese.   Eyes:      General: Lids are normal.      Conjunctiva/sclera: Conjunctivae normal.      Pupils: Pupils are equal, round, and reactive to light.   HENT:      Head: Normocephalic and atraumatic.      Nose: " Nose normal.   Neck:      Vascular: No JVR. JVD normal.   Pulmonary:      Effort: Pulmonary effort is normal.      Breath sounds: Examination of the right-upper field reveals decreased breath sounds. Examination of the left-upper field reveals decreased breath sounds. Examination of the right-middle field reveals decreased breath sounds. Examination of the left-middle field reveals decreased breath sounds. Examination of the right-lower field reveals decreased breath sounds. Examination of the left-lower field reveals decreased breath sounds. Decreased breath sounds present. No wheezing. No rhonchi. No rales.   Chest:      Chest wall: Not tender to palpatation.   Cardiovascular:      PMI at left midclavicular line. Normal rate. Regular rhythm. Normal S1. Normal S2.      Murmurs: There is no murmur.      No gallop. No click. No rub.   Pulses:     Intact distal pulses.   Edema:     Peripheral edema absent.   Abdominal:      General: Bowel sounds are normal.      Palpations: Abdomen is soft.      Tenderness: There is no abdominal tenderness.   Musculoskeletal: Normal range of motion.         General: No tenderness.      Cervical back: Normal range of motion. Skin:     General: Skin is warm and dry.   Neurological:      General: No focal deficit present.      Mental Status: Alert, oriented to person, place, and time and oriented to person, place and time.   Psychiatric:         Attention and Perception: Attention and perception normal.         Mood and Affect: Mood and affect normal.         Speech: Speech normal.         Behavior: Behavior normal. Behavior is cooperative.         Thought Content: Thought content normal.         Cognition and Memory: Cognition and memory normal.         Judgment: Judgment normal.        Result Review :   The following data was reviewed by: BHARATHI Vieira on 03/15/2023:  Common labs    Common Labs 2/3/23 2/3/23 2/22/23 3/6/23    1052 1052     WBC 10.2      Hemoglobin 15.4       Hematocrit 48.7      Platelets 226      Total Cholesterol  167     Triglycerides  122     HDL Cholesterol  47 (A)     LDL Cholesterol   96     Hemoglobin A1C   5.9    PSA    0.08   (A) Abnormal value       Comments are available for some flowsheets but are not being displayed.           Data reviewed: Cardiology studies 2d echo 5/7/22     ECG 12 Lead    Date/Time: 3/15/2023 2:28 PM  Performed by: Kayla Acevedo APRN  Authorized by: Kayla Acevedo APRN   Comparison: compared with previous ECG from 3/24/2022  Similar to previous ECG  Rhythm: sinus rhythm  Rate: normal  BPM: 68  QRS axis: normal    Clinical impression: normal ECG              Assessment and Plan    Diagnoses and all orders for this visit:    1. Nonrheumatic mitral valve regurgitation (Primary)-status post mitral valve replacement at  in 2017.    2. H/O mitral valve replacement-2017 .  No signs of mitral valve stenosis or regurgitation on 2D echo 5/7/2022.    3. Paroxysmal atrial fibrillation (HCC)-no known recent recurrence.  In sinus rhythm today.  Status post Maze procedure and left atrial appendage exclusion.  Stable.  Continue metoprolol tartrate.    4. Status post ligation of left atrial appendage-2017.    5. H/O maze procedure-2017.    6. Primary hypertension-blood pressure is elevated in office today. Continue to monitor following medication adjustment below.  Continue metoprolol tartrate and hydrochlorothiazide.  Monitor and record daily blood pressure. Report readings consistently higher than 130/80 or consistently lower than 100/60.     7. Chronic diastolic congestive heart failure (HCC)-grade 2 left ventricular diastolic dysfunction on 2D echo 5/5/2022. NYHA class II. Stage C. Compensated. Stop lisinopril. Start Entresto 49/51 mg twice daily on the morning of Friday 03/17/23 following 36 hour washout period. BMP in one week. Reviewed signs and symptoms of CHF and what to report with the patient. Patient instructed to restrict  sodium and weigh daily. Report weight gain of greater than 2 lbs overnight or 5 lbs in 1 week. Pt verbalized understanding of instructions and plan of care. Consider initiation of Jardiance and spironolactone in the future, if tolerated.     8. Class 2 severe obesity due to excess calories with serious comorbidity and body mass index (BMI) of 36.0 to 36.9 in adult ([facility])- Class 2 Severe Obesity (BMI >=35 and <=39.9). Obesity-related health conditions include the following: hypertension. Obesity is worsening. BMI is is above average; no BMI management plan is appropriate. We discussed low calorie, low carb based diet program, portion control and increasing exercise.    9. Tobacco abuse- Gurjit Andrea  reports that he has been smoking cigarettes. He has been smoking an average of 1.5 packs per day. He has never used smokeless tobacco.. I have educated him on the risk of diseases from using tobacco products such as cancer, COPD and heart disease. I advised him to quit and he is not willing to quit. I spent 3  minutes counseling the patient.    10. Stage 3b chronic kidney disease (HCC)- pt follows with Dr. Pedraza with nephrology. Stable.     11. Dilated pulmonary artery- check PFTs.     12. Ascending thoracic aortic aneurysm- referral to CT surgery.       Follow Up   Return in about 4 weeks (around 4/12/2023) for Next scheduled follow up.  Patient was given instructions and counseling regarding his condition or for health maintenance advice. Please see specific information pulled into the AVS if appropriate.

## 2023-03-16 ENCOUNTER — TELEPHONE (OUTPATIENT)
Dept: ENT CLINIC | Age: 53
End: 2023-03-16

## 2023-03-17 ENCOUNTER — TELEPHONE (OUTPATIENT)
Dept: GASTROENTEROLOGY | Age: 53
End: 2023-03-17

## 2023-03-21 ENCOUNTER — TELEPHONE (OUTPATIENT)
Dept: CARDIOLOGY | Facility: CLINIC | Age: 53
End: 2023-03-21
Payer: MEDICAID

## 2023-03-21 NOTE — TELEPHONE ENCOUNTER
PT IS SCHEDULED FOR A COLONOSCOPY ON 4/17/23. PLEASE ADVISE REGARDING CARDIAC CLEARANCE.    FORM TO BE SIGNED

## 2023-03-22 ENCOUNTER — TELEPHONE (OUTPATIENT)
Dept: GASTROENTEROLOGY | Age: 53
End: 2023-03-22

## 2023-03-22 NOTE — TELEPHONE ENCOUNTER
Patient: Nitesh Barragan    YOB: 1970      CARDIAC Clearance was received on March 22, 2023. for Colonoscopy scheduled for: 4/17/23    Patient may discontinue the use of ASA for 5  days prior to the procedure.     IS Lovenox required:  NO    PATIENT NOTIFIED ON:  3/22/23      Clearance scanned into media

## 2023-03-29 NOTE — PROGRESS NOTES
Subjective    Mr. Andrea is 53 y.o. male    Chief Complaint: Prostate Cancer  History of Present Illness   Preop PSA was 32.4.  He had a bone scan and CT which were negative for metastatic prostate cancer.  Preop erectile dysfunction.  Status post robotic assisted lap prostatectomy and bilateral pelvic lymph node dissection in June 2021.  This did reveal P T3a PN 0 [place] 4+3 = 7 adenocarcinoma the prostate with a positive margin at the bladder neck.  This is a significant upgrade from Susie 3+3= 6 in 1 out of 12 cores on his biopsy.  Postop PSA < 0.1. Stable MANISHA one thin liner per day.   Lab Results   Component Value Date    PSA 0.08 03/06/2023    PSA <0.1 07/19/2022    PSA <0.1 04/19/2022       The following portions of the patient's history were reviewed and updated as appropriate: allergies, current medications, past family history, past medical history, past social history, past surgical history and problem list.    Review of Systems      Current Outpatient Medications:   •  Acetaminophen (TYLENOL ARTHRITIS PAIN PO), Take 2 tablets by mouth As Needed., Disp: , Rfl:   •  amLODIPine (NORVASC) 2.5 MG tablet, Take 1 tablet by mouth Daily., Disp: , Rfl:   •  aspirin 325 MG tablet, Take 1 tablet by mouth 2 (Two) Times a Day., Disp: , Rfl:   •  citalopram (CeleXA) 40 MG tablet, Take 1 tablet by mouth Daily., Disp: , Rfl:   •  diclofenac (VOLTAREN) 75 MG EC tablet, Take 1 tablet by mouth 2 (Two) Times a Day., Disp: , Rfl:   •  donepezil (ARICEPT) 5 MG tablet, Take 1 tablet by mouth Every Night., Disp: , Rfl:   •  hydroCHLOROthiazide (HYDRODIURIL) 25 MG tablet, Take 1 tablet by mouth Daily. (Patient taking differently: Take 1 tablet by mouth 2 (Two) Times a Day.), Disp: 30 tablet, Rfl: 2  •  metoprolol tartrate (LOPRESSOR) 25 MG tablet, Take 1 tablet by mouth 2 (Two) Times a Day., Disp: 180 tablet, Rfl: 3  •  Multiple Vitamins-Minerals (MULTIVITAMIN WITH MINERALS) tablet tablet, Take 1 tablet by mouth Daily., Disp:  ", Rfl:   •  sacubitril-valsartan (Entresto) 49-51 MG tablet, Take 1 tablet by mouth 2 (Two) Times a Day., Disp: 60 tablet, Rfl: 11  •  vitamin D3 125 MCG (5000 UT) capsule capsule, Take 1 capsule by mouth Every Other Day., Disp: , Rfl:     Past Medical History:   Diagnosis Date   • A-fib    • Arthritis    • Chronic renal failure    • Coronary artery disease    • Elevated PSA    • GERD (gastroesophageal reflux disease)    • Hypertension    • Kidney stone    • MRSA bacteremia 02/21/2017    MRSA in heart valve   • Prostate cancer    • Purpura    • Wegener's granulomatosis with renal involvement        Past Surgical History:   Procedure Laterality Date   • APPENDECTOMY     • INSERTION HEMODIALYSIS CATHETER N/A 1/20/2017    Procedure: INSERTION PERMCATH;  Surgeon: Ian Grimes DO;  Location:  [facility] OR;  Service:    • LIP REPAIR     • MITRAL VALVE REPLACEMENT     • PROSTATECTOMY Bilateral 6/1/2021    Procedure: RADICAL PROSTATECTOMY LAPAROSCOPIC WITH DAVINCI ROBOT WITH BILATERAL PELVIC LYMPH NODE DISSECTION;  Surgeon: Luis Carlos Awan MD;  Location:  [facility] OR;  Service: Robotics - DaVinci;  Laterality: Bilateral;       Social History     Socioeconomic History   • Marital status:    Tobacco Use   • Smoking status: Every Day     Packs/day: 1.00     Years: 15.00     Pack years: 15.00     Types: Cigarettes   • Smokeless tobacco: Never   Vaping Use   • Vaping Use: Never used   Substance and Sexual Activity   • Alcohol use: Not Currently     Comment: 1 drink per year until 5 years ago, then none   • Drug use: No   • Sexual activity: Not Currently       Family History   Problem Relation Age of Onset   • Heart disease Father    • Hypertension Father        Objective    Temp 98.1 °F (36.7 °C)   Ht 182.9 cm (72\")   Wt 120 kg (264 lb)   BMI 35.80 kg/m²     Physical Exam        Results for orders placed or performed in visit on 04/05/23   POC Urinalysis Dipstick, Multipro    Specimen: Urine   Result Value Ref " Range    Color Yellow Yellow, Straw, Dark Yellow, Falguni    Clarity, UA Clear Clear    Glucose, UA Negative Negative mg/dL    Bilirubin Negative Negative    Ketones, UA Negative Negative    Specific Gravity  1.030 1.005 - 1.030    Blood, UA Negative Negative    pH, Urine 5.5 5.0 - 8.0    Protein, POC 30 mg/dL (A) Negative mg/dL    Urobilinogen, UA 0.2 E.U./dL Normal, 0.2 E.U./dL    Nitrite, UA Negative Negative    Leukocytes Negative Negative   KUB independent review    A KUB is available for me to review today.  The image is inspected for a bowel gas pattern and the general bone structure of the spine and pelvis. The kidneys are then inspected closely.  Renal outline is noted if identifiable. The kidney, collecting system, and anticipated path of the ureter are examined for calcifications including those in the true pelvis.  This film reveals:    On the right there is a single renal stone measuring 9 mm.    On the left there are no calcificaitons seen in the kidney or the expected course of the ureter. .      Assessment and Plan    Diagnoses and all orders for this visit:    1. Prostate cancer (Primary)  -     XR abdomen kub; Future  -     POC Urinalysis Dipstick, Multipro  -     PSA DIAGNOSTIC; Future    2. Impotence of organic origin    3. Male stress incontinence    4. Nephrolithiasis    5. Incisional hernia without obstruction or gangrene        Preop PSA was 32.4.  He had a bone scan and CT which were negative for metastatic prostate cancer.  Preop erectile dysfunction.  Status post robotic assisted lap prostatectomy and bilateral pelvic lymph node dissection in June 2021.  This did reveal P T3a PN 0 [place] 4+3 = 7 adenocarcinoma the prostate with a positive margin at the bladder neck.  This is a significant upgrade from [place] 3+3= 6 in 1 out of 12 cores on his biopsy.      PSA today 0.08.  We discussed recurrence is defined as PSA 0.2 or higher.     1 thin liner/day.  Continue Kegels.     Not interested in  ED therapy at this point.     Follow up in 4 months with PSA.        Check KUB every other appointment. 9mm right lower pole stone.  Patient has chosen observation.

## 2023-03-30 ENCOUNTER — TELEPHONE (OUTPATIENT)
Dept: CARDIAC SURGERY | Facility: CLINIC | Age: 53
End: 2023-03-30
Payer: MEDICAID

## 2023-04-03 ENCOUNTER — TELEPHONE (OUTPATIENT)
Dept: PSYCHIATRY | Age: 53
End: 2023-04-03

## 2023-04-03 ENCOUNTER — OFFICE VISIT (OUTPATIENT)
Dept: PRIMARY CARE CLINIC | Age: 53
End: 2023-04-03
Payer: MEDICAID

## 2023-04-03 VITALS
HEART RATE: 62 BPM | OXYGEN SATURATION: 98 % | DIASTOLIC BLOOD PRESSURE: 76 MMHG | BODY MASS INDEX: 34.59 KG/M2 | HEIGHT: 73 IN | WEIGHT: 261 LBS | TEMPERATURE: 97.3 F | SYSTOLIC BLOOD PRESSURE: 116 MMHG

## 2023-04-03 DIAGNOSIS — M54.32 SCIATICA OF LEFT SIDE: ICD-10-CM

## 2023-04-03 DIAGNOSIS — I77.9 AORTIC DISEASE (HCC): ICD-10-CM

## 2023-04-03 DIAGNOSIS — E04.1 THYROID NODULE: ICD-10-CM

## 2023-04-03 DIAGNOSIS — F32.A DEPRESSIVE DISORDER: Primary | ICD-10-CM

## 2023-04-03 PROCEDURE — 99214 OFFICE O/P EST MOD 30 MIN: CPT | Performed by: FAMILY MEDICINE

## 2023-04-03 PROCEDURE — 3078F DIAST BP <80 MM HG: CPT | Performed by: FAMILY MEDICINE

## 2023-04-03 PROCEDURE — 3074F SYST BP LT 130 MM HG: CPT | Performed by: FAMILY MEDICINE

## 2023-04-03 RX ORDER — SACUBITRIL AND VALSARTAN 49; 51 MG/1; MG/1
1 TABLET, FILM COATED ORAL 2 TIMES DAILY
COMMUNITY
Start: 2023-03-15

## 2023-04-03 RX ORDER — CYCLOBENZAPRINE HCL 5 MG
5 TABLET ORAL NIGHTLY PRN
Qty: 30 TABLET | Refills: 0 | Status: SHIPPED | OUTPATIENT
Start: 2023-04-03 | End: 2023-04-13

## 2023-04-03 ASSESSMENT — ENCOUNTER SYMPTOMS
SHORTNESS OF BREATH: 1
ABDOMINAL PAIN: 0
CHEST TIGHTNESS: 0
WHEEZING: 0
BLOOD IN STOOL: 0

## 2023-04-03 ASSESSMENT — PATIENT HEALTH QUESTIONNAIRE - PHQ9
6. FEELING BAD ABOUT YOURSELF - OR THAT YOU ARE A FAILURE OR HAVE LET YOURSELF OR YOUR FAMILY DOWN: 3
SUM OF ALL RESPONSES TO PHQ QUESTIONS 1-9: 18
10. IF YOU CHECKED OFF ANY PROBLEMS, HOW DIFFICULT HAVE THESE PROBLEMS MADE IT FOR YOU TO DO YOUR WORK, TAKE CARE OF THINGS AT HOME, OR GET ALONG WITH OTHER PEOPLE: 3
SUM OF ALL RESPONSES TO PHQ9 QUESTIONS 1 & 2: 5
2. FEELING DOWN, DEPRESSED OR HOPELESS: 3
SUM OF ALL RESPONSES TO PHQ QUESTIONS 1-9: 18
9. THOUGHTS THAT YOU WOULD BE BETTER OFF DEAD, OR OF HURTING YOURSELF: 1
3. TROUBLE FALLING OR STAYING ASLEEP: 3
1. LITTLE INTEREST OR PLEASURE IN DOING THINGS: 2
8. MOVING OR SPEAKING SO SLOWLY THAT OTHER PEOPLE COULD HAVE NOTICED. OR THE OPPOSITE, BEING SO FIGETY OR RESTLESS THAT YOU HAVE BEEN MOVING AROUND A LOT MORE THAN USUAL: 0
5. POOR APPETITE OR OVEREATING: 3
SUM OF ALL RESPONSES TO PHQ QUESTIONS 1-9: 17
4. FEELING TIRED OR HAVING LITTLE ENERGY: 3
SUM OF ALL RESPONSES TO PHQ QUESTIONS 1-9: 18

## 2023-04-03 ASSESSMENT — COLUMBIA-SUICIDE SEVERITY RATING SCALE - C-SSRS
2. HAVE YOU ACTUALLY HAD ANY THOUGHTS OF KILLING YOURSELF?: NO
6. HAVE YOU EVER DONE ANYTHING, STARTED TO DO ANYTHING, OR PREPARED TO DO ANYTHING TO END YOUR LIFE?: NO
1. WITHIN THE PAST MONTH, HAVE YOU WISHED YOU WERE DEAD OR WISHED YOU COULD GO TO SLEEP AND NOT WAKE UP?: NO

## 2023-04-03 NOTE — TELEPHONE ENCOUNTER
Called pt to schedule an appt from a referral    No answer and LVM.     Electronically signed by Cameron Hammond MA on 4/3/2023 at 2:27 PM

## 2023-04-03 NOTE — PROGRESS NOTES
tablet 0    amLODIPine (NORVASC) 2.5 MG tablet Take 1 tablet by mouth daily 90 tablet 1    donepezil (ARICEPT) 5 MG tablet Take 1 tablet by mouth nightly 90 tablet 1    aspirin 325 MG tablet Take 1 tablet by mouth in the morning and at bedtime      acetaminophen (TYLENOL) 650 MG extended release tablet Take 1 tablet by mouth every 8 hours as needed for Pain      vitamin D (CHOLECALCIFEROL) 125 MCG (5000 UT) CAPS capsule Take 1 capsule by mouth daily      citalopram (CELEXA) 40 MG tablet Take 1 tablet by mouth daily 90 tablet 2    hydroCHLOROthiazide (HYDRODIURIL) 25 MG tablet Take 1 tablet by mouth in the morning and at bedtime 180 tablet 3    diclofenac (VOLTAREN) 75 MG EC tablet Take 1 tablet by mouth 2 times daily 180 tablet 3    metoprolol tartrate (LOPRESSOR) 25 MG tablet Take 1 tablet by mouth 2 times daily 180 tablet 3    Multiple Vitamins-Minerals (THERA M PLUS) TABS Take 1 tablet by mouth daily      ENTRESTO 49-51 MG per tablet Take 1 tablet by mouth 2 times daily       No current facility-administered medications for this visit.       Family History   Problem Relation Age of Onset    Kidney Disease Mother     Thyroid Disease Mother     Hypertension Father     Heart Disease Father     Heart Failure Father     Liver Disease Father     Emphysema Father       Past Medical History:   Diagnosis Date    A-fib [facility])     Arthritis     Chronic renal disease     Elevated PSA     Hypertension     Renal calculi       Past Surgical History:   Procedure Laterality Date    ANKLE FRACTURE SURGERY Left 3/29/2022    LEFT  ANKLE OPEN REDUCTION INTERNAL FIXATION MEDIAL MALLEOLUS FRACTURE performed by Tavon Medeiros MD at [address] [place] [address]  03/2017    CARDIAC SURGERY      COSMETIC SURGERY      lip    MITRAL VALVE REPLACEMENT  03/01/2017    done at [place] [address]  2021      Allergies   Allergen Reactions    Chantix [Varenicline]      Pt states 'it made me go crazy'

## 2023-04-03 NOTE — TELEPHONE ENCOUNTER
Pt called back to schedule an appt from a referral    Scheduled with Clayton Serrano for 04/04/23 @ 1.     Electronically signed by Jihan Corbin MA on 4/3/2023 at 3:46 PM

## 2023-04-04 ENCOUNTER — OFFICE VISIT (OUTPATIENT)
Dept: PSYCHIATRY | Age: 53
End: 2023-04-04

## 2023-04-04 ENCOUNTER — TELEPHONE (OUTPATIENT)
Dept: UROLOGY | Facility: CLINIC | Age: 53
End: 2023-04-04
Payer: MEDICAID

## 2023-04-04 VITALS
DIASTOLIC BLOOD PRESSURE: 89 MMHG | TEMPERATURE: 97.2 F | SYSTOLIC BLOOD PRESSURE: 134 MMHG | OXYGEN SATURATION: 96 % | BODY MASS INDEX: 34.7 KG/M2 | HEART RATE: 79 BPM | WEIGHT: 263 LBS | RESPIRATION RATE: 18 BRPM

## 2023-04-04 DIAGNOSIS — F32.A DEPRESSION, UNSPECIFIED DEPRESSION TYPE: Primary | ICD-10-CM

## 2023-04-04 DIAGNOSIS — G47.00 INSOMNIA, UNSPECIFIED TYPE: ICD-10-CM

## 2023-04-04 DIAGNOSIS — F41.0 GENERALIZED ANXIETY DISORDER WITH PANIC ATTACKS: ICD-10-CM

## 2023-04-04 DIAGNOSIS — F41.1 GENERALIZED ANXIETY DISORDER WITH PANIC ATTACKS: ICD-10-CM

## 2023-04-04 RX ORDER — HYDROXYZINE HYDROCHLORIDE 25 MG/1
25 TABLET, FILM COATED ORAL 3 TIMES DAILY PRN
Qty: 90 TABLET | Refills: 0 | Status: SHIPPED | OUTPATIENT
Start: 2023-04-04

## 2023-04-04 RX ORDER — CITALOPRAM 40 MG/1
40 TABLET ORAL DAILY
Qty: 30 TABLET | Refills: 0 | Status: SHIPPED | OUTPATIENT
Start: 2023-04-04

## 2023-04-04 ASSESSMENT — PATIENT HEALTH QUESTIONNAIRE - PHQ9
SUM OF ALL RESPONSES TO PHQ QUESTIONS 1-9: 21
SUM OF ALL RESPONSES TO PHQ QUESTIONS 1-9: 21
2. FEELING DOWN, DEPRESSED OR HOPELESS: 3
SUM OF ALL RESPONSES TO PHQ9 QUESTIONS 1 & 2: 6
3. TROUBLE FALLING OR STAYING ASLEEP: 3
6. FEELING BAD ABOUT YOURSELF - OR THAT YOU ARE A FAILURE OR HAVE LET YOURSELF OR YOUR FAMILY DOWN: 3
SUM OF ALL RESPONSES TO PHQ QUESTIONS 1-9: 21
7. TROUBLE CONCENTRATING ON THINGS, SUCH AS READING THE NEWSPAPER OR WATCHING TELEVISION: 2
SUM OF ALL RESPONSES TO PHQ QUESTIONS 1-9: 20
5. POOR APPETITE OR OVEREATING: 3
10. IF YOU CHECKED OFF ANY PROBLEMS, HOW DIFFICULT HAVE THESE PROBLEMS MADE IT FOR YOU TO DO YOUR WORK, TAKE CARE OF THINGS AT HOME, OR GET ALONG WITH OTHER PEOPLE: 3
4. FEELING TIRED OR HAVING LITTLE ENERGY: 3
1. LITTLE INTEREST OR PLEASURE IN DOING THINGS: 3
9. THOUGHTS THAT YOU WOULD BE BETTER OFF DEAD, OR OF HURTING YOURSELF: 1
8. MOVING OR SPEAKING SO SLOWLY THAT OTHER PEOPLE COULD HAVE NOTICED. OR THE OPPOSITE, BEING SO FIGETY OR RESTLESS THAT YOU HAVE BEEN MOVING AROUND A LOT MORE THAN USUAL: 0

## 2023-04-04 NOTE — PATIENT INSTRUCTIONS
SAFETY PLAN    A suicide Safety Plan is a document that supports someone when they are having thoughts of suicide. Warning Signs that indicate a suicidal crisis may be developing: What (situations, thoughts, feelings, body sensations, behaviors, etc.) do you experience that lets you know you are beginning to think about suicide? 1. Medical report  2. Too much stimulation  3. I got to get out of here    Internal Coping Strategies:  What things can I do (relaxation techniques, hobbies, physical activities, etc.) to take my mind off my problems without contacting another person? 1. Put on grateful dead  2. Sit down  3. Smoke a cigarette    People and social settings that provide distraction: Who can I call or where can I go to distract me? 1. Name: Tania Garcia Phone:   2. Name: My mother Phone:   3. Place: Bed      4. Place: Hot water bath    People whom I can ask for help: Who can I call when I need help - for example, friends, family, clergy, someone else? 1. Name:  Tania Radha              Phone:   2. Name:  My friend Alon Arias           Phone:   3. Name:                      Phone:       Professionals or [address] [place], [place] [address]vd I can contact during a crisis: Who can I call for help - for example, my doctor, my psychiatrist, my psychologist, a mental health provider, a suicide hotline? 1. Clinician Name: [facility]  Phone: [phone]      Clinician Pager or Emergency Contact #: [phone]    2. Clinician Name: [address]  Phone: [phone]      Clinician Pager or Emergency Contact #: [phone]    3. Suicide Prevention Lifeline: 3-043-575-TALK (8126)    4. [facility] Emergency Department  [address] [facility]    Making the environment safe: How can I make my environment (house/apartment/living space) safer? For example, can I remove guns, medications, and other items? 1. Remove weapons from the home  2.

## 2023-04-04 NOTE — PROGRESS NOTES
is a stressor for him due to being diagnosed with bipolar and has issues herself. Patient reports decreased mood, increased anxiety, poor sleep, reports he \"overeats\", panic attacks about every 2 weeks, financial trouble is a stressor for him, due to not working, he reports they mostly lived off of the kids checks, but once he lost custody, he no longer received financial assistance. He reports he is hoping things will get better now that his wife has been approved for disability, however she does not get her first check till May 10. Patient is currently unemployed. Patient reports health issues have been a stressor for him as well, including chronic kidney disease, was on dialysis for a time in the recent past.  Patient denies history of any previous suicide attempts, and no history of inpatient psychiatric hospitalizations. Denies current suicidal thoughts today, denies homicidal thoughts. When asked about hallucinations, patient reports  \"sometimes  I feel like I have conversations with my wife in my head but she claims that we never had him\". Denies current auditory, visual, tactile hallucinations. No overt paranoia or delusions appreciated. Denies alcohol use, denies current substance use, reports that he smokes about a pack and 1/2 to 2 packs of cigarettes a day. Sleep: \"I sleep too much, and not enough\" I sleep for two hours at a time    Pt denies excessive spending, mood swings, reports some irritability, denies manic or hypomanic episodes. Pt denies SI, HI, Auditory, visual, and tactile hallucinations at this time.     Appetite: good, \"I overeat\"    Caffeine use: a liter of mountain dew a day and about 4 cups of coffee    Support: wife,  financially is my mother    PSYCHIATRIC HISTORY  Previous diagnoses: depression, anxiety, \"possible dementia\"  Suicide attempts/gestures: Denies   Prior hospitalizations: Denies   Prior Therapy: intensive outpatient many years ago at [address] [place] [address]

## 2023-04-05 ENCOUNTER — OFFICE VISIT (OUTPATIENT)
Dept: UROLOGY | Facility: CLINIC | Age: 53
End: 2023-04-05
Payer: MEDICAID

## 2023-04-05 ENCOUNTER — HOSPITAL ENCOUNTER (OUTPATIENT)
Dept: GENERAL RADIOLOGY | Facility: HOSPITAL | Age: 53
Discharge: HOME | End: 2023-04-05
Admitting: UROLOGY
Payer: MEDICAID

## 2023-04-05 VITALS — BODY MASS INDEX: 35.76 KG/M2 | HEIGHT: 72 IN | TEMPERATURE: 98.1 F | WEIGHT: 264 LBS

## 2023-04-05 DIAGNOSIS — N52.9 IMPOTENCE OF ORGANIC ORIGIN: ICD-10-CM

## 2023-04-05 DIAGNOSIS — K43.2 INCISIONAL HERNIA WITHOUT OBSTRUCTION OR GANGRENE: ICD-10-CM

## 2023-04-05 DIAGNOSIS — C61 PROSTATE CANCER: ICD-10-CM

## 2023-04-05 DIAGNOSIS — N20.0 NEPHROLITHIASIS: ICD-10-CM

## 2023-04-05 DIAGNOSIS — N39.3 MALE STRESS INCONTINENCE: ICD-10-CM

## 2023-04-05 DIAGNOSIS — C61 PROSTATE CANCER: Primary | ICD-10-CM

## 2023-04-05 LAB
BILIRUB BLD-MCNC: NEGATIVE MG/DL
CLARITY, POC: CLEAR
COLOR UR: YELLOW
GLUCOSE UR STRIP-MCNC: NEGATIVE MG/DL
KETONES UR QL: NEGATIVE
LEUKOCYTE EST, POC: NEGATIVE
NITRITE UR-MCNC: NEGATIVE MG/ML
PH UR: 5.5 [PH] (ref 5–8)
PROT UR STRIP-MCNC: ABNORMAL MG/DL
RBC # UR STRIP: NEGATIVE /UL
SP GR UR: 1.03 (ref 1–1.03)
UROBILINOGEN UR QL: ABNORMAL

## 2023-04-05 PROCEDURE — 1160F RVW MEDS BY RX/DR IN RCRD: CPT | Performed by: UROLOGY

## 2023-04-05 PROCEDURE — 74018 RADEX ABDOMEN 1 VIEW: CPT

## 2023-04-05 PROCEDURE — 1159F MED LIST DOCD IN RCRD: CPT | Performed by: UROLOGY

## 2023-04-05 PROCEDURE — 99214 OFFICE O/P EST MOD 30 MIN: CPT | Performed by: UROLOGY

## 2023-04-11 ENCOUNTER — HOSPITAL ENCOUNTER (OUTPATIENT)
Dept: PULMONOLOGY | Facility: HOSPITAL | Age: 53
Discharge: HOME | End: 2023-04-11
Admitting: NURSE PRACTITIONER
Payer: MEDICAID

## 2023-04-11 DIAGNOSIS — Z72.0 TOBACCO ABUSE: ICD-10-CM

## 2023-04-11 DIAGNOSIS — I28.8 DILATION OF PULMONARY ARTERY: ICD-10-CM

## 2023-04-11 PROCEDURE — 94060 EVALUATION OF WHEEZING: CPT

## 2023-04-11 PROCEDURE — 94060 EVALUATION OF WHEEZING: CPT | Performed by: INTERNAL MEDICINE

## 2023-04-11 PROCEDURE — 94726 PLETHYSMOGRAPHY LUNG VOLUMES: CPT

## 2023-04-11 PROCEDURE — 94726 PLETHYSMOGRAPHY LUNG VOLUMES: CPT | Performed by: INTERNAL MEDICINE

## 2023-04-11 RX ORDER — ALBUTEROL SULFATE 2.5 MG/3ML
2.5 SOLUTION RESPIRATORY (INHALATION) ONCE
Status: COMPLETED | OUTPATIENT
Start: 2023-04-11 | End: 2023-04-11

## 2023-04-11 RX ADMIN — ALBUTEROL SULFATE 2.5 MG: 2.5 SOLUTION RESPIRATORY (INHALATION) at 08:57

## 2023-04-12 ENCOUNTER — OFFICE VISIT (OUTPATIENT)
Dept: CARDIAC SURGERY | Facility: CLINIC | Age: 53
End: 2023-04-12
Payer: MEDICAID

## 2023-04-12 VITALS
SYSTOLIC BLOOD PRESSURE: 116 MMHG | BODY MASS INDEX: 35.87 KG/M2 | WEIGHT: 264.8 LBS | HEIGHT: 72 IN | DIASTOLIC BLOOD PRESSURE: 70 MMHG | OXYGEN SATURATION: 95 % | HEART RATE: 83 BPM

## 2023-04-12 DIAGNOSIS — I71.21 ANEURYSM OF ASCENDING AORTA WITHOUT RUPTURE: Primary | ICD-10-CM

## 2023-04-12 DIAGNOSIS — I71.20 THORACIC AORTIC ANEURYSM WITHOUT RUPTURE, UNSPECIFIED PART: ICD-10-CM

## 2023-04-12 PROCEDURE — 3074F SYST BP LT 130 MM HG: CPT | Performed by: THORACIC SURGERY (CARDIOTHORACIC VASCULAR SURGERY)

## 2023-04-12 PROCEDURE — 3078F DIAST BP <80 MM HG: CPT | Performed by: THORACIC SURGERY (CARDIOTHORACIC VASCULAR SURGERY)

## 2023-04-12 PROCEDURE — 1159F MED LIST DOCD IN RCRD: CPT | Performed by: THORACIC SURGERY (CARDIOTHORACIC VASCULAR SURGERY)

## 2023-04-12 PROCEDURE — 1160F RVW MEDS BY RX/DR IN RCRD: CPT | Performed by: THORACIC SURGERY (CARDIOTHORACIC VASCULAR SURGERY)

## 2023-04-12 PROCEDURE — 99204 OFFICE O/P NEW MOD 45 MIN: CPT | Performed by: THORACIC SURGERY (CARDIOTHORACIC VASCULAR SURGERY)

## 2023-04-12 RX ORDER — HYDROXYZINE HYDROCHLORIDE 25 MG/1
25 TABLET, FILM COATED ORAL 3 TIMES DAILY PRN
COMMUNITY
Start: 2023-04-04

## 2023-04-18 ENCOUNTER — HOSPITAL ENCOUNTER (OUTPATIENT)
Dept: CT IMAGING | Facility: HOSPITAL | Age: 53
Discharge: HOME | End: 2023-04-18
Admitting: THORACIC SURGERY (CARDIOTHORACIC VASCULAR SURGERY)
Payer: MEDICAID

## 2023-04-18 DIAGNOSIS — I71.21 ANEURYSM OF ASCENDING AORTA WITHOUT RUPTURE: ICD-10-CM

## 2023-04-18 DIAGNOSIS — G47.00 INSOMNIA, UNSPECIFIED TYPE: Primary | ICD-10-CM

## 2023-04-18 DIAGNOSIS — I71.21 ASCENDING AORTIC ANEURYSM, UNSPECIFIED WHETHER RUPTURED: ICD-10-CM

## 2023-04-18 DIAGNOSIS — I71.21 ASCENDING AORTIC ANEURYSM, UNSPECIFIED WHETHER RUPTURED: Primary | ICD-10-CM

## 2023-04-18 LAB — CREAT BLDA-MCNC: 2.4 MG/DL (ref 0.6–1.3)

## 2023-04-18 PROCEDURE — 71250 CT THORAX DX C-: CPT

## 2023-04-18 PROCEDURE — 82565 ASSAY OF CREATININE: CPT

## 2023-04-24 ENCOUNTER — OFFICE VISIT (OUTPATIENT)
Dept: CARDIOLOGY | Facility: CLINIC | Age: 53
End: 2023-04-24
Payer: MEDICAID

## 2023-04-24 VITALS
HEIGHT: 72 IN | DIASTOLIC BLOOD PRESSURE: 80 MMHG | OXYGEN SATURATION: 98 % | BODY MASS INDEX: 35.21 KG/M2 | SYSTOLIC BLOOD PRESSURE: 128 MMHG | HEART RATE: 84 BPM | WEIGHT: 260 LBS

## 2023-04-24 DIAGNOSIS — Z98.890 H/O MAZE PROCEDURE: ICD-10-CM

## 2023-04-24 DIAGNOSIS — Z98.890 STATUS POST LIGATION OF LEFT ATRIAL APPENDAGE: ICD-10-CM

## 2023-04-24 DIAGNOSIS — I10 PRIMARY HYPERTENSION: ICD-10-CM

## 2023-04-24 DIAGNOSIS — Z95.2 H/O MITRAL VALVE REPLACEMENT: ICD-10-CM

## 2023-04-24 DIAGNOSIS — I28.8 DILATION OF PULMONARY ARTERY: ICD-10-CM

## 2023-04-24 DIAGNOSIS — I48.0 PAROXYSMAL ATRIAL FIBRILLATION: ICD-10-CM

## 2023-04-24 DIAGNOSIS — I50.32 CHRONIC DIASTOLIC CONGESTIVE HEART FAILURE: ICD-10-CM

## 2023-04-24 DIAGNOSIS — N18.32 STAGE 3B CHRONIC KIDNEY DISEASE: ICD-10-CM

## 2023-04-24 DIAGNOSIS — I34.0 NONRHEUMATIC MITRAL VALVE REGURGITATION: Primary | ICD-10-CM

## 2023-04-24 DIAGNOSIS — Z72.0 TOBACCO ABUSE: ICD-10-CM

## 2023-04-24 DIAGNOSIS — E66.01 CLASS 2 SEVERE OBESITY DUE TO EXCESS CALORIES WITH SERIOUS COMORBIDITY AND BODY MASS INDEX (BMI) OF 35.0 TO 35.9 IN ADULT: ICD-10-CM

## 2023-04-24 DIAGNOSIS — I71.21 ANEURYSM OF ASCENDING AORTA WITHOUT RUPTURE: ICD-10-CM

## 2023-04-24 RX ORDER — SPIRONOLACTONE 25 MG/1
25 TABLET ORAL DAILY
Qty: 90 TABLET | Refills: 3 | Status: SHIPPED | OUTPATIENT
Start: 2023-04-24

## 2023-04-24 NOTE — PROGRESS NOTES
Chief Complaint  Congestive Heart Failure (4wk F/U. Started Entresto LOV) and Results (PFT)    Subjective          Gurjit Andrea presents to [id number] for routine follow-up of medication adjustment. Lisinopril was discontinued, and he was started on Entresto 49/51 mg twice daily at his last office visit on 3/15/23.  Follow up BMP was not completed, however creatinine increased from 2.1 10/5/522 to 2.4 4/18/23. He had mitral valve regurgitation status post mitral valve replacement in 2017 at , paroxysmal atrial fibrillation status post maze procedure and left atrial appendage exclusion with no recent recurrence, chronic diastolic congestive heart failure, stage III CKD, ventricular diastolic dysfunction, hypertension, obesity and tobacco use.  Patient denies chest pain, shortness of breath, palpitations, dizziness, syncope, orthopnea, PND, edema or decreased stamina.  Patient denies any signs of bleeding.    Atrial Fibrillation  Presents for follow-up visit. Symptoms are negative for an AICD problem, bradycardia, chest pain, dizziness, hemodynamic instability, hypertension, hypotension, pacemaker problem, palpitations, shortness of breath, syncope, tachycardia and weakness. The symptoms have been resolved. Past medical history includes atrial fibrillation and CHF. There are no medication compliance problems.   Hypertension  This is a chronic problem. The current episode started more than 1 year ago. The problem is controlled. Pertinent negatives include no anxiety, blurred vision, chest pain, headaches, malaise/fatigue, neck pain, orthopnea, palpitations, peripheral edema, PND, shortness of breath or sweats. Risk factors for coronary artery disease include obesity, male gender and smoking/tobacco exposure. Current antihypertension treatment includes diuretics, ACE inhibitors and beta blockers. The current treatment provides significant improvement.   Congestive Heart  "Failure  Presents for follow-up visit. Pertinent negatives include no abdominal pain, chest pain, chest pressure, claudication, edema, fatigue, muscle weakness, near-syncope, nocturia, orthopnea, palpitations, paroxysmal nocturnal dyspnea, shortness of breath or unexpected weight change. The symptoms have been stable. Compliance with total regimen is 51-75%. Compliance with diet is 51-75%. Compliance with exercise is 51-75%. Compliance with medications is %.       Objective     Current Outpatient Medications:   •  Acetaminophen (TYLENOL ARTHRITIS PAIN PO), Take 2 tablets by mouth As Needed., Disp: , Rfl:   •  amLODIPine (NORVASC) 2.5 MG tablet, Take 1 tablet by mouth Daily., Disp: , Rfl:   •  aspirin 325 MG tablet, Take 1 tablet by mouth 2 (Two) Times a Day., Disp: , Rfl:   •  citalopram (CeleXA) 40 MG tablet, Take 1 tablet by mouth Daily., Disp: , Rfl:   •  diclofenac (VOLTAREN) 75 MG EC tablet, Take 1 tablet by mouth 2 (Two) Times a Day., Disp: , Rfl:   •  donepezil (ARICEPT) 5 MG tablet, Take 1 tablet by mouth Every Night., Disp: , Rfl:   •  hydrOXYzine (ATARAX) 25 MG tablet, Take 1 tablet by mouth 3 (Three) Times a Day As Needed. for anxiety, Disp: , Rfl:   •  metoprolol tartrate (LOPRESSOR) 25 MG tablet, Take 1 tablet by mouth 2 (Two) Times a Day., Disp: 180 tablet, Rfl: 3  •  Multiple Vitamins-Minerals (MULTIVITAMIN WITH MINERALS) tablet tablet, Take 1 tablet by mouth Daily., Disp: , Rfl:   •  sacubitril-valsartan (Entresto) 49-51 MG tablet, Take 1 tablet by mouth 2 (Two) Times a Day., Disp: 60 tablet, Rfl: 11  •  vitamin D3 125 MCG (5000 UT) capsule capsule, Take 1 capsule by mouth Every Other Day., Disp: , Rfl:   •  spironolactone (ALDACTONE) 25 MG tablet, Take 1 tablet by mouth Daily., Disp: 90 tablet, Rfl: 3  Vital Signs:   /80   Pulse 84   Ht 182.9 cm (72\")   Wt 118 kg (260 lb)   SpO2 98%   BMI 35.26 kg/m²     Vitals and nursing note reviewed.   Constitutional:       General: Awake.      " Appearance: Normal and healthy appearance. Well-developed and not in distress. Obese.   Eyes:      General: Lids are normal.      Conjunctiva/sclera: Conjunctivae normal.      Pupils: Pupils are equal, round, and reactive to light.   HENT:      Head: Normocephalic and atraumatic.      Nose: Nose normal.   Neck:      Vascular: No JVR. JVD normal.   Pulmonary:      Effort: Pulmonary effort is normal.      Breath sounds: Examination of the right-upper field reveals decreased breath sounds. Examination of the left-upper field reveals decreased breath sounds. Examination of the right-middle field reveals decreased breath sounds. Examination of the left-middle field reveals decreased breath sounds. Examination of the right-lower field reveals decreased breath sounds. Examination of the left-lower field reveals decreased breath sounds. Decreased breath sounds present. No wheezing. No rhonchi. No rales.   Chest:      Chest wall: Not tender to palpatation.   Cardiovascular:      PMI at left midclavicular line. Normal rate. Regular rhythm. Normal S1. Normal S2.      Murmurs: There is no murmur.      No gallop. No click. No rub.   Pulses:     Intact distal pulses.   Edema:     Peripheral edema absent.   Abdominal:      General: Bowel sounds are normal.      Palpations: Abdomen is soft.      Tenderness: There is no abdominal tenderness.   Musculoskeletal: Normal range of motion.         General: No tenderness.      Cervical back: Normal range of motion. Skin:     General: Skin is warm and dry.   Neurological:      General: No focal deficit present.      Mental Status: Alert, oriented to person, place, and time and oriented to person, place and time.   Psychiatric:         Attention and Perception: Attention and perception normal.         Mood and Affect: Mood and affect normal.         Speech: Speech normal.         Behavior: Behavior normal. Behavior is cooperative.         Thought Content: Thought content normal.          Cognition and Memory: Cognition and memory normal.         Judgment: Judgment normal.        Result Review :   The following data was reviewed by: BHARATHI Vieira on 04/24/2023:  Common labs        2/22/2023    11:41 3/6/2023    09:42 4/18/2023    15:40   Common Labs   Creatinine   2.40     Hemoglobin A1C 5.9          PSA  0.08             This result is from an external source.     Data reviewed: Cardiology studies 2d echo 5/7/22          Assessment and Plan    Diagnoses and all orders for this visit:    1. Nonrheumatic mitral valve regurgitation (Primary)-status post mitral valve replacement at  in 2017.    2. H/O mitral valve replacement-2017 .  No signs of mitral valve stenosis or regurgitation on 2D echo 5/7/2022.    3. Paroxysmal atrial fibrillation (HCC)-no known recent recurrence.  In sinus rhythm today.  Status post Maze procedure and left atrial appendage exclusion.  Stable.  Continue metoprolol tartrate.    4. Status post ligation of left atrial appendage-2017.    5. H/O maze procedure-2017.    6. Primary hypertension-blood pressure is borderline in office today. Continue to monitor following medication adjustment below.  Continue metoprolol tartrate and hydrochlorothiazide.  Monitor and record daily blood pressure. Report readings consistently higher than 130/80 or consistently lower than 100/60.     7. Chronic diastolic congestive heart failure (HCC)-grade 2 left ventricular diastolic dysfunction on 2D echo 5/5/2022. NYHA class II. Stage C. Compensated. Start spironolactone 25 mg daily . BMP today and in one week. Reviewed signs and symptoms of CHF and what to report with the patient. Patient instructed to restrict sodium and weigh daily. Report weight gain of greater than 2 lbs overnight or 5 lbs in 1 week. Pt verbalized understanding of instructions and plan of care. Continue Entresto. Consider initiation of Jardiance in the future, if tolerated.     8. Class 2 severe obesity due to excess  calories with serious comorbidity and body mass index (BMI) of 35.0 to 35.9 in adult ([facility])- Class 2 Severe Obesity (BMI >=35 and <=39.9). Obesity-related health conditions include the following: hypertension. Obesity is worsening. BMI is is above average; no BMI management plan is appropriate. We discussed low calorie, low carb based diet program, portion control and increasing exercise.    9. Tobacco abuse- Gurjit Andrea  reports that he has been smoking cigarettes. He has a 15.00 pack-year smoking history. He has never used smokeless tobacco.. I have educated him on the risk of diseases from using tobacco products such as cancer, COPD and heart disease. I advised him to quit and he is not willing to quit. I spent 3  minutes counseling the patient.    10. Stage 3b chronic kidney disease (HCC)- pt follows with Dr. Pedraza with nephrology. Stable.     11. Dilated pulmonary artery- mild restrictive ventilatory defect on PFTs.     12. Ascending thoracic aortic aneurysm- pt is following with Dr. Cheney with CT surgery.       Follow Up   Return in about 4 weeks (around 5/22/2023) for Next scheduled follow up.  Patient was given instructions and counseling regarding his condition or for health maintenance advice. Please see specific information pulled into the AVS if appropriate.

## 2023-04-26 ENCOUNTER — TELEPHONE (OUTPATIENT)
Dept: PSYCHIATRY | Age: 53
End: 2023-04-26

## 2023-04-26 NOTE — TELEPHONE ENCOUNTER
Called pt on 04/21/23 to remind them of their appt for 04/27/23    -Pt confirmed    Electronically signed by Aparna Wheatley MA on 4/26/2023 at 4:12 PM

## 2023-04-27 ENCOUNTER — OFFICE VISIT (OUTPATIENT)
Dept: ENT CLINIC | Age: 53
End: 2023-04-27
Payer: MEDICAID

## 2023-04-27 ENCOUNTER — OFFICE VISIT (OUTPATIENT)
Dept: PSYCHIATRY | Age: 53
End: 2023-04-27
Payer: MEDICAID

## 2023-04-27 VITALS
TEMPERATURE: 97.3 F | WEIGHT: 259.6 LBS | SYSTOLIC BLOOD PRESSURE: 101 MMHG | OXYGEN SATURATION: 96 % | BODY MASS INDEX: 34.4 KG/M2 | HEIGHT: 73 IN | DIASTOLIC BLOOD PRESSURE: 68 MMHG | HEART RATE: 84 BPM

## 2023-04-27 VITALS
DIASTOLIC BLOOD PRESSURE: 74 MMHG | WEIGHT: 261 LBS | SYSTOLIC BLOOD PRESSURE: 120 MMHG | HEIGHT: 73 IN | BODY MASS INDEX: 34.59 KG/M2

## 2023-04-27 DIAGNOSIS — F41.1 GENERALIZED ANXIETY DISORDER WITH PANIC ATTACKS: ICD-10-CM

## 2023-04-27 DIAGNOSIS — E04.2 MULTINODULAR THYROID: Primary | ICD-10-CM

## 2023-04-27 DIAGNOSIS — F41.0 GENERALIZED ANXIETY DISORDER WITH PANIC ATTACKS: ICD-10-CM

## 2023-04-27 DIAGNOSIS — G47.00 INSOMNIA, UNSPECIFIED TYPE: ICD-10-CM

## 2023-04-27 DIAGNOSIS — F32.A DEPRESSION, UNSPECIFIED DEPRESSION TYPE: Primary | ICD-10-CM

## 2023-04-27 PROCEDURE — 99203 OFFICE O/P NEW LOW 30 MIN: CPT | Performed by: PHYSICIAN ASSISTANT

## 2023-04-27 PROCEDURE — 3078F DIAST BP <80 MM HG: CPT | Performed by: PHYSICIAN ASSISTANT

## 2023-04-27 PROCEDURE — 3074F SYST BP LT 130 MM HG: CPT | Performed by: PHYSICIAN ASSISTANT

## 2023-04-27 PROCEDURE — 3078F DIAST BP <80 MM HG: CPT

## 2023-04-27 PROCEDURE — 99214 OFFICE O/P EST MOD 30 MIN: CPT

## 2023-04-27 PROCEDURE — 3074F SYST BP LT 130 MM HG: CPT

## 2023-04-27 RX ORDER — CITALOPRAM 40 MG/1
40 TABLET ORAL DAILY
Qty: 30 TABLET | Refills: 1 | Status: SHIPPED | OUTPATIENT
Start: 2023-04-27

## 2023-04-27 RX ORDER — HYDROXYZINE HYDROCHLORIDE 25 MG/1
25 TABLET, FILM COATED ORAL 3 TIMES DAILY PRN
Qty: 90 TABLET | Refills: 1 | Status: SHIPPED | OUTPATIENT
Start: 2023-04-27

## 2023-04-27 RX ORDER — TRAZODONE HYDROCHLORIDE 50 MG/1
50 TABLET ORAL NIGHTLY
Qty: 30 TABLET | Refills: 1 | Status: SHIPPED | OUTPATIENT
Start: 2023-04-27

## 2023-04-27 ASSESSMENT — PATIENT HEALTH QUESTIONNAIRE - PHQ9
10. IF YOU CHECKED OFF ANY PROBLEMS, HOW DIFFICULT HAVE THESE PROBLEMS MADE IT FOR YOU TO DO YOUR WORK, TAKE CARE OF THINGS AT HOME, OR GET ALONG WITH OTHER PEOPLE: 1
1. LITTLE INTEREST OR PLEASURE IN DOING THINGS: 2
SUM OF ALL RESPONSES TO PHQ QUESTIONS 1-9: 16
3. TROUBLE FALLING OR STAYING ASLEEP: 3
SUM OF ALL RESPONSES TO PHQ QUESTIONS 1-9: 17
9. THOUGHTS THAT YOU WOULD BE BETTER OFF DEAD, OR OF HURTING YOURSELF: 1
SUM OF ALL RESPONSES TO PHQ QUESTIONS 1-9: 17
2. FEELING DOWN, DEPRESSED OR HOPELESS: 1
7. TROUBLE CONCENTRATING ON THINGS, SUCH AS READING THE NEWSPAPER OR WATCHING TELEVISION: 2
4. FEELING TIRED OR HAVING LITTLE ENERGY: 3
8. MOVING OR SPEAKING SO SLOWLY THAT OTHER PEOPLE COULD HAVE NOTICED. OR THE OPPOSITE, BEING SO FIGETY OR RESTLESS THAT YOU HAVE BEEN MOVING AROUND A LOT MORE THAN USUAL: 0
6. FEELING BAD ABOUT YOURSELF - OR THAT YOU ARE A FAILURE OR HAVE LET YOURSELF OR YOUR FAMILY DOWN: 2
SUM OF ALL RESPONSES TO PHQ QUESTIONS 1-9: 17
5. POOR APPETITE OR OVEREATING: 3
SUM OF ALL RESPONSES TO PHQ9 QUESTIONS 1 & 2: 3

## 2023-04-27 ASSESSMENT — ENCOUNTER SYMPTOMS
SINUS PAIN: 0
SORE THROAT: 0
PHOTOPHOBIA: 0
RHINORRHEA: 0
TROUBLE SWALLOWING: 0
EYE PAIN: 0
VOICE CHANGE: 0
FACIAL SWELLING: 0
SINUS PRESSURE: 0

## 2023-04-27 NOTE — PROGRESS NOTES
4/27/23        Progress Note    Bhavna Jimenez 1970      Chief Complaint   Patient presents with    Medication Check         Subjective:    Patient is a 46 y.o. male diagnosed with depression, anxiety and insomnia and presents today for follow-up. Last seen in clinic on 4/4/2023  and prior records were reviewed. Last visit: \" I have a lot of issues, like depression. I am dealing with more shit than anyone should have to deal with, like my wife is bipolar, she tried to kill herself back in October, I might be facing some nursing home time due to some legal stuff I got into, lost custody of 4 of the kids. They are adults but 2 of them have disabilities. I have been having a lot more anxiety lately, having some anxiety attacks and even panic attacks. They happen more when I am in public places in crowded places [facility], too many people around too much stimulation it seems like. \"  Patient seen in office today, appears disheveled, clothing appears dirty, poor hygiene noted, malodorous. Patient appears calm, cooperative, is very pleasant. Reports history of depression, anxiety, chronic kidney disease, HTN, aortic disease. Patient is alert and oriented to person, place, time, and situation. He reports he has had some memory problems, \"my doctor told me I might have early Alzheimer's disease, I have been leaving stove burners on walking away from not remembering that they are on\". MOCA performed in office today, patient scored 28 out of 30, only issue was delayed recall. Patient reports he has been dealing with increased stressors for the last several months, starting back in October 2022, when he \"got into some legal trouble, my wife and I lost custody of our kids, while they were kids, they are adults but 2 of them have disabilities and I took care of him I might be facing some nursing home time for that, my next court dates on April 10, but I feel like it is just going to be pushed back again. \"  He lives at

## 2023-04-27 NOTE — ASSESSMENT & PLAN NOTE
Multinodular thyroid-currently stable based on today's physical exam  Plan: I recommended repeating the ultrasound in 1 year. Patient was advised to call if he develops any dysphagia or any signs of thyroid region we will also get a thyroid lab panel. Patient is to see me after the ultrasound for repeat physical exam and for comparison of the ultrasound.

## 2023-04-27 NOTE — PROGRESS NOTES
[facility] OTOLARYNGOLOGY/ENT  Mr. Matilde Nino is a 48-year-old occasion male that is referred by Dr. Sandy Olea due to a multinodular thyroid. Patient had a recent ultrasound due to nodule being detected on CT. Patient was noted with several subcentimeter nodules. The largest measured 1.2 x 1.3 x 1.6 on the left lobe of the thyroid. Patient denies a history of radiation exposure or any thyroid issues. He also denies any history thyroid cancer. He currently denies any dysphagia to include solids or liquids or no questions        Allergies: Chantix [varenicline] and Zyrtec [cetirizine]      Current Outpatient Medications   Medication Sig Dispense Refill    hydrOXYzine HCl (ATARAX) 25 MG tablet Take 1 tablet by mouth 3 times daily as needed for Anxiety 90 tablet 0    citalopram (CELEXA) 40 MG tablet Take 1 tablet by mouth daily 30 tablet 0    ENTRESTO 49-51 MG per tablet Take 1 tablet by mouth 2 times daily      amLODIPine (NORVASC) 2.5 MG tablet Take 1 tablet by mouth daily 90 tablet 1    donepezil (ARICEPT) 5 MG tablet Take 1 tablet by mouth nightly 90 tablet 1    aspirin 325 MG tablet Take 1 tablet by mouth in the morning and at bedtime      acetaminophen (TYLENOL) 650 MG extended release tablet Take 1 tablet by mouth every 8 hours as needed for Pain      vitamin D (CHOLECALCIFEROL) 125 MCG (5000 UT) CAPS capsule Take 1 capsule by mouth daily      hydroCHLOROthiazide (HYDRODIURIL) 25 MG tablet Take 1 tablet by mouth in the morning and at bedtime 180 tablet 3    diclofenac (VOLTAREN) 75 MG EC tablet Take 1 tablet by mouth 2 times daily 180 tablet 3    metoprolol tartrate (LOPRESSOR) 25 MG tablet Take 1 tablet by mouth 2 times daily 180 tablet 3    Multiple Vitamins-Minerals (THERA M PLUS) TABS Take 1 tablet by mouth daily       No current facility-administered medications for this visit.        Past Surgical History:   Procedure Laterality Date    ANKLE FRACTURE SURGERY Left 3/29/2022    LEFT  ANKLE OPEN REDUCTION

## 2023-04-28 ENCOUNTER — OFFICE VISIT (OUTPATIENT)
Dept: PSYCHIATRY | Age: 53
End: 2023-04-28

## 2023-04-28 ENCOUNTER — TELEPHONE (OUTPATIENT)
Dept: PSYCHIATRY | Age: 53
End: 2023-04-28

## 2023-04-28 DIAGNOSIS — F41.1 GENERALIZED ANXIETY DISORDER WITH PANIC ATTACKS: ICD-10-CM

## 2023-04-28 DIAGNOSIS — F41.0 GENERALIZED ANXIETY DISORDER WITH PANIC ATTACKS: ICD-10-CM

## 2023-04-28 DIAGNOSIS — F32.A DEPRESSION, UNSPECIFIED DEPRESSION TYPE: Primary | ICD-10-CM

## 2023-04-28 ASSESSMENT — ANXIETY QUESTIONNAIRES
3. WORRYING TOO MUCH ABOUT DIFFERENT THINGS: 2
GAD7 TOTAL SCORE: 15
1. FEELING NERVOUS, ANXIOUS, OR ON EDGE: 3
6. BECOMING EASILY ANNOYED OR IRRITABLE: 2
7. FEELING AFRAID AS IF SOMETHING AWFUL MIGHT HAPPEN: 3
IF YOU CHECKED OFF ANY PROBLEMS ON THIS QUESTIONNAIRE, HOW DIFFICULT HAVE THESE PROBLEMS MADE IT FOR YOU TO DO YOUR WORK, TAKE CARE OF THINGS AT HOME, OR GET ALONG WITH OTHER PEOPLE: SOMEWHAT DIFFICULT
4. TROUBLE RELAXING: 2
2. NOT BEING ABLE TO STOP OR CONTROL WORRYING: 2
5. BEING SO RESTLESS THAT IT IS HARD TO SIT STILL: 1

## 2023-04-28 ASSESSMENT — PATIENT HEALTH QUESTIONNAIRE - PHQ9
1. LITTLE INTEREST OR PLEASURE IN DOING THINGS: 2
SUM OF ALL RESPONSES TO PHQ9 QUESTIONS 1 & 2: 4
3. TROUBLE FALLING OR STAYING ASLEEP: 3
8. MOVING OR SPEAKING SO SLOWLY THAT OTHER PEOPLE COULD HAVE NOTICED. OR THE OPPOSITE, BEING SO FIGETY OR RESTLESS THAT YOU HAVE BEEN MOVING AROUND A LOT MORE THAN USUAL: 0
7. TROUBLE CONCENTRATING ON THINGS, SUCH AS READING THE NEWSPAPER OR WATCHING TELEVISION: 2
4. FEELING TIRED OR HAVING LITTLE ENERGY: 3
SUM OF ALL RESPONSES TO PHQ QUESTIONS 1-9: 17
SUM OF ALL RESPONSES TO PHQ QUESTIONS 1-9: 18
SUM OF ALL RESPONSES TO PHQ QUESTIONS 1-9: 18
5. POOR APPETITE OR OVEREATING: 3
10. IF YOU CHECKED OFF ANY PROBLEMS, HOW DIFFICULT HAVE THESE PROBLEMS MADE IT FOR YOU TO DO YOUR WORK, TAKE CARE OF THINGS AT HOME, OR GET ALONG WITH OTHER PEOPLE: 1
6. FEELING BAD ABOUT YOURSELF - OR THAT YOU ARE A FAILURE OR HAVE LET YOURSELF OR YOUR FAMILY DOWN: 2
2. FEELING DOWN, DEPRESSED OR HOPELESS: 2
9. THOUGHTS THAT YOU WOULD BE BETTER OFF DEAD, OR OF HURTING YOURSELF: 1
SUM OF ALL RESPONSES TO PHQ QUESTIONS 1-9: 18

## 2023-04-28 ASSESSMENT — COLUMBIA-SUICIDE SEVERITY RATING SCALE - C-SSRS
1. WITHIN THE PAST MONTH, HAVE YOU WISHED YOU WERE DEAD OR WISHED YOU COULD GO TO SLEEP AND NOT WAKE UP?: NO
6. HAVE YOU EVER DONE ANYTHING, STARTED TO DO ANYTHING, OR PREPARED TO DO ANYTHING TO END YOUR LIFE?: NO
2. HAVE YOU ACTUALLY HAD ANY THOUGHTS OF KILLING YOURSELF?: NO
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
## 2023-04-28 NOTE — TELEPHONE ENCOUNTER
Called pt on 04/21/23 to remind them of their appt for 04/28/23    -Pt confirmed    Electronically signed by Sveta Keating MA on 4/28/2023 at 7:34 AM

## 2023-05-01 PROBLEM — I71.20 THORACIC AORTIC ANEURYSM WITHOUT RUPTURE: Status: ACTIVE | Noted: 2023-05-01

## 2023-05-01 NOTE — PROGRESS NOTES
Chief Complaint   Patient presents with   • Ascending Aortic Aneurysm     [facility]         Subjective     History of Present Illness  53-year-old male with a history of previous mitral valve surgery for endocarditis in 2017 at [facility], chronic renal failure, atrial fibrillation and Wegener's disease found to have an ascending aortic aneurysm.  He denies chest pain or shortness of breath at this time.  He is a patient of Dr. Malave and Dr. Cortez with [facility].  Referrals made for an incidentally identified thoracic aortic aneurysm.    Review of Systems   Constitutional: Positive for fatigue. Negative for activity change, appetite change, chills, diaphoresis, fever and unexpected weight change.   HENT: Negative for dental problem, hearing loss, nosebleeds, sore throat, trouble swallowing and voice change.    Eyes: Negative for photophobia, redness and visual disturbance.   Respiratory: Positive for shortness of breath. Negative for apnea, cough, chest tightness, wheezing and stridor.    Cardiovascular: Negative for chest pain, palpitations and leg swelling.   Gastrointestinal: Negative for abdominal distention, abdominal pain, blood in stool, constipation, diarrhea, nausea and vomiting.   Endocrine: Negative for cold intolerance, heat intolerance, polyphagia and polyuria.   Genitourinary: Negative for decreased urine volume, difficulty urinating, dysuria, flank pain, frequency, hematuria and urgency.   Musculoskeletal: Positive for arthralgias. Negative for back pain, gait problem, joint swelling, myalgias and neck pain.   Skin: Negative for pallor, rash and wound.   Allergic/Immunologic: Negative for immunocompromised state.   Neurological: Negative for dizziness, tremors, seizures, syncope, speech difficulty, weakness, light-headedness, numbness and headaches.   Hematological: Does not bruise/bleed easily.   Psychiatric/Behavioral: Negative for confusion, sleep disturbance  and suicidal ideas. The patient is not nervous/anxious.           Past Medical History:   Diagnosis Date   • A-fib    • Arthritis    • Chronic renal failure    • Coronary artery disease    • Elevated PSA    • GERD (gastroesophageal reflux disease)    • Hypertension    • Kidney stone    • MRSA bacteremia 02/21/2017    MRSA in heart valve   • Prostate cancer    • Purpura    • Wegener's granulomatosis with renal involvement      Past Surgical History:   Procedure Laterality Date   • APPENDECTOMY     • INSERTION HEMODIALYSIS CATHETER N/A 1/20/2017    Procedure: INSERTION PERMCATH;  Surgeon: Ian Grimes DO;  Location: [facility] OR;  Service:    • LIP REPAIR     • MITRAL VALVE REPLACEMENT     • PROSTATECTOMY Bilateral 6/1/2021    Procedure: RADICAL PROSTATECTOMY LAPAROSCOPIC WITH DAVINCI ROBOT WITH BILATERAL PELVIC LYMPH NODE DISSECTION;  Surgeon: Luis Carlos Awan MD;  Location:  [facility] OR;  Service: Robotics - DaVinci;  Laterality: Bilateral;     Family History   Problem Relation Age of Onset   • Heart disease Father    • Hypertension Father      Social History     Tobacco Use   • Smoking status: Every Day     Packs/day: 1.00     Years: 15.00     Pack years: 15.00     Types: Cigarettes   • Smokeless tobacco: Never   Vaping Use   • Vaping Use: Never used   Substance Use Topics   • Alcohol use: Not Currently     Comment: 1 drink per year until 5 years ago, then none   • Drug use: No     Current Outpatient Medications   Medication Sig Dispense Refill   • Acetaminophen (TYLENOL ARTHRITIS PAIN PO) Take 2 tablets by mouth As Needed.     • amLODIPine (NORVASC) 2.5 MG tablet Take 1 tablet by mouth Daily.     • aspirin 325 MG tablet Take 1 tablet by mouth 2 (Two) Times a Day.     • citalopram (CeleXA) 40 MG tablet Take 1 tablet by mouth Daily.     • diclofenac (VOLTAREN) 75 MG EC tablet Take 1 tablet by mouth 2 (Two) Times a Day.     • donepezil (ARICEPT) 5 MG tablet Take 1 tablet by mouth Every Night.     • hydrOXYzine  "(ATARAX) 25 MG tablet Take 1 tablet by mouth 3 (Three) Times a Day As Needed. for anxiety     • metoprolol tartrate (LOPRESSOR) 25 MG tablet Take 1 tablet by mouth 2 (Two) Times a Day. 180 tablet 3   • Multiple Vitamins-Minerals (MULTIVITAMIN WITH MINERALS) tablet tablet Take 1 tablet by mouth Daily.     • sacubitril-valsartan (Entresto) 49-51 MG tablet Take 1 tablet by mouth 2 (Two) Times a Day. 60 tablet 11   • vitamin D3 125 MCG (5000 UT) capsule capsule Take 1 capsule by mouth Every Other Day.     • spironolactone (ALDACTONE) 25 MG tablet Take 1 tablet by mouth Daily. 90 tablet 3     No current facility-administered medications for this visit.     Allergies:  Cetirizine and Chantix [varenicline]    Objective      Vital Signs  Visit Vitals  /70 (BP Location: Right arm, Patient Position: Sitting, Cuff Size: Adult)   Pulse 83   Ht 182.9 cm (72\")   Wt 120 kg (264 lb 12.8 oz)   SpO2 95%   BMI 35.91 kg/m²         Physical Exam  Constitutional:       Appearance: He is well-developed.   HENT:      Head: Normocephalic and atraumatic.   Eyes:      Pupils: Pupils are equal, round, and reactive to light.   Neck:      Thyroid: No thyromegaly.      Vascular: No JVD.      Trachea: No tracheal deviation.   Cardiovascular:      Rate and Rhythm: Normal rate and regular rhythm.      Heart sounds: Normal heart sounds. No murmur heard.    No friction rub. No gallop.   Pulmonary:      Effort: Pulmonary effort is normal. No respiratory distress.      Breath sounds: Normal breath sounds. No wheezing or rales.   Chest:      Chest wall: No tenderness.   Abdominal:      General: There is no distension.      Palpations: Abdomen is soft.      Tenderness: There is no abdominal tenderness.   Musculoskeletal:         General: Normal range of motion.      Cervical back: Normal range of motion and neck supple.   Lymphadenopathy:      Cervical: No cervical adenopathy.   Skin:     General: Skin is warm and dry.   Neurological:      Mental " Status: He is alert and oriented to person, place, and time.      Cranial Nerves: No cranial nerve deficit.         Results Review:      I reviewed the patient's new clinical results.  Discussed with patient      Assessment & Plan       Diagnoses and all orders for this visit:    1. Aneurysm of ascending aorta without rupture (Primary)  -     CT angiogram chest w contrast; Future    2. Thoracic aortic aneurysm without rupture, unspecified part          I discussed the patients findings and my recommendations with patient.  We discussed the natural course history of aortic aneurysmal disease. We discussed the specific size of aneurysm today and potential risk of aortic complications. We discussed the operative treatment of aneursymal disease broadly. At this time we discussed the recommendation to plan surveillance with CT scans. We discussed signs and symptoms of acute aortic pathology and the need to present to the emergency department for further evaluation. Lastly, we discussed the value of exercise while being mindful of a known aneurysm and the potential risk that high intensity, isometric, or valsalva type exercises presents.   He is due for a new CT scan of the chest.  We will obtain that study and contact the patient to discuss and make further recommendations.  Potential medical therapy including the use of a beta-blocker and perhaps other agents to accomplish strict control of pressure were discussed.     He is a smoker and counseling has been provided x4 minutes    Many thanks for the opportunity to care for your patient.    I will continue to keep you apprised of provided care as it ensues.

## 2023-05-02 DIAGNOSIS — I71.21 ANEURYSM OF ASCENDING AORTA WITHOUT RUPTURE: Primary | ICD-10-CM

## 2023-05-03 ENCOUNTER — PATIENT ROUNDING (BHMG ONLY) (OUTPATIENT)
Dept: CARDIAC SURGERY | Facility: CLINIC | Age: 53
End: 2023-05-03
Payer: MEDICAID

## 2023-05-03 NOTE — PROGRESS NOTES
A [email or web address] message has been sent to the patient for PATIENT ROUNDING with [facility].

## 2023-05-10 ENCOUNTER — LAB (OUTPATIENT)
Dept: LAB | Facility: HOSPITAL | Age: 53
End: 2023-05-10
Payer: MEDICAID

## 2023-05-10 ENCOUNTER — TRANSCRIBE ORDERS (OUTPATIENT)
Dept: ADMINISTRATIVE | Facility: HOSPITAL | Age: 53
End: 2023-05-10
Payer: MEDICAID

## 2023-05-10 DIAGNOSIS — E04.2 MULTINODULAR THYROID: Primary | ICD-10-CM

## 2023-05-10 DIAGNOSIS — E04.2 MULTINODULAR THYROID: ICD-10-CM

## 2023-05-10 DIAGNOSIS — I50.32 CHRONIC DIASTOLIC CONGESTIVE HEART FAILURE: ICD-10-CM

## 2023-05-10 LAB
ANION GAP SERPL CALCULATED.3IONS-SCNC: 10 MMOL/L (ref 5–15)
BUN SERPL-MCNC: 16 MG/DL (ref 6–20)
BUN/CREAT SERPL: 10.7 (ref 7–25)
CALCIUM SPEC-SCNC: 8.8 MG/DL (ref 8.6–10.5)
CHLORIDE SERPL-SCNC: 105 MMOL/L (ref 98–107)
CO2 SERPL-SCNC: 22 MMOL/L (ref 22–29)
CREAT SERPL-MCNC: 1.5 MG/DL (ref 0.76–1.27)
EGFRCR SERPLBLD CKD-EPI 2021: 55 ML/MIN/1.73
GLUCOSE SERPL-MCNC: 86 MG/DL (ref 65–99)
POTASSIUM SERPL-SCNC: 5.1 MMOL/L (ref 3.5–5.2)
SODIUM SERPL-SCNC: 137 MMOL/L (ref 136–145)
T4 FREE SERPL-MCNC: 1.12 NG/DL (ref 0.93–1.7)
TSH SERPL DL<=0.05 MIU/L-ACNC: 2.46 UIU/ML (ref 0.27–4.2)

## 2023-05-10 PROCEDURE — 36415 COLL VENOUS BLD VENIPUNCTURE: CPT

## 2023-05-10 PROCEDURE — 84439 ASSAY OF FREE THYROXINE: CPT

## 2023-05-10 PROCEDURE — 80048 BASIC METABOLIC PNL TOTAL CA: CPT

## 2023-05-10 PROCEDURE — 84443 ASSAY THYROID STIM HORMONE: CPT

## 2023-05-19 ENCOUNTER — TELEPHONE (OUTPATIENT)
Dept: PSYCHIATRY | Age: 53
End: 2023-05-19

## 2023-05-19 NOTE — TELEPHONE ENCOUNTER
Called and confirmed appt with pt for 5/22 @ 4 PM    Electronically signed by Jonatan Medrano on 5/19/2023 at 9:57 AM

## 2023-05-22 ENCOUNTER — OFFICE VISIT (OUTPATIENT)
Dept: PSYCHIATRY | Age: 53
End: 2023-05-22
Payer: MEDICAID

## 2023-05-22 VITALS
BODY MASS INDEX: 35.23 KG/M2 | HEART RATE: 72 BPM | DIASTOLIC BLOOD PRESSURE: 83 MMHG | OXYGEN SATURATION: 95 % | TEMPERATURE: 97.1 F | SYSTOLIC BLOOD PRESSURE: 126 MMHG | WEIGHT: 265.8 LBS | HEIGHT: 73 IN

## 2023-05-22 DIAGNOSIS — F41.0 GENERALIZED ANXIETY DISORDER WITH PANIC ATTACKS: ICD-10-CM

## 2023-05-22 DIAGNOSIS — F41.1 GENERALIZED ANXIETY DISORDER WITH PANIC ATTACKS: ICD-10-CM

## 2023-05-22 DIAGNOSIS — F32.A DEPRESSION, UNSPECIFIED DEPRESSION TYPE: Primary | ICD-10-CM

## 2023-05-22 DIAGNOSIS — G47.00 INSOMNIA, UNSPECIFIED TYPE: ICD-10-CM

## 2023-05-22 PROCEDURE — 3079F DIAST BP 80-89 MM HG: CPT

## 2023-05-22 PROCEDURE — 99214 OFFICE O/P EST MOD 30 MIN: CPT

## 2023-05-22 PROCEDURE — 3074F SYST BP LT 130 MM HG: CPT

## 2023-05-22 RX ORDER — TRAZODONE HYDROCHLORIDE 50 MG/1
100 TABLET ORAL NIGHTLY
Qty: 60 TABLET | Refills: 1 | Status: SHIPPED | OUTPATIENT
Start: 2023-05-22

## 2023-05-22 ASSESSMENT — PATIENT HEALTH QUESTIONNAIRE - PHQ9
7. TROUBLE CONCENTRATING ON THINGS, SUCH AS READING THE NEWSPAPER OR WATCHING TELEVISION: 2
SUM OF ALL RESPONSES TO PHQ9 QUESTIONS 1 & 2: 3
SUM OF ALL RESPONSES TO PHQ QUESTIONS 1-9: 11
2. FEELING DOWN, DEPRESSED OR HOPELESS: 1
4. FEELING TIRED OR HAVING LITTLE ENERGY: 1
SUM OF ALL RESPONSES TO PHQ QUESTIONS 1-9: 11
SUM OF ALL RESPONSES TO PHQ QUESTIONS 1-9: 11
9. THOUGHTS THAT YOU WOULD BE BETTER OFF DEAD, OR OF HURTING YOURSELF: 0
5. POOR APPETITE OR OVEREATING: 1
SUM OF ALL RESPONSES TO PHQ QUESTIONS 1-9: 11
6. FEELING BAD ABOUT YOURSELF - OR THAT YOU ARE A FAILURE OR HAVE LET YOURSELF OR YOUR FAMILY DOWN: 1
10. IF YOU CHECKED OFF ANY PROBLEMS, HOW DIFFICULT HAVE THESE PROBLEMS MADE IT FOR YOU TO DO YOUR WORK, TAKE CARE OF THINGS AT HOME, OR GET ALONG WITH OTHER PEOPLE: 1
3. TROUBLE FALLING OR STAYING ASLEEP: 3
1. LITTLE INTEREST OR PLEASURE IN DOING THINGS: 2
8. MOVING OR SPEAKING SO SLOWLY THAT OTHER PEOPLE COULD HAVE NOTICED. OR THE OPPOSITE, BEING SO FIGETY OR RESTLESS THAT YOU HAVE BEEN MOVING AROUND A LOT MORE THAN USUAL: 0

## 2023-05-22 NOTE — PROGRESS NOTES
Chief Complaint  Congestive Heart Failure (4wk F/U. Started Aldactone LOV) and Results (Lab)    Subjective          Gurjit Andrea presents to [id number] for routine follow-up of medication adjustment. He was started on spironolactone 25 mg daily at his last office visit on 4/24/2023.  Follow up BMP on 5/10/2023 revealed improvement in creatinine from 2.4 to 1.5. He has mitral valve regurgitation status post mitral valve replacement in 2017 at , paroxysmal atrial fibrillation status post maze procedure and left atrial appendage exclusion with no recent recurrence, chronic diastolic congestive heart failure, stage III CKD, ventricular diastolic dysfunction, hypertension, obesity and tobacco use.  Patient denies chest pain, shortness of breath, palpitations, dizziness, syncope, orthopnea, PND, edema or decreased stamina.  Patient denies any signs of bleeding.    Atrial Fibrillation  Presents for follow-up visit. Symptoms are negative for an AICD problem, bradycardia, chest pain, dizziness, hemodynamic instability, hypertension, hypotension, pacemaker problem, palpitations, shortness of breath, syncope, tachycardia and weakness. The symptoms have been resolved. Past medical history includes atrial fibrillation and CHF. There are no medication compliance problems.   Hypertension  This is a chronic problem. The current episode started more than 1 year ago. The problem is controlled. Pertinent negatives include no anxiety, blurred vision, chest pain, headaches, malaise/fatigue, neck pain, orthopnea, palpitations, peripheral edema, PND, shortness of breath or sweats. Risk factors for coronary artery disease include obesity, male gender and smoking/tobacco exposure. Current antihypertension treatment includes diuretics, ACE inhibitors and beta blockers. The current treatment provides significant improvement.   Congestive Heart Failure  Presents for follow-up visit. Pertinent  "negatives include no abdominal pain, chest pain, chest pressure, claudication, edema, fatigue, muscle weakness, near-syncope, nocturia, orthopnea, palpitations, paroxysmal nocturnal dyspnea, shortness of breath or unexpected weight change. The symptoms have been stable. Compliance with total regimen is 51-75%. Compliance with diet is 51-75%. Compliance with exercise is 51-75%. Compliance with medications is %.     I have reviewed and confirmed the accuracy of the ROS BHARATHI Vieira      Objective     Current Outpatient Medications:     Acetaminophen (TYLENOL ARTHRITIS PAIN PO), Take 2 tablets by mouth As Needed., Disp: , Rfl:     aspirin 325 MG tablet, Take 1 tablet by mouth 2 (Two) Times a Day., Disp: , Rfl:     citalopram (CeleXA) 40 MG tablet, Take 1 tablet by mouth Daily., Disp: , Rfl:     diclofenac (VOLTAREN) 75 MG EC tablet, Take 1 tablet by mouth 2 (Two) Times a Day., Disp: , Rfl:     donepezil (ARICEPT) 5 MG tablet, Take 1 tablet by mouth Every Night., Disp: , Rfl:     hydrOXYzine (ATARAX) 25 MG tablet, Take 1 tablet by mouth 3 (Three) Times a Day As Needed. for anxiety, Disp: , Rfl:     metoprolol tartrate (LOPRESSOR) 25 MG tablet, Take 1 tablet by mouth 2 (Two) Times a Day., Disp: 180 tablet, Rfl: 3    Multiple Vitamins-Minerals (MULTIVITAMIN WITH MINERALS) tablet tablet, Take 1 tablet by mouth Daily., Disp: , Rfl:     sacubitril-valsartan (Entresto) 49-51 MG tablet, Take 1 tablet by mouth 2 (Two) Times a Day., Disp: 60 tablet, Rfl: 11    spironolactone (ALDACTONE) 25 MG tablet, Take 1 tablet by mouth Daily., Disp: 90 tablet, Rfl: 3    traZODone (DESYREL) 50 MG tablet, Take 1 tablet by mouth Every Night., Disp: , Rfl:     vitamin D3 125 MCG (5000 UT) capsule capsule, Take 1 capsule by mouth Every Other Day., Disp: , Rfl:     empagliflozin (Jardiance) 10 MG tablet tablet, Take 1 tablet by mouth Daily., Disp: 30 tablet, Rfl: 11  Vital Signs:   /88   Pulse 90   Ht 182.9 cm (72\")   Wt " 122 kg (268 lb)   SpO2 97%   BMI 36.35 kg/m²     Vitals and nursing note reviewed.   Constitutional:       General: Awake.      Appearance: Normal and healthy appearance. Well-developed and not in distress. Obese.   Eyes:      General: Lids are normal.      Conjunctiva/sclera: Conjunctivae normal.      Pupils: Pupils are equal, round, and reactive to light.   HENT:      Head: Normocephalic and atraumatic.      Nose: Nose normal.   Neck:      Vascular: No JVR. JVD normal.   Pulmonary:      Effort: Pulmonary effort is normal.      Breath sounds: Examination of the right-upper field reveals decreased breath sounds. Examination of the left-upper field reveals decreased breath sounds. Examination of the right-middle field reveals decreased breath sounds. Examination of the left-middle field reveals decreased breath sounds. Examination of the right-lower field reveals decreased breath sounds. Examination of the left-lower field reveals decreased breath sounds. Decreased breath sounds present. No wheezing. No rhonchi. No rales.   Chest:      Chest wall: Not tender to palpatation.   Cardiovascular:      PMI at left midclavicular line. Normal rate. Regular rhythm. Normal S1. Normal S2.       Murmurs: There is no murmur.      No gallop.  No click. No rub.   Pulses:     Intact distal pulses.   Edema:     Peripheral edema absent.   Abdominal:      General: Bowel sounds are normal.      Palpations: Abdomen is soft.      Tenderness: There is no abdominal tenderness.   Musculoskeletal: Normal range of motion.         General: No tenderness.      Cervical back: Normal range of motion. Skin:     General: Skin is warm and dry.   Neurological:      General: No focal deficit present.      Mental Status: Alert, oriented to person, place, and time and oriented to person, place and time.   Psychiatric:         Attention and Perception: Attention and perception normal.         Mood and Affect: Mood and affect normal.         Speech:  Speech normal.         Behavior: Behavior normal. Behavior is cooperative.         Thought Content: Thought content normal.         Cognition and Memory: Cognition and memory normal.         Judgment: Judgment normal.      Result Review :   The following data was reviewed by: BHARATHI Vieira on 05/23/2023:  Common labs          3/6/2023    09:42 4/18/2023    15:40 5/10/2023    10:09   Common Labs   Glucose   86     BUN   16     Creatinine  2.40   1.50     Sodium   137     Potassium   5.1     Chloride   105     Calcium   8.8     PSA 0.08              This result is from an external source.     Data reviewed: Cardiology studies 2d echo 5/7/22           Assessment and Plan    Diagnoses and all orders for this visit:    1. Nonrheumatic mitral valve regurgitation (Primary)-status post mitral valve replacement at  in 2017.    2. H/O mitral valve replacement-2017 .  No signs of mitral valve stenosis or regurgitation on 2D echo 5/7/2022.    3. Paroxysmal atrial fibrillation (HCC)-no known recent recurrence.  In sinus rhythm today.  Status post Maze procedure and left atrial appendage exclusion.  Stable.  Continue metoprolol tartrate.    4. Status post ligation of left atrial appendage-2017.    5. H/O maze procedure-2017.    6. Primary hypertension-blood pressure is elevated in office today. Continue to monitor following medication adjustment below.  Continue Entresto, metoprolol tartrate, spironolactone and hydrochlorothiazide.  Monitor and record daily blood pressure. Report readings consistently higher than 130/80 or consistently lower than 100/60.     7. Chronic diastolic congestive heart failure (HCC)-grade 2 left ventricular diastolic dysfunction on 2D echo 5/5/2022. NYHA class II. Stage C. Compensated. Start Jardiance 10 mg daily . BMP in one week. Reviewed signs and symptoms of CHF and what to report with the patient. Patient instructed to restrict sodium and weigh daily. Report weight gain of greater  than 2 lbs overnight or 5 lbs in 1 week. Pt verbalized understanding of instructions and plan of care. Continue Entresto and spironolactone. Consider up-titration of Entresto and spironolactone in the future, if tolerated.     8. Class 2 severe obesity due to excess calories with serious comorbidity and body mass index (BMI) of 36.0 to 36.9 in adult (HCC)- Class 2 Severe Obesity (BMI >=35 and <=39.9). Obesity-related health conditions include the following: hypertension. Obesity is worsening. BMI is is above average; no BMI management plan is appropriate. We discussed low calorie, low carb based diet program, portion control and increasing exercise.    9. Tobacco abuse- Gurjit Andrea  reports that he has been smoking cigarettes. He has a 15.00 pack-year smoking history. He has never used smokeless tobacco.. I have educated him on the risk of diseases from using tobacco products such as cancer, COPD and heart disease. I advised him to quit and he is not willing to quit. I spent 3  minutes counseling the patient.    10. Stage 3b chronic kidney disease (HCC)- pt follows with Dr. Pedraza with nephrology. Stable.     11. Dilated pulmonary artery- mild restrictive ventilatory defect on PFTs.     12. Ascending thoracic aortic aneurysm- pt is following with Dr. Cheney with CT surgery.       Follow Up   Return in about 4 weeks (around 6/20/2023) for Next scheduled follow up.  Patient was given instructions and counseling regarding his condition or for health maintenance advice. Please see specific information pulled into the AVS if appropriate.

## 2023-05-22 NOTE — PROGRESS NOTES
the Last Year: Often true   Transportation Needs: Unmet Transportation Needs    Lack of Transportation (Non-Medical): Yes   Physical Activity: Sufficiently Active    Days of Exercise per Week: 6 days    Minutes of Exercise per Session: 30 min   Intimate Partner Violence: Not At Risk    Fear of Current or Ex-Partner: No    Emotionally Abused: No    Physically Abused: No    Sexually Abused: No   Housing Stability: Unknown    Unstable Housing in the Last Year: No       MSE:  Appearance: Appropriately groomed. Made good eye contact. Appears older than stated age. Gait stable. No abnormal movements or tremor. Behavior: Calm, cooperative, and socially appropriate. No psychomotor retardation/agitation appreciated. Speech: Normal in tone, volume, and quality. No slurring, dysarthria or pressured speech noted. Mood: \"feeling better, more energy\"   Affect: Mood congruent. Thought Process: Appears linear, logical and goal oriented. Causality appears intact. Thought Content: Denies active suicidal and homicidal ideations. No overt delusions or paranoia appreciated. Perceptions: Denies auditory or visual hallucinations at present time. Not responding to internal stimuli. Concentration: Intact. Orientation: to person, place, date, and situation. Language: Intact. Fund of information: Intact. Memory: Recent and remote appear intact. Impulsivity: Limited. Neurovegitative: Fair appetite and sleep. Insight: Fair. Judgment: Fair. Cognition: Can spell \"world\" backwards: Yes                    Can do serial 7's:  Yes    Lab Results   Component Value Date     02/03/2023    K 4.1 02/03/2023     02/03/2023    CO2 26 02/03/2023    BUN 14 02/03/2023    CREATININE 1.3 (H) 02/03/2023    GLUCOSE 103 02/03/2023    CALCIUM 9.3 02/03/2023    PROT 6.9 02/03/2023    LABALBU 3.8 02/03/2023    BILITOT 0.3 02/03/2023    ALKPHOS 82 02/03/2023    AST 18 02/03/2023    ALT 19 02/03/2023    LABGLOM >60

## 2023-05-23 ENCOUNTER — OFFICE VISIT (OUTPATIENT)
Dept: CARDIOLOGY | Facility: CLINIC | Age: 53
End: 2023-05-23
Payer: MEDICAID

## 2023-05-23 VITALS
DIASTOLIC BLOOD PRESSURE: 88 MMHG | HEART RATE: 90 BPM | OXYGEN SATURATION: 97 % | WEIGHT: 268 LBS | BODY MASS INDEX: 36.3 KG/M2 | HEIGHT: 72 IN | SYSTOLIC BLOOD PRESSURE: 138 MMHG

## 2023-05-23 DIAGNOSIS — Z95.2 H/O MITRAL VALVE REPLACEMENT: ICD-10-CM

## 2023-05-23 DIAGNOSIS — I71.20 THORACIC AORTIC ANEURYSM WITHOUT RUPTURE, UNSPECIFIED PART: ICD-10-CM

## 2023-05-23 DIAGNOSIS — I34.0 NONRHEUMATIC MITRAL VALVE REGURGITATION: Primary | ICD-10-CM

## 2023-05-23 DIAGNOSIS — I28.8 DILATION OF PULMONARY ARTERY: ICD-10-CM

## 2023-05-23 DIAGNOSIS — E66.01 CLASS 2 SEVERE OBESITY DUE TO EXCESS CALORIES WITH SERIOUS COMORBIDITY AND BODY MASS INDEX (BMI) OF 35.0 TO 35.9 IN ADULT: ICD-10-CM

## 2023-05-23 DIAGNOSIS — I10 PRIMARY HYPERTENSION: ICD-10-CM

## 2023-05-23 DIAGNOSIS — I48.0 PAROXYSMAL ATRIAL FIBRILLATION: ICD-10-CM

## 2023-05-23 DIAGNOSIS — Z98.890 H/O MAZE PROCEDURE: ICD-10-CM

## 2023-05-23 DIAGNOSIS — Z98.890 STATUS POST LIGATION OF LEFT ATRIAL APPENDAGE: ICD-10-CM

## 2023-05-23 DIAGNOSIS — I50.32 CHRONIC DIASTOLIC CONGESTIVE HEART FAILURE: ICD-10-CM

## 2023-05-23 DIAGNOSIS — Z72.0 TOBACCO ABUSE: ICD-10-CM

## 2023-05-23 DIAGNOSIS — N18.32 STAGE 3B CHRONIC KIDNEY DISEASE: ICD-10-CM

## 2023-05-23 RX ORDER — TRAZODONE HYDROCHLORIDE 50 MG/1
50 TABLET ORAL NIGHTLY
COMMUNITY
Start: 2023-05-22

## 2023-05-26 RX ORDER — CYCLOBENZAPRINE HCL 5 MG
TABLET ORAL
Qty: 30 TABLET | Refills: 0 | Status: SHIPPED | OUTPATIENT
Start: 2023-05-26

## 2023-06-02 ENCOUNTER — NURSE ONLY (OUTPATIENT)
Dept: PRIMARY CARE CLINIC | Age: 53
End: 2023-06-02

## 2023-06-02 LAB
ANION GAP SERPL CALCULATED.3IONS-SCNC: 11 MMOL/L (ref 7–19)
BUN SERPL-MCNC: 25 MG/DL (ref 6–20)
CALCIUM SERPL-MCNC: 9.5 MG/DL (ref 8.6–10)
CHLORIDE SERPL-SCNC: 105 MMOL/L (ref 98–111)
CO2 SERPL-SCNC: 22 MMOL/L (ref 22–29)
CREAT SERPL-MCNC: 1.9 MG/DL (ref 0.5–1.2)
GLUCOSE SERPL-MCNC: 116 MG/DL (ref 74–109)
POTASSIUM SERPL-SCNC: 4.5 MMOL/L (ref 3.5–5)
SODIUM SERPL-SCNC: 138 MMOL/L (ref 136–145)

## 2023-06-07 ENCOUNTER — TELEPHONE (OUTPATIENT)
Dept: PSYCHIATRY | Age: 53
End: 2023-06-07

## 2023-06-07 NOTE — TELEPHONE ENCOUNTER
Called and confirmed appt with pt for 6/8 @ 10 AM    Electronically signed by Lu Boyce on 6/7/2023 at 4:22 PM

## 2023-06-08 ENCOUNTER — OFFICE VISIT (OUTPATIENT)
Dept: PSYCHIATRY | Age: 53
End: 2023-06-08
Payer: MEDICAID

## 2023-06-08 DIAGNOSIS — F41.1 GENERALIZED ANXIETY DISORDER WITH PANIC ATTACKS: ICD-10-CM

## 2023-06-08 DIAGNOSIS — F41.0 GENERALIZED ANXIETY DISORDER WITH PANIC ATTACKS: ICD-10-CM

## 2023-06-08 DIAGNOSIS — F32.A DEPRESSION, UNSPECIFIED DEPRESSION TYPE: Primary | ICD-10-CM

## 2023-06-08 PROCEDURE — 90837 PSYTX W PT 60 MINUTES: CPT

## 2023-06-08 ASSESSMENT — PATIENT HEALTH QUESTIONNAIRE - PHQ9
2. FEELING DOWN, DEPRESSED OR HOPELESS: 1
SUM OF ALL RESPONSES TO PHQ QUESTIONS 1-9: 2
SUM OF ALL RESPONSES TO PHQ9 QUESTIONS 1 & 2: 2
SUM OF ALL RESPONSES TO PHQ QUESTIONS 1-9: 2
1. LITTLE INTEREST OR PLEASURE IN DOING THINGS: 1

## 2023-06-08 ASSESSMENT — ANXIETY QUESTIONNAIRES
3. WORRYING TOO MUCH ABOUT DIFFERENT THINGS: 1
6. BECOMING EASILY ANNOYED OR IRRITABLE: 2
GAD7 TOTAL SCORE: 12
IF YOU CHECKED OFF ANY PROBLEMS ON THIS QUESTIONNAIRE, HOW DIFFICULT HAVE THESE PROBLEMS MADE IT FOR YOU TO DO YOUR WORK, TAKE CARE OF THINGS AT HOME, OR GET ALONG WITH OTHER PEOPLE: SOMEWHAT DIFFICULT
1. FEELING NERVOUS, ANXIOUS, OR ON EDGE: 2
7. FEELING AFRAID AS IF SOMETHING AWFUL MIGHT HAPPEN: 3
2. NOT BEING ABLE TO STOP OR CONTROL WORRYING: 1
4. TROUBLE RELAXING: 2
5. BEING SO RESTLESS THAT IT IS HARD TO SIT STILL: 1

## 2023-06-08 NOTE — PROGRESS NOTES
Therapy Progress Note  Radha Herrmann M.S., [facility]  6/8/2023  10:00 AM CDT   11:00 AM    Patient location  : [facility] location : [address] [place] [address]      Time spent with Patient: 60 minutes  This is patient's second  Therapy appointment. Reason for Consult:  depression, anxiety, and stress  Referring Provider: No referring provider defined for this encounter. Marlena Chen ,a 46 y.o. male, for follow up visit. Pt provided informed consent for the behavioral health program. Discussed with patient model of service to include the limits of confidentiality (i.e. abuse reporting, suicide intervention, etc.) and short-term intervention focused approach. Discussed no show and late cancellation policy. Pt indicated understanding. S:  Pt reports he feels exhausted. Pt reports he has been sleeping much better. Pt reports fixing some springs on his mattress and has slept better since. Pt reports he has been cleaning the house and has two room completely finished. Pt reports he feels he has an issue with things trying to be perfect after they are cleaned. Pt reports his next court date is July 12. Pt reports having some depression but has been much better than before. Pt reports still having anxiety. Pt reports he noticed that when he dries his clothes he can only dry his clothes in a prime number dryer at the laundry mat. Pt calm, cooperative, and very pleasant. Pt denies si, hi, avh at this time. Pt reports his wife received her disability and now all of the bills are paid which he is happy about. Completed all assessments, see below. Therapist and pt discussed briefly about what is perfectionism. Handout provided and discussed. Pt receptive. Allowed time for pt to explore and process thoughts, emotions, and current stressors. Pt spent time talking about daily struggles he has been having in life. Therapist also provided reflective listening/validation, support and encouragement.

## 2023-06-19 ENCOUNTER — HOSPITAL ENCOUNTER (OUTPATIENT)
Dept: SLEEP CENTER | Age: 53
Discharge: HOME | End: 2023-06-21
Payer: MEDICAID

## 2023-06-19 PROCEDURE — 95811 POLYSOM 6/>YRS CPAP 4/> PARM: CPT

## 2023-06-20 RX ORDER — CYCLOBENZAPRINE HCL 5 MG
TABLET ORAL
Qty: 30 TABLET | Refills: 0 | Status: SHIPPED | OUTPATIENT
Start: 2023-06-20

## 2023-06-20 NOTE — PROGRESS NOTES
[facility]  [address], [address]  Phone [phone] Fax [phone]     Sleep Study Technician Review    Patient Name:  Torres Partida  :   1970  Referring Provider: KRISTA Cedeno    Brief History:  Torres Partida is a 46 y.o. male with a history of anxiety, depression, hypertension, A-fib, and insomnia who presented for a diagnostic PSG. Height:  73\"  Weight: 265lbs  BMI:  35.0  Neck Circ: 18.5\"  Mallampati  3  ESS:  15    Type of Study: PSG/Split  Time Stage Position Snore Hypopnea Obs Apnea Noe Apnea PAP O2   2206 L/off Wake right No No No No N/A RA   2300 N2 supine Yes Yes Yes Yes N/A RA   2400 N2 right Yes Yes Yes No N/A RA   0100 N2 right Yes Yes Yes Yes 6 RA   0200 REM supine Yes Yes No No 8 RA   0300 N2 supine Yes Yes No Yes 10 RA   0400 N2 supine No Yes No Yes 13 RA   0418 Wake left No No No No 13 RA     Summary:Pt arrived at the sleep center on time. Tech introduced self, verified pt's name and , and escorted pt to room. Tech explained procedure and answered questions. Pt verbalized understanding of procedure. Pt was prepared for PSG per protocol without complication. After sleep onset respiratory events were noted. After adequate sleep was accrued, pt was prepared for a CPAP trial. Pt was sized and fitted with a medium Hendrix-Paykel Vitera full-face mask. CPAP was started at [facility] and increased to [facility] for pt comfort. Pt was back to sleep quickly after CPAP was started. Pt was titrated to a final pressure of [id number]. Pt seemed to tolerate mask and therapy well. Pt was up to use the restroom x 1. Pt stated that he slept better than he usually does. DME:    [facility]    Final PAP settings: [id number]  Mask Type:  Full-face   Mask:   Hendrix-Paykel Vitera    Mask Size:  Medium    The study was reviewed briefly with Torres Partida.   Mr. Hoffmann Burgaw will be notified of the formal results and recommendations after the study
Therapy Titration   IPAP EPAP TIB Sleep REM Apneas Hypopneas RERAs   Min     (min) (min) (min) CA# OA# MA# Index # Index # Index AHI RDI SpO2    6 6 64.2 61.0 0.0 0 1 0 1.0 11 10.8 0 0.0 11.8 11.8 90    7 7 8.6 8.6 5.4 0 0 0 0.0 1 7.0 0 0.0 7.0 7.0 89    8 8 49.4 49.4 42.8 3 0 0 3.6 5 6.1 0 0.0 9.7 9.7 84    9 9 9.9 9.9 0.0 0 0 0 0.0 1 6.1 0 0.0 6.1 6.1 87    10 10 33.6 33.6 0.0 2 2 0 7.1 5 8.9 0 0.0 16.1 16.1 87    11 11 13.8 13.8 0.0 0 2 0 8.7 5 21.7 0 0.0 30.4 30.4 88    12 12 10.4 10.4 2.0 0 0 0 0.0 0 0.0 0 0.0 0.0 0.0 89    13 13 17.7 17.7 17.7 0 0 0 0.0 0 0.0 0 0.0 0.0 0.0 91      Note:   The interpreting physician named above, reviewed this record in its entirety, including sleep staging, EMG activity, EEG, EKG, breathing parameters, oxygen saturation, body position, and behavior unless otherwise noted. The interpretation is based on this information in addition to available clinical history and physical examination data. Interpretation:     Moderate obstructive sleep apnea. Obesity. Subjective hypersomnia. Hypoxia during sleep. Adequate PAP titration. Recommendations:    CPAP at 13 cm water pressure. Weight loss and exercise. Avoid risky activity such as driving if sleepy. Discuss sleep hygiene with the patient. Monitor PAP use and effectiveness.        Armando Frazier MD, [facility], [facility]

## 2023-06-21 DIAGNOSIS — G47.33 OSA (OBSTRUCTIVE SLEEP APNEA): Primary | ICD-10-CM

## 2023-06-21 PROCEDURE — 95810 POLYSOM 6/> YRS 4/> PARAM: CPT | Performed by: INTERNAL MEDICINE

## 2023-06-22 ENCOUNTER — TELEPHONE (OUTPATIENT)
Dept: SLEEP CENTER | Age: 53
End: 2023-06-22

## 2023-06-22 NOTE — TELEPHONE ENCOUNTER
Spoke to Mr. Bud Montoya  and went over the results of Mr. Elba Asif split night PSG performed  06/19/2023. Questions answered. Orders, documentation and insurance information were sent to [facility] in [place] today.

## 2023-07-07 ENCOUNTER — OFFICE VISIT (OUTPATIENT)
Dept: PRIMARY CARE CLINIC | Age: 53
End: 2023-07-07

## 2023-07-07 VITALS
DIASTOLIC BLOOD PRESSURE: 80 MMHG | TEMPERATURE: 96.3 F | HEART RATE: 78 BPM | RESPIRATION RATE: 16 BRPM | HEIGHT: 72 IN | OXYGEN SATURATION: 98 % | BODY MASS INDEX: 35.62 KG/M2 | WEIGHT: 263 LBS | SYSTOLIC BLOOD PRESSURE: 110 MMHG

## 2023-07-07 DIAGNOSIS — F32.A DEPRESSIVE DISORDER: ICD-10-CM

## 2023-07-07 DIAGNOSIS — G47.31 PRIMARY CENTRAL SLEEP APNEA: Primary | ICD-10-CM

## 2023-07-07 DIAGNOSIS — Z13.9 SCREENING DUE: ICD-10-CM

## 2023-07-07 DIAGNOSIS — Z12.11 COLON CANCER SCREENING: ICD-10-CM

## 2023-07-07 DIAGNOSIS — I10 PRIMARY HYPERTENSION: ICD-10-CM

## 2023-07-07 RX ORDER — SPIRONOLACTONE 25 MG/1
25 TABLET ORAL DAILY
COMMUNITY
Start: 2023-04-24

## 2023-07-07 RX ORDER — EMPAGLIFLOZIN 10 MG/1
10 TABLET, FILM COATED ORAL DAILY
COMMUNITY
Start: 2023-06-30

## 2023-07-07 ASSESSMENT — ENCOUNTER SYMPTOMS
NAUSEA: 1
CHEST TIGHTNESS: 0
WHEEZING: 0
ABDOMINAL PAIN: 0
SHORTNESS OF BREATH: 0
BLOOD IN STOOL: 0

## 2023-07-07 NOTE — PROGRESS NOTES
Libby Camarillo (:  1970) is a 46 y.o. male,Established patient, here for evaluation of the following chief complaint(s):  3 Month Follow-Up (Patient presents today for 3 month follow up on diabetes.)         ASSESSMENT/PLAN:  1. Primary central sleep apnea  2. Screening due  -     Fecal DNA Colorectal cancer screening (Cologuard)  3. Colon cancer screening  4. Primary hypertension  5. Depressive disorder      Patient encouraged to maintain follow-ups with specialist as previously scheduled. Blood pressure appears to be under much better control with Entresto and Jardiance. Patient's psychological wellbeing also seems to be improved on Risperdal and Celexa. Unfortunately I do not believe there is anything we can do to expedite patient's CPAP supplies. Patient is agreeable to Cologuard being ordered at this time. Discussed with patient that this will require screening every 3 years and if positive will require a follow-up colonoscopy. Return in about 3 months (around 10/7/2023). Subjective   SUBJECTIVE/OBJECTIVE:  Libby Camarillo is a 46 y.o. male who presents for 3-month follow-up. Patient says he has had a sleep study in the meantime and they diagnosed him with central sleep apnea. Patient has been put on a CPAP however is still waiting on some of the supplies as they are backed up. Patient is following up with sleep medicine in the future. Patient has also been following up with cardiology who has put him on Jardiance and Entresto and his blood pressure is now under good control. Patient has a aortic aneurysm that they plan to monitor going forward. Patient says that they expect to have to do surgery in a few years on him for this. Patient is also now seeing psych and they have started him on Risperdal and Vistaril and patient is feeling much better since doing this.   Diabetes has been under good control with last A1c at 5.9 with diet control  No other new concerns at this

## 2023-07-09 DIAGNOSIS — F42.9 OBSESSIVE-COMPULSIVE DISORDER, UNSPECIFIED TYPE: ICD-10-CM

## 2023-07-09 DIAGNOSIS — F41.1 GENERALIZED ANXIETY DISORDER WITH PANIC ATTACKS: ICD-10-CM

## 2023-07-09 DIAGNOSIS — F41.0 GENERALIZED ANXIETY DISORDER WITH PANIC ATTACKS: ICD-10-CM

## 2023-07-10 ENCOUNTER — TELEPHONE (OUTPATIENT)
Dept: PSYCHIATRY | Age: 53
End: 2023-07-10

## 2023-07-10 RX ORDER — RISPERIDONE 0.5 MG/1
0.5 TABLET ORAL NIGHTLY
Qty: 30 TABLET | Refills: 0 | Status: SHIPPED | OUTPATIENT
Start: 2023-07-10 | End: 2023-07-11

## 2023-07-10 NOTE — TELEPHONE ENCOUNTER
Refill:  0    citalopram (CELEXA) 40 MG tablet       Sig: Take 1 tablet by mouth daily       Dispense:  30 tablet       Refill:  1    hydrOXYzine HCl (ATARAX) 25 MG tablet       Sig: Take 1 tablet by mouth 3 times daily as needed for Anxiety       Dispense:  90 tablet       Refill:  1                     No orders of the defined types were placed in this encounter. 9. Additional comments: Continue therapy, discussed sleep hygiene, discussed the use of coping skills and relaxation strategies to manage symptoms. 10.Over 50% of the total visit time of   30  minutes was spent on counseling and/or coordination of care of:                         1. Depression, unspecified depression type    2. Generalized anxiety disorder with panic attacks    3. Insomnia, unspecified type    4.  Obsessive-compulsive disorder, unspecified type                        Psychotherapy Topics: mood/medication effectiveness , OCD     JOSE Gamez - CNP

## 2023-07-11 ENCOUNTER — OFFICE VISIT (OUTPATIENT)
Dept: PSYCHIATRY | Age: 53
End: 2023-07-11
Payer: MEDICAID

## 2023-07-11 VITALS
DIASTOLIC BLOOD PRESSURE: 75 MMHG | HEART RATE: 78 BPM | TEMPERATURE: 98 F | SYSTOLIC BLOOD PRESSURE: 108 MMHG | OXYGEN SATURATION: 93 % | WEIGHT: 263.2 LBS | HEIGHT: 73 IN | BODY MASS INDEX: 34.88 KG/M2

## 2023-07-11 DIAGNOSIS — G47.33 OBSTRUCTIVE SLEEP APNEA, ADULT: ICD-10-CM

## 2023-07-11 DIAGNOSIS — G47.01 INSOMNIA DUE TO MEDICAL CONDITION: ICD-10-CM

## 2023-07-11 DIAGNOSIS — F32.A DEPRESSION, UNSPECIFIED DEPRESSION TYPE: Primary | ICD-10-CM

## 2023-07-11 DIAGNOSIS — F42.9 OBSESSIVE-COMPULSIVE DISORDER, UNSPECIFIED TYPE: ICD-10-CM

## 2023-07-11 DIAGNOSIS — F41.1 GENERALIZED ANXIETY DISORDER WITH PANIC ATTACKS: ICD-10-CM

## 2023-07-11 DIAGNOSIS — G47.00 INSOMNIA, UNSPECIFIED TYPE: ICD-10-CM

## 2023-07-11 DIAGNOSIS — F41.0 GENERALIZED ANXIETY DISORDER WITH PANIC ATTACKS: ICD-10-CM

## 2023-07-11 PROCEDURE — 3078F DIAST BP <80 MM HG: CPT

## 2023-07-11 PROCEDURE — 99214 OFFICE O/P EST MOD 30 MIN: CPT

## 2023-07-11 PROCEDURE — 3074F SYST BP LT 130 MM HG: CPT

## 2023-07-11 RX ORDER — HYDROXYZINE HYDROCHLORIDE 25 MG/1
25 TABLET, FILM COATED ORAL 3 TIMES DAILY PRN
Qty: 90 TABLET | Refills: 1 | Status: SHIPPED | OUTPATIENT
Start: 2023-07-11

## 2023-07-11 RX ORDER — CITALOPRAM 40 MG/1
40 TABLET ORAL DAILY
Qty: 30 TABLET | Refills: 1 | Status: SHIPPED | OUTPATIENT
Start: 2023-07-11

## 2023-07-11 RX ORDER — RISPERIDONE 0.5 MG/1
0.5 TABLET ORAL NIGHTLY
Qty: 30 TABLET | Refills: 0
Start: 2023-07-11

## 2023-07-11 RX ORDER — RISPERIDONE 0.5 MG/1
1 TABLET ORAL NIGHTLY
Qty: 60 TABLET | Refills: 0
Start: 2023-07-11 | End: 2023-07-11

## 2023-07-11 RX ORDER — TRAZODONE HYDROCHLORIDE 50 MG/1
100 TABLET ORAL NIGHTLY
Qty: 60 TABLET | Refills: 1 | Status: SHIPPED | OUTPATIENT
Start: 2023-07-11

## 2023-07-11 ASSESSMENT — PATIENT HEALTH QUESTIONNAIRE - PHQ9
3. TROUBLE FALLING OR STAYING ASLEEP: 2
1. LITTLE INTEREST OR PLEASURE IN DOING THINGS: 1
SUM OF ALL RESPONSES TO PHQ QUESTIONS 1-9: 12
10. IF YOU CHECKED OFF ANY PROBLEMS, HOW DIFFICULT HAVE THESE PROBLEMS MADE IT FOR YOU TO DO YOUR WORK, TAKE CARE OF THINGS AT HOME, OR GET ALONG WITH OTHER PEOPLE: 1
SUM OF ALL RESPONSES TO PHQ9 QUESTIONS 1 & 2: 2
9. THOUGHTS THAT YOU WOULD BE BETTER OFF DEAD, OR OF HURTING YOURSELF: 0
8. MOVING OR SPEAKING SO SLOWLY THAT OTHER PEOPLE COULD HAVE NOTICED. OR THE OPPOSITE, BEING SO FIGETY OR RESTLESS THAT YOU HAVE BEEN MOVING AROUND A LOT MORE THAN USUAL: 0
2. FEELING DOWN, DEPRESSED OR HOPELESS: 1
7. TROUBLE CONCENTRATING ON THINGS, SUCH AS READING THE NEWSPAPER OR WATCHING TELEVISION: 1
6. FEELING BAD ABOUT YOURSELF - OR THAT YOU ARE A FAILURE OR HAVE LET YOURSELF OR YOUR FAMILY DOWN: 2
SUM OF ALL RESPONSES TO PHQ QUESTIONS 1-9: 12
5. POOR APPETITE OR OVEREATING: 3
4. FEELING TIRED OR HAVING LITTLE ENERGY: 2

## 2023-07-11 NOTE — PROGRESS NOTES
Depression, unspecified depression type    2. Generalized anxiety disorder with panic attacks    3. Obsessive-compulsive disorder, unspecified type    4. Insomnia due to medical condition    5. Obstructive sleep apnea, adult    6. Insomnia, unspecified type                      Psychotherapy Topics: mood/medication effectiveness legal    Roberto Carlos Deng, APRN - CNP    This dictation was generated by voice recognition computer software. Although all attempts are made to edit the dictation for accuracy, there may be errors in the transcription that are not intended.

## 2023-07-13 ENCOUNTER — TELEPHONE (OUTPATIENT)
Dept: PSYCHIATRY | Age: 53
End: 2023-07-13

## 2023-07-13 NOTE — TELEPHONE ENCOUNTER
Called pt for appointment reminder.   -Pt confirmed    Electronically signed by Macy Alanis MA on 7/13/2023 at 4:02 PM

## 2023-07-14 ENCOUNTER — OFFICE VISIT (OUTPATIENT)
Dept: PSYCHIATRY | Age: 53
End: 2023-07-14
Payer: MEDICAID

## 2023-07-14 DIAGNOSIS — F42.9 OBSESSIVE-COMPULSIVE DISORDER, UNSPECIFIED TYPE: ICD-10-CM

## 2023-07-14 DIAGNOSIS — F41.0 GENERALIZED ANXIETY DISORDER WITH PANIC ATTACKS: ICD-10-CM

## 2023-07-14 DIAGNOSIS — F32.A DEPRESSION, UNSPECIFIED DEPRESSION TYPE: Primary | ICD-10-CM

## 2023-07-14 DIAGNOSIS — F41.1 GENERALIZED ANXIETY DISORDER WITH PANIC ATTACKS: ICD-10-CM

## 2023-07-14 PROCEDURE — 90837 PSYTX W PT 60 MINUTES: CPT

## 2023-07-14 ASSESSMENT — PATIENT HEALTH QUESTIONNAIRE - PHQ9
SUM OF ALL RESPONSES TO PHQ QUESTIONS 1-9: 2
SUM OF ALL RESPONSES TO PHQ9 QUESTIONS 1 & 2: 2
2. FEELING DOWN, DEPRESSED OR HOPELESS: 1
SUM OF ALL RESPONSES TO PHQ QUESTIONS 1-9: 2
SUM OF ALL RESPONSES TO PHQ QUESTIONS 1-9: 2
1. LITTLE INTEREST OR PLEASURE IN DOING THINGS: 1
SUM OF ALL RESPONSES TO PHQ QUESTIONS 1-9: 2

## 2023-07-14 ASSESSMENT — ANXIETY QUESTIONNAIRES
IF YOU CHECKED OFF ANY PROBLEMS ON THIS QUESTIONNAIRE, HOW DIFFICULT HAVE THESE PROBLEMS MADE IT FOR YOU TO DO YOUR WORK, TAKE CARE OF THINGS AT HOME, OR GET ALONG WITH OTHER PEOPLE: SOMEWHAT DIFFICULT
4. TROUBLE RELAXING: 1
2. NOT BEING ABLE TO STOP OR CONTROL WORRYING: 1
5. BEING SO RESTLESS THAT IT IS HARD TO SIT STILL: 1
7. FEELING AFRAID AS IF SOMETHING AWFUL MIGHT HAPPEN: 3
6. BECOMING EASILY ANNOYED OR IRRITABLE: 2
GAD7 TOTAL SCORE: 12
3. WORRYING TOO MUCH ABOUT DIFFERENT THINGS: 2
1. FEELING NERVOUS, ANXIOUS, OR ON EDGE: 2

## 2023-07-14 NOTE — PROGRESS NOTES
Therapy Progress Note  Luis Carlos Catie., [facility]  7/14/2023  1:53 PM CDT   2:48 PM    Patient location  : [facility] location : [address] [facility]      Time spent with Patient: 55 minutes  This is patient's third  Therapy appointment. Reason for Consult:  depression, anxiety, and stress  Referring Provider: No referring provider defined for this encounter. Raz Mack ,a 46 y.o. male, for follow up visit. Pt provided informed consent for the behavioral health program. Discussed with patient model of service to include the limits of confidentiality (i.e. abuse reporting, suicide intervention, etc.) and short-term intervention focused approach. Discussed no show and late cancellation policy. Pt indicated understanding. S:  Pt reports he is tired today because he stayed up late last night watching a show. Pt reports he had panic attacks last Wednesday due to being in court. Pt reports the case has been continued until January. Pt reports he has two rooms and almost a third room fully cleaned. Pt reports he normally showers once every couple of weeks. Pt reports he has no motivation to shower. Discussed hygiene and the importance of self care. Pt voiced understanding and agreement. Pt reports their washer is broken but they are getting a new one this weekend. Pt reports they haven't been able to wash clothes and they have not been going to a laundry mat to wash clothes because it is too expensive. Pt reports he will be excited to get a new shower so he can wash his bed sheets and covers. Pt reports he had to go and buy him a new outfit to wear to court. Pt reports he has issues with the way he looks because when younger he was very skinny and would run ten miles per day. Pt reports he has been having trouble falling asleep. However, pt reports having a sleep apnea test and now has been diagnosed with sleep apnea.  Pt reports they are currently waiting on his insurance to

## 2023-07-19 NOTE — TELEPHONE ENCOUNTER
Called pt for appointment reminder.   -Pt confirmed    Electronically signed by Trisha Willett MA on 7/10/2023 at 2:12 PM Statement Selected

## 2023-07-28 ENCOUNTER — TELEPHONE (OUTPATIENT)
Dept: PSYCHIATRY | Age: 53
End: 2023-07-28

## 2023-07-28 NOTE — TELEPHONE ENCOUNTER
Called pt for appointment reminder.   -Pt confirmed    Electronically signed by Manfred Godinez MA on 7/28/2023 at 1:13 PM

## 2023-07-31 ENCOUNTER — TELEPHONE (OUTPATIENT)
Dept: UROLOGY | Facility: CLINIC | Age: 53
End: 2023-07-31
Payer: MEDICAID

## 2023-07-31 ENCOUNTER — TELEPHONE (OUTPATIENT)
Dept: PSYCHIATRY | Age: 53
End: 2023-07-31

## 2023-07-31 NOTE — TELEPHONE ENCOUNTER
Called pt was a no show. Called to check on them and  -Pt asked to reschedule and was rescheduled on 8/15/23.     Electronically signed by Bryce Molina MA on 7/31/2023 at 4:29 PM

## 2023-08-01 DIAGNOSIS — F41.1 GENERALIZED ANXIETY DISORDER WITH PANIC ATTACKS: ICD-10-CM

## 2023-08-01 DIAGNOSIS — F42.9 OBSESSIVE-COMPULSIVE DISORDER, UNSPECIFIED TYPE: ICD-10-CM

## 2023-08-01 DIAGNOSIS — F41.0 GENERALIZED ANXIETY DISORDER WITH PANIC ATTACKS: ICD-10-CM

## 2023-08-02 ENCOUNTER — TELEPHONE (OUTPATIENT)
Dept: PSYCHIATRY | Age: 53
End: 2023-08-02

## 2023-08-02 RX ORDER — RISPERIDONE 0.5 MG/1
0.5 TABLET ORAL NIGHTLY
Qty: 30 TABLET | Refills: 0 | Status: SHIPPED | OUTPATIENT
Start: 2023-08-02

## 2023-08-02 NOTE — TELEPHONE ENCOUNTER
Called and let pt know that his script for risperidone was sent to his pharmacy     Electronically signed by Chon Solorzano on 8/2/2023 at 11:14 AM

## 2023-08-02 NOTE — TELEPHONE ENCOUNTER
[facility]        Lab Results   Component Value Date     LABA1C 5.9 02/22/2023      No results found for: EAG  No results found for: TSHFT4, TSH  No results found for: VITD25        Lab Results   Component Value Date     OWULOECX33 366 02/03/2023            Lab Results   Component Value Date     FOLATE 16.9 02/03/2023         Assessment:    1. Depression, unspecified depression type    2. Generalized anxiety disorder with panic attacks    3. Obsessive-compulsive disorder, unspecified type    4. Insomnia due to medical condition    5. Obstructive sleep apnea, adult    6. Insomnia, unspecified type          No evidence of acute suicidality, homicidality or psychosis observed. Patient is psychiatrically stable     Plan:     1. Continue   Celexa 40 mg daily for depression  Risperdal 1 mg nightly for OCD  Hydroxyzine 25 mg 3 times daily as needed for anxiety  Trazodone, increase to 100 mg nightly for insomnia  Psychotherapy with Marli Nesbitt        The risks, benefits, side effects, indications, contraindications, and adverse effects of the medications have been discussed. Yes.  2. The pt has verbalized understanding and has capacity to give informed consent. 3. The Kellie Aracely report has been reviewed according to [facility] regulations. 4. Supportive therapy offered. 5. Follow up:    Return in about 3 weeks (around 8/1/2023) for Medication Follow up. 6. The patient has been advised to call with any problems.   7. Controlled substance Treatment Plan: n/a.  8. The above listed medications have been continued, modifications in meds and other orders/labs as follows:                 Encounter Medications         Orders Placed This Encounter   Medications    DISCONTD: risperiDONE (RISPERDAL) 0.5 MG tablet       Sig: Take 2 tablets by mouth nightly       Dispense:  60 tablet       Refill:  0    risperiDONE (RISPERDAL) 0.5 MG tablet       Sig: Take 1 tablet by mouth nightly       Dispense:  30 tablet       Refill:  0    citalopram

## 2023-08-03 ENCOUNTER — TELEPHONE (OUTPATIENT)
Dept: UROLOGY | Facility: CLINIC | Age: 53
End: 2023-08-03
Payer: MEDICAID

## 2023-08-04 ENCOUNTER — TELEPHONE (OUTPATIENT)
Dept: UROLOGY | Facility: CLINIC | Age: 53
End: 2023-08-04
Payer: MEDICAID

## 2023-08-04 ENCOUNTER — NURSE ONLY (OUTPATIENT)
Dept: PRIMARY CARE CLINIC | Age: 53
End: 2023-08-04

## 2023-08-04 LAB — PSA SERPL-MCNC: 0.08 NG/ML (ref 0–4)

## 2023-08-14 ENCOUNTER — TELEPHONE (OUTPATIENT)
Dept: PSYCHIATRY | Age: 53
End: 2023-08-14

## 2023-08-14 NOTE — TELEPHONE ENCOUNTER
Called pt for appointment reminder.   -Pt confirmed    Electronically signed by Barbara Espinoza MA on 8/14/2023 at 3:45 PM

## 2023-08-15 ENCOUNTER — OFFICE VISIT (OUTPATIENT)
Dept: PSYCHIATRY | Age: 53
End: 2023-08-15
Payer: MEDICAID

## 2023-08-15 VITALS
SYSTOLIC BLOOD PRESSURE: 128 MMHG | OXYGEN SATURATION: 96 % | TEMPERATURE: 97.3 F | WEIGHT: 260 LBS | DIASTOLIC BLOOD PRESSURE: 88 MMHG | HEIGHT: 73 IN | BODY MASS INDEX: 34.46 KG/M2 | HEART RATE: 99 BPM

## 2023-08-15 DIAGNOSIS — G47.01 INSOMNIA DUE TO MEDICAL CONDITION: ICD-10-CM

## 2023-08-15 DIAGNOSIS — F41.0 GENERALIZED ANXIETY DISORDER WITH PANIC ATTACKS: ICD-10-CM

## 2023-08-15 DIAGNOSIS — F32.A DEPRESSION, UNSPECIFIED DEPRESSION TYPE: Primary | ICD-10-CM

## 2023-08-15 DIAGNOSIS — F42.9 OBSESSIVE-COMPULSIVE DISORDER, UNSPECIFIED TYPE: ICD-10-CM

## 2023-08-15 DIAGNOSIS — G47.33 OBSTRUCTIVE SLEEP APNEA, ADULT: ICD-10-CM

## 2023-08-15 DIAGNOSIS — F41.1 GENERALIZED ANXIETY DISORDER WITH PANIC ATTACKS: ICD-10-CM

## 2023-08-15 PROCEDURE — 99214 OFFICE O/P EST MOD 30 MIN: CPT

## 2023-08-15 PROCEDURE — 3079F DIAST BP 80-89 MM HG: CPT

## 2023-08-15 PROCEDURE — 3074F SYST BP LT 130 MM HG: CPT

## 2023-08-15 RX ORDER — CITALOPRAM 40 MG/1
40 TABLET ORAL DAILY
Qty: 30 TABLET | Refills: 1 | Status: SHIPPED | OUTPATIENT
Start: 2023-08-15

## 2023-08-15 RX ORDER — RISPERIDONE 1 MG/1
1 TABLET ORAL NIGHTLY
Qty: 30 TABLET | Refills: 1 | Status: SHIPPED | OUTPATIENT
Start: 2023-08-15

## 2023-08-15 RX ORDER — DOXEPIN HYDROCHLORIDE 25 MG/1
25-50 CAPSULE ORAL NIGHTLY
Qty: 45 CAPSULE | Refills: 1 | Status: SHIPPED | OUTPATIENT
Start: 2023-08-15

## 2023-08-15 RX ORDER — HYDROXYZINE HYDROCHLORIDE 25 MG/1
25 TABLET, FILM COATED ORAL 3 TIMES DAILY PRN
Qty: 90 TABLET | Refills: 1 | Status: SHIPPED | OUTPATIENT
Start: 2023-08-15

## 2023-08-15 ASSESSMENT — PATIENT HEALTH QUESTIONNAIRE - PHQ9
2. FEELING DOWN, DEPRESSED OR HOPELESS: 1
4. FEELING TIRED OR HAVING LITTLE ENERGY: 1
8. MOVING OR SPEAKING SO SLOWLY THAT OTHER PEOPLE COULD HAVE NOTICED. OR THE OPPOSITE, BEING SO FIGETY OR RESTLESS THAT YOU HAVE BEEN MOVING AROUND A LOT MORE THAN USUAL: 0
3. TROUBLE FALLING OR STAYING ASLEEP: 3
SUM OF ALL RESPONSES TO PHQ QUESTIONS 1-9: 12
1. LITTLE INTEREST OR PLEASURE IN DOING THINGS: 1
6. FEELING BAD ABOUT YOURSELF - OR THAT YOU ARE A FAILURE OR HAVE LET YOURSELF OR YOUR FAMILY DOWN: 2
9. THOUGHTS THAT YOU WOULD BE BETTER OFF DEAD, OR OF HURTING YOURSELF: 0
SUM OF ALL RESPONSES TO PHQ QUESTIONS 1-9: 12
7. TROUBLE CONCENTRATING ON THINGS, SUCH AS READING THE NEWSPAPER OR WATCHING TELEVISION: 2
5. POOR APPETITE OR OVEREATING: 2
SUM OF ALL RESPONSES TO PHQ QUESTIONS 1-9: 12
10. IF YOU CHECKED OFF ANY PROBLEMS, HOW DIFFICULT HAVE THESE PROBLEMS MADE IT FOR YOU TO DO YOUR WORK, TAKE CARE OF THINGS AT HOME, OR GET ALONG WITH OTHER PEOPLE: 1
SUM OF ALL RESPONSES TO PHQ9 QUESTIONS 1 & 2: 2
SUM OF ALL RESPONSES TO PHQ QUESTIONS 1-9: 12

## 2023-08-15 NOTE — PROGRESS NOTES
hallucinations, and no overt paranoia or delusions appreciated. Today patient states, \"I am doing okay, but not sleeping very well. \"    Patient seen in office today, wearing casual clothing, appropriate for season. He is alert and oriented x 4. He is calm, cooperative, and pleasant. Patient does have poor hygiene, this is a chronic problem, malodorous. Patient has multiple animals at home. He reports his mood is been okay since last appointment, however reports he has been having trouble initiating sleep. His trazodone has been somewhat effective but not at all he reports now. He was found to have obstructive sleep apnea on sleep study, has CPAP now, but still has trouble initiating sleep. Reports once he is asleep he sleeps pretty well. Discussed a trial of doxepin to help with sleep, patient agreeable. Will discontinue trazodone. Patient reports appetite is good. He denies any current or active suicidal or homicidal ideations, denies hallucinations, and no overt paranoia or delusions appreciated. He continues psychotherapy with Magali in office. He reports his last appointment he did have a court hearing, reports that it has been postponed until January, patient is awaiting sentencing for he and his wife due to neglect case to his wife's 2 disabled adult children. In October 2021, there was a welfare check done by police on the adult children at their home due to patient and his wife were at the hospital at the time and patient's adult disabled children were left alone. Police found children in deplorable living conditions parents were charged with neglect. He and his wife no longer have custody of the adult children. Patient reports that court has been granted continuances for quite a long time, \"they are waiting on the full psychiatric admission to my wife and a few other things before they will do the sentencing\". We will follow in 6 weeks. Absent  suicidal ideation.     Reports

## 2023-08-15 NOTE — PROGRESS NOTES
Subjective    Mr. Andrea is 53 y.o. male    Chief Complaint: Prostate Cancer    History of Present Illness    Preop PSA was 32.4.  He had a bone scan and CT which were negative for metastatic prostate cancer.  Preop erectile dysfunction.  Status post robotic assisted lap prostatectomy and bilateral pelvic lymph node dissection in June 2021.  This did reveal P T3a PN 0 Susie 4+3 = 7 adenocarcinoma the prostate with a positive margin at the bladder neck.  This is a significant upgrade from Susie 3+3= 6 in 1 out of 12 cores on his biopsy.  Postop PSA 0.08. Stable MANISHA one thin liner per day.      PSA- 0.08 (8/4/2023)  Lab Results   Component Value Date    PSA 0.08 03/06/2023    PSA <0.1 07/19/2022    PSA <0.1 04/19/2022       The following portions of the patient's history were reviewed and updated as appropriate: allergies, current medications, past family history, past medical history, past social history, past surgical history and problem list.    Review of Systems      Current Outpatient Medications:     Acetaminophen (TYLENOL ARTHRITIS PAIN PO), Take 2 tablets by mouth As Needed., Disp: , Rfl:     aspirin 325 MG tablet, Take 1 tablet by mouth 2 (Two) Times a Day., Disp: , Rfl:     citalopram (CeleXA) 40 MG tablet, Take 1 tablet by mouth Daily., Disp: , Rfl:     cyclobenzaprine (FLEXERIL) 10 MG tablet, Take 1 tablet 3 times a day by oral route as needed., Disp: , Rfl:     diclofenac (VOLTAREN) 75 MG EC tablet, Take 1 tablet by mouth 2 (Two) Times a Day., Disp: , Rfl:     donepezil (ARICEPT) 5 MG tablet, Take 1 tablet by mouth Every Night., Disp: , Rfl:     doxepin (SINEquan) 25 MG capsule, Take 1-2 capsules by mouth Every Night., Disp: , Rfl:     empagliflozin (Jardiance) 10 MG tablet tablet, Take 1 tablet by mouth Daily., Disp: 30 tablet, Rfl: 11    hydrOXYzine (ATARAX) 25 MG tablet, Take 1 tablet by mouth 3 (Three) Times a Day As Needed. for anxiety, Disp: , Rfl:     loperamide (IMODIUM) 2 MG  capsule, TAKE 1 CAPSULE BY MOUTH 4 TIMES DAILY AS NEEDED FOR DIARRHEA, Disp: , Rfl:     metoprolol tartrate (LOPRESSOR) 25 MG tablet, Take 1 tablet by mouth 2 (Two) Times a Day., Disp: 180 tablet, Rfl: 3    Multiple Vitamins-Minerals (MULTIVITAMIN WITH MINERALS) tablet tablet, Take 1 tablet by mouth Daily., Disp: , Rfl:     sacubitril-valsartan (Entresto) 49-51 MG tablet, Take 1 tablet by mouth 2 (Two) Times a Day., Disp: 60 tablet, Rfl: 11    spironolactone (ALDACTONE) 25 MG tablet, Take 1 tablet by mouth Daily., Disp: 90 tablet, Rfl: 3    sulfamethoxazole-trimethoprim (BACTRIM DS,SEPTRA DS) 800-160 MG per tablet, Take 1 tablet by mouth Every 12 (Twelve) Hours., Disp: , Rfl:     vitamin D3 125 MCG (5000 UT) capsule capsule, Take 1 capsule by mouth Every Other Day., Disp: , Rfl:     Past Medical History:   Diagnosis Date    A-fib     Arthritis     Chronic renal failure     Coronary artery disease     Elevated PSA     GERD (gastroesophageal reflux disease)     Hypertension     Kidney stone     MRSA bacteremia 02/21/2017    MRSA in heart valve    Prostate cancer     Purpura     Wegener's granulomatosis with renal involvement        Past Surgical History:   Procedure Laterality Date    APPENDECTOMY      INSERTION HEMODIALYSIS CATHETER N/A 1/20/2017    Procedure: INSERTION PERMCATH;  Surgeon: Ian Grimes DO;  Location:  [facility] OR;  Service:     LIP REPAIR      MITRAL VALVE REPLACEMENT      PROSTATECTOMY Bilateral 6/1/2021    Procedure: RADICAL PROSTATECTOMY LAPAROSCOPIC WITH DAVINCI ROBOT WITH BILATERAL PELVIC LYMPH NODE DISSECTION;  Surgeon: Luis Carlos Awan MD;  Location:  [facility] OR;  Service: Robotics - DaVinci;  Laterality: Bilateral;       Social History     Socioeconomic History    Marital status:    Tobacco Use    Smoking status: Every Day     Packs/day: 1.00     Years: 15.00     Pack years: 15.00     Types: Cigarettes    Smokeless tobacco: Never   Vaping Use    Vaping  "Use: Never used   Substance and Sexual Activity    Alcohol use: Not Currently     Comment: 1 drink per year until 5 years ago, then none    Drug use: No    Sexual activity: Not Currently       Family History   Problem Relation Age of Onset    Heart disease Father     Hypertension Father        Objective    Temp 96.9 øF (36.1 øC)   Ht 185.4 cm (73\")   Wt 118 kg (259 lb 3.2 oz)   BMI 34.20 kg/mý     Physical Exam  Skin:     Comments: Midline incisional hernia           Results for orders placed or performed in visit on 08/07/23   POC Urinalysis Dipstick, Multipro    Specimen: Urine   Result Value Ref Range    Color Yellow Yellow, Straw, Dark Yellow, Falguni    Clarity, UA Clear Clear    Glucose,  mg/dL (A) Negative mg/dL    Bilirubin Negative Negative    Ketones, UA Negative Negative    Specific Gravity  1.030 1.005 - 1.030    Blood, UA Negative Negative    pH, Urine 5.5 5.0 - 8.0    Protein, POC Negative Negative mg/dL    Urobilinogen, UA 0.2 E.U./dL Normal, 0.2 E.U./dL    Nitrite, UA Negative Negative    Leukocytes Negative Negative     Assessment and Plan    Diagnoses and all orders for this visit:    1. Prostate cancer (Primary)  -     Cancel: POC Urinalysis Dipstick, Multipro  -     PSA DIAGNOSTIC; Future    2. Impotence of organic origin    3. Nephrolithiasis  -     XR Abdomen KUB; Future    4. Incisional hernia without obstruction or gangrene        Preop PSA was 32.4.  He had a bone scan and CT which were negative for metastatic prostate cancer.  Preop erectile dysfunction.  Status post robotic assisted lap prostatectomy and bilateral pelvic lymph node dissection in June 2021.  This did reveal P T3a PN 0 Susie 4+3 = 7 adenocarcinoma the prostate with a positive margin at the bladder neck.  This is a significant upgrade from [place] 3+3= 6 in 1 out of 12 cores on his biopsy.      PSA today 0.08 stable.  We discussed recurrence is defined as PSA 0.2 or higher.     1 thin liner/day.  Continue " Paolo.     Not interested in ED therapy at this point.     Follow up in 6 months with PSA.        Check KUB every other appointment. 9mm right lower pole stone.  Patient has chosen observation.

## 2023-08-25 ENCOUNTER — TELEPHONE (OUTPATIENT)
Dept: PSYCHIATRY | Age: 53
End: 2023-08-25

## 2023-08-25 NOTE — TELEPHONE ENCOUNTER
Called pt for appointment reminder.   -Pt confirmed    Electronically signed by Annabella Adrian MA on 8/25/2023 at 2:55 PM

## 2023-08-28 ENCOUNTER — OFFICE VISIT (OUTPATIENT)
Dept: PSYCHIATRY | Age: 53
End: 2023-08-28
Payer: MEDICAID

## 2023-08-28 DIAGNOSIS — F42.9 OBSESSIVE-COMPULSIVE DISORDER, UNSPECIFIED TYPE: ICD-10-CM

## 2023-08-28 DIAGNOSIS — F41.1 GENERALIZED ANXIETY DISORDER WITH PANIC ATTACKS: ICD-10-CM

## 2023-08-28 DIAGNOSIS — F32.A DEPRESSION, UNSPECIFIED DEPRESSION TYPE: Primary | ICD-10-CM

## 2023-08-28 DIAGNOSIS — F41.0 GENERALIZED ANXIETY DISORDER WITH PANIC ATTACKS: ICD-10-CM

## 2023-08-28 PROCEDURE — 90834 PSYTX W PT 45 MINUTES: CPT

## 2023-08-28 ASSESSMENT — ANXIETY QUESTIONNAIRES
1. FEELING NERVOUS, ANXIOUS, OR ON EDGE: 1
6. BECOMING EASILY ANNOYED OR IRRITABLE: 2
3. WORRYING TOO MUCH ABOUT DIFFERENT THINGS: 1
7. FEELING AFRAID AS IF SOMETHING AWFUL MIGHT HAPPEN: 3
GAD7 TOTAL SCORE: 11
4. TROUBLE RELAXING: 1
5. BEING SO RESTLESS THAT IT IS HARD TO SIT STILL: 2
2. NOT BEING ABLE TO STOP OR CONTROL WORRYING: 1
IF YOU CHECKED OFF ANY PROBLEMS ON THIS QUESTIONNAIRE, HOW DIFFICULT HAVE THESE PROBLEMS MADE IT FOR YOU TO DO YOUR WORK, TAKE CARE OF THINGS AT HOME, OR GET ALONG WITH OTHER PEOPLE: VERY DIFFICULT

## 2023-08-28 ASSESSMENT — PATIENT HEALTH QUESTIONNAIRE - PHQ9
SUM OF ALL RESPONSES TO PHQ QUESTIONS 1-9: 2
SUM OF ALL RESPONSES TO PHQ9 QUESTIONS 1 & 2: 2
1. LITTLE INTEREST OR PLEASURE IN DOING THINGS: 1
SUM OF ALL RESPONSES TO PHQ QUESTIONS 1-9: 2
SUM OF ALL RESPONSES TO PHQ QUESTIONS 1-9: 2
2. FEELING DOWN, DEPRESSED OR HOPELESS: 1
SUM OF ALL RESPONSES TO PHQ QUESTIONS 1-9: 2

## 2023-08-28 NOTE — PROGRESS NOTES
Therapy Progress Note  Nu Rangel, [facility] LYNETTE  8/28/2023  3:04 PM CDT   3:46 PM    Patient location  : [address] [place] [address] location : [address] [facility]      Time spent with Patient: 42 minutes  This is patient's fourth  Therapy appointment. Reason for Consult:  depression, anxiety, and stress  Referring Provider: No referring provider defined for this encounter. Carlos Fisher ,a 46 y.o. male, for follow up visit. Pt provided informed consent for the behavioral health program. Discussed with patient model of service to include the limits of confidentiality (i.e. abuse reporting, suicide intervention, etc.) and short-term intervention focused approach. Discussed no show and late cancellation policy. Pt indicated understanding. S:  Pt reports things have been going pretty good lately. Pt reports since last appointment he has had only one rough day where he was thinking negatively. However, pt reports on that day he was listening to very sad music. Pt reports he is trying to get more organized so now he has a notebook in the kitchen for kitchen related stuff, a notebook for daily life, and a notebook to sketch ideas he has. Pt reports he is now showering once a month. Pt reports this month his house had a water leak which was very costly. Pt reports having trouble falling asleep. Pt reports after he falls asleep he sleeps good. Pt reports he feels his depression and anxiety is not as bad as it once was. Pt reports he feels like he is in a better place. Pt reports he is wanting to plant a garden. However, pt reports with things being up in the air (court) he does not know if he should plant the garden. Pt reports he is going to plant the garden. Pt reports he loves to cook and a garden would help save them money. Pt reports he really misses the boys at times especially since sports are starting back up. Pt reports he talked to Tosin Palomares which is his best friend.  Pt reports he has not

## 2023-08-29 ENCOUNTER — TELEPHONE (OUTPATIENT)
Dept: UROLOGY | Facility: CLINIC | Age: 53
End: 2023-08-29
Payer: MEDICAID

## 2023-08-29 ENCOUNTER — OFFICE VISIT (OUTPATIENT)
Dept: PRIMARY CARE CLINIC | Age: 53
End: 2023-08-29
Payer: MEDICAID

## 2023-08-29 VITALS
HEART RATE: 71 BPM | HEIGHT: 72 IN | BODY MASS INDEX: 34.95 KG/M2 | SYSTOLIC BLOOD PRESSURE: 116 MMHG | TEMPERATURE: 98.1 F | RESPIRATION RATE: 18 BRPM | OXYGEN SATURATION: 98 % | WEIGHT: 258 LBS | DIASTOLIC BLOOD PRESSURE: 80 MMHG

## 2023-08-29 DIAGNOSIS — L02.415 ABSCESS OF RIGHT THIGH: ICD-10-CM

## 2023-08-29 DIAGNOSIS — E04.2 MULTINODULAR THYROID: ICD-10-CM

## 2023-08-29 DIAGNOSIS — R19.7 DIARRHEA, UNSPECIFIED TYPE: Primary | ICD-10-CM

## 2023-08-29 LAB
T4 FREE SERPL-MCNC: 1.19 NG/DL (ref 0.93–1.7)
TSH SERPL DL<=0.005 MIU/L-ACNC: 1.47 UIU/ML (ref 0.27–4.2)

## 2023-08-29 PROCEDURE — 99214 OFFICE O/P EST MOD 30 MIN: CPT | Performed by: FAMILY MEDICINE

## 2023-08-29 PROCEDURE — 3074F SYST BP LT 130 MM HG: CPT | Performed by: FAMILY MEDICINE

## 2023-08-29 PROCEDURE — 3079F DIAST BP 80-89 MM HG: CPT | Performed by: FAMILY MEDICINE

## 2023-08-29 RX ORDER — SULFAMETHOXAZOLE AND TRIMETHOPRIM 800; 160 MG/1; MG/1
1 TABLET ORAL 2 TIMES DAILY
Qty: 20 TABLET | Refills: 0 | Status: SHIPPED | OUTPATIENT
Start: 2023-08-29 | End: 2023-09-08

## 2023-08-29 RX ORDER — LOPERAMIDE HYDROCHLORIDE 2 MG/1
2 CAPSULE ORAL 4 TIMES DAILY PRN
Qty: 10 CAPSULE | Refills: 0 | Status: SHIPPED | OUTPATIENT
Start: 2023-08-29 | End: 2023-09-08

## 2023-08-29 ASSESSMENT — ENCOUNTER SYMPTOMS
DIARRHEA: 1
ABDOMINAL PAIN: 1
SHORTNESS OF BREATH: 0
CONSTIPATION: 0
BLOOD IN STOOL: 0
CHEST TIGHTNESS: 0
NAUSEA: 0
WHEEZING: 0
VOMITING: 0

## 2023-08-30 ENCOUNTER — NURSE ONLY (OUTPATIENT)
Dept: PRIMARY CARE CLINIC | Age: 53
End: 2023-08-30

## 2023-08-30 ENCOUNTER — OFFICE VISIT (OUTPATIENT)
Dept: UROLOGY | Facility: CLINIC | Age: 53
End: 2023-08-30
Payer: MEDICAID

## 2023-08-30 VITALS — BODY MASS INDEX: 34.35 KG/M2 | WEIGHT: 259.2 LBS | TEMPERATURE: 96.9 F | HEIGHT: 73 IN

## 2023-08-30 DIAGNOSIS — R19.7 DIARRHEA, UNSPECIFIED TYPE: Primary | ICD-10-CM

## 2023-08-30 DIAGNOSIS — N20.0 NEPHROLITHIASIS: ICD-10-CM

## 2023-08-30 DIAGNOSIS — C61 PROSTATE CANCER: Primary | ICD-10-CM

## 2023-08-30 DIAGNOSIS — N52.9 IMPOTENCE OF ORGANIC ORIGIN: ICD-10-CM

## 2023-08-30 DIAGNOSIS — R19.7 DIARRHEA, UNSPECIFIED TYPE: ICD-10-CM

## 2023-08-30 DIAGNOSIS — K43.2 INCISIONAL HERNIA WITHOUT OBSTRUCTION OR GANGRENE: ICD-10-CM

## 2023-08-30 RX ORDER — DOXEPIN HYDROCHLORIDE 25 MG/1
25-50 CAPSULE ORAL NIGHTLY
COMMUNITY
Start: 2023-08-17

## 2023-08-30 RX ORDER — CYCLOBENZAPRINE HCL 10 MG
TABLET ORAL
COMMUNITY

## 2023-08-30 RX ORDER — LOPERAMIDE HYDROCHLORIDE 2 MG/1
CAPSULE ORAL
COMMUNITY
Start: 2023-08-29

## 2023-08-30 RX ORDER — SULFAMETHOXAZOLE AND TRIMETHOPRIM 800; 160 MG/1; MG/1
1 TABLET ORAL EVERY 12 HOURS SCHEDULED
COMMUNITY
Start: 2023-08-29

## 2023-08-31 ENCOUNTER — TELEPHONE (OUTPATIENT)
Dept: PSYCHIATRY | Age: 53
End: 2023-08-31

## 2023-08-31 NOTE — TELEPHONE ENCOUNTER
Per direction of TANESHA GARCIA, contacted pt -wife also on the phone. They were informed that the [facility] where he was seen by his PCP a few days ago, parasite pathogens negative and the PCP believed may be related to dietary. Wife reports no changes in his diet. She was informed that [address] wanted him to continue the Doxepin for now, but if no better by next week for him to call back to this office. Wife stated that the diarrhea is so bad that he is incontinent several times a day and it is just water. She was informed that the APRN would be given this additional info and they would be called back. Normal vision: sees adequately in most situations; can see medication labels, newsprint

## 2023-08-31 NOTE — TELEPHONE ENCOUNTER
Pt's wife left  stating that TANESHA GARCIA switched pt's sleep med from Trazodone to Doxepin. She stated that he has had diarrhea every day since starting the Doxepin. She is wanting to know if he can be changed to a different med for sleep.

## 2023-08-31 NOTE — TELEPHONE ENCOUNTER
Per direction of TANSEHA GARCIA, pt informed that he could stop the Doxepin if he would like to see if the med is causing the diarrhea. He was informed that she did not want to prescribe a med in place of it until know if the Doxepin is the cause. Pt encouraged to call back in a few days if diarrhea continues and/or having sleep problems. Pt verbalized understanding of the above info and plans to follow.

## 2023-09-18 ENCOUNTER — TELEPHONE (OUTPATIENT)
Dept: PSYCHIATRY | Age: 53
End: 2023-09-18

## 2023-09-18 NOTE — TELEPHONE ENCOUNTER
Called pt for appointment reminder.   -Pt confirmed    Electronically signed by Fernando Valdez MA on 9/18/2023 at 1:45 PM

## 2023-09-19 ENCOUNTER — OFFICE VISIT (OUTPATIENT)
Dept: PSYCHIATRY | Age: 53
End: 2023-09-19
Payer: MEDICAID

## 2023-09-19 VITALS
BODY MASS INDEX: 33.66 KG/M2 | HEART RATE: 77 BPM | WEIGHT: 254 LBS | HEIGHT: 73 IN | SYSTOLIC BLOOD PRESSURE: 108 MMHG | DIASTOLIC BLOOD PRESSURE: 69 MMHG | TEMPERATURE: 97.8 F | OXYGEN SATURATION: 93 %

## 2023-09-19 DIAGNOSIS — F42.9 OBSESSIVE-COMPULSIVE DISORDER, UNSPECIFIED TYPE: ICD-10-CM

## 2023-09-19 DIAGNOSIS — G47.01 INSOMNIA DUE TO MEDICAL CONDITION: ICD-10-CM

## 2023-09-19 DIAGNOSIS — F41.1 GENERALIZED ANXIETY DISORDER WITH PANIC ATTACKS: ICD-10-CM

## 2023-09-19 DIAGNOSIS — F41.0 GENERALIZED ANXIETY DISORDER WITH PANIC ATTACKS: ICD-10-CM

## 2023-09-19 DIAGNOSIS — F33.1 MODERATE EPISODE OF RECURRENT MAJOR DEPRESSIVE DISORDER (HCC): Primary | ICD-10-CM

## 2023-09-19 PROCEDURE — 3074F SYST BP LT 130 MM HG: CPT

## 2023-09-19 PROCEDURE — 3078F DIAST BP <80 MM HG: CPT

## 2023-09-19 PROCEDURE — 99214 OFFICE O/P EST MOD 30 MIN: CPT

## 2023-09-19 RX ORDER — HYDROXYZINE HYDROCHLORIDE 25 MG/1
25 TABLET, FILM COATED ORAL 3 TIMES DAILY PRN
Qty: 90 TABLET | Refills: 1 | Status: SHIPPED | OUTPATIENT
Start: 2023-09-19

## 2023-09-19 RX ORDER — CITALOPRAM 40 MG/1
40 TABLET ORAL DAILY
Qty: 30 TABLET | Refills: 1 | Status: SHIPPED | OUTPATIENT
Start: 2023-09-19

## 2023-09-19 RX ORDER — RISPERIDONE 1 MG/1
1 TABLET ORAL NIGHTLY
Qty: 30 TABLET | Refills: 1 | Status: SHIPPED | OUTPATIENT
Start: 2023-09-19

## 2023-09-19 RX ORDER — DOXEPIN HYDROCHLORIDE 50 MG/1
50 CAPSULE ORAL NIGHTLY
Qty: 30 CAPSULE | Refills: 1 | Status: SHIPPED | OUTPATIENT
Start: 2023-09-19 | End: 2023-11-18

## 2023-09-19 ASSESSMENT — PATIENT HEALTH QUESTIONNAIRE - PHQ9
SUM OF ALL RESPONSES TO PHQ9 QUESTIONS 1 & 2: 2
6. FEELING BAD ABOUT YOURSELF - OR THAT YOU ARE A FAILURE OR HAVE LET YOURSELF OR YOUR FAMILY DOWN: 1
10. IF YOU CHECKED OFF ANY PROBLEMS, HOW DIFFICULT HAVE THESE PROBLEMS MADE IT FOR YOU TO DO YOUR WORK, TAKE CARE OF THINGS AT HOME, OR GET ALONG WITH OTHER PEOPLE: 0
7. TROUBLE CONCENTRATING ON THINGS, SUCH AS READING THE NEWSPAPER OR WATCHING TELEVISION: 1
4. FEELING TIRED OR HAVING LITTLE ENERGY: 1
SUM OF ALL RESPONSES TO PHQ QUESTIONS 1-9: 8
5. POOR APPETITE OR OVEREATING: 1
8. MOVING OR SPEAKING SO SLOWLY THAT OTHER PEOPLE COULD HAVE NOTICED. OR THE OPPOSITE, BEING SO FIGETY OR RESTLESS THAT YOU HAVE BEEN MOVING AROUND A LOT MORE THAN USUAL: 0
SUM OF ALL RESPONSES TO PHQ QUESTIONS 1-9: 8
3. TROUBLE FALLING OR STAYING ASLEEP: 2
1. LITTLE INTEREST OR PLEASURE IN DOING THINGS: 1
2. FEELING DOWN, DEPRESSED OR HOPELESS: 1
9. THOUGHTS THAT YOU WOULD BE BETTER OFF DEAD, OR OF HURTING YOURSELF: 0
SUM OF ALL RESPONSES TO PHQ QUESTIONS 1-9: 8
SUM OF ALL RESPONSES TO PHQ QUESTIONS 1-9: 8

## 2023-09-19 NOTE — PROGRESS NOTES
9/19/23        Progress Note    Marty Markham 1970      Chief Complaint   Patient presents with    Medication Check         Subjective:    Patient is a 46 y.o. male diagnosed with MDD, CHARITO with panic attacks, insomnia, OCD, and presents today for follow-up. Last seen in clinic on 8/15/2023  and prior records were reviewed. Last visit: \"I am doing okay, but not sleeping very well. \"   Patient seen in office today, wearing casual clothing, appropriate for season. He is alert and oriented x 4. He is calm, cooperative, and pleasant. Patient does have poor hygiene, this is a chronic problem, malodorous. Patient has multiple animals at home. He reports his mood is been okay since last appointment, however reports he has been having trouble initiating sleep. His trazodone has been somewhat effective but not at all he reports now. He was found to have obstructive sleep apnea on sleep study, has CPAP now, but still has trouble initiating sleep. Reports once he is asleep he sleeps pretty well. Discussed a trial of doxepin to help with sleep, patient agreeable. Will discontinue trazodone. Patient reports appetite is good. He denies any current or active suicidal or homicidal ideations, denies hallucinations, and no overt paranoia or delusions appreciated. He continues psychotherapy with Magali in office. He reports his last appointment he did have a court hearing, reports that it has been postponed until January, patient is awaiting sentencing for he and his wife due to neglect case to his wife's 2 disabled adult children. In October 2021, there was a welfare check done by police on the adult children at their home due to patient and his wife were at the hospital at the time and patient's adult disabled children were left alone. Police found children in deplorable living conditions parents were charged with neglect. He and his wife no longer have custody of the adult children.   Patient reports that

## 2023-09-25 ENCOUNTER — OFFICE VISIT (OUTPATIENT)
Dept: PRIMARY CARE CLINIC | Age: 53
End: 2023-09-25
Payer: MEDICAID

## 2023-09-25 VITALS
TEMPERATURE: 98.1 F | OXYGEN SATURATION: 96 % | DIASTOLIC BLOOD PRESSURE: 80 MMHG | BODY MASS INDEX: 33.62 KG/M2 | HEART RATE: 83 BPM | WEIGHT: 254.8 LBS | SYSTOLIC BLOOD PRESSURE: 126 MMHG

## 2023-09-25 DIAGNOSIS — R19.7 DIARRHEA, UNSPECIFIED TYPE: ICD-10-CM

## 2023-09-25 DIAGNOSIS — H81.10 BENIGN PAROXYSMAL POSITIONAL VERTIGO, UNSPECIFIED LATERALITY: Primary | ICD-10-CM

## 2023-09-25 PROCEDURE — 3074F SYST BP LT 130 MM HG: CPT | Performed by: FAMILY MEDICINE

## 2023-09-25 PROCEDURE — 99214 OFFICE O/P EST MOD 30 MIN: CPT | Performed by: FAMILY MEDICINE

## 2023-09-25 PROCEDURE — 3079F DIAST BP 80-89 MM HG: CPT | Performed by: FAMILY MEDICINE

## 2023-09-25 RX ORDER — MECLIZINE HYDROCHLORIDE 25 MG/1
25 TABLET ORAL 3 TIMES DAILY PRN
Qty: 15 TABLET | Refills: 0 | Status: SHIPPED | OUTPATIENT
Start: 2023-09-25 | End: 2023-10-05

## 2023-09-25 ASSESSMENT — ENCOUNTER SYMPTOMS
CHEST TIGHTNESS: 0
DIARRHEA: 1
SHORTNESS OF BREATH: 0
VOMITING: 1
WHEEZING: 0
BLOOD IN STOOL: 0
ABDOMINAL PAIN: 1
NAUSEA: 1

## 2023-09-25 NOTE — PROGRESS NOTES
Rigoberto Sotomayor (:  1970) is a 46 y.o. male,Established patient, here for evaluation of the following chief complaint(s):  Dizziness (Spell of vertigo over the weekend. Nausea and vomiting with R ear fullness/ hearing loss. )         ASSESSMENT/PLAN:  1. Benign paroxysmal positional vertigo, unspecified laterality  2. Diarrhea, unspecified type      Given patient's reported dizziness with wife's observed nystagmus this does raise my suspicion for BPPV. Although, I am unable to reproduce this on my physical exam patient said he did experience some of this just before coming in when he was out in the waiting room. I have low suspicion for any sort of underlying neurologic issue such as stroke. We will treat for BPPV at this time. Patient instructed on completing Epley maneuver at home and I have attached both 1 person and 2 person Epley maneuvers to his discharge paperwork. I will also send in a prescription for meclizine 25 mg for patient to take as needed for dizziness in the meantime. Did discuss with patient if his symptoms fail to remit to return for further evaluation symptoms    Return if symptoms worsen or fail to improve. Subjective   SUBJECTIVE/OBJECTIVE:  Rigoberto Sotomayor is a 46 y.o. male who presents due to intermittent dizziness over the weekend. Patient says his symptoms started on Saturday and have occurred on and off since then. Patient says that the symptoms are brought on by any movement and his wife has noted twitchy eye movement/nystagmus whenever it occurs. Patient denies any falls. Patient denies any palpitations. Patient has not been taking anything for his symptoms. Patient says he has been laid up in bed fairly immobile since the onset of this as he says it is making it difficult to get around. Patient denies any new numbness or tingling. Patient has not having any dizziness at this moment. Patient's diarrhea is persisting.   Patient says that he has 1 loose stool

## 2023-10-05 ENCOUNTER — TELEPHONE (OUTPATIENT)
Dept: PSYCHIATRY | Age: 53
End: 2023-10-05

## 2023-10-05 NOTE — TELEPHONE ENCOUNTER
Called and confirmed appt with pt for 10/9 @ 4 PM    Electronically signed by Nicole Willoughby on 10/5/2023 at 4:13 PM

## 2023-10-09 ENCOUNTER — OFFICE VISIT (OUTPATIENT)
Dept: PSYCHIATRY | Age: 53
End: 2023-10-09
Payer: MEDICAID

## 2023-10-09 VITALS
OXYGEN SATURATION: 94 % | SYSTOLIC BLOOD PRESSURE: 133 MMHG | BODY MASS INDEX: 34.96 KG/M2 | DIASTOLIC BLOOD PRESSURE: 89 MMHG | HEIGHT: 72 IN | HEART RATE: 71 BPM | RESPIRATION RATE: 18 BRPM | WEIGHT: 258.1 LBS | TEMPERATURE: 98.2 F

## 2023-10-09 DIAGNOSIS — F33.0 MILD EPISODE OF RECURRENT MAJOR DEPRESSIVE DISORDER (HCC): Primary | ICD-10-CM

## 2023-10-09 DIAGNOSIS — F41.0 GENERALIZED ANXIETY DISORDER WITH PANIC ATTACKS: ICD-10-CM

## 2023-10-09 DIAGNOSIS — F41.1 GENERALIZED ANXIETY DISORDER WITH PANIC ATTACKS: ICD-10-CM

## 2023-10-09 DIAGNOSIS — F42.9 OBSESSIVE-COMPULSIVE DISORDER, UNSPECIFIED TYPE: ICD-10-CM

## 2023-10-09 DIAGNOSIS — R41.3 MEMORY IMPAIRMENT: ICD-10-CM

## 2023-10-09 DIAGNOSIS — G47.01 INSOMNIA DUE TO MEDICAL CONDITION: ICD-10-CM

## 2023-10-09 PROCEDURE — 3079F DIAST BP 80-89 MM HG: CPT

## 2023-10-09 PROCEDURE — 3075F SYST BP GE 130 - 139MM HG: CPT

## 2023-10-09 PROCEDURE — 99214 OFFICE O/P EST MOD 30 MIN: CPT

## 2023-10-09 ASSESSMENT — PATIENT HEALTH QUESTIONNAIRE - PHQ9
SUM OF ALL RESPONSES TO PHQ9 QUESTIONS 1 & 2: 2
SUM OF ALL RESPONSES TO PHQ QUESTIONS 1-9: 11
1. LITTLE INTEREST OR PLEASURE IN DOING THINGS: 1
6. FEELING BAD ABOUT YOURSELF - OR THAT YOU ARE A FAILURE OR HAVE LET YOURSELF OR YOUR FAMILY DOWN: 1
7. TROUBLE CONCENTRATING ON THINGS, SUCH AS READING THE NEWSPAPER OR WATCHING TELEVISION: 1
2. FEELING DOWN, DEPRESSED OR HOPELESS: 1
8. MOVING OR SPEAKING SO SLOWLY THAT OTHER PEOPLE COULD HAVE NOTICED. OR THE OPPOSITE, BEING SO FIGETY OR RESTLESS THAT YOU HAVE BEEN MOVING AROUND A LOT MORE THAN USUAL: 0
4. FEELING TIRED OR HAVING LITTLE ENERGY: 1
5. POOR APPETITE OR OVEREATING: 3
SUM OF ALL RESPONSES TO PHQ QUESTIONS 1-9: 11
SUM OF ALL RESPONSES TO PHQ QUESTIONS 1-9: 11
10. IF YOU CHECKED OFF ANY PROBLEMS, HOW DIFFICULT HAVE THESE PROBLEMS MADE IT FOR YOU TO DO YOUR WORK, TAKE CARE OF THINGS AT HOME, OR GET ALONG WITH OTHER PEOPLE: 1
3. TROUBLE FALLING OR STAYING ASLEEP: 3
9. THOUGHTS THAT YOU WOULD BE BETTER OFF DEAD, OR OF HURTING YOURSELF: 0
SUM OF ALL RESPONSES TO PHQ QUESTIONS 1-9: 11

## 2023-10-09 NOTE — PROGRESS NOTES
coordination of care of:                        1. Moderate episode of recurrent major depressive disorder ([address])    2. Generalized anxiety disorder with panic attacks    3. Insomnia due to medical condition    4. Obsessive-compulsive disorder, unspecified type    5. Memory impairment                        Psychotherapy Topics: mood/medication effectiveness financial and marital    Clarise Hodgkin, JOSE - CNP    This dictation was generated by voice recognition computer software. Although all attempts are made to edit the dictation for accuracy, there may be errors in the transcription that are not intended.

## 2023-10-11 ENCOUNTER — TELEPHONE (OUTPATIENT)
Dept: PSYCHIATRY | Age: 53
End: 2023-10-11

## 2023-10-11 NOTE — TELEPHONE ENCOUNTER
Called pt for appointment reminder.   -left voicemail, requesting a return call    Electronically signed by Salena Matson MA on 10/11/2023 at 2:23 PM

## 2023-10-12 ENCOUNTER — OFFICE VISIT (OUTPATIENT)
Dept: PSYCHIATRY | Age: 53
End: 2023-10-12
Payer: MEDICAID

## 2023-10-12 DIAGNOSIS — F41.0 GENERALIZED ANXIETY DISORDER WITH PANIC ATTACKS: Primary | ICD-10-CM

## 2023-10-12 DIAGNOSIS — F41.1 GENERALIZED ANXIETY DISORDER WITH PANIC ATTACKS: Primary | ICD-10-CM

## 2023-10-12 DIAGNOSIS — F33.9 MINIMAL RECURRENT MAJOR DEPRESSIVE DISORDER (HCC): ICD-10-CM

## 2023-10-12 PROCEDURE — 90834 PSYTX W PT 45 MINUTES: CPT

## 2023-10-12 ASSESSMENT — ANXIETY QUESTIONNAIRES
7. FEELING AFRAID AS IF SOMETHING AWFUL MIGHT HAPPEN: 2
5. BEING SO RESTLESS THAT IT IS HARD TO SIT STILL: 0
6. BECOMING EASILY ANNOYED OR IRRITABLE: 2
2. NOT BEING ABLE TO STOP OR CONTROL WORRYING: 0
4. TROUBLE RELAXING: 1
3. WORRYING TOO MUCH ABOUT DIFFERENT THINGS: 1
GAD7 TOTAL SCORE: 7
1. FEELING NERVOUS, ANXIOUS, OR ON EDGE: 1
IF YOU CHECKED OFF ANY PROBLEMS ON THIS QUESTIONNAIRE, HOW DIFFICULT HAVE THESE PROBLEMS MADE IT FOR YOU TO DO YOUR WORK, TAKE CARE OF THINGS AT HOME, OR GET ALONG WITH OTHER PEOPLE: VERY DIFFICULT

## 2023-10-12 ASSESSMENT — PATIENT HEALTH QUESTIONNAIRE - PHQ9
SUM OF ALL RESPONSES TO PHQ QUESTIONS 1-9: 2
SUM OF ALL RESPONSES TO PHQ QUESTIONS 1-9: 2
SUM OF ALL RESPONSES TO PHQ9 QUESTIONS 1 & 2: 2
SUM OF ALL RESPONSES TO PHQ QUESTIONS 1-9: 2
SUM OF ALL RESPONSES TO PHQ QUESTIONS 1-9: 2
2. FEELING DOWN, DEPRESSED OR HOPELESS: 1
1. LITTLE INTEREST OR PLEASURE IN DOING THINGS: 1

## 2023-10-12 NOTE — PROGRESS NOTES
Transportation (Medical): Not on file     Lack of Transportation (Non-Medical): Yes   Physical Activity: Sufficiently Active (2/3/2023)    Exercise Vital Sign     Days of Exercise per Week: 6 days     Minutes of Exercise per Session: 30 min   Stress: Not on file   Social Connections: Not on file   Intimate Partner Violence: Not At Risk (2/3/2023)    Humiliation, Afraid, Rape, and Kick questionnaire     Fear of Current or Ex-Partner: No     Emotionally Abused: No     Physically Abused: No     Sexually Abused: No   Housing Stability: Unknown (2/3/2023)    Housing Stability Vital Sign     Unable to Pay for Housing in the Last Year: Not on file     Number of [address] in the Last Year: Not on file     Unstable Housing in the Last Year: No       TOBACCO:   reports that he has been smoking cigarettes. He has a 60.00 pack-year smoking history. He has never used smokeless tobacco.  ETOH:   reports no history of alcohol use. Family History:   Family History   Problem Relation Age of Onset    Kidney Disease Mother     Thyroid Disease Mother     Hypertension Father     Heart Disease Father     Heart Failure Father     Liver Disease Father     Emphysema Father        Diagnosis:    Minimal recurrent major depressive disorder ([address])    Generalized anxiety disorder with panic attacks           Diagnosis Date    A-fib [facility])     Arthritis     Chronic renal disease     Elevated PSA     Hypertension     Renal calculi      Other psychosocial and environmental problems    Plan:  1. Continue medication management  2. CBT to target cognitive distortions  3.  Discuss therapeutic goals    Pt interventions:  Trained in strategies for increasing balanced thinking, Provided education, Discussed self-care (sleep, nutrition, rewarding activities, social support, exercise), Established rapport, Supportive techniques, and Emphasized self-care as important for managing overall health      Over 50% of the total visit time of visit was spent on

## 2023-11-01 ENCOUNTER — TELEPHONE (OUTPATIENT)
Dept: PSYCHIATRY | Age: 53
End: 2023-11-01

## 2023-11-01 NOTE — TELEPHONE ENCOUNTER
Called pt for appointment reminder.   -Pt confirmed    Electronically signed by Ingrid Neumann MA on 11/1/2023 at 2:02 PM

## 2023-11-02 ENCOUNTER — OFFICE VISIT (OUTPATIENT)
Dept: NEUROLOGY | Age: 53
End: 2023-11-02
Payer: MEDICAID

## 2023-11-02 VITALS
BODY MASS INDEX: 34.95 KG/M2 | DIASTOLIC BLOOD PRESSURE: 85 MMHG | WEIGHT: 258 LBS | SYSTOLIC BLOOD PRESSURE: 118 MMHG | HEIGHT: 72 IN | HEART RATE: 68 BPM | OXYGEN SATURATION: 97 %

## 2023-11-02 DIAGNOSIS — R41.3 MEMORY LOSS: ICD-10-CM

## 2023-11-02 DIAGNOSIS — G31.84 MILD COGNITIVE IMPAIRMENT: ICD-10-CM

## 2023-11-02 DIAGNOSIS — R41.3 MEMORY LOSS: Primary | ICD-10-CM

## 2023-11-02 LAB
ALBUMIN SERPL-MCNC: 3.9 G/DL (ref 3.5–5.2)
ALP SERPL-CCNC: 81 U/L (ref 40–130)
ALT SERPL-CCNC: 15 U/L (ref 5–41)
ANION GAP SERPL CALCULATED.3IONS-SCNC: 9 MMOL/L (ref 7–19)
AST SERPL-CCNC: 16 U/L (ref 5–40)
BASOPHILS # BLD: 0.1 K/UL (ref 0–0.2)
BASOPHILS NFR BLD: 0.8 % (ref 0–1)
BILIRUB SERPL-MCNC: 0.3 MG/DL (ref 0.2–1.2)
BUN SERPL-MCNC: 22 MG/DL (ref 6–20)
CALCIUM SERPL-MCNC: 9.4 MG/DL (ref 8.6–10)
CHLORIDE SERPL-SCNC: 102 MMOL/L (ref 98–111)
CO2 SERPL-SCNC: 27 MMOL/L (ref 22–29)
CREAT SERPL-MCNC: 1.9 MG/DL (ref 0.5–1.2)
CRP SERPL HS-MCNC: 1.02 MG/DL (ref 0–0.5)
EOSINOPHIL # BLD: 0.2 K/UL (ref 0–0.6)
EOSINOPHIL NFR BLD: 2.3 % (ref 0–5)
ERYTHROCYTE [DISTWIDTH] IN BLOOD BY AUTOMATED COUNT: 13.7 % (ref 11.5–14.5)
ERYTHROCYTE [SEDIMENTATION RATE] IN BLOOD BY WESTERGREN METHOD: 1 MM/HR (ref 0–15)
GLUCOSE SERPL-MCNC: 91 MG/DL (ref 74–109)
HCT VFR BLD AUTO: 50.7 % (ref 42–52)
HGB BLD-MCNC: 16.6 G/DL (ref 14–18)
IMM GRANULOCYTES # BLD: 0 K/UL
LYMPHOCYTES # BLD: 3 K/UL (ref 1.1–4.5)
LYMPHOCYTES NFR BLD: 32.4 % (ref 20–40)
MCH RBC QN AUTO: 30.3 PG (ref 27–31)
MCHC RBC AUTO-ENTMCNC: 32.7 G/DL (ref 33–37)
MCV RBC AUTO: 92.7 FL (ref 80–94)
MONOCYTES # BLD: 0.7 K/UL (ref 0–0.9)
MONOCYTES NFR BLD: 7.3 % (ref 0–10)
NEUTROPHILS # BLD: 5.3 K/UL (ref 1.5–7.5)
NEUTS SEG NFR BLD: 56.9 % (ref 50–65)
PLATELET # BLD AUTO: 242 K/UL (ref 130–400)
PLATELET SLIDE REVIEW: ABNORMAL
PMV BLD AUTO: 10.3 FL (ref 9.4–12.4)
POTASSIUM SERPL-SCNC: 4.9 MMOL/L (ref 3.5–5)
PROT SERPL-MCNC: 6.8 G/DL (ref 6.6–8.7)
RBC # BLD AUTO: 5.47 M/UL (ref 4.7–6.1)
RPR SER QL: NORMAL
SODIUM SERPL-SCNC: 138 MMOL/L (ref 136–145)
TSH SERPL DL<=0.005 MIU/L-ACNC: 2.19 UIU/ML (ref 0.35–5.5)
VIT B12 SERPL-MCNC: 440 PG/ML (ref 211–946)
WBC # BLD AUTO: 9.2 K/UL (ref 4.8–10.8)

## 2023-11-02 PROCEDURE — 3079F DIAST BP 80-89 MM HG: CPT | Performed by: NURSE PRACTITIONER

## 2023-11-02 PROCEDURE — 99214 OFFICE O/P EST MOD 30 MIN: CPT | Performed by: NURSE PRACTITIONER

## 2023-11-02 PROCEDURE — 3074F SYST BP LT 130 MM HG: CPT | Performed by: NURSE PRACTITIONER

## 2023-11-02 NOTE — PROGRESS NOTES
[facility] Office Note      Patient:   Antonio Cat  MR#:    [id number]  Account Number:                         YOB: 1970  Date of Evaluation:  11/2/2023  Time of Note:                          2:39 PM  Primary/Referring Physician:  Leif Frost MD   Consulting Physician:  JOSE Haas    NEW PATIENT CONSULTATION      Chief Complaint   Patient presents with    New Patient    Memory Loss       HISTORY OF PRESENT ILLNESS    Antonio Cat is a 46y.o. year old male here for evaluation and treatment of cognitive problems that began about 6-12 months ago. He is accompanied by wife. Wife relates that he will forget parts of his daily routine. For example will get in car and forget to put seatbelt on. He paid for gas and then forgot to put gas in the car. She reports that the multi-step process of making coffee in the morning has become difficult for him, forgets different parts of this process. He does most of the cooking and is now forgetting to turn off stove. Has put sugar in the fridge. There is both short and long term memory loss. Occurring daily. They do not note word recall difficulty. There is repetition of stories. Mood is well-controlled through psychiatry most of the time. Follows with Graciela Buerger, APRN. Wife also reports he will have angry outbursts at times if she reminds him of something. No SI/HI. Sleep is variable. He is using CPAP, wakes up feeling rested. Financial responsibility shared. Bathing, eating, dressing independently- however wife may have to remind him to put shoes on at times. Appetite is up and down. Weight has been stable. He is driving, has driven down wrong street one time. There is no tremor. There is no new gait abnormality. There are occasional falls, possibly imbalance, possibly tripped over a dog. Chronic history of incontinence of bladder due to prior prostate surgery. Refuses to wear pull ups or guards, wife is frustrated by this.
REVIEW OF SYSTEMS    Constitutional: []Fever []Sweat []Chills [x] Recent Injury [x] Denies all unless marked  HEENT:[]Headache  [] Head Injury/Hearing Loss  [] Sore Throat  [] Ear Ache/Dizziness  [x] Denies all unless marked  Spine:  [x] Neck pain  [x] Back pain  [] Sciaticia  [x] Denies all unless marked  Cardiovascular:[]Heart Disease []Chest Pain [] Palpitations  [x] Denies all unless marked  Pulmonary: []Shortness of Breath []Cough   [x] Denies all unless marke  Gastrointestinal: []Nausea  []Vomiting  []Abdominal Pain  []Constipation  []Diarrhea  []Dark Bloody Stools  [x] Denies all unless marked  Psychiatric/Behavioral:[] Depression [] Anxiety [x] Denies all unless marked  Genitourinary:   [] Frequency  [] Urgency  [] Incontinence [] Pain with Urination  [x] Denies all unless marked  Extremities: []Pain  []Swelling  [x] Denies all unless marked  Musculoskeletal: [] Muscle Pain  [] Joint Pain  [] Arthritis [] Muscle Cramps [] Muscle Twitches  [x] Denies all unless marked  Sleep: [] Insomnia [] Snoring [] Restless Legs [] Sleep Apnea  [] Daytime Sleepiness  [x] Denies all unless marked  Skin:[] Rash [] Skin Discoloration [x] Denies all unless marked   Neurological: [x]Visual Disturbance/Memory Loss [x] Loss of Balance [] Slurred Speech/Weakness [] Seizures  [] Vertigo/Dizziness [x] Denies all unless marked
I will STOP taking the medications listed below when I get home from the hospital:  None

## 2023-11-03 LAB
B BURGDOR IGG SER QL IB: NEGATIVE
B BURGDOR IGM SER QL IB: NEGATIVE

## 2023-11-04 LAB — NUCLEAR IGG SER QL IA: NORMAL

## 2023-11-05 LAB
METHYLMALONATE SERPL-SCNC: 0.38 UMOL/L (ref 0–0.4)
VIT B1 BLD-MCNC: 260 NMOL/L (ref 70–180)

## 2023-11-06 ENCOUNTER — OFFICE VISIT (OUTPATIENT)
Dept: PSYCHIATRY | Age: 53
End: 2023-11-06
Payer: MEDICAID

## 2023-11-06 VITALS
BODY MASS INDEX: 35.59 KG/M2 | TEMPERATURE: 97.7 F | OXYGEN SATURATION: 94 % | HEART RATE: 88 BPM | DIASTOLIC BLOOD PRESSURE: 99 MMHG | HEIGHT: 72 IN | SYSTOLIC BLOOD PRESSURE: 135 MMHG | WEIGHT: 262.8 LBS

## 2023-11-06 DIAGNOSIS — F33.1 MODERATE EPISODE OF RECURRENT MAJOR DEPRESSIVE DISORDER (HCC): ICD-10-CM

## 2023-11-06 DIAGNOSIS — F42.9 OBSESSIVE-COMPULSIVE DISORDER, UNSPECIFIED TYPE: ICD-10-CM

## 2023-11-06 DIAGNOSIS — F41.0 GENERALIZED ANXIETY DISORDER WITH PANIC ATTACKS: ICD-10-CM

## 2023-11-06 DIAGNOSIS — G47.01 INSOMNIA DUE TO MEDICAL CONDITION: ICD-10-CM

## 2023-11-06 DIAGNOSIS — F41.1 GENERALIZED ANXIETY DISORDER WITH PANIC ATTACKS: ICD-10-CM

## 2023-11-06 PROCEDURE — 3075F SYST BP GE 130 - 139MM HG: CPT

## 2023-11-06 PROCEDURE — 99214 OFFICE O/P EST MOD 30 MIN: CPT

## 2023-11-06 PROCEDURE — 3080F DIAST BP >= 90 MM HG: CPT

## 2023-11-06 RX ORDER — DONEPEZIL HYDROCHLORIDE 5 MG/1
5 TABLET, FILM COATED ORAL NIGHTLY
Qty: 90 TABLET | Refills: 3 | Status: SHIPPED | OUTPATIENT
Start: 2023-11-06

## 2023-11-06 RX ORDER — CITALOPRAM 40 MG/1
40 TABLET ORAL DAILY
Qty: 30 TABLET | Refills: 1 | Status: SHIPPED | OUTPATIENT
Start: 2023-11-06

## 2023-11-06 RX ORDER — DOXEPIN HYDROCHLORIDE 50 MG/1
50 CAPSULE ORAL NIGHTLY
Qty: 30 CAPSULE | Refills: 1 | Status: SHIPPED | OUTPATIENT
Start: 2023-11-06 | End: 2024-01-05

## 2023-11-06 RX ORDER — HYDROXYZINE HYDROCHLORIDE 25 MG/1
25 TABLET, FILM COATED ORAL 3 TIMES DAILY PRN
Qty: 90 TABLET | Refills: 1 | Status: SHIPPED | OUTPATIENT
Start: 2023-11-06

## 2023-11-06 RX ORDER — RISPERIDONE 1 MG/1
1 TABLET ORAL NIGHTLY
Qty: 30 TABLET | Refills: 1 | Status: SHIPPED | OUTPATIENT
Start: 2023-11-06

## 2023-11-06 ASSESSMENT — PATIENT HEALTH QUESTIONNAIRE - PHQ9
1. LITTLE INTEREST OR PLEASURE IN DOING THINGS: 1
5. POOR APPETITE OR OVEREATING: 0
6. FEELING BAD ABOUT YOURSELF - OR THAT YOU ARE A FAILURE OR HAVE LET YOURSELF OR YOUR FAMILY DOWN: 1
4. FEELING TIRED OR HAVING LITTLE ENERGY: 1
SUM OF ALL RESPONSES TO PHQ QUESTIONS 1-9: 8
10. IF YOU CHECKED OFF ANY PROBLEMS, HOW DIFFICULT HAVE THESE PROBLEMS MADE IT FOR YOU TO DO YOUR WORK, TAKE CARE OF THINGS AT HOME, OR GET ALONG WITH OTHER PEOPLE: 1
3. TROUBLE FALLING OR STAYING ASLEEP: 1
SUM OF ALL RESPONSES TO PHQ QUESTIONS 1-9: 8
SUM OF ALL RESPONSES TO PHQ QUESTIONS 1-9: 8
2. FEELING DOWN, DEPRESSED OR HOPELESS: 2
7. TROUBLE CONCENTRATING ON THINGS, SUCH AS READING THE NEWSPAPER OR WATCHING TELEVISION: 2
9. THOUGHTS THAT YOU WOULD BE BETTER OFF DEAD, OR OF HURTING YOURSELF: 0
8. MOVING OR SPEAKING SO SLOWLY THAT OTHER PEOPLE COULD HAVE NOTICED. OR THE OPPOSITE, BEING SO FIGETY OR RESTLESS THAT YOU HAVE BEEN MOVING AROUND A LOT MORE THAN USUAL: 0
SUM OF ALL RESPONSES TO PHQ QUESTIONS 1-9: 8
SUM OF ALL RESPONSES TO PHQ9 QUESTIONS 1 & 2: 3

## 2023-11-06 ASSESSMENT — COLUMBIA-SUICIDE SEVERITY RATING SCALE - C-SSRS
1. WITHIN THE PAST MONTH, HAVE YOU WISHED YOU WERE DEAD OR WISHED YOU COULD GO TO SLEEP AND NOT WAKE UP?: NO
2. HAVE YOU ACTUALLY HAD ANY THOUGHTS OF KILLING YOURSELF?: NO
6. HAVE YOU EVER DONE ANYTHING, STARTED TO DO ANYTHING, OR PREPARED TO DO ANYTHING TO END YOUR LIFE?: NO
BASED ON RESPONSES TO C-SSRS QS 1-6, WHAT IS THE PATIENT'S OVERALL RISK RATING FOR SUICIDE: NO RISK

## 2023-11-29 ENCOUNTER — TELEPHONE (OUTPATIENT)
Dept: PSYCHIATRY | Age: 53
End: 2023-11-29

## 2023-11-29 NOTE — TELEPHONE ENCOUNTER
Called patient to remind them of their appointment   -left voicemail, requesting a return call    Reminded patient to complete their visit pre-check/digital registration in [address] [place] [address].     Electronically signed by Akira Mead MA on 11/29/2023 at 1:49 PM

## 2023-12-01 ENCOUNTER — OFFICE VISIT (OUTPATIENT)
Dept: PSYCHIATRY | Age: 53
End: 2023-12-01
Payer: MEDICAID

## 2023-12-01 DIAGNOSIS — F33.1 MODERATE EPISODE OF RECURRENT MAJOR DEPRESSIVE DISORDER (HCC): Primary | ICD-10-CM

## 2023-12-01 DIAGNOSIS — F41.1 GENERALIZED ANXIETY DISORDER WITH PANIC ATTACKS: ICD-10-CM

## 2023-12-01 DIAGNOSIS — F41.0 GENERALIZED ANXIETY DISORDER WITH PANIC ATTACKS: ICD-10-CM

## 2023-12-01 PROCEDURE — 90834 PSYTX W PT 45 MINUTES: CPT

## 2023-12-01 ASSESSMENT — ANXIETY QUESTIONNAIRES
6. BECOMING EASILY ANNOYED OR IRRITABLE: 1
4. TROUBLE RELAXING: 1
2. NOT BEING ABLE TO STOP OR CONTROL WORRYING: 0
IF YOU CHECKED OFF ANY PROBLEMS ON THIS QUESTIONNAIRE, HOW DIFFICULT HAVE THESE PROBLEMS MADE IT FOR YOU TO DO YOUR WORK, TAKE CARE OF THINGS AT HOME, OR GET ALONG WITH OTHER PEOPLE: VERY DIFFICULT
3. WORRYING TOO MUCH ABOUT DIFFERENT THINGS: 1
GAD7 TOTAL SCORE: 6
1. FEELING NERVOUS, ANXIOUS, OR ON EDGE: 1
5. BEING SO RESTLESS THAT IT IS HARD TO SIT STILL: 0
7. FEELING AFRAID AS IF SOMETHING AWFUL MIGHT HAPPEN: 2

## 2023-12-01 ASSESSMENT — PATIENT HEALTH QUESTIONNAIRE - PHQ9
10. IF YOU CHECKED OFF ANY PROBLEMS, HOW DIFFICULT HAVE THESE PROBLEMS MADE IT FOR YOU TO DO YOUR WORK, TAKE CARE OF THINGS AT HOME, OR GET ALONG WITH OTHER PEOPLE: 1
SUM OF ALL RESPONSES TO PHQ QUESTIONS 1-9: 8
6. FEELING BAD ABOUT YOURSELF - OR THAT YOU ARE A FAILURE OR HAVE LET YOURSELF OR YOUR FAMILY DOWN: 1
2. FEELING DOWN, DEPRESSED OR HOPELESS: 2
3. TROUBLE FALLING OR STAYING ASLEEP: 1
7. TROUBLE CONCENTRATING ON THINGS, SUCH AS READING THE NEWSPAPER OR WATCHING TELEVISION: 2
4. FEELING TIRED OR HAVING LITTLE ENERGY: 1
SUM OF ALL RESPONSES TO PHQ QUESTIONS 1-9: 8
8. MOVING OR SPEAKING SO SLOWLY THAT OTHER PEOPLE COULD HAVE NOTICED. OR THE OPPOSITE, BEING SO FIGETY OR RESTLESS THAT YOU HAVE BEEN MOVING AROUND A LOT MORE THAN USUAL: 0
9. THOUGHTS THAT YOU WOULD BE BETTER OFF DEAD, OR OF HURTING YOURSELF: 0
5. POOR APPETITE OR OVEREATING: 0
SUM OF ALL RESPONSES TO PHQ9 QUESTIONS 1 & 2: 3
SUM OF ALL RESPONSES TO PHQ QUESTIONS 1-9: 8
1. LITTLE INTEREST OR PLEASURE IN DOING THINGS: 1
SUM OF ALL RESPONSES TO PHQ QUESTIONS 1-9: 8

## 2023-12-01 NOTE — PROGRESS NOTES
Therapy Progress Note  Tawnya Weiss M.S., [facility]  12/1/2023    Patient location  : [address] [facility] location : [address]      Total time spent: 45 minutes  This is patient's sixth  Therapy appointment. Reason for Consult:  depression, anxiety, and stress  Referring Provider: No referring provider defined for this encounter. Sunday Shari ,a 46 y.o. male, for follow up visit. Pt provided informed consent for the behavioral health program. Discussed with patient model of service to include the limits of confidentiality (i.e. abuse reporting, suicide intervention, etc.) and short-term intervention focused approach. Discussed no show and late cancellation policy. Pt indicated understanding. S:  Pt reports overall things have been going pretty good. Pt reports he had a hard time getting to sleep last night. Pt reports he was very restless so he slept in this morning. Pt reports due to sleeping in late he feels like he has been running behind all day. Pt reports his court date went ok, but he will have to get evaluated and they are running behind and it could take up to three years. Pt reports he feels that he is still having some memory issues. Pt reports he was seen by a neurologist and they ran some tests on him. Pt reports he will be getting a MRI soon. Pt reports he did not have his phone for three days because it was broken. Pt reports his three dogs started fighting and his phone was slid across the room and broke. Pt reports without his phone, his mind started thinking about his health, sentencing, and the court date. Pt reports his phone is fixed now. Pt reports sometimes he is worried about the future. Pt reports he has cleaned about ninety percent of his house and wants his wife to help with the last ten percent. However, his wife says she cannot because she does not feel good. Pt reports sometimes this frustrates him.  Pt calm, cooperative, and denies si, hi,

## 2023-12-26 ENCOUNTER — HOSPITAL ENCOUNTER (OUTPATIENT)
Dept: MRI IMAGING | Age: 53
Discharge: HOME | End: 2023-12-26

## 2023-12-26 ENCOUNTER — OFFICE VISIT (OUTPATIENT)
Age: 53
End: 2023-12-26

## 2023-12-26 VITALS
BODY MASS INDEX: 35.12 KG/M2 | TEMPERATURE: 98.2 F | OXYGEN SATURATION: 96 % | DIASTOLIC BLOOD PRESSURE: 82 MMHG | SYSTOLIC BLOOD PRESSURE: 110 MMHG | HEART RATE: 72 BPM | HEIGHT: 73 IN | WEIGHT: 265 LBS | RESPIRATION RATE: 20 BRPM

## 2023-12-26 DIAGNOSIS — R42 VERTIGO: Primary | ICD-10-CM

## 2023-12-26 DIAGNOSIS — R41.3 MEMORY LOSS: ICD-10-CM

## 2023-12-26 PROCEDURE — NBSRV NON-BILLABLE SERVICE

## 2023-12-26 NOTE — PATIENT INSTRUCTIONS
-F/U with PCP  -Continue with epley maneuver, avoid driving with dizziness  -Use prescribed medications for dizziness  -OTC flonase and zyrtec or claritin

## 2023-12-26 NOTE — PROGRESS NOTES
[address]  [address] [address]  Dept: [phone]  Dept Fax: [phone]  Loc: [phone]    Bala Minaya is a 46 y.o. male who presents today for his medical conditions/complaints as noted below. Bala Minaya is complaining of Dizziness        HPI:   Dizziness  This is a chronic problem. The problem has been waxing and waning. Associated symptoms include vertigo. Pertinent negatives include no abdominal pain, chest pain, chills, congestion, coughing, fatigue, fever, headaches, myalgias, nausea, rash, sore throat or vomiting. The symptoms are aggravated by standing and bending. Patient here with chronic vertigo, he was dizzy and needed to sit down during check in. Once he sat for several minutes he said the dizziness went away. He has an MRI scheduled soon. He decided not to be seen due to a 2 hour wait, he denies dizziness now, has medication for dizziness at home. Told him I would prefer he get a  but he refused and left.        Past Medical History:   Diagnosis Date    A-fib [facility])     Arthritis     Chronic renal disease     Elevated PSA     Hypertension     Renal calculi        Past Surgical History:   Procedure Laterality Date    ANKLE FRACTURE SURGERY Left 3/29/2022    LEFT  ANKLE OPEN REDUCTION INTERNAL FIXATION MEDIAL MALLEOLUS FRACTURE performed by Merlin Jamison MD at [address]  03/2017    CARDIAC SURGERY      COSMETIC SURGERY      lip    MITRAL VALVE REPLACEMENT  03/01/2017    done at [place] [address]  2021       Family History   Problem Relation Age of Onset    Kidney Disease Mother     Thyroid Disease Mother     Hypertension Father     Heart Disease Father     Heart Failure Father     Liver Disease Father     Emphysema Father        Social History     Tobacco Use    Smoking status: Every Day     Current packs/day: 1.50     Average

## 2024-01-10 ENCOUNTER — HOSPITAL ENCOUNTER (OUTPATIENT)
Dept: MRI IMAGING | Age: 54
Discharge: HOME | End: 2024-01-10
Payer: MEDICAID

## 2024-01-10 ENCOUNTER — TELEPHONE (OUTPATIENT)
Dept: NEUROLOGY | Age: 54
End: 2024-01-10

## 2024-01-10 DIAGNOSIS — I72.9 ANEURYSM (HCC): Primary | ICD-10-CM

## 2024-01-10 PROCEDURE — 6360000004 HC RX CONTRAST MEDICATION: Performed by: NURSE PRACTITIONER

## 2024-01-10 PROCEDURE — A9577 INJ MULTIHANCE: HCPCS | Performed by: NURSE PRACTITIONER

## 2024-01-10 PROCEDURE — 70553 MRI BRAIN STEM W/O & W/DYE: CPT

## 2024-01-10 RX ADMIN — GADOBENATE DIMEGLUMINE 20 ML: 529 INJECTION, SOLUTION INTRAVENOUS at 13:09

## 2024-01-10 NOTE — TELEPHONE ENCOUNTER
Called and spoke with patient and his wife. I advised them of BW note seen below. They voiced their understanding. I provided them with number to scheduling to get CTA scheduled. They asked how referral to [facility] worked. I voiced that we would send everything to [facility] and then [facility] will reach out to them to schedule appointment.    ----- Message from JOSE Cartagena CNP sent at 1/10/2024  2:39 PM CST -----  Please let him know that MRI brain notes finding of aneurysm on imaging. I am ordering STAT CTA and urgent referral to [facility].

## 2024-01-11 ENCOUNTER — OFFICE VISIT (OUTPATIENT)
Dept: NEUROLOGY | Age: 54
End: 2024-01-11

## 2024-01-11 ENCOUNTER — HOSPITAL ENCOUNTER (OUTPATIENT)
Dept: CT IMAGING | Age: 54
Discharge: HOME | End: 2024-01-11
Payer: MEDICAID

## 2024-01-11 VITALS
WEIGHT: 271 LBS | SYSTOLIC BLOOD PRESSURE: 126 MMHG | DIASTOLIC BLOOD PRESSURE: 84 MMHG | HEART RATE: 76 BPM | HEIGHT: 73 IN | BODY MASS INDEX: 35.92 KG/M2

## 2024-01-11 DIAGNOSIS — G31.84 MILD COGNITIVE IMPAIRMENT: ICD-10-CM

## 2024-01-11 DIAGNOSIS — R41.3 MEMORY LOSS: ICD-10-CM

## 2024-01-11 DIAGNOSIS — R41.3 MEMORY CHANGES: Primary | ICD-10-CM

## 2024-01-11 DIAGNOSIS — Z86.59 HISTORY OF ANXIETY: ICD-10-CM

## 2024-01-11 DIAGNOSIS — I72.9 ANEURYSM (HCC): ICD-10-CM

## 2024-01-11 LAB — CREAT SERPL-MCNC: 1.9 MG/DL (ref 0.3–1.3)

## 2024-01-11 PROCEDURE — 82565 ASSAY OF CREATININE: CPT

## 2024-01-11 PROCEDURE — 6360000004 HC RX CONTRAST MEDICATION: Performed by: NURSE PRACTITIONER

## 2024-01-11 PROCEDURE — 70496 CT ANGIOGRAPHY HEAD: CPT

## 2024-01-11 RX ADMIN — IOPAMIDOL 60 ML: 755 INJECTION, SOLUTION INTRAVENOUS at 09:53

## 2024-01-11 NOTE — PROGRESS NOTES
REVIEW OF SYSTEMS    Constitutional: []Fever []Sweat []Chills [] Recent Injury [x] Denies all unless marked  HEENT:[]Headache  [] Head Injury/Hearing Loss  [] Sore Throat  [] Ear Ache/Dizziness  [x] Denies all unless marked  Spine:  [] Neck pain  [] Back pain  [] Sciaticia  [x] Denies all unless marked  Cardiovascular:[]Heart Disease []Chest Pain [] Palpitations  [x] Denies all unless marked  Pulmonary: []Shortness of Breath []Cough   [x] Denies all unless marke  Gastrointestinal: []Nausea  []Vomiting  []Abdominal Pain  []Constipation  []Diarrhea  []Dark Bloody Stools  [x] Denies all unless marked  Psychiatric/Behavioral:[] Depression [] Anxiety [x] Denies all unless marked  Genitourinary:   [] Frequency  [] Urgency  [] Incontinence [] Pain with Urination  [x] Denies all unless marked  Extremities: []Pain  []Swelling  [x] Denies all unless marked  Musculoskeletal: [] Muscle Pain  [] Joint Pain  [] Arthritis [] Muscle Cramps [] Muscle Twitches  [x] Denies all unless marked  Sleep: [] Insomnia [] Snoring [] Restless Legs [] Sleep Apnea  [] Daytime Sleepiness  [x] Denies all unless marked  Skin:[] Rash [] Skin Discoloration [x] Denies all unless marked   Neurological: [x]Visual Disturbance/Memory Loss [] Loss of Balance [] Slurred Speech/Weakness [] Seizures  [] Vertigo/Dizziness [x] Denies all unless marked       
recommended formal neuropsychological testing and to go ahead and increase his Aricept up to 10 mg a day.  Follow-up with Rubi as scheduled.  Anxiety is stable.    Plan    Return if symptoms worsen or fail to improve.    (Please note that portions of this note were completed with a voice recognition program. Efforts were made to edit the dictations but occasionally words are mis-transcribed.)

## 2024-01-12 ENCOUNTER — TELEPHONE (OUTPATIENT)
Dept: NEUROLOGY | Age: 54
End: 2024-01-12

## 2024-01-12 NOTE — TELEPHONE ENCOUNTER
Called and spoke with patient. I advised him of  result note seen below. He voiced his understanding. Patient voiced that he does not believe that [facility] will accept patients insurance. I advised that I would pass this on to . He had no other questions at this time.     ----- Message from JOSE Cartagena CNP sent at 1/11/2024 11:02 AM CST -----  Stable aneurysm; urgent referral sent to [facility] yesterday.   With any severe headaches or changes to mental status go to ER.

## 2024-01-15 ENCOUNTER — TELEPHONE (OUTPATIENT)
Dept: NEUROLOGY | Age: 54
End: 2024-01-15

## 2024-01-15 DIAGNOSIS — I72.9 ANEURYSM (HCC): Primary | ICD-10-CM

## 2024-01-15 NOTE — TELEPHONE ENCOUNTER
[facility] called and informed me they received the referral for the pt but they are out of the pt's network for insurance.

## 2024-01-17 ENCOUNTER — TELEPHONE (OUTPATIENT)
Dept: PSYCHIATRY | Age: 54
End: 2024-01-17

## 2024-01-17 NOTE — TELEPHONE ENCOUNTER
Called pt to cancel/reschedule appt for 01/18/24 with Magali Edmond because the provider is out the office sick.   No Answer Left Vm.

## 2024-01-22 NOTE — TELEPHONE ENCOUNTER
Called and spoke with patient I advised him that referral has been sent to [facility] due to insurance. He voiced his understanding and had no questions at this time.

## 2024-01-24 RX ORDER — DONEPEZIL HYDROCHLORIDE 5 MG/1
10 TABLET, FILM COATED ORAL NIGHTLY
Qty: 90 TABLET | Refills: 3 | Status: SHIPPED | OUTPATIENT
Start: 2024-01-24

## 2024-01-24 NOTE — TELEPHONE ENCOUNTER
Requested Prescriptions     Pending Prescriptions Disp Refills    donepezil (ARICEPT) 5 MG tablet 90 tablet 3     Sig: Take 2 tablets by mouth nightly       Last Office Visit: 1/11/2024  Next Office Visit: none  Last Medication Refill:  11/6/23     Increased by Dr. Olivas on 1/11/24. Needs new script sent to pharm please.

## 2024-01-24 NOTE — TELEPHONE ENCOUNTER
Patient wife called and said that patient medication  donepezil(Aricept) was increase from 5 mg to 10 mg but the new prescription was not called in to [facility] . Patient wife ask if patient medication could be called in today.

## 2024-02-02 ENCOUNTER — TELEPHONE (OUTPATIENT)
Dept: PSYCHIATRY | Age: 54
End: 2024-02-02

## 2024-02-02 NOTE — TELEPHONE ENCOUNTER
Called patient to remind them of their appointment     -Pt confirmed    Electronically signed by Tammy Way MA on 2/2/2024 at 1:24 PM

## 2024-02-05 ENCOUNTER — OFFICE VISIT (OUTPATIENT)
Dept: PSYCHIATRY | Age: 54
End: 2024-02-05
Payer: MEDICAID

## 2024-02-05 VITALS
SYSTOLIC BLOOD PRESSURE: 127 MMHG | BODY MASS INDEX: 35.57 KG/M2 | HEIGHT: 73 IN | HEART RATE: 77 BPM | DIASTOLIC BLOOD PRESSURE: 85 MMHG | WEIGHT: 268.4 LBS | TEMPERATURE: 97.4 F | OXYGEN SATURATION: 96 %

## 2024-02-05 DIAGNOSIS — F41.1 GENERALIZED ANXIETY DISORDER WITH PANIC ATTACKS: ICD-10-CM

## 2024-02-05 DIAGNOSIS — F42.9 OBSESSIVE-COMPULSIVE DISORDER, UNSPECIFIED TYPE: ICD-10-CM

## 2024-02-05 DIAGNOSIS — G47.01 INSOMNIA DUE TO MEDICAL CONDITION: ICD-10-CM

## 2024-02-05 DIAGNOSIS — F41.0 GENERALIZED ANXIETY DISORDER WITH PANIC ATTACKS: ICD-10-CM

## 2024-02-05 DIAGNOSIS — F33.1 MODERATE EPISODE OF RECURRENT MAJOR DEPRESSIVE DISORDER (HCC): Primary | ICD-10-CM

## 2024-02-05 PROCEDURE — 3079F DIAST BP 80-89 MM HG: CPT

## 2024-02-05 PROCEDURE — 99214 OFFICE O/P EST MOD 30 MIN: CPT

## 2024-02-05 PROCEDURE — 3074F SYST BP LT 130 MM HG: CPT

## 2024-02-05 ASSESSMENT — PATIENT HEALTH QUESTIONNAIRE - PHQ9
10. IF YOU CHECKED OFF ANY PROBLEMS, HOW DIFFICULT HAVE THESE PROBLEMS MADE IT FOR YOU TO DO YOUR WORK, TAKE CARE OF THINGS AT HOME, OR GET ALONG WITH OTHER PEOPLE: 2
6. FEELING BAD ABOUT YOURSELF - OR THAT YOU ARE A FAILURE OR HAVE LET YOURSELF OR YOUR FAMILY DOWN: 2
5. POOR APPETITE OR OVEREATING: 2
SUM OF ALL RESPONSES TO PHQ9 QUESTIONS 1 & 2: 3
9. THOUGHTS THAT YOU WOULD BE BETTER OFF DEAD, OR OF HURTING YOURSELF: 1
SUM OF ALL RESPONSES TO PHQ QUESTIONS 1-9: 13
7. TROUBLE CONCENTRATING ON THINGS, SUCH AS READING THE NEWSPAPER OR WATCHING TELEVISION: 2
8. MOVING OR SPEAKING SO SLOWLY THAT OTHER PEOPLE COULD HAVE NOTICED. OR THE OPPOSITE, BEING SO FIGETY OR RESTLESS THAT YOU HAVE BEEN MOVING AROUND A LOT MORE THAN USUAL: 0
2. FEELING DOWN, DEPRESSED OR HOPELESS: 1
4. FEELING TIRED OR HAVING LITTLE ENERGY: 1
1. LITTLE INTEREST OR PLEASURE IN DOING THINGS: 2
SUM OF ALL RESPONSES TO PHQ QUESTIONS 1-9: 13
SUM OF ALL RESPONSES TO PHQ QUESTIONS 1-9: 13
3. TROUBLE FALLING OR STAYING ASLEEP: 2
SUM OF ALL RESPONSES TO PHQ QUESTIONS 1-9: 12

## 2024-02-05 NOTE — PROGRESS NOTES
2/5/24        Progress Note    Toan Chacon 1970      Chief Complaint   Patient presents with    Medication Check         Subjective:    Patient is a 54 y.o. male diagnosed with MDD, CHARITO with panic attacks, insomnia, OCD, and presents today for follow-up.  Last seen in clinic on 12/22/23  and prior records were reviewed.    Last visit: \"I've been doing okay.\"   Patient seen in office today, wearing casual clothing, appropriate for season.  He is alert and oriented x 4.  Poor hygiene and poor grooming noted. This is a chronic issue.  He is calm, cooperative, and pleasant.  He reports increased fatigue today, reports he did not sleep well last night.  Reports he has been running around all day, his wife had court this morning, case continued again.  They are waiting for a court appointed psychiatric eval.  He reports court today was also to see if they would discontinue urine drug screen monitoring, however no decision was made he reports state was not ready for anything.  He has not been using his CPAP, having trouble initiating sleep, \"I cannot shut my brain off a lot of the time:.  Highly encouraged patient to use CPAP as he does have obstructive sleep apnea.  Patient verbally states understanding.  He reports his mood has been slightly down, \"I guess it is probably just the holidays making a difference in all of this legal stuff, cannot get out of my head a lot of time\".  We processed his feelings, supportive psychotherapy provided.  He continues to see [facility] in office for psychotherapy.  He reports compliance with medications, denies any negative or unwanted side effects or medications.  Denies any current or active suicidal and homicidal ideations, denies hallucinations, and no overt paranoia or delusions appreciated.  Follow-up in 6 weeks.    Today patient states, \"been doing okay, a little stressed\"    Patient seen in office today, wearing casual clothing, appropriate for season.  Poor hygiene noted.

## 2024-03-08 ENCOUNTER — TELEPHONE (OUTPATIENT)
Dept: PSYCHIATRY | Age: 54
End: 2024-03-08

## 2024-03-08 NOTE — TELEPHONE ENCOUNTER
Called pt to get him r/s for 3/8/24 due to provider Magali Edmond being out of the office. R/S for 5/3/24.

## 2024-03-20 ENCOUNTER — SCHEDULED TELEPHONE ENCOUNTER (OUTPATIENT)
Dept: PSYCHIATRY | Age: 54
End: 2024-03-20
Payer: MEDICAID

## 2024-03-20 ENCOUNTER — TELEPHONE (OUTPATIENT)
Dept: PSYCHIATRY | Age: 54
End: 2024-03-20

## 2024-03-20 DIAGNOSIS — F41.1 GENERALIZED ANXIETY DISORDER WITH PANIC ATTACKS: ICD-10-CM

## 2024-03-20 DIAGNOSIS — F33.1 MODERATE EPISODE OF RECURRENT MAJOR DEPRESSIVE DISORDER (HCC): Primary | ICD-10-CM

## 2024-03-20 DIAGNOSIS — F41.0 GENERALIZED ANXIETY DISORDER WITH PANIC ATTACKS: ICD-10-CM

## 2024-03-20 PROCEDURE — 90834 PSYTX W PT 45 MINUTES: CPT

## 2024-03-20 ASSESSMENT — PATIENT HEALTH QUESTIONNAIRE - PHQ9
SUM OF ALL RESPONSES TO PHQ QUESTIONS 1-9: 17
3. TROUBLE FALLING OR STAYING ASLEEP: MORE THAN HALF THE DAYS
SUM OF ALL RESPONSES TO PHQ QUESTIONS 1-9: 17
7. TROUBLE CONCENTRATING ON THINGS, SUCH AS READING THE NEWSPAPER OR WATCHING TELEVISION: MORE THAN HALF THE DAYS
9. THOUGHTS THAT YOU WOULD BE BETTER OFF DEAD, OR OF HURTING YOURSELF: NOT AT ALL
SUM OF ALL RESPONSES TO PHQ QUESTIONS 1-9: 17
8. MOVING OR SPEAKING SO SLOWLY THAT OTHER PEOPLE COULD HAVE NOTICED. OR THE OPPOSITE, BEING SO FIGETY OR RESTLESS THAT YOU HAVE BEEN MOVING AROUND A LOT MORE THAN USUAL: NOT AT ALL
2. FEELING DOWN, DEPRESSED OR HOPELESS: NEARLY EVERY DAY
10. IF YOU CHECKED OFF ANY PROBLEMS, HOW DIFFICULT HAVE THESE PROBLEMS MADE IT FOR YOU TO DO YOUR WORK, TAKE CARE OF THINGS AT HOME, OR GET ALONG WITH OTHER PEOPLE: VERY DIFFICULT
5. POOR APPETITE OR OVEREATING: MORE THAN HALF THE DAYS
4. FEELING TIRED OR HAVING LITTLE ENERGY: NEARLY EVERY DAY
SUM OF ALL RESPONSES TO PHQ9 QUESTIONS 1 & 2: 6
SUM OF ALL RESPONSES TO PHQ QUESTIONS 1-9: 17
6. FEELING BAD ABOUT YOURSELF - OR THAT YOU ARE A FAILURE OR HAVE LET YOURSELF OR YOUR FAMILY DOWN: MORE THAN HALF THE DAYS
1. LITTLE INTEREST OR PLEASURE IN DOING THINGS: NEARLY EVERY DAY

## 2024-03-20 ASSESSMENT — ANXIETY QUESTIONNAIRES
GAD7 TOTAL SCORE: 10
2. NOT BEING ABLE TO STOP OR CONTROL WORRYING: SEVERAL DAYS
7. FEELING AFRAID AS IF SOMETHING AWFUL MIGHT HAPPEN: NEARLY EVERY DAY
5. BEING SO RESTLESS THAT IT IS HARD TO SIT STILL: SEVERAL DAYS
4. TROUBLE RELAXING: SEVERAL DAYS
1. FEELING NERVOUS, ANXIOUS, OR ON EDGE: MORE THAN HALF THE DAYS
3. WORRYING TOO MUCH ABOUT DIFFERENT THINGS: SEVERAL DAYS
IF YOU CHECKED OFF ANY PROBLEMS ON THIS QUESTIONNAIRE, HOW DIFFICULT HAVE THESE PROBLEMS MADE IT FOR YOU TO DO YOUR WORK, TAKE CARE OF THINGS AT HOME, OR GET ALONG WITH OTHER PEOPLE: VERY DIFFICULT
6. BECOMING EASILY ANNOYED OR IRRITABLE: SEVERAL DAYS

## 2024-03-20 NOTE — PROGRESS NOTES
Documentation:  I communicated with the patient and/or health care decision maker about see below.   Details of this discussion see below    Total Time: 45 minutes    Toan Chacon was evaluated through a synchronous (real-time) audio encounter. Patient identification was verified at the start of the visit. He (or guardian if applicable) is aware that this is a billable service, which includes applicable co-pays. This visit was conducted with the patient's (and/or legal guardian's) verbal consent. He has not had a related appointment within my department in the past 7 days or scheduled within the next 24 hours.   The patient was located at Home: [address]  [place] [address].  The provider was located at Facility (Appt Dept): [address] [place] [address]  [address]  [place] [address].    Note: not billable if this call serves to triage the patient into an appointment for the relevant concern    Toan Chacon is a 54 y.o. male evaluated via telephone on 3/20/2024 for No chief complaint on file.  .    Magali Edmond [facility]        Therapy Progress Note  Magali Edmond M.S. [facility]  3/20/2024    Patient location  : home  [facility] location : [facility]      Total time spent: 45 minutes  This is patient's seventh  Therapy appointment.  Reason for Consult:  depression, anxiety, and stress  Referring Provider: No referring provider defined for this encounter.      Toan Chacon ,a 54 y.o. male, for follow up visit.     Pt provided informed consent for the behavioral health program. Discussed with patient model of service to include the limits of confidentiality (i.e. abuse reporting, suicide intervention, etc.) and short-term intervention focused approach.  Discussed no show and late cancellation policy.  Pt indicated understanding.    S:  Appointment completed over phone today. Pt reports his wife went out of town for three to four weeks to be evaluated. Pt reports for the evaluation they locked her up in a

## 2024-03-20 NOTE — TELEPHONE ENCOUNTER
Pt was called for virtual appointment check in.    Electronically signed by Tammy Way MA on 3/20/2024 at 2:03 PM

## 2024-03-20 NOTE — TELEPHONE ENCOUNTER
Called pt to see if he wanted to take an earlier appt that came open with Magali Edmond for today 03/20/24 @ 1:00 to do a phone visit.    Pt agreed and scheduled him for today @ 1:00.    Electronically signed by Tammy Way MA on 3/20/2024 at 1:31 PM

## 2024-03-25 ENCOUNTER — OFFICE VISIT (OUTPATIENT)
Dept: PRIMARY CARE CLINIC | Age: 54
End: 2024-03-25
Payer: MEDICAID

## 2024-03-25 VITALS
SYSTOLIC BLOOD PRESSURE: 130 MMHG | OXYGEN SATURATION: 96 % | RESPIRATION RATE: 16 BRPM | HEIGHT: 73 IN | HEART RATE: 83 BPM | TEMPERATURE: 98.7 F | BODY MASS INDEX: 36.05 KG/M2 | DIASTOLIC BLOOD PRESSURE: 82 MMHG | WEIGHT: 272 LBS

## 2024-03-25 DIAGNOSIS — Z13.9 SCREENING DUE: ICD-10-CM

## 2024-03-25 DIAGNOSIS — Z00.00 ENCOUNTER FOR WELL ADULT EXAM WITHOUT ABNORMAL FINDINGS: Primary | ICD-10-CM

## 2024-03-25 DIAGNOSIS — Z79.899 MEDICATION MANAGEMENT: ICD-10-CM

## 2024-03-25 DIAGNOSIS — R41.3 MEMORY LOSS: ICD-10-CM

## 2024-03-25 DIAGNOSIS — Z87.891 PERSONAL HISTORY OF TOBACCO USE: ICD-10-CM

## 2024-03-25 DIAGNOSIS — Z71.89 ACP (ADVANCE CARE PLANNING): ICD-10-CM

## 2024-03-25 PROCEDURE — 3079F DIAST BP 80-89 MM HG: CPT | Performed by: FAMILY MEDICINE

## 2024-03-25 PROCEDURE — 3075F SYST BP GE 130 - 139MM HG: CPT | Performed by: FAMILY MEDICINE

## 2024-03-25 PROCEDURE — 99396 PREV VISIT EST AGE 40-64: CPT | Performed by: FAMILY MEDICINE

## 2024-03-25 PROCEDURE — 80305 DRUG TEST PRSMV DIR OPT OBS: CPT | Performed by: FAMILY MEDICINE

## 2024-03-25 RX ORDER — DONEPEZIL HYDROCHLORIDE 10 MG/1
10 TABLET, FILM COATED ORAL NIGHTLY
Qty: 90 TABLET | Refills: 0 | Status: SHIPPED | OUTPATIENT
Start: 2024-03-25 | End: 2024-06-23

## 2024-03-25 RX ORDER — DULOXETIN HYDROCHLORIDE 60 MG/1
60 CAPSULE, DELAYED RELEASE ORAL DAILY
Qty: 30 CAPSULE | Refills: 5 | Status: SHIPPED | OUTPATIENT
Start: 2024-03-25

## 2024-03-25 RX ORDER — TRAMADOL HYDROCHLORIDE 50 MG/1
50 TABLET ORAL EVERY 8 HOURS PRN
Qty: 12 TABLET | Refills: 0 | Status: SHIPPED | OUTPATIENT
Start: 2024-03-25 | End: 2024-04-24

## 2024-03-25 SDOH — ECONOMIC STABILITY: FOOD INSECURITY: WITHIN THE PAST 12 MONTHS, YOU WORRIED THAT YOUR FOOD WOULD RUN OUT BEFORE YOU GOT MONEY TO BUY MORE.: SOMETIMES TRUE

## 2024-03-25 SDOH — ECONOMIC STABILITY: INCOME INSECURITY: HOW HARD IS IT FOR YOU TO PAY FOR THE VERY BASICS LIKE FOOD, HOUSING, MEDICAL CARE, AND HEATING?: SOMEWHAT HARD

## 2024-03-25 SDOH — ECONOMIC STABILITY: FOOD INSECURITY: WITHIN THE PAST 12 MONTHS, THE FOOD YOU BOUGHT JUST DIDN'T LAST AND YOU DIDN'T HAVE MONEY TO GET MORE.: SOMETIMES TRUE

## 2024-03-25 ASSESSMENT — ENCOUNTER SYMPTOMS
ABDOMINAL PAIN: 0
WHEEZING: 0
CHEST TIGHTNESS: 0
BLOOD IN STOOL: 0
SHORTNESS OF BREATH: 0

## 2024-03-25 NOTE — PROGRESS NOTES
Toan Chacon (:  1970) is a 54 y.o. male,Established patient, here for evaluation of the following chief complaint(s):  Annual Exam (Yearly physical. Has a medicine issue with donepezil. Neuro wanted him to double it, but did not increase his script and he keeps running out.) and Depression (Wants to see about switching his celexa to cymbalta to help with arthritis and incontinence. )         ASSESSMENT/PLAN:  1. Encounter for well adult exam without abnormal findings  2. ACP (advance care planning)  3. Personal history of tobacco use  -     CT Lung Screening; Future  4. Medication management  -     traMADol (ULTRAM) 50 MG tablet; Take 1 tablet by mouth every 8 hours as needed for Pain for up to 30 days. Intended supply: 3 days. Take lowest dose possible to manage pain Max Daily Amount: 150 mg, Disp-12 tablet, R-0Normal  -     POCT Rapid Drug Screen  5. Screening due  -     Comprehensive Metabolic Panel; Future  -     Hemoglobin A1C; Future  6. Memory loss      Refill for donepezil sent to pharmacy for 10 mg dosage.  I am agreeable to switching patient from Celexa to Cymbalta.  I did discuss with him that in the state of his kidneys we we will need to closely monitor his kidney function going forward if we continue this medication.  I discussed with patient he will need to taper off of the Celexa before starting the Cymbalta and patient should take half a tablet for 1 week followed by half a tablet every other day the week following that prior to starting the new medication.  I am going to have patient discontinue the diclofenac at this time due to his compromised kidney function.  My hope is that starting the Cymbalta will help to alleviate some of his ongoing pain however I am going to also start patient on a low-dose of tramadol for him to use as needed for any breakthrough pain.  Patient was instructed he should only use this for breakthrough pain and should preferentially use Tylenol as this should

## 2024-03-25 NOTE — PATIENT INSTRUCTIONS
weight may be enough to improve your health.  Get family and friends involved to provide support. Talk to them about why you are trying to lose weight, and ask them to help. They can help by participating in exercise and having meals with you, even if they may be eating something different.  Find what works best for you. If you do not have time or do not like to cook, a program that offers meal replacement bars or shakes may be better for you. Or if you like to prepare meals, finding a plan that includes daily menus and recipes may be best.  Ask your doctor about other health professionals who can help you achieve your weight loss goals.  A dietitian can help you make healthy changes in your diet.  An exercise specialist or  can help you develop a safe and effective exercise program.  A counselor or psychiatrist can help you cope with issues such as depression, anxiety, or family problems that can make it hard to focus on weight loss.  Consider joining a support group for people who are trying to lose weight. Your doctor can suggest groups in your area.  Where can you learn more?  Go to https://www.[employer].net/patientEd and enter U357 to learn more about \"Starting a Weight Loss Plan: Care Instructions.\"  Current as of: September 20, 2023               Content Version: 14.0  © 2006-2024 [employer].   Care instructions adapted under license by [employer]. If you have questions about a medical condition or this instruction, always ask your healthcare professional. [employer] disclaims any warranty or liability for your use of this information.

## 2024-04-03 ENCOUNTER — TELEPHONE (OUTPATIENT)
Dept: PSYCHIATRY | Age: 54
End: 2024-04-03

## 2024-04-03 NOTE — TELEPHONE ENCOUNTER
Called pt to cancel/reschedule appt for 04/09/24 with Jennie Jang because the provider will be out of the office that day.    Rescheduled for 04/17/24 @ 3:30.    Electronically signed by Tammy Way MA on 4/3/2024 at 4:34 PM

## 2024-04-10 ENCOUNTER — TELEPHONE (OUTPATIENT)
Dept: PRIMARY CARE CLINIC | Age: 54
End: 2024-04-10

## 2024-04-11 DIAGNOSIS — E04.1 THYROID NODULE: Primary | ICD-10-CM

## 2024-04-11 DIAGNOSIS — Z12.5 PROSTATE CANCER SCREENING: ICD-10-CM

## 2024-04-16 ENCOUNTER — TELEPHONE (OUTPATIENT)
Dept: PSYCHIATRY | Age: 54
End: 2024-04-16

## 2024-04-16 NOTE — TELEPHONE ENCOUNTER
Called patient to remind them of their appointment     -Pt confirmed    Electronically signed by Tammy Way MA on 4/16/2024 at 12:08 PM

## 2024-04-17 ENCOUNTER — OFFICE VISIT (OUTPATIENT)
Dept: PSYCHIATRY | Age: 54
End: 2024-04-17
Payer: MEDICAID

## 2024-04-17 VITALS
HEART RATE: 94 BPM | BODY MASS INDEX: 35.08 KG/M2 | HEIGHT: 73 IN | TEMPERATURE: 97.3 F | DIASTOLIC BLOOD PRESSURE: 79 MMHG | WEIGHT: 264.7 LBS | OXYGEN SATURATION: 96 % | SYSTOLIC BLOOD PRESSURE: 110 MMHG

## 2024-04-17 DIAGNOSIS — F41.0 GENERALIZED ANXIETY DISORDER WITH PANIC ATTACKS: ICD-10-CM

## 2024-04-17 DIAGNOSIS — F41.1 GENERALIZED ANXIETY DISORDER WITH PANIC ATTACKS: ICD-10-CM

## 2024-04-17 DIAGNOSIS — F42.9 OBSESSIVE-COMPULSIVE DISORDER, UNSPECIFIED TYPE: ICD-10-CM

## 2024-04-17 DIAGNOSIS — G47.01 INSOMNIA DUE TO MEDICAL CONDITION: ICD-10-CM

## 2024-04-17 DIAGNOSIS — F33.1 MODERATE EPISODE OF RECURRENT MAJOR DEPRESSIVE DISORDER (HCC): Primary | ICD-10-CM

## 2024-04-17 PROCEDURE — 3074F SYST BP LT 130 MM HG: CPT

## 2024-04-17 PROCEDURE — 3078F DIAST BP <80 MM HG: CPT

## 2024-04-17 PROCEDURE — 99214 OFFICE O/P EST MOD 30 MIN: CPT

## 2024-04-17 RX ORDER — HYDROXYZINE HYDROCHLORIDE 25 MG/1
25 TABLET, FILM COATED ORAL 3 TIMES DAILY PRN
Qty: 90 TABLET | Refills: 2 | Status: SHIPPED | OUTPATIENT
Start: 2024-04-17

## 2024-04-17 RX ORDER — DOXEPIN HYDROCHLORIDE 50 MG/1
50 CAPSULE ORAL NIGHTLY
Qty: 30 CAPSULE | Refills: 2 | Status: SHIPPED | OUTPATIENT
Start: 2024-04-17 | End: 2024-07-16

## 2024-04-17 RX ORDER — RISPERIDONE 1 MG/1
1 TABLET ORAL NIGHTLY
Qty: 30 TABLET | Refills: 2 | Status: SHIPPED | OUTPATIENT
Start: 2024-04-17

## 2024-04-17 ASSESSMENT — PATIENT HEALTH QUESTIONNAIRE - PHQ9
5. POOR APPETITE OR OVEREATING: SEVERAL DAYS
SUM OF ALL RESPONSES TO PHQ9 QUESTIONS 1 & 2: 4
8. MOVING OR SPEAKING SO SLOWLY THAT OTHER PEOPLE COULD HAVE NOTICED. OR THE OPPOSITE, BEING SO FIGETY OR RESTLESS THAT YOU HAVE BEEN MOVING AROUND A LOT MORE THAN USUAL: NOT AT ALL
SUM OF ALL RESPONSES TO PHQ QUESTIONS 1-9: 12
10. IF YOU CHECKED OFF ANY PROBLEMS, HOW DIFFICULT HAVE THESE PROBLEMS MADE IT FOR YOU TO DO YOUR WORK, TAKE CARE OF THINGS AT HOME, OR GET ALONG WITH OTHER PEOPLE: SOMEWHAT DIFFICULT
7. TROUBLE CONCENTRATING ON THINGS, SUCH AS READING THE NEWSPAPER OR WATCHING TELEVISION: MORE THAN HALF THE DAYS
9. THOUGHTS THAT YOU WOULD BE BETTER OFF DEAD, OR OF HURTING YOURSELF: NOT AT ALL
SUM OF ALL RESPONSES TO PHQ QUESTIONS 1-9: 12
SUM OF ALL RESPONSES TO PHQ QUESTIONS 1-9: 12
6. FEELING BAD ABOUT YOURSELF - OR THAT YOU ARE A FAILURE OR HAVE LET YOURSELF OR YOUR FAMILY DOWN: MORE THAN HALF THE DAYS
2. FEELING DOWN, DEPRESSED OR HOPELESS: MORE THAN HALF THE DAYS
3. TROUBLE FALLING OR STAYING ASLEEP: MORE THAN HALF THE DAYS
1. LITTLE INTEREST OR PLEASURE IN DOING THINGS: MORE THAN HALF THE DAYS
SUM OF ALL RESPONSES TO PHQ QUESTIONS 1-9: 12
4. FEELING TIRED OR HAVING LITTLE ENERGY: SEVERAL DAYS

## 2024-04-17 NOTE — PROGRESS NOTES
7/16/24 Yes Jennie Jang APRN - CNP   risperiDONE (RISPERDAL) 1 MG tablet Take 1 tablet by mouth nightly 4/17/24  Yes Jennie Jang APRN - CNP   hydrOXYzine HCl (ATARAX) 25 MG tablet Take 1 tablet by mouth 3 times daily as needed for Anxiety 4/17/24  Yes Jennie Jang APRN - CNP   donepezil (ARICEPT) 10 MG tablet Take 1 tablet by mouth nightly 3/25/24 6/23/24  Rodrigo Koo MD   DULoxetine (CYMBALTA) 60 MG extended release capsule Take 1 capsule by mouth daily 3/25/24   Rodrigo Koo MD   traMADol (ULTRAM) 50 MG tablet Take 1 tablet by mouth every 8 hours as needed for Pain for up to 30 days. Intended supply: 3 days. Take lowest dose possible to manage pain Max Daily Amount: 150 mg 3/25/24 4/24/24  Rodrigo Koo MD   JARDIANCE 10 MG tablet Take 1 tablet by mouth daily 6/30/23   Colette Ceballos MD   spironolactone (ALDACTONE) 25 MG tablet Take 1 tablet by mouth daily 4/24/23   Colette Ceballos MD   cyclobenzaprine (FLEXERIL) 5 MG tablet TAKE 1 TABLET BY MOUTH NIGHTLY AS NEEDED FOR MUSCLE SPASM 6/20/23   Rodrigo Koo MD   ENTRESTO 49-51 MG per tablet Take 1 tablet by mouth 2 times daily 3/15/23   Colette Ceballos MD   aspirin 325 MG tablet Take 1 tablet by mouth in the morning and at bedtime 2/3/21   Colette Ceballos MD   acetaminophen (TYLENOL) 650 MG extended release tablet Take 1 tablet by mouth every 8 hours as needed for Pain    Colette Ceballos MD   vitamin D (CHOLECALCIFEROL) 125 MCG (5000 UT) CAPS capsule Take 1 capsule by mouth daily    Colette Ceballos MD   metoprolol tartrate (LOPRESSOR) 25 MG tablet Take 1 tablet by mouth 2 times daily 2/3/23   Rodrigo Koo MD   Multiple Vitamins-Minerals (THERA M PLUS) TABS Take 1 tablet by mouth daily    Colette Ceballos MD     Social History     Socioeconomic History    Marital status:    Tobacco Use    Smoking status: Every Day     Current packs/day: 1.50     Average packs/day: 1.5 packs/day for

## 2024-04-23 ENCOUNTER — HOSPITAL ENCOUNTER (OUTPATIENT)
Dept: CT IMAGING | Facility: HOSPITAL | Age: 54
Discharge: HOME | End: 2024-04-23
Payer: MEDICAID

## 2024-04-23 ENCOUNTER — OFFICE VISIT (OUTPATIENT)
Dept: CARDIAC SURGERY | Facility: CLINIC | Age: 54
End: 2024-04-23
Payer: MEDICAID

## 2024-04-23 VITALS
SYSTOLIC BLOOD PRESSURE: 119 MMHG | DIASTOLIC BLOOD PRESSURE: 64 MMHG | BODY MASS INDEX: 34.91 KG/M2 | HEIGHT: 73 IN | HEART RATE: 77 BPM | OXYGEN SATURATION: 97 % | WEIGHT: 263.4 LBS

## 2024-04-23 DIAGNOSIS — I71.21 ANEURYSM OF ASCENDING AORTA WITHOUT RUPTURE: ICD-10-CM

## 2024-04-23 DIAGNOSIS — I71.21 AORTIC ROOT ANEURYSM: Primary | ICD-10-CM

## 2024-04-23 DIAGNOSIS — Z12.5 PROSTATE CANCER SCREENING: ICD-10-CM

## 2024-04-23 DIAGNOSIS — E04.1 THYROID NODULE: ICD-10-CM

## 2024-04-23 DIAGNOSIS — Z72.0 TOBACCO ABUSE: ICD-10-CM

## 2024-04-23 DIAGNOSIS — Z13.9 SCREENING DUE: ICD-10-CM

## 2024-04-23 PROBLEM — I77.810 AORTIC ROOT DILATION: Status: ACTIVE | Noted: 2024-04-23

## 2024-04-23 LAB
ALBUMIN SERPL-MCNC: 3.9 G/DL (ref 3.5–5.2)
ALP SERPL-CCNC: 91 U/L (ref 40–130)
ALT SERPL-CCNC: 18 U/L (ref 5–41)
ANION GAP SERPL CALCULATED.3IONS-SCNC: 12 MMOL/L (ref 7–19)
AST SERPL-CCNC: 16 U/L (ref 5–40)
BILIRUB SERPL-MCNC: 0.3 MG/DL (ref 0.2–1.2)
BUN SERPL-MCNC: 20 MG/DL (ref 6–20)
CALCIUM SERPL-MCNC: 9.4 MG/DL (ref 8.6–10)
CHLORIDE SERPL-SCNC: 104 MMOL/L (ref 98–111)
CO2 SERPL-SCNC: 22 MMOL/L (ref 22–29)
CREAT BLDA-MCNC: 1.8 MG/DL (ref 0.6–1.3)
CREAT SERPL-MCNC: 1.6 MG/DL (ref 0.5–1.2)
GLUCOSE SERPL-MCNC: 116 MG/DL (ref 74–109)
HBA1C MFR BLD: 5.6 % (ref 4–6)
POTASSIUM SERPL-SCNC: 4.5 MMOL/L (ref 3.5–5)
PROT SERPL-MCNC: 6.9 G/DL (ref 6.6–8.7)
PSA SERPL-MCNC: 0.1 NG/ML (ref 0–4)
SODIUM SERPL-SCNC: 138 MMOL/L (ref 136–145)
T4 SERPL-MCNC: 6.7 UG/DL (ref 4.5–11.7)

## 2024-04-23 PROCEDURE — 82565 ASSAY OF CREATININE: CPT

## 2024-04-23 PROCEDURE — 36415 COLL VENOUS BLD VENIPUNCTURE: CPT | Performed by: FAMILY MEDICINE

## 2024-04-23 PROCEDURE — 25510000001 IOPAMIDOL PER 1 ML

## 2024-04-23 PROCEDURE — 71275 CT ANGIOGRAPHY CHEST: CPT

## 2024-04-23 RX ORDER — DULOXETIN HYDROCHLORIDE 60 MG/1
1 CAPSULE, DELAYED RELEASE ORAL DAILY
COMMUNITY
Start: 2024-03-25

## 2024-04-23 RX ORDER — TRAMADOL HYDROCHLORIDE 50 MG/1
TABLET ORAL
COMMUNITY
Start: 2024-03-25

## 2024-04-23 RX ORDER — RISPERIDONE 1 MG/1
1 TABLET ORAL
COMMUNITY
Start: 2024-01-18

## 2024-04-23 RX ADMIN — IOPAMIDOL 100 ML: 755 INJECTION, SOLUTION INTRAVENOUS at 12:52

## 2024-04-23 NOTE — PROGRESS NOTES
Chief Complaint   Patient presents with    Thoracic Aneurysm     Patient is here for follow up w/CT     Subjective     History of Present Illness    Mr. Andrea is a 54-year-old male who presents today in follow-up of ascending aortic aneurysm and an aortic root aneurysm.  In the interim, patient reports finding out that he has an aneurysm of the basilar artery.  He is followed by [facility] neurosurgery.  He is being treated medically and has a 35 pound weight restriction.  He reports recent medication changes by cardiology.  He was recently started on Entresto, Jardiance, and Aldactone.  He reports that his blood pressure has been well-controlled staying around 120/80.  He also reports improvement in leg swelling.  He denies back or chest pain.  He denies new or worsening shortness of breath.  Unfortunately,  he continues to smoke 1 pack of cigarettes a day.    Review of Systems   HENT:  Negative for sore throat and trouble swallowing.    Cardiovascular:  Positive for leg swelling (Intermittent, improving). Negative for chest pain and palpitations.   Musculoskeletal:  Negative for back pain.   Skin:  Negative for color change, pallor and rash.   Neurological:  Negative for dizziness and weakness.      Past Medical History:   Diagnosis Date    A-fib     Arthritis     Chronic renal failure     Coronary artery disease     Elevated PSA     GERD (gastroesophageal reflux disease)     Hypertension     Kidney stone     MRSA bacteremia 02/21/2017    MRSA in heart valve    Prostate cancer     Purpura     Wegener's granulomatosis with renal involvement      Past Surgical History:   Procedure Laterality Date    APPENDECTOMY      INSERTION HEMODIALYSIS CATHETER N/A 1/20/2017    Procedure: INSERTION PERMCATH;  Surgeon: Ian Grimes DO;  Location: [facility] OR;  Service:     LIP REPAIR      MITRAL VALVE REPLACEMENT      PROSTATECTOMY Bilateral 6/1/2021    Procedure: RADICAL PROSTATECTOMY LAPAROSCOPIC WITH DAVINCI ROBOT WITH  BILATERAL PELVIC LYMPH NODE DISSECTION;  Surgeon: Luis Carlos Awan MD;  Location:  [facility] OR;  Service: Robotics - DaVinci;  Laterality: Bilateral;     Family History   Problem Relation Age of Onset    Heart disease Father     Hypertension Father      Social History     Tobacco Use    Smoking status: Every Day     Current packs/day: 1.00     Average packs/day: 1 pack/day for 15.0 years (15.0 ttl pk-yrs)     Types: Cigarettes    Smokeless tobacco: Never   Vaping Use    Vaping status: Never Used   Substance Use Topics    Alcohol use: Not Currently     Comment: 1 drink per year until 5 years ago, then none    Drug use: No     Current Outpatient Medications   Medication Sig Dispense Refill    Acetaminophen (TYLENOL ARTHRITIS PAIN PO) Take 2 tablets by mouth As Needed.      aspirin 325 MG tablet Take 1 tablet by mouth 2 (Two) Times a Day.      cyclobenzaprine (FLEXERIL) 10 MG tablet Take 1 tablet 3 times a day by oral route as needed.      donepezil (ARICEPT) 5 MG tablet Take 1 tablet by mouth Every Night.      doxepin (SINEquan) 25 MG capsule Take 1-2 capsules by mouth Every Night.      DULoxetine (CYMBALTA) 60 MG capsule Take 1 capsule by mouth Daily.      empagliflozin (Jardiance) 10 MG tablet tablet Take 1 tablet by mouth Daily. 30 tablet 11    hydrOXYzine (ATARAX) 25 MG tablet Take 1 tablet by mouth 3 (Three) Times a Day As Needed. for anxiety      metoprolol tartrate (LOPRESSOR) 25 MG tablet Take 1 tablet by mouth 2 (Two) Times a Day. 180 tablet 3    Multiple Vitamins-Minerals (MULTIVITAMIN WITH MINERALS) tablet tablet Take 1 tablet by mouth Daily.      risperiDONE (risperDAL) 1 MG tablet Take 1 tablet by mouth.      sacubitril-valsartan (Entresto) 49-51 MG tablet Take 1 tablet by mouth 2 (Two) Times a Day. 60 tablet 11    spironolactone (ALDACTONE) 25 MG tablet Take 1 tablet by mouth Daily. 90 tablet 3    traMADol (ULTRAM) 50 MG tablet TAKE 1 TABLET BY MOUTH EVERY 8 HOURS AS NEEDED FOR PAIN FOR UP TO 30 DAYS.  "TAKE LOWEST DOSE POSSIBLE TO MANAGE PAIN. REDUCE DOSES TAKEN AS PAIN BECOMES MANAGEABLE      vitamin D3 125 MCG (5000 UT) capsule capsule Take 1 capsule by mouth Every Other Day.      citalopram (CeleXA) 40 MG tablet Take 1 tablet by mouth Daily. (Patient not taking: Reported on 4/23/2024)      diclofenac (VOLTAREN) 75 MG EC tablet Take 1 tablet by mouth 2 (Two) Times a Day. (Patient not taking: Reported on 4/23/2024)      loperamide (IMODIUM) 2 MG capsule TAKE 1 CAPSULE BY MOUTH 4 TIMES DAILY AS NEEDED FOR DIARRHEA (Patient not taking: Reported on 4/23/2024)      sulfamethoxazole-trimethoprim (BACTRIM DS,SEPTRA DS) 800-160 MG per tablet Take 1 tablet by mouth Every 12 (Twelve) Hours. (Patient not taking: Reported on 4/23/2024)       No current facility-administered medications for this visit.     Allergies:  Cetirizine and Chantix [varenicline]    Objective      Vital Signs  Visit Vitals  /64 (BP Location: Right arm, Patient Position: Sitting, Cuff Size: Adult)   Pulse 77   Ht 185.4 cm (73\")   Wt 119 kg (263 lb 6.4 oz)   SpO2 97%   BMI 34.75 kg/m²     Physical Exam  Constitutional:       Appearance: He is obese.   HENT:      Head: Normocephalic and atraumatic.   Eyes:      Pupils: Pupils are equal, round, and reactive to light.   Cardiovascular:      Rate and Rhythm: Normal rate and regular rhythm.      Heart sounds: No murmur heard.  Pulmonary:      Effort: Pulmonary effort is normal.      Breath sounds: Normal breath sounds. No wheezing, rhonchi or rales.   Abdominal:      General: There is no distension.      Palpations: Abdomen is soft.      Tenderness: There is no abdominal tenderness.   Musculoskeletal:         General: Normal range of motion.      Cervical back: Normal range of motion.      Right lower leg: No edema.      Left lower leg: No edema.   Neurological:      General: No focal deficit present.   Psychiatric:         Mood and Affect: Mood normal.         Behavior: Behavior normal.       Results " Review:   CT Angiogram Chest    Result Date: 4/23/2024  Narrative: EXAMINATION: CT ANGIOGRAM CHEST-   4/23/2024 11:39 AM  HISTORY: Ascending aortic aneurysm; I71.21-Aneurysm of the ascending aorta, without rupture  In order to have a CT radiation dose as low as reasonably achievable Automated Exposure Control was utilized for adjustment of the mA and/or KV according to patient size.  Total DLP = 1363.91 mGy.cm  The CT angiography of the chest is performed the fluid and after intravenous contrast enhancement.  Images are acquired in axial plane and subsequent 2D reconstruction in coronal and sagittal planes and 3D maximum intensity projection reconstruction.  The comparison is made with the previous study dated 4/18/2023.  Atheromatous changes of the thoracic aorta are seen. Moderately ectatic proximal ascending thoracic aorta is noted which measures 4.5 x 3.9 cm above the aortic root (image #77 and series 5). The sinus of Valsalva was measured in sagittal plane images and measures 4.6 cm in AP dimension. The distal ascending aorta measures 4.1 cm in diameter. The anterior aortic arch measures 3.3 cm in diameter. The mid arch measures 2.7 cm. Posterior arch measures 3 cm. The mid descending thoracic aorta measures 2.8 cm.  No evidence of dissection. No evidence of mural hematoma.  Atheromatous changes of coronary arteries are noted.  Normal size central pulmonary arteries are seen. There are no filling defects in the opacified pulmonary arterial bed.  An atrial appendage clamp is in place.  A prosthetic mild valve is seen in place.  There is no evidence of mediastinal or hilar mass or lymphadenopathy.  Limited visualized soft tissue of the neck are unremarkable. Left thyroid nodule is seen similar to the previous study.  No axillary lymphadenopathy.  The lungs are poorly expanded.  There is a tiny oval subpleural nodule in the right middle lobe, image #100 and series 7, which is stable and unchanged since the  previous study.  A few calcified granulomas are seen.  Central airway is patent and clear.  Limited visualized abdomen is unremarkable. The high density material in the gallbladder probably is excreted contrast material. Low-density nodule in the segment 6 of the liver is similar to the previous study. There is fatty infiltration of the liver. The spleen is normal.  Images reviewed in bone windows show no acute bony abnormality.      Impression: 1. Stable CT angiography of the chest. Aortic measurements are given above and appears stable when measured at the same level in same dimension as in the previous study. No dissection. 2. The lungs are unremarkable except for a tiny benign-appearing nodule in the right middle lobe. No features of malignancy.                      This report was signed and finalized on 4/23/2024 1:17 PM by Dr. Erin Chi MD.        My personal interpretation of radiographic imaging:  The ascending aorta measures 4.4 cm in maximum dimension and the aortic root measures 4.6 cm in size without rupture or dissection.      Assessment & Plan       Diagnoses and all orders for this visit:    1. Aortic root aneurysm (Primary)  -     CT Angiogram Chest; Future    2. Aneurysm of ascending aorta without rupture  -     CT Angiogram Chest; Future    3. Tobacco abuse      Overall Mr. Andrea is doing well today.  He presents in follow-up of an ascending aortic aneurysm and aortic root dilation.  On CT imaging today the ascending aorta measures 4.4 cm in maximum dimension and the aortic root measures 4.6 cm in size without rupture or dissection.  I feel the difference in measurement of the aortic root between the last 2 exams is due to motion artifact seen on his previous CT scan. He denies back or chest pain.  He reports recent medication changes from cardiology.  He was recently prescribed Entresto, Aldactone, and Jardiance.  He reports that his blood pressure has been well-controlled staying  around 120/80. He also reports an improvement in leg swelling.  He does report recently finding out that he has a 1.9 cm brain aneurysm, followed by [facility] neurosurgery.  He continues to smoke 1 pack of cigarettes a day.     We discussed the natural course history of aortic aneurysmal disease. We discussed the specific size of aneurysm today and potential risk of aortic complications. We discussed the operative treatment of aneursymal disease broadly. At this time we discussed the recommendation to plan surveillance with CT scans.  Will have him return in 6 months with Dr. Cheney with CT angiogram of the chest.    We discussed signs and symptoms of acute aortic pathology and the need to present to the emergency department for further evaluation. Lastly, we discussed the value of exercise while being mindful of a known aneurysm and the potential risk that high intensity, isometric, or valsalva type exercises presents.     Potential medical therapy including the use of a beta-blocker and perhaps other agents to accomplish strict control of pressure were discussed.     Gurjit Andrea  reports that he has been smoking cigarettes. He has a 15 pack-year smoking history. He has never used smokeless tobacco. I have educated him on the risk of diseases from using tobacco products such as cancer, COPD, and heart disease.     Advance Care Planning   N/A.  Patient is under 65 years of age.       BHARATHI Tracy

## 2024-04-24 PROBLEM — Q25.43 AORTIC ROOT ANEURYSM: Status: ACTIVE | Noted: 2023-04-24

## 2024-04-24 PROBLEM — I77.810 AORTIC ROOT DILATION: Status: RESOLVED | Noted: 2024-04-23 | Resolved: 2024-04-24

## 2024-05-02 ENCOUNTER — TELEPHONE (OUTPATIENT)
Dept: PSYCHIATRY | Age: 54
End: 2024-05-02

## 2024-05-02 NOTE — TELEPHONE ENCOUNTER
Called and confirmed appt with pt for 5/3 @ 1 PM    Electronically signed by Mitchell Garg on 5/2/2024 at 1:59 PM

## 2024-05-03 ENCOUNTER — OFFICE VISIT (OUTPATIENT)
Dept: PRIMARY CARE CLINIC | Age: 54
End: 2024-05-03
Payer: MEDICAID

## 2024-05-03 ENCOUNTER — OFFICE VISIT (OUTPATIENT)
Dept: PSYCHIATRY | Age: 54
End: 2024-05-03
Payer: MEDICAID

## 2024-05-03 VITALS
TEMPERATURE: 96.9 F | SYSTOLIC BLOOD PRESSURE: 122 MMHG | RESPIRATION RATE: 18 BRPM | DIASTOLIC BLOOD PRESSURE: 82 MMHG | WEIGHT: 271.6 LBS | HEIGHT: 72 IN | OXYGEN SATURATION: 100 % | HEART RATE: 65 BPM | BODY MASS INDEX: 36.79 KG/M2

## 2024-05-03 DIAGNOSIS — F41.1 GENERALIZED ANXIETY DISORDER WITH PANIC ATTACKS: ICD-10-CM

## 2024-05-03 DIAGNOSIS — F41.0 GENERALIZED ANXIETY DISORDER WITH PANIC ATTACKS: ICD-10-CM

## 2024-05-03 DIAGNOSIS — F32.A DEPRESSIVE DISORDER: Primary | ICD-10-CM

## 2024-05-03 DIAGNOSIS — J40 BRONCHITIS: ICD-10-CM

## 2024-05-03 DIAGNOSIS — R73.03 PREDIABETES: ICD-10-CM

## 2024-05-03 DIAGNOSIS — F33.1 MODERATE EPISODE OF RECURRENT MAJOR DEPRESSIVE DISORDER (HCC): Primary | ICD-10-CM

## 2024-05-03 DIAGNOSIS — M54.32 SCIATICA OF LEFT SIDE: ICD-10-CM

## 2024-05-03 DIAGNOSIS — Z79.899 MEDICATION MANAGEMENT: ICD-10-CM

## 2024-05-03 PROCEDURE — 90837 PSYTX W PT 60 MINUTES: CPT

## 2024-05-03 PROCEDURE — 3079F DIAST BP 80-89 MM HG: CPT | Performed by: FAMILY MEDICINE

## 2024-05-03 PROCEDURE — 3074F SYST BP LT 130 MM HG: CPT | Performed by: FAMILY MEDICINE

## 2024-05-03 PROCEDURE — 99214 OFFICE O/P EST MOD 30 MIN: CPT | Performed by: FAMILY MEDICINE

## 2024-05-03 RX ORDER — AZITHROMYCIN 250 MG/1
TABLET, FILM COATED ORAL
Qty: 6 TABLET | Refills: 0 | Status: SHIPPED | OUTPATIENT
Start: 2024-05-03 | End: 2024-05-13

## 2024-05-03 RX ORDER — TRAMADOL HYDROCHLORIDE 50 MG/1
TABLET ORAL
COMMUNITY
Start: 2024-03-25

## 2024-05-03 RX ORDER — BENZONATATE 200 MG/1
200 CAPSULE ORAL 3 TIMES DAILY PRN
Qty: 30 CAPSULE | Refills: 0 | Status: SHIPPED | OUTPATIENT
Start: 2024-05-03 | End: 2024-05-10

## 2024-05-03 ASSESSMENT — ANXIETY QUESTIONNAIRES
5. BEING SO RESTLESS THAT IT IS HARD TO SIT STILL: NOT AT ALL
1. FEELING NERVOUS, ANXIOUS, OR ON EDGE: MORE THAN HALF THE DAYS
IF YOU CHECKED OFF ANY PROBLEMS ON THIS QUESTIONNAIRE, HOW DIFFICULT HAVE THESE PROBLEMS MADE IT FOR YOU TO DO YOUR WORK, TAKE CARE OF THINGS AT HOME, OR GET ALONG WITH OTHER PEOPLE: VERY DIFFICULT
2. NOT BEING ABLE TO STOP OR CONTROL WORRYING: SEVERAL DAYS
6. BECOMING EASILY ANNOYED OR IRRITABLE: SEVERAL DAYS
7. FEELING AFRAID AS IF SOMETHING AWFUL MIGHT HAPPEN: NEARLY EVERY DAY
3. WORRYING TOO MUCH ABOUT DIFFERENT THINGS: MORE THAN HALF THE DAYS
GAD7 TOTAL SCORE: 10
4. TROUBLE RELAXING: SEVERAL DAYS

## 2024-05-03 ASSESSMENT — PATIENT HEALTH QUESTIONNAIRE - PHQ9
SUM OF ALL RESPONSES TO PHQ QUESTIONS 1-9: 17
3. TROUBLE FALLING OR STAYING ASLEEP: MORE THAN HALF THE DAYS
1. LITTLE INTEREST OR PLEASURE IN DOING THINGS: MORE THAN HALF THE DAYS
7. TROUBLE CONCENTRATING ON THINGS, SUCH AS READING THE NEWSPAPER OR WATCHING TELEVISION: NEARLY EVERY DAY
SUM OF ALL RESPONSES TO PHQ QUESTIONS 1-9: 17
5. POOR APPETITE OR OVEREATING: MORE THAN HALF THE DAYS
SUM OF ALL RESPONSES TO PHQ QUESTIONS 1-9: 17
4. FEELING TIRED OR HAVING LITTLE ENERGY: MORE THAN HALF THE DAYS
SUM OF ALL RESPONSES TO PHQ QUESTIONS 1-9: 17
2. FEELING DOWN, DEPRESSED OR HOPELESS: MORE THAN HALF THE DAYS
8. MOVING OR SPEAKING SO SLOWLY THAT OTHER PEOPLE COULD HAVE NOTICED. OR THE OPPOSITE, BEING SO FIGETY OR RESTLESS THAT YOU HAVE BEEN MOVING AROUND A LOT MORE THAN USUAL: MORE THAN HALF THE DAYS
9. THOUGHTS THAT YOU WOULD BE BETTER OFF DEAD, OR OF HURTING YOURSELF: NOT AT ALL
SUM OF ALL RESPONSES TO PHQ9 QUESTIONS 1 & 2: 4
10. IF YOU CHECKED OFF ANY PROBLEMS, HOW DIFFICULT HAVE THESE PROBLEMS MADE IT FOR YOU TO DO YOUR WORK, TAKE CARE OF THINGS AT HOME, OR GET ALONG WITH OTHER PEOPLE: SOMEWHAT DIFFICULT
6. FEELING BAD ABOUT YOURSELF - OR THAT YOU ARE A FAILURE OR HAVE LET YOURSELF OR YOUR FAMILY DOWN: MORE THAN HALF THE DAYS

## 2024-05-03 ASSESSMENT — ENCOUNTER SYMPTOMS
CHEST TIGHTNESS: 0
SHORTNESS OF BREATH: 0
BLOOD IN STOOL: 0
WHEEZING: 0
ABDOMINAL PAIN: 0

## 2024-05-03 NOTE — PROGRESS NOTES
Toan Chacon (:  1970) is a 54 y.o. male,Established patient, here for evaluation of the following chief complaint(s):  Follow-up (Pt here for 4 wk f/u after changing some medications.)      Assessment & Plan   ASSESSMENT/PLAN:  1. Depressive disorder  2. Sciatica of left side  3. Medication management  4. Prediabetes  5. Bronchitis      Will maintain patient on duloxetine as prescribed.  Will maintain current dosage.  Patient still has multiple doses of tramadol left and I encouraged him to call us when he is in need of a refill and we will do so.  Overall patient seems to be responding well to the medication changes  Patient did have some questions about his A1c and this was reviewed with him.  We will continue to monitor though patient is only prediabetic and carb avoidance can help limit progression of  On exam patient's breath sounds are quite coarse.  Patient says he is overcoming a recent illness.  I am going to send through some azithromycin and benzonatate given his coarse breath sounds and prior probability of bronchitis/pneumonia.  Patient advised to notify us if symptoms worsen or fail to improve      Return in about 3 months (around 8/3/2024).         Subjective   SUBJECTIVE/OBJECTIVE:  Toan Chacon is a 54 y.o. male who presents for follow-up.  Patient says that the duloxetine has been working very well for him and is actually helping his pain as well as his mood.  Patient says that he feels much more \"even\" and is not having as many intrusive thoughts.  Patient denies any side effects from the new medication.  Patient says he has only had to use the tramadol around 5 times on severe pain days but says that this works well for him as well.  Patient has seen CT surgery since his last appointment with us.  They are continuing to observe his aneurysm  No other concerns at this time                Review of Systems   Constitutional:  Positive for fatigue. Negative for activity change and
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
## 2024-05-06 RX ORDER — SACUBITRIL AND VALSARTAN 49; 51 MG/1; MG/1
1 TABLET, FILM COATED ORAL 2 TIMES DAILY
Qty: 60 TABLET | Refills: 1 | Status: SHIPPED | OUTPATIENT
Start: 2024-05-06

## 2024-05-13 RX ORDER — CYCLOBENZAPRINE HCL 5 MG
TABLET ORAL
Qty: 30 TABLET | Refills: 0 | Status: SHIPPED | OUTPATIENT
Start: 2024-05-13

## 2024-05-31 ENCOUNTER — TELEPHONE (OUTPATIENT)
Dept: PSYCHIATRY | Age: 54
End: 2024-05-31

## 2024-05-31 NOTE — TELEPHONE ENCOUNTER
Called patient to remind them of their appointment   -left voicemail, requesting a return call  Reminded patient to complete their visit pre-check/digital registration in [employer].    Electronically signed by Tammy Way MA on 5/31/2024 at 12:02 PM

## 2024-06-03 ENCOUNTER — OFFICE VISIT (OUTPATIENT)
Dept: PSYCHIATRY | Age: 54
End: 2024-06-03
Payer: MEDICAID

## 2024-06-03 VITALS
SYSTOLIC BLOOD PRESSURE: 117 MMHG | TEMPERATURE: 97.5 F | OXYGEN SATURATION: 96 % | BODY MASS INDEX: 35.95 KG/M2 | HEIGHT: 72 IN | WEIGHT: 265.4 LBS | HEART RATE: 83 BPM | DIASTOLIC BLOOD PRESSURE: 80 MMHG

## 2024-06-03 DIAGNOSIS — F42.9 OBSESSIVE-COMPULSIVE DISORDER, UNSPECIFIED TYPE: ICD-10-CM

## 2024-06-03 DIAGNOSIS — F33.0 MILD EPISODE OF RECURRENT MAJOR DEPRESSIVE DISORDER (HCC): Primary | ICD-10-CM

## 2024-06-03 DIAGNOSIS — F41.1 GENERALIZED ANXIETY DISORDER WITH PANIC ATTACKS: ICD-10-CM

## 2024-06-03 DIAGNOSIS — Z79.899 DRUG THERAPY: ICD-10-CM

## 2024-06-03 DIAGNOSIS — F41.0 GENERALIZED ANXIETY DISORDER WITH PANIC ATTACKS: ICD-10-CM

## 2024-06-03 DIAGNOSIS — G47.01 INSOMNIA DUE TO MEDICAL CONDITION: ICD-10-CM

## 2024-06-03 PROCEDURE — 99214 OFFICE O/P EST MOD 30 MIN: CPT

## 2024-06-03 PROCEDURE — 3074F SYST BP LT 130 MM HG: CPT

## 2024-06-03 PROCEDURE — 3079F DIAST BP 80-89 MM HG: CPT

## 2024-06-03 RX ORDER — HYDROXYZINE HYDROCHLORIDE 25 MG/1
25 TABLET, FILM COATED ORAL 3 TIMES DAILY PRN
Qty: 90 TABLET | Refills: 2 | Status: SHIPPED | OUTPATIENT
Start: 2024-06-03

## 2024-06-03 RX ORDER — RISPERIDONE 1 MG/1
1 TABLET ORAL NIGHTLY
Qty: 30 TABLET | Refills: 2 | Status: SHIPPED | OUTPATIENT
Start: 2024-06-03

## 2024-06-03 RX ORDER — DOXEPIN HYDROCHLORIDE 50 MG/1
50 CAPSULE ORAL NIGHTLY
Qty: 30 CAPSULE | Refills: 2 | Status: SHIPPED | OUTPATIENT
Start: 2024-06-03 | End: 2024-09-01

## 2024-06-03 ASSESSMENT — PATIENT HEALTH QUESTIONNAIRE - PHQ9
10. IF YOU CHECKED OFF ANY PROBLEMS, HOW DIFFICULT HAVE THESE PROBLEMS MADE IT FOR YOU TO DO YOUR WORK, TAKE CARE OF THINGS AT HOME, OR GET ALONG WITH OTHER PEOPLE: SOMEWHAT DIFFICULT
9. THOUGHTS THAT YOU WOULD BE BETTER OFF DEAD, OR OF HURTING YOURSELF: NOT AT ALL
SUM OF ALL RESPONSES TO PHQ QUESTIONS 1-9: 12
7. TROUBLE CONCENTRATING ON THINGS, SUCH AS READING THE NEWSPAPER OR WATCHING TELEVISION: MORE THAN HALF THE DAYS
2. FEELING DOWN, DEPRESSED OR HOPELESS: MORE THAN HALF THE DAYS
3. TROUBLE FALLING OR STAYING ASLEEP: SEVERAL DAYS
SUM OF ALL RESPONSES TO PHQ9 QUESTIONS 1 & 2: 4
5. POOR APPETITE OR OVEREATING: MORE THAN HALF THE DAYS
8. MOVING OR SPEAKING SO SLOWLY THAT OTHER PEOPLE COULD HAVE NOTICED. OR THE OPPOSITE, BEING SO FIGETY OR RESTLESS THAT YOU HAVE BEEN MOVING AROUND A LOT MORE THAN USUAL: NOT AT ALL
SUM OF ALL RESPONSES TO PHQ QUESTIONS 1-9: 12
4. FEELING TIRED OR HAVING LITTLE ENERGY: SEVERAL DAYS
SUM OF ALL RESPONSES TO PHQ QUESTIONS 1-9: 12
SUM OF ALL RESPONSES TO PHQ QUESTIONS 1-9: 12
1. LITTLE INTEREST OR PLEASURE IN DOING THINGS: MORE THAN HALF THE DAYS
6. FEELING BAD ABOUT YOURSELF - OR THAT YOU ARE A FAILURE OR HAVE LET YOURSELF OR YOUR FAMILY DOWN: MORE THAN HALF THE DAYS
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
## 2024-06-07 ENCOUNTER — TELEPHONE (OUTPATIENT)
Dept: UROLOGY | Facility: CLINIC | Age: 54
End: 2024-06-07
Payer: MEDICAID

## 2024-06-07 NOTE — TELEPHONE ENCOUNTER
Called Patient to remind them to get PSA & KUB prior to appointment.  Spoke with Patient.  If patient calls back it is ok for the HUB to tell the pt the message.  Pt is going to get PCP fax results to us.

## 2024-06-11 RX ORDER — CYCLOBENZAPRINE HCL 5 MG
TABLET ORAL
Qty: 30 TABLET | Refills: 0 | Status: SHIPPED | OUTPATIENT
Start: 2024-06-11

## 2024-06-18 RX ORDER — SPIRONOLACTONE 25 MG/1
25 TABLET ORAL DAILY
Qty: 30 TABLET | Refills: 0 | Status: SHIPPED | OUTPATIENT
Start: 2024-06-18

## 2024-06-25 DIAGNOSIS — Z79.899 MEDICATION MANAGEMENT: Primary | ICD-10-CM

## 2024-06-25 RX ORDER — TRAMADOL HYDROCHLORIDE 50 MG/1
50 TABLET ORAL EVERY 8 HOURS PRN
Qty: 60 TABLET | Refills: 0 | Status: SHIPPED | OUTPATIENT
Start: 2024-06-25 | End: 2024-07-25

## 2024-06-25 NOTE — TELEPHONE ENCOUNTER
Provider needs to review PDMP    PDMP Monitoring:    Last PDMP Jur[place] as Reviewed ([place]):  Review User Review Instant Review Result          Urine Drug Screenings (1 yr)       POCT Rapid Drug Screen  Collected: 3/25/2024  3:22 PM (Final result)                  Medication Contract and Consent for Opioid Use Documents Filed       Patient Documents       Type of Document Status Date Received Received By Description    Medication Contract Received 4/4/2023 12:48 PM DEAN FITZGERALD     Medication Contract Received 3/25/2024 11:00 AM KTAHY CABALLERO Medication Contract

## 2024-07-01 ENCOUNTER — OFFICE VISIT (OUTPATIENT)
Dept: CARDIOLOGY | Facility: CLINIC | Age: 54
End: 2024-07-01
Payer: MEDICAID

## 2024-07-01 VITALS
DIASTOLIC BLOOD PRESSURE: 76 MMHG | OXYGEN SATURATION: 100 % | HEART RATE: 80 BPM | HEIGHT: 73 IN | BODY MASS INDEX: 35.12 KG/M2 | SYSTOLIC BLOOD PRESSURE: 110 MMHG | WEIGHT: 265 LBS

## 2024-07-01 DIAGNOSIS — E66.01 CLASS 2 SEVERE OBESITY DUE TO EXCESS CALORIES WITH SERIOUS COMORBIDITY AND BODY MASS INDEX (BMI) OF 35.0 TO 35.9 IN ADULT: ICD-10-CM

## 2024-07-01 DIAGNOSIS — I50.32 CHRONIC DIASTOLIC CONGESTIVE HEART FAILURE: ICD-10-CM

## 2024-07-01 DIAGNOSIS — N18.32 STAGE 3B CHRONIC KIDNEY DISEASE: ICD-10-CM

## 2024-07-01 DIAGNOSIS — I48.0 PAROXYSMAL ATRIAL FIBRILLATION: ICD-10-CM

## 2024-07-01 DIAGNOSIS — Z95.2 H/O MITRAL VALVE REPLACEMENT: Primary | ICD-10-CM

## 2024-07-01 DIAGNOSIS — I10 PRIMARY HYPERTENSION: ICD-10-CM

## 2024-07-01 DIAGNOSIS — Z72.0 TOBACCO ABUSE: ICD-10-CM

## 2024-07-01 PROBLEM — I48.91 A-FIB: Status: RESOLVED | Noted: 2022-07-27 | Resolved: 2024-07-01

## 2024-07-01 PROCEDURE — 99214 OFFICE O/P EST MOD 30 MIN: CPT | Performed by: NURSE PRACTITIONER

## 2024-07-01 PROCEDURE — 3078F DIAST BP <80 MM HG: CPT | Performed by: NURSE PRACTITIONER

## 2024-07-01 PROCEDURE — 1159F MED LIST DOCD IN RCRD: CPT | Performed by: NURSE PRACTITIONER

## 2024-07-01 PROCEDURE — 3074F SYST BP LT 130 MM HG: CPT | Performed by: NURSE PRACTITIONER

## 2024-07-01 PROCEDURE — 1160F RVW MEDS BY RX/DR IN RCRD: CPT | Performed by: NURSE PRACTITIONER

## 2024-07-01 PROCEDURE — 93000 ELECTROCARDIOGRAM COMPLETE: CPT | Performed by: NURSE PRACTITIONER

## 2024-07-01 RX ORDER — SACUBITRIL AND VALSARTAN 49; 51 MG/1; MG/1
1 TABLET, FILM COATED ORAL 2 TIMES DAILY
Qty: 60 TABLET | Refills: 11 | Status: SHIPPED | OUTPATIENT
Start: 2024-07-01

## 2024-07-01 RX ORDER — SPIRONOLACTONE 25 MG/1
25 TABLET ORAL DAILY
Qty: 30 TABLET | Refills: 11 | Status: SHIPPED | OUTPATIENT
Start: 2024-07-01

## 2024-07-01 NOTE — PROGRESS NOTES
"    Subjective:     Encounter Date:07/01/2024      Patient ID: Gurjit Andrea is a 54 y.o. male.    Chief Complaint:\"no complaints\"  Congestive Heart Failure  Presents for follow-up visit. Pertinent negatives include no chest pain, near-syncope, palpitations or shortness of breath. The symptoms have been stable.     Patient presents today as a follow up, needing medication refills. He currently follows with Dr. Cortez and BHARATHI Mcmahon for management of  mitral valve replacement in 2017 at  due to mitral valve endocarditis, PAF s/p MAZE and LAAE at time of mitral valve replacement and diastolic heart failure. His last echo was completed in 5/2022 and noted no evidence of valvular heart disease.     He presents today with his wife who is present via telephone. He reports he is feeling great and has felt well since his CHF medications have been \"lined out\" via Kayla. He does not check his bp at home due to it being controlled at other offices. He does not weigh daily. He reports having only 2 episodes of ankle edema that resolved over night in last year. He notes his swelling subsided after he was placed on aldactone. His wife reports he does have \"orthostatic hypotension\" with associated near syncope, lightheadedness and dizziness if he stands too quickly. He continues to smoke 1ppd. He denies chest pain, dyspnea, palpitations, and syncope.    The following portions of the patient's history were reviewed and updated as appropriate: allergies, current medications, past family history, past medical history, past social history, past surgical history and problem list.    Allergies   Allergen Reactions    Cetirizine Nausea Only    Chantix [Varenicline] Anxiety       Current Outpatient Medications:     Acetaminophen (TYLENOL ARTHRITIS PAIN PO), Take 2 tablets by mouth As Needed., Disp: , Rfl:     aspirin 325 MG tablet, Take 1 tablet by mouth 2 (Two) Times a Day., Disp: , Rfl:     cyclobenzaprine (FLEXERIL) " 10 MG tablet, Take 1 tablet 3 times a day by oral route as needed., Disp: , Rfl:     DULoxetine (CYMBALTA) 60 MG capsule, Take 1 capsule by mouth Daily., Disp: , Rfl:     empagliflozin (Jardiance) 10 MG tablet tablet, Take 1 tablet by mouth Daily., Disp: 30 tablet, Rfl: 11    hydrOXYzine (ATARAX) 25 MG tablet, Take 1 tablet by mouth 3 (Three) Times a Day As Needed. for anxiety, Disp: , Rfl:     metoprolol tartrate (LOPRESSOR) 25 MG tablet, Take 1 tablet by mouth 2 (Two) Times a Day., Disp: 180 tablet, Rfl: 3    Multiple Vitamins-Minerals (MULTIVITAMIN WITH MINERALS) tablet tablet, Take 1 tablet by mouth Daily., Disp: , Rfl:     risperiDONE (risperDAL) 1 MG tablet, Take 1 tablet by mouth., Disp: , Rfl:     sacubitril-valsartan (Entresto) 49-51 MG tablet, Take 1 tablet by mouth 2 (Two) Times a Day., Disp: 60 tablet, Rfl: 11    spironolactone (ALDACTONE) 25 MG tablet, Take 1 tablet by mouth Daily., Disp: 30 tablet, Rfl: 11    traMADol (ULTRAM) 50 MG tablet, TAKE 1 TABLET BY MOUTH EVERY 8 HOURS AS NEEDED FOR PAIN FOR UP TO 30 DAYS. TAKE LOWEST DOSE POSSIBLE TO MANAGE PAIN. REDUCE DOSES TAKEN AS PAIN BECOMES MANAGEABLE, Disp: , Rfl:     vitamin D3 125 MCG (5000 UT) capsule capsule, Take 1 capsule by mouth Every Other Day., Disp: , Rfl:   Past Medical History:   Diagnosis Date    A-fib     Arthritis     Chronic renal failure     Coronary artery disease     Elevated PSA     GERD (gastroesophageal reflux disease)     Hypertension     Kidney stone     MRSA bacteremia 02/21/2017    MRSA in heart valve    Prostate cancer     Purpura     Wegener's granulomatosis with renal involvement        Social History     Socioeconomic History    Marital status:    Tobacco Use    Smoking status: Every Day     Current packs/day: 1.00     Average packs/day: 1 pack/day for 15.0 years (15.0 ttl pk-yrs)     Types: Cigarettes     Passive exposure: Current    Smokeless tobacco: Never   Vaping Use    Vaping status: Never Used   Substance  and Sexual Activity    Alcohol use: Not Currently     Comment: 1 drink per year until 5 years ago, then none    Drug use: No    Sexual activity: Not Currently       Review of Systems   Constitutional: Negative for malaise/fatigue, weight gain and weight loss.   Cardiovascular:  Negative for chest pain, dyspnea on exertion, irregular heartbeat, leg swelling, near-syncope, orthopnea, palpitations, paroxysmal nocturnal dyspnea and syncope.   Respiratory:  Negative for cough, shortness of breath, sleep disturbances due to breathing, sputum production and wheezing.    Skin:  Negative for dry skin, flushing, itching and rash.   Gastrointestinal:  Negative for hematemesis and hematochezia.   Neurological:  Negative for dizziness, light-headedness, loss of balance and weakness.   All other systems reviewed and are negative.         Objective:     Vitals reviewed.   Constitutional:       General: Not in acute distress.     Appearance: Healthy appearance. Well-developed. Not diaphoretic.   Eyes:      General: No scleral icterus.     Conjunctiva/sclera: Conjunctivae normal.      Pupils: Pupils are equal, round, and reactive to light.   HENT:      Head: Normocephalic.    Mouth/Throat:      Pharynx: No oropharyngeal exudate.   Neck:      Vascular: No JVR.   Pulmonary:      Effort: Pulmonary effort is normal. No respiratory distress.      Breath sounds: Normal breath sounds. No wheezing. No rhonchi. No rales.   Chest:      Chest wall: Not tender to palpatation.   Cardiovascular:      Normal rate. Regular rhythm.   Pulses:     Intact distal pulses.   Edema:     Peripheral edema absent.   Abdominal:      General: Bowel sounds are normal. There is no distension.      Palpations: Abdomen is soft.      Tenderness: There is no abdominal tenderness.   Musculoskeletal: Normal range of motion.      Cervical back: Normal range of motion and neck supple. Skin:     General: Skin is warm and dry.      Coloration: Skin is not pale.       "Findings: No erythema or rash.   Neurological:      Mental Status: Alert, oriented to person, place, and time and oriented to person, place and time.      Deep Tendon Reflexes: Reflexes are normal and symmetric.   Psychiatric:         Behavior: Behavior normal.             ECG 12 Lead    Date/Time: 7/1/2024 10:22 AM  Performed by: Nia Grant APRN    Authorized by: Nia Grant APRN  Comparison: compared with previous ECG from 5/13/2023  Similar to previous ECG  Rhythm: sinus rhythm  Rate: normal  BPM: 80  Conduction: conduction normal  ST Segments: ST segments normal  T Waves: T waves normal  QRS axis: normal  Other: no other findings    Clinical impression: normal ECG        /76 (BP Location: Left arm, Patient Position: Sitting, Cuff Size: Adult)   Pulse 80   Ht 184.2 cm (72.5\")   Wt 120 kg (265 lb)   SpO2 100%   BMI 35.45 kg/m²     Lab Review:   I have reviewed previous office notes, recent labs and recent cardiac testing.     Results for orders placed during the hospital encounter of 05/05/22    Adult Transthoracic Echo Complete w/ Color, Spectral and Contrast if necessary per protocol    Interpretation Summary  · Left ventricular ejection fraction appears to be 56 - 60%. Left ventricular systolic function is normal.  · Left ventricular diastolic function is consistent with (grade II w/high LAP) pseudonormalization.  · The following left ventricular wall segments are hypokinetic: apical septal and apex hypokinetic.  · Abnormal global longitudinal LV strain (GLS) = -9.0%  · Left atrial volume is severely increased.  · The right ventricular cavity is mildly dilated. Normal RV systolic function  · Mild dilation of the aortic root is present.  · Estimated right ventricular systolic pressure from tricuspid regurgitation is normal (<35 mmHg).  · There is a trivial pericardial effusion. The effusion is fluid filled. There is no evidence of cardiac tamponade.            Assessment:          Diagnosis " Plan   1. H/O mitral valve replacement 2017         2. Paroxysmal atrial fibrillation        3. Primary hypertension        4. Chronic diastolic congestive heart failure        5. Stage 3b chronic kidney disease        6. Tobacco abuse        7. Class 2 severe obesity due to excess calories with serious comorbidity and body mass index (BMI) of 35.0 to 35.9 in adult             Plan:       1 H/o mitral valve replacement- at  in 2017 due to endocarditis. Normal valve function per echo in 2022.   2. PAF- s/p MAZE and LAAE at time of mitral valve replacement with no documented reoccurrence. Currently on full dose ASA.   3. HTN- controlled. Continue current medical therapy.   4. Chronic diastolic CHF- compensated with class 2 symptoms. Continue Entresto, Jardiance and Aldactone.   5. CKD- followed by nephrology.   6. Tobacco use- Gurjit Andrea  reports that he has been smoking cigarettes. He has a 15 pack-year smoking history. He has been exposed to tobacco smoke. He has never used smokeless tobacco. I have educated him on the risk of diseases from using tobacco products such as cancer, COPD, and heart disease. I advised him to quit and he is not willing to quit. I spent 3  minutes counseling the patient.  7. BMI- Class 2 Severe Obesity (BMI >=35 and <=39.9). Obesity-related health conditions include the following: hypertension, dyslipidemias, and peripheral vascular disease. Obesity is unchanged. BMI is is above average; BMI management plan is completed. We discussed portion control and increasing exercise.         Medication refills have been sent to the pharmacy.     Follow up in 6 months with Kayla  or sooner if symptoms worsen.     I spent 30 minutes caring for Gurjit on this date of service. This time includes time spent by me in the following activities:preparing for the visit, reviewing tests, obtaining and/or reviewing a separately obtained history, performing a medically appropriate examination  and/or evaluation , counseling and educating the patient/family/caregiver, ordering medications, tests, or procedures, and documenting information in the medical record    I spent 2 minutes on the separately reported service of EKG interpretation. This time is not included in the time used to support the E/M service also reported today.

## 2024-07-03 ENCOUNTER — TELEPHONE (OUTPATIENT)
Dept: PSYCHIATRY | Age: 54
End: 2024-07-03

## 2024-07-03 NOTE — TELEPHONE ENCOUNTER
Called pt on 07/03/24 for appointment reminder for 07/05/24. Pt confirmed  ?   Reminded patient to complete their visit pre-check/digital registration in [employer].

## 2024-07-05 ENCOUNTER — TELEPHONE (OUTPATIENT)
Dept: PSYCHIATRY | Age: 54
End: 2024-07-05

## 2024-07-05 NOTE — TELEPHONE ENCOUNTER
Called pt to cancel/reschedule appt for 07/05/24 with Magali Edmond because the provider is out the office sick.    Cancelled appt  Rescheduled for 07/22/24 @ 2:00.    Electronically signed by Tammy Way MA on 7/5/2024 at 7:28 AM

## 2024-07-10 RX ORDER — CYCLOBENZAPRINE HCL 5 MG
TABLET ORAL
Qty: 30 TABLET | Refills: 0 | Status: SHIPPED | OUTPATIENT
Start: 2024-07-10

## 2024-07-19 ENCOUNTER — TELEPHONE (OUTPATIENT)
Dept: PSYCHIATRY | Age: 54
End: 2024-07-19

## 2024-07-19 NOTE — TELEPHONE ENCOUNTER
Called and confirmed appt with pt for 7/22 @ 2 PM    Electronically signed by Mitchell Garg on 7/19/2024 at 11:08 AM

## 2024-07-22 ENCOUNTER — OFFICE VISIT (OUTPATIENT)
Dept: PSYCHIATRY | Age: 54
End: 2024-07-22
Payer: MEDICAID

## 2024-07-22 DIAGNOSIS — F41.0 GENERALIZED ANXIETY DISORDER WITH PANIC ATTACKS: ICD-10-CM

## 2024-07-22 DIAGNOSIS — F33.0 MILD EPISODE OF RECURRENT MAJOR DEPRESSIVE DISORDER (HCC): Primary | ICD-10-CM

## 2024-07-22 DIAGNOSIS — F41.1 GENERALIZED ANXIETY DISORDER WITH PANIC ATTACKS: ICD-10-CM

## 2024-07-22 DIAGNOSIS — F42.9 OBSESSIVE-COMPULSIVE DISORDER, UNSPECIFIED TYPE: ICD-10-CM

## 2024-07-22 PROCEDURE — 90834 PSYTX W PT 45 MINUTES: CPT

## 2024-07-22 NOTE — PROGRESS NOTES
Therapy Progress Note  Magali Edmond M.S., [facility]  7/22/2024    Patient location  : [facility] location : [facility]      Total time spent: 46 minutes  This is patient's ninth  Therapy appointment.  Reason for Consult:  depression, anxiety, and stress  Referring Provider: No referring provider defined for this encounter.      Toan Chacon ,a 54 y.o. male, for follow up visit.     Pt provided informed consent for the behavioral health program. Discussed with patient model of service to include the limits of confidentiality (i.e. abuse reporting, suicide intervention, etc.) and short-term intervention focused approach.  Discussed no show and late cancellation policy.  Pt indicated understanding.    S:  Pt reports overall things have been going pretty good. Pt reports he has been having some trouble with his mind racing. Pt reports he does not like silence so at home he puts on the big bang theory tv show and it helps. Pt reports his new trial date is set for Jan 12 and he is \"scared shitless\". 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 Pt reports his wife has been hurting a lot and she cannot get into see the pain management doctor until December. Pt reports everything has been working out well with his medications. Pt reports him forgetting things have gotten some better. Pt reports he obsessively keeps a routine and if anything throws his routine off his whole day feels messed up. Pt reports they have been cleaning better and throw a bag of trash away everyday. Pt reports his ADL's have not been good. Pt reports he feels he does not have a desire to do anything. Pt reports he is having trouble with sleeping regularly. Pt reports he has trouble getting comfortable. Pt calm, cooperative, smiling, and denies si, hi, avh. Completed assessments, see below. Therapist and pt discussed distraction coping skills, deep breathing, and mindfulness. Handouts

## 2024-07-24 RX ORDER — CYCLOBENZAPRINE HCL 5 MG
TABLET ORAL
Qty: 30 TABLET | Refills: 0 | OUTPATIENT
Start: 2024-07-24

## 2024-07-31 RX ORDER — DONEPEZIL HYDROCHLORIDE 10 MG/1
10 TABLET, FILM COATED ORAL NIGHTLY
Qty: 90 TABLET | Refills: 0 | Status: SHIPPED | OUTPATIENT
Start: 2024-07-31 | End: 2024-10-29

## 2024-08-15 RX ORDER — CYCLOBENZAPRINE HCL 5 MG
TABLET ORAL
Qty: 30 TABLET | Refills: 0 | OUTPATIENT
Start: 2024-08-15

## 2024-08-22 RX ORDER — CYCLOBENZAPRINE HCL 5 MG
TABLET ORAL
Qty: 30 TABLET | Refills: 0 | Status: SHIPPED | OUTPATIENT
Start: 2024-08-22

## 2024-08-30 ENCOUNTER — OFFICE VISIT (OUTPATIENT)
Dept: PSYCHIATRY | Age: 54
End: 2024-08-30

## 2024-08-30 DIAGNOSIS — F33.1 MODERATE RECURRENT MAJOR DEPRESSION (HCC): Primary | ICD-10-CM

## 2024-08-30 DIAGNOSIS — F41.1 GENERALIZED ANXIETY DISORDER: ICD-10-CM

## 2024-08-30 ASSESSMENT — ANXIETY QUESTIONNAIRES
5. BEING SO RESTLESS THAT IT IS HARD TO SIT STILL: MORE THAN HALF THE DAYS
7. FEELING AFRAID AS IF SOMETHING AWFUL MIGHT HAPPEN: NEARLY EVERY DAY
1. FEELING NERVOUS, ANXIOUS, OR ON EDGE: NEARLY EVERY DAY
IF YOU CHECKED OFF ANY PROBLEMS ON THIS QUESTIONNAIRE, HOW DIFFICULT HAVE THESE PROBLEMS MADE IT FOR YOU TO DO YOUR WORK, TAKE CARE OF THINGS AT HOME, OR GET ALONG WITH OTHER PEOPLE: EXTREMELY DIFFICULT
4. TROUBLE RELAXING: NEARLY EVERY DAY
6. BECOMING EASILY ANNOYED OR IRRITABLE: NEARLY EVERY DAY
GAD7 TOTAL SCORE: 20
3. WORRYING TOO MUCH ABOUT DIFFERENT THINGS: NEARLY EVERY DAY
2. NOT BEING ABLE TO STOP OR CONTROL WORRYING: NEARLY EVERY DAY

## 2024-08-30 ASSESSMENT — PATIENT HEALTH QUESTIONNAIRE - PHQ9
SUM OF ALL RESPONSES TO PHQ QUESTIONS 1-9: 15
SUM OF ALL RESPONSES TO PHQ QUESTIONS 1-9: 15
SUM OF ALL RESPONSES TO PHQ9 QUESTIONS 1 & 2: 3
8. MOVING OR SPEAKING SO SLOWLY THAT OTHER PEOPLE COULD HAVE NOTICED. OR THE OPPOSITE, BEING SO FIGETY OR RESTLESS THAT YOU HAVE BEEN MOVING AROUND A LOT MORE THAN USUAL: MORE THAN HALF THE DAYS
7. TROUBLE CONCENTRATING ON THINGS, SUCH AS READING THE NEWSPAPER OR WATCHING TELEVISION: MORE THAN HALF THE DAYS
9. THOUGHTS THAT YOU WOULD BE BETTER OFF DEAD, OR OF HURTING YOURSELF: NOT AT ALL
4. FEELING TIRED OR HAVING LITTLE ENERGY: MORE THAN HALF THE DAYS
6. FEELING BAD ABOUT YOURSELF - OR THAT YOU ARE A FAILURE OR HAVE LET YOURSELF OR YOUR FAMILY DOWN: MORE THAN HALF THE DAYS
SUM OF ALL RESPONSES TO PHQ QUESTIONS 1-9: 15
2. FEELING DOWN, DEPRESSED OR HOPELESS: MORE THAN HALF THE DAYS
5. POOR APPETITE OR OVEREATING: MORE THAN HALF THE DAYS
SUM OF ALL RESPONSES TO PHQ QUESTIONS 1-9: 15
10. IF YOU CHECKED OFF ANY PROBLEMS, HOW DIFFICULT HAVE THESE PROBLEMS MADE IT FOR YOU TO DO YOUR WORK, TAKE CARE OF THINGS AT HOME, OR GET ALONG WITH OTHER PEOPLE: SOMEWHAT DIFFICULT
1. LITTLE INTEREST OR PLEASURE IN DOING THINGS: SEVERAL DAYS
3. TROUBLE FALLING OR STAYING ASLEEP: MORE THAN HALF THE DAYS

## 2024-08-30 NOTE — PROGRESS NOTES
different things Several days More than half the days Several days   Trouble relaxing Several days Several days Several days   Being so restless that it is hard to sit still Several days Not at all Several days   Becoming easily annoyed or irritable Several days Several days Several days   Feeling afraid as if something awful might happen Nearly every day Nearly every day Nearly every day   CHARITO-7 Total Score 9 10 10   How difficult have these problems made it for you to do your work, take care of things at home, or get along with other people? Very difficult Very difficult Very difficult        C-SSRS Suicide Screening:       Audit Questionnaire: Screen for Alcohol Misuse:  Cognitive Screening: Mini-Cog:  Geriatric Depression Scale:  (MDQ) The Mood Disorder Questionnaire: Positive or Negative  If the patient answers:  1. “Yes” to seven or more of the 13 items in question number 1;  AND  2. “Yes” to question number 2;  AND  3. “Moderate” or “Serious” to question number 3;  you have a positive screen. All three of the criteria above should be met. A positive screen should  be followed by a comprehensive medical evaluation for Bipolar Spectrum Disorder.  PCL-5 PTSD:  Postpartum Depression Scale:  Abuse Screening:      MSE:    Appearance    disheveled; poor hygiene  Appetite abnormal: overeats  Sleep disturbance Yes  Fatigue Yes  Loss of pleasure Yes  Impulsive behavior No  Speech    normal rate and normal volume  Mood    Anxious  Depressed  Affect    constricted  Thought Content    intrusive thoughts  Thought Process    coherent  Associations    logical connections  Insight    Fair  Judgment    Intact  Orientation    oriented to person, place, time, and general circumstances  Memory    recent and remote memory intact  Attention/Concentration    intact  Morbid ideation No  Suicide Assessment    no suicidal ideation      History:  Social History     Socioeconomic History    Marital status:    Tobacco Use     PSA     Hypertension     Renal calculi      Economic problems, Problems related to interaction with the legal system/ crime, and Other psychosocial and environmental problems    Plan:  1. Continue medication management  2. CBT to target cognitive distortions  3. Discuss therapeutic goals    Pt. Goal: \" To stop having intrusive thoughts, have better interactions with people, and feel good about myself again.\"    Pt interventions:  Therapist posed open-ended questions to facilitate reciprocal communication. Therapist also provided reflective listening/validation, support and encouragement.  Established rapport and Supportive techniques    Patient will reschedule first available.      Over 50% of the total visit time of visit was spent on counseling and/or coordination of care     Yuly Huggins [facility]

## 2024-09-03 ENCOUNTER — TELEPHONE (OUTPATIENT)
Dept: PSYCHIATRY | Age: 54
End: 2024-09-03

## 2024-09-03 NOTE — TELEPHONE ENCOUNTER
Called and confirmed appt with pt for 9/4 @ 3:30 PM    Electronically signed by Mitchell Garg on 9/3/2024 at 11:58 AM

## 2024-09-04 ENCOUNTER — OFFICE VISIT (OUTPATIENT)
Dept: PSYCHIATRY | Age: 54
End: 2024-09-04
Payer: MEDICAID

## 2024-09-04 VITALS
BODY MASS INDEX: 35.28 KG/M2 | DIASTOLIC BLOOD PRESSURE: 76 MMHG | SYSTOLIC BLOOD PRESSURE: 110 MMHG | HEART RATE: 91 BPM | TEMPERATURE: 98.9 F | HEIGHT: 73 IN | WEIGHT: 266.2 LBS | RESPIRATION RATE: 20 BRPM | OXYGEN SATURATION: 97 %

## 2024-09-04 DIAGNOSIS — F33.0 MILD EPISODE OF RECURRENT MAJOR DEPRESSIVE DISORDER (HCC): Primary | ICD-10-CM

## 2024-09-04 DIAGNOSIS — F41.1 GENERALIZED ANXIETY DISORDER WITH PANIC ATTACKS: ICD-10-CM

## 2024-09-04 DIAGNOSIS — F41.0 GENERALIZED ANXIETY DISORDER WITH PANIC ATTACKS: ICD-10-CM

## 2024-09-04 DIAGNOSIS — G47.01 INSOMNIA DUE TO MEDICAL CONDITION: ICD-10-CM

## 2024-09-04 DIAGNOSIS — F42.9 OBSESSIVE-COMPULSIVE DISORDER, UNSPECIFIED TYPE: ICD-10-CM

## 2024-09-04 PROCEDURE — 3078F DIAST BP <80 MM HG: CPT

## 2024-09-04 PROCEDURE — 99214 OFFICE O/P EST MOD 30 MIN: CPT

## 2024-09-04 PROCEDURE — 3074F SYST BP LT 130 MM HG: CPT

## 2024-09-04 RX ORDER — HYDROXYZINE HYDROCHLORIDE 25 MG/1
25 TABLET, FILM COATED ORAL 3 TIMES DAILY PRN
Qty: 90 TABLET | Refills: 2 | Status: SHIPPED | OUTPATIENT
Start: 2024-09-04

## 2024-09-04 RX ORDER — DOXEPIN HYDROCHLORIDE 50 MG/1
50 CAPSULE ORAL NIGHTLY
Qty: 30 CAPSULE | Refills: 2 | Status: SHIPPED | OUTPATIENT
Start: 2024-09-04 | End: 2024-12-03

## 2024-09-04 RX ORDER — RISPERIDONE 1 MG/1
1 TABLET ORAL NIGHTLY
Qty: 30 TABLET | Refills: 2 | Status: SHIPPED | OUTPATIENT
Start: 2024-09-04

## 2024-09-04 ASSESSMENT — PATIENT HEALTH QUESTIONNAIRE - PHQ9
SUM OF ALL RESPONSES TO PHQ QUESTIONS 1-9: 17
2. FEELING DOWN, DEPRESSED OR HOPELESS: MORE THAN HALF THE DAYS
10. IF YOU CHECKED OFF ANY PROBLEMS, HOW DIFFICULT HAVE THESE PROBLEMS MADE IT FOR YOU TO DO YOUR WORK, TAKE CARE OF THINGS AT HOME, OR GET ALONG WITH OTHER PEOPLE: SOMEWHAT DIFFICULT
SUM OF ALL RESPONSES TO PHQ QUESTIONS 1-9: 17
SUM OF ALL RESPONSES TO PHQ9 QUESTIONS 1 & 2: 5
3. TROUBLE FALLING OR STAYING ASLEEP: MORE THAN HALF THE DAYS
4. FEELING TIRED OR HAVING LITTLE ENERGY: MORE THAN HALF THE DAYS
SUM OF ALL RESPONSES TO PHQ QUESTIONS 1-9: 17
5. POOR APPETITE OR OVEREATING: MORE THAN HALF THE DAYS
1. LITTLE INTEREST OR PLEASURE IN DOING THINGS: NEARLY EVERY DAY
8. MOVING OR SPEAKING SO SLOWLY THAT OTHER PEOPLE COULD HAVE NOTICED. OR THE OPPOSITE, BEING SO FIGETY OR RESTLESS THAT YOU HAVE BEEN MOVING AROUND A LOT MORE THAN USUAL: MORE THAN HALF THE DAYS
SUM OF ALL RESPONSES TO PHQ QUESTIONS 1-9: 17
6. FEELING BAD ABOUT YOURSELF - OR THAT YOU ARE A FAILURE OR HAVE LET YOURSELF OR YOUR FAMILY DOWN: MORE THAN HALF THE DAYS
9. THOUGHTS THAT YOU WOULD BE BETTER OFF DEAD, OR OF HURTING YOURSELF: NOT AT ALL
7. TROUBLE CONCENTRATING ON THINGS, SUCH AS READING THE NEWSPAPER OR WATCHING TELEVISION: MORE THAN HALF THE DAYS

## 2024-09-04 NOTE — PROGRESS NOTES
9/4/2024        Progress Note    Toan Chacon 1970      Chief Complaint   Patient presents with    Follow-up         Subjective:    Patient is a 54 y.o. male diagnosed with MDD, CHARITO with panic attacks, insomnia, OCD, and presents today for follow-up.  Last seen in clinic on 6/3/24  and prior records were reviewed.    Seen in office.  Calm and cooperative.  Affect is bright.  Reports compliance with medications, denies any negative or wanted side effects. Started therapy with Yuly, saw Magali in past. Reports this went well. He and his wife will be going to trial in January from neglect case involving their two sons from charges from a few years ago. They did not get offered a plea deal.  He denies suicidal thoughts.  He is future oriented.  Good support system in his wife and his mother.  We will follow-up in a couple months.    Reports compliance with medications as good .     Sleep: sleeping okay    Caffeine use: excess, 1 2 L of mountain dew soda and 4 cups of coffee    PREVIOUS MED TRIALS  Doxepin  Celexa (current, started Aug 21)  Hydroxyzine  Trazodone (became ineffective)  Risperdal        Current Substance Use:   Alcohol: denies, hasn't drank since college  Illicit drug use: history of LSD when younger, opioid abuse  Marijuana: denies  Tobacco: smokes 1.5-2 packs per day currently  Vape: occasional vapes    BP: /76 (Site: Right Upper Arm, Position: Sitting)   Pulse 91   Temp 98.9 °F (37.2 °C) (Temporal)   Resp 20   Ht 1.854 m (6' 1\")   Wt 120.7 kg (266 lb 3.2 oz)   SpO2 97%   BMI 35.12 kg/m²       Review of Systems - 14 point review:  Negative     Constitutional: (fevers, chills, night sweats, wt loss/gain, change in appetite, fatigue, somnolence)    HEENT: (ear pain or discharge, hearing loss, ear ringing, sinus pressure, nosebleed, nasal discharge, sore throat, oral sores, tooth pain, bleeding gums, hoarse voice, neck pain)      Cardiovascular: (HTN, chest pain, elevated

## 2024-09-27 DIAGNOSIS — Q25.43 AORTIC ROOT ANEURYSM: Primary | ICD-10-CM

## 2024-09-27 DIAGNOSIS — I71.21 ANEURYSM OF ASCENDING AORTA WITHOUT RUPTURE: ICD-10-CM

## 2024-10-03 RX ORDER — CYCLOBENZAPRINE HCL 5 MG
TABLET ORAL
Qty: 30 TABLET | Refills: 0 | Status: SHIPPED | OUTPATIENT
Start: 2024-10-03

## 2024-10-03 RX ORDER — CYCLOBENZAPRINE HCL 5 MG
TABLET ORAL
Qty: 30 TABLET | Refills: 0 | OUTPATIENT
Start: 2024-10-03

## 2024-10-04 NOTE — PROGRESS NOTES
Continued Stay Note   [place]     Patient Name: Gurjit Andrea  MRN: [id number]  Today's Date: 2/23/2017    Admit Date: 2/15/2017          Discharge Plan       02/23/17 0935    Case Management/Social Work Plan    Plan Per Gio with Hospitalist group, PT has been accepted to  for transfer but they have not called with an available bed yet. [facility] is on standby to transport when PT is ready for transport.     Patient/Family In Agreement With Plan yes              Discharge Codes     None        Expected Discharge Date and Time     Expected Discharge Date Expected Discharge Time    Feb 23, 2017             SHANI Miranda     Gastroenterology

## 2024-10-18 RX ORDER — DULOXETIN HYDROCHLORIDE 60 MG/1
60 CAPSULE, DELAYED RELEASE ORAL DAILY
Qty: 30 CAPSULE | Refills: 0 | Status: SHIPPED | OUTPATIENT
Start: 2024-10-18

## 2024-10-18 NOTE — TELEPHONE ENCOUNTER
Would recommend 6-month follow-up sometime next month as it does not look like patient has an appointment scheduled

## 2024-10-23 ENCOUNTER — HOSPITAL ENCOUNTER (OUTPATIENT)
Dept: CT IMAGING | Facility: HOSPITAL | Age: 54
Discharge: HOME | End: 2024-10-23
Admitting: NURSE PRACTITIONER
Payer: MEDICAID

## 2024-10-23 ENCOUNTER — OFFICE VISIT (OUTPATIENT)
Dept: CARDIAC SURGERY | Facility: CLINIC | Age: 54
End: 2024-10-23
Payer: MEDICAID

## 2024-10-23 VITALS
SYSTOLIC BLOOD PRESSURE: 110 MMHG | DIASTOLIC BLOOD PRESSURE: 68 MMHG | WEIGHT: 263.4 LBS | HEIGHT: 72 IN | OXYGEN SATURATION: 98 % | BODY MASS INDEX: 35.68 KG/M2 | HEART RATE: 94 BPM

## 2024-10-23 DIAGNOSIS — Z72.0 TOBACCO ABUSE: ICD-10-CM

## 2024-10-23 DIAGNOSIS — I71.21 ANEURYSM OF ASCENDING AORTA WITHOUT RUPTURE: ICD-10-CM

## 2024-10-23 DIAGNOSIS — Q25.43 AORTIC ROOT ANEURYSM: Primary | ICD-10-CM

## 2024-10-23 DIAGNOSIS — Q25.43 AORTIC ROOT ANEURYSM: ICD-10-CM

## 2024-10-23 LAB — CREAT BLDA-MCNC: 2 MG/DL (ref 0.6–1.3)

## 2024-10-23 PROCEDURE — 82565 ASSAY OF CREATININE: CPT

## 2024-10-23 PROCEDURE — 71275 CT ANGIOGRAPHY CHEST: CPT

## 2024-10-23 PROCEDURE — 1159F MED LIST DOCD IN RCRD: CPT | Performed by: THORACIC SURGERY (CARDIOTHORACIC VASCULAR SURGERY)

## 2024-10-23 PROCEDURE — 3074F SYST BP LT 130 MM HG: CPT | Performed by: THORACIC SURGERY (CARDIOTHORACIC VASCULAR SURGERY)

## 2024-10-23 PROCEDURE — 25510000001 IOPAMIDOL PER 1 ML: Performed by: NURSE PRACTITIONER

## 2024-10-23 PROCEDURE — 99213 OFFICE O/P EST LOW 20 MIN: CPT | Performed by: THORACIC SURGERY (CARDIOTHORACIC VASCULAR SURGERY)

## 2024-10-23 PROCEDURE — 3078F DIAST BP <80 MM HG: CPT | Performed by: THORACIC SURGERY (CARDIOTHORACIC VASCULAR SURGERY)

## 2024-10-23 PROCEDURE — 1160F RVW MEDS BY RX/DR IN RCRD: CPT | Performed by: THORACIC SURGERY (CARDIOTHORACIC VASCULAR SURGERY)

## 2024-10-23 RX ORDER — DOXEPIN HYDROCHLORIDE 50 MG/1
CAPSULE ORAL
COMMUNITY
Start: 2024-10-11

## 2024-10-23 RX ORDER — DONEPEZIL HYDROCHLORIDE 10 MG/1
10 TABLET, FILM COATED ORAL NIGHTLY
COMMUNITY
Start: 2024-07-31

## 2024-10-23 RX ORDER — IOPAMIDOL 755 MG/ML
100 INJECTION, SOLUTION INTRAVASCULAR
Status: COMPLETED | OUTPATIENT
Start: 2024-10-23 | End: 2024-10-23

## 2024-10-23 RX ADMIN — IOPAMIDOL 100 ML: 755 INJECTION, SOLUTION INTRAVENOUS at 08:39

## 2024-10-30 ENCOUNTER — TELEPHONE (OUTPATIENT)
Dept: PSYCHIATRY | Age: 54
End: 2024-10-30

## 2024-11-01 ENCOUNTER — OFFICE VISIT (OUTPATIENT)
Dept: PSYCHIATRY | Age: 54
End: 2024-11-01

## 2024-11-01 DIAGNOSIS — F41.1 GENERALIZED ANXIETY DISORDER WITH PANIC ATTACKS: ICD-10-CM

## 2024-11-01 DIAGNOSIS — F41.0 GENERALIZED ANXIETY DISORDER WITH PANIC ATTACKS: ICD-10-CM

## 2024-11-01 DIAGNOSIS — F33.0 MILD RECURRENT MAJOR DEPRESSION (HCC): Primary | ICD-10-CM

## 2024-11-01 ASSESSMENT — ANXIETY QUESTIONNAIRES
GAD7 TOTAL SCORE: 19
2. NOT BEING ABLE TO STOP OR CONTROL WORRYING: NEARLY EVERY DAY
6. BECOMING EASILY ANNOYED OR IRRITABLE: MORE THAN HALF THE DAYS
7. FEELING AFRAID AS IF SOMETHING AWFUL MIGHT HAPPEN: NEARLY EVERY DAY
1. FEELING NERVOUS, ANXIOUS, OR ON EDGE: MORE THAN HALF THE DAYS
5. BEING SO RESTLESS THAT IT IS HARD TO SIT STILL: NEARLY EVERY DAY
IF YOU CHECKED OFF ANY PROBLEMS ON THIS QUESTIONNAIRE, HOW DIFFICULT HAVE THESE PROBLEMS MADE IT FOR YOU TO DO YOUR WORK, TAKE CARE OF THINGS AT HOME, OR GET ALONG WITH OTHER PEOPLE: VERY DIFFICULT
4. TROUBLE RELAXING: NEARLY EVERY DAY
3. WORRYING TOO MUCH ABOUT DIFFERENT THINGS: NEARLY EVERY DAY

## 2024-11-01 ASSESSMENT — PATIENT HEALTH QUESTIONNAIRE - PHQ9
SUM OF ALL RESPONSES TO PHQ QUESTIONS 1-9: 15
2. FEELING DOWN, DEPRESSED OR HOPELESS: MORE THAN HALF THE DAYS
4. FEELING TIRED OR HAVING LITTLE ENERGY: SEVERAL DAYS
3. TROUBLE FALLING OR STAYING ASLEEP: NEARLY EVERY DAY
SUM OF ALL RESPONSES TO PHQ QUESTIONS 1-9: 15
7. TROUBLE CONCENTRATING ON THINGS, SUCH AS READING THE NEWSPAPER OR WATCHING TELEVISION: MORE THAN HALF THE DAYS
SUM OF ALL RESPONSES TO PHQ9 QUESTIONS 1 & 2: 5
8. MOVING OR SPEAKING SO SLOWLY THAT OTHER PEOPLE COULD HAVE NOTICED. OR THE OPPOSITE, BEING SO FIGETY OR RESTLESS THAT YOU HAVE BEEN MOVING AROUND A LOT MORE THAN USUAL: NOT AT ALL
SUM OF ALL RESPONSES TO PHQ QUESTIONS 1-9: 15
1. LITTLE INTEREST OR PLEASURE IN DOING THINGS: NEARLY EVERY DAY
9. THOUGHTS THAT YOU WOULD BE BETTER OFF DEAD, OR OF HURTING YOURSELF: NOT AT ALL
SUM OF ALL RESPONSES TO PHQ QUESTIONS 1-9: 15
10. IF YOU CHECKED OFF ANY PROBLEMS, HOW DIFFICULT HAVE THESE PROBLEMS MADE IT FOR YOU TO DO YOUR WORK, TAKE CARE OF THINGS AT HOME, OR GET ALONG WITH OTHER PEOPLE: SOMEWHAT DIFFICULT
5. POOR APPETITE OR OVEREATING: MORE THAN HALF THE DAYS
6. FEELING BAD ABOUT YOURSELF - OR THAT YOU ARE A FAILURE OR HAVE LET YOURSELF OR YOUR FAMILY DOWN: MORE THAN HALF THE DAYS

## 2024-11-01 NOTE — PROGRESS NOTES
Therapy Progress Note  Yuly Huggins, [facility]   11/1/2024    Patient location  :[facility]  [facility] location : [facility]      Total time spent: 60 minutes  This is patient's second  Therapy appointment.  Chief Complaint:  depression, anxiety, and stress  Referring Provider: No referring provider defined for this encounter.      Toan Chacon ,a 54 y.o. male, for follow up visit.     Pt provided informed consent for the behavioral health program. Discussed with patient model of service to include the limits of confidentiality (i.e. abuse reporting, suicide intervention, etc.) and short-term intervention focused approach.  Discussed no show and late cancellation policy.  Pt indicated understanding.    S:  Provider seeing patient in follow-up for depression and anxiety.  Patient reported worrying a lot about his jury trial in January.  Patient reported he worries about going to detention, losing his home, and losing his animals.  Patient reported he can not remember when was the last time he took a shower.  Patient discussed his appetite is very sporadic, is not sleeping, and lacks motivation doing anything.  Patient reported, \"I live on cigarettes and coffee.\"  Patient discussed having weekly panic attacks and feels very fearful being in public places and rarely leaves the house.  Patient discussed his wife also has mental health problems and feels like \"we just feed off each other all day long and nothing changes or gets better.\"  Patient reported lacking motivation and energy daily, feels depressed, can not sleep throughout the night, chronic fatigue, feeling like a failure, trouble concentrating on tasks, worries about too many different things, has difficulty managing worrying thoughts, apprehension, irritability, and trouble relaxing.      Pt reported he  would like to continue the monthly appointments and verbalized he  would call sooner if needed.     Pt denies Suicidal

## 2024-11-04 ENCOUNTER — TELEPHONE (OUTPATIENT)
Dept: PSYCHIATRY | Age: 54
End: 2024-11-04

## 2024-11-04 RX ORDER — DONEPEZIL HYDROCHLORIDE 10 MG/1
10 TABLET, FILM COATED ORAL NIGHTLY
Qty: 90 TABLET | Refills: 3 | Status: SHIPPED | OUTPATIENT
Start: 2024-11-04

## 2024-11-04 NOTE — TELEPHONE ENCOUNTER
SW called pt on 11/04/24 for an appointment reminder for 11/05/24. Patient confirmed   ?   Reminded patient to complete their visit pre-check/digital registration in [employer].

## 2024-11-05 ENCOUNTER — OFFICE VISIT (OUTPATIENT)
Dept: PSYCHIATRY | Age: 54
End: 2024-11-05
Payer: MEDICAID

## 2024-11-05 VITALS
WEIGHT: 260.2 LBS | DIASTOLIC BLOOD PRESSURE: 69 MMHG | BODY MASS INDEX: 34.48 KG/M2 | HEART RATE: 77 BPM | SYSTOLIC BLOOD PRESSURE: 90 MMHG | TEMPERATURE: 98.1 F | HEIGHT: 73 IN | OXYGEN SATURATION: 97 %

## 2024-11-05 DIAGNOSIS — F41.0 GENERALIZED ANXIETY DISORDER WITH PANIC ATTACKS: ICD-10-CM

## 2024-11-05 DIAGNOSIS — F41.1 GENERALIZED ANXIETY DISORDER WITH PANIC ATTACKS: ICD-10-CM

## 2024-11-05 DIAGNOSIS — G47.01 INSOMNIA DUE TO MEDICAL CONDITION: ICD-10-CM

## 2024-11-05 DIAGNOSIS — F42.9 OBSESSIVE-COMPULSIVE DISORDER, UNSPECIFIED TYPE: ICD-10-CM

## 2024-11-05 DIAGNOSIS — F39 UNSPECIFIED MOOD (AFFECTIVE) DISORDER (HCC): Primary | ICD-10-CM

## 2024-11-05 PROCEDURE — 99214 OFFICE O/P EST MOD 30 MIN: CPT

## 2024-11-05 PROCEDURE — 3078F DIAST BP <80 MM HG: CPT

## 2024-11-05 PROCEDURE — 3074F SYST BP LT 130 MM HG: CPT

## 2024-11-05 RX ORDER — HYDROXYZINE HYDROCHLORIDE 25 MG/1
25 TABLET, FILM COATED ORAL 3 TIMES DAILY PRN
Qty: 90 TABLET | Refills: 3 | Status: SHIPPED | OUTPATIENT
Start: 2024-11-05

## 2024-11-05 RX ORDER — RISPERIDONE 0.5 MG/1
1 TABLET, ORALLY DISINTEGRATING ORAL 2 TIMES DAILY PRN
Qty: 60 TABLET | Refills: 3 | Status: SHIPPED | OUTPATIENT
Start: 2024-11-05

## 2024-11-05 RX ORDER — DOXEPIN HYDROCHLORIDE 50 MG/1
50 CAPSULE ORAL NIGHTLY
Qty: 30 CAPSULE | Refills: 3 | Status: SHIPPED | OUTPATIENT
Start: 2024-11-05 | End: 2025-03-05

## 2024-11-05 ASSESSMENT — PATIENT HEALTH QUESTIONNAIRE - PHQ9
SUM OF ALL RESPONSES TO PHQ QUESTIONS 1-9: 14
10. IF YOU CHECKED OFF ANY PROBLEMS, HOW DIFFICULT HAVE THESE PROBLEMS MADE IT FOR YOU TO DO YOUR WORK, TAKE CARE OF THINGS AT HOME, OR GET ALONG WITH OTHER PEOPLE: SOMEWHAT DIFFICULT
9. THOUGHTS THAT YOU WOULD BE BETTER OFF DEAD, OR OF HURTING YOURSELF: NOT AT ALL
2. FEELING DOWN, DEPRESSED OR HOPELESS: MORE THAN HALF THE DAYS
SUM OF ALL RESPONSES TO PHQ QUESTIONS 1-9: 14
4. FEELING TIRED OR HAVING LITTLE ENERGY: MORE THAN HALF THE DAYS
SUM OF ALL RESPONSES TO PHQ QUESTIONS 1-9: 14
SUM OF ALL RESPONSES TO PHQ9 QUESTIONS 1 & 2: 4
5. POOR APPETITE OR OVEREATING: MORE THAN HALF THE DAYS
3. TROUBLE FALLING OR STAYING ASLEEP: NEARLY EVERY DAY
8. MOVING OR SPEAKING SO SLOWLY THAT OTHER PEOPLE COULD HAVE NOTICED. OR THE OPPOSITE, BEING SO FIGETY OR RESTLESS THAT YOU HAVE BEEN MOVING AROUND A LOT MORE THAN USUAL: SEVERAL DAYS
6. FEELING BAD ABOUT YOURSELF - OR THAT YOU ARE A FAILURE OR HAVE LET YOURSELF OR YOUR FAMILY DOWN: SEVERAL DAYS
1. LITTLE INTEREST OR PLEASURE IN DOING THINGS: MORE THAN HALF THE DAYS
7. TROUBLE CONCENTRATING ON THINGS, SUCH AS READING THE NEWSPAPER OR WATCHING TELEVISION: SEVERAL DAYS
SUM OF ALL RESPONSES TO PHQ QUESTIONS 1-9: 14

## 2024-11-05 ASSESSMENT — COLUMBIA-SUICIDE SEVERITY RATING SCALE - C-SSRS
6. HAVE YOU EVER DONE ANYTHING, STARTED TO DO ANYTHING, OR PREPARED TO DO ANYTHING TO END YOUR LIFE?: NO
1. WITHIN THE PAST MONTH, HAVE YOU WISHED YOU WERE DEAD OR WISHED YOU COULD GO TO SLEEP AND NOT WAKE UP?: NO
2. HAVE YOU ACTUALLY HAD ANY THOUGHTS OF KILLING YOURSELF?: NO

## 2024-11-05 NOTE — PROGRESS NOTES
16.9 02/03/2023        Assessment:   1. Unspecified mood (affective) disorder ([facility])    2. Generalized anxiety disorder with panic attacks    3. Insomnia due to medical condition    4. Obsessive-compulsive disorder, unspecified type                        No evidence of acute suicidality, homicidality or psychosis observed.  Patient is psychiatrically stable    Plan:    1.   Continue   Cymbalta 60 mg ER daily for depression/pain (prescribed by PCP)   Doxepin 50 mg nightly for sleep  Melatonin 5 mg nightly for sleep  Risperdal 0.5 mg nightly for agitation  Hydroxyzine 25 mg 3 times daily as needed for anxiety  Psychotherapy with Yuly   Prolactin ordered due to risperdal use      The risks, benefits, side effects, indications, contraindications, and adverse effects of the medications have been discussed. Yes.  2. The pt has verbalized understanding and has capacity to give informed consent.  3. The [facility] report has been reviewed according to HB1 regulations.  4. Supportive therapy offered.  5. Follow up: Return in about 3 months (around 2/5/2025) for Medication Follow up.  6. The patient has been advised to call with any problems.  7. Controlled substance Treatment Plan: n/a.  8. The above listed medications have been continued, modifications in meds and other orders/labs as follows:      Orders Placed This Encounter   Medications    melatonin 5 MG TABS tablet     Sig: Take 1 tablet by mouth nightly     Dispense:  30 tablet     Refill:  3    risperiDONE (RISPERDAL M-TABS) 0.5 MG disintegrating tablet     Sig: Take 2 tablets by mouth 2 times daily as needed (agitation,anxiety)     Dispense:  60 tablet     Refill:  3    doxepin (SINEQUAN) 50 MG capsule     Sig: Take 1 capsule by mouth nightly     Dispense:  30 capsule     Refill:  3    hydrOXYzine HCl (ATARAX) 25 MG tablet     Sig: Take 1 tablet by mouth 3 times daily as needed for Anxiety     Dispense:  90 tablet     Refill:  3      No orders of the defined types

## 2024-11-05 NOTE — PATIENT INSTRUCTIONS
Medication Instructions  Change risperidone to 0.5 mg twice a day as needed for agitation, anxiety  Add melatonin 5 mg nightly for sleep  Cannot increase sleep aid due to low blood pressure  Monitor blood pressure at home if can, contact PCP if still running low  Continue other current regimen

## 2024-11-08 DIAGNOSIS — Z79.899 MEDICATION MANAGEMENT: Primary | ICD-10-CM

## 2024-11-08 RX ORDER — TRAMADOL HYDROCHLORIDE 50 MG/1
50 TABLET ORAL EVERY 8 HOURS PRN
COMMUNITY
Start: 2024-08-13 | End: 2024-11-08 | Stop reason: SDUPTHER

## 2024-11-08 RX ORDER — TRAMADOL HYDROCHLORIDE 50 MG/1
50 TABLET ORAL EVERY 8 HOURS PRN
Qty: 60 TABLET | Refills: 0 | Status: SHIPPED | OUTPATIENT
Start: 2024-11-08 | End: 2024-12-08

## 2024-11-08 NOTE — TELEPHONE ENCOUNTER
Provider needs to review PDMP    PDMP Monitoring:    Last PDMP [place] as Reviewed ([place]):  Review User Review Instant Review Result          Urine Drug Screenings (1 yr)       POCT Rapid Drug Screen  Collected: 3/25/2024  3:22 PM (Final result)                  Medication Contract and Consent for Opioid Use Documents Filed       Patient Documents       Type of Document Status Date Received Received By Description    Medication Contract Received 4/4/2023 12:48 PM DEAN FITZGERALD     Medication Contract Received 3/25/2024 11:00 AM KATHY CABALLERO Medication Contract

## 2024-11-10 NOTE — PROGRESS NOTES
Subjective   Patient ID: Gurjit Andrea is a 54 y.o. male who is here for follow-up of a known aneurysm.   Chief Complaint   Patient presents with    Aortic Aneurysm     Patient is here for follow up w/CT     History of Present Illness  The patient returns today for an interval follow-up of an identified aneurysm. He has two known aneurysms in the ascending aorta and the aortic root position. He is accompanied by his wife.    He reports feeling well overall. He has been diagnosed with a brain aneurysm, which is currently under observation. 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 He currently has a 35-pound weight restriction and is being treated medically for his known basilar aneurysm. He mentions that there are two types of aneurysms, one resembling a sac and the other involving the enlargement of a vein. His condition is more akin to the latter. He also has a history of surgery at  for a root aneurysm.    He continues to smoke and has no plans to quit. His diet consists of one meal a day, typically supper, as he does not consume breakfast or lunch. His wife notes that despite his appearance, he does not have diabetes or high cholesterol. His A1c levels are on the lower side. They maintain a healthy diet at home, cooking with lard, butter, or olive oil, and previously had goats for milk and cheese.    He has an autoimmune disease and was in the hospital for 3 months. 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    SOCIAL HISTORY  He is still smoking. He has smoked since he was a teenager.    The following portions of the patient's history were reviewed and updated as appropriate: allergies, current medications, past family history, past medical history, past social history, past surgical history and problem list.       Objective   Physical Exam  Physical Exam  Patient  is in no acute distress, appropriate, and alert.  Lungs are clear to auscultation bilaterally. No wheezing, rubs, or rales.  Heart has a regular rate and rhythm without murmurs. There is a click. No rubs or gallops.  Abdomen is soft, nondistended, and nontender.  No edema in the extremities.      CT Angiogram Chest    Result Date: 10/23/2024  Narrative: EXAM: CT ANGIOGRAM CHEST- - 10/23/2024 7:21 AM  HISTORY: Aortic root aneurysm, ascending aortic aneurysm; Q25.43-Congenital aneurysm of aorta; I71.21-Aneurysm of the ascending aorta, without rupture   COMPARISON: 4/23/2024  DOSE LENGTH PRODUCT: 709.72 mGy.cm. Automatic exposure control was utilized to make radiation dose as low as reasonably achievable.  TECHNIQUE: Enhanced axial CT images obtained with multiplanar reformats. 3D postprocessing, including MIPs, performed and images saved to PACS.  FINDINGS: AIRWAYS/PULMONARY PARENCHYMA: The central airways are midline and patent. No pleural effusion or pneumothorax. No suspicious pulmonary mass or nodule.  VESSELS: Contrast bolus is adequate. No pulmonary artery filling defect to suggest pulmonary embolus.  Sinus of Valsalva measures 4.6 cm on sagittal images, similar compared to prior. Ascending thoracic aorta measures 4.5 x 3.9 cm, similar compared to prior. Anterior aortic arch measures 3.3 cm. Mid arch measures 2.7 cm. Posterior arch measures 3.0 cm. Descending thoracic aorta measures 2.5 cm Similar J-shaped configuration and stenosis of the proximal celiac artery.  CARDIAC: Borderline heart size. Changes of mitral valve replacement. LEFT atrial appendage occlusion device. No pericardial effusion.   MEDIASTINUM: There is no mediastinal or hilar lymphadenopathy by CT size criteria. Esophagus has normal coarse, caliber and wall thickness.  EXTRATHORACIC: The visualized portions of the thyroid gland are unremarkable. No thoracic inlet or axillary adenopathy. The extrathoracic soft tissues are within normal limits.   INCLUDED UPPER ABDOMEN: Similar circumscribed hypodensity at the RIGHT inferior thyroid lobe compared to 5/13/2022. Visualized portion of the gallbladder, pancreas, bleeding, RIGHT adrenal gland appear within normal limits. Small LEFT adrenal nodule appears similar compared to 5/13/2022. Upper kidneys appear within normal limits.  OSSEOUS: No acute or suspicious bony finding.       Impression: 1. Similar enlarged ascending thoracic aorta measuring 4.5 x 3.9 cm. Additional measurements as above. 2. Prior cardiac surgery.  This report was signed and finalized on 10/23/2024 11:46 AM by Dr Debbie Lamb MD.     Results  Independent Review of Radiographic Studies:    CT of the chest: Distal ascending aorta measures 3.4 cm, ascending aorta measures 4 cm, aortic root measures 4.4 cm. Previous measurements were 4.6 cm for aortic root and 4.4 cm for ascending aorta.    Diagnoses and all orders for this visit:    1. Aortic root aneurysm (Primary)  -     CT angiogram chest w contrast; Future    2. Aneurysm of ascending aorta without rupture  -     CT angiogram chest w contrast; Future    3. Tobacco abuse          Assessment & Plan     Assessment & Plan  1. Aortic root aneurysm.  On sagittal imaging, the aortic root measured 4.4 cm. Conservative management is recommended for continued ongoing surveillance. He is advised to see Veronica Abdul in 6 months with a CTA of the chest. He has restrictions based on his basilar aneurysm, including no heavy lifting greater than 35 pounds. Signs and symptoms of acute aortic syndromes were previously discussed and reiterated today.    2. Ascending aorta aneurysm.  The ascending aorta measured 4.3 cm previously and now measures 4.2 cm. Conservative management is recommended for continued ongoing surveillance. He is advised to see Veronica Abdul in 6 months with a CTA of the chest.    3. Basilar artery aneurysm.  He has a known basilar artery aneurysm and is currently under medical management  with a 35-pound weight restriction. Continued monitoring is advised.    4. Chronic tobacco use.  He continues to smoke despite discussions on the value of smoking cessation. The risks associated with smoking, especially in the context of his aneurysms and autoimmune conditions, were discussed. He is not ready to quit smoking at this time.    Follow-up  Return in 6 months for follow-up.     Many thanks for the opportunity to care for your patient.    I will continue to keep you apprised of provided care as it ensues.            Patient or patient representative verbalized consent for the use of Ambient Listening during the visit with  Tommie Cheney MD for chart documentation. 11/10/2024  10:20 CST

## 2024-11-11 RX ORDER — CYCLOBENZAPRINE HCL 5 MG
TABLET ORAL
Qty: 30 TABLET | Refills: 0 | Status: SHIPPED | OUTPATIENT
Start: 2024-11-11

## 2024-11-14 ENCOUNTER — TELEPHONE (OUTPATIENT)
Dept: PSYCHIATRY | Age: 54
End: 2024-11-14

## 2024-11-14 NOTE — TELEPHONE ENCOUNTER
Called patient to remind them of their appointment   -Pt confirmed      Reminded patient of their     copay for their appointment. Reminded patient to complete their visit pre-check/digital registration in [employer].    Electronically signed by Alicia D Giraldo-Reyes on 11/14/2024 at 9:22 AM

## 2024-11-15 ENCOUNTER — SCHEDULED TELEPHONE ENCOUNTER (OUTPATIENT)
Dept: PSYCHIATRY | Age: 54
End: 2024-11-15

## 2024-11-15 DIAGNOSIS — F41.1 GENERALIZED ANXIETY DISORDER: ICD-10-CM

## 2024-11-15 DIAGNOSIS — F33.0 MILD RECURRENT MAJOR DEPRESSION (HCC): Primary | ICD-10-CM

## 2024-11-15 ASSESSMENT — PATIENT HEALTH QUESTIONNAIRE - PHQ9
SUM OF ALL RESPONSES TO PHQ9 QUESTIONS 1 & 2: 4
2. FEELING DOWN, DEPRESSED OR HOPELESS: MORE THAN HALF THE DAYS
7. TROUBLE CONCENTRATING ON THINGS, SUCH AS READING THE NEWSPAPER OR WATCHING TELEVISION: SEVERAL DAYS
5. POOR APPETITE OR OVEREATING: NEARLY EVERY DAY
10. IF YOU CHECKED OFF ANY PROBLEMS, HOW DIFFICULT HAVE THESE PROBLEMS MADE IT FOR YOU TO DO YOUR WORK, TAKE CARE OF THINGS AT HOME, OR GET ALONG WITH OTHER PEOPLE: SOMEWHAT DIFFICULT
SUM OF ALL RESPONSES TO PHQ QUESTIONS 1-9: 14
SUM OF ALL RESPONSES TO PHQ QUESTIONS 1-9: 14
6. FEELING BAD ABOUT YOURSELF - OR THAT YOU ARE A FAILURE OR HAVE LET YOURSELF OR YOUR FAMILY DOWN: SEVERAL DAYS
SUM OF ALL RESPONSES TO PHQ QUESTIONS 1-9: 14
1. LITTLE INTEREST OR PLEASURE IN DOING THINGS: MORE THAN HALF THE DAYS
8. MOVING OR SPEAKING SO SLOWLY THAT OTHER PEOPLE COULD HAVE NOTICED. OR THE OPPOSITE, BEING SO FIGETY OR RESTLESS THAT YOU HAVE BEEN MOVING AROUND A LOT MORE THAN USUAL: SEVERAL DAYS
9. THOUGHTS THAT YOU WOULD BE BETTER OFF DEAD, OR OF HURTING YOURSELF: NOT AT ALL
3. TROUBLE FALLING OR STAYING ASLEEP: MORE THAN HALF THE DAYS
4. FEELING TIRED OR HAVING LITTLE ENERGY: MORE THAN HALF THE DAYS
SUM OF ALL RESPONSES TO PHQ QUESTIONS 1-9: 14

## 2024-11-15 ASSESSMENT — ANXIETY QUESTIONNAIRES
1. FEELING NERVOUS, ANXIOUS, OR ON EDGE: MORE THAN HALF THE DAYS
IF YOU CHECKED OFF ANY PROBLEMS ON THIS QUESTIONNAIRE, HOW DIFFICULT HAVE THESE PROBLEMS MADE IT FOR YOU TO DO YOUR WORK, TAKE CARE OF THINGS AT HOME, OR GET ALONG WITH OTHER PEOPLE: SOMEWHAT DIFFICULT
5. BEING SO RESTLESS THAT IT IS HARD TO SIT STILL: SEVERAL DAYS
3. WORRYING TOO MUCH ABOUT DIFFERENT THINGS: SEVERAL DAYS
6. BECOMING EASILY ANNOYED OR IRRITABLE: MORE THAN HALF THE DAYS
4. TROUBLE RELAXING: SEVERAL DAYS
2. NOT BEING ABLE TO STOP OR CONTROL WORRYING: SEVERAL DAYS
GAD7 TOTAL SCORE: 11
7. FEELING AFRAID AS IF SOMETHING AWFUL MIGHT HAPPEN: NEARLY EVERY DAY

## 2024-11-15 NOTE — PROGRESS NOTES
Healthcare System    Abuse Screen   Housing Stability: Unknown (3/25/2024)    Housing Stability Vital Sign     Unstable Housing in the Last Year: No       Medications:   Current Outpatient Medications   Medication Sig Dispense Refill    cyclobenzaprine (FLEXERIL) 5 MG tablet TAKE 1 TABLET BY MOUTH NIGHTLY AS NEEDED FOR MUSCLE SPASM 30 tablet 0    traMADol (ULTRAM) 50 MG tablet Take 1 tablet by mouth every 8 hours as needed for Pain for up to 30 days. Max Daily Amount: 150 mg 60 tablet 0    melatonin 5 MG TABS tablet Take 1 tablet by mouth nightly 30 tablet 3    risperiDONE (RISPERDAL M-TABS) 0.5 MG disintegrating tablet Take 2 tablets by mouth 2 times daily as needed (agitation,anxiety) 60 tablet 3    doxepin (SINEQUAN) 50 MG capsule Take 1 capsule by mouth nightly 30 capsule 3    hydrOXYzine HCl (ATARAX) 25 MG tablet Take 1 tablet by mouth 3 times daily as needed for Anxiety 90 tablet 3    donepezil (ARICEPT) 10 MG tablet Take 1 tablet by mouth nightly 90 tablet 3    DULoxetine (CYMBALTA) 60 MG extended release capsule Take 1 capsule by mouth once daily 30 capsule 0    metoprolol tartrate (LOPRESSOR) 25 MG tablet Take 1 tablet by mouth 2 times daily 180 tablet 3    JARDIANCE 10 MG tablet Take 1 tablet by mouth daily      spironolactone (ALDACTONE) 25 MG tablet Take 1 tablet by mouth daily      ENTRESTO 49-51 MG per tablet Take 1 tablet by mouth 2 times daily      aspirin 325 MG tablet Take 1 tablet by mouth in the morning and at bedtime      acetaminophen (TYLENOL) 650 MG extended release tablet Take 1 tablet by mouth every 8 hours as needed for Pain      vitamin D (CHOLECALCIFEROL) 125 MCG (5000 UT) CAPS capsule Take 1 capsule by mouth daily      Multiple Vitamins-Minerals (THERA M PLUS) TABS Take 1 tablet by mouth daily       No current facility-administered medications for this visit.       Social History:   Social History     Socioeconomic History    Marital status:      Spouse name: Not on file

## 2024-11-18 RX ORDER — DULOXETIN HYDROCHLORIDE 60 MG/1
60 CAPSULE, DELAYED RELEASE ORAL DAILY
Qty: 30 CAPSULE | Refills: 0 | Status: SHIPPED | OUTPATIENT
Start: 2024-11-18

## 2024-12-16 ENCOUNTER — TELEPHONE (OUTPATIENT)
Dept: PSYCHIATRY | Age: 54
End: 2024-12-16

## 2024-12-16 NOTE — TELEPHONE ENCOUNTER
Called and confirmed appt with pt for 12/17 @ 3 pm     Electronically signed by Mitchell Garg on 12/16/2024 at 12:06 PM

## 2024-12-17 ENCOUNTER — OFFICE VISIT (OUTPATIENT)
Dept: PSYCHIATRY | Age: 54
End: 2024-12-17
Payer: MEDICAID

## 2024-12-17 DIAGNOSIS — F41.1 GENERALIZED ANXIETY DISORDER: ICD-10-CM

## 2024-12-17 DIAGNOSIS — F33.0 MILD RECURRENT MAJOR DEPRESSION (HCC): Primary | ICD-10-CM

## 2024-12-17 PROCEDURE — 90837 PSYTX W PT 60 MINUTES: CPT | Performed by: COUNSELOR

## 2024-12-17 ASSESSMENT — ANXIETY QUESTIONNAIRES
4. TROUBLE RELAXING: SEVERAL DAYS
7. FEELING AFRAID AS IF SOMETHING AWFUL MIGHT HAPPEN: SEVERAL DAYS
6. BECOMING EASILY ANNOYED OR IRRITABLE: MORE THAN HALF THE DAYS
1. FEELING NERVOUS, ANXIOUS, OR ON EDGE: SEVERAL DAYS
GAD7 TOTAL SCORE: 10
3. WORRYING TOO MUCH ABOUT DIFFERENT THINGS: SEVERAL DAYS
IF YOU CHECKED OFF ANY PROBLEMS ON THIS QUESTIONNAIRE, HOW DIFFICULT HAVE THESE PROBLEMS MADE IT FOR YOU TO DO YOUR WORK, TAKE CARE OF THINGS AT HOME, OR GET ALONG WITH OTHER PEOPLE: SOMEWHAT DIFFICULT
5. BEING SO RESTLESS THAT IT IS HARD TO SIT STILL: MORE THAN HALF THE DAYS
2. NOT BEING ABLE TO STOP OR CONTROL WORRYING: MORE THAN HALF THE DAYS

## 2024-12-17 ASSESSMENT — PATIENT HEALTH QUESTIONNAIRE - PHQ9
9. THOUGHTS THAT YOU WOULD BE BETTER OFF DEAD, OR OF HURTING YOURSELF: NOT AT ALL
SUM OF ALL RESPONSES TO PHQ QUESTIONS 1-9: 13
8. MOVING OR SPEAKING SO SLOWLY THAT OTHER PEOPLE COULD HAVE NOTICED. OR THE OPPOSITE, BEING SO FIGETY OR RESTLESS THAT YOU HAVE BEEN MOVING AROUND A LOT MORE THAN USUAL: NOT AT ALL
SUM OF ALL RESPONSES TO PHQ QUESTIONS 1-9: 13
1. LITTLE INTEREST OR PLEASURE IN DOING THINGS: SEVERAL DAYS
SUM OF ALL RESPONSES TO PHQ9 QUESTIONS 1 & 2: 2
6. FEELING BAD ABOUT YOURSELF - OR THAT YOU ARE A FAILURE OR HAVE LET YOURSELF OR YOUR FAMILY DOWN: MORE THAN HALF THE DAYS
2. FEELING DOWN, DEPRESSED OR HOPELESS: SEVERAL DAYS
7. TROUBLE CONCENTRATING ON THINGS, SUCH AS READING THE NEWSPAPER OR WATCHING TELEVISION: SEVERAL DAYS
10. IF YOU CHECKED OFF ANY PROBLEMS, HOW DIFFICULT HAVE THESE PROBLEMS MADE IT FOR YOU TO DO YOUR WORK, TAKE CARE OF THINGS AT HOME, OR GET ALONG WITH OTHER PEOPLE: SOMEWHAT DIFFICULT
5. POOR APPETITE OR OVEREATING: NEARLY EVERY DAY
4. FEELING TIRED OR HAVING LITTLE ENERGY: MORE THAN HALF THE DAYS
SUM OF ALL RESPONSES TO PHQ QUESTIONS 1-9: 13
SUM OF ALL RESPONSES TO PHQ QUESTIONS 1-9: 13
3. TROUBLE FALLING OR STAYING ASLEEP: NEARLY EVERY DAY

## 2024-12-17 NOTE — PROGRESS NOTES
Therapy Progress Note  Yuly Huggins, [facility]   12/17/2024    Patient location  :[facility]  [facility] location : [facility]      Total time spent: 55 minutes  This is patient's fourth  Therapy appointment.  Chief Complaint:  depression, anxiety, and stress  Referring Provider: No referring provider defined for this encounter.      Toan Chacon ,a 54 y.o. male, for follow up visit.     Pt provided informed consent for the behavioral health program. Discussed with patient model of service to include the limits of confidentiality (i.e. abuse reporting, suicide intervention, etc.) and short-term intervention focused approach.  Discussed no show and late cancellation policy.  Pt indicated understanding.    S:  Provider seeing patient in follow-up for depression and worrying.  Patient reported he continues to lack motivation and struggles cleaning his home, maintaining his home, and engaging in proper hygiene rituals.  Patient reported \"not caring\" about the condition of his home and about personal self-care.  Patient discussed his wife also lacks motivation at home and neither work together to solve this problem.  Patient discussed he has been occasionally writing poetry as a new hobby.  Patient discussed worrying about his court date next month and doesn't know what will happen to his home and animals if he and his wife both go to nursing home.  Patient indicated feeling depressed and anxious, experiences sleep disturbances, low motivation, fatigue, poor appetite, feeling like a failure, trouble concentrating on tasks and initiating tasks, difficulty managing worrying thoughts, and apprehension.    Pt reported he  would like to continue the monthly appointments and verbalized he  would call sooner if needed.     Pt denies Suicidal Ideations, Homicidal Ideation, Auditory Hallucinations, Visual Hallucinations, Tactical Hallucinations.    Assessments:  Assessments:  PHQ-9 Score:

## 2025-01-09 RX ORDER — CYCLOBENZAPRINE HCL 5 MG
TABLET ORAL
Qty: 30 TABLET | Refills: 0 | Status: SHIPPED | OUTPATIENT
Start: 2025-01-09

## 2025-01-14 SDOH — ECONOMIC STABILITY: FOOD INSECURITY: WITHIN THE PAST 12 MONTHS, YOU WORRIED THAT YOUR FOOD WOULD RUN OUT BEFORE YOU GOT MONEY TO BUY MORE.: SOMETIMES TRUE

## 2025-01-14 SDOH — ECONOMIC STABILITY: INCOME INSECURITY: IN THE LAST 12 MONTHS, WAS THERE A TIME WHEN YOU WERE NOT ABLE TO PAY THE MORTGAGE OR RENT ON TIME?: NO

## 2025-01-14 SDOH — ECONOMIC STABILITY: TRANSPORTATION INSECURITY
IN THE PAST 12 MONTHS, HAS THE LACK OF TRANSPORTATION KEPT YOU FROM MEDICAL APPOINTMENTS OR FROM GETTING MEDICATIONS?: NO

## 2025-01-14 SDOH — ECONOMIC STABILITY: TRANSPORTATION INSECURITY
IN THE PAST 12 MONTHS, HAS LACK OF TRANSPORTATION KEPT YOU FROM MEETINGS, WORK, OR FROM GETTING THINGS NEEDED FOR DAILY LIVING?: NO

## 2025-01-14 SDOH — ECONOMIC STABILITY: FOOD INSECURITY: WITHIN THE PAST 12 MONTHS, THE FOOD YOU BOUGHT JUST DIDN'T LAST AND YOU DIDN'T HAVE MONEY TO GET MORE.: NEVER TRUE

## 2025-01-14 ASSESSMENT — PATIENT HEALTH QUESTIONNAIRE - PHQ9
4. FEELING TIRED OR HAVING LITTLE ENERGY: SEVERAL DAYS
SUM OF ALL RESPONSES TO PHQ QUESTIONS 1-9: 12
5. POOR APPETITE OR OVEREATING: NEARLY EVERY DAY
SUM OF ALL RESPONSES TO PHQ QUESTIONS 1-9: 12
8. MOVING OR SPEAKING SO SLOWLY THAT OTHER PEOPLE COULD HAVE NOTICED. OR THE OPPOSITE, BEING SO FIGETY OR RESTLESS THAT YOU HAVE BEEN MOVING AROUND A LOT MORE THAN USUAL: NOT AT ALL
SUM OF ALL RESPONSES TO PHQ QUESTIONS 1-9: 12
2. FEELING DOWN, DEPRESSED OR HOPELESS: SEVERAL DAYS
3. TROUBLE FALLING OR STAYING ASLEEP: MORE THAN HALF THE DAYS
1. LITTLE INTEREST OR PLEASURE IN DOING THINGS: MORE THAN HALF THE DAYS
SUM OF ALL RESPONSES TO PHQ QUESTIONS 1-9: 12
SUM OF ALL RESPONSES TO PHQ9 QUESTIONS 1 & 2: 3
6. FEELING BAD ABOUT YOURSELF - OR THAT YOU ARE A FAILURE OR HAVE LET YOURSELF OR YOUR FAMILY DOWN: SEVERAL DAYS
9. THOUGHTS THAT YOU WOULD BE BETTER OFF DEAD, OR OF HURTING YOURSELF: NOT AT ALL
10. IF YOU CHECKED OFF ANY PROBLEMS, HOW DIFFICULT HAVE THESE PROBLEMS MADE IT FOR YOU TO DO YOUR WORK, TAKE CARE OF THINGS AT HOME, OR GET ALONG WITH OTHER PEOPLE: SOMEWHAT DIFFICULT
7. TROUBLE CONCENTRATING ON THINGS, SUCH AS READING THE NEWSPAPER OR WATCHING TELEVISION: MORE THAN HALF THE DAYS

## 2025-01-15 ENCOUNTER — TELEPHONE (OUTPATIENT)
Dept: PSYCHIATRY | Age: 55
End: 2025-01-15

## 2025-01-15 ENCOUNTER — OFFICE VISIT (OUTPATIENT)
Dept: PRIMARY CARE CLINIC | Age: 55
End: 2025-01-15
Payer: MEDICAID

## 2025-01-15 VITALS
HEIGHT: 73 IN | RESPIRATION RATE: 18 BRPM | OXYGEN SATURATION: 97 % | SYSTOLIC BLOOD PRESSURE: 115 MMHG | TEMPERATURE: 96.9 F | BODY MASS INDEX: 34.62 KG/M2 | DIASTOLIC BLOOD PRESSURE: 82 MMHG | WEIGHT: 261.2 LBS | HEART RATE: 94 BPM

## 2025-01-15 DIAGNOSIS — R19.7 DIARRHEA, UNSPECIFIED TYPE: Primary | ICD-10-CM

## 2025-01-15 DIAGNOSIS — G47.09 OTHER INSOMNIA: ICD-10-CM

## 2025-01-15 PROCEDURE — 3079F DIAST BP 80-89 MM HG: CPT | Performed by: FAMILY MEDICINE

## 2025-01-15 PROCEDURE — 3074F SYST BP LT 130 MM HG: CPT | Performed by: FAMILY MEDICINE

## 2025-01-15 PROCEDURE — 99214 OFFICE O/P EST MOD 30 MIN: CPT | Performed by: FAMILY MEDICINE

## 2025-01-15 RX ORDER — TRAZODONE HYDROCHLORIDE 100 MG/1
150 TABLET ORAL NIGHTLY
Qty: 90 TABLET | Refills: 1 | Status: SHIPPED | OUTPATIENT
Start: 2025-01-15

## 2025-01-15 RX ORDER — SIMETHICONE 80 MG
80 TABLET,CHEWABLE ORAL 4 TIMES DAILY PRN
Qty: 180 TABLET | Refills: 3 | Status: SHIPPED | OUTPATIENT
Start: 2025-01-15

## 2025-01-15 ASSESSMENT — PATIENT HEALTH QUESTIONNAIRE - PHQ9
2. FEELING DOWN, DEPRESSED OR HOPELESS: SEVERAL DAYS
3. TROUBLE FALLING OR STAYING ASLEEP: MORE THAN HALF THE DAYS
1. LITTLE INTEREST OR PLEASURE IN DOING THINGS: MORE THAN HALF THE DAYS
8. MOVING OR SPEAKING SO SLOWLY THAT OTHER PEOPLE COULD HAVE NOTICED. OR THE OPPOSITE - BEING SO FIDGETY OR RESTLESS THAT YOU HAVE BEEN MOVING AROUND A LOT MORE THAN USUAL: NOT AT ALL
10. IF YOU CHECKED OFF ANY PROBLEMS, HOW DIFFICULT HAVE THESE PROBLEMS MADE IT FOR YOU TO DO YOUR WORK, TAKE CARE OF THINGS AT HOME, OR GET ALONG WITH OTHER PEOPLE: SOMEWHAT DIFFICULT
5. POOR APPETITE OR OVEREATING: NEARLY EVERY DAY
9. THOUGHTS THAT YOU WOULD BE BETTER OFF DEAD, OR OF HURTING YOURSELF: NOT AT ALL
SUM OF ALL RESPONSES TO PHQ QUESTIONS 1-9: 12
7. TROUBLE CONCENTRATING ON THINGS, SUCH AS READING THE NEWSPAPER OR WATCHING TELEVISION: MORE THAN HALF THE DAYS
6. FEELING BAD ABOUT YOURSELF - OR THAT YOU ARE A FAILURE OR HAVE LET YOURSELF OR YOUR FAMILY DOWN: SEVERAL DAYS
4. FEELING TIRED OR HAVING LITTLE ENERGY: SEVERAL DAYS

## 2025-01-15 ASSESSMENT — ENCOUNTER SYMPTOMS
DIARRHEA: 1
ABDOMINAL PAIN: 0
CONSTIPATION: 0
NAUSEA: 0
CHEST TIGHTNESS: 0
VOMITING: 0
SHORTNESS OF BREATH: 0
WHEEZING: 0
BLOOD IN STOOL: 0

## 2025-01-15 NOTE — PROGRESS NOTES
Toan Chacon (:  1970) is a 54 y.o. male,Established patient, here for evaluation of the following chief complaint(s):  Diarrhea (Pt has had lose stools for about 10 days. He said he can sometimes control it but most of the time he can't stop it. He has woken up several times in the night and there will be stool in the bed. )      Assessment & Plan   ASSESSMENT/PLAN:  1. Diarrhea, unspecified type  -     simethicone (MYLICON) 80 MG chewable tablet; Take 1 tablet by mouth 4 times daily as needed for Flatulence, Disp-180 tablet, R-3Normal  -     MISCELLANEOUS SENDOUT Gastrointestinal + C.Diff  2. Other insomnia  -     traZODone (DESYREL) 100 MG tablet; Take 1.5 tablets by mouth nightly, Disp-90 tablet, R-1Normal      Due to patient's reported history I do have a concern for an infectious type of diarrhea that may not resolve without antibiotics.  The pack has been common recently and this could represent this or may represent norovirus though it does not seem to follow the typical symptom cluster to be that.  [facility] GI panel completed.  Will alert patient of results and treat accordingly.  In the meantime patient was encouraged to use probiotics as well as plenty of fluids.  Patient advised to avoid antidiarrheals as this could prolong symptoms.  I have sent in some simethicone for his gassiness to help reduce cramping  Will do trial with increased dose of trazodone and I have increased this to 150 mg nightly        Return in about 4 weeks (around 2025).         Subjective   SUBJECTIVE/OBJECTIVE:  Toan Chacon is a 54 y.o. male who presents due to loose watery diarrhea.  Patient says that this started on the eighth.  He says that it has been green.  Patient says he is unaware of any known sick contacts.  He has not noticed any blood in this and denies any recent antibiotics.  He has not had any fever.  He has had minimal abdominal pain but has been quite gassy.  Patient says that he has had a

## 2025-01-15 NOTE — TELEPHONE ENCOUNTER
Called patient to remind them of their appointment     -Pt confirmed  Reminded patient to complete their visit pre-check/digital registration in [employer].    Electronically signed by Tammy Way MA on 1/15/2025 at 12:38 PM

## 2025-01-16 ENCOUNTER — TELEPHONE (OUTPATIENT)
Dept: PSYCHIATRY | Age: 55
End: 2025-01-16

## 2025-01-16 DIAGNOSIS — F42.9 OBSESSIVE-COMPULSIVE DISORDER, UNSPECIFIED TYPE: Primary | ICD-10-CM

## 2025-01-16 RX ORDER — RISPERIDONE 1 MG/1
1 TABLET ORAL
COMMUNITY
End: 2025-01-16 | Stop reason: SDUPTHER

## 2025-01-16 RX ORDER — DULOXETIN HYDROCHLORIDE 60 MG/1
60 CAPSULE, DELAYED RELEASE ORAL DAILY
Qty: 30 CAPSULE | Refills: 0 | Status: SHIPPED | OUTPATIENT
Start: 2025-01-16

## 2025-01-16 RX ORDER — RISPERIDONE 1 MG/1
1 TABLET ORAL
Qty: 30 TABLET | Refills: 4 | Status: SHIPPED | OUTPATIENT
Start: 2025-01-16 | End: 2025-02-15

## 2025-01-16 NOTE — TELEPHONE ENCOUNTER
Sarah at [facility] pharmacy was contact.  She said they had already received the RX for Risperdal 1 mg that was sent in today.  She was informed to fill that RX and cancel the RX for Risperdal M-tab.  Pt informed that refill RX for Risperidone 1 mg had been sent to his pharmacy.

## 2025-01-16 NOTE — TELEPHONE ENCOUNTER
Pt was a late cancellation on 1/16 @ 1 pm    Appt rescheduled for 3/7 @ 3 pm    Electronically signed by Mitchell Garg on 1/16/2025 at 3:16 PM

## 2025-01-16 NOTE — TELEPHONE ENCOUNTER
offered.  5. Follow up:    Return in about 3 months (around 2/5/2025) for Medication Follow up.  6. The patient has been advised to call with any problems.  7. Controlled substance Treatment Plan: n/a.  8. The above listed medications have been continued, modifications in meds and other orders/labs as follows:                 Encounter Medications        Orders Placed This Encounter   Medications    melatonin 5 MG TABS tablet       Sig: Take 1 tablet by mouth nightly       Dispense:  30 tablet       Refill:  3    risperiDONE (RISPERDAL M-TABS) 0.5 MG disintegrating tablet       Sig: Take 2 tablets by mouth 2 times daily as needed (agitation,anxiety)       Dispense:  60 tablet       Refill:  3    doxepin (SINEQUAN) 50 MG capsule       Sig: Take 1 capsule by mouth nightly       Dispense:  30 capsule       Refill:  3    hydrOXYzine HCl (ATARAX) 25 MG tablet       Sig: Take 1 tablet by mouth 3 times daily as needed for Anxiety       Dispense:  90 tablet       Refill:  3                     No orders of the defined types were placed in this encounter.        9. Additional comments: Continue therapy, discussed sleep hygiene, discussed the use of coping skills and relaxation strategies to manage symptoms.            10.Over 50% of the total visit time of   30  minutes was spent on counseling and/or coordination of care of:                         1. Unspecified mood (affective) disorder (HCC)    2. Generalized anxiety disorder with panic attacks    3. Insomnia due to medical condition    4. Obsessive-compulsive disorder, unspecified type                                                Psychotherapy Topics: mood/medication effectiveness financial and marital     Jennie Jang, APRN - CNP

## 2025-01-17 NOTE — RESULT ENCOUNTER NOTE
GI panel was negative for any of the tested entities.  He still could be a 9 tested it is that even though I think given all of the major hitters are negative it would be safe to go ahead and use an antidiarrheal if you would like.  Would drink plenty of fluids.  If this still does not improve we may consider empiric antibiotics

## 2025-01-29 ENCOUNTER — TRANSCRIBE ORDERS (OUTPATIENT)
Dept: ADMINISTRATIVE | Facility: HOSPITAL | Age: 55
End: 2025-01-29
Payer: MEDICAID

## 2025-01-29 DIAGNOSIS — I72.5 ANEURYSM OF BASILAR ARTERY: Primary | ICD-10-CM

## 2025-01-29 DIAGNOSIS — I72.5 ANEURYSM OF OTHER PRECEREBRAL ARTERIES: Primary | ICD-10-CM

## 2025-02-03 ENCOUNTER — TELEPHONE (OUTPATIENT)
Dept: PSYCHIATRY | Age: 55
End: 2025-02-03

## 2025-02-03 NOTE — TELEPHONE ENCOUNTER
Called patient to remind them of their appointment   -Pt confirmed      Reminded patient of their     copay for their appointment. Reminded patient to complete their visit pre-check/digital registration in [employer].    Electronically signed by Alicia D Giraldo-Reyes on 2/3/2025 at 2:12 PM

## 2025-02-04 ENCOUNTER — OFFICE VISIT (OUTPATIENT)
Dept: PSYCHIATRY | Age: 55
End: 2025-02-04
Payer: MEDICAID

## 2025-02-04 DIAGNOSIS — F33.0 MILD RECURRENT MAJOR DEPRESSION (HCC): Primary | ICD-10-CM

## 2025-02-04 DIAGNOSIS — F41.1 GENERALIZED ANXIETY DISORDER WITH PANIC ATTACKS: ICD-10-CM

## 2025-02-04 DIAGNOSIS — F41.0 GENERALIZED ANXIETY DISORDER WITH PANIC ATTACKS: ICD-10-CM

## 2025-02-04 PROCEDURE — 90834 PSYTX W PT 45 MINUTES: CPT | Performed by: COUNSELOR

## 2025-02-04 ASSESSMENT — PATIENT HEALTH QUESTIONNAIRE - PHQ9
4. FEELING TIRED OR HAVING LITTLE ENERGY: MORE THAN HALF THE DAYS
1. LITTLE INTEREST OR PLEASURE IN DOING THINGS: SEVERAL DAYS
7. TROUBLE CONCENTRATING ON THINGS, SUCH AS READING THE NEWSPAPER OR WATCHING TELEVISION: MORE THAN HALF THE DAYS
5. POOR APPETITE OR OVEREATING: NEARLY EVERY DAY
9. THOUGHTS THAT YOU WOULD BE BETTER OFF DEAD, OR OF HURTING YOURSELF: NOT AT ALL
8. MOVING OR SPEAKING SO SLOWLY THAT OTHER PEOPLE COULD HAVE NOTICED. OR THE OPPOSITE, BEING SO FIGETY OR RESTLESS THAT YOU HAVE BEEN MOVING AROUND A LOT MORE THAN USUAL: NOT AT ALL
SUM OF ALL RESPONSES TO PHQ QUESTIONS 1-9: 13
SUM OF ALL RESPONSES TO PHQ QUESTIONS 1-9: 13
2. FEELING DOWN, DEPRESSED OR HOPELESS: SEVERAL DAYS
SUM OF ALL RESPONSES TO PHQ9 QUESTIONS 1 & 2: 2
3. TROUBLE FALLING OR STAYING ASLEEP: NEARLY EVERY DAY
SUM OF ALL RESPONSES TO PHQ QUESTIONS 1-9: 13
10. IF YOU CHECKED OFF ANY PROBLEMS, HOW DIFFICULT HAVE THESE PROBLEMS MADE IT FOR YOU TO DO YOUR WORK, TAKE CARE OF THINGS AT HOME, OR GET ALONG WITH OTHER PEOPLE: SOMEWHAT DIFFICULT
6. FEELING BAD ABOUT YOURSELF - OR THAT YOU ARE A FAILURE OR HAVE LET YOURSELF OR YOUR FAMILY DOWN: SEVERAL DAYS
SUM OF ALL RESPONSES TO PHQ QUESTIONS 1-9: 13

## 2025-02-04 ASSESSMENT — ANXIETY QUESTIONNAIRES
3. WORRYING TOO MUCH ABOUT DIFFERENT THINGS: MORE THAN HALF THE DAYS
1. FEELING NERVOUS, ANXIOUS, OR ON EDGE: MORE THAN HALF THE DAYS
IF YOU CHECKED OFF ANY PROBLEMS ON THIS QUESTIONNAIRE, HOW DIFFICULT HAVE THESE PROBLEMS MADE IT FOR YOU TO DO YOUR WORK, TAKE CARE OF THINGS AT HOME, OR GET ALONG WITH OTHER PEOPLE: VERY DIFFICULT
5. BEING SO RESTLESS THAT IT IS HARD TO SIT STILL: MORE THAN HALF THE DAYS
6. BECOMING EASILY ANNOYED OR IRRITABLE: MORE THAN HALF THE DAYS
GAD7 TOTAL SCORE: 15
2. NOT BEING ABLE TO STOP OR CONTROL WORRYING: MORE THAN HALF THE DAYS
4. TROUBLE RELAXING: MORE THAN HALF THE DAYS
7. FEELING AFRAID AS IF SOMETHING AWFUL MIGHT HAPPEN: NEARLY EVERY DAY

## 2025-02-04 NOTE — PROGRESS NOTES
Therapy Progress Note  Yuly Huggins, [facility]   2/4/2025    Patient location  :[facility]  [facility] location : [facility]      Total time spent: 50 minutes  This is patient's fifth  Therapy appointment.  Chief Complaint:  depression and anxiety  Referring Provider: No referring provider defined for this encounter.      Toan Chacon ,a 55 y.o. male, for follow up visit.     Pt provided informed consent for the behavioral health program. Discussed with patient model of service to include the limits of confidentiality (i.e. abuse reporting, suicide intervention, etc.) and short-term intervention focused approach.  Discussed no show and late cancellation policy.  Pt indicated understanding.    S:  Provider seeing patient in follow-up for depression/anxiety/panic attacks.  Patient reported he rarely leaves the house due to having panic attacks often in public and usually avoids going into many public places.  Patient discussed he continues to avoid showering and practicing daily hygiene rituals, due to fear of slipping and falling in the shower again.  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      Pt reported he  would like to continue the twice a month appointments and verbalized he  would call sooner if needed.     Pt denies Suicidal Ideations, Homicidal Ideation, Auditory Hallucinations, Visual Hallucinations, Tactical Hallucinations.    Assessments:  Assessments:  PHQ-9 Score:       2/4/2025     3:14 PM 1/14/2025

## 2025-02-10 NOTE — PROGRESS NOTES
Subjective:     Encounter Date:02/11/2025      Patient ID: Gurjit Andrea is a 55 y.o. male with mitral valve replacement in 2017 at  due to mitral valve endocarditis, PAF s/p MAZE and LAAE at time of mitral valve replacement and diastolic heart failure.     Chief Complaint:  no complaints  History of Present Illness  Patient presents today for management of mitral valve replacement and diastolic heart failure. Today patient reports that he has been doing good. He denies any chest pain. He denies any heart racing or palpitations. He reports chronic dyspnea on exertion. He reports some mild leg swelling if he is on his feet from prolonged periods. He reports that his BP has been under control. He reports some dizziness. Patient follows with Dr Malave as PCP.     The following portions of the patient's history were reviewed and updated as appropriate: allergies, current medications, past family history, past medical history, past social history, past surgical history and problem list.    Allergies   Allergen Reactions    Cetirizine Nausea Only    Chantix [Varenicline] Anxiety       Current Outpatient Medications:     Acetaminophen (TYLENOL ARTHRITIS PAIN PO), Take 2 tablets by mouth As Needed., Disp: , Rfl:     aspirin 325 MG tablet, Take 1 tablet by mouth 2 (Two) Times a Day., Disp: , Rfl:     cyclobenzaprine (FLEXERIL) 10 MG tablet, Take 1 tablet 3 times a day by oral route as needed., Disp: , Rfl:     donepezil (ARICEPT) 10 MG tablet, Take 1 tablet by mouth Every Night., Disp: , Rfl:     doxepin (SINEquan) 50 MG capsule, , Disp: , Rfl:     DULoxetine (CYMBALTA) 60 MG capsule, Take 1 capsule by mouth Daily., Disp: , Rfl:     empagliflozin (Jardiance) 10 MG tablet tablet, Take 1 tablet by mouth Daily., Disp: 30 tablet, Rfl: 11    hydrOXYzine (ATARAX) 25 MG tablet, Take 1 tablet by mouth 3 (Three) Times a Day As Needed. for anxiety, Disp: , Rfl:     metoprolol tartrate (LOPRESSOR) 25 MG tablet, Take 1 tablet  by mouth 2 (Two) Times a Day., Disp: 180 tablet, Rfl: 3    Multiple Vitamins-Minerals (MULTIVITAMIN WITH MINERALS) tablet tablet, Take 1 tablet by mouth Daily., Disp: , Rfl:     risperiDONE (risperDAL) 1 MG tablet, Take 1 tablet by mouth., Disp: , Rfl:     sacubitril-valsartan (Entresto) 49-51 MG tablet, Take 1 tablet by mouth 2 (Two) Times a Day., Disp: 60 tablet, Rfl: 11    spironolactone (ALDACTONE) 25 MG tablet, Take 1 tablet by mouth Daily., Disp: 30 tablet, Rfl: 11    traMADol (ULTRAM) 50 MG tablet, TAKE 1 TABLET BY MOUTH EVERY 8 HOURS AS NEEDED FOR PAIN FOR UP TO 30 DAYS. TAKE LOWEST DOSE POSSIBLE TO MANAGE PAIN. REDUCE DOSES TAKEN AS PAIN BECOMES MANAGEABLE, Disp: , Rfl:     vitamin D3 125 MCG (5000 UT) capsule capsule, Take 1 capsule by mouth Every Other Day., Disp: , Rfl:   Past Medical History:   Diagnosis Date    A-fib     Arthritis     Chronic renal failure     Coronary artery disease     Elevated PSA     GERD (gastroesophageal reflux disease)     Hypertension     Kidney stone     MRSA bacteremia 02/21/2017    MRSA in heart valve    Prostate cancer     Purpura     Wegener's granulomatosis with renal involvement      Social History     Socioeconomic History    Marital status:    Tobacco Use    Smoking status: Every Day     Current packs/day: 1.00     Average packs/day: 1 pack/day for 15.0 years (15.0 ttl pk-yrs)     Types: Cigarettes     Passive exposure: Current    Smokeless tobacco: Never   Vaping Use    Vaping status: Never Used   Substance and Sexual Activity    Alcohol use: Not Currently     Comment: 1 drink per year until 5 years ago, then none    Drug use: No    Sexual activity: Not Currently       Review of Systems   Constitutional: Negative for malaise/fatigue.   Cardiovascular:  Positive for dyspnea on exertion (unchanged). Negative for chest pain, irregular heartbeat, leg swelling, near-syncope, orthopnea, palpitations, paroxysmal nocturnal dyspnea and syncope.   Hematologic/Lymphatic:  "Bruises/bleeds easily.   Genitourinary:  Negative for hematuria.   Neurological:  Positive for dizziness (intermittent).   All other systems reviewed and are negative.         Objective:     Vitals reviewed.   Constitutional:       General: Not in acute distress.     Appearance: Normal appearance. Well-developed.   Eyes:      Pupils: Pupils are equal, round, and reactive to light.   HENT:      Head: Normocephalic and atraumatic.      Nose: Nose normal.   Neck:      Vascular: No carotid bruit.   Pulmonary:      Effort: Pulmonary effort is normal. No respiratory distress.      Breath sounds: Normal breath sounds. No wheezing. No rales.   Cardiovascular:      Normal rate. Regular rhythm.      Murmurs: There is no murmur.   Edema:     Peripheral edema absent.   Abdominal:      General: There is no distension.      Palpations: Abdomen is soft.   Musculoskeletal: Normal range of motion.      Cervical back: Normal range of motion and neck supple. Skin:     General: Skin is warm.      Findings: No erythema or rash.   Neurological:      General: No focal deficit present.      Mental Status: Alert and oriented to person, place, and time.   Psychiatric:         Attention and Perception: Attention normal.         Mood and Affect: Mood normal.         Speech: Speech normal.         Behavior: Behavior normal.         Thought Content: Thought content normal.         Judgment: Judgment normal.         /80   Pulse 81   Ht 185.4 cm (73\")   Wt 118 kg (261 lb)   SpO2 97%   BMI 34.43 kg/m²     Procedures    Lab Review:     Results for orders placed during the hospital encounter of 05/05/22    Adult Transthoracic Echo Complete w/ Color, Spectral and Contrast if necessary per protocol    Interpretation Summary  · Left ventricular ejection fraction appears to be 56 - 60%. Left ventricular systolic function is normal.  · Left ventricular diastolic function is consistent with (grade II w/high LAP) pseudonormalization.  · The " following left ventricular wall segments are hypokinetic: apical septal and apex hypokinetic.  · Abnormal global longitudinal LV strain (GLS) = -9.0%  · Left atrial volume is severely increased.  · The right ventricular cavity is mildly dilated. Normal RV systolic function  · Mild dilation of the aortic root is present.  · Estimated right ventricular systolic pressure from tricuspid regurgitation is normal (<35 mmHg).  · There is a trivial pericardial effusion. The effusion is fluid filled. There is no evidence of cardiac tamponade.    I have personally reviewed echo, and past office notes prior to patients visit  Assessment:          Diagnosis Plan   1. H/O mitral valve replacement 2017   Adult Transthoracic Echo Complete w/ Color, Spectral and Contrast if necessary per protocol      2. Paroxysmal atrial fibrillation        3. Primary hypertension        4. Chronic diastolic congestive heart failure        5. Stage 3b chronic kidney disease        6. Tobacco abuse        7. Class 1 obesity due to excess calories without serious comorbidity with body mass index (BMI) of 34.0 to 34.9 in adult               Plan:       H/O mitral valve replacement 2017 at  due to endocarditis. Echo 2022 noted normal valve function. Will recheck echo prior to next follow up.     2. Paroxysmal atrial fibrillation: s/p MAZE and LAAE at time of mitral valve replacement with no documented recurrence. Current aspirin    3. Hypertension: well controlled. Continue current medications    4. Chronic diastolic CHF: NYHA class II, patient is compensated and stable. Continue Entresto, Jardiance and Aldactone. Reviewed signs and symptoms of CHF and what to report to office with patient. Patient instructed to restrict sodium and record daily weight. Patient is to report weight gain of greater than 2 lbs overnight or 5 lbs in 1 week. Patient verbalized understanding of instructions and plan of care.     5. CKD: Follows with nephrology     6.  Tobacco use: Gurjit Andrea  reports that he has been smoking cigarettes. He has a 15 pack-year smoking history. He has been exposed to tobacco smoke. He has never used smokeless tobacco. I have educated him on the risk of diseases from using tobacco products such as cancer, COPD, and heart disease. I advised him to quit and he is not willing to quit. I spent 3  minutes counseling the patient.    I spent 35 minutes caring for Gurjit on this date of service. This time includes time spent by me in the following activities:preparing for the visit, reviewing tests, obtaining and/or reviewing a separately obtained history, performing a medically appropriate examination and/or evaluation , counseling and educating the patient/family/caregiver, ordering medications, tests, or procedures, and documenting information in the medical record     Patient is to follow up in 6 months or sooner if needed

## 2025-02-11 ENCOUNTER — OFFICE VISIT (OUTPATIENT)
Dept: CARDIOLOGY | Facility: CLINIC | Age: 55
End: 2025-02-11
Payer: MEDICAID

## 2025-02-11 VITALS
BODY MASS INDEX: 34.59 KG/M2 | HEIGHT: 73 IN | OXYGEN SATURATION: 97 % | SYSTOLIC BLOOD PRESSURE: 112 MMHG | DIASTOLIC BLOOD PRESSURE: 80 MMHG | HEART RATE: 81 BPM | WEIGHT: 261 LBS

## 2025-02-11 DIAGNOSIS — I10 PRIMARY HYPERTENSION: ICD-10-CM

## 2025-02-11 DIAGNOSIS — N18.32 STAGE 3B CHRONIC KIDNEY DISEASE: ICD-10-CM

## 2025-02-11 DIAGNOSIS — E66.811 CLASS 1 OBESITY DUE TO EXCESS CALORIES WITHOUT SERIOUS COMORBIDITY WITH BODY MASS INDEX (BMI) OF 34.0 TO 34.9 IN ADULT: ICD-10-CM

## 2025-02-11 DIAGNOSIS — Z95.2 H/O MITRAL VALVE REPLACEMENT: Primary | ICD-10-CM

## 2025-02-11 DIAGNOSIS — Z72.0 TOBACCO ABUSE: ICD-10-CM

## 2025-02-11 DIAGNOSIS — I50.32 CHRONIC DIASTOLIC CONGESTIVE HEART FAILURE: ICD-10-CM

## 2025-02-11 DIAGNOSIS — I48.0 PAROXYSMAL ATRIAL FIBRILLATION: ICD-10-CM

## 2025-02-11 DIAGNOSIS — E66.09 CLASS 1 OBESITY DUE TO EXCESS CALORIES WITHOUT SERIOUS COMORBIDITY WITH BODY MASS INDEX (BMI) OF 34.0 TO 34.9 IN ADULT: ICD-10-CM

## 2025-02-12 ENCOUNTER — OFFICE VISIT (OUTPATIENT)
Dept: PRIMARY CARE CLINIC | Age: 55
End: 2025-02-12

## 2025-02-12 VITALS
RESPIRATION RATE: 18 BRPM | SYSTOLIC BLOOD PRESSURE: 115 MMHG | TEMPERATURE: 96.8 F | HEART RATE: 86 BPM | OXYGEN SATURATION: 100 % | DIASTOLIC BLOOD PRESSURE: 80 MMHG | BODY MASS INDEX: 34.72 KG/M2 | HEIGHT: 73 IN | WEIGHT: 262 LBS

## 2025-02-12 DIAGNOSIS — R19.7 DIARRHEA, UNSPECIFIED TYPE: Primary | ICD-10-CM

## 2025-02-12 DIAGNOSIS — L98.9 SKIN LESION: ICD-10-CM

## 2025-02-12 RX ORDER — METRONIDAZOLE 500 MG/1
500 TABLET ORAL 2 TIMES DAILY
Qty: 14 TABLET | Refills: 0 | Status: SHIPPED | OUTPATIENT
Start: 2025-02-12 | End: 2025-02-19

## 2025-02-12 RX ORDER — DIPHENOXYLATE HYDROCHLORIDE AND ATROPINE SULFATE 2.5; .025 MG/1; MG/1
1 TABLET ORAL 4 TIMES DAILY PRN
Qty: 30 TABLET | Refills: 0 | Status: SHIPPED | OUTPATIENT
Start: 2025-02-12 | End: 2025-02-22

## 2025-02-12 RX ORDER — CIPROFLOXACIN 500 MG/1
500 TABLET, FILM COATED ORAL 2 TIMES DAILY
Qty: 14 TABLET | Refills: 0 | Status: SHIPPED | OUTPATIENT
Start: 2025-02-12 | End: 2025-02-19

## 2025-02-12 NOTE — PROGRESS NOTES
morning and at bedtime      acetaminophen (TYLENOL) 650 MG extended release tablet Take 1 tablet by mouth every 8 hours as needed for Pain      vitamin D (CHOLECALCIFEROL) 125 MCG (5000 UT) CAPS capsule Take 1 capsule by mouth daily      Multiple Vitamins-Minerals (THERA M PLUS) TABS Take 1 tablet by mouth daily      risperiDONE (RISPERDAL M-TABS) 0.5 MG disintegrating tablet Take 2 tablets by mouth 2 times daily as needed (agitation,anxiety) (Patient not taking: Reported on 1/16/2025) 60 tablet 3     No current facility-administered medications for this visit.      Family History   Problem Relation Age of Onset    Kidney Disease Mother     Thyroid Disease Mother     Hypertension Father     Heart Disease Father     Heart Failure Father     Liver Disease Father     Emphysema Father       Past Medical History:   Diagnosis Date    A-fib (HCC)     Arthritis     Chronic renal disease     Elevated PSA     Hypertension     Renal calculi       Past Surgical History:   Procedure Laterality Date    ANKLE FRACTURE SURGERY Left 3/29/2022    LEFT  ANKLE OPEN REDUCTION INTERNAL FIXATION MEDIAL MALLEOLUS FRACTURE performed by Dex Ramirez MD at [facility] OR    APPENDECTOMY      ATRIAL ABLATION SURGERY  03/2017    CARDIAC SURGERY      COSMETIC SURGERY      lip    MITRAL VALVE REPLACEMENT  03/01/2017    done at [facility]    PROSTATECTOMY  2021      Allergies   Allergen Reactions    Chantix [Varenicline]      Pt states 'it made me go crazy'    Zyrtec [Cetirizine] Rash        Lab Results   Component Value Date     04/23/2024    K 4.5 04/23/2024     04/23/2024    CO2 22 04/23/2024    BUN 20 04/23/2024    CREATININE 1.6 (H) 04/23/2024    GLUCOSE 116 (H) 04/23/2024    CALCIUM 9.4 04/23/2024    BILITOT 0.3 04/23/2024    ALKPHOS 91 04/23/2024    AST 16 04/23/2024    ALT 18 04/23/2024    LABGLOM 50 (A) 04/23/2024    GFRAA 40 (L) 10/05/2022        Lab Results   Component Value Date    WBC 9.2 11/02/2023    HGB 16.6

## 2025-02-14 ASSESSMENT — ENCOUNTER SYMPTOMS
CHEST TIGHTNESS: 0
BLOOD IN STOOL: 0
DIARRHEA: 1
VOMITING: 0
WHEEZING: 0
SHORTNESS OF BREATH: 0
NAUSEA: 0
ABDOMINAL PAIN: 0

## 2025-02-18 ENCOUNTER — TELEPHONE (OUTPATIENT)
Dept: PSYCHIATRY | Age: 55
End: 2025-02-18

## 2025-02-18 NOTE — TELEPHONE ENCOUNTER
Called pt to cancel/reschedule their appt with Dr. Bae for 03/06/25 because the provider will not be in the office that day.    No answer and vm full.  Electronically signed by Tammy Way MA on 2/18/2025 at 11:45 AM

## 2025-03-04 ENCOUNTER — TELEPHONE (OUTPATIENT)
Dept: PSYCHIATRY | Age: 55
End: 2025-03-04

## 2025-03-04 NOTE — TELEPHONE ENCOUNTER
Called and reschedule appt with pt that was on 3/6 @2:30 pm due to Dr. Bae not being in the office that day.    Appt rescheduled for 6/2 @ 1:30 pm   Pt was put on cancellation list       Electronically signed by Mitchell Garg on 3/4/2025 at 10:23 AM

## 2025-03-06 ENCOUNTER — TELEPHONE (OUTPATIENT)
Dept: PSYCHIATRY | Age: 55
End: 2025-03-06

## 2025-03-06 RX ORDER — CYCLOBENZAPRINE HCL 5 MG
TABLET ORAL
Qty: 30 TABLET | Refills: 3 | Status: SHIPPED | OUTPATIENT
Start: 2025-03-06

## 2025-03-06 NOTE — TELEPHONE ENCOUNTER
Called and confirmed appt with pt for 3/7 @ 3 pm       Electronically signed by Mitchell Garg on 3/6/2025 at 10:34 AM

## 2025-03-07 ENCOUNTER — OFFICE VISIT (OUTPATIENT)
Dept: PSYCHIATRY | Age: 55
End: 2025-03-07

## 2025-03-07 DIAGNOSIS — F33.0 MILD RECURRENT MAJOR DEPRESSION: Primary | ICD-10-CM

## 2025-03-07 DIAGNOSIS — F41.1 GENERALIZED ANXIETY DISORDER: ICD-10-CM

## 2025-03-07 RX ORDER — DULOXETIN HYDROCHLORIDE 60 MG/1
60 CAPSULE, DELAYED RELEASE ORAL DAILY
Qty: 30 CAPSULE | Refills: 3 | Status: SHIPPED | OUTPATIENT
Start: 2025-03-07

## 2025-03-07 ASSESSMENT — PATIENT HEALTH QUESTIONNAIRE - PHQ9
SUM OF ALL RESPONSES TO PHQ QUESTIONS 1-9: 13
SUM OF ALL RESPONSES TO PHQ QUESTIONS 1-9: 13
1. LITTLE INTEREST OR PLEASURE IN DOING THINGS: SEVERAL DAYS
4. FEELING TIRED OR HAVING LITTLE ENERGY: MORE THAN HALF THE DAYS
SUM OF ALL RESPONSES TO PHQ QUESTIONS 1-9: 13
10. IF YOU CHECKED OFF ANY PROBLEMS, HOW DIFFICULT HAVE THESE PROBLEMS MADE IT FOR YOU TO DO YOUR WORK, TAKE CARE OF THINGS AT HOME, OR GET ALONG WITH OTHER PEOPLE: SOMEWHAT DIFFICULT
5. POOR APPETITE OR OVEREATING: NEARLY EVERY DAY
3. TROUBLE FALLING OR STAYING ASLEEP: NEARLY EVERY DAY
9. THOUGHTS THAT YOU WOULD BE BETTER OFF DEAD, OR OF HURTING YOURSELF: NOT AT ALL
6. FEELING BAD ABOUT YOURSELF - OR THAT YOU ARE A FAILURE OR HAVE LET YOURSELF OR YOUR FAMILY DOWN: MORE THAN HALF THE DAYS
SUM OF ALL RESPONSES TO PHQ QUESTIONS 1-9: 13
2. FEELING DOWN, DEPRESSED OR HOPELESS: SEVERAL DAYS
8. MOVING OR SPEAKING SO SLOWLY THAT OTHER PEOPLE COULD HAVE NOTICED. OR THE OPPOSITE, BEING SO FIGETY OR RESTLESS THAT YOU HAVE BEEN MOVING AROUND A LOT MORE THAN USUAL: NOT AT ALL
7. TROUBLE CONCENTRATING ON THINGS, SUCH AS READING THE NEWSPAPER OR WATCHING TELEVISION: SEVERAL DAYS

## 2025-03-07 ASSESSMENT — ANXIETY QUESTIONNAIRES
1. FEELING NERVOUS, ANXIOUS, OR ON EDGE: MORE THAN HALF THE DAYS
GAD7 TOTAL SCORE: 12
4. TROUBLE RELAXING: MORE THAN HALF THE DAYS
2. NOT BEING ABLE TO STOP OR CONTROL WORRYING: SEVERAL DAYS
5. BEING SO RESTLESS THAT IT IS HARD TO SIT STILL: MORE THAN HALF THE DAYS
IF YOU CHECKED OFF ANY PROBLEMS ON THIS QUESTIONNAIRE, HOW DIFFICULT HAVE THESE PROBLEMS MADE IT FOR YOU TO DO YOUR WORK, TAKE CARE OF THINGS AT HOME, OR GET ALONG WITH OTHER PEOPLE: SOMEWHAT DIFFICULT
6. BECOMING EASILY ANNOYED OR IRRITABLE: NEARLY EVERY DAY
3. WORRYING TOO MUCH ABOUT DIFFERENT THINGS: SEVERAL DAYS
7. FEELING AFRAID AS IF SOMETHING AWFUL MIGHT HAPPEN: SEVERAL DAYS
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
## 2025-03-14 DIAGNOSIS — G47.01 INSOMNIA DUE TO MEDICAL CONDITION: ICD-10-CM

## 2025-03-14 NOTE — TELEPHONE ENCOUNTER
Encounter Medications        Orders Placed This Encounter   Medications    melatonin 5 MG TABS tablet       Sig: Take 1 tablet by mouth nightly       Dispense:  30 tablet       Refill:  3    risperiDONE (RISPERDAL M-TABS) 0.5 MG disintegrating tablet       Sig: Take 2 tablets by mouth 2 times daily as needed (agitation,anxiety)       Dispense:  60 tablet       Refill:  3    doxepin (SINEQUAN) 50 MG capsule       Sig: Take 1 capsule by mouth nightly       Dispense:  30 capsule       Refill:  3    hydrOXYzine HCl (ATARAX) 25 MG tablet       Sig: Take 1 tablet by mouth 3 times daily as needed for Anxiety       Dispense:  90 tablet       Refill:  3                     No orders of the defined types were placed in this encounter.        9. Additional comments: Continue therapy, discussed sleep hygiene, discussed the use of coping skills and relaxation strategies to manage symptoms.            10.Over 50% of the total visit time of   30  minutes was spent on counseling and/or coordination of care of:                         1. Unspecified mood (affective) disorder (HCC)    2. Generalized anxiety disorder with panic attacks    3. Insomnia due to medical condition    4. Obsessive-compulsive disorder, unspecified type                                                Psychotherapy Topics: mood/medication effectiveness financial and marital     Jennie Jang, APRN - CNP

## 2025-03-28 ENCOUNTER — TELEPHONE (OUTPATIENT)
Dept: PSYCHIATRY | Age: 55
End: 2025-03-28

## 2025-03-28 NOTE — TELEPHONE ENCOUNTER
Called and confirmed appt with pt for 3/31 @ 3 pm     Electronically signed by Mitchell Garg on 3/28/2025 at 2:04 PM

## 2025-03-31 ENCOUNTER — TELEPHONE (OUTPATIENT)
Dept: PSYCHIATRY | Age: 55
End: 2025-03-31

## 2025-03-31 NOTE — TELEPHONE ENCOUNTER
Patient no showed to their appointment in the [facility]. Office called the patient to check on them and to reschedule their appointment.     Left voicemail for patient to call the office back at [phone] Option 3 and provided education regarding the three no show and dismissal policies. Provided education that after three no show, patients can be dismissed by the provider and practice.  .     Barriers to treatment: N/A - didn't speak to patient.      Electronically signed by Alicia D Giraldo-Reyes on 3/31/2025 at 4:04 PM

## 2025-04-01 ENCOUNTER — TELEPHONE (OUTPATIENT)
Dept: PSYCHIATRY | Age: 55
End: 2025-04-01

## 2025-04-01 ENCOUNTER — TELEMEDICINE (OUTPATIENT)
Dept: PSYCHIATRY | Age: 55
End: 2025-04-01
Payer: MEDICAID

## 2025-04-01 DIAGNOSIS — F33.0 MILD RECURRENT MAJOR DEPRESSION: Primary | ICD-10-CM

## 2025-04-01 DIAGNOSIS — F41.1 GENERALIZED ANXIETY DISORDER: ICD-10-CM

## 2025-04-01 PROCEDURE — 90834 PSYTX W PT 45 MINUTES: CPT | Performed by: COUNSELOR

## 2025-04-01 ASSESSMENT — PATIENT HEALTH QUESTIONNAIRE - PHQ9
6. FEELING BAD ABOUT YOURSELF - OR THAT YOU ARE A FAILURE OR HAVE LET YOURSELF OR YOUR FAMILY DOWN: SEVERAL DAYS
7. TROUBLE CONCENTRATING ON THINGS, SUCH AS READING THE NEWSPAPER OR WATCHING TELEVISION: SEVERAL DAYS
4. FEELING TIRED OR HAVING LITTLE ENERGY: NEARLY EVERY DAY
SUM OF ALL RESPONSES TO PHQ QUESTIONS 1-9: 16
2. FEELING DOWN, DEPRESSED OR HOPELESS: SEVERAL DAYS
10. IF YOU CHECKED OFF ANY PROBLEMS, HOW DIFFICULT HAVE THESE PROBLEMS MADE IT FOR YOU TO DO YOUR WORK, TAKE CARE OF THINGS AT HOME, OR GET ALONG WITH OTHER PEOPLE: VERY DIFFICULT
SUM OF ALL RESPONSES TO PHQ QUESTIONS 1-9: 16
8. MOVING OR SPEAKING SO SLOWLY THAT OTHER PEOPLE COULD HAVE NOTICED. OR THE OPPOSITE, BEING SO FIGETY OR RESTLESS THAT YOU HAVE BEEN MOVING AROUND A LOT MORE THAN USUAL: MORE THAN HALF THE DAYS
1. LITTLE INTEREST OR PLEASURE IN DOING THINGS: MORE THAN HALF THE DAYS
3. TROUBLE FALLING OR STAYING ASLEEP: NEARLY EVERY DAY
5. POOR APPETITE OR OVEREATING: NEARLY EVERY DAY
9. THOUGHTS THAT YOU WOULD BE BETTER OFF DEAD, OR OF HURTING YOURSELF: NOT AT ALL
SUM OF ALL RESPONSES TO PHQ QUESTIONS 1-9: 16
SUM OF ALL RESPONSES TO PHQ QUESTIONS 1-9: 16

## 2025-04-01 ASSESSMENT — ANXIETY QUESTIONNAIRES
4. TROUBLE RELAXING: SEVERAL DAYS
2. NOT BEING ABLE TO STOP OR CONTROL WORRYING: SEVERAL DAYS
7. FEELING AFRAID AS IF SOMETHING AWFUL MIGHT HAPPEN: NEARLY EVERY DAY
3. WORRYING TOO MUCH ABOUT DIFFERENT THINGS: SEVERAL DAYS
5. BEING SO RESTLESS THAT IT IS HARD TO SIT STILL: SEVERAL DAYS
1. FEELING NERVOUS, ANXIOUS, OR ON EDGE: NEARLY EVERY DAY
6. BECOMING EASILY ANNOYED OR IRRITABLE: NEARLY EVERY DAY
GAD7 TOTAL SCORE: 13
IF YOU CHECKED OFF ANY PROBLEMS ON THIS QUESTIONNAIRE, HOW DIFFICULT HAVE THESE PROBLEMS MADE IT FOR YOU TO DO YOUR WORK, TAKE CARE OF THINGS AT HOME, OR GET ALONG WITH OTHER PEOPLE: VERY DIFFICULT
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
## 2025-04-01 NOTE — TELEPHONE ENCOUNTER
Called pt to see if they wanted to take an earlier appt that came open with Yuly Huggins for 04/01/25.  Pt agreed.    Scheduled a mychart visit for 04/01/25 @ 3:00.    Electronically signed by Tammy Way MA on 4/1/2025 at 10:16 AM

## 2025-04-09 DIAGNOSIS — Z79.899 MEDICATION MANAGEMENT: ICD-10-CM

## 2025-04-09 RX ORDER — TRAMADOL HYDROCHLORIDE 50 MG/1
50 TABLET ORAL EVERY 8 HOURS PRN
Qty: 60 TABLET | Refills: 0 | Status: SHIPPED | OUTPATIENT
Start: 2025-04-09 | End: 2025-05-09

## 2025-04-24 ENCOUNTER — TELEPHONE (OUTPATIENT)
Dept: PSYCHIATRY | Age: 55
End: 2025-04-24

## 2025-04-24 NOTE — TELEPHONE ENCOUNTER
Called and confirmed appt with pt for 4/25 @ 3 pm       Electronically signed by Mitchell Garg on 4/24/2025 at 1:52 PM

## 2025-04-25 ENCOUNTER — TELEMEDICINE (OUTPATIENT)
Dept: PSYCHIATRY | Age: 55
End: 2025-04-25

## 2025-04-25 DIAGNOSIS — F42.2 MIXED OBSESSIONAL THOUGHTS AND ACTS: ICD-10-CM

## 2025-04-25 DIAGNOSIS — F41.1 GENERALIZED ANXIETY DISORDER: ICD-10-CM

## 2025-04-25 DIAGNOSIS — F33.0 MILD RECURRENT MAJOR DEPRESSION: Primary | ICD-10-CM

## 2025-04-25 ASSESSMENT — ANXIETY QUESTIONNAIRES
4. TROUBLE RELAXING: MORE THAN HALF THE DAYS
7. FEELING AFRAID AS IF SOMETHING AWFUL MIGHT HAPPEN: NEARLY EVERY DAY
2. NOT BEING ABLE TO STOP OR CONTROL WORRYING: SEVERAL DAYS
6. BECOMING EASILY ANNOYED OR IRRITABLE: NEARLY EVERY DAY
GAD7 TOTAL SCORE: 13
3. WORRYING TOO MUCH ABOUT DIFFERENT THINGS: SEVERAL DAYS
1. FEELING NERVOUS, ANXIOUS, OR ON EDGE: MORE THAN HALF THE DAYS
5. BEING SO RESTLESS THAT IT IS HARD TO SIT STILL: SEVERAL DAYS

## 2025-04-25 ASSESSMENT — PATIENT HEALTH QUESTIONNAIRE - PHQ9
8. MOVING OR SPEAKING SO SLOWLY THAT OTHER PEOPLE COULD HAVE NOTICED. OR THE OPPOSITE, BEING SO FIGETY OR RESTLESS THAT YOU HAVE BEEN MOVING AROUND A LOT MORE THAN USUAL: NOT AT ALL
SUM OF ALL RESPONSES TO PHQ QUESTIONS 1-9: 16
6. FEELING BAD ABOUT YOURSELF - OR THAT YOU ARE A FAILURE OR HAVE LET YOURSELF OR YOUR FAMILY DOWN: MORE THAN HALF THE DAYS
SUM OF ALL RESPONSES TO PHQ QUESTIONS 1-9: 16
10. IF YOU CHECKED OFF ANY PROBLEMS, HOW DIFFICULT HAVE THESE PROBLEMS MADE IT FOR YOU TO DO YOUR WORK, TAKE CARE OF THINGS AT HOME, OR GET ALONG WITH OTHER PEOPLE: VERY DIFFICULT
1. LITTLE INTEREST OR PLEASURE IN DOING THINGS: SEVERAL DAYS
7. TROUBLE CONCENTRATING ON THINGS, SUCH AS READING THE NEWSPAPER OR WATCHING TELEVISION: MORE THAN HALF THE DAYS
SUM OF ALL RESPONSES TO PHQ QUESTIONS 1-9: 16
5. POOR APPETITE OR OVEREATING: NEARLY EVERY DAY
2. FEELING DOWN, DEPRESSED OR HOPELESS: MORE THAN HALF THE DAYS
3. TROUBLE FALLING OR STAYING ASLEEP: NEARLY EVERY DAY
SUM OF ALL RESPONSES TO PHQ QUESTIONS 1-9: 16
9. THOUGHTS THAT YOU WOULD BE BETTER OFF DEAD, OR OF HURTING YOURSELF: NOT AT ALL
4. FEELING TIRED OR HAVING LITTLE ENERGY: NEARLY EVERY DAY

## 2025-04-25 NOTE — PROGRESS NOTES
Toan Chacon is a 55 y.o. male evaluated via telephone on 4/25/2025.      Consent:  He and/or health care decision maker is aware that that he may receive a bill for this telephone service, depending on his insurance coverage, and has provided verbal consent to proceed: Yes      Documentation:  I communicated with the patient and/or health care decision maker about, see below.         I affirm this is a Patient Initiated Episode with a Patient who has not had a related appointment within my department in the past 7 days or scheduled within the next 24 hours.    The patient  (guardian) is aware that this is a billable service, which includes applicable co-pays.  This virtual visit was conducted with patient's (and or legal guardian's) consent.  This visit was conducted pursuant to the emergency declaration under the [place] act and the National emergencies act, 1135 waiver authority in the coronavirus preparedness and response supplemental appropriation's ACT.  Patient identification was verified and a caregiver was present when appropriate.  The patient was located in a state where the provider was licensed to provide care.    Patient identification was verified at the start of the visit: Yes    Total Time: 50    Note: not billable if this call serves to triage the patient into an appointment for the relevant concern      RADHA Jacome      Therapy Progress Note  RADHA Jacome   4/25/2025    Patient location  :Home  [facility] location : [facility]      Total time spent: 50 minutes  This is patient's eighth  Therapy appointment.  Chief Complaint:  depression and anxiety  Referring Provider: No referring provider defined for this encounter.      Toan Chacon ,a 55 y.o. male, for follow up visit.     Pt provided informed consent for the behavioral health program. Discussed with patient model of service to include the limits of confidentiality (i.e. abuse reporting, suicide intervention,

## 2025-04-28 ENCOUNTER — TELEPHONE (OUTPATIENT)
Dept: CARDIAC SURGERY | Facility: CLINIC | Age: 55
End: 2025-04-28

## 2025-04-28 NOTE — TELEPHONE ENCOUNTER
Pt was scheduled for CT Angiogram Chest and follow up with BHARATHI Gaines today, however, no showed for both appointments. Pt was aware of these appointments. Called pt to find out why he no showed and see if he would like to reschedule. No answer. VM full. If pt returns call, he will need to be rescheduled for CT and follow up. CT order will not  until 10/24/2025. Reviewed pt's chart for letters and a No Show letter has already been created and going out with next batch of outgoing mail.

## 2025-05-01 NOTE — TELEPHONE ENCOUNTER
Called pt again and he answered this time. R/s'd appointments to 06/05. Provided appointment information to pt and informed him these will be on his [employer] as well. I will send a [employer] message with instructions.

## 2025-05-15 ENCOUNTER — TELEMEDICINE (OUTPATIENT)
Dept: PSYCHIATRY | Age: 55
End: 2025-05-15

## 2025-05-15 ENCOUNTER — TELEPHONE (OUTPATIENT)
Dept: PSYCHIATRY | Age: 55
End: 2025-05-15

## 2025-05-15 DIAGNOSIS — F42.2 MIXED OBSESSIONAL THOUGHTS AND ACTS: ICD-10-CM

## 2025-05-15 DIAGNOSIS — F41.1 GENERALIZED ANXIETY DISORDER: ICD-10-CM

## 2025-05-15 DIAGNOSIS — F33.0 MILD RECURRENT MAJOR DEPRESSION: Primary | ICD-10-CM

## 2025-05-15 ASSESSMENT — ANXIETY QUESTIONNAIRES
6. BECOMING EASILY ANNOYED OR IRRITABLE: MORE THAN HALF THE DAYS
1. FEELING NERVOUS, ANXIOUS, OR ON EDGE: MORE THAN HALF THE DAYS
7. FEELING AFRAID AS IF SOMETHING AWFUL MIGHT HAPPEN: NEARLY EVERY DAY
IF YOU CHECKED OFF ANY PROBLEMS ON THIS QUESTIONNAIRE, HOW DIFFICULT HAVE THESE PROBLEMS MADE IT FOR YOU TO DO YOUR WORK, TAKE CARE OF THINGS AT HOME, OR GET ALONG WITH OTHER PEOPLE: VERY DIFFICULT
GAD7 TOTAL SCORE: 14
4. TROUBLE RELAXING: NEARLY EVERY DAY
5. BEING SO RESTLESS THAT IT IS HARD TO SIT STILL: MORE THAN HALF THE DAYS
2. NOT BEING ABLE TO STOP OR CONTROL WORRYING: SEVERAL DAYS
3. WORRYING TOO MUCH ABOUT DIFFERENT THINGS: SEVERAL DAYS

## 2025-05-15 ASSESSMENT — PATIENT HEALTH QUESTIONNAIRE - PHQ9
SUM OF ALL RESPONSES TO PHQ QUESTIONS 1-9: 16
5. POOR APPETITE OR OVEREATING: NEARLY EVERY DAY
SUM OF ALL RESPONSES TO PHQ QUESTIONS 1-9: 16
7. TROUBLE CONCENTRATING ON THINGS, SUCH AS READING THE NEWSPAPER OR WATCHING TELEVISION: SEVERAL DAYS
10. IF YOU CHECKED OFF ANY PROBLEMS, HOW DIFFICULT HAVE THESE PROBLEMS MADE IT FOR YOU TO DO YOUR WORK, TAKE CARE OF THINGS AT HOME, OR GET ALONG WITH OTHER PEOPLE: VERY DIFFICULT
SUM OF ALL RESPONSES TO PHQ QUESTIONS 1-9: 16
2. FEELING DOWN, DEPRESSED OR HOPELESS: MORE THAN HALF THE DAYS
8. MOVING OR SPEAKING SO SLOWLY THAT OTHER PEOPLE COULD HAVE NOTICED. OR THE OPPOSITE, BEING SO FIGETY OR RESTLESS THAT YOU HAVE BEEN MOVING AROUND A LOT MORE THAN USUAL: MORE THAN HALF THE DAYS
3. TROUBLE FALLING OR STAYING ASLEEP: NEARLY EVERY DAY
SUM OF ALL RESPONSES TO PHQ QUESTIONS 1-9: 16
1. LITTLE INTEREST OR PLEASURE IN DOING THINGS: SEVERAL DAYS
4. FEELING TIRED OR HAVING LITTLE ENERGY: MORE THAN HALF THE DAYS
6. FEELING BAD ABOUT YOURSELF - OR THAT YOU ARE A FAILURE OR HAVE LET YOURSELF OR YOUR FAMILY DOWN: MORE THAN HALF THE DAYS
9. THOUGHTS THAT YOU WOULD BE BETTER OFF DEAD, OR OF HURTING YOURSELF: NOT AT ALL

## 2025-05-15 NOTE — PROGRESS NOTES
communication. Therapist also provided reflective listening/validation, support and encouragement.  Discussed self-care (sleep, nutrition, rewarding activities, social support, exercise), Supportive techniques, and Identified maladaptive thoughts    Return Next visit July 2 at 1pm, video visit over my chart; add to cancellation list..       Over 50% of the total visit time of visit was spent on counseling and/or coordination of care     Yuly Huggins [facility]

## 2025-05-23 ENCOUNTER — OFFICE VISIT (OUTPATIENT)
Dept: PRIMARY CARE CLINIC | Age: 55
End: 2025-05-23
Payer: MEDICAID

## 2025-05-23 VITALS
OXYGEN SATURATION: 94 % | DIASTOLIC BLOOD PRESSURE: 86 MMHG | HEIGHT: 73 IN | BODY MASS INDEX: 34.46 KG/M2 | HEART RATE: 89 BPM | SYSTOLIC BLOOD PRESSURE: 115 MMHG | TEMPERATURE: 96.6 F | RESPIRATION RATE: 18 BRPM | WEIGHT: 260 LBS

## 2025-05-23 DIAGNOSIS — H81.10 BENIGN PAROXYSMAL POSITIONAL VERTIGO, UNSPECIFIED LATERALITY: ICD-10-CM

## 2025-05-23 DIAGNOSIS — R06.02 SOB (SHORTNESS OF BREATH): Primary | ICD-10-CM

## 2025-05-23 DIAGNOSIS — R73.03 PREDIABETES: ICD-10-CM

## 2025-05-23 DIAGNOSIS — I10 PRIMARY HYPERTENSION: ICD-10-CM

## 2025-05-23 DIAGNOSIS — Z12.5 SCREENING PSA (PROSTATE SPECIFIC ANTIGEN): ICD-10-CM

## 2025-05-23 DIAGNOSIS — Z79.899 MEDICATION MANAGEMENT: ICD-10-CM

## 2025-05-23 LAB
ALBUMIN SERPL-MCNC: 4.2 G/DL (ref 3.5–5.2)
ALCOHOL URINE: ABNORMAL
ALP SERPL-CCNC: 91 U/L (ref 40–129)
ALT SERPL-CCNC: 18 U/L (ref 10–50)
AMPHETAMINE SCREEN URINE: ABNORMAL
ANION GAP SERPL CALCULATED.3IONS-SCNC: 14 MMOL/L (ref 8–16)
AST SERPL-CCNC: 27 U/L (ref 10–50)
BARBITURATE SCREEN URINE: ABNORMAL
BASOPHILS # BLD: 0.1 K/UL (ref 0–0.2)
BASOPHILS NFR BLD: 0.8 % (ref 0–1)
BENZODIAZEPINE SCREEN, URINE: ABNORMAL
BILIRUB SERPL-MCNC: 0.4 MG/DL (ref 0.2–1.2)
BNP BLD-MCNC: 88 PG/ML (ref 0–124)
BUN SERPL-MCNC: 19 MG/DL (ref 6–20)
BUPRENORPHINE URINE: ABNORMAL
CALCIUM SERPL-MCNC: 10.1 MG/DL (ref 8.6–10)
CHLORIDE SERPL-SCNC: 103 MMOL/L (ref 98–107)
CO2 SERPL-SCNC: 19 MMOL/L (ref 22–29)
COCAINE METABOLITE SCREEN URINE: ABNORMAL
CREAT SERPL-MCNC: 1.6 MG/DL (ref 0.7–1.2)
EOSINOPHIL # BLD: 0.4 K/UL (ref 0–0.6)
EOSINOPHIL NFR BLD: 3.4 % (ref 0–5)
ERYTHROCYTE [DISTWIDTH] IN BLOOD BY AUTOMATED COUNT: 13.4 % (ref 11.5–14.5)
FENTANYL SCREEN, URINE: ABNORMAL
GABAPENTIN SCREEN, URINE: ABNORMAL
GLUCOSE SERPL-MCNC: 104 MG/DL (ref 70–99)
HBA1C MFR BLD: 5.8 % (ref 4–5.6)
HCT VFR BLD AUTO: 56.4 % (ref 42–52)
HGB BLD-MCNC: 19 G/DL (ref 14–18)
IMM GRANULOCYTES # BLD: 0.1 K/UL
LYMPHOCYTES # BLD: 2.8 K/UL (ref 1.1–4.5)
LYMPHOCYTES NFR BLD: 27.5 % (ref 20–40)
MCH RBC QN AUTO: 29.4 PG (ref 27–31)
MCHC RBC AUTO-ENTMCNC: 33.7 G/DL (ref 33–37)
MCV RBC AUTO: 87.3 FL (ref 80–94)
MDMA, URINE: ABNORMAL
METHADONE SCREEN, URINE: ABNORMAL
METHAMPHETAMINE, URINE: ABNORMAL
MONOCYTES # BLD: 0.6 K/UL (ref 0–0.9)
MONOCYTES NFR BLD: 6.3 % (ref 0–10)
NEUTROPHILS # BLD: 6.3 K/UL (ref 1.5–7.5)
NEUTS SEG NFR BLD: 61.4 % (ref 50–65)
OPIATE SCREEN URINE: ABNORMAL
OXYCODONE SCREEN URINE: ABNORMAL
PHENCYCLIDINE SCREEN URINE: ABNORMAL
PLATELET # BLD AUTO: 281 K/UL (ref 130–400)
PMV BLD AUTO: 10.3 FL (ref 9.4–12.4)
POTASSIUM SERPL-SCNC: 4.8 MMOL/L (ref 3.5–5.1)
PROPOXYPHENE SCREEN, URINE: ABNORMAL
PROT SERPL-MCNC: 7.4 G/DL (ref 6.4–8.3)
PSA SERPL-MCNC: 0.43 NG/ML (ref 0–4)
RBC # BLD AUTO: 6.46 M/UL (ref 4.7–6.1)
SODIUM SERPL-SCNC: 136 MMOL/L (ref 136–145)
SYNTHETIC CANNABINOIDS(K2) SCREEN, URINE: ABNORMAL
THC SCREEN, URINE: ABNORMAL
TRAMADOL SCREEN URINE: ABNORMAL
TRICYCLIC ANTIDEPRESSANTS, UR: POSITIVE
WBC # BLD AUTO: 10.2 K/UL (ref 4.8–10.8)

## 2025-05-23 PROCEDURE — 3079F DIAST BP 80-89 MM HG: CPT | Performed by: FAMILY MEDICINE

## 2025-05-23 PROCEDURE — 3074F SYST BP LT 130 MM HG: CPT | Performed by: FAMILY MEDICINE

## 2025-05-23 PROCEDURE — 99214 OFFICE O/P EST MOD 30 MIN: CPT | Performed by: FAMILY MEDICINE

## 2025-05-23 PROCEDURE — 80305 DRUG TEST PRSMV DIR OPT OBS: CPT | Performed by: FAMILY MEDICINE

## 2025-05-23 RX ORDER — ALBUTEROL SULFATE 90 UG/1
2 INHALANT RESPIRATORY (INHALATION) 4 TIMES DAILY PRN
Qty: 18 G | Refills: 0 | Status: SHIPPED | OUTPATIENT
Start: 2025-05-23

## 2025-05-23 RX ORDER — TRAMADOL HYDROCHLORIDE 50 MG/1
50 TABLET ORAL EVERY 8 HOURS PRN
Qty: 60 TABLET | Refills: 0 | Status: SHIPPED | OUTPATIENT
Start: 2025-05-23 | End: 2025-06-22

## 2025-05-23 NOTE — PROGRESS NOTES
Toan Chacon (:  1970) is a 55 y.o. male,Established patient, here for evaluation of the following chief complaint(s):  Follow-up (Pt here for 3 month f/u. Pt says that the diarrhea has gone away. ) and Dizziness (Pt says he has been experiencing vertigo since last Friday. Pt says that this is the first time that he has gotten out of bed and out of the house. )      Assessment & Plan     Assessment & Plan  1. Dizziness.  - Reports severe dizziness starting on 2025, accompanied by tinnitus, nystagmus, and episodes of vomiting.  - Symptoms have improved but were persistent until 2025. This is the third episode in two years, suggesting a possible diagnosis of Benign Paroxysmal Positional Vertigo (BPPV).  However given the component of tinnitus Ménière's disease would also be on the differential.  - Advised to monitor symptoms and avoid sudden movements that may trigger dizziness.  I would recommend reattempting the Epley maneuver which we have discussed in the past  - No current dizziness reported during the visit.    2. Shortness of breath.  - Reports episodes of shortness of breath, particularly upon waking. His wife, a nurse, has noted that his breathing sounds congested.  - Physical examination reveals no wheezing or abnormal breath sounds.  - Prescription for albuterol inhaler provided for use as needed.  - Laboratory tests, including BNP, will be conducted today to evaluate for potential heart failure or fluid retention.    3. Medication management.  - Currently taking tramadol and requires a controlled substance agreement.  - Last filled prescription on 2025.  - Urine test will be conducted today to update the contract and ensure compliance.  - No other medication refills requested during the visit.    ASSESSMENT/PLAN:  1. SOB (shortness of breath)  -     Brain Natriuretic Peptide; Future  -     albuterol sulfate HFA (VENTOLIN HFA) 108 (90 Base) MCG/ACT inhaler; Inhale 2 puffs into  FOLLOW UP WITH YOUR DOCTOR IN THE NEXT 24 HOURS FOR A RECHECK, AVOID EAR BUDS AND LOUD MUSIC.  RETURN TO THE ER IF SYMPTOMS WORSEN OR IF YOU HAVE FURTHER CONCERNS   TAKE ALL PREVIOUS AND ANY NEW MEDS AS PRESCRIBED       THE DISCHARGE INSTRUCTIONS ARE INTENDED AS A COMPLEMENT TO AND NOT A REPLACEMENT FOR THE VERBAL INSTRUCTIONS THAT I HAVE PROVIDED YOU TONIGHT. AFTER GOING OVER THE PLAN OF CARE TONIGHT, INCLUDING SIDE EFFECTS, ADVERSE REACTIONS OF ALL MEDICATIONS PRESCRIBED (THIS WILL BE PROVIDED TO YOU AT THE PHARMACY ALSO) AND PROVIDING YOU WITH THE VERBAL INSTRUCTIONS AT DISCHARGE YOU HAVE HAD THE OPPORTUNITY TO ASK FURTHER QUESTIONS AND TO CLARIFY UNCERTAINTIES. SINCE YOU HAVE NO FURTHER QUESTIONS PLEASE HAVE A WONDERFUL SAFE EVENING THANK YOU.

## 2025-05-27 ASSESSMENT — ENCOUNTER SYMPTOMS
BLOOD IN STOOL: 0
CHEST TIGHTNESS: 0
ABDOMINAL PAIN: 0

## 2025-06-02 ENCOUNTER — RESULTS FOLLOW-UP (OUTPATIENT)
Dept: PRIMARY CARE CLINIC | Age: 55
End: 2025-06-02

## 2025-06-03 ENCOUNTER — TRANSCRIBE ORDERS (OUTPATIENT)
Dept: ADMINISTRATIVE | Facility: HOSPITAL | Age: 55
End: 2025-06-03
Payer: MEDICAID

## 2025-06-06 RX ORDER — DULOXETIN HYDROCHLORIDE 60 MG/1
60 CAPSULE, DELAYED RELEASE ORAL DAILY
Qty: 90 CAPSULE | Refills: 1 | Status: SHIPPED | OUTPATIENT
Start: 2025-06-06

## 2025-06-23 ENCOUNTER — TELEPHONE (OUTPATIENT)
Dept: PSYCHIATRY | Age: 55
End: 2025-06-23

## 2025-06-23 NOTE — TELEPHONE ENCOUNTER
Called patient to remind them of their appointment   -Pt confirmed      Reminded patient of their     copay for their appointment. Reminded patient to complete their visit pre-check/digital registration in [employer].    Electronically signed by Alicia D Giraldo-Reyes on 6/23/2025 at 2:37 PM

## 2025-06-24 ENCOUNTER — SCHEDULED TELEPHONE ENCOUNTER (OUTPATIENT)
Dept: PSYCHIATRY | Age: 55
End: 2025-06-24
Payer: MEDICAID

## 2025-06-24 ENCOUNTER — TELEPHONE (OUTPATIENT)
Dept: PSYCHIATRY | Age: 55
End: 2025-06-24

## 2025-06-24 DIAGNOSIS — Z79.899 HIGH RISK MEDICATION USE: ICD-10-CM

## 2025-06-24 DIAGNOSIS — G47.00 INSOMNIA, UNSPECIFIED TYPE: ICD-10-CM

## 2025-06-24 DIAGNOSIS — F39 UNSPECIFIED MOOD (AFFECTIVE) DISORDER: Primary | ICD-10-CM

## 2025-06-24 DIAGNOSIS — F42.9 OBSESSIVE-COMPULSIVE DISORDER, UNSPECIFIED TYPE: ICD-10-CM

## 2025-06-24 DIAGNOSIS — F41.1 GENERALIZED ANXIETY DISORDER: ICD-10-CM

## 2025-06-24 DIAGNOSIS — R41.3 MEMORY IMPAIRMENT: ICD-10-CM

## 2025-06-24 PROCEDURE — 99215 OFFICE O/P EST HI 40 MIN: CPT | Performed by: PSYCHIATRY & NEUROLOGY

## 2025-06-24 RX ORDER — DULOXETIN HYDROCHLORIDE 20 MG/1
CAPSULE, DELAYED RELEASE ORAL
Qty: 49 CAPSULE | Refills: 0 | Status: SHIPPED | OUTPATIENT
Start: 2025-06-24 | End: 2025-08-05

## 2025-06-24 RX ORDER — TRAZODONE HYDROCHLORIDE 100 MG/1
100 TABLET ORAL NIGHTLY
Qty: 90 TABLET | Refills: 3 | Status: SHIPPED | OUTPATIENT
Start: 2025-06-24 | End: 2025-09-22

## 2025-06-24 ASSESSMENT — PATIENT HEALTH QUESTIONNAIRE - PHQ9: DEPRESSION UNABLE TO ASSESS: FUNCTIONAL CAPACITY MOTIVATION LIMITS ACCURACY

## 2025-06-24 NOTE — PROGRESS NOTES
6/24/2025 2:08 PM   Progress Note      On this date 6/24/2025 I have spent 22 minutes reviewing previous notes, test results, and other pertinent medical information with the patient, discussing the diagnosis and importance of compliance with the treatment plan as well as documenting on the day of the visit.    Toan Chacon was evaluated through a synchronous (real-time) audio encounter. Patient identification was verified at the start of the visit. He (or guardian if applicable) is aware that this is a billable service, which includes applicable co-pays. This visit was conducted with the patient's (and/or legal guardian's) verbal consent. He has not had a related appointment within my department in the past 7 days or scheduled within the next 24 hours.   The patient was located at Home: [address]  [place] [address].  The provider was located at Facility (Appt Dept): [address] [place] [address]  [address]  [place] [address].  Confirm you are appropriately licensed, registered, or certified to deliver care in the Critical access hospital where the patient is located as indicated above. If you are not or unsure, please re-schedule the visit: Yes, I confirm.     Note: not billable if this call serves to triage the patient into an appointment for the relevant concern    Toan Chacon is a 55 y.o. male evaluated via telephone on 6/24/2025 for Depression and Insomnia  .        Tessie Bae MD       Toan Chacon 1970      Chief Complaint   Patient presents with    Depression    Insomnia         Subjective:    55 y.o. white male diagnosed with MDD, CHARITO, insomnia, OCD, memory impairment, gout, renal impairment, presents for follow-up. On Cymbalta, Risperdal, trazodone, melatonin.  Reports compliance.  Excessive sedation and trazodone.  Noted elevated creatinine.  He reports weight loss.    Patient is calm and cooperative on the phone.  Overall doing okay.  Stable mood.  He is no longer hearing voices.  Fair sleep.  No

## 2025-06-24 NOTE — TELEPHONE ENCOUNTER
Called patient to check in for phone appt.    Electronically signed by Alicia D Giraldo-Reyes on 6/24/2025 at 1:22 PM

## 2025-07-01 ENCOUNTER — TELEPHONE (OUTPATIENT)
Dept: PSYCHIATRY | Age: 55
End: 2025-07-01

## 2025-07-01 ASSESSMENT — PATIENT HEALTH QUESTIONNAIRE - PHQ9
10. IF YOU CHECKED OFF ANY PROBLEMS, HOW DIFFICULT HAVE THESE PROBLEMS MADE IT FOR YOU TO DO YOUR WORK, TAKE CARE OF THINGS AT HOME, OR GET ALONG WITH OTHER PEOPLE: SOMEWHAT DIFFICULT
6. FEELING BAD ABOUT YOURSELF - OR THAT YOU ARE A FAILURE OR HAVE LET YOURSELF OR YOUR FAMILY DOWN: MORE THAN HALF THE DAYS
1. LITTLE INTEREST OR PLEASURE IN DOING THINGS: SEVERAL DAYS
SUM OF ALL RESPONSES TO PHQ QUESTIONS 1-9: 11
SUM OF ALL RESPONSES TO PHQ QUESTIONS 1-9: 11
4. FEELING TIRED OR HAVING LITTLE ENERGY: SEVERAL DAYS
9. THOUGHTS THAT YOU WOULD BE BETTER OFF DEAD, OR OF HURTING YOURSELF: NOT AT ALL
2. FEELING DOWN, DEPRESSED OR HOPELESS: MORE THAN HALF THE DAYS
1. LITTLE INTEREST OR PLEASURE IN DOING THINGS: SEVERAL DAYS
4. FEELING TIRED OR HAVING LITTLE ENERGY: SEVERAL DAYS
8. MOVING OR SPEAKING SO SLOWLY THAT OTHER PEOPLE COULD HAVE NOTICED. OR THE OPPOSITE - BEING SO FIDGETY OR RESTLESS THAT YOU HAVE BEEN MOVING AROUND A LOT MORE THAN USUAL: NOT AT ALL
3. TROUBLE FALLING OR STAYING ASLEEP: MORE THAN HALF THE DAYS
5. POOR APPETITE OR OVEREATING: MORE THAN HALF THE DAYS
SUM OF ALL RESPONSES TO PHQ QUESTIONS 1-9: 11
SUM OF ALL RESPONSES TO PHQ QUESTIONS 1-9: 11
10. IF YOU CHECKED OFF ANY PROBLEMS, HOW DIFFICULT HAVE THESE PROBLEMS MADE IT FOR YOU TO DO YOUR WORK, TAKE CARE OF THINGS AT HOME, OR GET ALONG WITH OTHER PEOPLE: SOMEWHAT DIFFICULT
6. FEELING BAD ABOUT YOURSELF - OR THAT YOU ARE A FAILURE OR HAVE LET YOURSELF OR YOUR FAMILY DOWN: MORE THAN HALF THE DAYS
2. FEELING DOWN, DEPRESSED OR HOPELESS: MORE THAN HALF THE DAYS
5. POOR APPETITE OR OVEREATING: MORE THAN HALF THE DAYS
9. THOUGHTS THAT YOU WOULD BE BETTER OFF DEAD, OR OF HURTING YOURSELF: NOT AT ALL
7. TROUBLE CONCENTRATING ON THINGS, SUCH AS READING THE NEWSPAPER OR WATCHING TELEVISION: SEVERAL DAYS
7. TROUBLE CONCENTRATING ON THINGS, SUCH AS READING THE NEWSPAPER OR WATCHING TELEVISION: SEVERAL DAYS
3. TROUBLE FALLING OR STAYING ASLEEP: MORE THAN HALF THE DAYS
8. MOVING OR SPEAKING SO SLOWLY THAT OTHER PEOPLE COULD HAVE NOTICED. OR THE OPPOSITE, BEING SO FIGETY OR RESTLESS THAT YOU HAVE BEEN MOVING AROUND A LOT MORE THAN USUAL: NOT AT ALL
SUM OF ALL RESPONSES TO PHQ QUESTIONS 1-9: 11

## 2025-07-01 ASSESSMENT — ANXIETY QUESTIONNAIRES
1. FEELING NERVOUS, ANXIOUS, OR ON EDGE: SEVERAL DAYS
1. FEELING NERVOUS, ANXIOUS, OR ON EDGE: SEVERAL DAYS
IF YOU CHECKED OFF ANY PROBLEMS ON THIS QUESTIONNAIRE, HOW DIFFICULT HAVE THESE PROBLEMS MADE IT FOR YOU TO DO YOUR WORK, TAKE CARE OF THINGS AT HOME, OR GET ALONG WITH OTHER PEOPLE: SOMEWHAT DIFFICULT
7. FEELING AFRAID AS IF SOMETHING AWFUL MIGHT HAPPEN: MORE THAN HALF THE DAYS
3. WORRYING TOO MUCH ABOUT DIFFERENT THINGS: SEVERAL DAYS
5. BEING SO RESTLESS THAT IT IS HARD TO SIT STILL: MORE THAN HALF THE DAYS
6. BECOMING EASILY ANNOYED OR IRRITABLE: MORE THAN HALF THE DAYS
GAD7 TOTAL SCORE: 12
4. TROUBLE RELAXING: MORE THAN HALF THE DAYS
2. NOT BEING ABLE TO STOP OR CONTROL WORRYING: MORE THAN HALF THE DAYS
3. WORRYING TOO MUCH ABOUT DIFFERENT THINGS: SEVERAL DAYS
4. TROUBLE RELAXING: MORE THAN HALF THE DAYS
2. NOT BEING ABLE TO STOP OR CONTROL WORRYING: MORE THAN HALF THE DAYS
6. BECOMING EASILY ANNOYED OR IRRITABLE: MORE THAN HALF THE DAYS
7. FEELING AFRAID AS IF SOMETHING AWFUL MIGHT HAPPEN: MORE THAN HALF THE DAYS
IF YOU CHECKED OFF ANY PROBLEMS ON THIS QUESTIONNAIRE, HOW DIFFICULT HAVE THESE PROBLEMS MADE IT FOR YOU TO DO YOUR WORK, TAKE CARE OF THINGS AT HOME, OR GET ALONG WITH OTHER PEOPLE: SOMEWHAT DIFFICULT
5. BEING SO RESTLESS THAT IT IS HARD TO SIT STILL: MORE THAN HALF THE DAYS

## 2025-07-01 NOTE — TELEPHONE ENCOUNTER
Called patient to remind them of their appointment   -Pt confirmed      Reminded patient of their     copay for their appointment. Reminded patient to complete their visit pre-check/digital registration in [employer].    Electronically signed by Alicia D Giraldo-Reyes on 7/1/2025 at 3:44 PM

## 2025-07-02 ENCOUNTER — TELEMEDICINE (OUTPATIENT)
Dept: PSYCHIATRY | Age: 55
End: 2025-07-02

## 2025-07-02 DIAGNOSIS — F33.0 MILD RECURRENT MAJOR DEPRESSION: Primary | ICD-10-CM

## 2025-07-02 DIAGNOSIS — F42.2 MIXED OBSESSIONAL THOUGHTS AND ACTS: ICD-10-CM

## 2025-07-02 DIAGNOSIS — F41.1 GENERALIZED ANXIETY DISORDER: ICD-10-CM

## 2025-07-02 PROCEDURE — 90832 PSYTX W PT 30 MINUTES: CPT | Performed by: COUNSELOR

## 2025-07-02 NOTE — PROGRESS NOTES
Toan Chacon, was evaluated through a synchronous (real-time) audio-video encounter. The patient (or guardian if applicable) is aware that this is a billable service, which includes applicable co-pays. This Virtual Visit was conducted with patient's (and/or legal guardian's) consent. Patient identification was verified, and a caregiver was present when appropriate.   The patient was located at Home: [address]  [place] [address]  Provider was located at Facility (Appt Dept): [address] [place] [address].  [address]  [place] [id number]  Confirm you are appropriately licensed, registered, or certified to deliver care in the state where the patient is located as indicated above. If you are not or unsure, please re-schedule the visit: Yes, I confirm.      Total time spent for this encounter: 35    --RADHA Jacome on 7/2/2025 at 1:18 PM    An electronic signature was used to authenticate this note.    Therapy Progress Note  RADHA Jacome   7/2/2025    Patient location  :Home  [facility] location : [facility]      Total time spent: 35 minutes  This is patient's tenth  Therapy appointment.  Chief Complaint:  depression, anxiety, and stress  Referring Provider: No referring provider defined for this encounter.      Toan Chacon ,a 55 y.o. male, for follow up visit.     Pt provided informed consent for the behavioral health program. Discussed with patient model of service to include the limits of confidentiality (i.e. abuse reporting, suicide intervention, etc.) and short-term intervention focused approach.  Discussed no show and late cancellation policy.  Pt indicated understanding.    S:  Provider seeing patient in follow-up for depression/anxiety/stress.  [facility].  Patient reported having \"high anxiety\" currently, is \"dreading\" returning back to court next month, will find out if he will be going to longterm or allowed to do mental health court.  Patient

## 2025-07-31 ENCOUNTER — TELEPHONE (OUTPATIENT)
Dept: PSYCHIATRY | Age: 55
End: 2025-07-31

## 2025-07-31 NOTE — TELEPHONE ENCOUNTER
Pt called to cancel/reschedule his appt with Yuly Huggins for 08/05/25 because his insurance lapsed and will not be able to do the appt.    Rescheduled for 09/03/25 @ 3:00.    Electronically signed by Tammy Way MA on 7/31/2025 at 2:12 PM

## 2025-09-02 ENCOUNTER — TELEPHONE (OUTPATIENT)
Dept: PSYCHIATRY | Age: 55
End: 2025-09-02

## 2025-09-03 ENCOUNTER — TELEPHONE (OUTPATIENT)
Dept: PSYCHIATRY | Age: 55
End: 2025-09-03

## 2025-09-03 ENCOUNTER — TELEMEDICINE (OUTPATIENT)
Dept: PSYCHIATRY | Age: 55
End: 2025-09-03

## 2025-09-03 DIAGNOSIS — F39 UNSPECIFIED MOOD (AFFECTIVE) DISORDER: Primary | ICD-10-CM

## 2025-09-03 DIAGNOSIS — F41.1 GENERALIZED ANXIETY DISORDER: ICD-10-CM

## 2025-09-03 DIAGNOSIS — F42.9 OBSESSIVE-COMPULSIVE DISORDER, UNSPECIFIED TYPE: ICD-10-CM

## 2025-09-03 PROCEDURE — 90834 PSYTX W PT 45 MINUTES: CPT | Performed by: COUNSELOR

## 2025-09-03 ASSESSMENT — ANXIETY QUESTIONNAIRES
2. NOT BEING ABLE TO STOP OR CONTROL WORRYING: SEVERAL DAYS
6. BECOMING EASILY ANNOYED OR IRRITABLE: SEVERAL DAYS
GAD7 TOTAL SCORE: 8
7. FEELING AFRAID AS IF SOMETHING AWFUL MIGHT HAPPEN: NEARLY EVERY DAY
IF YOU CHECKED OFF ANY PROBLEMS ON THIS QUESTIONNAIRE, HOW DIFFICULT HAVE THESE PROBLEMS MADE IT FOR YOU TO DO YOUR WORK, TAKE CARE OF THINGS AT HOME, OR GET ALONG WITH OTHER PEOPLE: SOMEWHAT DIFFICULT
7. FEELING AFRAID AS IF SOMETHING AWFUL MIGHT HAPPEN: NEARLY EVERY DAY
1. FEELING NERVOUS, ANXIOUS, OR ON EDGE: SEVERAL DAYS
3. WORRYING TOO MUCH ABOUT DIFFERENT THINGS: SEVERAL DAYS
6. BECOMING EASILY ANNOYED OR IRRITABLE: SEVERAL DAYS
5. BEING SO RESTLESS THAT IT IS HARD TO SIT STILL: NOT AT ALL
IF YOU CHECKED OFF ANY PROBLEMS ON THIS QUESTIONNAIRE, HOW DIFFICULT HAVE THESE PROBLEMS MADE IT FOR YOU TO DO YOUR WORK, TAKE CARE OF THINGS AT HOME, OR GET ALONG WITH OTHER PEOPLE: SOMEWHAT DIFFICULT
3. WORRYING TOO MUCH ABOUT DIFFERENT THINGS: SEVERAL DAYS
2. NOT BEING ABLE TO STOP OR CONTROL WORRYING: SEVERAL DAYS
4. TROUBLE RELAXING: SEVERAL DAYS
4. TROUBLE RELAXING: SEVERAL DAYS
5. BEING SO RESTLESS THAT IT IS HARD TO SIT STILL: NOT AT ALL
1. FEELING NERVOUS, ANXIOUS, OR ON EDGE: SEVERAL DAYS

## 2025-09-03 ASSESSMENT — PATIENT HEALTH QUESTIONNAIRE - PHQ9
6. FEELING BAD ABOUT YOURSELF - OR THAT YOU ARE A FAILURE OR HAVE LET YOURSELF OR YOUR FAMILY DOWN: SEVERAL DAYS
2. FEELING DOWN, DEPRESSED OR HOPELESS: SEVERAL DAYS
SUM OF ALL RESPONSES TO PHQ QUESTIONS 1-9: 10
10. IF YOU CHECKED OFF ANY PROBLEMS, HOW DIFFICULT HAVE THESE PROBLEMS MADE IT FOR YOU TO DO YOUR WORK, TAKE CARE OF THINGS AT HOME, OR GET ALONG WITH OTHER PEOPLE: SOMEWHAT DIFFICULT
4. FEELING TIRED OR HAVING LITTLE ENERGY: SEVERAL DAYS
7. TROUBLE CONCENTRATING ON THINGS, SUCH AS READING THE NEWSPAPER OR WATCHING TELEVISION: SEVERAL DAYS
7. TROUBLE CONCENTRATING ON THINGS, SUCH AS READING THE NEWSPAPER OR WATCHING TELEVISION: SEVERAL DAYS
6. FEELING BAD ABOUT YOURSELF - OR THAT YOU ARE A FAILURE OR HAVE LET YOURSELF OR YOUR FAMILY DOWN: SEVERAL DAYS
SUM OF ALL RESPONSES TO PHQ QUESTIONS 1-9: 10
9. THOUGHTS THAT YOU WOULD BE BETTER OFF DEAD, OR OF HURTING YOURSELF: NOT AT ALL
8. MOVING OR SPEAKING SO SLOWLY THAT OTHER PEOPLE COULD HAVE NOTICED. OR THE OPPOSITE - BEING SO FIDGETY OR RESTLESS THAT YOU HAVE BEEN MOVING AROUND A LOT MORE THAN USUAL: NOT AT ALL
5. POOR APPETITE OR OVEREATING: NEARLY EVERY DAY
9. THOUGHTS THAT YOU WOULD BE BETTER OFF DEAD, OR OF HURTING YOURSELF: NOT AT ALL
SUM OF ALL RESPONSES TO PHQ QUESTIONS 1-9: 10
3. TROUBLE FALLING OR STAYING ASLEEP: MORE THAN HALF THE DAYS
2. FEELING DOWN, DEPRESSED OR HOPELESS: SEVERAL DAYS
SUM OF ALL RESPONSES TO PHQ QUESTIONS 1-9: 10
1. LITTLE INTEREST OR PLEASURE IN DOING THINGS: SEVERAL DAYS
5. POOR APPETITE OR OVEREATING: NEARLY EVERY DAY
4. FEELING TIRED OR HAVING LITTLE ENERGY: SEVERAL DAYS
10. IF YOU CHECKED OFF ANY PROBLEMS, HOW DIFFICULT HAVE THESE PROBLEMS MADE IT FOR YOU TO DO YOUR WORK, TAKE CARE OF THINGS AT HOME, OR GET ALONG WITH OTHER PEOPLE: SOMEWHAT DIFFICULT
3. TROUBLE FALLING OR STAYING ASLEEP: MORE THAN HALF THE DAYS
8. MOVING OR SPEAKING SO SLOWLY THAT OTHER PEOPLE COULD HAVE NOTICED. OR THE OPPOSITE, BEING SO FIGETY OR RESTLESS THAT YOU HAVE BEEN MOVING AROUND A LOT MORE THAN USUAL: NOT AT ALL
1. LITTLE INTEREST OR PLEASURE IN DOING THINGS: SEVERAL DAYS
SUM OF ALL RESPONSES TO PHQ QUESTIONS 1-9: 10

## (undated) DEVICE — PK TURNOVER RM ADV

## (undated) DEVICE — SUT SILK 2/0 FS BLK 18IN 685G

## (undated) DEVICE — ZIMMER® STERILE DISPOSABLE TOURNIQUET CUFF WITH PLC, DUAL PORT, SINGLE BLADDER, 34 IN. (86 CM)

## (undated) DEVICE — SURGICAL PROCEDURE PACK LOWER EXTREMITY LOURDES HOSP

## (undated) DEVICE — ADHS SKIN PREMIERPRO EXOFIN TOPICAL HI/VISC .5ML

## (undated) DEVICE — SNAP KOVER: Brand: UNBRANDED

## (undated) DEVICE — CANNULA SEAL

## (undated) DEVICE — SUT MNCRYL 4/0 PS2 27IN UD MCP426H

## (undated) DEVICE — SYR LUERLOK 20CC

## (undated) DEVICE — AMBU AURA-I U SIZE 4, DISPOSABLE LARYNGEAL MASK: Brand: AURA-I

## (undated) DEVICE — BNDG,ELSTC,MATRIX,STRL,6"X5YD,LF,HOOK&LP: Brand: MEDLINE

## (undated) DEVICE — PENCL ES MEGADINE EZ/CLEAN BUTN W/HOLSTR 10FT

## (undated) DEVICE — SHEET,DRAPE,53X77,STERILE: Brand: MEDLINE

## (undated) DEVICE — VIOLET POLYDIOXANONE POLYMER, SYNTHETIC ABSORBABLE SUTURE CLIPS: Brand: LAPRA-TY

## (undated) DEVICE — BLADE SURG NO15 C STL DISPOSABLE ST

## (undated) DEVICE — TROC ANCHORPORT BLADELES W/GRIP 12X120MM

## (undated) DEVICE — SPNG GZ 2S 2X2 8PLY STRL PK/2

## (undated) DEVICE — PK POSTN TRENGUARD450 PROC

## (undated) DEVICE — SUTURE VCRL SZ 3-0 L27IN ABSRB UD L26MM SH 1/2 CIR J416H

## (undated) DEVICE — BIT DRL L110MM DIA2.5MM G QUIK CPL W/O STP REUSE

## (undated) DEVICE — WRAP AROUND LENS-STERLIE

## (undated) DEVICE — ST TBG AIRSEAL FLTR TRI LUM

## (undated) DEVICE — DRSNG FM MEPILEX LITE ABS THN 4X4IN

## (undated) DEVICE — SUT ETHLN 2/0 FS 18IN 664H

## (undated) DEVICE — TIP COVER ACCESSORY

## (undated) DEVICE — 3M™ STERI-STRIP™ REINFORCED ADHESIVE SKIN CLOSURES, R1547, 1/2 IN X 4 IN (12 MM X 100 MM), 6 STRIPS/ENVELOPE: Brand: 3M™ STERI-STRIP™

## (undated) DEVICE — SUT GUT CHRM 3/0 SH 27IN G122H

## (undated) DEVICE — INTENDED FOR TISSUE SEPARATION, AND OTHER PROCEDURES THAT REQUIRE A SHARP SURGICAL BLADE TO PUNCTURE OR CUT.: Brand: BARD-PARKER ® STAINLESS STEEL BLADES

## (undated) DEVICE — ENDOPOUCH RETRIEVER SPECIMEN RETRIEVAL BAGS: Brand: ENDOPOUCH RETRIEVER

## (undated) DEVICE — GLOVE SURG SZ 8 L12IN FNGR THK79MIL GRN LTX FREE

## (undated) DEVICE — C-ARMOR C-ARM EQUIPMENT COVERS CLEAR STERILE UNIVERSAL FIT 12 PER CASE: Brand: C-ARMOR

## (undated) DEVICE — KT CLN CLEANOR SCPE

## (undated) DEVICE — GLV SURG NEOLON 2G PF LF 7.5 STRL

## (undated) DEVICE — APPL HEMO SURG ARISTA/AH/FLEXITIP XL 38CM

## (undated) DEVICE — PAD MINOR UNIVERSAL: Brand: MEDLINE INDUSTRIES, INC.

## (undated) DEVICE — ANTIBACTERIAL UNDYED BRAIDED (POLYGLACTIN 910), SYNTHETIC ABSORBABLE SUTURE: Brand: COATED VICRYL

## (undated) DEVICE — BNDG ADHS CURAD FLX/FABRC 1X3IN STRL LF

## (undated) DEVICE — SUTURE ETHLN SZ 3-0 L18IN NONABSORBABLE BLK FS-1 L24MM 3/8 663H

## (undated) DEVICE — EQUISTREAM XK HEMODIALYSIS CATH W/STYLET, ST, AIRGUARD, 16 FR. 23CM: Brand: EQUISTREAM XK LONG-TERM HEMODIALYSIS CATHETER WITH PRELOADED STYLET

## (undated) DEVICE — BANDAGE COMPR W3INXL15FT BGE E SGL LAYERED CLP CLSR

## (undated) DEVICE — TBG INSUFFLATION LUER LOCK: Brand: MEDLINE INDUSTRIES, INC.

## (undated) DEVICE — SYS CLOSE PORTII CARTR/THOMASN XL

## (undated) DEVICE — GLOVE SURG SZ 8 CRM LTX FREE POLYISOPRENE POLYMER BEAD ANTI

## (undated) DEVICE — SUTURE VCRL SZ 2-0 L36IN ABSRB UD L36MM CT-1 1/2 CIR J945H

## (undated) DEVICE — APPL CHLORAPREP W/TINT 26ML ORNG

## (undated) DEVICE — BANDAGE CAST W4INXL5YD PLSTR OF PARIS FAST SET 5 8MIN LO

## (undated) DEVICE — DRN JP RND NO TROC SIL 15F 3/16IN

## (undated) DEVICE — CODMAN® SURGICAL STRIPS 1" X 6" (25MM X 152MM): Brand: CODMAN®

## (undated) DEVICE — ARM DRAPE

## (undated) DEVICE — PADDING UNDERCAST W4INXL4YD 100% COT CRIMPED FINISH WBRL II

## (undated) DEVICE — ENDOPATH XCEL WITH OPTIVIEW TECHNOLOGY BLADELESS TROCARS WITH STABILITY SLEEVES: Brand: ENDOPATH XCEL OPTIVIEW

## (undated) DEVICE — SYR SLP TP 10ML DISP

## (undated) DEVICE — GLV SURG TRIUMPH MICRO PF LTX 7.5 STRL

## (undated) DEVICE — GLV SURG BIOGEL M LTX PF 8

## (undated) DEVICE — CATHETER,FOLEY,SILI-ELAST,LTX,20FR,10ML: Brand: MEDLINE

## (undated) DEVICE — DAVINCI: Brand: MEDLINE INDUSTRIES, INC.

## (undated) DEVICE — SUT VIC 0 UR6 27IN VCP603H

## (undated) DEVICE — DRESSING GZ PETRO ST OVERWRAP 5X9IN XRFRM CURAD

## (undated) DEVICE — SOLUTION IV IRRIG POUR BRL 0.9% SODIUM CHL 2F7124

## (undated) DEVICE — RESERVOIR,SUCTION,100CC,SILICONE: Brand: MEDLINE

## (undated) DEVICE — SUT VICRYL 0 TIES J112T

## (undated) DEVICE — PROXIMATE RH ROTATING HEAD SKIN STAPLERS (35 WIDE) CONTAINS 35 STAINLESS STEEL STAPLES: Brand: PROXIMATE

## (undated) DEVICE — C-ARM: Brand: UNBRANDED

## (undated) DEVICE — CVR PROB GEN PURP W ISOSILK 6X48

## (undated) DEVICE — BLADELESS OBTURATOR: Brand: WECK VISTA

## (undated) DEVICE — MONOPOLAR METZENBAUM SCISSOR, MINI BLADE TIP, DISPOSABLE: Brand: MONOPOLAR METZENBAUM SCISSOR, MINI BLADE TIP, DISPOSABLE

## (undated) DEVICE — SYR PRECISIONGLIDE LL 5CC 20X1 1/2IN